# Patient Record
Sex: FEMALE | Race: BLACK OR AFRICAN AMERICAN | NOT HISPANIC OR LATINO | Employment: FULL TIME | URBAN - METROPOLITAN AREA
[De-identification: names, ages, dates, MRNs, and addresses within clinical notes are randomized per-mention and may not be internally consistent; named-entity substitution may affect disease eponyms.]

---

## 2017-11-29 PROBLEM — M35.00 SJOGREN'S SYNDROME: Status: ACTIVE | Noted: 2017-11-29

## 2017-11-29 PROBLEM — F41.1 GENERALIZED ANXIETY DISORDER: Status: ACTIVE | Noted: 2017-11-29

## 2017-11-29 PROBLEM — G40.909 SEIZURE DISORDER: Status: ACTIVE | Noted: 2017-11-29

## 2017-11-29 PROBLEM — G43.909 MIGRAINE WITHOUT STATUS MIGRAINOSUS, NOT INTRACTABLE: Status: ACTIVE | Noted: 2017-11-29

## 2017-12-11 ENCOUNTER — OFFICE VISIT (OUTPATIENT)
Dept: OTOLARYNGOLOGY | Facility: CLINIC | Age: 42
End: 2017-12-11
Payer: COMMERCIAL

## 2017-12-11 VITALS
SYSTOLIC BLOOD PRESSURE: 127 MMHG | HEART RATE: 79 BPM | TEMPERATURE: 98 F | WEIGHT: 237.44 LBS | BODY MASS INDEX: 38.32 KG/M2 | DIASTOLIC BLOOD PRESSURE: 91 MMHG

## 2017-12-11 DIAGNOSIS — M35.00 SJOGREN'S SYNDROME, WITH UNSPECIFIED ORGAN INVOLVEMENT: ICD-10-CM

## 2017-12-11 DIAGNOSIS — R09.A2 GLOBUS SENSATION: Primary | ICD-10-CM

## 2017-12-11 DIAGNOSIS — R49.0 HOARSENESS OF VOICE: ICD-10-CM

## 2017-12-11 PROCEDURE — 99204 OFFICE O/P NEW MOD 45 MIN: CPT | Mod: 25,S$GLB,, | Performed by: ORTHOPAEDIC SURGERY

## 2017-12-11 PROCEDURE — 31575 DIAGNOSTIC LARYNGOSCOPY: CPT | Mod: S$GLB,,, | Performed by: ORTHOPAEDIC SURGERY

## 2017-12-11 PROCEDURE — 99999 PR PBB SHADOW E&M-NEW PATIENT-LVL III: CPT | Mod: PBBFAC,,, | Performed by: ORTHOPAEDIC SURGERY

## 2017-12-11 NOTE — PROGRESS NOTES
Subjective:       Patient ID: Driss Lucia is a 42 y.o. female.    Chief Complaint: Lump in left side of throat    Patient is a very pleasant 42 year old female here to see me today for the first time for evaluation of a globus sensation.  She says that for the last several weeks she has had a constant sensation of there being something in her throat.  She has no difficulty swallowing either solids or liquids, and has no pain with swallowing.  She is a non-smoker.  She has not noted any reflux or heartburn.  She had a similar issue several years ago, and saw an ENT and at that time she was told it was related to her caffeine intake.  She decreased her caffeine, and it resolved at that time.  She now is drinking more caffeine.  She has had intermittent hoarseness over the last week.      Review of Systems   Constitutional: Negative for chills, fatigue, fever and unexpected weight change.   HENT: Positive for congestion, postnasal drip and voice change. Negative for dental problem, ear discharge, ear pain, facial swelling, hearing loss, nosebleeds, rhinorrhea, sinus pressure, sneezing, sore throat (globus sensation), tinnitus and trouble swallowing.    Eyes: Negative for redness, itching and visual disturbance.   Respiratory: Positive for shortness of breath (with exertion). Negative for cough, choking and wheezing.    Cardiovascular: Negative for chest pain and palpitations.   Gastrointestinal: Negative for abdominal pain.        No reflux.   Musculoskeletal: Negative for gait problem.   Skin: Negative for rash.   Neurological: Negative for dizziness, light-headedness and headaches.       Objective:      Physical Exam   Constitutional: She is oriented to person, place, and time. She appears well-developed and well-nourished. No distress.   HENT:   Head: Normocephalic and atraumatic.   Right Ear: Tympanic membrane, external ear and ear canal normal.   Left Ear: Tympanic membrane, external ear and ear canal  normal.   Nose: Nose normal. No mucosal edema, rhinorrhea, nasal deformity or septal deviation. No epistaxis. Right sinus exhibits no maxillary sinus tenderness and no frontal sinus tenderness. Left sinus exhibits no maxillary sinus tenderness and no frontal sinus tenderness.   Mouth/Throat: Uvula is midline, oropharynx is clear and moist and mucous membranes are normal. Mucous membranes are not pale and not dry. No dental caries. No oropharyngeal exudate or posterior oropharyngeal erythema.   Eyes: Conjunctivae, EOM and lids are normal. Pupils are equal, round, and reactive to light. Right eye exhibits no chemosis. Left eye exhibits no chemosis. Right conjunctiva is not injected. Left conjunctiva is not injected. No scleral icterus. Right eye exhibits normal extraocular motion and no nystagmus. Left eye exhibits normal extraocular motion and no nystagmus.   Neck: Trachea normal and phonation normal. No tracheal tenderness present. No tracheal deviation present. No thyroid mass and no thyromegaly present.   Cardiovascular: Intact distal pulses.    Pulmonary/Chest: Effort normal. No stridor. No respiratory distress.   Abdominal: She exhibits no distension.   Lymphadenopathy:        Head (right side): No submental, no submandibular, no preauricular, no posterior auricular and no occipital adenopathy present.        Head (left side): No submental, no submandibular, no preauricular, no posterior auricular and no occipital adenopathy present.     She has no cervical adenopathy.   Neurological: She is alert and oriented to person, place, and time. No cranial nerve deficit.   Skin: Skin is warm and dry. No rash noted. No erythema.   Psychiatric: She has a normal mood and affect. Her behavior is normal.       Due to indication in patient's history, presentation or risk factors,  a fiber optic exam was performed.    SEPARATE PROCEDURE NOTE:    ANESTHESIA:  Topical xylocaine with rogerio-synephrine  FINDINGS:  Normal  exam      PROCEDURE:  After verbal consent was obtained, the flexible scope was passed through the patient's nasal cavity without difficulty.  The nasopharynx (adenoid pad) and eustachian tube orifices were first visualized and were found to be normal, without masses or irregularity.  The posterior pharyngeal wall and base of tongue were then examined and no mass or irregular tissue was seen.  The scope was then advanced to the larynx, and the epiglottis, valleculae, and piriform sinuses were normal, without masses or mucosal irregularity.  The false vocal folds and true vocal folds were then examined and were found to have normal mobility (full abduction and adduction) and no masses or mucosal irregularity was seen.  The arytenoids and the interarytenoid area were normal.          Assessment:       1. Globus sensation    2. Hoarseness of voice    3. Sjogren's syndrome, with unspecified organ involvement        Plan:       1.  Globus sensation:  Discussed that her upper endoscopy today is normal, and does not show any worrisome ulcers or masses as the cause for her symptoms.  She had similar symptoms several years ago, and she noted that her previous throat issue resolved with decreasing caffeine.  She is again drinking lots of caffeine, will try and decrease.  She also has noted a postnasal drip, I would recommend she try Flonase sensimist daily for 3-4 weeks.  She has had increased life stress and anxiety, which can also worsen symptoms.  If not resolved in 2-3 weeks with above measures, would then consider esophagoscopy with GI.  2.  Hoarseness:  As above.  Normal laryngeal exam.  3.  Sjogren's:  Awaiting rheumatology appointment.  I would recommend she decrease caffeine and increase hydration.

## 2017-12-11 NOTE — LETTER
December 11, 2017      Jenny Dixon MD  [address] [facility] [address]           [facility]  [address] [place] [id number]  Phone: [phone]  Fax: [phone]          Patient: Driss Lucia   MR Number: [id number]   YOB: 1975   Date of Visit: 12/11/2017       Dear Dr. Jenny Dixon:    Thank you for referring Driss Lucia to me for evaluation. Attached you will find relevant portions of my assessment and plan of care.    If you have questions, please do not hesitate to call me. I look forward to following Driss Lucia along with you.    Sincerely,    Queenie Lopes MD    Enclosure  CC:  No Recipients    If you would like to receive this communication electronically, please contact [email or web address] or [phone] to request more information on [facility] Link access.    For providers and/or their staff who would like to refer a patient to [facility], please contact us through our one-stop-shop provider referral line, [facility], at [phone].    If you feel you have received this communication in error or would no longer like to receive these types of communications, please e-mail [email or web address]

## 2017-12-14 ENCOUNTER — TELEPHONE (OUTPATIENT)
Dept: RHEUMATOLOGY | Facility: CLINIC | Age: 42
End: 2017-12-14

## 2017-12-14 NOTE — TELEPHONE ENCOUNTER
Called patient regarding referral from Dr. Dixon, no answer, left message for pt to call clinic back.

## 2017-12-15 ENCOUNTER — TELEPHONE (OUTPATIENT)
Dept: RHEUMATOLOGY | Facility: CLINIC | Age: 42
End: 2017-12-15

## 2017-12-15 NOTE — TELEPHONE ENCOUNTER
Spoke with pt and scheduled a new patient appointment with Dr. Morris for 2.1.18 at 8.30 am. Pt verbalized understanding. Will mail apt slip.

## 2017-12-15 NOTE — TELEPHONE ENCOUNTER
----- Message from Carole Cornelius sent at 12/15/2017  9:49 AM CST -----  Contact: Self  Patient returning call. Please call back at [phone].      Thanks,  Carole Cornelius

## 2018-02-01 ENCOUNTER — LAB VISIT (OUTPATIENT)
Dept: LAB | Facility: HOSPITAL | Age: 43
End: 2018-02-01
Attending: INTERNAL MEDICINE
Payer: COMMERCIAL

## 2018-02-01 ENCOUNTER — OFFICE VISIT (OUTPATIENT)
Dept: RHEUMATOLOGY | Facility: CLINIC | Age: 43
End: 2018-02-01
Payer: COMMERCIAL

## 2018-02-01 VITALS
DIASTOLIC BLOOD PRESSURE: 82 MMHG | HEIGHT: 66 IN | WEIGHT: 240.5 LBS | BODY MASS INDEX: 38.65 KG/M2 | SYSTOLIC BLOOD PRESSURE: 122 MMHG | HEART RATE: 82 BPM

## 2018-02-01 DIAGNOSIS — M35.01 SJOGREN'S SYNDROME WITH KERATOCONJUNCTIVITIS SICCA: ICD-10-CM

## 2018-02-01 DIAGNOSIS — M35.01 SJOGREN'S SYNDROME WITH KERATOCONJUNCTIVITIS SICCA: Primary | ICD-10-CM

## 2018-02-01 LAB
ALBUMIN SERPL BCP-MCNC: 3.9 G/DL
ALP SERPL-CCNC: 52 U/L
ALT SERPL W/O P-5'-P-CCNC: 16 U/L
ANION GAP SERPL CALC-SCNC: 8 MMOL/L
AST SERPL-CCNC: 13 U/L
BASOPHILS # BLD AUTO: 0.02 K/UL
BASOPHILS NFR BLD: 0.2 %
BILIRUB SERPL-MCNC: 0.4 MG/DL
BUN SERPL-MCNC: 14 MG/DL
C3 SERPL-MCNC: 141 MG/DL
C4 SERPL-MCNC: 22 MG/DL
CALCIUM SERPL-MCNC: 9.5 MG/DL
CCP AB SER IA-ACNC: 1.2 U/ML
CHLORIDE SERPL-SCNC: 102 MMOL/L
CO2 SERPL-SCNC: 28 MMOL/L
CREAT SERPL-MCNC: 1 MG/DL
DIFFERENTIAL METHOD: ABNORMAL
EOSINOPHIL # BLD AUTO: 0.1 K/UL
EOSINOPHIL NFR BLD: 1.2 %
ERYTHROCYTE [DISTWIDTH] IN BLOOD BY AUTOMATED COUNT: 14 %
EST. GFR  (AFRICAN AMERICAN): >60 ML/MIN/1.73 M^2
EST. GFR  (NON AFRICAN AMERICAN): >60 ML/MIN/1.73 M^2
GLUCOSE SERPL-MCNC: 86 MG/DL
HCT VFR BLD AUTO: 35.8 %
HGB BLD-MCNC: 11.5 G/DL
LYMPHOCYTES # BLD AUTO: 3.1 K/UL
LYMPHOCYTES NFR BLD: 31.5 %
MCH RBC QN AUTO: 27.3 PG
MCHC RBC AUTO-ENTMCNC: 32.1 G/DL
MCV RBC AUTO: 85 FL
MONOCYTES # BLD AUTO: 0.7 K/UL
MONOCYTES NFR BLD: 7.5 %
NEUTROPHILS # BLD AUTO: 5.9 K/UL
NEUTROPHILS NFR BLD: 59.6 %
PLATELET # BLD AUTO: 337 K/UL
PMV BLD AUTO: 8.8 FL
POTASSIUM SERPL-SCNC: 3.6 MMOL/L
PROT SERPL-MCNC: 8.3 G/DL
RBC # BLD AUTO: 4.21 M/UL
SODIUM SERPL-SCNC: 138 MMOL/L
T4 FREE SERPL-MCNC: 1.02 NG/DL
TSH SERPL DL<=0.005 MIU/L-ACNC: 1.58 UIU/ML
WBC # BLD AUTO: 9.86 K/UL

## 2018-02-01 PROCEDURE — 86334 IMMUNOFIX E-PHORESIS SERUM: CPT | Mod: 26,,, | Performed by: PATHOLOGY

## 2018-02-01 PROCEDURE — 99999 PR PBB SHADOW E&M-EST. PATIENT-LVL III: CPT | Mod: PBBFAC,,, | Performed by: INTERNAL MEDICINE

## 2018-02-01 PROCEDURE — 80053 COMPREHEN METABOLIC PANEL: CPT | Mod: PO

## 2018-02-01 PROCEDURE — 84443 ASSAY THYROID STIM HORMONE: CPT

## 2018-02-01 PROCEDURE — 80074 ACUTE HEPATITIS PANEL: CPT

## 2018-02-01 PROCEDURE — 86334 IMMUNOFIX E-PHORESIS SERUM: CPT

## 2018-02-01 PROCEDURE — 84165 PROTEIN E-PHORESIS SERUM: CPT

## 2018-02-01 PROCEDURE — 36415 COLL VENOUS BLD VENIPUNCTURE: CPT | Mod: PO

## 2018-02-01 PROCEDURE — 85613 RUSSELL VIPER VENOM DILUTED: CPT

## 2018-02-01 PROCEDURE — 3008F BODY MASS INDEX DOCD: CPT | Mod: S$GLB,,, | Performed by: INTERNAL MEDICINE

## 2018-02-01 PROCEDURE — 99204 OFFICE O/P NEW MOD 45 MIN: CPT | Mod: S$GLB,,, | Performed by: INTERNAL MEDICINE

## 2018-02-01 PROCEDURE — 86146 BETA-2 GLYCOPROTEIN ANTIBODY: CPT

## 2018-02-01 PROCEDURE — 84439 ASSAY OF FREE THYROXINE: CPT

## 2018-02-01 PROCEDURE — 86235 NUCLEAR ANTIGEN ANTIBODY: CPT

## 2018-02-01 PROCEDURE — 84165 PROTEIN E-PHORESIS SERUM: CPT | Mod: 26,,, | Performed by: PATHOLOGY

## 2018-02-01 PROCEDURE — 86147 CARDIOLIPIN ANTIBODY EA IG: CPT

## 2018-02-01 PROCEDURE — 86038 ANTINUCLEAR ANTIBODIES: CPT

## 2018-02-01 PROCEDURE — 86160 COMPLEMENT ANTIGEN: CPT | Mod: 59

## 2018-02-01 PROCEDURE — 86235 NUCLEAR ANTIGEN ANTIBODY: CPT | Mod: 59

## 2018-02-01 PROCEDURE — 86200 CCP ANTIBODY: CPT

## 2018-02-01 PROCEDURE — 86160 COMPLEMENT ANTIGEN: CPT

## 2018-02-01 PROCEDURE — 86039 ANTINUCLEAR ANTIBODIES (ANA): CPT

## 2018-02-01 PROCEDURE — 85025 COMPLETE CBC W/AUTO DIFF WBC: CPT | Mod: PO

## 2018-02-01 RX ORDER — PREDNISONE 20 MG/1
TABLET ORAL
COMMUNITY
Start: 2018-01-26 | End: 2018-02-07

## 2018-02-01 RX ORDER — CETIRIZINE HYDROCHLORIDE 10 MG/1
TABLET ORAL
Refills: 0 | COMMUNITY
Start: 2018-01-26 | End: 2018-02-07

## 2018-02-01 RX ORDER — GABAPENTIN 100 MG/1
100 CAPSULE ORAL NIGHTLY
Qty: 30 CAPSULE | Refills: 5 | Status: SHIPPED | OUTPATIENT
Start: 2018-02-01 | End: 2018-03-26

## 2018-02-01 RX ORDER — HYDROXYCHLOROQUINE SULFATE 200 MG/1
200 TABLET, FILM COATED ORAL 2 TIMES DAILY
Qty: 60 TABLET | Refills: 6 | Status: SHIPPED | OUTPATIENT
Start: 2018-02-01 | End: 2018-07-16 | Stop reason: SDUPTHER

## 2018-02-01 RX ORDER — BENZONATATE 200 MG/1
CAPSULE ORAL
COMMUNITY
Start: 2018-01-26 | End: 2018-02-09

## 2018-02-01 RX ORDER — DOXYCYCLINE 100 MG/1
CAPSULE ORAL
COMMUNITY
Start: 2018-01-26 | End: 2018-02-07

## 2018-02-01 NOTE — PATIENT INSTRUCTIONS
Hydroxychloroquine tablets  What is this medicine?  HYDROXYCHLOROQUINE (shellie drox ee KLOR oh kwin) is used to treat rheumatoid arthritis and systemic lupus erythematosus. It is also used to treat malaria.  How should I use this medicine?  Take this medicine by mouth with a glass of water. Follow the directions on the prescription label. If this medicine upsets your stomach take it with food or milk. Take your doses at regular intervals. Do not take your medicine more often than directed.  Talk to your pediatrician regarding the use of this medicine in children. Special care may be needed.  What side effects may I notice from receiving this medicine?  Side effects that you should report to your doctor or health care professional as soon as possible:  · allergic reactions like skin rash, itching or hives, swelling of the face, lips, or tongue  · change in vision  · fever, infection  · hearing loss or ringing  · muscle weakness, tremor, or numbness  · redness, blistering, peeling or loosening of the skin, including inside the mouth  · seizures  · unusual bleeding or bruising  · unusually weak or tired  Side effects that usually do not require medical attention (report to your doctor or health care professional if they continue or are bothersome):  · change in coloration of the mouth or skin  · dizziness  · hair loss, lightening  · headache  · irritability, nervousness, nightmares  · loss of appetite  · stomach upset, diarrhea  What may interact with this medicine?  · antacids  · botulinum toxins  · digoxin  · kaolin  · penicillamine  What if I miss a dose?  If you miss a dose, take it as soon as you can. If it is almost time for your next dose, take only that dose. Do not take double or extra doses.  Where should I keep my medicine?  Keep out of the reach of children. In children, this medicine can cause overdose with small doses.  Store at room temperature between 15 and 30 degrees C (59 and 86 degrees F). Protect  from moisture and light. Throw away any unused medicine after the expiration date.  What should I tell my health care provider before I take this medicine?  They need to know if you have any of these conditions:  · alcoholism  · anemia or other blood disorder  · eye disease  · glucose 6-phosphate dehydrogenase (G6PD) deficiency  · liver disease  · porphyria  · psoriasis  · an unusual or allergic reaction to chloroquine, hydroxychloroquine, other medicines, foods, dyes, or preservatives  · pregnant or trying to get pregnant  · breast-feeding  What should I watch for while using this medicine?  Visit your doctor or health care professional for regular check ups. Tell your doctor if your symptoms do not improve. Arthritis symptoms may take several weeks to improve. If you are taking this medicine for a long time, you will need important blood work done. You will also need to have your eyes checked as directed.  This medicine can make you more sensitive to the sun. Keep out of the sun. If you cannot avoid being in the sun, wear protective clothing and use sunscreen. Do not use sun lamps or tanning beds/booths.  Avoid antacids and kaolin containing products for 2 hours before and after taking a dose of this medicine.  NOTE:This sheet is a summary. It may not cover all possible information. If you have questions about this medicine, talk to your doctor, pharmacist, or health care provider. Copyright© 2017 [employer] Standard        Gabapentin capsules or tablets  What is this medicine?  GABAPENTIN (GA ba pen tin) is used to control partial seizures in adults with epilepsy. It is also used to treat certain types of nerve pain.  How should I use this medicine?  Take this medicine by mouth with a glass of water. Follow the directions on the prescription label. You can take it with or without food. If it upsets your stomach, take it with food.Take your medicine at regular intervals. Do not take it more often than directed. Do not  stop taking except on your doctor's advice.  If you are directed to break the 600 or 800 mg tablets in half as part of your dose, the extra half tablet should be used for the next dose. If you have not used the extra half tablet within 28 days, it should be thrown away.  A special MedGuide will be given to you by the pharmacist with each prescription and refill. Be sure to read this information carefully each time.  Talk to your pediatrician regarding the use of this medicine in children. Special care may be needed.  What side effects may I notice from receiving this medicine?  Side effects that you should report to your doctor or health care professional as soon as possible:  · allergic reactions like skin rash, itching or hives, swelling of the face, lips, or tongue  · worsening of mood, thoughts or actions of suicide or dying  Side effects that usually do not require medical attention (report to your doctor or health care professional if they continue or are bothersome):  · constipation  · difficulty walking or controlling muscle movements  · dizziness  · nausea  · slurred speech  · tiredness  · tremors  · weight gain  What may interact with this medicine?  Do not take this medicine with any of the following medications:  · other gabapentin products  This medicine may also interact with the following medications:  · alcohol  · antacids  · antihistamines for allergy, cough and cold  · certain medicines for anxiety or sleep  · certain medicines for depression or psychotic disturbances  · homatropine; hydrocodone  · naproxen  · narcotic medicines (opiates) for pain  · phenothiazines like chlorpromazine, mesoridazine, prochlorperazine, thioridazine  What if I miss a dose?  If you miss a dose, take it as soon as you can. If it is almost time for your next dose, take only that dose. Do not take double or extra doses.  Where should I keep my medicine?  Keep out of reach of children.  This medicine may cause accidental  overdose and death if it taken by other adults, children, or pets. Mix any unused medicine with a substance like cat litter or coffee grounds. Then throw the medicine away in a sealed container like a sealed bag or a coffee can with a lid. Do not use the medicine after the expiration date.  Store at room temperature between 15 and 30 degrees C (59 and 86 degrees F).  What should I tell my health care provider before I take this medicine?  They need to know if you have any of these conditions:  · kidney disease  · suicidal thoughts, plans, or attempt; a previous suicide attempt by you or a family member  · an unusual or allergic reaction to gabapentin, other medicines, foods, dyes, or preservatives  · pregnant or trying to get pregnant  · breast-feeding  What should I watch for while using this medicine?  Visit your doctor or health care professional for regular checks on your progress. You may want to keep a record at home of how you feel your condition is responding to treatment. You may want to share this information with your doctor or health care professional at each visit. You should contact your doctor or health care professional if your seizures get worse or if you have any new types of seizures. Do not stop taking this medicine or any of your seizure medicines unless instructed by your doctor or health care professional. Stopping your medicine suddenly can increase your seizures or their severity.  Wear a medical identification bracelet or chain if you are taking this medicine for seizures, and carry a card that lists all your medications.  You may get drowsy, dizzy, or have blurred vision. Do not drive, use machinery, or do anything that needs mental alertness until you know how this medicine affects you. To reduce dizzy or fainting spells, do not sit or stand up quickly, especially if you are an older patient. Alcohol can increase drowsiness and dizziness. Avoid alcoholic drinks.  Your mouth may get dry.  Chewing sugarless gum or sucking hard candy, and drinking plenty of water will help.  The use of this medicine may increase the chance of suicidal thoughts or actions. Pay special attention to how you are responding while on this medicine. Any worsening of mood, or thoughts of suicide or dying should be reported to your health care professional right away.  Women who become pregnant while using this medicine may enroll in the [place] American Antiepileptic Drug Pregnancy Registry by calling 1-507.844.9473. This registry collects information about the safety of antiepileptic drug use during pregnancy.  NOTE:This sheet is a summary. It may not cover all possible information. If you have questions about this medicine, talk to your doctor, pharmacist, or health care provider. Copyright© 2017 [employer] Standard

## 2018-02-01 NOTE — PROGRESS NOTES
CC: routine f/u sjogrens       History of Present Illness:  Driss carvalho 42 y.o.yo female   Patient Active Problem List   Diagnosis    Seizure disorder    Sjogren's syndrome    Migraine without status migrainosus, not intractable    Generalized anxiety disorder     Referred for further evaluation of sjogrens syndrome by Jenny Dixon MD  she was diagnosed with sjogrens in  , when she presented with joint pains and swelling in hands/ feet . Associated with   Tingling/ numbnesss. She mentions of dry eyes and uses tear drops and they help. She has dry mouth and drinks lots of water.   + RP  She had reddish rash  Like burn on face in the past but none in last 1 year.   Today she mentions of arthralgias with involvement of  hands, feet , knees , shins, elbows .  Associated with early am stiffness , but then gets better as day goes by.  She takes aleve prn and it helps   She had 1 miscarriage and 1    Sometimes feels food struck in throat   She is on vit d supp  Mother has RA     Past medical history:  Asthma  Seizures , last 2 weeks ago     Past surgical history:  None     Social History   Substance Use Topics    Smoking status: Never Smoker    Smokeless tobacco: Never Used    Alcohol use Yes       Family History   Problem Relation Age of Onset    Arthritis Mother     Diabetes Father     Diabetes Maternal Grandmother     Hypertension Maternal Grandmother     Arthritis Maternal Grandmother     Diabetes Maternal Grandfather     Heart disease Maternal Grandfather     Hypertension Maternal Grandfather     Diabetes Paternal Grandmother     Heart disease Paternal Grandmother     Hypertension Paternal Grandmother     Heart disease Paternal Grandfather        Review of patient's allergies indicates:   Allergen Reactions    Sulfa (sulfonamide antibiotics) Swelling             Review of Systems:  Constitutional: Denies fever, chills. + weight gain   Fatigue: yes   Muscle weakness:  no  Headaches: no new headaches  Eyes: No redness   +dryness.  No recent visual changes.  ENT: + dry mouth. No oral or nasal ulcers.  Card: No chest pain.  Resp: No cough or sob.   Gastro: No nausea or vomiting.  No heartburn.  Constipation: no  Diarrhea: no  Genito:  No dysuria.  No genital ulcers.  Skin: No rash.  Raynauds:+  Neuro: + numbness / tingling.   Psych: No depression+  anxiety  Endo:  + thirst.  Heme: no abnormal bleeding or bruising    OBJECTIVE:     Vital Signs   Vitals:    02/01/18 0847   BP: 122/82   Pulse: 82     Physical Exam:  General Appearance:  NAD.   Gait: not favoring.  HEENT: PERRL.  Eyes not dry or injected.  No nasal ulcers.  Mouth not dry, no oral lesions.  Lymph: cervical, supraclavicular or axillary nodes: none abnormal   Cardio: no irregularity of S1 or S2.  No gallops or rubs.   Resp: Normal respiratory motion. Clear to auscultation bilaterally.   No abnormal chest conformation.  Abd: Soft, non-tender, nondistended.  No masses.   Skin: Head and neck,  and extremities examined. No rashes.   Neuro: Ox3.   Cranial nerves II-XII grossly intact.   Sensation intact  in both distal LE and upper extremities to light touch.  Musculoskeletal Exam: no synovitis     Right Side Rheumatological Exam     Examination finds the shoulder, elbow, wrist, knee, 1st PIP, 1st MCP, 2nd PIP, 2nd MCP, 3rd PIP, 3rd MCP, 4th PIP, 4th MCP, 5th PIP and 5th MCP normal.  + tinels   Left Side Rheumatological Exam     Examination finds the shoulder, elbow, wrist, knee, 1st PIP, 1st MCP, 2nd PIP, 2nd MCP, 3rd PIP, 3rd MCP, 4th PIP, 4th MCP, 5th PIP and 5th MCP normal.  + tinels    Tender points: +++  Muscle strength:Equal and full in all mm groups of the upper and lower ext.    Laboratory:   Results for orders placed or performed in visit on 01/08/18   C-reactive protein   Result Value Ref Range    CRP 8.1 (H) 0.0 - 4.9 mg/L     Imaging :none   Notes reviewed  Other procedures:    ASSESSMENT/PLAN:     Sjogren's  syndrome with keratoconjunctivitis sicca  -     Comprehensive metabolic panel; Standing  -     CBC auto differential; Standing  -     C4 complement; Future; Expected date: 02/01/2018  -     C3 complement; Future; Expected date: 02/01/2018  -     Cyclic citrul peptide antibody, IgG; Future; Expected date: 02/01/2018  -     T4, free; Future; Expected date: 02/01/2018  -     Urinalysis; Standing  -     Protein electrophoresis, serum; Future; Expected date: 02/01/2018  -     Immunofixation electrophoresis; Future; Expected date: 02/01/2018  -     TSH; Future; Expected date: 02/01/2018  -     Protein / creatinine ratio, urine; Standing  -     Hepatitis panel, acute; Future  -     DRVVT; Future; Expected date: 02/01/2018  -     Cardiolipin antibody; Future; Expected date: 02/01/2018  -     Beta-2 glycoprotein antibodies; Future; Expected date: 02/01/2018  -     hydroxychloroquine (PLAQUENIL) 200 mg tablet; Take 1 tablet (200 mg total) by mouth 2 (two) times daily.  Dispense: 60 tablet; Refill: 6  -     Ambulatory Referral to Ophthalmology  -     gabapentin (NEURONTIN) 100 MG capsule; Take 1 capsule (100 mg total) by mouth every evening.  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 Medication literature given to patient up  discharge  Gabapentin is preg C , use contraception   Plaquenil - ocular toxicity , rare myositis

## 2018-02-01 NOTE — LETTER
February 1, 2018      Jenny Dixon MD  [address] [facility] [id number] [place] [address] Rheumatology  [address] [facility] [address]  Phone: [phone]  Fax: [phone]          Patient: Driss Lucia   MR Number: [id number]   YOB: 1975   Date of Visit: 2/1/2018       Dear Dr. Jenny Dixon:    Thank you for referring Driss Lucia to me for evaluation. Attached you will find relevant portions of my assessment and plan of care.    If you have questions, please do not hesitate to call me. I look forward to following Driss Lucia along with you.    Sincerely,    Sabrina Morris MD    Enclosure  CC:  No Recipients    If you would like to receive this communication electronically, please contact [email or web address] or [phone] to request more information on [facility] access.    For providers and/or their staff who would like to refer a patient to [facility], please contact us through our one-stop-shop provider referral line, [facility], at [phone].    If you feel you have received this communication in error or would no longer like to receive these types of communications, please e-mail [email or web address]

## 2018-02-02 LAB
ANA SER QL IF: POSITIVE
ANA TITR SER IF: NORMAL {TITER}
CARDIOLIPIN IGG SER IA-ACNC: 10.92 GPL
CARDIOLIPIN IGM SER IA-ACNC: <9.4 MPL
HAV IGM SERPL QL IA: NEGATIVE
HBV CORE IGM SERPL QL IA: NEGATIVE
HBV SURFACE AG SERPL QL IA: NEGATIVE
HCV AB SERPL QL IA: NEGATIVE
INTERPRETATION SERPL IFE-IMP: NORMAL
LA PPP-IMP: NEGATIVE
PATHOLOGIST INTERPRETATION IFE: NORMAL

## 2018-02-03 LAB
B2 GLYCOPROT1 IGA SER QL: <9 SAU
B2 GLYCOPROT1 IGG SER QL: 21 SGU
B2 GLYCOPROT1 IGM SER QL: <9 SMU

## 2018-02-05 ENCOUNTER — TELEPHONE (OUTPATIENT)
Dept: RHEUMATOLOGY | Facility: CLINIC | Age: 43
End: 2018-02-05

## 2018-02-05 LAB
ALBUMIN SERPL ELPH-MCNC: 4.24 G/DL
ALPHA1 GLOB SERPL ELPH-MCNC: 0.35 G/DL
ALPHA2 GLOB SERPL ELPH-MCNC: 0.76 G/DL
B-GLOBULIN SERPL ELPH-MCNC: 0.81 G/DL
ENA SCL70 AB SER-ACNC: 43 UNITS
GAMMA GLOB SERPL ELPH-MCNC: 1.84 G/DL
PROT SERPL-MCNC: 8 G/DL

## 2018-02-05 NOTE — TELEPHONE ENCOUNTER
----- Message from Sabrina Morris MD sent at 2/5/2018  2:29 PM CST -----  Please schedule early follow up to discuss results in a week

## 2018-02-06 LAB
ANTI SM ANTIBODY: 7.19 EU
ANTI SM/RNP ANTIBODY: 30.58 EU
ANTI-SM INTERPRETATION: NEGATIVE
ANTI-SM/RNP INTERPRETATION: POSITIVE
ANTI-SSA ANTIBODY: 93.17 EU
ANTI-SSA INTERPRETATION: POSITIVE
ANTI-SSB ANTIBODY: 89.7 EU
ANTI-SSB INTERPRETATION: POSITIVE
DSDNA AB SER-ACNC: ABNORMAL [IU]/ML
PATHOLOGIST INTERPRETATION SPE: NORMAL

## 2018-02-07 ENCOUNTER — OFFICE VISIT (OUTPATIENT)
Dept: OPHTHALMOLOGY | Facility: CLINIC | Age: 43
End: 2018-02-07
Payer: COMMERCIAL

## 2018-02-07 DIAGNOSIS — Z79.899 LONG-TERM USE OF PLAQUENIL: ICD-10-CM

## 2018-02-07 DIAGNOSIS — H52.4 BILATERAL PRESBYOPIA: ICD-10-CM

## 2018-02-07 DIAGNOSIS — M35.00 SJOGREN'S SYNDROME, WITH UNSPECIFIED ORGAN INVOLVEMENT: Primary | ICD-10-CM

## 2018-02-07 DIAGNOSIS — Z83.511 FAMILY HISTORY OF GLAUCOMA IN MOTHER: ICD-10-CM

## 2018-02-07 DIAGNOSIS — H52.03 HYPEROPIA, BILATERAL: ICD-10-CM

## 2018-02-07 PROCEDURE — 92134 CPTRZ OPH DX IMG PST SGM RTA: CPT | Mod: ,,, | Performed by: OPTOMETRIST

## 2018-02-07 PROCEDURE — 92004 COMPRE OPH EXAM NEW PT 1/>: CPT | Mod: S$GLB,,, | Performed by: OPTOMETRIST

## 2018-02-07 PROCEDURE — 92015 DETERMINE REFRACTIVE STATE: CPT | Mod: S$GLB,,, | Performed by: OPTOMETRIST

## 2018-02-07 PROCEDURE — 99999 PR PBB SHADOW E&M-EST. PATIENT-LVL I: CPT | Mod: PBBFAC,,, | Performed by: OPTOMETRIST

## 2018-02-07 PROCEDURE — 92082 INTERMEDIATE VISUAL FIELD XM: CPT | Mod: ,,, | Performed by: OPTOMETRIST

## 2018-02-07 NOTE — PROGRESS NOTES
HPI     Concerns About Ocular Health    Additional comments: Plaquenil           Comments   Decrease near visual acuity x 7-8 months. New patient last eye exam   08/2017. Update glasses RX. Plaquenil check. Patient just started   plaquenil x 6 days.        Last edited by Myah Maya on 2/7/2018  8:42 AM. (History)            Assessment /Plan     For exam results, see Encounter Report.    Sjogren's syndrome, with unspecified organ involvement    Long-term use of Plaquenil    Family history of glaucoma in mother    Hyperopia, bilateral    Bilateral presbyopia      Mild keratitis.    Mom had glaucoma drain surgery. Pt has normal IOP, healthy ONH OU.    VF and mOCT show no plaquenil toxicity OU.    AT prn OU    RTC 1 year VF and mOCT

## 2018-02-07 NOTE — LETTER
February 7, 2018      Sabrina Morris MD  [id number] [place] [address] [address]ila [address] [id number]           [facility]  [address] [facility]  Sheila [place] [address]  Phone: [phone]  Fax: [phone]          Patient: Driss Lucia   MR Number: [id number]   YOB: 1975   Date of Visit: 2/7/2018       Dear Dr. Sabrina Morris:    Thank you for referring Driss Lucia to me for evaluation. Attached you will find relevant portions of my assessment and plan of care.    If you have questions, please do not hesitate to call me. I look forward to following Driss Lucia along with you.    Sincerely,    Nikita Love, OD    Enclosure  CC:  No Recipients    If you would like to receive this communication electronically, please contact [email or web address] or [phone] to request more information on [employer] access.    For providers and/or their staff who would like to refer a patient to [facility], please contact us through our one-stop-shop provider referral line, [facility], at [phone].    If you feel you have received this communication in error or would no longer like to receive these types of communications, please e-mail [email or web address]

## 2018-02-09 ENCOUNTER — OFFICE VISIT (OUTPATIENT)
Dept: RHEUMATOLOGY | Facility: CLINIC | Age: 43
End: 2018-02-09
Payer: COMMERCIAL

## 2018-02-09 ENCOUNTER — LAB VISIT (OUTPATIENT)
Dept: LAB | Facility: HOSPITAL | Age: 43
End: 2018-02-09
Attending: INTERNAL MEDICINE
Payer: COMMERCIAL

## 2018-02-09 VITALS
BODY MASS INDEX: 38.34 KG/M2 | WEIGHT: 238.56 LBS | HEART RATE: 75 BPM | DIASTOLIC BLOOD PRESSURE: 72 MMHG | SYSTOLIC BLOOD PRESSURE: 122 MMHG | HEIGHT: 66 IN

## 2018-02-09 DIAGNOSIS — M35.00 SJOGREN'S SYNDROME, WITH UNSPECIFIED ORGAN INVOLVEMENT: Primary | ICD-10-CM

## 2018-02-09 DIAGNOSIS — M35.00 SJOGREN'S SYNDROME, WITH UNSPECIFIED ORGAN INVOLVEMENT: ICD-10-CM

## 2018-02-09 DIAGNOSIS — R06.02 SHORTNESS OF BREATH: ICD-10-CM

## 2018-02-09 DIAGNOSIS — M34.9 SCLERODERMA: ICD-10-CM

## 2018-02-09 LAB
BILIRUB UR QL STRIP: NEGATIVE
CLARITY UR: ABNORMAL
COLOR UR: ABNORMAL
GLUCOSE UR QL STRIP: NEGATIVE
HGB UR QL STRIP: ABNORMAL
KETONES UR QL STRIP: NEGATIVE
LEUKOCYTE ESTERASE UR QL STRIP: NEGATIVE
MICROSCOPIC COMMENT: ABNORMAL
NITRITE UR QL STRIP: NEGATIVE
PH UR STRIP: 6 [PH] (ref 5–8)
PROT UR QL STRIP: NEGATIVE
RBC #/AREA URNS HPF: 2 /HPF (ref 0–4)
SP GR UR STRIP: 1.01 (ref 1–1.03)
SQUAMOUS #/AREA URNS HPF: 12 /HPF
URN SPEC COLLECT METH UR: ABNORMAL
WBC CLUMPS URNS QL MICRO: ABNORMAL

## 2018-02-09 PROCEDURE — 81000 URINALYSIS NONAUTO W/SCOPE: CPT | Mod: PO

## 2018-02-09 PROCEDURE — 99999 PR PBB SHADOW E&M-EST. PATIENT-LVL III: CPT | Mod: PBBFAC,,, | Performed by: INTERNAL MEDICINE

## 2018-02-09 PROCEDURE — 99214 OFFICE O/P EST MOD 30 MIN: CPT | Mod: S$GLB,,, | Performed by: INTERNAL MEDICINE

## 2018-02-09 PROCEDURE — 3008F BODY MASS INDEX DOCD: CPT | Mod: S$GLB,,, | Performed by: INTERNAL MEDICINE

## 2018-02-09 NOTE — PROGRESS NOTES
CC: routine f/u sjogrens       History of Present Illness:  Driss carvalho 42 y.o.yo female   Patient Active Problem List   Diagnosis    Seizure disorder    Sjogren's syndrome    Migraine without status migrainosus, not intractable    Generalized anxiety disorder    Scleroderma    Shortness of breath     PCP :   Jenny Dixon MD     Here for f/u and review of labs   Since last visit she has started plaquenil 200 mg po bid and tolerating well   She continues to have arthralgias inv hands , knees , elbows   + am stiffness which improves later in the day   + RP stable , no skin ulcerations , telangiectasias   She has a feeling of something struck in throat always , she had in  and EGD/ colon neg then   She also c/o SOB  Noted at rest , aggravated with exertion , uses inhalers prn , noted x month now     Denies skin thickening , no tightness around mouth   persistant dry  mouth  Has seen opthalmology , uses tear drops prn   Uses PPI for GERD     Initial visit :  she was diagnosed with sjogrens in  , when she presented with joint pains and swelling in hands/ feet . Associated with   Tingling/ numbnesss. She mentions of dry eyes and uses tear drops and they help. She has dry mouth and drinks lots of water.   + RP  She had reddish rash  Like burn on face in the past but none in last 1 year.    she mentions of arthralgias with involvement of  hands, feet , knees , shins, elbows .  Associated with early am stiffness , but then gets better as day goes by.  She takes aleve prn and it helps   She had 1 miscarriage and 1    Sometimes feels food struck in throat   She is on vit d supp  Mother has RA     Past medical history:  Asthma  Seizures , last 2 weeks ago     Past surgical history:  None     Social History   Substance Use Topics    Smoking status: Never Smoker    Smokeless tobacco: Never Used    Alcohol use Yes       Family History   Problem Relation Age of Onset    Arthritis Mother      Glaucoma Mother     Hypertension Mother     Diabetes Father     Diabetes Maternal Grandmother     Hypertension Maternal Grandmother     Arthritis Maternal Grandmother     Cataracts Maternal Grandmother     Diabetes Maternal Grandfather     Heart disease Maternal Grandfather     Hypertension Maternal Grandfather     Diabetes Paternal Grandmother     Heart disease Paternal Grandmother     Hypertension Paternal Grandmother     Heart disease Paternal Grandfather        Review of patient's allergies indicates:   Allergen Reactions    Sulfa (sulfonamide antibiotics) Swelling             Review of Systems:  Constitutional: Denies fever, chills. + weight gain   Fatigue: yes   Muscle weakness: no  Headaches: no new headaches  Eyes: No redness   +dryness.  No recent visual changes.  ENT: + dry mouth. No oral or nasal ulcers.  Card: No chest pain.  Resp: No cough or sob.   Gastro: No nausea or vomiting.  No heartburn.  Constipation: no  Diarrhea: no  Genito:  No dysuria.  No genital ulcers.  Skin: No rash.  Raynauds:+  Neuro: + numbness / tingling.   Psych: No depression+  anxiety  Endo:  + thirst.  Heme: no abnormal bleeding or bruising    OBJECTIVE:     Vital Signs   Vitals:    02/09/18 0833   BP: 122/72   Pulse: 75     Physical Exam:  General Appearance:  NAD.   Gait: not favoring.  HEENT: PERRL.  Eyes not dry or injected.  No nasal ulcers.  Mouth not dry, no oral lesions.  Lymph: cervical, supraclavicular or axillary nodes: none abnormal   Cardio: no irregularity of S1 or S2.  No gallops or rubs.   Resp: Normal respiratory motion. Clear to auscultation bilaterally.   No abnormal chest conformation.  Abd: Soft, non-tender, nondistended.  No masses.   Skin: Head and neck,  and extremities examined. No rashes.   Neuro: Ox3.   Cranial nerves II-XII grossly intact.   Sensation intact  in both distal LE and upper extremities to light touch.  Musculoskeletal Exam: no synovitis     Right Side Rheumatological Exam      Examination finds the shoulder, elbow, wrist, knee, 1st PIP, 1st MCP, 2nd PIP, 2nd MCP, 3rd PIP, 3rd MCP, 4th PIP, 4th MCP, 5th PIP and 5th MCP normal.  + tinels   Left Side Rheumatological Exam     Examination finds the shoulder, elbow, wrist, knee, 1st PIP, 1st MCP, 2nd PIP, 2nd MCP, 3rd PIP, 3rd MCP, 4th PIP, 4th MCP, 5th PIP and 5th MCP normal.  + tinels    Tender points: +++  Muscle strength:Equal and full in all mm groups of the upper and lower ext.    Laboratory:   Results for orders placed or performed in visit on 02/01/18   Comprehensive metabolic panel   Result Value Ref Range    Sodium 138 136 - 145 mmol/L    Potassium 3.6 3.5 - 5.1 mmol/L    Chloride 102 95 - 110 mmol/L    CO2 28 23 - 29 mmol/L    Glucose 86 70 - 110 mg/dL    BUN, Bld 14 6 - 20 mg/dL    Creatinine 1.0 0.5 - 1.4 mg/dL    Calcium 9.5 8.7 - 10.5 mg/dL    Total Protein 8.3 6.0 - 8.4 g/dL    Albumin 3.9 3.5 - 5.2 g/dL    Total Bilirubin 0.4 0.1 - 1.0 mg/dL    Alkaline Phosphatase 52 (L) 55 - 135 U/L    AST 13 10 - 40 U/L    ALT 16 10 - 44 U/L    Anion Gap 8 8 - 16 mmol/L    eGFR if African American >60 >60 mL/min/1.73 m^2    eGFR if non African American >60 >60 mL/min/1.73 m^2   CBC auto differential   Result Value Ref Range    WBC 9.86 3.90 - 12.70 K/uL    RBC 4.21 4.00 - 5.40 M/uL    Hemoglobin 11.5 (L) 12.0 - 16.0 g/dL    Hematocrit 35.8 (L) 37.0 - 48.5 %    MCV 85 82 - 98 fL    MCH 27.3 27.0 - 31.0 pg    MCHC 32.1 32.0 - 36.0 g/dL    RDW 14.0 11.5 - 14.5 %    Platelets 337 150 - 350 K/uL    MPV 8.8 (L) 9.2 - 12.9 fL    Gran # (ANC) 5.9 1.8 - 7.7 K/uL    Lymph # 3.1 1.0 - 4.8 K/uL    Mono # 0.7 0.3 - 1.0 K/uL    Eos # 0.1 0.0 - 0.5 K/uL    Baso # 0.02 0.00 - 0.20 K/uL    Gran% 59.6 38.0 - 73.0 %    Lymph% 31.5 18.0 - 48.0 %    Mono% 7.5 4.0 - 15.0 %    Eosinophil% 1.2 0.0 - 8.0 %    Basophil% 0.2 0.0 - 1.9 %    Differential Method Automated    C4 complement   Result Value Ref Range    Complement (C-4) 22 11 - 44 mg/dL   C3 complement    Result Value Ref Range    Complement (C-3) 141 50 - 180 mg/dL   Cyclic citrul peptide antibody, IgG   Result Value Ref Range    CCP Antibodies 1.2 <5.0 U/mL   T4, free   Result Value Ref Range    Free T4 1.02 0.71 - 1.51 ng/dL   Protein electrophoresis, serum   Result Value Ref Range    Protein, Serum 8.0 6.0 - 8.4 g/dL    Albumin grams/dl 4.24 3.35 - 5.55 g/dL    Alpha-1 grams/dl 0.35 0.17 - 0.41 g/dL    Alpha-2 grams/dl 0.76 0.43 - 0.99 g/dL    Beta grams/dl 0.81 0.50 - 1.10 g/dL    Gamma grams/dl 1.84 (H) 0.67 - 1.58 g/dL   Immunofixation electrophoresis   Result Value Ref Range    Immunofix Interp. SEE COMMENT    TSH   Result Value Ref Range    TSH 1.583 0.400 - 4.000 uIU/mL   Hepatitis panel, acute   Result Value Ref Range    Hepatitis B Surface Ag Negative     Hep B C IgM Negative     Hep A IgM Negative     Hepatitis C Ab Negative    DRVVT   Result Value Ref Range    DRVVT, Lupus Anticoagulant Negative Negative   Cardiolipin antibody   Result Value Ref Range    APA Isotype IgG 10.92 0.00 - 14.99 GPL    APA Isotype IgM <9.40 0.00 - 12.49 MPL   Beta-2 glycoprotein antibodies   Result Value Ref Range    Beta-2 Glyco 1 IgG 21 (H) <=20 SGU    Beta-2 Glyco 1 IgM <9 <=20 SMU    Beta-2 Glyco 1 IgA <9 <=20 RAYRAY   AMAURY   Result Value Ref Range    AMAURY Screen Positive (A) Negative <1:160   Anti-scleroderma antibody   Result Value Ref Range    Scleroderma SCL- 43 (H) <20 UNITS   AMAURY Titer   Result Value Ref Range    AMAURY HEP-2 Titer Positive 1:1280 Speckled  Atypical     AMAURY Profile   Result Value Ref Range    Anti Sm Antibody 7.19 0.00 - 19.99 EU    Anti-Sm Interpretation Negative Negative    Anti-SSA Antibody 93.17 (H) 0.00 - 19.99 EU    Anti-SSA Interpretation Positive (A) Negative    Anti-SSB Antibody 89.70 (H) 0.00 - 19.99 EU    Anti-SSB Interpretation Positive (A) Negative    ds DNA Ab Negative 1:10 Negative 1:10    Anti Sm/RNP Antibody 30.58 (H) 0.00 - 19.99 EU    Anti-Sm/RNP Interpretation Positive (A) Negative    Pathologist Interpretation LETICIA   Result Value Ref Range    Pathologist Interpretation LETICIA REVIEWED    Pathologist Interpretation SPE   Result Value Ref Range    Pathologist Interpretation SPE REVIEWED      Imaging :none   Notes reviewed  Other procedures:    ASSESSMENT/PLAN:     Sjogren's syndrome, with unspecified organ involvement  -     2D echo with color flow doppler; Future  -     Complete PFT with bronchodilator; Future; Expected date: 02/09/2018  -     CT Chest Without Contrast; Future; Expected date: 02/09/2018  -     Urinalysis; Standing  -     Protein / creatinine ratio, urine; Standing    Shortness of breath  -     2D echo with color flow doppler; Future  -     Complete PFT with bronchodilator; Future; Expected date: 02/09/2018  -     CT Chest Without Contrast; Future; Expected date: 02/09/2018    Scleroderma  -     2D echo with color flow doppler; Future  -     Complete PFT with bronchodilator; Future; Expected date: 02/09/2018  -     CT Chest Without Contrast; Future; Expected date: 02/09/2018  -     Urinalysis; Standing  -     Protein / creatinine ratio, urine; Standing  -     RNA polymerase III Ab, IgG; Future; Expected date: 02/09/2018  -     Beta-2 glycoprotein antibodies; Future; Expected date: 02/09/2018  -     Ambulatory Referral to Gastroenterology  -     CK; Future; Expected date: 02/09/2018      42 yr old with  1:  SSc sine scleroderma /Sjogrens overlap (+1:1289 speckled ,  AMAURY , SSa , SSb + ve )    With RP, sicca symptoms , arthralgias , GERD   With normal complements now   Tear drops, avoid sodas    Plaquenil 200 mg po bid   Advised on annual eye exams   GI referrel , continue PPI   BP stable , will monitor , ACE  As needed   Renal stable - f/u UA   RP stable - supportive care , avoid colds   Implications of sjogrens and fetal risk discussed     2: SOB :  Check  PFTs , ECHO , HRCT   Watch for ILD/ PAH     3: Fibromyalgia :  Associated with allodynia   Add gabapentin 100 mg qhs   Exercise    Weight loss     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   Brace   Gabapentin 100 mg qhs     5: Beta 2 GP low titer + ve :  No h/o clots   Repeat in 12 weeks       3 month return with labs     Risks vs Benefits and potential side effects of medication prescribed today were discussed with patient.   Gabapentin is preg C , use contraception   Plaquenil - ocular toxicity , rare myositis

## 2018-03-07 DIAGNOSIS — M35.00 SJOGREN'S SYNDROME, WITH UNSPECIFIED ORGAN INVOLVEMENT: Primary | ICD-10-CM

## 2018-03-07 NOTE — TELEPHONE ENCOUNTER
----- Message from Monica Maya sent at 3/7/2018  8:21 AM CST -----  Contact: pt  The pt request a call concerning FMLA paperwork and she is having joint pain and wants to be advised, the pt can be reached at [phone]///[email or web address]

## 2018-03-07 NOTE — TELEPHONE ENCOUNTER
Patient states that she is having severe pain. States that pain is all over . Some swelling in hands -- ankles --knees. Was not able to get out of bed yesterday. Just a little better today than yesterday. Wants to know if there is anything she can take or do to help with the pain and swelling. Patient states that she took Aleve yesterday and it was no help.

## 2018-03-08 ENCOUNTER — TELEPHONE (OUTPATIENT)
Dept: RHEUMATOLOGY | Facility: CLINIC | Age: 43
End: 2018-03-08

## 2018-03-08 RX ORDER — CYCLOBENZAPRINE HCL 10 MG
10 TABLET ORAL 2 TIMES DAILY PRN
Qty: 60 TABLET | Refills: 0 | Status: SHIPPED | OUTPATIENT
Start: 2018-03-08 | End: 2018-04-05 | Stop reason: SDUPTHER

## 2018-03-08 NOTE — TELEPHONE ENCOUNTER
Spoke with patient and informed her of Dr. Morris's advisement. Ms. Lucia states understanding and that she will bring papers for FMLA regarding missing work due to the pain.

## 2018-03-08 NOTE — TELEPHONE ENCOUNTER
Flexeril e scribed , let her know to    Let her come in if she still has issues and persistant swelling

## 2018-03-08 NOTE — TELEPHONE ENCOUNTER
----- Message from Eliot Valdes sent at 3/8/2018  3:54 PM CST -----  Contact: pt   Pt was returning the nurse call.      [phone] ([place])

## 2018-03-20 ENCOUNTER — TELEPHONE (OUTPATIENT)
Dept: RHEUMATOLOGY | Facility: CLINIC | Age: 43
End: 2018-03-20

## 2018-03-20 NOTE — TELEPHONE ENCOUNTER
Patient notified that forms were received but have not been completed. Will let her know when they are ready for pickup.

## 2018-03-20 NOTE — TELEPHONE ENCOUNTER
----- Message from Alexandra Parikh sent at 3/20/2018  8:18 AM CDT -----  Contact: pt   Call pt regarding the status of paperwork.   [phone] (rhiv)

## 2018-03-23 ENCOUNTER — TELEPHONE (OUTPATIENT)
Dept: RHEUMATOLOGY | Facility: CLINIC | Age: 43
End: 2018-03-23

## 2018-03-23 NOTE — TELEPHONE ENCOUNTER
Spoke with Dr. Morris and per verbal direction, paperwork will be discussed with patient at next visit.         Left message for pt to call clinic back.

## 2018-03-23 NOTE — TELEPHONE ENCOUNTER
----- Message from Magalis Taylor sent at 3/23/2018  9:28 AM CDT -----  Contact: Driss Naqvi at [phone] or [phone] Regarding paperwork that was to be filled out by today no later than Monday at the latest

## 2018-03-26 ENCOUNTER — LAB VISIT (OUTPATIENT)
Dept: LAB | Facility: HOSPITAL | Age: 43
End: 2018-03-26
Attending: INTERNAL MEDICINE
Payer: COMMERCIAL

## 2018-03-26 ENCOUNTER — OFFICE VISIT (OUTPATIENT)
Dept: RHEUMATOLOGY | Facility: CLINIC | Age: 43
End: 2018-03-26
Payer: COMMERCIAL

## 2018-03-26 ENCOUNTER — PROCEDURE VISIT (OUTPATIENT)
Dept: PULMONOLOGY | Facility: CLINIC | Age: 43
End: 2018-03-26
Payer: COMMERCIAL

## 2018-03-26 VITALS
BODY MASS INDEX: 38.27 KG/M2 | WEIGHT: 238.13 LBS | HEIGHT: 66 IN | HEART RATE: 91 BPM | DIASTOLIC BLOOD PRESSURE: 77 MMHG | SYSTOLIC BLOOD PRESSURE: 117 MMHG

## 2018-03-26 DIAGNOSIS — M35.00 SJOGREN'S SYNDROME, WITH UNSPECIFIED ORGAN INVOLVEMENT: Primary | ICD-10-CM

## 2018-03-26 DIAGNOSIS — M34.9 SCLERODERMA: ICD-10-CM

## 2018-03-26 DIAGNOSIS — M35.01 SJOGREN'S SYNDROME WITH KERATOCONJUNCTIVITIS SICCA: ICD-10-CM

## 2018-03-26 DIAGNOSIS — R06.02 SHORTNESS OF BREATH: ICD-10-CM

## 2018-03-26 DIAGNOSIS — M35.00 SJOGREN'S SYNDROME, WITH UNSPECIFIED ORGAN INVOLVEMENT: ICD-10-CM

## 2018-03-26 LAB
ALBUMIN SERPL BCP-MCNC: 3.9 G/DL
ALP SERPL-CCNC: 46 U/L
ALT SERPL W/O P-5'-P-CCNC: 14 U/L
ANION GAP SERPL CALC-SCNC: 8 MMOL/L
AST SERPL-CCNC: 15 U/L
BASOPHILS # BLD AUTO: 0.01 K/UL
BASOPHILS NFR BLD: 0.2 %
BILIRUB SERPL-MCNC: 0.4 MG/DL
BUN SERPL-MCNC: 11 MG/DL
CALCIUM SERPL-MCNC: 9.6 MG/DL
CHLORIDE SERPL-SCNC: 103 MMOL/L
CO2 SERPL-SCNC: 26 MMOL/L
CREAT SERPL-MCNC: 1 MG/DL
DIFFERENTIAL METHOD: ABNORMAL
EOSINOPHIL # BLD AUTO: 0.2 K/UL
EOSINOPHIL NFR BLD: 3.9 %
ERYTHROCYTE [DISTWIDTH] IN BLOOD BY AUTOMATED COUNT: 13.1 %
EST. GFR  (AFRICAN AMERICAN): >60 ML/MIN/1.73 M^2
EST. GFR  (NON AFRICAN AMERICAN): >60 ML/MIN/1.73 M^2
GLUCOSE SERPL-MCNC: 96 MG/DL
HCT VFR BLD AUTO: 35 %
HGB BLD-MCNC: 11.7 G/DL
LYMPHOCYTES # BLD AUTO: 1.9 K/UL
LYMPHOCYTES NFR BLD: 33.9 %
MCH RBC QN AUTO: 27.6 PG
MCHC RBC AUTO-ENTMCNC: 33.4 G/DL
MCV RBC AUTO: 83 FL
MONOCYTES # BLD AUTO: 0.4 K/UL
MONOCYTES NFR BLD: 6.3 %
NEUTROPHILS # BLD AUTO: 3.1 K/UL
NEUTROPHILS NFR BLD: 55.7 %
PLATELET # BLD AUTO: 279 K/UL
PMV BLD AUTO: 9.1 FL
POST FEF 25 75: 0.5 L/S (ref 2.1–3.57)
POST FET 100: 4.52 S
POST FEV1 FVC: 53 %
POST FEV1: 0.78 L (ref 2.33–2.98)
POST FIF 50: 0.9 L/S
POST FVC: 1.47 L (ref 2.88–3.64)
POST PEF: 1.05 L/S (ref 5.74–7.89)
POTASSIUM SERPL-SCNC: 3.7 MMOL/L
PRE DLCO: 10.28 ML/MMHG/MIN (ref 24.64–32.93)
PRE ERV: 0.25 L
PRE FEF 25 75: 0.51 L/S (ref 2.1–3.57)
PRE FET 100: 8.75 S
PRE FEV1 FVC: 50 %
PRE FEV1: 1 L (ref 2.33–2.98)
PRE FIF 50: 1.25 L/S
PRE FRC PL: 2.39 L (ref 1.86–2.81)
PRE FVC: 2 L (ref 2.88–3.64)
PRE KROGHS K: 4.92 1/MIN
PRE PEF: 1.57 L/S (ref 5.74–7.89)
PRE RV: 1.22 L (ref 1.51–2.21)
PRE SVC: 2 L
PRE TLC: 3.22 L (ref 5.04–5.81)
PREDICTED DLCO: 28.79 ML/MMHG/MIN (ref 24.64–32.93)
PREDICTED FEV1 FVC: 81.46 % (ref 76.56–86.36)
PREDICTED FEV1: 2.65 L (ref 2.33–2.98)
PREDICTED FRC N2: 2.34 L (ref 1.86–2.81)
PREDICTED FRC PL: 2.34 L (ref 1.86–2.81)
PREDICTED FVC: 3.26 L (ref 2.88–3.64)
PREDICTED RV: 1.86 L (ref 1.51–2.21)
PREDICTED SVC: 3.52 L
PREDICTED TLC: 5.42 L (ref 5.04–5.81)
PROT SERPL-MCNC: 8.5 G/DL
PROVOCATION PROTOCOL: ABNORMAL
RBC # BLD AUTO: 4.24 M/UL
SODIUM SERPL-SCNC: 137 MMOL/L
WBC # BLD AUTO: 5.6 K/UL

## 2018-03-26 PROCEDURE — 85025 COMPLETE CBC W/AUTO DIFF WBC: CPT | Mod: PO

## 2018-03-26 PROCEDURE — 94060 EVALUATION OF WHEEZING: CPT | Mod: S$GLB,,, | Performed by: INTERNAL MEDICINE

## 2018-03-26 PROCEDURE — 86146 BETA-2 GLYCOPROTEIN ANTIBODY: CPT

## 2018-03-26 PROCEDURE — 99999 PR PBB SHADOW E&M-EST. PATIENT-LVL III: CPT | Mod: PBBFAC,,, | Performed by: INTERNAL MEDICINE

## 2018-03-26 PROCEDURE — 99214 OFFICE O/P EST MOD 30 MIN: CPT | Mod: S$GLB,,, | Performed by: INTERNAL MEDICINE

## 2018-03-26 PROCEDURE — 83520 IMMUNOASSAY QUANT NOS NONAB: CPT

## 2018-03-26 PROCEDURE — 80053 COMPREHEN METABOLIC PANEL: CPT | Mod: PO

## 2018-03-26 PROCEDURE — 94729 DIFFUSING CAPACITY: CPT | Mod: S$GLB,,, | Performed by: INTERNAL MEDICINE

## 2018-03-26 PROCEDURE — 94726 PLETHYSMOGRAPHY LUNG VOLUMES: CPT | Mod: S$GLB,,, | Performed by: INTERNAL MEDICINE

## 2018-03-26 PROCEDURE — 36415 COLL VENOUS BLD VENIPUNCTURE: CPT | Mod: PO

## 2018-03-26 RX ORDER — PREDNISONE 5 MG/1
5 TABLET ORAL DAILY
Qty: 21 TABLET | Refills: 0 | Status: SHIPPED | OUTPATIENT
Start: 2018-03-26 | End: 2018-04-16

## 2018-03-26 NOTE — PROGRESS NOTES
CC: routine f/u sjogrens       History of Present Illness:  Driss carvalho 42 y.o.yo female   Patient Active Problem List   Diagnosis    Seizure disorder    Sjogren's syndrome    Migraine without status migrainosus, not intractable    Generalized anxiety disorder    Scleroderma    Shortness of breath     PCP :   Jenny Dixon MD     Here for f/u   Since last visit she has started plaquenil 200 mg po bid and tolerating well   She continues to have arthralgias inv hands , knees , elbows   + am stiffness which improves later in the day   + RP stable , no skin ulcerations , telangiectasias   She has a feeling of something struck in throat always , she had in  and EGD/ colon neg then   She also c/o SOB  Noted at rest , aggravated with exertion , uses inhalers prn , all worsened since last visit   + h/o asthma - on albuterol   Her s/s have all worsened since last visit   She is having more pain / stiffness in hands   She is feeling very bad some days and cant go to work   Her ECHO / CT chest / PFts are pending   + dysphagia , at times feels food struck     persistant dry  mouth  Has seen opthalmology , uses tear drops prn   Uses PPI for GERD   Stopped gabapentin due to intolerance issues     Initial visit :  she was diagnosed with sjogrens in  , when she presented with joint pains and swelling in hands/ feet . Associated with   Tingling/ numbnesss. She mentions of dry eyes and uses tear drops and they help. She has dry mouth and drinks lots of water.   + RP  She had reddish rash  Like burn on face in the past but none in last 1 year.    she mentions of arthralgias with involvement of  hands, feet , knees , shins, elbows .  Associated with early am stiffness , but then gets better as day goes by.  She takes aleve prn and it helps   She had 1 miscarriage and 1    Sometimes feels food struck in throat   She is on vit d supp  Mother has RA     Past medical history:  Asthma  Seizures      Past surgical history:  None     Social History   Substance Use Topics    Smoking status: Never Smoker    Smokeless tobacco: Never Used    Alcohol use Yes       Family History   Problem Relation Age of Onset    Arthritis Mother     Glaucoma Mother     Hypertension Mother     Diabetes Father     Diabetes Maternal Grandmother     Hypertension Maternal Grandmother     Arthritis Maternal Grandmother     Cataracts Maternal Grandmother     Diabetes Maternal Grandfather     Heart disease Maternal Grandfather     Hypertension Maternal Grandfather     Diabetes Paternal Grandmother     Heart disease Paternal Grandmother     Hypertension Paternal Grandmother     Heart disease Paternal Grandfather        Review of patient's allergies indicates:   Allergen Reactions    Sulfa (sulfonamide antibiotics) Swelling             Review of Systems:  Constitutional: Denies fever, chills. + weight gain   Fatigue: yes   Muscle weakness: no  Headaches: no new headaches  Eyes: No redness   +dryness.  No recent visual changes.  ENT: + dry mouth. No oral or nasal ulcers.  Card: No chest pain.  Resp: No cough + sob.   Gastro: No nausea or vomiting.  No heartburn.  Constipation: no  Diarrhea: no  Genito:  No dysuria.  No genital ulcers.  Skin: No rash.  Raynauds:+  Neuro: + numbness / tingling.   Psych: No depression+  anxiety  Endo:  + thirst.  Heme: no abnormal bleeding or bruising    OBJECTIVE:     Vital Signs   Vitals:    03/26/18 0939   BP: 117/77   Pulse: 91     Physical Exam:  General Appearance:  NAD.   Gait: not favoring.  HEENT: PERRL.  Eyes not dry or injected.  No nasal ulcers.  Mouth not dry, no oral lesions.  Lymph: cervical, supraclavicular or axillary nodes: none abnormal   Cardio: no irregularity of S1 or S2.  No gallops or rubs.   Resp: Normal respiratory motion. Clear to auscultation bilaterally.   No abnormal chest conformation.  Abd: Soft, non-tender, nondistended.  No masses.   Skin: Head and neck,   and extremities examined. No rashes.   Neuro: Ox3.   Cranial nerves II-XII grossly intact.   Sensation intact  in both distal LE and upper extremities to light touch.  Musculoskeletal Exam: no synovitis     B/l hands appear puffy with T inv all MCP/PIP/DIP   With some loss of wrinkling     Tender points: +++  Muscle strength:Equal and full in all mm groups of the upper and lower ext.    Laboratory:   Results for orders placed or performed in visit on 02/09/18   Urinalysis   Result Value Ref Range    Specimen UA Urine, Clean Catch     Color, UA Straw Yellow, Straw, Letty    Appearance, UA Hazy (A) Clear    pH, UA 6.0 5.0 - 8.0    Specific Gravity, UA 1.010 1.005 - 1.030    Protein, UA Negative Negative    Glucose, UA Negative Negative    Ketones, UA Negative Negative    Bilirubin (UA) Negative Negative    Occult Blood UA 3+ (A) Negative    Nitrite, UA Negative Negative    Leukocytes, UA Negative Negative   Urinalysis Microscopic   Result Value Ref Range    RBC, UA 2 0 - 4 /hpf    WBC Clumps, UA Occasional (A) None-Rare    Squam Epithel, UA 12 /hpf    Microscopic Comment SEE COMMENT      Imaging :none   Notes reviewed  Other procedures:    ASSESSMENT/PLAN:     Sjogren's syndrome, with unspecified organ involvement  -     CBC auto differential; Standing  -     Comprehensive metabolic panel; Standing  -     C-reactive protein; Standing  -     Sedimentation rate, manual; Standing  -     C4 complement; Standing; Expected date: 03/26/2018  -     C3 complement; Standing; Expected date: 03/26/2018  -     Urinalysis; Standing  -     Protein / creatinine ratio, urine; Standing    Scleroderma  -     CBC auto differential; Standing  -     Comprehensive metabolic panel; Standing  -     C-reactive protein; Standing  -     Sedimentation rate, manual; Standing  -     C4 complement; Standing; Expected date: 03/26/2018  -     C3 complement; Standing; Expected date: 03/26/2018  -     Urinalysis; Standing  -     Protein / creatinine  ratio, urine; Standing  -     predniSONE (DELTASONE) 5 MG tablet; Take 1 tablet (5 mg total) by mouth once daily.  Dispense: 21 tablet; Refill: 0      42 yr old with  1:  Scleroderma  /Sjogrens overlap (+1:1289 speckled ,  AMAURY , SSa , SSb + ve )    With stable  RP, sicca symptoms, GERD   With worsening SOB and possible skin tightening in hands and worsening arthritis   C/w Plaquenil 200 mg po bid   Add low dose prednisone for 2-3 weeks   Will f/u ct chest / PFts / ECHO and decide on MTX vs cellcept     Advised on annual eye exams   GI referrel , continue PPI   BP stable , will monitor , ACE  As needed   Renal stable - f/u UA   RP stable - supportive care , avoid colds   Implications of sjogrens and fetal risk discussed     2: SOB :  Check  PFTs , ECHO , HRCT   Watch for ILD/ PAH     3: Fibromyalgia :  Associated with allodynia   Exercise   Weight loss     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   Brace   Intolerant to gabapentin     5: Beta 2 GP low titer + ve :  No h/o clots   Repeat in 12 weeks     Labs today , return as scheduled in April   FMLA given x 3 months     Risks vs Benefits and potential side effects of medication prescribed today were discussed with patient.     Plaquenil - ocular toxicity , rare myositis , skin pigmentation     Please call if any worsening noted

## 2018-03-26 NOTE — PATIENT INSTRUCTIONS
Called [facility] to schedule a same day appointment for patient but no answer and patient was in clinic. Spoke with Dr. Morris who states that [facility] could wait until next available on 04/04/2018.

## 2018-03-28 ENCOUNTER — HOSPITAL ENCOUNTER (OUTPATIENT)
Dept: RADIOLOGY | Facility: HOSPITAL | Age: 43
Discharge: HOME | End: 2018-03-28
Attending: INTERNAL MEDICINE
Payer: COMMERCIAL

## 2018-03-28 DIAGNOSIS — M35.00 SJOGREN'S SYNDROME, WITH UNSPECIFIED ORGAN INVOLVEMENT: ICD-10-CM

## 2018-03-28 DIAGNOSIS — M34.9 SCLERODERMA: ICD-10-CM

## 2018-03-28 DIAGNOSIS — R06.02 SHORTNESS OF BREATH: ICD-10-CM

## 2018-03-28 LAB — RNA POLYMERASE III ANTIBODIES, IGG, SERUM: <10 U

## 2018-03-28 PROCEDURE — 71250 CT THORAX DX C-: CPT | Mod: TC

## 2018-03-29 LAB
B2 GLYCOPROT1 IGA SER QL: <9 SAU
B2 GLYCOPROT1 IGG SER QL: 13 SGU
B2 GLYCOPROT1 IGM SER QL: <9 SMU

## 2018-04-04 ENCOUNTER — CLINICAL SUPPORT (OUTPATIENT)
Dept: CARDIOLOGY | Facility: CLINIC | Age: 43
End: 2018-04-04
Attending: INTERNAL MEDICINE
Payer: COMMERCIAL

## 2018-04-04 DIAGNOSIS — M34.9 SCLERODERMA: ICD-10-CM

## 2018-04-04 DIAGNOSIS — R06.02 SHORTNESS OF BREATH: ICD-10-CM

## 2018-04-04 DIAGNOSIS — M35.00 SJOGREN'S SYNDROME, WITH UNSPECIFIED ORGAN INVOLVEMENT: ICD-10-CM

## 2018-04-04 LAB
DIASTOLIC DYSFUNCTION: NO
ESTIMATED PA SYSTOLIC PRESSURE: 17.9
MITRAL VALVE REGURGITATION: NORMAL
RETIRED EF AND QEF - SEE NOTES: 55 (ref 55–65)

## 2018-04-04 PROCEDURE — 93306 TTE W/DOPPLER COMPLETE: CPT | Mod: S$GLB,,, | Performed by: INTERNAL MEDICINE

## 2018-04-05 DIAGNOSIS — M35.00 SJOGREN'S SYNDROME, WITH UNSPECIFIED ORGAN INVOLVEMENT: ICD-10-CM

## 2018-04-08 RX ORDER — CYCLOBENZAPRINE HCL 10 MG
10 TABLET ORAL 2 TIMES DAILY PRN
Qty: 60 TABLET | Refills: 0 | Status: SHIPPED | OUTPATIENT
Start: 2018-04-08 | End: 2018-05-10 | Stop reason: SDUPTHER

## 2018-04-09 ENCOUNTER — TELEPHONE (OUTPATIENT)
Dept: RHEUMATOLOGY | Facility: CLINIC | Age: 43
End: 2018-04-09

## 2018-04-09 ENCOUNTER — OFFICE VISIT (OUTPATIENT)
Dept: RHEUMATOLOGY | Facility: CLINIC | Age: 43
End: 2018-04-09
Payer: COMMERCIAL

## 2018-04-09 VITALS
WEIGHT: 237.63 LBS | SYSTOLIC BLOOD PRESSURE: 114 MMHG | DIASTOLIC BLOOD PRESSURE: 80 MMHG | HEART RATE: 81 BPM | HEIGHT: 66 IN | BODY MASS INDEX: 38.19 KG/M2

## 2018-04-09 DIAGNOSIS — M35.00 SJOGREN'S SYNDROME, WITH UNSPECIFIED ORGAN INVOLVEMENT: ICD-10-CM

## 2018-04-09 DIAGNOSIS — M34.9 SCLERODERMA: Primary | ICD-10-CM

## 2018-04-09 PROCEDURE — 99999 PR PBB SHADOW E&M-EST. PATIENT-LVL III: CPT | Mod: PBBFAC,,, | Performed by: INTERNAL MEDICINE

## 2018-04-09 PROCEDURE — 99214 OFFICE O/P EST MOD 30 MIN: CPT | Mod: S$GLB,,, | Performed by: INTERNAL MEDICINE

## 2018-04-09 RX ORDER — METHOTREXATE 2.5 MG/1
15 TABLET ORAL
Qty: 30 TABLET | Refills: 3 | Status: SHIPPED | OUTPATIENT
Start: 2018-04-09 | End: 2018-07-16 | Stop reason: SDUPTHER

## 2018-04-09 RX ORDER — FOLIC ACID 1 MG/1
1 TABLET ORAL DAILY
Qty: 90 TABLET | Refills: 2 | Status: SHIPPED | OUTPATIENT
Start: 2018-04-09 | End: 2019-02-20 | Stop reason: SDUPTHER

## 2018-04-09 NOTE — PROGRESS NOTES
CC: routine f/u sjogrens       History of Present Illness:  Zachary Turner a 42 y.o.yo female   Patient Active Problem List   Diagnosis    Seizure disorder    Sjogren's syndrome    Migraine without status migrainosus, not intractable    Generalized anxiety disorder    Scleroderma    Shortness of breath     PCP :   Jenny Dixon MD     Here for f/u   She is on plaquenil 200 mg bid , seen by opthalmologist since starting plaqenil, tolerating well    Last visit she c/o  arthralgias inv hands , knees , elbows with severe pain   + am stiffness which improves later in the day   She was started on prednisone 5 mg daily with improvement in pain and stiffness     + RP stable , no skin ulcerations , telangiectasias   She has a feeling of something struck in throat always , she had in  and EGD/ colon neg then   But now it got better since being on flonase     + h/o asthma - on albuterol , last visit she has had worsening SOB , but now feeling better     Her ECHO / CT chest were normal with no evidence of ILD   Inconclusive PFTs     persistant dry  mouth  Has seen opthalmology , uses tear drops prn   Uses PPI for GERD   Stopped gabapentin due to intolerance issues     Initial visit :  she was diagnosed with sjogrens in  , when she presented with joint pains and swelling in hands/ feet . Associated with   Tingling/ numbnesss. She mentions of dry eyes and uses tear drops and they help. She has dry mouth and drinks lots of water.   + RP  She had reddish rash  Like burn on face in the past but none in last 1 year.    she mentions of arthralgias with involvement of  hands, feet , knees , shins, elbows .  Associated with early am stiffness , but then gets better as day goes by.  She takes aleve prn and it helps   She had 1 miscarriage and 1    Sometimes feels food struck in throat   She is on vit d supp  Mother has RA     Past medical history:  Asthma  Seizures     Past surgical history:  None      Social History   Substance Use Topics    Smoking status: Never Smoker    Smokeless tobacco: Never Used    Alcohol use Yes       Family History   Problem Relation Age of Onset    Arthritis Mother     Glaucoma Mother     Hypertension Mother     Diabetes Father     Diabetes Maternal Grandmother     Hypertension Maternal Grandmother     Arthritis Maternal Grandmother     Cataracts Maternal Grandmother     Diabetes Maternal Grandfather     Heart disease Maternal Grandfather     Hypertension Maternal Grandfather     Diabetes Paternal Grandmother     Heart disease Paternal Grandmother     Hypertension Paternal Grandmother     Heart disease Paternal Grandfather        Review of patient's allergies indicates:   Allergen Reactions    Sulfa (sulfonamide antibiotics) Swelling             Review of Systems:  Constitutional: Denies fever, chills. + weight gain   Fatigue: yes   Muscle weakness: no  Headaches: no new headaches  Eyes: No redness   +dryness.  No recent visual changes.  ENT: + dry mouth. No oral or nasal ulcers.  Card: No chest pain.  Resp: No cough + sob.   Gastro: No nausea or vomiting.  No heartburn.  Constipation: no  Diarrhea: no  Genito:  No dysuria.  No genital ulcers.  Skin: No rash.  Raynauds:+  Neuro: + numbness / tingling.   Psych: No depression+  anxiety  Endo:  + thirst.  Heme: no abnormal bleeding or bruising    OBJECTIVE:     Vital Signs   Vitals:    04/09/18 1011   BP: 114/80   Pulse: 81     Physical Exam:  General Appearance:  NAD.   Gait: not favoring.  HEENT: PERRL.  Eyes not dry or injected.  No nasal ulcers.  Mouth not dry, no oral lesions.  Lymph: cervical, supraclavicular or axillary nodes: none abnormal   Cardio: no irregularity of S1 or S2.  No gallops or rubs.   Resp: Normal respiratory motion. Clear to auscultation bilaterally.   No abnormal chest conformation.  Abd: Soft, non-tender, nondistended.  No masses.   Skin: Head and neck,  and extremities examined. No  rashes.   Neuro: Ox3.   Cranial nerves II-XII grossly intact.   Sensation intact  in both distal LE and upper extremities to light touch.  Musculoskeletal Exam: no synovitis   No swelling , only Tenderness along PIP noted     Tender points: +  Muscle strength:Equal and full in all mm groups of the upper and lower ext.    Laboratory:   Results for orders placed or performed in visit on 04/04/18   2D echo with color flow doppler   Result Value Ref Range    EF 55 55 - 65    Mitral Valve Regurgitation TRIVIAL     Diastolic Dysfunction No     Est. PA Systolic Pressure 17.9      Imaging :    Ct chest :  No significant abnormalities within the chest.    Notes reviewed  Other procedures:    ASSESSMENT/PLAN:     Scleroderma    Sjogren's syndrome, with unspecified organ involvement    Other orders  -     methotrexate 2.5 MG Tab; Take 6 tablets (15 mg total) by mouth every 7 days.  Dispense: 30 tablet; Refill: 3      42 yr old with  1:  Scleroderma  /Sjogrens overlap (+1:1280 speckled ,  AMAURY , SSa , SSb + ve )    With stable  RP, sicca symptoms, GERD   Worsening arthritis , now improved on prednisone   C/w Plaquenil 200 mg po bid   Add MTX 10 mg / week x 2 weeks then increase to 15 mg / week , with daily FA   Strictly advised her to use some form of contraception , MTX is highly teratogenic , she understands and agrees     Advised on annual eye exams   GI referrel , continue PPI   BP stable , will monitor , ACE  As needed   Renal stable - f/u UA   RP stable - supportive care , avoid colds   Implications of sjogrens and fetal risk discussed     2: SOB :  Related to asthma likely , improved now after prednisone    ECHO , HRCT no evidence of ILD or PAH   Inconclusive PFTs ,due to poor effort     3: Fibromyalgia :  Associated with allodynia   Exercise   Weight loss     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   Brace   Intolerant to gabapentin     5: Beta 2 GP low titer + ve :  No h/o clots   Repeat in 12 weeks  Negative     6: Hepatic steatosis  :  Incidental finding with normal LFTS   counselled on diet control  Weight loss   Avoid alcohol ( she takes ocassionally )  Recommend she f/u with PCP Dr Dixon further       3 month follow up   FMLA given x 3 months     Risks vs Benefits and potential side effects of medication prescribed today were discussed with patient.     Plaquenil - ocular toxicity , rare myositis , skin pigmentation   MTX - liver toxicity , infections , malignancy risk , teratogenic effects discussed     Please call if any worsening noted      Cc : Dr Jenny Dixon

## 2018-04-09 NOTE — TELEPHONE ENCOUNTER
----- Message from Sabrina Morris MD sent at 4/8/2018  9:09 PM CDT -----  Ct chest shows normal lung but showing fatty liver   Watch your diet , no alcohol   Will discuss further next visit

## 2018-04-09 NOTE — PATIENT INSTRUCTIONS
Start MTX 4 tablets/ week for 2 weeks then increase to 6 tab/ week if no issues. MTX (methotrexate) is one a week and Folic acid is daily. Please contact us if you have issues with your dosage change.  [phone]

## 2018-04-09 NOTE — TELEPHONE ENCOUNTER
Spoke with patient and informed her of her lab results and advisement below per Dr. Morris. MsSelma Zachary states understanding.

## 2018-04-10 NOTE — TELEPHONE ENCOUNTER
She is noted to have hepatic steatosis on CT scan   I advised her weight control, diet control ,and avoidance of alcohol   further she might need lipid panel , advised she discuss with PCP further in next visit     Cc Dr Dixon

## 2018-05-10 DIAGNOSIS — M35.00 SJOGREN'S SYNDROME, WITH UNSPECIFIED ORGAN INVOLVEMENT: ICD-10-CM

## 2018-05-10 RX ORDER — CYCLOBENZAPRINE HCL 10 MG
10 TABLET ORAL 2 TIMES DAILY PRN
Qty: 60 TABLET | Refills: 0 | Status: SHIPPED | OUTPATIENT
Start: 2018-05-10 | End: 2018-06-26 | Stop reason: SDUPTHER

## 2018-05-22 PROBLEM — R39.89 ABNORMAL URINE COLOR: Status: ACTIVE | Noted: 2018-05-22

## 2018-06-11 ENCOUNTER — HOSPITAL ENCOUNTER (EMERGENCY)
Facility: HOSPITAL | Age: 43
Discharge: HOME | End: 2018-06-12
Attending: EMERGENCY MEDICINE
Payer: COMMERCIAL

## 2018-06-11 DIAGNOSIS — R10.11 RIGHT UPPER QUADRANT ABDOMINAL PAIN: Primary | ICD-10-CM

## 2018-06-11 LAB
ALBUMIN SERPL BCP-MCNC: 3.8 G/DL
ALP SERPL-CCNC: 48 U/L
ALT SERPL W/O P-5'-P-CCNC: 15 U/L
ANION GAP SERPL CALC-SCNC: 13 MMOL/L
AST SERPL-CCNC: 29 U/L
B-HCG UR QL: NEGATIVE
BASOPHILS # BLD AUTO: 0.02 K/UL
BASOPHILS NFR BLD: 0.2 %
BILIRUB SERPL-MCNC: 0.4 MG/DL
BILIRUB UR QL STRIP: NEGATIVE
BUN SERPL-MCNC: 11 MG/DL
CALCIUM SERPL-MCNC: 9.6 MG/DL
CHLORIDE SERPL-SCNC: 106 MMOL/L
CLARITY UR: CLEAR
CO2 SERPL-SCNC: 20 MMOL/L
COLOR UR: YELLOW
CREAT SERPL-MCNC: 1 MG/DL
DIFFERENTIAL METHOD: ABNORMAL
EOSINOPHIL # BLD AUTO: 0.2 K/UL
EOSINOPHIL NFR BLD: 2 %
ERYTHROCYTE [DISTWIDTH] IN BLOOD BY AUTOMATED COUNT: 14.5 %
EST. GFR  (AFRICAN AMERICAN): >60 ML/MIN/1.73 M^2
EST. GFR  (NON AFRICAN AMERICAN): >60 ML/MIN/1.73 M^2
GLUCOSE SERPL-MCNC: 97 MG/DL
GLUCOSE UR QL STRIP: NEGATIVE
HCT VFR BLD AUTO: 33.7 %
HGB BLD-MCNC: 11.3 G/DL
HGB UR QL STRIP: ABNORMAL
KETONES UR QL STRIP: NEGATIVE
LEUKOCYTE ESTERASE UR QL STRIP: NEGATIVE
LIPASE SERPL-CCNC: 17 U/L
LYMPHOCYTES # BLD AUTO: 1.6 K/UL
LYMPHOCYTES NFR BLD: 18.4 %
MCH RBC QN AUTO: 27.8 PG
MCHC RBC AUTO-ENTMCNC: 33.5 G/DL
MCV RBC AUTO: 83 FL
MICROSCOPIC COMMENT: ABNORMAL
MONOCYTES # BLD AUTO: 0.6 K/UL
MONOCYTES NFR BLD: 7.4 %
NEUTROPHILS # BLD AUTO: 6.1 K/UL
NEUTROPHILS NFR BLD: 72 %
NITRITE UR QL STRIP: NEGATIVE
PH UR STRIP: 6 [PH] (ref 5–8)
PLATELET # BLD AUTO: 345 K/UL
PMV BLD AUTO: 8.5 FL
POTASSIUM SERPL-SCNC: 4.1 MMOL/L
PROT SERPL-MCNC: 8.5 G/DL
PROT UR QL STRIP: NEGATIVE
RBC # BLD AUTO: 4.07 M/UL
RBC #/AREA URNS HPF: 6 /HPF (ref 0–4)
SODIUM SERPL-SCNC: 139 MMOL/L
SP GR UR STRIP: 1.01 (ref 1–1.03)
URN SPEC COLLECT METH UR: ABNORMAL
UROBILINOGEN UR STRIP-ACNC: NEGATIVE EU/DL
WBC # BLD AUTO: 8.48 K/UL

## 2018-06-11 PROCEDURE — 99284 EMERGENCY DEPT VISIT MOD MDM: CPT | Mod: 25

## 2018-06-11 PROCEDURE — 81025 URINE PREGNANCY TEST: CPT

## 2018-06-11 PROCEDURE — 63600175 PHARM REV CODE 636 W HCPCS: Performed by: EMERGENCY MEDICINE

## 2018-06-11 PROCEDURE — 80053 COMPREHEN METABOLIC PANEL: CPT

## 2018-06-11 PROCEDURE — 96374 THER/PROPH/DIAG INJ IV PUSH: CPT

## 2018-06-11 PROCEDURE — 83690 ASSAY OF LIPASE: CPT

## 2018-06-11 PROCEDURE — 81000 URINALYSIS NONAUTO W/SCOPE: CPT

## 2018-06-11 PROCEDURE — 96375 TX/PRO/DX INJ NEW DRUG ADDON: CPT

## 2018-06-11 PROCEDURE — 85025 COMPLETE CBC W/AUTO DIFF WBC: CPT

## 2018-06-11 RX ORDER — HYDROMORPHONE HYDROCHLORIDE 1 MG/ML
1 INJECTION, SOLUTION INTRAMUSCULAR; INTRAVENOUS; SUBCUTANEOUS
Status: COMPLETED | OUTPATIENT
Start: 2018-06-11 | End: 2018-06-11

## 2018-06-11 RX ORDER — ONDANSETRON 2 MG/ML
4 INJECTION INTRAMUSCULAR; INTRAVENOUS
Status: COMPLETED | OUTPATIENT
Start: 2018-06-11 | End: 2018-06-11

## 2018-06-11 RX ADMIN — ONDANSETRON 4 MG: 2 INJECTION INTRAMUSCULAR; INTRAVENOUS at 10:06

## 2018-06-11 RX ADMIN — HYDROMORPHONE HYDROCHLORIDE 1 MG: 1 INJECTION, SOLUTION INTRAMUSCULAR; INTRAVENOUS; SUBCUTANEOUS at 10:06

## 2018-06-12 VITALS
DIASTOLIC BLOOD PRESSURE: 78 MMHG | TEMPERATURE: 99 F | WEIGHT: 229.25 LBS | HEIGHT: 66 IN | OXYGEN SATURATION: 99 % | RESPIRATION RATE: 18 BRPM | HEART RATE: 82 BPM | BODY MASS INDEX: 36.84 KG/M2 | SYSTOLIC BLOOD PRESSURE: 132 MMHG

## 2018-06-12 RX ORDER — ONDANSETRON 4 MG/1
4 TABLET, FILM COATED ORAL EVERY 6 HOURS
Qty: 12 TABLET | Refills: 0 | Status: SHIPPED | OUTPATIENT
Start: 2018-06-12 | End: 2021-11-29

## 2018-06-12 RX ORDER — TRAMADOL HYDROCHLORIDE 50 MG/1
50 TABLET ORAL EVERY 6 HOURS PRN
Qty: 12 TABLET | Refills: 0 | Status: SHIPPED | OUTPATIENT
Start: 2018-06-12 | End: 2018-06-22

## 2018-06-12 NOTE — ED PROVIDER NOTES
SCRIBE #1 NOTE: I, Jessa Padilla, am scribing for, and in the presence of, Oli Crane MD. I have scribed the entire note.      History      Chief Complaint   Patient presents with    Abdominal Pain     right side abd pain radiates around to right flank started last night worse today       Review of patient's allergies indicates:   Allergen Reactions    Sulfa (sulfonamide antibiotics) Swelling        HPI   HPI    6/11/2018, 8:22 PM   History obtained from the patient      History of Present Illness: Zachary Escalona is a 42 y.o. female patient who presents to the Emergency Department for RUQ abd pain which onset gradually last PM. She rates her pain a 9/10 in severity. Symptoms are constant and moderate in severity. Exacerbated by eating, mitigated by nothing. Associated sxs include nausea. Patient denies any fever, chills, emesis, diarrhea, constipation, hematochezia, dysuria, hematuria, frequency, vaginal bleeding/discharge, and all other sxs at this time. No further complaints or concerns at this time.       Arrival mode: Personal vehicle      PCP: Jenny Dixon MD       Past Medical History:  Past Medical History:   Diagnosis Date    Scleroderma     Seizures 01/12/2017       Past Surgical History:  History reviewed. No pertinent surgical history.    Family History:  Family History   Problem Relation Age of Onset    Arthritis Mother     Glaucoma Mother     Hypertension Mother     Diabetes Father     Diabetes Maternal Grandmother     Hypertension Maternal Grandmother     Arthritis Maternal Grandmother     Cataracts Maternal Grandmother     Diabetes Maternal Grandfather     Heart disease Maternal Grandfather     Hypertension Maternal Grandfather     Diabetes Paternal Grandmother     Heart disease Paternal Grandmother     Hypertension Paternal Grandmother     Heart disease Paternal Grandfather        Social History:  Social History     Social History Main Topics    Smoking status:  Never Smoker    Smokeless tobacco: Never Used    Alcohol use Yes    Drug use: No    Sexual activity: Yes       ROS   Review of Systems   Constitutional: Negative for chills and fever.   HENT: Negative for sore throat.    Respiratory: Negative for shortness of breath.    Cardiovascular: Negative for chest pain.   Gastrointestinal: Positive for abdominal pain (RUQ) and nausea. Negative for blood in stool, constipation, diarrhea and vomiting.   Genitourinary: Negative for dysuria, frequency, hematuria, vaginal bleeding and vaginal discharge.   Musculoskeletal: Negative for back pain.   Skin: Negative for rash.   Neurological: Negative for weakness.   Hematological: Does not bruise/bleed easily.   All other systems reviewed and are negative.    Physical Exam      Initial Vitals [06/11/18 1841]   BP Pulse Resp Temp SpO2   (!) 169/89 85 16 98.7 °F (37.1 °C) 97 %      MAP       --          Physical Exam  Nursing Notes and Vital Signs Reviewed.  Constitutional: Patient is in no acute distress. Well-developed and well-nourished.  Head: Atraumatic. Normocephalic.  Eyes: PERRL. EOM intact. Conjunctivae are not pale. No scleral icterus.  ENT: Mucous membranes are moist. Oropharynx is clear and symmetric.    Neck: Supple. Full ROM. No lymphadenopathy.  Cardiovascular: Regular rate. Regular rhythm. No murmurs, rubs, or gallops. Distal pulses are 2+ and symmetric.  Pulmonary/Chest: No respiratory distress. Clear to auscultation bilaterally. No wheezing or rales.  Abdominal: Soft and non-distended.  There is RUQ tenderness.  No rebound, guarding, or rigidity. Good bowel sounds.  Genitourinary: No CVA tenderness  Musculoskeletal: Moves all extremities. No obvious deformities. No edema. No calf tenderness.  Skin: Warm and dry.  Neurological:  Alert, awake, and appropriate.  Normal speech.  No acute focal neurological deficits are appreciated.  Psychiatric: Normal affect. Good eye contact. Appropriate in content.    ED Course   "  Procedures  ED Vital Signs:  Vitals:    06/11/18 1841 06/11/18 2235 06/12/18 0001 06/12/18 0025   BP: (!) 169/89 (!) 145/80 128/79 132/78   Pulse: 85 90 87 82   Resp: 16   18   Temp: 98.7 °F (37.1 °C)   98.6 °F (37 °C)   TempSrc: Oral      SpO2: 97% 96% 98% 99%   Weight: 104 kg (229 lb 4.5 oz)      Height: 5' 6" (1.676 m)          Abnormal Lab Results:  Labs Reviewed   URINALYSIS - Abnormal; Notable for the following:        Result Value    Occult Blood UA 2+ (*)     All other components within normal limits   CBC W/ AUTO DIFFERENTIAL - Abnormal; Notable for the following:     Hemoglobin 11.3 (*)     Hematocrit 33.7 (*)     MPV 8.5 (*)     All other components within normal limits   COMPREHENSIVE METABOLIC PANEL - Abnormal; Notable for the following:     CO2 20 (*)     Total Protein 8.5 (*)     Alkaline Phosphatase 48 (*)     All other components within normal limits   URINALYSIS MICROSCOPIC - Abnormal; Notable for the following:     RBC, UA 6 (*)     All other components within normal limits   PREGNANCY TEST, URINE RAPID   LIPASE        All Lab Results:  Results for orders placed or performed during the hospital encounter of 06/11/18   Urinalysis   Result Value Ref Range    Specimen UA Urine, Clean Catch     Color, UA Yellow Yellow, Straw, Letty    Appearance, UA Clear Clear    pH, UA 6.0 5.0 - 8.0    Specific Gravity, UA 1.015 1.005 - 1.030    Protein, UA Negative Negative    Glucose, UA Negative Negative    Ketones, UA Negative Negative    Bilirubin (UA) Negative Negative    Occult Blood UA 2+ (A) Negative    Nitrite, UA Negative Negative    Urobilinogen, UA Negative <2.0 EU/dL    Leukocytes, UA Negative Negative   Pregnancy, urine rapid (UPT)   Result Value Ref Range    Preg Test, Ur Negative    CBC W/ AUTO DIFFERENTIAL   Result Value Ref Range    WBC 8.48 3.90 - 12.70 K/uL    RBC 4.07 4.00 - 5.40 M/uL    Hemoglobin 11.3 (L) 12.0 - 16.0 g/dL    Hematocrit 33.7 (L) 37.0 - 48.5 %    MCV 83 82 - 98 fL    MCH 27.8 " 27.0 - 31.0 pg    MCHC 33.5 32.0 - 36.0 g/dL    RDW 14.5 11.5 - 14.5 %    Platelets 345 150 - 350 K/uL    MPV 8.5 (L) 9.2 - 12.9 fL    Gran # (ANC) 6.1 1.8 - 7.7 K/uL    Lymph # 1.6 1.0 - 4.8 K/uL    Mono # 0.6 0.3 - 1.0 K/uL    Eos # 0.2 0.0 - 0.5 K/uL    Baso # 0.02 0.00 - 0.20 K/uL    Gran% 72.0 38.0 - 73.0 %    Lymph% 18.4 18.0 - 48.0 %    Mono% 7.4 4.0 - 15.0 %    Eosinophil% 2.0 0.0 - 8.0 %    Basophil% 0.2 0.0 - 1.9 %    Differential Method Automated    Comp. Metabolic Panel   Result Value Ref Range    Sodium 139 136 - 145 mmol/L    Potassium 4.1 3.5 - 5.1 mmol/L    Chloride 106 95 - 110 mmol/L    CO2 20 (L) 23 - 29 mmol/L    Glucose 97 70 - 110 mg/dL    BUN, Bld 11 6 - 20 mg/dL    Creatinine 1.0 0.5 - 1.4 mg/dL    Calcium 9.6 8.7 - 10.5 mg/dL    Total Protein 8.5 (H) 6.0 - 8.4 g/dL    Albumin 3.8 3.5 - 5.2 g/dL    Total Bilirubin 0.4 0.1 - 1.0 mg/dL    Alkaline Phosphatase 48 (L) 55 - 135 U/L    AST 29 10 - 40 U/L    ALT 15 10 - 44 U/L    Anion Gap 13 8 - 16 mmol/L    eGFR if African American >60 >60 mL/min/1.73 m^2    eGFR if non African American >60 >60 mL/min/1.73 m^2   Lipase   Result Value Ref Range    Lipase 17 4 - 60 U/L   Urinalysis Microscopic   Result Value Ref Range    RBC, UA 6 (H) 0 - 4 /hpf    Microscopic Comment SEE COMMENT        Imaging Results:  Imaging Results          US Abdomen Limited (Final result)  Result time 06/11/18 22:40:35    Final result by Chente Cerrato MD (06/11/18 22:40:35)                 Impression:      Fatty liver.  No sonographic evidence of acute cholecystitis.      Electronically signed by: Chente Cerrato MD  Date:    06/11/2018  Time:    22:40             Narrative:    EXAMINATION:  US ABDOMEN LIMITED    CLINICAL HISTORY:  Acute right upper quadrant abdominal pain;    TECHNIQUE:  Limited ultrasound of the right upper quadrant of the abdomen (including pancreas, liver, gallbladder, common bile duct, and right kidney) was  performed.    COMPARISON:  None.    FINDINGS:  Liver: Normal in size, measuring 14.9 cm. Increased echotexture.  No focal hepatic lesions.    Gallbladder: No calculi, wall thickening, or pericholecystic fluid.  No sonographic Cornelius's sign.    Biliary system: The common duct is not dilated, measuring 4 mm.  No intrahepatic ductal dilatation.    Right kidney: Normal in size with no hydronephrosis, measuring 11 cm.    Miscellaneous: No upper abdominal ascites.                                        The Emergency Provider reviewed the vital signs and test results, which are outlined above.    ED Discussion     12:08 AM: Reassessed pt at this time.  Pt states her condition has improved at this time. She is tolerating PO. Discussed with pt all pertinent ED information and results. Discussed pt dx and plan of tx. Gave pt all f/u and return to the ED instructions. All questions and concerns were addressed at this time. Pt expresses understanding of information and instructions, and is comfortable with plan to discharge. Pt is stable for discharge.    I discussed with patient and/or family/caretaker that evaluation in the ED does not suggest any emergent or life threatening medical conditions requiring immediate intervention beyond what was provided in the ED, and I believe patient is safe for discharge.  Regardless, an unremarkable evaluation in the ED does not preclude the development or presence of a serious of life threatening condition. As such, patient was instructed to return immediately for any worsening or change in current symptoms.    Driving or other activities under influence of medications - Patient and/or family/caretaker was given a prescription for, or instructed to use a medicine that may impair ability to drive, operate machinery, or participate in other potentially dangerous activities.  Patient was instructed not to participate in these activities while under the influence of these medications.      ED  Medication(s):  Medications   HYDROmorphone injection 1 mg (1 mg Intravenous Given 6/11/18 2226)   ondansetron injection 4 mg (4 mg Intravenous Given 6/11/18 2226)       Discharge Medication List as of 6/12/2018 12:15 AM      START taking these medications    Details   ondansetron (ZOFRAN) 4 MG tablet Take 1 tablet (4 mg total) by mouth every 6 (six) hours., Starting Tue 6/12/2018, Normal      traMADol (ULTRAM) 50 mg tablet Take 1 tablet (50 mg total) by mouth every 6 (six) hours as needed for Pain., Starting Tue 6/12/2018, Until Fri 6/22/2018, Print             Follow-up Information     Jenny Dixon MD. Schedule an appointment as soon as possible for a visit in 3 days.    Specialty:  Internal Medicine  Why:  to follow-up on today's visit  Contact information:  [address] [facility] [address] [phone]             Go to  [facility]    Specialty:  Emergency Medicine  Why:  As needed, If symptoms worsen  Contact information:  [id number] [place] [id number] [phone]                   Medical Decision Making    Medical Decision Making:   Clinical Tests:   Lab Tests: Ordered and Reviewed  Radiological Study: Ordered and Reviewed           Scribe Attestation:   Scribe #1: I performed the above scribed service and the documentation accurately describes the services I performed. I attest to the accuracy of the note.    Attending:   Physician Attestation Statement for Scribe #1: I, Oli Crane MD, personally performed the services described in this documentation, as scribed by Jessa Padilla, in my presence, and it is both accurate and complete.          Clinical Impression       ICD-10-CM ICD-9-CM   1. Right upper quadrant abdominal pain R10.11 789.01       Disposition:   Disposition: Discharged  Condition: Stable         Oli Crane MD  06/16/18 0434

## 2018-06-26 DIAGNOSIS — M35.00 SJOGREN'S SYNDROME, WITH UNSPECIFIED ORGAN INVOLVEMENT: ICD-10-CM

## 2018-06-26 RX ORDER — CYCLOBENZAPRINE HCL 10 MG
10 TABLET ORAL 2 TIMES DAILY PRN
Qty: 60 TABLET | Refills: 0 | Status: SHIPPED | OUTPATIENT
Start: 2018-06-26 | End: 2022-02-17 | Stop reason: SDUPTHER

## 2018-07-09 ENCOUNTER — LAB VISIT (OUTPATIENT)
Dept: LAB | Facility: HOSPITAL | Age: 43
End: 2018-07-09
Attending: INTERNAL MEDICINE
Payer: COMMERCIAL

## 2018-07-09 DIAGNOSIS — M34.9 SCLERODERMA: ICD-10-CM

## 2018-07-09 DIAGNOSIS — M35.00 SJOGREN'S SYNDROME, WITH UNSPECIFIED ORGAN INVOLVEMENT: ICD-10-CM

## 2018-07-09 LAB
ALBUMIN SERPL BCP-MCNC: 3.7 G/DL
ALP SERPL-CCNC: 42 U/L
ALT SERPL W/O P-5'-P-CCNC: 10 U/L
ANION GAP SERPL CALC-SCNC: 6 MMOL/L
AST SERPL-CCNC: 12 U/L
BASOPHILS # BLD AUTO: 0.01 K/UL
BASOPHILS NFR BLD: 0.2 %
BILIRUB SERPL-MCNC: 0.4 MG/DL
BUN SERPL-MCNC: 13 MG/DL
C3 SERPL-MCNC: 134 MG/DL
C4 SERPL-MCNC: 25 MG/DL
CALCIUM SERPL-MCNC: 9.3 MG/DL
CHLORIDE SERPL-SCNC: 105 MMOL/L
CO2 SERPL-SCNC: 27 MMOL/L
CREAT SERPL-MCNC: 0.9 MG/DL
CRP SERPL-MCNC: 7.5 MG/L
DIFFERENTIAL METHOD: ABNORMAL
EOSINOPHIL # BLD AUTO: 0.2 K/UL
EOSINOPHIL NFR BLD: 4.1 %
ERYTHROCYTE [DISTWIDTH] IN BLOOD BY AUTOMATED COUNT: 14.1 %
ERYTHROCYTE [SEDIMENTATION RATE] IN BLOOD BY WESTERGREN METHOD: 30 MM/HR
EST. GFR  (AFRICAN AMERICAN): >60 ML/MIN/1.73 M^2
EST. GFR  (NON AFRICAN AMERICAN): >60 ML/MIN/1.73 M^2
GLUCOSE SERPL-MCNC: 96 MG/DL
HCT VFR BLD AUTO: 33.2 %
HGB BLD-MCNC: 10.9 G/DL
LYMPHOCYTES # BLD AUTO: 1.7 K/UL
LYMPHOCYTES NFR BLD: 33.1 %
MCH RBC QN AUTO: 27.5 PG
MCHC RBC AUTO-ENTMCNC: 32.8 G/DL
MCV RBC AUTO: 84 FL
MONOCYTES # BLD AUTO: 0.5 K/UL
MONOCYTES NFR BLD: 9.7 %
NEUTROPHILS # BLD AUTO: 2.7 K/UL
NEUTROPHILS NFR BLD: 52.9 %
PLATELET # BLD AUTO: 283 K/UL
PMV BLD AUTO: 8.9 FL
POTASSIUM SERPL-SCNC: 4 MMOL/L
PROT SERPL-MCNC: 7.7 G/DL
RBC # BLD AUTO: 3.96 M/UL
SODIUM SERPL-SCNC: 138 MMOL/L
WBC # BLD AUTO: 5.17 K/UL

## 2018-07-09 PROCEDURE — 86140 C-REACTIVE PROTEIN: CPT

## 2018-07-09 PROCEDURE — 86160 COMPLEMENT ANTIGEN: CPT

## 2018-07-09 PROCEDURE — 80053 COMPREHEN METABOLIC PANEL: CPT | Mod: PO

## 2018-07-09 PROCEDURE — 85025 COMPLETE CBC W/AUTO DIFF WBC: CPT | Mod: PO

## 2018-07-09 PROCEDURE — 36415 COLL VENOUS BLD VENIPUNCTURE: CPT | Mod: PO

## 2018-07-09 PROCEDURE — 86160 COMPLEMENT ANTIGEN: CPT | Mod: 59

## 2018-07-09 PROCEDURE — 85651 RBC SED RATE NONAUTOMATED: CPT | Mod: PO

## 2018-07-16 ENCOUNTER — OFFICE VISIT (OUTPATIENT)
Dept: RHEUMATOLOGY | Facility: CLINIC | Age: 43
End: 2018-07-16
Payer: COMMERCIAL

## 2018-07-16 VITALS
DIASTOLIC BLOOD PRESSURE: 90 MMHG | SYSTOLIC BLOOD PRESSURE: 148 MMHG | HEART RATE: 87 BPM | BODY MASS INDEX: 37.14 KG/M2 | HEIGHT: 66 IN | WEIGHT: 231.06 LBS

## 2018-07-16 DIAGNOSIS — M34.9 SCLERODERMA: Primary | ICD-10-CM

## 2018-07-16 DIAGNOSIS — M35.00 SJOGREN'S SYNDROME, WITH UNSPECIFIED ORGAN INVOLVEMENT: ICD-10-CM

## 2018-07-16 DIAGNOSIS — M35.01 SJOGREN'S SYNDROME WITH KERATOCONJUNCTIVITIS SICCA: ICD-10-CM

## 2018-07-16 PROCEDURE — 3008F BODY MASS INDEX DOCD: CPT | Mod: CPTII,S$GLB,, | Performed by: INTERNAL MEDICINE

## 2018-07-16 PROCEDURE — 99999 PR PBB SHADOW E&M-EST. PATIENT-LVL III: CPT | Mod: PBBFAC,,, | Performed by: INTERNAL MEDICINE

## 2018-07-16 PROCEDURE — 99214 OFFICE O/P EST MOD 30 MIN: CPT | Mod: S$GLB,,, | Performed by: INTERNAL MEDICINE

## 2018-07-16 RX ORDER — PREDNISONE 5 MG/1
5 TABLET ORAL DAILY
Qty: 10 TABLET | Refills: 1 | Status: SHIPPED | OUTPATIENT
Start: 2018-07-16 | End: 2018-10-15

## 2018-07-16 RX ORDER — METHOTREXATE 2.5 MG/1
20 TABLET ORAL
Qty: 40 TABLET | Refills: 3 | Status: SHIPPED | OUTPATIENT
Start: 2018-07-16 | End: 2018-10-15 | Stop reason: SDUPTHER

## 2018-07-16 RX ORDER — HYDROXYCHLOROQUINE SULFATE 200 MG/1
200 TABLET, FILM COATED ORAL 2 TIMES DAILY
Qty: 60 TABLET | Refills: 6 | Status: SHIPPED | OUTPATIENT
Start: 2018-07-16 | End: 2019-02-20 | Stop reason: SDUPTHER

## 2018-07-16 NOTE — PROGRESS NOTES
CC: routine f/u sjogrens       History of Present Illness:  Zachary Turner a 42 y.o.yo female   Patient Active Problem List   Diagnosis    Seizure disorder    Sjogren's syndrome    Migraine without status migrainosus, not intractable    Generalized anxiety disorder    Scleroderma    Shortness of breath    Abnormal urine color     PCP :   Jenny Dixon MD     Here for routine  f/u   She is on plaquenil 200 mg bid , seen by opthalmologist since starting plaqenil, tolerating well  She is also MTX 15 mg/ week  She did well on MTX , pain and stiffness improved though she did have some persistant am stiffness , however last Friday she  flared   Today c/o pain in hands , with puffiness , also hurts in hips ,ankles , knees , but no swelling   Denies any worsening skin changes  + RP stable , no skin ulcerations , telangiectasias   She has a feeling of something struck in throat always , she had in 2010 and EGD/ colon neg then   But now it got better ,no dysphagia   No cp, sob   But now it got better since being on flonase     + h/o asthma - on albuterol , last visit she has had worsening SOB , but now feeling better     Her ECHO / CT chest were normal with no evidence of ILD   Inconclusive PFTs ( h/o asthma)     persistant dry  Mouth, dry eyes   Has seen opthalmology , uses tear drops prn   Uses PPI for GERD   Stopped gabapentin due to intolerance issues     Initial visit :  she was diagnosed with sjogrens in 2010 , when she presented with joint pains and swelling in hands/ feet . Associated with   Tingling/ numbnesss. She mentions of dry eyes and uses tear drops and they help. She has dry mouth and drinks lots of water.   + RP  She had reddish rash  Like burn on face in the past but none in last 1 year.    she mentions of arthralgias with involvement of  hands, feet , knees , shins, elbows .  Associated with early am stiffness , but then gets better as day goes by.  She takes aleve prn and it helps    She had 1 miscarriage and 1    Sometimes feels food struck in throat   She is on vit d supp  Mother has RA     Past medical history:  Asthma  Seizures     Past surgical history:  None     Social History   Substance Use Topics    Smoking status: Never Smoker    Smokeless tobacco: Never Used    Alcohol use Yes       Family History   Problem Relation Age of Onset    Arthritis Mother     Glaucoma Mother     Hypertension Mother     Diabetes Father     Diabetes Maternal Grandmother     Hypertension Maternal Grandmother     Arthritis Maternal Grandmother     Cataracts Maternal Grandmother     Diabetes Maternal Grandfather     Heart disease Maternal Grandfather     Hypertension Maternal Grandfather     Diabetes Paternal Grandmother     Heart disease Paternal Grandmother     Hypertension Paternal Grandmother     Heart disease Paternal Grandfather        Review of patient's allergies indicates:   Allergen Reactions    Sulfa (sulfonamide antibiotics) Swelling             Review of Systems:  Constitutional: Denies fever, chills. + weight gain   Fatigue: yes   Muscle weakness: no  Headaches: no new headaches  Eyes: No redness   +dryness.  No recent visual changes.  ENT: + dry mouth. No oral or nasal ulcers.  Card: No chest pain.  Resp: No cough,  sob.   Gastro: No nausea or vomiting.  No heartburn.  Constipation: no  Diarrhea: no  Genito:  No dysuria.  No genital ulcers.  Skin: No rash.  Raynauds:+stable   Neuro: + numbness / tingling.   Psych: No depression+  anxiety  Endo:  + thirst.  Heme: no abnormal bleeding or bruising  Clots/ miscarriages - none     OBJECTIVE:     Vital Signs   Vitals:    18 1004   BP: (!) 148/90   Pulse: 87     Physical Exam:  General Appearance:  NAD.   Gait: not favoring.  HEENT: PERRL.  Eyes not dry or injected.  No nasal ulcers.  Mouth not dry, no oral lesions.  Lymph: cervical, supraclavicular or axillary nodes: none abnormal   Cardio: no irregularity of S1 or S2.   No gallops or rubs.   Resp: Normal respiratory motion. Clear to auscultation bilaterally.   No abnormal chest conformation.  Abd: Soft, non-tender, nondistended.  No masses.   Skin: Head and neck,  and extremities examined. No rashes.   Neuro: Ox3.   Cranial nerves II-XII grossly intact.   Sensation intact  in both distal LE and upper extremities to light touch.  Musculoskeletal Exam: no synovitis   Tenderness along PIP noted   MCP/ DIP : N   Puffy appearing fingers     Left knee : crepitus     Tender points: +  Muscle strength:Equal and full in all mm groups of the upper and lower ext.    Laboratory:   Results for orders placed or performed in visit on 07/09/18   CBC auto differential   Result Value Ref Range    WBC 5.17 3.90 - 12.70 K/uL    RBC 3.96 (L) 4.00 - 5.40 M/uL    Hemoglobin 10.9 (L) 12.0 - 16.0 g/dL    Hematocrit 33.2 (L) 37.0 - 48.5 %    MCV 84 82 - 98 fL    MCH 27.5 27.0 - 31.0 pg    MCHC 32.8 32.0 - 36.0 g/dL    RDW 14.1 11.5 - 14.5 %    Platelets 283 150 - 350 K/uL    MPV 8.9 (L) 9.2 - 12.9 fL    Gran # (ANC) 2.7 1.8 - 7.7 K/uL    Lymph # 1.7 1.0 - 4.8 K/uL    Mono # 0.5 0.3 - 1.0 K/uL    Eos # 0.2 0.0 - 0.5 K/uL    Baso # 0.01 0.00 - 0.20 K/uL    Gran% 52.9 38.0 - 73.0 %    Lymph% 33.1 18.0 - 48.0 %    Mono% 9.7 4.0 - 15.0 %    Eosinophil% 4.1 0.0 - 8.0 %    Basophil% 0.2 0.0 - 1.9 %    Differential Method Automated    Comprehensive metabolic panel   Result Value Ref Range    Sodium 138 136 - 145 mmol/L    Potassium 4.0 3.5 - 5.1 mmol/L    Chloride 105 95 - 110 mmol/L    CO2 27 23 - 29 mmol/L    Glucose 96 70 - 110 mg/dL    BUN, Bld 13 6 - 20 mg/dL    Creatinine 0.9 0.5 - 1.4 mg/dL    Calcium 9.3 8.7 - 10.5 mg/dL    Total Protein 7.7 6.0 - 8.4 g/dL    Albumin 3.7 3.5 - 5.2 g/dL    Total Bilirubin 0.4 0.1 - 1.0 mg/dL    Alkaline Phosphatase 42 (L) 55 - 135 U/L    AST 12 10 - 40 U/L    ALT 10 10 - 44 U/L    Anion Gap 6 (L) 8 - 16 mmol/L    eGFR if African American >60 >60 mL/min/1.73 m^2    eGFR if  non African American >60 >60 mL/min/1.73 m^2   C-reactive protein   Result Value Ref Range    CRP 7.5 0.0 - 8.2 mg/L   Sedimentation rate, manual   Result Value Ref Range    Sed Rate 30 (H) 0 - 20 mm/Hr   C4 complement   Result Value Ref Range    Complement (C-4) 25 11 - 44 mg/dL   C3 complement   Result Value Ref Range    Complement (C-3) 134 50 - 180 mg/dL     Results for ILIANA HILL (MRN [id number]) as of 7/16/2018 11:30   Ref. Range 11/29/2017 11:54 2/1/2018 09:57   AMAURY Latest Ref Range: Negative  Positive (A)    AMAURY HEP-2 Titer Unknown  Positive 1:1280 S...   Anti-SSA Antibody Latest Ref Range: 0.00 - 19.99 EU  93.17 (H)   Anti-SSA Interpretation Latest Ref Range: Negative   Positive (A)   Anti-SSB Antibody Latest Ref Range: 0.00 - 19.99 EU  89.70 (H)   Anti-SSB Interpretation Latest Ref Range: Negative   Positive (A)   ds DNA Ab Latest Ref Range: Negative 1:10  1 Negative 1:10   Anti Sm Antibody Latest Ref Range: 0.00 - 19.99 EU  7.19   Anti-Sm Interpretation Latest Ref Range: Negative   Negative   Anti Sm/RNP Antibody Latest Ref Range: 0.00 - 19.99 EU  30.58 (H)   Anti-Sm/RNP Interpretation Latest Ref Range: Negative   Positive (A)   Scleroderma SCL- Latest Ref Range: <20 UNITS  43 (H)       Imaging :    Ct chest :  No significant abnormalities within the chest.    Notes reviewed  Other procedures:    ASSESSMENT/PLAN:     Scleroderma  -     predniSONE (DELTASONE) 5 MG tablet; Take 1 tablet (5 mg total) by mouth once daily. X week , then 2.5 mg / day x week  Dispense: 10 tablet; Refill: 1    Sjogren's syndrome, with unspecified organ involvement    Sjogren's syndrome with keratoconjunctivitis sicca  -     hydroxychloroquine (PLAQUENIL) 200 mg tablet; Take 1 tablet (200 mg total) by mouth 2 (two) times daily.  Dispense: 60 tablet; Refill: 6    Other orders  -     methotrexate 2.5 MG Tab; Take 8 tablets (20 mg total) by mouth every 7 days.  Dispense: 40 tablet; Refill: 3      42 yr old with  1:   Scleroderma  /Sjogrens overlap (+1:1280 speckled ,  AMAURY , SSa , SSb + ve )  scl 70 + ve   Mild elevated ESR , normal CRP   Normal c3, c4   With stable  RP, sicca symptoms, GERD   With flare in arthritis since 3 days   Add prednisone 5 mg daily for a week then 2.5 mg daily for a week and stop  C/w Plaquenil 200 mg po bid   Increase methotrexate to 20 mg a week, split 4/ 4  c/w, with daily FA   Strictly advised her to use some form of contraception , MTX is highly teratogenic , she understands and agrees   She uses barrier contraception , does not intend to have any further kids    Advised on annual eye exams    continue PPI   BP stable , will monitor , ACE  As needed   Renal stable - UA shows microscopic hematuria, but she states she was in her cycle then, will repeat today  If persistent hematuria will refer to  Nephrology  Neg protein   RP stable - supportive care , avoid colds   Dry eyes -c/w teardrops, discuss Re stasis with Ophthalmology next visit  Implications of sjogrens and fetal risk discussed     2: SOB :  Related to asthma likely , stable now    ECHO , HRCT no evidence of ILD or PAH   Inconclusive PFTs ,due to poor effort     3: Fibromyalgia :  Associated with allodynia   Exercise   Weight loss   C/w flexeril     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   Brace   Intolerant to gabapentin     5: Beta 2 GP low titer + ve :with rpt negative   LA/ ACLA neg   No h/o clots       6: Hepatic steatosis :  Incidental finding with normal LFTS , more recent US abdomen - normal   counselled on diet control  Weight loss   Avoid alcohol  She d/w PCP and was advised weight loss     7:  Chronic stable anemia of chronic disease:  Denies any GI bleed     3 month follow up   FMLA given x 3 months in the past     Risks vs Benefits and potential side effects of medication prescribed today were discussed with patient.     Plaquenil - ocular toxicity , rare myositis , skin pigmentation   MTX - liver toxicity , infections , malignancy risk ,  teratogenic effects discussed     Please call if any worsening noted      Cc : Dr Jenny Dixon

## 2018-07-17 ENCOUNTER — LAB VISIT (OUTPATIENT)
Dept: LAB | Facility: HOSPITAL | Age: 43
End: 2018-07-17
Attending: INTERNAL MEDICINE
Payer: COMMERCIAL

## 2018-07-17 DIAGNOSIS — M34.9 SCLERODERMA: ICD-10-CM

## 2018-07-17 DIAGNOSIS — M35.00 SJOGREN'S SYNDROME, WITH UNSPECIFIED ORGAN INVOLVEMENT: ICD-10-CM

## 2018-07-17 LAB
BACTERIA #/AREA URNS HPF: ABNORMAL /HPF
BILIRUB UR QL STRIP: NEGATIVE
CLARITY UR: CLEAR
COLOR UR: YELLOW
GLUCOSE UR QL STRIP: NEGATIVE
HGB UR QL STRIP: ABNORMAL
KETONES UR QL STRIP: NEGATIVE
LEUKOCYTE ESTERASE UR QL STRIP: NEGATIVE
MICROSCOPIC COMMENT: ABNORMAL
NITRITE UR QL STRIP: POSITIVE
PH UR STRIP: 7 [PH] (ref 5–8)
PROT UR QL STRIP: NEGATIVE
RBC #/AREA URNS HPF: 1 /HPF (ref 0–4)
SP GR UR STRIP: 1.01 (ref 1–1.03)
URN SPEC COLLECT METH UR: ABNORMAL
WBC #/AREA URNS HPF: 1 /HPF (ref 0–5)

## 2018-07-17 PROCEDURE — 81000 URINALYSIS NONAUTO W/SCOPE: CPT | Mod: PO

## 2018-10-11 RX ORDER — METHOTREXATE 2.5 MG/1
15 TABLET ORAL
Qty: 30 TABLET | Refills: 3 | Status: SHIPPED | OUTPATIENT
Start: 2018-10-11 | End: 2019-02-20

## 2018-10-15 ENCOUNTER — LAB VISIT (OUTPATIENT)
Dept: LAB | Facility: HOSPITAL | Age: 43
End: 2018-10-15
Attending: INTERNAL MEDICINE
Payer: COMMERCIAL

## 2018-10-15 ENCOUNTER — OFFICE VISIT (OUTPATIENT)
Dept: RHEUMATOLOGY | Facility: CLINIC | Age: 43
End: 2018-10-15
Payer: COMMERCIAL

## 2018-10-15 VITALS
WEIGHT: 235.69 LBS | SYSTOLIC BLOOD PRESSURE: 144 MMHG | BODY MASS INDEX: 37.88 KG/M2 | HEART RATE: 102 BPM | DIASTOLIC BLOOD PRESSURE: 88 MMHG | HEIGHT: 66 IN

## 2018-10-15 DIAGNOSIS — M35.01 SJOGREN'S SYNDROME WITH KERATOCONJUNCTIVITIS SICCA: Primary | ICD-10-CM

## 2018-10-15 DIAGNOSIS — M35.00 SJOGREN'S SYNDROME, WITH UNSPECIFIED ORGAN INVOLVEMENT: ICD-10-CM

## 2018-10-15 DIAGNOSIS — M34.9 SCLERODERMA: ICD-10-CM

## 2018-10-15 DIAGNOSIS — I73.00 RAYNAUD'S DISEASE WITHOUT GANGRENE: ICD-10-CM

## 2018-10-15 DIAGNOSIS — M79.7 FIBROMYALGIA: ICD-10-CM

## 2018-10-15 LAB
ALBUMIN SERPL BCP-MCNC: 3.8 G/DL
ALP SERPL-CCNC: 49 U/L
ALT SERPL W/O P-5'-P-CCNC: 14 U/L
ANION GAP SERPL CALC-SCNC: 9 MMOL/L
AST SERPL-CCNC: 14 U/L
BASOPHILS # BLD AUTO: 0.02 K/UL
BASOPHILS NFR BLD: 0.5 %
BILIRUB SERPL-MCNC: 0.3 MG/DL
BILIRUB UR QL STRIP: NEGATIVE
BUN SERPL-MCNC: 12 MG/DL
C3 SERPL-MCNC: 154 MG/DL
C4 SERPL-MCNC: 25 MG/DL
CALCIUM SERPL-MCNC: 9.6 MG/DL
CHLORIDE SERPL-SCNC: 102 MMOL/L
CK SERPL-CCNC: 102 U/L
CLARITY UR: CLEAR
CO2 SERPL-SCNC: 26 MMOL/L
COLOR UR: YELLOW
CREAT SERPL-MCNC: 1 MG/DL
CREAT UR-MCNC: 160 MG/DL
CRP SERPL-MCNC: 14.9 MG/L
DIFFERENTIAL METHOD: ABNORMAL
EOSINOPHIL # BLD AUTO: 0.1 K/UL
EOSINOPHIL NFR BLD: 2.7 %
ERYTHROCYTE [DISTWIDTH] IN BLOOD BY AUTOMATED COUNT: 13.4 %
ERYTHROCYTE [SEDIMENTATION RATE] IN BLOOD BY WESTERGREN METHOD: 35 MM/HR
EST. GFR  (AFRICAN AMERICAN): >60 ML/MIN/1.73 M^2
EST. GFR  (NON AFRICAN AMERICAN): >60 ML/MIN/1.73 M^2
GLUCOSE SERPL-MCNC: 97 MG/DL
GLUCOSE UR QL STRIP: NEGATIVE
HCT VFR BLD AUTO: 35.1 %
HGB BLD-MCNC: 11.3 G/DL
HGB UR QL STRIP: ABNORMAL
KETONES UR QL STRIP: NEGATIVE
LEUKOCYTE ESTERASE UR QL STRIP: NEGATIVE
LYMPHOCYTES # BLD AUTO: 1.5 K/UL
LYMPHOCYTES NFR BLD: 35.8 %
MCH RBC QN AUTO: 27.1 PG
MCHC RBC AUTO-ENTMCNC: 32.2 G/DL
MCV RBC AUTO: 84 FL
MICROSCOPIC COMMENT: ABNORMAL
MONOCYTES # BLD AUTO: 0.3 K/UL
MONOCYTES NFR BLD: 8.4 %
NEUTROPHILS # BLD AUTO: 2.1 K/UL
NEUTROPHILS NFR BLD: 52.6 %
NITRITE UR QL STRIP: NEGATIVE
PH UR STRIP: 6 [PH] (ref 5–8)
PLATELET # BLD AUTO: 291 K/UL
PMV BLD AUTO: 8.6 FL
POTASSIUM SERPL-SCNC: 4 MMOL/L
PROT SERPL-MCNC: 8.1 G/DL
PROT UR QL STRIP: NEGATIVE
PROT UR-MCNC: 9 MG/DL
PROT/CREAT UR: 0.06 MG/G{CREAT}
RBC # BLD AUTO: 4.17 M/UL
RBC #/AREA URNS HPF: 6 /HPF (ref 0–4)
SODIUM SERPL-SCNC: 137 MMOL/L
SP GR UR STRIP: 1.02 (ref 1–1.03)
SQUAMOUS #/AREA URNS HPF: 1 /HPF
URN SPEC COLLECT METH UR: ABNORMAL
WBC # BLD AUTO: 4.05 K/UL
WBC #/AREA URNS HPF: 1 /HPF (ref 0–5)

## 2018-10-15 PROCEDURE — 36415 COLL VENOUS BLD VENIPUNCTURE: CPT | Mod: PO

## 2018-10-15 PROCEDURE — 80053 COMPREHEN METABOLIC PANEL: CPT | Mod: PO

## 2018-10-15 PROCEDURE — 86160 COMPLEMENT ANTIGEN: CPT | Mod: 59

## 2018-10-15 PROCEDURE — 85651 RBC SED RATE NONAUTOMATED: CPT | Mod: PO

## 2018-10-15 PROCEDURE — 99214 OFFICE O/P EST MOD 30 MIN: CPT | Mod: S$GLB,,, | Performed by: INTERNAL MEDICINE

## 2018-10-15 PROCEDURE — 86140 C-REACTIVE PROTEIN: CPT

## 2018-10-15 PROCEDURE — 82550 ASSAY OF CK (CPK): CPT | Mod: PO

## 2018-10-15 PROCEDURE — 99999 PR PBB SHADOW E&M-EST. PATIENT-LVL III: CPT | Mod: PBBFAC,,, | Performed by: INTERNAL MEDICINE

## 2018-10-15 PROCEDURE — 81000 URINALYSIS NONAUTO W/SCOPE: CPT | Mod: PO

## 2018-10-15 PROCEDURE — 85025 COMPLETE CBC W/AUTO DIFF WBC: CPT | Mod: PO

## 2018-10-15 PROCEDURE — 82570 ASSAY OF URINE CREATININE: CPT

## 2018-10-15 PROCEDURE — 3008F BODY MASS INDEX DOCD: CPT | Mod: CPTII,S$GLB,, | Performed by: INTERNAL MEDICINE

## 2018-10-15 PROCEDURE — 86160 COMPLEMENT ANTIGEN: CPT

## 2018-10-15 RX ORDER — METHOTREXATE 2.5 MG/1
20 TABLET ORAL
Qty: 120 TABLET | Refills: 1 | Status: SHIPPED | OUTPATIENT
Start: 2018-10-15 | End: 2019-02-20

## 2018-10-15 RX ORDER — AMLODIPINE BESYLATE 2.5 MG/1
2.5 TABLET ORAL DAILY
Qty: 90 TABLET | Refills: 3 | Status: SHIPPED | OUTPATIENT
Start: 2018-10-15 | End: 2020-01-08 | Stop reason: SDUPTHER

## 2018-10-15 NOTE — PROGRESS NOTES
CC: routine f/u sjogrens , scleroderma overlap      History of Present Illness:  Zachary Turner a 42 y.o.yo female   Patient Active Problem List   Diagnosis    Seizure disorder    Sjogren's syndrome    Migraine without status migrainosus, not intractable    Generalized anxiety disorder    Scleroderma    Shortness of breath    Abnormal urine color    Raynaud's disease without gangrene     PCP :   Jenny Dixon MD     Here for routine  f/u Sjogren's, scleroderma, fibromyalgia  She is on plaquenil 200 mg bid , seen by opthalmologist since starting plaqenil,  She is also MTX 20mg/ week  She did well on MTX , inflammation in the hands resolved and she feels much better  Overall she feels much better  Were she does mention of some vision disturbances    However she continues to have some bad days, which she hurts all over.  She takes some Advil all Flexeril as needed and she rests  She prefers to stay off from work during those days, usually gets back to work in a day or 2  Stop gabapentin in the past due to intolerance issues        + Raynaud's, a now she is noticing more tingling in fingertips with color changes.  No skin breakdown  Worried about the winter  It is cold at work    She had a feeling of something struck in throat always , she had in 2010 and EGD/ colon neg then   But now it got better ,no dysphagia   Uses PPI for GERD    History of asthma  No worsening sob   But now it got better since being on flonase   Her ECHO / CT chest were normal with no evidence of ILD   Inconclusive PFTs ( h/o asthma)     persistant dry  Mouth, dry eyes   Has seen opthalmology , uses tear drops prn     No new rashes, fever, chills, weight loss.  Denies any glandular swelling    Initial visit :  she was diagnosed with sjogrens in 2010 , when she presented with joint pains and swelling in hands/ feet . Associated with   Tingling/ numbnesss. She mentions of dry eyes and uses tear drops and they help. She has dry  mouth and drinks lots of water.   + RP  She had reddish rash  Like burn on face in the past but none in last 1 year.    she mentions of arthralgias with involvement of  hands, feet , knees , shins, elbows .  Associated with early am stiffness , but then gets better as day goes by.  She takes aleve prn and it helps   She had 1 miscarriage and 1    Sometimes feels food struck in throat   She is on vit d supp  Mother has RA     Past medical history:  Asthma  Seizures     Past surgical history:  None     Social History     Tobacco Use    Smoking status: Never Smoker    Smokeless tobacco: Never Used   Substance Use Topics    Alcohol use: Yes    Drug use: No       Family History   Problem Relation Age of Onset    Arthritis Mother     Glaucoma Mother     Hypertension Mother     Diabetes Father     Diabetes Maternal Grandmother     Hypertension Maternal Grandmother     Arthritis Maternal Grandmother     Cataracts Maternal Grandmother     Diabetes Maternal Grandfather     Heart disease Maternal Grandfather     Hypertension Maternal Grandfather     Diabetes Paternal Grandmother     Heart disease Paternal Grandmother     Hypertension Paternal Grandmother     Heart disease Paternal Grandfather        Review of patient's allergies indicates:   Allergen Reactions    Sulfa (sulfonamide antibiotics) Swelling             Review of Systems:  Constitutional: Denies fever, chills. + weight gain   Fatigue: yes   Muscle weakness: no  Headaches: no new headaches  Eyes: No redness   +dryness.  No recent visual changes.  ENT: + dry mouth. No oral or nasal ulcers.  Card: No chest pain.  Resp: No cough,  sob.   Gastro: No nausea or vomiting.  No heartburn.  Constipation: no  Diarrhea: no  Genito:  No dysuria.  No genital ulcers.  Skin: No rash.  Raynauds:+stable   Neuro: + numbness / tingling in finger tips , toe tips   Psych: No depression  Endo:  + thirst.  Heme: no abnormal bleeding or bruising  Clots/  miscarriages - none     OBJECTIVE:     Vital Signs   Vitals:    10/15/18 0843   BP: (!) 144/88   Pulse: 102     Physical Exam:  General Appearance:  NAD.   Gait: not favoring.  HEENT: PERRL.  Eyes not dry or injected.  No nasal ulcers.  Mouth not dry, no oral lesions.  Lymph: cervical, supraclavicular or axillary nodes: none abnormal   Cardio: no irregularity of S1 or S2.  No gallops or rubs.   Resp: Normal respiratory motion. Clear to auscultation bilaterally.   No abnormal chest conformation.  Abd: Soft, non-tender, nondistended.  No masses.   Skin: Head and neck,  and extremities examined. No rashes.   Neuro: Ox3.   Cranial nerves II-XII grossly intact.   Sensation intact  in both distal LE and upper extremities to light touch.  Musculoskeletal Exam: no synovitis     bl hands : no synovitis   Knees : crepitus +     Tender points: ++  Muscle strength:Equal and full in all mm groups of the upper and lower ext.    Laboratory:   Results for orders placed or performed in visit on 10/15/18   CK   Result Value Ref Range     20 - 180 U/L   CBC auto differential   Result Value Ref Range    WBC 4.05 3.90 - 12.70 K/uL    RBC 4.17 4.00 - 5.40 M/uL    Hemoglobin 11.3 (L) 12.0 - 16.0 g/dL    Hematocrit 35.1 (L) 37.0 - 48.5 %    MCV 84 82 - 98 fL    MCH 27.1 27.0 - 31.0 pg    MCHC 32.2 32.0 - 36.0 g/dL    RDW 13.4 11.5 - 14.5 %    Platelets 291 150 - 350 K/uL    MPV 8.6 (L) 9.2 - 12.9 fL    Gran # (ANC) 2.1 1.8 - 7.7 K/uL    Lymph # 1.5 1.0 - 4.8 K/uL    Mono # 0.3 0.3 - 1.0 K/uL    Eos # 0.1 0.0 - 0.5 K/uL    Baso # 0.02 0.00 - 0.20 K/uL    Gran% 52.6 38.0 - 73.0 %    Lymph% 35.8 18.0 - 48.0 %    Mono% 8.4 4.0 - 15.0 %    Eosinophil% 2.7 0.0 - 8.0 %    Basophil% 0.5 0.0 - 1.9 %    Differential Method Automated    Comprehensive metabolic panel   Result Value Ref Range    Sodium 137 136 - 145 mmol/L    Potassium 4.0 3.5 - 5.1 mmol/L    Chloride 102 95 - 110 mmol/L    CO2 26 23 - 29 mmol/L    Glucose 97 70 - 110 mg/dL     BUN, Bld 12 6 - 20 mg/dL    Creatinine 1.0 0.5 - 1.4 mg/dL    Calcium 9.6 8.7 - 10.5 mg/dL    Total Protein 8.1 6.0 - 8.4 g/dL    Albumin 3.8 3.5 - 5.2 g/dL    Total Bilirubin 0.3 0.1 - 1.0 mg/dL    Alkaline Phosphatase 49 (L) 55 - 135 U/L    AST 14 10 - 40 U/L    ALT 14 10 - 44 U/L    Anion Gap 9 8 - 16 mmol/L    eGFR if African American >60 >60 mL/min/1.73 m^2    eGFR if non African American >60 >60 mL/min/1.73 m^2   Urinalysis   Result Value Ref Range    Specimen UA Urine, Clean Catch     Color, UA Yellow Yellow, Straw, Letty    Appearance, UA Clear Clear    pH, UA 6.0 5.0 - 8.0    Specific Gravity, UA 1.020 1.005 - 1.030    Protein, UA Negative Negative    Glucose, UA Negative Negative    Ketones, UA Negative Negative    Bilirubin (UA) Negative Negative    Occult Blood UA 2+ (A) Negative    Nitrite, UA Negative Negative    Leukocytes, UA Negative Negative   Urinalysis Microscopic   Result Value Ref Range    RBC, UA 6 (H) 0 - 4 /hpf    WBC, UA 1 0 - 5 /hpf    Squam Epithel, UA 1 /hpf    Microscopic Comment SEE COMMENT      Results for ILIANA HILL (MRN [id number]) as of 7/16/2018 11:30   Ref. Range 11/29/2017 11:54 2/1/2018 09:57   AMAURY Latest Ref Range: Negative  Positive (A)    AMAURY HEP-2 Titer Unknown  Positive 1:1280 S...   Anti-SSA Antibody Latest Ref Range: 0.00 - 19.99 EU  93.17 (H)   Anti-SSA Interpretation Latest Ref Range: Negative   Positive (A)   Anti-SSB Antibody Latest Ref Range: 0.00 - 19.99 EU  89.70 (H)   Anti-SSB Interpretation Latest Ref Range: Negative   Positive (A)   ds DNA Ab Latest Ref Range: Negative 1:10  1 Negative 1:10   Anti Sm Antibody Latest Ref Range: 0.00 - 19.99 EU  7.19   Anti-Sm Interpretation Latest Ref Range: Negative   Negative   Anti Sm/RNP Antibody Latest Ref Range: 0.00 - 19.99 EU  30.58 (H)   Anti-Sm/RNP Interpretation Latest Ref Range: Negative   Positive (A)   Scleroderma SCL- Latest Ref Range: <20 UNITS  43 (H)       Imaging :    Ct chest :  No significant  abnormalities within the chest.    Notes reviewed  Other procedures:    ASSESSMENT/PLAN:     Sjogren's syndrome with keratoconjunctivitis sicca    Raynaud's disease without gangrene  -     amLODIPine (NORVASC) 2.5 MG tablet; Take 1 tablet (2.5 mg total) by mouth once daily.  Dispense: 90 tablet; Refill: 3    Scleroderma    Fibromyalgia    Other orders  -     methotrexate 2.5 MG Tab; Take 8 tablets (20 mg total) by mouth every 7 days.  Dispense: 120 tablet; Refill: 1      42 yr old with  1:  Scleroderma  /Sjogrens overlap (+1:1280 speckled ,  AMAURY , SSa , SSb + ve )  scl 70 + ve   Mild elevated ESR , normal CRP last lab, pending today  Normal c3, c4   With   RP, sicca symptoms, GERD       Hold  Plaquenil 200 mg po bid , due to vision problems, advised to follow up with Ophthalmology and their  instructions further on continuation of Plaquenil  C/w  methotrexate to 20 mg a week, split 4/ 4  c/w, with daily FA   With some worsening of Raynaud's noted add amlodipine 2.5 mg daily  Strictly advised her to use dual contraception , MTX is highly teratogenic , she understands and agrees   She uses barrier contraception , does not intend to have any further kids    Advised on annual eye exams   continue PPI   BP stable , will monito  Neg protein   RP  - supportive care , avoid colds , add amlodipine 2.5 daily, advised to monitor blood pressure  Dry eyes -c/w teardrops  Implications of sjogrens and fetal risk discussed     2: SOB :  Related to asthma likely , stable now    ECHO , HRCT no evidence of ILD or PAH   Inconclusive PFTs ,due to poor effort     3: Fibromyalgia :  Associated with allodynia   Exercise   Weight loss   C/w flexeril     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   Brace   Intolerant to gabapentin     5: Beta 2 GP low titer + ve :with rpt negative   LA/ ACLA neg   No h/o clots     6: Hepatic steatosis :  Incidental finding with normal LFTS , more recent US abdomen - normal   counselled on diet control  Weight loss   Avoid  alcohol  She d/w PCP and was advised weight loss     7:  Chronic stable anemia of chronic disease:  Denies any GI bleed  Stable hemoglobin   Advised to  follow-up with PCP regarding further workup and any possible iron supplementation  May take on MVT with iron    4  month follow up   FMLA given x 1 year to help during periods of flares    Risks vs Benefits and potential side effects of medication prescribed today were discussed with patient.     Plaquenil - ocular toxicity , rare myositis , skin pigmentation   MTX - liver toxicity , infections , malignancy risk , teratogenic effects discussed     Please call if any worsening noted      Cc : Dr Jenny Dixon

## 2018-10-16 ENCOUNTER — PATIENT MESSAGE (OUTPATIENT)
Dept: RHEUMATOLOGY | Facility: CLINIC | Age: 43
End: 2018-10-16

## 2018-10-17 ENCOUNTER — TELEPHONE (OUTPATIENT)
Dept: RHEUMATOLOGY | Facility: CLINIC | Age: 43
End: 2018-10-17

## 2018-10-17 NOTE — TELEPHONE ENCOUNTER
----- Message from Sabrina Morris MD sent at 10/17/2018 10:12 AM CDT -----  UA suggestive of blood, advised to repeat UA in a week  If persistent hematuria noted will need to see Nephrology

## 2018-10-24 ENCOUNTER — TELEPHONE (OUTPATIENT)
Dept: RHEUMATOLOGY | Facility: CLINIC | Age: 43
End: 2018-10-24

## 2018-10-24 ENCOUNTER — LAB VISIT (OUTPATIENT)
Dept: LAB | Facility: HOSPITAL | Age: 43
End: 2018-10-24
Attending: INTERNAL MEDICINE
Payer: COMMERCIAL

## 2018-10-24 DIAGNOSIS — M34.9 SCLERODERMA: ICD-10-CM

## 2018-10-24 LAB
BACTERIA #/AREA URNS HPF: ABNORMAL /HPF
BILIRUB UR QL STRIP: ABNORMAL
CLARITY UR: ABNORMAL
COLOR UR: ABNORMAL
GLUCOSE UR QL STRIP: ABNORMAL
HGB UR QL STRIP: ABNORMAL
KETONES UR QL STRIP: ABNORMAL
LEUKOCYTE ESTERASE UR QL STRIP: ABNORMAL
MICROSCOPIC COMMENT: ABNORMAL
NITRITE UR QL STRIP: ABNORMAL
PH UR STRIP: ABNORMAL [PH] (ref 5–8)
PROT UR QL STRIP: ABNORMAL
RBC #/AREA URNS HPF: 4 /HPF (ref 0–4)
SP GR UR STRIP: ABNORMAL (ref 1–1.03)
SQUAMOUS #/AREA URNS HPF: 15 /HPF
URN SPEC COLLECT METH UR: ABNORMAL
WBC #/AREA URNS HPF: 30 /HPF (ref 0–5)

## 2018-10-24 PROCEDURE — 81000 URINALYSIS NONAUTO W/SCOPE: CPT | Mod: PO

## 2018-10-24 NOTE — TELEPHONE ENCOUNTER
Returned pt call told pt we did not call her that it may have been from another provider calling pt verbalized understanding.

## 2018-10-24 NOTE — TELEPHONE ENCOUNTER
----- Message from Aj Carrasquillo sent at 10/24/2018  1:44 PM CDT -----  Contact: pt  She's calling in regards to a missed call, [phone] (home), if no answer pls leave results information on voice mail

## 2018-10-26 ENCOUNTER — TELEPHONE (OUTPATIENT)
Dept: RHEUMATOLOGY | Facility: CLINIC | Age: 43
End: 2018-10-26

## 2018-10-26 NOTE — TELEPHONE ENCOUNTER
----- Message from Sabrina Morris MD sent at 10/26/2018 10:19 AM CDT -----  UA- reported as orange colored, ?  Was this her cycle time  Repeat  this

## 2018-10-26 NOTE — TELEPHONE ENCOUNTER
----- Message from Alexandra Parikh sent at 10/26/2018 11:31 AM CDT -----  Contact: pt  Pt missed call back regarding test results ...   [phone] (home)

## 2018-10-26 NOTE — TELEPHONE ENCOUNTER
Informed Pt that Urine was noted to be orange colored, and test could not be completed advised Pt that we may need to repeat test. Asked Pt was test taken around or during her cycle Pt said no

## 2018-10-29 ENCOUNTER — TELEPHONE (OUTPATIENT)
Dept: RHEUMATOLOGY | Facility: CLINIC | Age: 43
End: 2018-10-29

## 2018-10-30 ENCOUNTER — LAB VISIT (OUTPATIENT)
Dept: LAB | Facility: HOSPITAL | Age: 43
End: 2018-10-30
Attending: INTERNAL MEDICINE
Payer: COMMERCIAL

## 2018-10-30 ENCOUNTER — TELEPHONE (OUTPATIENT)
Dept: RHEUMATOLOGY | Facility: CLINIC | Age: 43
End: 2018-10-30

## 2018-10-30 DIAGNOSIS — R31.29 MICROSCOPIC HEMATURIA: Primary | ICD-10-CM

## 2018-10-30 DIAGNOSIS — M34.9 SCLERODERMA: ICD-10-CM

## 2018-10-30 LAB
BILIRUB UR QL STRIP: NEGATIVE
CLARITY UR: CLEAR
COLOR UR: YELLOW
GLUCOSE UR QL STRIP: NEGATIVE
HGB UR QL STRIP: ABNORMAL
KETONES UR QL STRIP: NEGATIVE
LEUKOCYTE ESTERASE UR QL STRIP: NEGATIVE
MICROSCOPIC COMMENT: NORMAL
NITRITE UR QL STRIP: NEGATIVE
PH UR STRIP: 7 [PH] (ref 5–8)
PROT UR QL STRIP: NEGATIVE
RBC #/AREA URNS HPF: 0 /HPF (ref 0–4)
SP GR UR STRIP: 1.01 (ref 1–1.03)
URN SPEC COLLECT METH UR: ABNORMAL
WBC #/AREA URNS HPF: 1 /HPF (ref 0–5)

## 2018-10-30 PROCEDURE — 81000 URINALYSIS NONAUTO W/SCOPE: CPT | Mod: PO

## 2018-10-30 NOTE — TELEPHONE ENCOUNTER
In view of intermittent microscopic hematuria will refer her to Urology and nephrology     Patient called and message left explaining the same

## 2018-10-30 NOTE — TELEPHONE ENCOUNTER
----- Message from Asad La sent at 10/30/2018  3:25 PM CDT -----  Contact: Pt  Please give pt a call at ..[phone] (home) she is returning the nurse call.

## 2018-10-30 NOTE — TELEPHONE ENCOUNTER
Called pt and scheduled appt nephrology for 11/02/18@3:00 . Tried to schedule urology but nothing until next year pt states will see what nephrology says and then will schedule urology. Pt verbalized understanding.

## 2018-11-01 DIAGNOSIS — M34.9 SCLERODERMA: ICD-10-CM

## 2018-11-02 ENCOUNTER — OFFICE VISIT (OUTPATIENT)
Dept: NEPHROLOGY | Facility: CLINIC | Age: 43
End: 2018-11-02
Payer: COMMERCIAL

## 2018-11-02 ENCOUNTER — LAB VISIT (OUTPATIENT)
Dept: LAB | Facility: HOSPITAL | Age: 43
End: 2018-11-02
Attending: INTERNAL MEDICINE
Payer: COMMERCIAL

## 2018-11-02 VITALS
HEART RATE: 82 BPM | WEIGHT: 237.19 LBS | DIASTOLIC BLOOD PRESSURE: 80 MMHG | BODY MASS INDEX: 38.12 KG/M2 | SYSTOLIC BLOOD PRESSURE: 122 MMHG | HEIGHT: 66 IN

## 2018-11-02 DIAGNOSIS — R31.9 HEMATURIA SYNDROME: ICD-10-CM

## 2018-11-02 DIAGNOSIS — M34.9 SCLERODERMA: ICD-10-CM

## 2018-11-02 DIAGNOSIS — M34.9 SCLERODERMA: Primary | ICD-10-CM

## 2018-11-02 PROCEDURE — 87086 URINE CULTURE/COLONY COUNT: CPT

## 2018-11-02 PROCEDURE — 87088 URINE BACTERIA CULTURE: CPT

## 2018-11-02 PROCEDURE — 99204 OFFICE O/P NEW MOD 45 MIN: CPT | Mod: S$GLB,,, | Performed by: INTERNAL MEDICINE

## 2018-11-02 PROCEDURE — 99999 PR PBB SHADOW E&M-EST. PATIENT-LVL III: CPT | Mod: PBBFAC,,, | Performed by: INTERNAL MEDICINE

## 2018-11-02 PROCEDURE — 3008F BODY MASS INDEX DOCD: CPT | Mod: CPTII,S$GLB,, | Performed by: INTERNAL MEDICINE

## 2018-11-02 RX ORDER — PREDNISONE 5 MG/1
5 TABLET ORAL DAILY
Qty: 21 TABLET | Refills: 0 | OUTPATIENT
Start: 2018-11-02 | End: 2018-11-23

## 2018-11-02 NOTE — LETTER
November 2, 2018      Sabrina Morris MD  [address] [place] [address] [address] [place] [id number]           [facility]  [address] [facility] [place] [address]  Phone: [phone]  Fax: [phone]          Patient: Zachary Lucia   MR Number: [id number]   YOB: 1975   Date of Visit: 11/2/2018       Dear Dr. Sabrina Morris:    Thank you for referring Zachary Lucia to me for evaluation. Attached you will find relevant portions of my assessment and plan of care.    If you have questions, please do not hesitate to call me. I look forward to following Zachary Lucia along with you.    Sincerely,    Alma Rosa Garza MD    Enclosure  CC:  No Recipients    If you would like to receive this communication electronically, please contact [email or web address] or [phone] to request more information on [facility] access.    For providers and/or their staff who would like to refer a patient to [facility], please contact us through our one-stop-shop provider referral line, [facility], at [phone].    If you feel you have received this communication in error or would no longer like to receive these types of communications, please e-mail [email or web address]

## 2018-11-02 NOTE — PROGRESS NOTES
Subjective:       Patient ID: Zachary Lucia is a 43 y.o. Black or  female who presents for new evaluation of Hematuria    HPI     Patient is a 43-year-old female with history of scleroderma and seizure disorder.  Patient has been seen by Rheumatology and has been managed on methotrexate and Plaquenil.  Patient was noted to have hematuria and a nephrology consultation is requested in November of 2018.  Patient's creatinine is 1.0.  Patient has been anemic.  Patient also has hypertension for which she is on Norvasc.  The patient's complement levels have been normal in October 2018.  Patient also has been AMAURY positive and immunological profile is consistent with scleroderma.  Patient also has history of Sjogren syndrome back in 2010.  Patient also has history of miscarriage.  Patient also has history of rash.    Review of Systems   Constitutional: Negative for activity change, appetite change, chills, diaphoresis, fatigue, fever and unexpected weight change.   HENT: Negative for congestion, dental problem, drooling, postnasal drip, rhinorrhea and voice change.    Eyes: Negative for discharge.   Respiratory: Negative for apnea, cough, choking, chest tightness, shortness of breath, wheezing and stridor.    Cardiovascular: Negative for chest pain, palpitations and leg swelling.   Gastrointestinal: Negative for abdominal distention, blood in stool, constipation, diarrhea, nausea, rectal pain and vomiting.   Endocrine: Negative for cold intolerance, heat intolerance, polydipsia and polyuria.   Genitourinary: Negative for decreased urine volume, difficulty urinating, dysuria, enuresis, flank pain, frequency, hematuria and urgency.   Musculoskeletal: Negative for arthralgias, back pain, gait problem and joint swelling.   Skin: Negative for rash.   Allergic/Immunologic: Negative for food allergies and immunocompromised state.   Neurological: Negative for dizziness, tremors, syncope, numbness  "and headaches.   Hematological: Does not bruise/bleed easily.   Psychiatric/Behavioral: Negative for agitation, behavioral problems and self-injury. The patient is not nervous/anxious and is not hyperactive.    All other systems reviewed and are negative.      Objective:   /80   Pulse 82   Ht 5' 6" (1.676 m)   Wt 107.6 kg (237 lb 3.4 oz)   BMI 38.29 kg/m²      Physical Exam   Constitutional: She is oriented to person, place, and time. No distress.   HENT:   Head: Normocephalic and atraumatic.   Nose: Nose normal.   Eyes: Conjunctivae and EOM are normal. Pupils are equal, round, and reactive to light.   Neck: Normal range of motion. No JVD present. No tracheal deviation present. No thyromegaly present.   Cardiovascular: Normal rate, regular rhythm, normal heart sounds and intact distal pulses. Exam reveals no gallop and no friction rub.   No murmur heard.  Pulmonary/Chest: Effort normal and breath sounds normal. No respiratory distress. She has no wheezes. She has no rales. She exhibits no tenderness.   Abdominal: Soft. Bowel sounds are normal. She exhibits no distension and no mass. There is no tenderness. No hernia.   Musculoskeletal: Normal range of motion. She exhibits no edema, tenderness or deformity.   Multiple joint tenderness and inflammation   Neurological: She is alert and oriented to person, place, and time. She has normal reflexes. She displays normal reflexes. No cranial nerve deficit. She exhibits normal muscle tone. Coordination normal.   Skin: Skin is warm. Capillary refill takes less than 2 seconds. Rash noted. She is not diaphoretic. No erythema. There is pallor.   Psychiatric: She has a normal mood and affect. Her behavior is normal. Judgment and thought content normal.   Nursing note and vitals reviewed.        Lab Results   Component Value Date    CREATININE 1.0 10/15/2018    BUN 12 10/15/2018     10/15/2018    K 4.0 10/15/2018     10/15/2018    CO2 26 10/15/2018     Lab " Results   Component Value Date    WBC 4.05 10/15/2018    HGB 11.3 (L) 10/15/2018    HCT 35.1 (L) 10/15/2018    MCV 84 10/15/2018     10/15/2018     Lab Results   Component Value Date    CALCIUM 9.6 10/15/2018       Assessment:    )    1. Scleroderma    2. Hematuria syndrome        Plan:         patient has autoimmune disorder with history of Sjogren syndrome and scleroderma.  Now the patient has hematuria with rash in legs along with positive AMAURY.  Ideally at this point we will need to check for UTI since the patient is on immune suppressive agents.  Will also need to image the kidney and the bladder.  Pending urology consult noted.  we will need to proceed with a kidney biopsy for diagnostic purposes to check for lupus nephritis/ vasculitis or other autoimmune renal pathology    Will order autoimmune workup as well as CT urogram and follow-up.

## 2018-11-04 LAB — BACTERIA UR CULT: NORMAL

## 2018-11-15 ENCOUNTER — TELEPHONE (OUTPATIENT)
Dept: RADIOLOGY | Facility: HOSPITAL | Age: 43
End: 2018-11-15

## 2018-11-15 DIAGNOSIS — I73.00 RAYNAUD'S DISEASE WITHOUT GANGRENE: Primary | ICD-10-CM

## 2018-11-16 ENCOUNTER — HOSPITAL ENCOUNTER (OUTPATIENT)
Facility: HOSPITAL | Age: 43
Discharge: HOME | End: 2018-11-16
Attending: INTERNAL MEDICINE | Admitting: INTERNAL MEDICINE
Payer: COMMERCIAL

## 2018-11-16 VITALS
HEIGHT: 66 IN | DIASTOLIC BLOOD PRESSURE: 84 MMHG | TEMPERATURE: 98 F | HEART RATE: 74 BPM | WEIGHT: 234 LBS | BODY MASS INDEX: 37.61 KG/M2 | RESPIRATION RATE: 18 BRPM | OXYGEN SATURATION: 98 % | SYSTOLIC BLOOD PRESSURE: 132 MMHG

## 2018-11-16 DIAGNOSIS — M34.9 SCLERODERMA: ICD-10-CM

## 2018-11-16 DIAGNOSIS — R31.9 HEMATURIA SYNDROME: ICD-10-CM

## 2018-11-16 LAB
ANION GAP SERPL CALC-SCNC: 7 MMOL/L
BUN SERPL-MCNC: 9 MG/DL
CALCIUM SERPL-MCNC: 8.8 MG/DL
CHLORIDE SERPL-SCNC: 104 MMOL/L
CO2 SERPL-SCNC: 25 MMOL/L
CREAT SERPL-MCNC: 0.9 MG/DL
EST. GFR  (AFRICAN AMERICAN): >60 ML/MIN/1.73 M^2
EST. GFR  (NON AFRICAN AMERICAN): >60 ML/MIN/1.73 M^2
GLUCOSE SERPL-MCNC: 101 MG/DL
POTASSIUM SERPL-SCNC: 4.1 MMOL/L
SODIUM SERPL-SCNC: 136 MMOL/L

## 2018-11-16 PROCEDURE — 25000003 PHARM REV CODE 250

## 2018-11-16 PROCEDURE — 63600175 PHARM REV CODE 636 W HCPCS: Performed by: RADIOLOGY

## 2018-11-16 PROCEDURE — 63600175 PHARM REV CODE 636 W HCPCS

## 2018-11-16 PROCEDURE — 80048 BASIC METABOLIC PNL TOTAL CA: CPT

## 2018-11-16 RX ORDER — MIDAZOLAM HYDROCHLORIDE 1 MG/ML
INJECTION INTRAMUSCULAR; INTRAVENOUS CODE/TRAUMA/SEDATION MEDICATION
Status: COMPLETED | OUTPATIENT
Start: 2018-11-16 | End: 2018-11-16

## 2018-11-16 RX ORDER — FENTANYL CITRATE 50 UG/ML
INJECTION, SOLUTION INTRAMUSCULAR; INTRAVENOUS CODE/TRAUMA/SEDATION MEDICATION
Status: COMPLETED | OUTPATIENT
Start: 2018-11-16 | End: 2018-11-16

## 2018-11-16 RX ADMIN — MIDAZOLAM HYDROCHLORIDE 0.5 MG: 1 INJECTION, SOLUTION INTRAMUSCULAR; INTRAVENOUS at 09:11

## 2018-11-16 RX ADMIN — FENTANYL CITRATE 25 MCG: 50 INJECTION, SOLUTION INTRAMUSCULAR; INTRAVENOUS at 09:11

## 2018-11-16 NOTE — SEDATION DOCUMENTATION
Pt in ct prone with bilateral arms above head. Continuous vs monitor in place, vss.  Pt verbalized understanding of procedure.

## 2018-11-16 NOTE — DISCHARGE INSTRUCTIONS
Recovery After Procedural Sedation (Adult)  You have been given medicine by vein to make you sleep during your surgery. This may have included both a pain medicine and sleeping medicine. Most of the effects have worn off. But you may still have some drowsiness for the next 6 to 8 hours.  Home care  Follow these guidelines when you get home:  · For the next 8 hours, you should be watched by a responsible adult. This person should make sure your condition is not getting worse.  · Don't drink any alcohol for the next 24 hours.  · Don't drive, operate dangerous machinery, or make important business or personal decisions during the next 24 hours.  Note: Your healthcare provider may tell you not to take any medicine by mouth for pain or sleep in the next 4 hours. These medicines may react with the medicines you were given in the hospital. This could cause a much stronger response than usual.  Follow-up care  Follow up with your healthcare provider if you are not alert and back to your usual level of activity within 12 hours.  When to seek medical advice  Call your healthcare provider right away if any of these occur:  · Drowsiness gets worse  · Weakness or dizziness gets worse  · Repeated vomiting  · You can't be awakened   Date Last Reviewed: 10/18/2016  © 9286-2132 [employer]. [address] [place] [address], [place] [address]. All rights reserved. This information is not intended as a substitute for professional medical care. Always follow your healthcare professional's instructions.        Discharge Instructions for Kidney Biopsy  You had a procedure called a kidney biopsy. Your healthcare provider used a special needle to remove a small piece of tissue from your kidney to examine it for signs of damage and disease. A kidney biopsy is ordered after other tests have shown that there may be a problem with your kidney. Kidney biopsies are also performed when kidney disease is suspected and to rule out  cancer.  Home care  · Rest for 24 hours to 48 hours. Get up only to use the bathroom.  · Dont drive for 24 hours to 48 hours after the procedure.  · Dont shower for 24 hours after the biopsy. If you wish, you may wash yourself with a sponge or washcloth. When you are able to shower, dont scrub the site. Gently wash the area and pat it dry.  · Remove the bandage covering the biopsy site 24 hours to 48 hours after the procedure.  · Dont lift anything heavier than 10 pounds for 3 days to 4 days after the procedure.  · Ask your healthcare provider when you can return to work. Be sure to tell your healthcare provider if your job involves heavy lifting.  · If you normally take blood thinner medicines (anticoagulants or antiplatelet medicines) and you stopped taking them a few days before your procedure, ask your healthcare provider when to start taking them again.  When to seek medical care  Call your healthcare provider right away if you have any of the following:  · Blood in your urine  · Exhaustion or extreme weakness  · Dizziness or lightheadedness  · Sudden or increased shortness of breath  · Sudden chest pain  · Fever of 100.4°F (38°C) or higher, or chills  · Increasing redness, tenderness, or swelling at the biopsy site  · Opening of or drainage or bleeding from the biopsy site  · Increasing pain, with or without activity   Date Last Reviewed: 2/1/2017  © 7261-4884 [employer]. [address] [place] [address], [place] [address]. All rights reserved. This information is not intended as a substitute for professional medical care. Always follow your healthcare professional's instructions.

## 2018-11-16 NOTE — PLAN OF CARE
Ok to discharge pt to home per Dr. Lutz.  Pt d/c home in stable condition via wheelchair with mother and aunt.  Verbalized understanding of d/c instructions.  Pt voiced no complaints at this time. Pt stood at side of bed, walked steps with no new motor or sensory deficits.  Neurologically intact. Pt voided clear yellow urine prior to discharge

## 2018-11-16 NOTE — SEDATION DOCUMENTATION
Procedure complete, pt tolerated well.  Vss.  Pt awake and alert, able to follow commands.  Band aid placed to R back puncture site, site WDL.  Pt complained to moderated pain to R back puncture site, see flow sheet for intervention.  Pressure held to site per Demar Mattson

## 2018-11-20 ENCOUNTER — LAB VISIT (OUTPATIENT)
Dept: LAB | Facility: HOSPITAL | Age: 43
End: 2018-11-20
Attending: INTERNAL MEDICINE
Payer: COMMERCIAL

## 2018-11-20 DIAGNOSIS — M34.9 SCLERODERMA: ICD-10-CM

## 2018-11-20 DIAGNOSIS — R31.9 HEMATURIA SYNDROME: ICD-10-CM

## 2018-11-20 LAB
ALBUMIN SERPL BCP-MCNC: 3.5 G/DL
ANION GAP SERPL CALC-SCNC: 6 MMOL/L
BASOPHILS # BLD AUTO: 0.02 K/UL
BASOPHILS NFR BLD: 0.4 %
BUN SERPL-MCNC: 11 MG/DL
CALCIUM SERPL-MCNC: 9.5 MG/DL
CHLORIDE SERPL-SCNC: 101 MMOL/L
CO2 SERPL-SCNC: 28 MMOL/L
CREAT SERPL-MCNC: 0.8 MG/DL
DIFFERENTIAL METHOD: ABNORMAL
EOSINOPHIL # BLD AUTO: 0.2 K/UL
EOSINOPHIL NFR BLD: 3.2 %
ERYTHROCYTE [DISTWIDTH] IN BLOOD BY AUTOMATED COUNT: 13.5 %
EST. GFR  (AFRICAN AMERICAN): >60 ML/MIN/1.73 M^2
EST. GFR  (NON AFRICAN AMERICAN): >60 ML/MIN/1.73 M^2
GLUCOSE SERPL-MCNC: 87 MG/DL
HCT VFR BLD AUTO: 33.7 %
HGB BLD-MCNC: 10.3 G/DL
IMM GRANULOCYTES # BLD AUTO: 0.02 K/UL
IMM GRANULOCYTES NFR BLD AUTO: 0.4 %
LYMPHOCYTES # BLD AUTO: 1.5 K/UL
LYMPHOCYTES NFR BLD: 27.1 %
MCH RBC QN AUTO: 26.3 PG
MCHC RBC AUTO-ENTMCNC: 30.6 G/DL
MCV RBC AUTO: 86 FL
MONOCYTES # BLD AUTO: 0.4 K/UL
MONOCYTES NFR BLD: 7.4 %
NEUTROPHILS # BLD AUTO: 3.4 K/UL
NEUTROPHILS NFR BLD: 61.5 %
NRBC BLD-RTO: 0 /100 WBC
PHOSPHATE SERPL-MCNC: 2.9 MG/DL
PLATELET # BLD AUTO: 355 K/UL
PMV BLD AUTO: 9.6 FL
POTASSIUM SERPL-SCNC: 3.8 MMOL/L
RBC # BLD AUTO: 3.92 M/UL
SODIUM SERPL-SCNC: 135 MMOL/L
WBC # BLD AUTO: 5.57 K/UL

## 2018-11-20 PROCEDURE — 82610 CYSTATIN C: CPT

## 2018-11-20 PROCEDURE — 80069 RENAL FUNCTION PANEL: CPT

## 2018-11-20 PROCEDURE — 85025 COMPLETE CBC W/AUTO DIFF WBC: CPT

## 2018-11-21 LAB
CYSTATIN C SERPL-MCNC: 0.59 MG/L
GFR/BSA.PRED SERPLBLD CYS-BASED-ARV: 120 ML/MIN/BSA

## 2018-11-26 NOTE — PLAN OF CARE
Kidney biopsy result:    Patient's kidney biopsy shows early membranous nephropathy.  No evidence of vasculitis or interstitial nephritis.  Minimal chronicity.  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  We do not need to make any adjustments in medications at this time.  Will have the patient come back for follow-up for further discussions and management questions.    Alma Rosa Garza MD

## 2018-11-27 ENCOUNTER — OFFICE VISIT (OUTPATIENT)
Dept: NEPHROLOGY | Facility: CLINIC | Age: 43
End: 2018-11-27
Payer: COMMERCIAL

## 2018-11-27 VITALS
SYSTOLIC BLOOD PRESSURE: 110 MMHG | HEART RATE: 62 BPM | WEIGHT: 234.13 LBS | BODY MASS INDEX: 37.63 KG/M2 | DIASTOLIC BLOOD PRESSURE: 70 MMHG | HEIGHT: 66 IN

## 2018-11-27 DIAGNOSIS — I10 BENIGN ESSENTIAL HTN: ICD-10-CM

## 2018-11-27 DIAGNOSIS — M32.14 LUPUS NEPHRITIS, ISN/RPS CLASS V: ICD-10-CM

## 2018-11-27 DIAGNOSIS — I73.00 RAYNAUD'S DISEASE WITHOUT GANGRENE: ICD-10-CM

## 2018-11-27 DIAGNOSIS — R31.9 HEMATURIA SYNDROME: Primary | ICD-10-CM

## 2018-11-27 PROCEDURE — 99214 OFFICE O/P EST MOD 30 MIN: CPT | Mod: S$GLB,,, | Performed by: INTERNAL MEDICINE

## 2018-11-27 PROCEDURE — 99999 PR PBB SHADOW E&M-EST. PATIENT-LVL III: CPT | Mod: PBBFAC,,, | Performed by: INTERNAL MEDICINE

## 2018-11-27 PROCEDURE — 3008F BODY MASS INDEX DOCD: CPT | Mod: CPTII,S$GLB,, | Performed by: INTERNAL MEDICINE

## 2018-11-27 RX ORDER — LISINOPRIL 5 MG/1
5 TABLET ORAL DAILY
Qty: 90 TABLET | Refills: 3 | Status: SHIPPED | OUTPATIENT
Start: 2018-11-27 | End: 2020-01-08 | Stop reason: SDUPTHER

## 2018-11-27 NOTE — PATIENT INSTRUCTIONS
Lupus membranous nephropathy (class V) -- Patients with class V lupus nephritis typically present with signs of the nephrotic syndrome, similar to that in idiopathic membranous nephropathy [6,69,83]. Microscopic hematuria and hypertension may also be seen at presentation, and the creatinine concentration is usually normal or only slightly elevated.  Class V disease is characterized by diffuse thickening of the glomerular capillary wall on light microscopy (picture 5) and by subepithelial immune deposits (either global or segmental involvement) on immunofluorescence or electron microscopy (picture 6) [63,83,84]. Mesangial involvement may also be seen.  Although sparse subendothelial deposits can be seen by immunofluorescence or electron microscopy with class V disease alone, the presence of such deposits as detected by light microscopy warrants a combined diagnosis of classes III and V disease, or of classes IV and V disease. In this setting, the additional designation of class III or IV is based upon the distribution of the deposits      All patients with lupus membranous nephropathy should be treated with antihypertensive, antiproteinuric, and lipid-lowering

## 2018-11-27 NOTE — PROGRESS NOTES
Subjective:       Patient ID: Zachary Lucia is a 43 y.o. Black or  female who presents for new evaluation of Scleroderma; Follow-up; and Glomerulonephritis    HPI     Patient is a 43-year-old female with history of scleroderma and seizure disorder.  Patient has been seen by Rheumatology and has been managed on methotrexate and Plaquenil.  Patient was noted to have hematuria and a nephrology consultation is requested in November of 2018.  Patient's creatinine is 1.0.  Patient has been anemic.  Patient also has hypertension for which she is on Norvasc.  The patient's complement levels have been normal in October 2018.  Patient also has been AMAURY positive and immunological profile is consistent with scleroderma.  Patient also has history of Sjogren syndrome back in 2010.  Patient also has history of miscarriage.  Patient also has history of rash.    November 2018 patient seen after kidney biopsy.  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  No heavy proteinuria no chronicity.  No proliferative lesions. Started on ACE-inhibitor.  Check lipid profile on follow-up.    Review of Systems   Constitutional: Negative for activity change, appetite change, chills, diaphoresis, fatigue, fever and unexpected weight change.   HENT: Negative for congestion, dental problem, drooling, postnasal drip, rhinorrhea and voice change.    Eyes: Negative for discharge.   Respiratory: Negative for apnea, cough, choking, chest tightness, shortness of breath, wheezing and stridor.    Cardiovascular: Negative for chest pain, palpitations and leg swelling.   Gastrointestinal: Negative for abdominal distention, blood in stool, constipation, diarrhea, nausea, rectal pain and vomiting.   Endocrine: Negative for cold intolerance, heat intolerance, polydipsia and polyuria.   Genitourinary: Negative for decreased urine volume, difficulty urinating, dysuria, enuresis, flank pain, frequency, hematuria and  "urgency.   Musculoskeletal: Negative for arthralgias, back pain, gait problem and joint swelling.   Skin: Negative for rash.   Allergic/Immunologic: Negative for food allergies and immunocompromised state.   Neurological: Negative for dizziness, tremors, syncope, numbness and headaches.   Hematological: Does not bruise/bleed easily.   Psychiatric/Behavioral: Negative for agitation, behavioral problems and self-injury. The patient is not nervous/anxious and is not hyperactive.    All other systems reviewed and are negative.      Objective:   /70   Pulse 62   Ht 5' 6" (1.676 m)   Wt 106.2 kg (234 lb 2.1 oz)   BMI 37.79 kg/m²      Physical Exam   Constitutional: She is oriented to person, place, and time. No distress.   HENT:   Head: Normocephalic and atraumatic.   Nose: Nose normal.   Eyes: Conjunctivae and EOM are normal. Pupils are equal, round, and reactive to light.   Neck: Normal range of motion. No JVD present. No tracheal deviation present. No thyromegaly present.   Cardiovascular: Normal rate, regular rhythm, normal heart sounds and intact distal pulses. Exam reveals no gallop and no friction rub.   No murmur heard.  Pulmonary/Chest: Effort normal and breath sounds normal. No respiratory distress. She has no wheezes. She has no rales. She exhibits no tenderness.   Abdominal: Soft. Bowel sounds are normal. She exhibits no distension and no mass. There is no tenderness. No hernia.   Musculoskeletal: Normal range of motion. She exhibits no edema, tenderness or deformity.   Multiple joint tenderness and inflammation   Neurological: She is alert and oriented to person, place, and time. She has normal reflexes. She displays normal reflexes. No cranial nerve deficit. She exhibits normal muscle tone. Coordination normal.   Skin: Skin is warm. Capillary refill takes less than 2 seconds. Rash noted. She is not diaphoretic. No erythema. There is pallor.   Psychiatric: She has a normal mood and affect. Her " behavior is normal. Judgment and thought content normal.   Nursing note and vitals reviewed.        Lab Results   Component Value Date    CREATININE 0.8 11/20/2018    BUN 11 11/20/2018     (L) 11/20/2018    K 3.8 11/20/2018     11/20/2018    CO2 28 11/20/2018     Lab Results   Component Value Date    WBC 5.57 11/20/2018    HGB 10.3 (L) 11/20/2018    HCT 33.7 (L) 11/20/2018    MCV 86 11/20/2018     (H) 11/20/2018     Lab Results   Component Value Date    CALCIUM 9.5 11/20/2018    PHOS 2.9 11/20/2018       Assessment:    )    1. Hematuria syndrome    2. Lupus nephritis, ISN/RPS class V    3. Raynaud's disease without gangrene    4. Benign essential HTN        Plan:         1.  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  Stage 5 with no chronicity.  Early membranous changes.  No nephrotic syndrome.  No proliferative changes in the kidney biopsy.  No indication for immunosuppressive therapy.  Will start on small dose of ACE-inhibitor.  Check lipid profile for follow-up.  If the blood pressure drops we may stop the Norvasc.    2.  Raynaud's without gangrene:  Continue small dose of Norvasc    3.  Essential hypertension:  Adjust medications to keep the systolic blood pressure between 110-130 systolic      Biopsy report and handout and education done      Follow-up 3 months

## 2019-02-13 ENCOUNTER — OFFICE VISIT (OUTPATIENT)
Dept: OPHTHALMOLOGY | Facility: CLINIC | Age: 44
End: 2019-02-13
Payer: COMMERCIAL

## 2019-02-13 DIAGNOSIS — H52.4 BILATERAL PRESBYOPIA: ICD-10-CM

## 2019-02-13 DIAGNOSIS — Z79.899 LONG-TERM USE OF PLAQUENIL: ICD-10-CM

## 2019-02-13 DIAGNOSIS — H52.03 HYPEROPIA, BILATERAL: ICD-10-CM

## 2019-02-13 DIAGNOSIS — M35.00 SJOGREN'S SYNDROME, WITH UNSPECIFIED ORGAN INVOLVEMENT: Primary | ICD-10-CM

## 2019-02-13 DIAGNOSIS — Z83.511 FAMILY HISTORY OF GLAUCOMA IN MOTHER: ICD-10-CM

## 2019-02-13 PROCEDURE — 92015 PR REFRACTION: ICD-10-PCS | Mod: S$GLB,,, | Performed by: OPTOMETRIST

## 2019-02-13 PROCEDURE — 92082 INTERMEDIATE VISUAL FIELD XM: CPT | Mod: S$GLB,,, | Performed by: OPTOMETRIST

## 2019-02-13 PROCEDURE — 92134 POSTERIOR SEGMENT OCT RETINA (OCULAR COHERENCE TOMOGRAPHY)-BOTH EYES: ICD-10-PCS | Mod: S$GLB,,, | Performed by: OPTOMETRIST

## 2019-02-13 PROCEDURE — 92015 DETERMINE REFRACTIVE STATE: CPT | Mod: S$GLB,,, | Performed by: OPTOMETRIST

## 2019-02-13 PROCEDURE — 99999 PR PBB SHADOW E&M-EST. PATIENT-LVL I: CPT | Mod: PBBFAC,,, | Performed by: OPTOMETRIST

## 2019-02-13 PROCEDURE — 92014 COMPRE OPH EXAM EST PT 1/>: CPT | Mod: S$GLB,,, | Performed by: OPTOMETRIST

## 2019-02-13 PROCEDURE — 92134 CPTRZ OPH DX IMG PST SGM RTA: CPT | Mod: S$GLB,,, | Performed by: OPTOMETRIST

## 2019-02-13 PROCEDURE — 92014 PR EYE EXAM, EST PATIENT,COMPREHESV: ICD-10-PCS | Mod: S$GLB,,, | Performed by: OPTOMETRIST

## 2019-02-13 PROCEDURE — 99999 PR PBB SHADOW E&M-EST. PATIENT-LVL I: ICD-10-PCS | Mod: PBBFAC,,, | Performed by: OPTOMETRIST

## 2019-02-13 PROCEDURE — 92082 HUMPHREY VISUAL FIELD-OU-INTERMEDIATE-BOTH EYES: ICD-10-PCS | Mod: S$GLB,,, | Performed by: OPTOMETRIST

## 2019-02-13 NOTE — PROGRESS NOTES
HPI     Hard to focus at near and distance x 6 months.  Plaquenil check, taking Plaquenil x 1 year.  Last eye exam 02/07/2018 TRF.  Update glasses RX.    Last edited by Myah Maya on 2/13/2019  8:38 AM. (History)            Assessment /Plan     For exam results, see Encounter Report.    Sjogren's syndrome, with unspecified organ involvement    Long-term use of Plaquenil    Family history of glaucoma in mother    Hyperopia, bilateral    Bilateral presbyopia      No active anterior or posterior uveitis OU.  No ocular changes from Plaquenil use  Will schedule VF and mOCT for tomorrow.    Stable IOP and ONH    Dispense Final Rx for glasses.  RTC 1 year  Discussed above and answered questions.

## 2019-02-14 ENCOUNTER — OFFICE VISIT (OUTPATIENT)
Dept: OPHTHALMOLOGY | Facility: CLINIC | Age: 44
End: 2019-02-14
Payer: COMMERCIAL

## 2019-02-14 DIAGNOSIS — Z79.899 LONG-TERM USE OF PLAQUENIL: ICD-10-CM

## 2019-02-14 DIAGNOSIS — M35.00 SJOGREN'S SYNDROME, WITH UNSPECIFIED ORGAN INVOLVEMENT: Primary | ICD-10-CM

## 2019-02-14 PROCEDURE — 99999 PR PBB SHADOW E&M-EST. PATIENT-LVL I: ICD-10-PCS | Mod: PBBFAC,,, | Performed by: OPTOMETRIST

## 2019-02-14 PROCEDURE — 99999 PR PBB SHADOW E&M-EST. PATIENT-LVL I: CPT | Mod: PBBFAC,,, | Performed by: OPTOMETRIST

## 2019-02-14 PROCEDURE — 99499 NO LOS: ICD-10-PCS | Mod: S$GLB,,, | Performed by: OPTOMETRIST

## 2019-02-14 PROCEDURE — 99499 UNLISTED E&M SERVICE: CPT | Mod: S$GLB,,, | Performed by: OPTOMETRIST

## 2019-02-20 ENCOUNTER — OFFICE VISIT (OUTPATIENT)
Dept: RHEUMATOLOGY | Facility: CLINIC | Age: 44
End: 2019-02-20
Payer: COMMERCIAL

## 2019-02-20 ENCOUNTER — LAB VISIT (OUTPATIENT)
Dept: LAB | Facility: HOSPITAL | Age: 44
End: 2019-02-20
Attending: INTERNAL MEDICINE
Payer: COMMERCIAL

## 2019-02-20 VITALS
BODY MASS INDEX: 39.32 KG/M2 | SYSTOLIC BLOOD PRESSURE: 134 MMHG | HEIGHT: 66 IN | HEART RATE: 74 BPM | WEIGHT: 244.69 LBS | DIASTOLIC BLOOD PRESSURE: 89 MMHG

## 2019-02-20 DIAGNOSIS — M34.9 SCLERODERMA: ICD-10-CM

## 2019-02-20 DIAGNOSIS — M35.01 SJOGREN'S SYNDROME WITH KERATOCONJUNCTIVITIS SICCA: ICD-10-CM

## 2019-02-20 DIAGNOSIS — M34.9 SCLERODERMA: Primary | ICD-10-CM

## 2019-02-20 LAB
ALBUMIN SERPL BCP-MCNC: 3.7 G/DL
ALP SERPL-CCNC: 51 U/L
ALT SERPL W/O P-5'-P-CCNC: 13 U/L
ANION GAP SERPL CALC-SCNC: 9 MMOL/L
AST SERPL-CCNC: 12 U/L
BASOPHILS # BLD AUTO: 0.01 K/UL
BASOPHILS NFR BLD: 0.2 %
BILIRUB SERPL-MCNC: 0.3 MG/DL
BUN SERPL-MCNC: 10 MG/DL
C3 SERPL-MCNC: 167 MG/DL
C4 SERPL-MCNC: 27 MG/DL
CALCIUM SERPL-MCNC: 9.6 MG/DL
CHLORIDE SERPL-SCNC: 104 MMOL/L
CO2 SERPL-SCNC: 25 MMOL/L
CREAT SERPL-MCNC: 0.9 MG/DL
CRP SERPL-MCNC: 11.3 MG/L
DIFFERENTIAL METHOD: ABNORMAL
EOSINOPHIL # BLD AUTO: 0.3 K/UL
EOSINOPHIL NFR BLD: 4.7 %
ERYTHROCYTE [DISTWIDTH] IN BLOOD BY AUTOMATED COUNT: 14 %
ERYTHROCYTE [SEDIMENTATION RATE] IN BLOOD BY WESTERGREN METHOD: 31 MM/HR
EST. GFR  (AFRICAN AMERICAN): >60 ML/MIN/1.73 M^2
EST. GFR  (NON AFRICAN AMERICAN): >60 ML/MIN/1.73 M^2
GLUCOSE SERPL-MCNC: 89 MG/DL
HCT VFR BLD AUTO: 35.3 %
HGB BLD-MCNC: 10.9 G/DL
IMM GRANULOCYTES # BLD AUTO: 0.03 K/UL
IMM GRANULOCYTES NFR BLD AUTO: 0.5 %
LYMPHOCYTES # BLD AUTO: 1.9 K/UL
LYMPHOCYTES NFR BLD: 30.9 %
MCH RBC QN AUTO: 25.6 PG
MCHC RBC AUTO-ENTMCNC: 30.9 G/DL
MCV RBC AUTO: 83 FL
MONOCYTES # BLD AUTO: 0.6 K/UL
MONOCYTES NFR BLD: 9.2 %
NEUTROPHILS # BLD AUTO: 3.4 K/UL
NEUTROPHILS NFR BLD: 55 %
NRBC BLD-RTO: 0 /100 WBC
PLATELET # BLD AUTO: 341 K/UL
PMV BLD AUTO: 8.9 FL
POTASSIUM SERPL-SCNC: 3.5 MMOL/L
PROT SERPL-MCNC: 8.5 G/DL
RBC # BLD AUTO: 4.26 M/UL
SODIUM SERPL-SCNC: 138 MMOL/L
WBC # BLD AUTO: 6.18 K/UL

## 2019-02-20 PROCEDURE — 85025 COMPLETE CBC W/AUTO DIFF WBC: CPT

## 2019-02-20 PROCEDURE — 99999 PR PBB SHADOW E&M-EST. PATIENT-LVL III: CPT | Mod: PBBFAC,,, | Performed by: INTERNAL MEDICINE

## 2019-02-20 PROCEDURE — 86140 C-REACTIVE PROTEIN: CPT

## 2019-02-20 PROCEDURE — 3008F PR BODY MASS INDEX (BMI) DOCUMENTED: ICD-10-PCS | Mod: CPTII,S$GLB,, | Performed by: INTERNAL MEDICINE

## 2019-02-20 PROCEDURE — 86235 NUCLEAR ANTIGEN ANTIBODY: CPT | Mod: 59

## 2019-02-20 PROCEDURE — 99214 PR OFFICE/OUTPT VISIT, EST, LEVL IV, 30-39 MIN: ICD-10-PCS | Mod: S$GLB,,, | Performed by: INTERNAL MEDICINE

## 2019-02-20 PROCEDURE — 3079F PR MOST RECENT DIASTOLIC BLOOD PRESSURE 80-89 MM HG: ICD-10-PCS | Mod: CPTII,S$GLB,, | Performed by: INTERNAL MEDICINE

## 2019-02-20 PROCEDURE — 82657 ENZYME CELL ACTIVITY: CPT

## 2019-02-20 PROCEDURE — 85652 RBC SED RATE AUTOMATED: CPT

## 2019-02-20 PROCEDURE — 3008F BODY MASS INDEX DOCD: CPT | Mod: CPTII,S$GLB,, | Performed by: INTERNAL MEDICINE

## 2019-02-20 PROCEDURE — 3075F SYST BP GE 130 - 139MM HG: CPT | Mod: CPTII,S$GLB,, | Performed by: INTERNAL MEDICINE

## 2019-02-20 PROCEDURE — 86039 ANTINUCLEAR ANTIBODIES (ANA): CPT

## 2019-02-20 PROCEDURE — 99999 PR PBB SHADOW E&M-EST. PATIENT-LVL III: ICD-10-PCS | Mod: PBBFAC,,, | Performed by: INTERNAL MEDICINE

## 2019-02-20 PROCEDURE — 86160 COMPLEMENT ANTIGEN: CPT

## 2019-02-20 PROCEDURE — 99214 OFFICE O/P EST MOD 30 MIN: CPT | Mod: S$GLB,,, | Performed by: INTERNAL MEDICINE

## 2019-02-20 PROCEDURE — 80053 COMPREHEN METABOLIC PANEL: CPT

## 2019-02-20 PROCEDURE — 86038 ANTINUCLEAR ANTIBODIES: CPT

## 2019-02-20 PROCEDURE — 36415 COLL VENOUS BLD VENIPUNCTURE: CPT

## 2019-02-20 PROCEDURE — 3079F DIAST BP 80-89 MM HG: CPT | Mod: CPTII,S$GLB,, | Performed by: INTERNAL MEDICINE

## 2019-02-20 PROCEDURE — 86160 COMPLEMENT ANTIGEN: CPT | Mod: 59

## 2019-02-20 PROCEDURE — 3075F PR MOST RECENT SYSTOLIC BLOOD PRESS GE 130-139MM HG: ICD-10-PCS | Mod: CPTII,S$GLB,, | Performed by: INTERNAL MEDICINE

## 2019-02-20 RX ORDER — HYDROXYCHLOROQUINE SULFATE 200 MG/1
200 TABLET, FILM COATED ORAL 2 TIMES DAILY
Qty: 180 TABLET | Refills: 3 | Status: SHIPPED | OUTPATIENT
Start: 2019-02-20 | End: 2020-02-12 | Stop reason: SDUPTHER

## 2019-02-20 RX ORDER — METHOTREXATE 2.5 MG/1
20 TABLET ORAL
Qty: 120 TABLET | Refills: 1 | Status: SHIPPED | OUTPATIENT
Start: 2019-02-20 | End: 2019-02-20 | Stop reason: ALTCHOICE

## 2019-02-20 RX ORDER — MYCOPHENOLATE MOFETIL 500 MG/1
1000 TABLET ORAL 2 TIMES DAILY
Qty: 120 TABLET | Refills: 6 | Status: SHIPPED | OUTPATIENT
Start: 2019-02-20 | End: 2019-02-27 | Stop reason: SDUPTHER

## 2019-02-20 RX ORDER — FOLIC ACID 1 MG/1
1 TABLET ORAL DAILY
Qty: 90 TABLET | Refills: 3 | Status: SHIPPED | OUTPATIENT
Start: 2019-02-20 | End: 2019-02-20 | Stop reason: ALTCHOICE

## 2019-02-20 NOTE — PROGRESS NOTES
CC: routine f/u sjogrens , scleroderma overlap      History of Present Illness:  Zachary Buckner a 43 y.o.yo female   Patient Active Problem List   Diagnosis    Seizure disorder    Sjogren's syndrome    Migraine without status migrainosus, not intractable    Generalized anxiety disorder    Scleroderma    Shortness of breath    Abnormal urine color    Raynaud's disease without gangrene     PCP :   Jenny Dixon MD     Here for routine  f/u Sjogren's, scleroderma, fibromyalgia  She is on plaquenil 200 mg bid , seen by opthalmologist since starting plaqenil,  She is also MTX  15 mg/ week daily folic acid 1 mg  She states she never increase methotrexate to 8 tablets a week she stayed on 6 tablets a week  She did well on MTX , inflammation in the hands resolved and she feels much better  Overall she feels much better  Today no pain stiffness or swelling in the hands noted      However she continues to have some bad days, which she hurts all over.  Like in November she had to take of 3 days in December 2 days and once in January  She just takes 1 day off  She takes some Advil all Flexeril as needed and she rests  Stopped  gabapentin in the past due to intolerance issues    She was also noted to have Raynaud's and was added Norvasc 2.5 mg last visit  Raynaud's is stable now.  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  Denies any glandular swelling    Initial visit :  she was diagnosed with sjogrens in  , when she presented with joint pains and swelling in hands/ feet . Associated with   Tingling/ numbnesss. She mentions of dry eyes and uses tear drops and they help. She has dry mouth and drinks lots of water.   + RP  She had reddish rash  Like burn on face in the past but none in last 1 year.    she mentions of arthralgias with involvement of  hands, feet , knees , shins, elbows .  Associated with early am stiffness , but then gets better as day goes by.  She takes aleve prn and it helps   She had 1 miscarriage and 1    Sometimes feels food struck in throat   She is on vit d supp  Mother has RA     Past medical history:  Asthma  Seizures     Past surgical history:  None     Social History     Tobacco Use    Smoking status: Never Smoker    Smokeless tobacco: Never Used   Substance Use Topics    Alcohol use: Yes    Drug use: No       Family History   Problem Relation Age of Onset    Arthritis Mother     Glaucoma Mother     Hypertension Mother     Diabetes Father     Diabetes Maternal Grandmother     Hypertension Maternal Grandmother     Arthritis Maternal Grandmother     Cataracts Maternal Grandmother     Diabetes Maternal Grandfather     Heart disease Maternal Grandfather     Hypertension Maternal Grandfather     Diabetes Paternal Grandmother     Heart disease Paternal Grandmother     Hypertension Paternal Grandmother     Heart disease Paternal Grandfather        Review of patient's allergies indicates:   Allergen Reactions    Sulfa (sulfonamide antibiotics) Swelling             Review of Systems:  Constitutional: Denies fever, chills. + weight gain   Fatigue: yes   Muscle weakness: no  Headaches: no new headaches  Eyes: No redness   +dryness.  No recent visual changes.  ENT: + dry mouth. No oral or nasal ulcers.  Card: No chest pain.  Resp:  No cough,  sob.   Gastro: No nausea or vomiting.  No heartburn.  Constipation: no  Diarrhea: no  Genito:  No dysuria.  No genital ulcers.  Skin: No rash.  Raynauds:+stable   Neuro: + numbness / tingling in finger tips , toe tips   Psych: No depression  Endo:  + thirst.  Heme: no abnormal bleeding or bruising  Clots/ miscarriages - none     OBJECTIVE:     Vital Signs   Vitals:    02/20/19 1142   BP: 134/89   Pulse: 74     Physical Exam:  General Appearance:  NAD.   Gait: not favoring.  HEENT: PERRL.  Eyes not dry or injected.  No nasal ulcers.  Mouth not dry, no oral lesions.  Lymph: cervical, supraclavicular or axillary nodes: none abnormal   Cardio: no irregularity of S1 or S2.  No gallops or rubs.   Resp: Normal respiratory motion. Clear to auscultation bilaterally.   No abnormal chest conformation.  Abd: Soft, non-tender, nondistended.  No masses.   Skin: Head and neck,  and extremities examined. No rashes.   Neuro: Ox3.   Cranial nerves II-XII grossly intact.   Sensation intact  in both distal LE and upper extremities to light touch.  Musculoskeletal Exam: no synovitis     bl hands : no synovitis ,  Knees : crepitus +   No skin thickening noted in the hands  Muscle strength:Equal and full in all mm groups of the upper and lower ext.    Laboratory:   Results for orders placed or performed in visit on 11/20/18   Urinalysis   Result Value Ref Range    Specimen UA Urine, Clean Catch     Color, UA Yellow Yellow, Straw, Letty    Appearance, UA Clear Clear    pH, UA 7.0 5.0 - 8.0    Specific Gravity, UA 1.015 1.005 - 1.030    Protein, UA Negative Negative    Glucose, UA Negative Negative    Ketones, UA Negative Negative    Bilirubin (UA) Negative Negative    Occult Blood UA 2+ (A) Negative    Nitrite, UA Negative Negative    Leukocytes, UA 1+ (A) Negative   Protein / creatinine ratio, urine   Result Value Ref Range    Protein, Urine Random 15 0 - 15 mg/dL    Creatinine, Random Ur 102.0 15.0 - 325.0 mg/dL    Prot/Creat  Ratio, Ur 0.15 0.00 - 0.20   Urinalysis Microscopic   Result Value Ref Range    RBC, UA 10 (H) 0 - 4 /hpf    WBC, UA 20 (H) 0 - 5 /hpf    Bacteria, UA Few (A) None-Occ /hpf    Squam Epithel, UA 10 /hpf    Microscopic Comment SEE COMMENT      Results for ILIANA HILL (MRN [id number]) as of 7/16/2018 11:30   Ref. Range 11/29/2017 11:54 2/1/2018 09:57   AMAURY Latest Ref Range: Negative  Positive (A)    AMAURY HEP-2 Titer Unknown  Positive 1:1280 S...   Anti-SSA Antibody Latest Ref Range: 0.00 - 19.99 EU  93.17 (H)   Anti-SSA Interpretation Latest Ref Range: Negative   Positive (A)   Anti-SSB Antibody Latest Ref Range: 0.00 - 19.99 EU  89.70 (H)   Anti-SSB Interpretation Latest Ref Range: Negative   Positive (A)   ds DNA Ab Latest Ref Range: Negative 1:10  1 Negative 1:10   Anti Sm Antibody Latest Ref Range: 0.00 - 19.99 EU  7.19   Anti-Sm Interpretation Latest Ref Range: Negative   Negative   Anti Sm/RNP Antibody Latest Ref Range: 0.00 - 19.99 EU  30.58 (H)   Anti-Sm/RNP Interpretation Latest Ref Range: Negative   Positive (A)   Scleroderma SCL- Latest Ref Range: <20 UNITS  43 (H)       Imaging :    Ct chest :  No significant abnormalities within the chest.    Notes reviewed  Other procedures:    ASSESSMENT/PLAN:     Scleroderma  -     Comprehensive metabolic panel; Standing  -     C-reactive protein; Standing  -     Sedimentation rate; Standing  -     CBC auto differential; Standing  -     Urinalysis; Standing  -     Protein / creatinine ratio, urine; Standing  -     C3 complement; Standing; Expected date: 02/20/2019  -     C4 complement; Standing; Expected date: 02/20/2019  -     Discontinue: methotrexate 2.5 MG Tab; Take 8 tablets (20 mg total) by mouth every 7 days.  Dispense: 120 tablet; Refill: 1  -     Discontinue: folic acid (FOLVITE) 1 MG tablet; Take 1 tablet (1 mg total) by mouth once daily.  Dispense: 90 tablet; Refill: 3  -     mycophenolate (CELLCEPT) 500 mg Tab; Take 2 tablets (1,000 mg total) by  mouth 2 (two) times daily. Start 1 tablet twice daily , then increase to 2 tabs twice daily  Dispense: 120 tablet; Refill: 6  -     Thiopurine Methyltrans, RBC; Future; Expected date: 02/20/2019    Sjogren's syndrome with keratoconjunctivitis sicca  -     hydroxychloroquine (PLAQUENIL) 200 mg tablet; Take 1 tablet (200 mg total) by mouth 2 (two) times daily.  Dispense: 180 tablet; Refill: 3      42 yr old with  1:  Scleroderma  /Sjogrens overlap (+1:1280 speckled ,  AMAURY , SSa , SSb + ve )  scl 70 + ve   Normal c3, c4   With   RP, sicca symptoms, GERD   Microscopic hematuria with kidney biopsy suggestive of membranous nephropathy/lupus-like condition/lupus nephritis    Continue with Plaquenil 200 mg twice a day use sunscreens and continue with annual eye exams  With kidney involvement, and history of fatty liver, would prefer  CellCept over methotrexate  Stop methotrexate  Start CellCept 500 mg p.o. b.i.d. for a week  Then increase to 1000 mg p.o. B.i.d.  If further optimization needed will call back and may increase to 3 tablets twice a day  She uses barrier contraception , does not intend to have any further kids    RP  - supportive care , avoid colds , c/w  amlodipine 2.5 daily, advised to monitor blood pressure  Dry eyes -c/w teardrops  Implications of sjogrens and fetal risk discussed     2: SOB :  Related to asthma likely , stable now    ECHO , HRCT no evidence of ILD or PAH   Inconclusive PFTs ,due to poor effort     3: Fibromyalgia :  Associated with allodynia   Exercise   Weight loss   C/w flexeril     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   Brace   Intolerant to gabapentin     5: Beta 2 GP low titer + ve :with rpt negative   LA/ ACLA neg   No h/o clots     6: Hepatic steatosis :  Incidental finding with normal LFTS , more recent US abdomen - fatty liver  counselled on diet control  Weight loss   Avoid alcohol  She d/w PCP and was advised weight loss     7:  Chronic stable anemia of chronic disease:  Denies any GI  bleed  Stable hemoglobin   Advised to  follow-up with PCP regarding further workup and any possible iron supplementation  May take on MVT with iron    3   month follow up   FMLA given x 1 year to help during periods of flares    Risks vs Benefits and potential side effects of medication prescribed today were discussed with patient.     Plaquenil - ocular toxicity , rare myositis , skin pigmentation   CellCept- infection, malignancy risk discussed  Diarrhea-if she develops any nausea or diarrhea she will call me back  Always hold during infection or prior to surgery  Will check TPMT as well in any case if Imuran is a consideration for future    Please call if any worsening noted

## 2019-02-21 LAB — ANA SER QL IF: POSITIVE

## 2019-02-22 ENCOUNTER — TELEPHONE (OUTPATIENT)
Dept: PHARMACY | Facility: CLINIC | Age: 44
End: 2019-02-22

## 2019-02-22 LAB
ANA TITR SER IF: NORMAL {TITER}
ANTI SM ANTIBODY: 11.33 EU
ANTI SM/RNP ANTIBODY: 11.51 EU
ANTI-SM INTERPRETATION: NEGATIVE
ANTI-SM/RNP INTERPRETATION: NEGATIVE
ANTI-SSA ANTIBODY: 112.22 EU
ANTI-SSA INTERPRETATION: POSITIVE
ANTI-SSB ANTIBODY: 106.97 EU
ANTI-SSB INTERPRETATION: POSITIVE
DSDNA AB SER-ACNC: ABNORMAL [IU]/ML

## 2019-02-27 DIAGNOSIS — M34.9 SCLERODERMA: ICD-10-CM

## 2019-02-27 LAB
6-METHYLMERCAPTOPURINE RIBOSIDE: 5.02 NMOL/ML/H (ref 5.04–9.57)
6-METHYLMERCAPTOPURINE: 2.95 NMOL/ML/H (ref 3–6.66)
6-METHYLTHIOGUANINE RIBOSIDE: 3.07 NMOL/ML/H (ref 2.7–5.84)
TPMT INTERPRETATION: ABNORMAL
TPMT REVIEWED BY: ABNORMAL

## 2019-02-27 NOTE — TELEPHONE ENCOUNTER
DOCUMENTATION ONLY:  Prior authorization for Mycophenolate not required    Co-pay: Unknown, patient locked into [facility]    Patient Assistance is not available.

## 2019-02-27 NOTE — TELEPHONE ENCOUNTER
----- Message from Bridget Segura sent at 2/27/2019  3:29 PM CST -----  Regarding: Mycophenolate  Contact: [phone]  FYI:  Mycophenolate does not required prior authorization. Patient's insurance requires the patient to fill through [facility]. Please send prescription to [facility], which has been added to the patients EPIC profile. Patient has been notified and provided with the necessary info to call and schedule a delivery.    To complete this in EPIC, the original order MUST be discontinued and re-typed as a new prescription with the updated pharmacy listed. Clicking reorder will continue to route the rx to [facility] even if the pharmacy is changed. Please opt the patient out of [facility] when the BPA is fired.

## 2019-02-28 DIAGNOSIS — M34.9 SCLERODERMA: Primary | ICD-10-CM

## 2019-02-28 DIAGNOSIS — M34.9 SCLERODERMA: ICD-10-CM

## 2019-02-28 RX ORDER — MYCOPHENOLATE MOFETIL 500 MG/1
1000 TABLET ORAL 2 TIMES DAILY
Qty: 120 TABLET | Refills: 6 | Status: SHIPPED | OUTPATIENT
Start: 2019-02-28 | End: 2019-02-28 | Stop reason: CLARIF

## 2019-02-28 RX ORDER — MYCOPHENOLATE MOFETIL 500 MG/1
1000 TABLET ORAL 2 TIMES DAILY
Qty: 120 TABLET | Refills: 5 | Status: SHIPPED | OUTPATIENT
Start: 2019-02-28 | End: 2019-03-18 | Stop reason: SDUPTHER

## 2019-02-28 RX ORDER — MYCOPHENOLATE MOFETIL 500 MG/1
1000 TABLET ORAL 2 TIMES DAILY
Qty: 120 TABLET | Refills: 6 | Status: SHIPPED | OUTPATIENT
Start: 2019-02-28 | End: 2019-02-28 | Stop reason: SDUPTHER

## 2019-03-01 NOTE — PATIENT INSTRUCTIONS
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   Kami received cycle #6 of CHOP/R and Dose #11 of prophylactic intrathecal methotrexate 2019.  Kami was brought in by family ( and 2 daughters) with a temperature elevation to 100.8°. Recheck temperature here is 101°.  CBC reveals a WBC of 0.19 with an ANC of 0.06  Hemoglobin of 8.7 and platelet count of 209,000.  On review of systems, she has only lost 1 pound in the last week, she feels about baseline other than being weak and tired and experienced nausea and vomiting on the one-hour drive from home to the clinic.  She does not have a headache or cough, or burning on urination or sputum production.  She is being evaluated in anticipation of admission to the hospital for neutropenia and fever.      TARGET LYMPHOMA SITES:  1. Right maxillary sinus with local invasion to the floor of the right orbit   2.  on 10/10/18   3. borderline spleen size, 13 x 4.8 x 11.8 cm  4. Bone marrow negative on 10/11/18   5. CSF specimen via Omaya port negative on 10/24/18  TUMOR HISTORY: Right maxillary sinus high-grade diffuse large B-cell lymphoma 2018  Kami was treated for hyperthyroidism with radioactive iodine at about age 33. About 45 years ago she underwent a thyroidectomy followed subsequently by a vocal cord medialization procedure by Dr. Crane in [facility].  Several years ago she had right maxillary sinus surgery by Dr. Garcia.  Kami developed upper respiratory and sinus symptoms in the spring of 2018.  She was seen and treated at [facility], [place], operated by [facility] urgent care on 3/13/2018 for bronchitis.   She was then seen in    Methotrexate tablets  What is this medicine?  METHOTREXATE (METH oh TREX ate) is a chemotherapy drug used to treat cancer including breast cancer, leukemia, and lymphoma. This medicine can also be used to treat psoriasis and certain kinds of arthritis.  How should I use this medicine?  Take this medicine by mouth with a glass of water. Follow the directions on the prescription label. Take your medicine at regular intervals. Do not take it more often than directed. Do not stop taking except on your doctor's advice.  Make sure you know why you are taking this medicine and how often you should take it. If this medicine is used for a condition that is not cancer, like arthritis or psoriasis, it should be taken weekly, NOT daily. Taking this medicine more often than directed can cause serious side effects, even death.  Talk to your healthcare provider about safe handling and disposal of this medicine. You may need to take special precautions.  Talk to your pediatrician regarding the use of this medicine in children. While this drug may be prescribed for selected conditions, precautions do apply.  What side effects may I notice from receiving this medicine?  Side effects that you should report to your doctor or health care professional as soon as possible:  · allergic reactions like skin rash, itching or hives, swelling of the face, lips, or tongue  · breathing problems or shortness of breath  · diarrhea  · dry, nonproductive cough  · low blood counts - this medicine may decrease the number of white blood cells, red blood cells and platelets. You may be at increased risk for infections and bleeding.  · mouth sores  · redness, blistering, peeling or loosening of the skin, including inside the mouth  · signs of infection - fever or chills, cough, sore throat, pain or trouble passing urine  · signs and symptoms of bleeding such as bloody or black, tarry stools; red or dark-brown urine; spitting up blood or brown material  followup by her PCP, Dr. Kvng Strong on 4/27/2018. He felt an abnormality in the neck and ordered a CT scan of the soft tissues of the neck and referred her to ENT for further evaluation of this.  A CT scan of the soft tissues of the neck with contrast on 5/7/2018 documented extensive mucosal thickening of the right maxillary sinus consistent with chronic sinusitis. There was also opacification of several of the ethmoid air cells and mucosal thickening in the nasal cavity.  No neck mass or adenopathy was appreciated.   Kami was evaluated by Dr. Bong Felix on 5/30/2018 for evaluation of right neck pain and swelling.  Full ENT exam including nasopharyngeal area was within normal limits.  Review of the CT scan from 5/7/2018 was consistent with chronic sinusitis. Kami had had a medialization procedure by ENT and [place] earlier in the year for a vocal cord paralysis issue. It was felt that some of the changes on the CT scan were consistent with that procedure.  On 8/3/2018 Kami had a right upper posterior tooth pulled by an oral surgeon, Dr. Ortega in [facility] for what was felt to possibly be an infected tooth.  Kami was again seen at [facility] urgent care on 8/16/2018 with swelling, redness and tenderness in the right maxillary area with associated headaches not responding to antibiotics.   Plain film imaging studies and a CT scan of the area was performed.   Plain film x-rays of the facial bones on 8/16/2018 did not reveal acute abnormalities   CT scan of facial bones within without contrast on 8/16/2018 documented interval development of an enhancing soft tissue along the right maxilla extending along the anterior right maxillary sinus and along the floor of the right orbit measuring 2.4 cm in width. New erosive bony changes were noted involving the bony structure of the right orbital floor. The findings were concerning for a neoplastic process with erosive changes of the right orbital  that looks like coffee grounds; red spots on the skin; unusual bruising or bleeding from the eye, gums, or nose  · signs and symptoms of kidney injury like trouble passing urine or change in the amount of urine  · signs and symptoms of liver injury like dark yellow or brown urine; general ill feeling or flu-like symptoms; light-colored stools; loss of appetite; nausea; right upper belly pain; unusually weak or tired; yellowing of the eyes or skin  Side effects that usually do not require medical attention (report to your doctor or health care professional if they continue or are bothersome):  · dizziness  · hair loss  · tiredness  · upset stomach  · vomiting  What may interact with this medicine?  This medicine may interact with the following medication:  · acitretin  · aspirin and aspirin-like medicines including salicylates  · azathioprine  · certain antibiotics like penicillins, tetracycline, and chloramphenicol  · cyclosporine  · gold  · hydroxychloroquine  · live virus vaccines  · NSAIDs, medicines for pain and inflammation, like ibuprofen or naproxen  · other cytotoxic agents  · penicillamine  · phenylbutazone  · phenytoin  · probenecid  · retinoids such as isotretinoin and tretinoin  · steroid medicines like prednisone or cortisone  · sulfonamides like sulfasalazine and trimethoprim/sulfamethoxazole  · theophylline  What if I miss a dose?  If you miss a dose, talk with your doctor or health care professional. Do not take double or extra doses.  Where should I keep my medicine?  Keep out of the reach of children.  Store at room temperature between 20 and 25 degrees C (68 and 77 degrees F). Protect from light. Throw away any unused medicine after the expiration date.  What should I tell my health care provider before I take this medicine?  They need to know if you have any of these conditions:  · fluid in the stomach area or lungs  · if you often drink alcohol  · infection or immune system problems  · kidney  disease or on hemodialysis  · liver disease  · low blood counts, like low white cell, platelet, or red cell counts  · lung disease  · radiation therapy  · stomach ulcers  · ulcerative colitis  · an unusual or allergic reaction to methotrexate, other medicines, foods, dyes, or preservatives  · pregnant or trying to get pregnant  · breast-feeding  What should I watch for while using this medicine?  Avoid alcoholic drinks.  This medicine can make you more sensitive to the sun. Keep out of the sun. If you cannot avoid being in the sun, wear protective clothing and use sunscreen. Do not use sun lamps or tanning beds/booths.  You may need blood work done while you are taking this medicine.  Call your doctor or health care professional for advice if you get a fever, chills or sore throat, or other symptoms of a cold or flu. Do not treat yourself. This drug decreases your body's ability to fight infections. Try to avoid being around people who are sick.  This medicine may increase your risk to bruise or bleed. Call your doctor or health care professional if you notice any unusual bleeding.  Check with your doctor or health care professional if you get an attack of severe diarrhea, nausea and vomiting, or if you sweat a lot. The loss of too much body fluid can make it dangerous for you to take this medicine.  Talk to your doctor about your risk of cancer. You may be more at risk for certain types of cancers if you take this medicine.  Both men and women must use effective birth control with this medicine. Do not become pregnant while taking this medicine or until at least 1 normal menstrual cycle has occurred after stopping it. Women should inform their doctor if they wish to become pregnant or think they might be pregnant. Men should not father a child while taking this medicine and for 3 months after stopping it. There is a potential for serious side effects to an unborn child. Talk to your health care professional or  floor with ENT consultation recommended.   Kami was seen by Kenny Mar PA-C with ENT on 8/20/2018 where the above CT scanning clinical findings were evaluated. Arrangements were made for a biopsy with Dr. Bong Felix.  On 9/17/2018, Dr. Bong Felix performed a nasal/sinus endoscopy with biopsy.  Pathology revealed a high grade (Ki-67 of 80%) monoclonal kappa B cell diffuse large B-cell lymphoma. The cells are polymorphic with maintaining medium sized to large cells with irregular nuclear contour.  On flow cytometry, the monoclonal kappa B cell population does not express CD5 or CD10, but are low viability.  The immunostains, show a diffuse B-cell infiltrate positive for CD20, PAX-5 and BCL 6, but negative for CD5, CD10, BCL-2, cyclin D1 and CD43.  <30% of the cells were positive for Mum-1. Ki-67 showed a high proliferative rate of about 80%.  All of the above is consistent with a diffuse large B-cell lymphoma of the GBC type.  A CT scan of the soft tissues of the neck with contrast on 10/3/2018 was compared to the CT scan of the face on 8/16/2018 and 5/7/2018.  There had been significant progression of disease with interval increase in the size of the right buccal space hyperdense mass measuring approximately at least 7 x 2 cm on axial cuts.   There is involvement of the medial and lateral pterigoid muscle. The right temporalis muscle was also inseparable from the mass with involvement as well of the pterygomaxillary fissure, pterygopalatine fossa and sphenopalatine foramen.  Local cortex erosive the structure and was also noted along the maxillary gingival surface.  Involvement of the roof of the right maxillary sinus with progression of the erosive disease along the floor of the right orbit was also again identified. An asymmetric soft tissue density was also noted at the location of the right infraorbital area.  There was no evidence of involvement of the extraocular muscles nor do the lacrimal glands  demonstrate a discrete mass.  The lacrimal glands do not demonstrate a discrete mass.  Disease extends along the right lateral nasal cavity and ethmoid air cells. There is now disease along the right sphenoid sinus inseparable from the right foramen rotundum with probable involvement of the maxillary branch of V5.  Evidence of prior instrumentation with prior middle turbinectomies, uncinate to measles, antrostomies, and partial ethmoidectomies is also suspected based on the radiographic imaging.  The cavernous sinuses are symmetric.  Meckel's caves are symmetric.   There is now a right level one be lymphadenopathy by size criteria, and abnormal clustering with lymph node measuring up to 1.5 cm.  Otherwise the rest of the oral cavity and head and neck area did not demonstrate evidence of discrete mass.  Apparent right vocal cord paralysis would medialization of the true vocal cord with anteromedial rotation of the right was also described.   CT scan of the chest with and without contrast on 10/3/2018 did not reveal evidence of thoracic lymphadenopathy.  Tiny 2 mm nodules were noted in the subpleural left lung, likely to be benign with a 1 year followup recommended   CT scan of the abdomen and pelvis with contrast on 10/3/2018 did not reveal evidence of metastatic disease nor abdominal lymphadenopathy. The spleen was of borderline size measuring 13 cm x 4.8 cm x 11.8 cm.  Physical examination on 10/10/2018 revealed a very enlarged and swollen right maxillary facial area with induration of soft tissues around the upper teeth on the right and face.  No palpable peripheral lymphadenopathy was able to be appreciated in the head and neck area, epitrochlear and axillary areas, inguinal areas. No evidence of palpable splenomegaly or other abdominal findings were encountered on exam.  Bone marrow aspirate and biopsy on 10/11/18 showed no evidence of involvement by diffuse large B-cell lymphoma. FLOW cytometry negative.  MRI  pharmacist for more information. Do not breast-feed an infant while taking this medicine.  NOTE:This sheet is a summary. It may not cover all possible information. If you have questions about this medicine, talk to your doctor, pharmacist, or health care provider. Copyright© 2017 [employer]        Mycophenolate tablets  What is this medicine?  MYCOPHENOLATE MOFETIL (april HARRIS oh late PINO fe til) is used to decrease the immune system's response to a transplanted organ.  How should I use this medicine?  Take this medicine by mouth with a full glass of water. Follow the directions on the prescription label. Take this medicine on an empty stomach, at least 1 hour before or 2 hours after food. Do not take with food unless your doctor approves. Swallow the medicine whole. Do not cut, crush, or chew the medicine. If the medicine is broken or is not intact, do not get the powder on your skin or eyes. If contact occurs, rinse thoroughly with water. Take your medicine at regular intervals. Do not take your medicine more often than directed. Do not stop taking except on your doctor's advice.  A special MedGuide will be given to you by the pharmacist with each prescription and refill. Be sure to read this information carefully each time.  Talk to your pediatrician regarding the use of this medicine in children. Special care may be needed.  What side effects may I notice from receiving this medicine?  Side effects that you should report to your doctor or health care professional as soon as possible:  · allergic reactions like skin rash, itching or hives, swelling of the face, lips, or tongue  · changes in vision  · dizziness  · fever, chills or any other sign of infection  · unusual bleeding or bruising  · unusually weak or tired  Side effects that usually do not require medical attention (report to your doctor or health care professional if they continue or are bothersome):  · constipation  · diarrhea  · difficulty  of the brain w/wo contrast 10/11/18 at [facility] was consistent with abnormal soft tissue mass involving the right  and buccal space extending along the anterior margin of the right maxillary sinus contiguous with the floor of the right orbit. There was no definite invasion into the orbit, nor displacement of the inferior rectus musculature.  PET scan 10/12/18 at [facility] showed hypermetabolic activity associated with the right buccal space and  space mass, SUV 18.5. There is no distant focus of hypermetabolic activity seen to suggest other sites of involvement.  Echocardiogram 10/12/18 at [facility] revealed an estimated LVEF of 60%.   Hepatitis B and C were negative.  Hepatitis A IgM was positive and discussed with Dr. Osborn.  Left chest mediport was placed 10/18/18 by Dr. ANSON Mcgrath.  Right ommaya port was placed 10/22/18 by Dr. ANURADHA Greer.  CSF specimen was obtained via ommaya port by Dr. Osborn, sent for cytology and FLOW prior to initiation of Cycle #1 CHOP-R initiated on 10/24/18 was negative.     TREATMENT SUMMARY:  1. Cycle #1 CHOP-R initiated 10/24/18,   2. Dose #1 prophylactic intrathecal methotrexate (FLOW cytometry on CSF negative) given 10/29/18. Dose #2 on 11/14/18.   3. Anticipate consolidative XRT.     TARGET BREAST CANCER SITES:  1. Right mastectomy 09/23/04 for DCIS     TUMOR HISTORY: Right DCIS 09/23/04  On 09/23/04, Kami had a right simple mastectomy by Dr. 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   The ER was 95%, IL was 13%.   No further specific therapy was indicated for this cancer other than close follow up of the area and the opposite breast.     TREATMENT SUMMARY:  1. Right mastectomy and 9/23/2004 for DCIS    REVIEW OF SYSTEMS:    Constitutional: positive for fever on admission , curve decreasing  HEENT: no blurring of vision, no double vision                       Lungs: no hemoptysis, no cough  CVS: no palpitation, no chest pain  GI: no abdominal pain, no  sleeping  · loss of appetite  · nausea  · vomiting  What may interact with this medicine?  · acyclovir or valacyclovir  · antacids  · azathioprine  · birth control pills  · certain antibiotics like ciprofloxacin and amoxicillin; clavulanic acid  · ganciclovir or valganciclovir  · lanthanum carbonate  · medicines for cholesterol like cholestyramine and colestipol  · metronidazole  · norfloxacin  · other mycophenolate medicines  · probenecid  · rifampin  · sevelamer  · vaccines  What if I miss a dose?  If you miss a dose, take it as soon as you can. If it is almost time for your next dose, take only that dose. Do not take double or extra doses.  Where should I keep my medicine?  Keep out of the reach of children.  Store at room temperature between 15 and 30 degrees C (59 and 86 degrees F). Protect from light. Throw away any unused medicine after the expiration date.  What should I tell my health care provider before I take this medicine?  They need to know if you have any of these conditions:  · anemia or other blood disorder  · diarrhea  · immune system problems  · infection  · kidney disease  · phenylketonuria  · stomach problems  · an unusual or allergic reaction to mycophenolate mofetil, other medicines, foods, dyes, or preservatives  · pregnant or trying to get pregnant  · breast-feeding  What should I watch for while using this medicine?  Visit your doctor or health care professional for regular checks on your progress. You will need frequent blood checks during the first few months you are receiving the medicine.  Keep out of the sun. If you cannot avoid being in the sun, wear protective clothing and use sunscreen. Do not use sun lamps or tanning beds/booths.  This medicine can cause birth defects. Do not get pregnant while taking this drug. Females will need to have a negative pregnancy test before starting this medicine. If sexually active, use 2 reliable forms of birth control together for 4 weeks before  constipation; no nausea  AMOS: no dysuria, frequency and urgency, no hematuria  Musculoskeletal: no joint pain, swelling , stiffness  Endocrine: no polyuria, polydypsia, no cold or heat intolerence; positive for chronic thyroid replacement  Hematology: positive for pancytopenia from chemotherapy  Dermatology: no skin rash, no eczema, no pruritis  Psychiatry: no suicide ideation  Neurology: no syncope, no seizures; positive for Ommaya with IT chemotherapy     OBJECTIVE:    Vitals:    03/01/19 0338   BP: 142/74   Pulse: 80   Resp: 16   Temp: 98.7 °F (37.1 °C)   SpO2: 92%       Intake/Output Summary (Last 24 hours) at 3/1/2019 0627  Last data filed at 2/28/2019 1555  Gross per 24 hour   Intake 960 ml   Output --   Net 960 ml     PHYSICAL EXAM:    CONSTITUTIONAL: NAD  EYES: Nonicteric  ENT: Mucus membranes moist, no oropharyngeal lesions   NECK: Supple, no masses   CHEST/LUNGS: mostly clear, normal respiratory effort   CARDIOVASCULAR: RRR, no murmurs  ABDOMEN: soft non-tender, active bowel sounds, no HSM  EXTREMITIES: warm, moves all fours  SKIN: warm, dry with no rashes or lesions.  port left chest wall without erythema, Ommaya right scalp without erythema  LYMPH: No cervical, clavicular, axillary, or inguinal lymphadenopathy  NEUROLOGIC: follows commands, non focal   PSYCH: mood and affect appropriate    CBC  Results from last 7 days   Lab Units 03/01/19  0434 02/28/19  0500 02/27/19  1325   WBC 10*3/mm3 0.42* 0.22* 0.17*   HEMOGLOBIN g/dL 8.1* 7.6* 9.2*   HEMATOCRIT % 24.4* 22.7* 27.8*   PLATELETS 10*3/mm3 71* 81* 115*         Lab Results   Component Value Date     (L) 03/01/2019    K 2.5 (C) 03/01/2019    CL 96 (L) 03/01/2019    CO2 32.0 (H) 03/01/2019    BUN 14 03/01/2019    CREATININE 0.53 03/01/2019    GLUCOSE 76 03/01/2019    CALCIUM 7.9 (L) 03/01/2019    BILITOT 0.4 02/27/2019    ALKPHOS 53 02/27/2019    AST 21 02/27/2019    ALT 26 02/27/2019    AGRATIO 1.5 02/27/2019    GLOB 2.1 02/27/2019       Lab  starting this medicine, while you are taking this medicine, and for 6 weeks after you stop taking this medicine. Birth control pills alone may not work properly while you are taking this medicine. If you think that you might be pregnant talk to your doctor right away.  If you get a cold or other infection while receiving this medicine, call your doctor or health care professional. Do not treat yourself. The medicine may decrease your body's ability to fight infections.  NOTE:This sheet is a summary. It may not cover all possible information. If you have questions about this medicine, talk to your doctor, pharmacist, or health care provider. Copyright© 2017 [employer]         Results   Component Value Date    INR 0.97 09/10/2018    PROTIME 13.2 09/10/2018       Cultures:    Lab Results   Component Value Date    BLOODCX No growth at 24 hours 02/27/2019     No components found for: URINCX    ASSESSMENT/PLAN:  1.  High-grade diffuse large B-cell lymphoma of the right maxillary sinus with local invasion to the floor of the right orbit.   S/p cycle #6 of CHOP/R  2/20/19  (C#6 D#10 today)  S/p dose #11 of prophylactic intrathecal methotrexate 2/20/19      2. Neutropenic fever -neutropenia from chemotherapy     WBC - 0.42 with ANC pending   - neupogen 480 daily continues  Temp max recorded last 24 hrs 101.1 at 8:21 PM - afebrile since 1244 - 2/28/19    B/p  142/74 this am    Urine cx 2/27/19 - > 1000 CFU/mL gram-negative bacilli  Blood culture x 2 2/27/19 -no growth at 24 hours  Repeat blood culture port/peripheral 2/27/19 p.m. - no growth at 24 hrs  CSF 2/27/19 - gram stain negative-culture pending - no growth at 24 hrs    CXR 2/27/19 -  diffuse interstitial opacities    Zosyn 4.5 g IV every 8 hours  Levaquin 500 mg IV daily  Appreciate Dr. Gruber [place] assistance      3.  Anemia from chemotherapy  HGB - 8.1 - s/p 2 units p RBC 2/28/19      4.  Thrombocytopenia from chemotherapy  PLT - 71,000 - monitor    5.  DVT prophylaxis    - Lovenox 40 subq daily - monitor plt  - crt 0.53    5.  Hypokalemia    - K 2.5 - KCL runs ordered      Discussed with daughter      ELLA Jiménez    03/01/19  6:27 AM     Kami has Pancytopenia due to chemotherapy which led to the neutropenia, anemia and thrombocytopenia.    I personally saw and examined this patient, performing a face-to-face diagnostic evaluation with plan of care reviewed and developed with  Prashanth Chambers PA-C and nursing staff.   I have addended and/or modified the above history of present illness, physical examination, and assessment and plan to reflect my findings and impressions.   Essential elements of the care plan were discussed with   Prashanth Chambers PA-C .   Agree with findings and assessment/plan as documented above.   Questions were encouraged, asked and answered to their understanding and satisfaction.    Alexander Osborn MD  3/1/2019 7:01 AM

## 2019-03-11 ENCOUNTER — LAB VISIT (OUTPATIENT)
Dept: LAB | Facility: HOSPITAL | Age: 44
End: 2019-03-11
Attending: INTERNAL MEDICINE
Payer: COMMERCIAL

## 2019-03-11 DIAGNOSIS — R31.9 HEMATURIA SYNDROME: ICD-10-CM

## 2019-03-11 DIAGNOSIS — M32.14 LUPUS NEPHRITIS, ISN/RPS CLASS V: ICD-10-CM

## 2019-03-11 PROCEDURE — 36415 COLL VENOUS BLD VENIPUNCTURE: CPT

## 2019-03-11 PROCEDURE — 80061 LIPID PANEL: CPT

## 2019-03-11 PROCEDURE — 85025 COMPLETE CBC W/AUTO DIFF WBC: CPT

## 2019-03-11 PROCEDURE — 80069 RENAL FUNCTION PANEL: CPT

## 2019-03-12 LAB
ALBUMIN SERPL BCP-MCNC: 3.9 G/DL
ANION GAP SERPL CALC-SCNC: 6 MMOL/L
BASOPHILS # BLD AUTO: 0.03 K/UL
BASOPHILS NFR BLD: 0.5 %
BUN SERPL-MCNC: 12 MG/DL
CALCIUM SERPL-MCNC: 9.8 MG/DL
CHLORIDE SERPL-SCNC: 101 MMOL/L
CHOLEST SERPL-MCNC: 179 MG/DL
CHOLEST/HDLC SERPL: 3.4 {RATIO}
CO2 SERPL-SCNC: 28 MMOL/L
CREAT SERPL-MCNC: 0.9 MG/DL
DIFFERENTIAL METHOD: ABNORMAL
EOSINOPHIL # BLD AUTO: 0.5 K/UL
EOSINOPHIL NFR BLD: 8.5 %
ERYTHROCYTE [DISTWIDTH] IN BLOOD BY AUTOMATED COUNT: 13.9 %
EST. GFR  (AFRICAN AMERICAN): >60 ML/MIN/1.73 M^2
EST. GFR  (NON AFRICAN AMERICAN): >60 ML/MIN/1.73 M^2
GLUCOSE SERPL-MCNC: 87 MG/DL
HCT VFR BLD AUTO: 33.8 %
HDLC SERPL-MCNC: 53 MG/DL
HDLC SERPL: 29.6 %
HGB BLD-MCNC: 10.5 G/DL
IMM GRANULOCYTES # BLD AUTO: 0.01 K/UL
IMM GRANULOCYTES NFR BLD AUTO: 0.2 %
LDLC SERPL CALC-MCNC: 110.2 MG/DL
LYMPHOCYTES # BLD AUTO: 2.1 K/UL
LYMPHOCYTES NFR BLD: 37.2 %
MCH RBC QN AUTO: 25.6 PG
MCHC RBC AUTO-ENTMCNC: 31.1 G/DL
MCV RBC AUTO: 82 FL
MONOCYTES # BLD AUTO: 0.5 K/UL
MONOCYTES NFR BLD: 8.5 %
NEUTROPHILS # BLD AUTO: 2.6 K/UL
NEUTROPHILS NFR BLD: 45.1 %
NONHDLC SERPL-MCNC: 126 MG/DL
NRBC BLD-RTO: 0 /100 WBC
PHOSPHATE SERPL-MCNC: 3.4 MG/DL
PLATELET # BLD AUTO: 354 K/UL
PMV BLD AUTO: 9.6 FL
POTASSIUM SERPL-SCNC: 3.8 MMOL/L
RBC # BLD AUTO: 4.1 M/UL
SODIUM SERPL-SCNC: 135 MMOL/L
TRIGL SERPL-MCNC: 79 MG/DL
WBC # BLD AUTO: 5.67 K/UL

## 2019-03-18 ENCOUNTER — OFFICE VISIT (OUTPATIENT)
Dept: NEPHROLOGY | Facility: CLINIC | Age: 44
End: 2019-03-18
Payer: COMMERCIAL

## 2019-03-18 VITALS
WEIGHT: 241.88 LBS | DIASTOLIC BLOOD PRESSURE: 74 MMHG | BODY MASS INDEX: 38.87 KG/M2 | HEART RATE: 80 BPM | SYSTOLIC BLOOD PRESSURE: 128 MMHG | HEIGHT: 66 IN

## 2019-03-18 DIAGNOSIS — R31.9 HEMATURIA SYNDROME: Primary | ICD-10-CM

## 2019-03-18 DIAGNOSIS — M32.14 LUPUS NEPHRITIS, ISN/RPS CLASS V: ICD-10-CM

## 2019-03-18 DIAGNOSIS — M34.9 SCLERODERMA: ICD-10-CM

## 2019-03-18 DIAGNOSIS — I10 BENIGN ESSENTIAL HTN: ICD-10-CM

## 2019-03-18 DIAGNOSIS — I73.00 RAYNAUD'S DISEASE WITHOUT GANGRENE: ICD-10-CM

## 2019-03-18 PROCEDURE — 99999 PR PBB SHADOW E&M-EST. PATIENT-LVL III: ICD-10-PCS | Mod: PBBFAC,,, | Performed by: INTERNAL MEDICINE

## 2019-03-18 PROCEDURE — 99999 PR PBB SHADOW E&M-EST. PATIENT-LVL III: CPT | Mod: PBBFAC,,, | Performed by: INTERNAL MEDICINE

## 2019-03-18 PROCEDURE — 3074F SYST BP LT 130 MM HG: CPT | Mod: CPTII,S$GLB,, | Performed by: INTERNAL MEDICINE

## 2019-03-18 PROCEDURE — 3078F DIAST BP <80 MM HG: CPT | Mod: CPTII,S$GLB,, | Performed by: INTERNAL MEDICINE

## 2019-03-18 PROCEDURE — 3078F PR MOST RECENT DIASTOLIC BLOOD PRESSURE < 80 MM HG: ICD-10-PCS | Mod: CPTII,S$GLB,, | Performed by: INTERNAL MEDICINE

## 2019-03-18 PROCEDURE — 3074F PR MOST RECENT SYSTOLIC BLOOD PRESSURE < 130 MM HG: ICD-10-PCS | Mod: CPTII,S$GLB,, | Performed by: INTERNAL MEDICINE

## 2019-03-18 PROCEDURE — 99214 OFFICE O/P EST MOD 30 MIN: CPT | Mod: S$GLB,,, | Performed by: INTERNAL MEDICINE

## 2019-03-18 PROCEDURE — 99214 PR OFFICE/OUTPT VISIT, EST, LEVL IV, 30-39 MIN: ICD-10-PCS | Mod: S$GLB,,, | Performed by: INTERNAL MEDICINE

## 2019-03-18 PROCEDURE — 3008F PR BODY MASS INDEX (BMI) DOCUMENTED: ICD-10-PCS | Mod: CPTII,S$GLB,, | Performed by: INTERNAL MEDICINE

## 2019-03-18 PROCEDURE — 3008F BODY MASS INDEX DOCD: CPT | Mod: CPTII,S$GLB,, | Performed by: INTERNAL MEDICINE

## 2019-03-18 RX ORDER — MYCOPHENOLATE MOFETIL 500 MG/1
1000 TABLET ORAL 2 TIMES DAILY
Qty: 120 TABLET | Refills: 5 | Status: SHIPPED | OUTPATIENT
Start: 2019-03-18 | End: 2019-05-22 | Stop reason: SINTOL

## 2019-03-18 NOTE — TELEPHONE ENCOUNTER
Called patient about meds and she stated she never received meds.  Patient should be on 500mg 1 tab BID. Patient stated pharmacy never got meds.

## 2019-03-18 NOTE — TELEPHONE ENCOUNTER
Please call her and check with her regarding the dose of cellcept she is on     2 tabs bid ot 3 tabs bid ?  Let me know

## 2019-03-18 NOTE — PROGRESS NOTES
Subjective:       Patient ID: Zachary Lucia is a 43 y.o. Black or  female who presents for new evaluation of Glomerulonephritis and Hematuria    Hematuria   Irritative symptoms do not include frequency or urgency. Pertinent negatives include no chills, dysuria, fever, flank pain, nausea or vomiting.        Patient is a 43-year-old female with history of scleroderma and seizure disorder.  Patient has been seen by Rheumatology and has been managed on methotrexate and Plaquenil.  Patient was noted to have hematuria and a nephrology consultation is requested in November of 2018.  Patient's creatinine is 1.0.  Patient has been anemic.  Patient also has hypertension for which she is on Norvasc.  The patient's complement levels have been normal in October 2018.  Patient also has been AMAURY positive and immunological profile is consistent with scleroderma.  Patient also has history of Sjogren syndrome back in 2010.  Patient also has history of miscarriage.  Patient also has history of rash.    November 2018 patient seen after kidney biopsy.  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  No heavy proteinuria no chronicity.  No proliferative lesions. Started on ACE-inhibitor.  Check lipid profile on follow-up.    March 2019 no heavy proteinuria ,creatinine stable at 0.9.  Rheumatology records reviewed.  Methotrexate has been stopped and the patient has been started on CellCept    Review of Systems   Constitutional: Negative for activity change, appetite change, chills, diaphoresis, fatigue, fever and unexpected weight change.   HENT: Negative for congestion, dental problem, drooling, postnasal drip, rhinorrhea and voice change.    Eyes: Negative for discharge.   Respiratory: Negative for apnea, cough, choking, chest tightness, shortness of breath, wheezing and stridor.    Cardiovascular: Negative for chest pain, palpitations and leg swelling.   Gastrointestinal: Negative for  "abdominal distention, blood in stool, constipation, diarrhea, nausea, rectal pain and vomiting.   Endocrine: Negative for cold intolerance, heat intolerance, polydipsia and polyuria.   Genitourinary: Positive for hematuria. Negative for decreased urine volume, difficulty urinating, dysuria, enuresis, flank pain, frequency and urgency.   Musculoskeletal: Negative for arthralgias, back pain, gait problem and joint swelling.   Skin: Negative for rash.   Allergic/Immunologic: Negative for food allergies and immunocompromised state.   Neurological: Negative for dizziness, tremors, syncope, numbness and headaches.   Hematological: Does not bruise/bleed easily.   Psychiatric/Behavioral: Negative for agitation, behavioral problems and self-injury. The patient is not nervous/anxious and is not hyperactive.    All other systems reviewed and are negative.      Objective:   /74   Pulse 80   Ht 5' 6" (1.676 m)   Wt 109.7 kg (241 lb 13.5 oz)   BMI 39.03 kg/m²      Physical Exam   Constitutional: She is oriented to person, place, and time. No distress.   HENT:   Head: Normocephalic and atraumatic.   Nose: Nose normal.   Eyes: Conjunctivae and EOM are normal. Pupils are equal, round, and reactive to light.   Neck: Normal range of motion. No JVD present. No tracheal deviation present. No thyromegaly present.   Cardiovascular: Normal rate, regular rhythm, normal heart sounds and intact distal pulses. Exam reveals no gallop and no friction rub.   No murmur heard.  Pulmonary/Chest: Effort normal and breath sounds normal. No respiratory distress. She has no wheezes. She has no rales. She exhibits no tenderness.   Abdominal: Soft. Bowel sounds are normal. She exhibits no distension and no mass. There is no tenderness. No hernia.   Musculoskeletal: Normal range of motion. She exhibits no edema, tenderness or deformity.   Multiple joint tenderness and inflammation   Neurological: She is alert and oriented to person, place, and " time. She has normal reflexes. She displays normal reflexes. No cranial nerve deficit. She exhibits normal muscle tone. Coordination normal.   Skin: Skin is warm. Capillary refill takes less than 2 seconds. Rash noted. She is not diaphoretic. No erythema. There is pallor.   Psychiatric: She has a normal mood and affect. Her behavior is normal. Judgment and thought content normal.   Nursing note and vitals reviewed.        Lab Results   Component Value Date    CREATININE 0.9 03/11/2019    BUN 12 03/11/2019     (L) 03/11/2019    K 3.8 03/11/2019     03/11/2019    CO2 28 03/11/2019     Lab Results   Component Value Date    WBC 5.67 03/11/2019    HGB 10.5 (L) 03/11/2019    HCT 33.8 (L) 03/11/2019    MCV 82 03/11/2019     (H) 03/11/2019     Lab Results   Component Value Date    CALCIUM 9.8 03/11/2019    PHOS 3.4 03/11/2019       Assessment:    )    1. Hematuria syndrome    2. Lupus nephritis, ISN/RPS class V    3. Raynaud's disease without gangrene    4. Benign essential HTN        Plan:         1.  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  Stage 5 with no chronicity.  Early membranous changes.  No nephrotic syndrome.  No proliferative changes in the kidney biopsy.  Patient is on Plaquenil and CellCept per Rheumatology.  Continue current regimen.  Reordered Cell cept ( as she never got it)     2.  Raynaud's without gangrene:  Continue small dose of Norvasc    3.  Essential hypertension:  Adjust medications to keep the systolic blood pressure between 110-130 systolic          Follow-up 6 months

## 2019-03-18 NOTE — PATIENT INSTRUCTIONS
1.  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  Stage 5 with no chronicity.  Early membranous changes.  No nephrotic syndrome.  No proliferative changes in the kidney biopsy.  Patient is on Plaquenil and CellCept per Rheumatology.  Continue current regimen.  Reordered Cell cept ( as she never got it)     2.  Raynaud's without gangrene:  Continue small dose of Norvasc    3.  Essential hypertension:  Adjust medications to keep the systolic blood pressure between 110-130 systolic

## 2019-03-19 RX ORDER — MYCOPHENOLATE MOFETIL 500 MG/1
1000 TABLET ORAL 2 TIMES DAILY
Qty: 120 TABLET | Refills: 5 | OUTPATIENT
Start: 2019-03-19

## 2019-03-25 ENCOUNTER — TELEPHONE (OUTPATIENT)
Dept: RHEUMATOLOGY | Facility: CLINIC | Age: 44
End: 2019-03-25

## 2019-03-25 NOTE — TELEPHONE ENCOUNTER
----- Message from Martha Hadleyite sent at 3/25/2019 11:56 AM CDT -----  Contact: Pt   Pt called and stated she has not rec'd the medication from the specialty pharmacy and it has been about a month. She can be reached at [phone].    Thanks,  TF

## 2019-03-25 NOTE — TELEPHONE ENCOUNTER
Called [facility] and spoke with Queenie who stated med was approved they just needed to speak with patient.  Called patient to inform her to call [facility] for meds. Patient verbalized understanding.

## 2019-05-15 ENCOUNTER — LAB VISIT (OUTPATIENT)
Dept: LAB | Facility: HOSPITAL | Age: 44
End: 2019-05-15
Payer: COMMERCIAL

## 2019-05-15 DIAGNOSIS — M34.9 SCLERODERMA: ICD-10-CM

## 2019-05-15 LAB
ALBUMIN SERPL BCP-MCNC: 3.5 G/DL (ref 3.5–5.2)
ALP SERPL-CCNC: 50 U/L (ref 55–135)
ALT SERPL W/O P-5'-P-CCNC: 14 U/L (ref 10–44)
ANION GAP SERPL CALC-SCNC: 6 MMOL/L (ref 8–16)
AST SERPL-CCNC: 14 U/L (ref 10–40)
BASOPHILS # BLD AUTO: 0.02 K/UL (ref 0–0.2)
BASOPHILS NFR BLD: 0.4 % (ref 0–1.9)
BILIRUB SERPL-MCNC: 0.4 MG/DL (ref 0.1–1)
BUN SERPL-MCNC: 8 MG/DL (ref 6–20)
C3 SERPL-MCNC: 148 MG/DL (ref 50–180)
C4 SERPL-MCNC: 26 MG/DL (ref 11–44)
CALCIUM SERPL-MCNC: 8.8 MG/DL (ref 8.7–10.5)
CHLORIDE SERPL-SCNC: 105 MMOL/L (ref 95–110)
CO2 SERPL-SCNC: 27 MMOL/L (ref 23–29)
CREAT SERPL-MCNC: 0.9 MG/DL (ref 0.5–1.4)
CRP SERPL-MCNC: 8.3 MG/L (ref 0–8.2)
DIFFERENTIAL METHOD: ABNORMAL
EOSINOPHIL # BLD AUTO: 0.3 K/UL (ref 0–0.5)
EOSINOPHIL NFR BLD: 5.8 % (ref 0–8)
ERYTHROCYTE [DISTWIDTH] IN BLOOD BY AUTOMATED COUNT: 14.4 % (ref 11.5–14.5)
ERYTHROCYTE [SEDIMENTATION RATE] IN BLOOD BY WESTERGREN METHOD: 11 MM/HR (ref 0–36)
EST. GFR  (AFRICAN AMERICAN): >60 ML/MIN/1.73 M^2
EST. GFR  (NON AFRICAN AMERICAN): >60 ML/MIN/1.73 M^2
GLUCOSE SERPL-MCNC: 97 MG/DL (ref 70–110)
HCT VFR BLD AUTO: 34.4 % (ref 37–48.5)
HGB BLD-MCNC: 10.6 G/DL (ref 12–16)
IMM GRANULOCYTES # BLD AUTO: 0.01 K/UL (ref 0–0.04)
IMM GRANULOCYTES NFR BLD AUTO: 0.2 % (ref 0–0.5)
LYMPHOCYTES # BLD AUTO: 1.6 K/UL (ref 1–4.8)
LYMPHOCYTES NFR BLD: 31.4 % (ref 18–48)
MCH RBC QN AUTO: 25.9 PG (ref 27–31)
MCHC RBC AUTO-ENTMCNC: 30.8 G/DL (ref 32–36)
MCV RBC AUTO: 84 FL (ref 82–98)
MONOCYTES # BLD AUTO: 0.3 K/UL (ref 0.3–1)
MONOCYTES NFR BLD: 5.6 % (ref 4–15)
NEUTROPHILS # BLD AUTO: 3 K/UL (ref 1.8–7.7)
NEUTROPHILS NFR BLD: 56.8 % (ref 38–73)
NRBC BLD-RTO: 0 /100 WBC
PLATELET # BLD AUTO: 336 K/UL (ref 150–350)
PMV BLD AUTO: 8.8 FL (ref 9.2–12.9)
POTASSIUM SERPL-SCNC: 3.6 MMOL/L (ref 3.5–5.1)
PROT SERPL-MCNC: 7.7 G/DL (ref 6–8.4)
RBC # BLD AUTO: 4.1 M/UL (ref 4–5.4)
SODIUM SERPL-SCNC: 138 MMOL/L (ref 136–145)
WBC # BLD AUTO: 5.19 K/UL (ref 3.9–12.7)

## 2019-05-15 PROCEDURE — 85652 RBC SED RATE AUTOMATED: CPT

## 2019-05-15 PROCEDURE — 86038 ANTINUCLEAR ANTIBODIES: CPT

## 2019-05-15 PROCEDURE — 86140 C-REACTIVE PROTEIN: CPT

## 2019-05-15 PROCEDURE — 85025 COMPLETE CBC W/AUTO DIFF WBC: CPT

## 2019-05-15 PROCEDURE — 86160 COMPLEMENT ANTIGEN: CPT

## 2019-05-15 PROCEDURE — 86160 COMPLEMENT ANTIGEN: CPT | Mod: 59

## 2019-05-15 PROCEDURE — 86235 NUCLEAR ANTIGEN ANTIBODY: CPT | Mod: 59

## 2019-05-15 PROCEDURE — 86039 ANTINUCLEAR ANTIBODIES (ANA): CPT

## 2019-05-15 PROCEDURE — 36415 COLL VENOUS BLD VENIPUNCTURE: CPT

## 2019-05-15 PROCEDURE — 80053 COMPREHEN METABOLIC PANEL: CPT

## 2019-05-16 LAB
ANA SER QL IF: POSITIVE
ANA TITR SER IF: NORMAL {TITER}

## 2019-05-17 LAB
ANTI SM ANTIBODY: 5.92 EU (ref 0–19.99)
ANTI SM/RNP ANTIBODY: 10.07 EU (ref 0–19.99)
ANTI-SM INTERPRETATION: NEGATIVE
ANTI-SM/RNP INTERPRETATION: NEGATIVE
ANTI-SSA ANTIBODY: 88.76 EU (ref 0–19.99)
ANTI-SSA INTERPRETATION: POSITIVE
ANTI-SSB ANTIBODY: 107.56 EU (ref 0–19.99)
ANTI-SSB INTERPRETATION: POSITIVE
DSDNA AB SER-ACNC: ABNORMAL [IU]/ML

## 2019-05-22 ENCOUNTER — OFFICE VISIT (OUTPATIENT)
Dept: RHEUMATOLOGY | Facility: CLINIC | Age: 44
End: 2019-05-22
Payer: COMMERCIAL

## 2019-05-22 VITALS
WEIGHT: 240.5 LBS | SYSTOLIC BLOOD PRESSURE: 128 MMHG | HEART RATE: 76 BPM | BODY MASS INDEX: 38.65 KG/M2 | HEIGHT: 66 IN | DIASTOLIC BLOOD PRESSURE: 88 MMHG

## 2019-05-22 DIAGNOSIS — M34.9 SCLERODERMA: ICD-10-CM

## 2019-05-22 DIAGNOSIS — D64.9 ANEMIA, UNSPECIFIED TYPE: Primary | ICD-10-CM

## 2019-05-22 PROCEDURE — 3074F SYST BP LT 130 MM HG: CPT | Mod: CPTII,S$GLB,, | Performed by: INTERNAL MEDICINE

## 2019-05-22 PROCEDURE — 99999 PR PBB SHADOW E&M-EST. PATIENT-LVL III: ICD-10-PCS | Mod: PBBFAC,,, | Performed by: INTERNAL MEDICINE

## 2019-05-22 PROCEDURE — 3079F PR MOST RECENT DIASTOLIC BLOOD PRESSURE 80-89 MM HG: ICD-10-PCS | Mod: CPTII,S$GLB,, | Performed by: INTERNAL MEDICINE

## 2019-05-22 PROCEDURE — 99214 PR OFFICE/OUTPT VISIT, EST, LEVL IV, 30-39 MIN: ICD-10-PCS | Mod: S$GLB,,, | Performed by: INTERNAL MEDICINE

## 2019-05-22 PROCEDURE — 3008F BODY MASS INDEX DOCD: CPT | Mod: CPTII,S$GLB,, | Performed by: INTERNAL MEDICINE

## 2019-05-22 PROCEDURE — 3008F PR BODY MASS INDEX (BMI) DOCUMENTED: ICD-10-PCS | Mod: CPTII,S$GLB,, | Performed by: INTERNAL MEDICINE

## 2019-05-22 PROCEDURE — 3074F PR MOST RECENT SYSTOLIC BLOOD PRESSURE < 130 MM HG: ICD-10-PCS | Mod: CPTII,S$GLB,, | Performed by: INTERNAL MEDICINE

## 2019-05-22 PROCEDURE — 99999 PR PBB SHADOW E&M-EST. PATIENT-LVL III: CPT | Mod: PBBFAC,,, | Performed by: INTERNAL MEDICINE

## 2019-05-22 PROCEDURE — 3079F DIAST BP 80-89 MM HG: CPT | Mod: CPTII,S$GLB,, | Performed by: INTERNAL MEDICINE

## 2019-05-22 PROCEDURE — 99214 OFFICE O/P EST MOD 30 MIN: CPT | Mod: S$GLB,,, | Performed by: INTERNAL MEDICINE

## 2019-05-22 RX ORDER — MYCOPHENOLATE MOFETIL 250 MG/1
1000 CAPSULE ORAL 2 TIMES DAILY
Qty: 240 CAPSULE | Refills: 5 | Status: SHIPPED | OUTPATIENT
Start: 2019-05-22 | End: 2019-10-21 | Stop reason: ALTCHOICE

## 2019-05-22 RX ORDER — IBUPROFEN 800 MG/1
800 TABLET ORAL EVERY 8 HOURS PRN
Refills: 0 | COMMUNITY
Start: 2019-04-08

## 2019-05-22 NOTE — PROGRESS NOTES
CC: routine f/u sjogrens , scleroderma overlap      History of Present Illness:  Zachary Buckner a 43 y.o.yo female   Patient Active Problem List   Diagnosis    Seizure disorder    Sjogren's syndrome    Migraine without status migrainosus, not intractable    Generalized anxiety disorder    Scleroderma    Shortness of breath    Abnormal urine color    Raynaud's disease without gangrene     PCP :   Jenny Dixon MD     Here for routine  f/u Sjogren's, scleroderma, fibromyalgia  She is on plaquenil 200 mg bid , seen by opthalmologist since starting plaqenil,  She is also on CellCept 1500 mg p.o. B.i.d.  Labs reviewed- stable  Improvement in CRP, normal complement  Hand pain and stiffness and skin thickening improved  However CellCept tablets cause nausea, so she is not able to tolerate    Further workup for scleroderma normal CT chest no ILD  Fatty liver noted advised to follow up with PCP, methotrexate avoided  No transaminitis noted though  2D echo PA pressures 18    However her main issue has been fatigue  She did have a flare of arthritis since last visit which resolved now  She takes some Advil all Flexeril as needed and she rests  Stopped  gabapentin in the past due to intolerance issues    She was also noted to have Raynaud's and is on Norvasc 2.5 mg daily  Raynaud's is stable now.  No skin breakdown    she was seen by Nephrology for microscopic hematuria  She had a kidney biopsy done which was suggestive of membranous nephropathy/membranous lupus nephritis/ class 5  She is also on ACE inhibitor now    She had a feeling of something struck in throat always , she had in 2010 and EGD/ colon neg then   But now it got better ,no dysphagia   Uses PPI for GERD    She is also noted to have chronic anemia  She is now taking multivitamin with iron  Has not followed up back with PCP for further evaluation of chronic anemia as advised last visit    History of asthma  No worsening sob   But  now it got better since being on flonase   Her ECHO / CT chest were normal with no evidence of ILD   Inconclusive PFTs ( h/o asthma)     persistant dry  Mouth, dry eyes   Has seen opthalmology , uses tear drops prn     No new rashes, fever, chills, weight loss.  Denies any glandular swelling    Initial visit :  she was diagnosed with sjogrens in  , when she presented with joint pains and swelling in hands/ feet . Associated with   Tingling/ numbnesss. She mentions of dry eyes and uses tear drops and they help. She has dry mouth and drinks lots of water.   + RP  She had reddish rash  Like burn on face in the past but none in last 1 year.    she mentions of arthralgias with involvement of  hands, feet , knees , shins, elbows .  Associated with early am stiffness , but then gets better as day goes by.  She takes aleve prn and it helps   She had 1 miscarriage and 1    Sometimes feels food struck in throat   She is on vit d supp  Mother has RA     Past medical history:  Asthma  Seizures     Past surgical history:  None     Social History     Tobacco Use    Smoking status: Never Smoker    Smokeless tobacco: Never Used   Substance Use Topics    Alcohol use: Yes    Drug use: No       Family History   Problem Relation Age of Onset    Arthritis Mother     Glaucoma Mother     Hypertension Mother     Diabetes Father     Diabetes Maternal Grandmother     Hypertension Maternal Grandmother     Arthritis Maternal Grandmother     Cataracts Maternal Grandmother     Diabetes Maternal Grandfather     Heart disease Maternal Grandfather     Hypertension Maternal Grandfather     Diabetes Paternal Grandmother     Heart disease Paternal Grandmother     Hypertension Paternal Grandmother     Heart disease Paternal Grandfather        Review of patient's allergies indicates:   Allergen Reactions    Sulfa (sulfonamide antibiotics) Swelling             Review of Systems:  Constitutional: Denies fever, chills. +  weight gain   Fatigue: yes   Muscle weakness: no  Headaches: no new headaches  Eyes: No redness   +dryness.  No recent visual changes.  ENT: + dry mouth. No oral or nasal ulcers.  Card: No chest pain.  Resp: No cough,  sob.   Gastro: No nausea or vomiting.  No heartburn.  Constipation: no  Diarrhea: no  Genito:  No dysuria.  No genital ulcers.  Skin: No rash.  Raynauds:+stable   Neuro: + numbness / tingling in finger tips , toe tips   Psych: No depression  Endo:  + thirst.  Heme: no abnormal bleeding or bruising  Clots/ miscarriages - none     OBJECTIVE:     Vital Signs   Vitals:    05/22/19 0945   BP: 128/88   Pulse: 76     Physical Exam:  General Appearance:  NAD.   Gait: not favoring.  HEENT: PERRL.  Eyes not dry or injected.  No nasal ulcers.  Mouth not dry, no oral lesions.  Lymph: cervical, supraclavicular or axillary nodes: none abnormal   Cardio: no irregularity of S1 or S2.  No gallops or rubs.   Resp: Normal respiratory motion. Clear to auscultation bilaterally.   No abnormal chest conformation.  Abd: Soft, non-tender, nondistended.  No masses.   Skin: Head and neck,  and extremities examined. No rashes.   Neuro: Ox3.   Cranial nerves II-XII grossly intact.   Sensation intact  in both distal LE and upper extremities to light touch.  Musculoskeletal Exam: no synovitis     bl hands : no synovitis ,  Knees : crepitus +   No skin thickening noted in the hands  Muscle strength:Equal and full in all mm groups of the upper and lower ext.    Laboratory:   Results for orders placed or performed in visit on 05/15/19   Urinalysis   Result Value Ref Range    Specimen UA Urine, Clean Catch     Color, UA Straw Yellow, Straw, Letty    Appearance, UA Clear Clear    pH, UA 6.0 5.0 - 8.0    Specific Gravity, UA <=1.005 1.005 - 1.030    Protein, UA Negative Negative    Glucose, UA Negative Negative    Ketones, UA Negative Negative    Bilirubin (UA) Negative Negative    Occult Blood UA 2+ (A) Negative    Nitrite, UA Negative  Negative    Leukocytes, UA Negative Negative   Protein / creatinine ratio, urine   Result Value Ref Range    Protein, Urine Random <7 0 - 15 mg/dL    Creatinine, Random Ur 66.0 15.0 - 325.0 mg/dL    Prot/Creat Ratio, Ur Unable to calculate 0.00 - 0.20   Urinalysis Microscopic   Result Value Ref Range    RBC, UA 6 (H) 0 - 4 /hpf    WBC, UA 1 0 - 5 /hpf    Squam Epithel, UA 3 /hpf    Microscopic Comment SEE COMMENT      Results for ILIANA HILL (MRN [id number]) as of 7/16/2018 11:30   Ref. Range 11/29/2017 11:54 2/1/2018 09:57   AMAURY Latest Ref Range: Negative  Positive (A)    AMAURY HEP-2 Titer Unknown  Positive 1:1280 S...   Anti-SSA Antibody Latest Ref Range: 0.00 - 19.99 EU  93.17 (H)   Anti-SSA Interpretation Latest Ref Range: Negative   Positive (A)   Anti-SSB Antibody Latest Ref Range: 0.00 - 19.99 EU  89.70 (H)   Anti-SSB Interpretation Latest Ref Range: Negative   Positive (A)   ds DNA Ab Latest Ref Range: Negative 1:10  1 Negative 1:10   Anti Sm Antibody Latest Ref Range: 0.00 - 19.99 EU  7.19   Anti-Sm Interpretation Latest Ref Range: Negative   Negative   Anti Sm/RNP Antibody Latest Ref Range: 0.00 - 19.99 EU  30.58 (H)   Anti-Sm/RNP Interpretation Latest Ref Range: Negative   Positive (A)   Scleroderma SCL- Latest Ref Range: <20 UNITS  43 (H)       Imaging :    Ct chest :  No significant abnormalities within the chest.    Notes reviewed  Other procedures:    ASSESSMENT/PLAN:     Anemia, unspecified type  -     Ferritin; Future; Expected date: 05/22/2019  -     Iron and TIBC; Future; Expected date: 05/22/2019  -     Vitamin B12; Future; Expected date: 05/22/2019  -     Folate; Future; Expected date: 05/22/2019    Scleroderma  -     mycophenolate (CELLCEPT) 250 mg Cap; Take 4 capsules (1,000 mg total) by mouth 2 (two) times daily.  Dispense: 240 capsule; Refill: 5  -     Anti-DNA antibody, double-stranded; Standing; Expected date: 05/22/2019      42 yr old with  1:  Scleroderma  /Sjogrens overlap  (+1:1280 speckled ,  AMAURY , SSa , SSb + ve )  scl 70 + ve   Normal c3, c4   With   RP, sicca symptoms, GERD   Microscopic hematuria with kidney biopsy suggestive of membranous nephropathy/lupus-like condition/lupus nephritis    Continue with Plaquenil 200 mg twice a day use sunscreens and continue with annual eye exams  With kidney involvement, and history of fatty liver, would prefer  CellCept over methotrexate  But however she has intolerance to CellCept tablets  Will switch to capsules, she will try capsules for a month  If no relief in side effects will switch her to Imuran 50 mg p.o. B.i.d.- TPMT normal      She uses barrier contraception , does not intend to have any further kids    RP  - supportive care , avoid colds , c/w  amlodipine 2.5 daily, advised to monitor blood pressure  Dry eyes -c/w teardrops  Implications of sjogrens and fetal risk discussed     2: SOB :  Related to asthma likely , stable now    ECHO , HRCT no evidence of ILD or PAH   Inconclusive PFTs ,due to poor effort     3: Fibromyalgia :  Associated with allodynia   Exercise   Weight loss   C/w flexeril / p.r.n.    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   Stable  Brace   Intolerant to gabapentin     5: Beta 2 GP low titer + ve :with rpt negative   LA/ ACLA neg   No h/o clots     6: Hepatic steatosis :  Incidental finding with normal LFTS , more recent US abdomen - fatty liver  counselled on diet control  Weight loss   Avoid alcohol  She d/w PCP and was advised weight loss     7:  Chronic stable anemia of chronic disease:  Denies any GI bleed  Stable hemoglobin   Advised to  follow-up with PCP regarding further workup and any possible iron supplementation  May take on MVT with iron  Will check further iron studies, B12, folate- advised she might to the labs sometime this week    4    month follow up   FMLA given x 1 year to help during periods of flares    Risks vs Benefits and potential side effects of medication prescribed today were discussed with patient.      Plaquenil - ocular toxicity , rare myositis , skin pigmentation   CellCept/ Imuran - infection, malignancy risk discussed  Diarrhea-if she develops any nausea or diarrhea she will call me back  Always hold during infection or prior to surgery      Please call if any worsening noted      Disclaimer: This note was prepared using voice recognition system and is likely to have sound alike errors and is not proof read.  Please call me with any questions.

## 2019-05-29 ENCOUNTER — LAB VISIT (OUTPATIENT)
Dept: LAB | Facility: HOSPITAL | Age: 44
End: 2019-05-29
Attending: INTERNAL MEDICINE
Payer: COMMERCIAL

## 2019-05-29 DIAGNOSIS — M34.9 SCLERODERMA: ICD-10-CM

## 2019-05-29 DIAGNOSIS — D64.9 ANEMIA, UNSPECIFIED TYPE: ICD-10-CM

## 2019-05-29 LAB
FERRITIN SERPL-MCNC: 7 NG/ML (ref 20–300)
FOLATE SERPL-MCNC: 5.8 NG/ML (ref 4–24)
IRON SERPL-MCNC: 46 UG/DL (ref 30–160)
SATURATED IRON: 11 % (ref 20–50)
TOTAL IRON BINDING CAPACITY: 437 UG/DL (ref 250–450)
TRANSFERRIN SERPL-MCNC: 295 MG/DL (ref 200–375)
VIT B12 SERPL-MCNC: 278 PG/ML (ref 210–950)

## 2019-05-29 PROCEDURE — 82728 ASSAY OF FERRITIN: CPT

## 2019-05-29 PROCEDURE — 83540 ASSAY OF IRON: CPT

## 2019-05-29 PROCEDURE — 36415 COLL VENOUS BLD VENIPUNCTURE: CPT

## 2019-05-29 PROCEDURE — 82746 ASSAY OF FOLIC ACID SERUM: CPT

## 2019-05-29 PROCEDURE — 82607 VITAMIN B-12: CPT

## 2019-05-29 PROCEDURE — 86225 DNA ANTIBODY NATIVE: CPT

## 2019-05-30 ENCOUNTER — TELEPHONE (OUTPATIENT)
Dept: RHEUMATOLOGY | Facility: CLINIC | Age: 44
End: 2019-05-30

## 2019-05-30 PROBLEM — Z79.899 ON ANTIEPILEPTIC THERAPY: Status: ACTIVE | Noted: 2019-05-30

## 2019-05-30 PROBLEM — N05.2: Status: ACTIVE | Noted: 2019-05-30

## 2019-05-30 PROBLEM — N05.2: Status: RESOLVED | Noted: 2019-05-30 | Resolved: 2019-05-30

## 2019-05-30 PROBLEM — E55.9 VITAMIN D DEFICIENCY DISEASE: Status: ACTIVE | Noted: 2019-05-30

## 2019-05-30 PROBLEM — M32.14 LUPUS NEPHRITIS, ISN/RPS CLASS V: Status: ACTIVE | Noted: 2019-05-30

## 2019-05-30 LAB — DSDNA AB SER-ACNC: NORMAL [IU]/ML

## 2019-05-30 NOTE — TELEPHONE ENCOUNTER
----- Message from Sabrina Morris MD sent at 5/30/2019  2:48 PM CDT -----  Let her know she has iron deficiency anemia  She can continue iron supplements and follow-up with PCP

## 2019-08-15 ENCOUNTER — OFFICE VISIT (OUTPATIENT)
Dept: OPHTHALMOLOGY | Facility: CLINIC | Age: 44
End: 2019-08-15
Payer: COMMERCIAL

## 2019-08-15 DIAGNOSIS — H00.14 CHALAZION LEFT UPPER EYELID: Primary | ICD-10-CM

## 2019-08-15 DIAGNOSIS — H00.034 CELLULITIS OF LEFT UPPER EYELID: ICD-10-CM

## 2019-08-15 PROCEDURE — 92012 PR EYE EXAM, EST PATIENT,INTERMED: ICD-10-PCS | Mod: S$GLB,,, | Performed by: OPTOMETRIST

## 2019-08-15 PROCEDURE — 99999 PR PBB SHADOW E&M-EST. PATIENT-LVL II: CPT | Mod: PBBFAC,,, | Performed by: OPTOMETRIST

## 2019-08-15 PROCEDURE — 92012 INTRM OPH EXAM EST PATIENT: CPT | Mod: S$GLB,,, | Performed by: OPTOMETRIST

## 2019-08-15 PROCEDURE — 99999 PR PBB SHADOW E&M-EST. PATIENT-LVL II: ICD-10-PCS | Mod: PBBFAC,,, | Performed by: OPTOMETRIST

## 2019-08-15 RX ORDER — DOXYCYCLINE HYCLATE 100 MG
100 TABLET ORAL EVERY 12 HOURS
Qty: 20 TABLET | Refills: 2 | Status: SHIPPED | OUTPATIENT
Start: 2019-08-15 | End: 2019-08-25

## 2019-08-15 NOTE — PROGRESS NOTES
HPI     Left eye red swollen, irritated, hard to open.  Pain left eye. Pain scale 9.  Patient used Napcon A x 3 days which made it worse.  Bump on upper left lid x 7 days.  Last eye visit 02/14/2019 [facility]    Last edited by Nikita Love, OD on 8/15/2019 11:02 AM. (History)            Assessment /Plan     For exam results, see Encounter Report.    Chalazion left upper eyelid  -     doxycycline (VIBRA-TABS) 100 MG tablet; Take 1 tablet (100 mg total) by mouth every 12 (twelve) hours. for 10 days  Dispense: 20 tablet; Refill: 2    Cellulitis of left upper eyelid  -     doxycycline (VIBRA-TABS) 100 MG tablet; Take 1 tablet (100 mg total) by mouth every 12 (twelve) hours. for 10 days  Dispense: 20 tablet; Refill: 2      Worksheet given, warm soaks with lid scrubs    RTC prn worsens

## 2019-09-25 ENCOUNTER — LAB VISIT (OUTPATIENT)
Dept: LAB | Facility: HOSPITAL | Age: 44
End: 2019-09-25
Attending: INTERNAL MEDICINE
Payer: COMMERCIAL

## 2019-09-25 DIAGNOSIS — M34.9 SCLERODERMA: ICD-10-CM

## 2019-09-25 LAB
ALBUMIN SERPL BCP-MCNC: 3.6 G/DL (ref 3.5–5.2)
ALP SERPL-CCNC: 47 U/L (ref 55–135)
ALT SERPL W/O P-5'-P-CCNC: 13 U/L (ref 10–44)
ANION GAP SERPL CALC-SCNC: 11 MMOL/L (ref 8–16)
AST SERPL-CCNC: 16 U/L (ref 10–40)
BASOPHILS # BLD AUTO: 0.02 K/UL (ref 0–0.2)
BASOPHILS NFR BLD: 0.4 % (ref 0–1.9)
BILIRUB SERPL-MCNC: 0.3 MG/DL (ref 0.1–1)
BUN SERPL-MCNC: 9 MG/DL (ref 6–20)
CALCIUM SERPL-MCNC: 9.7 MG/DL (ref 8.7–10.5)
CHLORIDE SERPL-SCNC: 104 MMOL/L (ref 95–110)
CO2 SERPL-SCNC: 24 MMOL/L (ref 23–29)
CREAT SERPL-MCNC: 1 MG/DL (ref 0.5–1.4)
CRP SERPL-MCNC: 17.4 MG/L (ref 0–8.2)
DIFFERENTIAL METHOD: ABNORMAL
EOSINOPHIL # BLD AUTO: 0.3 K/UL (ref 0–0.5)
EOSINOPHIL NFR BLD: 5.3 % (ref 0–8)
ERYTHROCYTE [DISTWIDTH] IN BLOOD BY AUTOMATED COUNT: 13.8 % (ref 11.5–14.5)
EST. GFR  (AFRICAN AMERICAN): >60 ML/MIN/1.73 M^2
EST. GFR  (NON AFRICAN AMERICAN): >60 ML/MIN/1.73 M^2
GLUCOSE SERPL-MCNC: 99 MG/DL (ref 70–110)
HCT VFR BLD AUTO: 33.9 % (ref 37–48.5)
HGB BLD-MCNC: 10.7 G/DL (ref 12–16)
IMM GRANULOCYTES # BLD AUTO: 0.02 K/UL (ref 0–0.04)
IMM GRANULOCYTES NFR BLD AUTO: 0.4 % (ref 0–0.5)
LYMPHOCYTES # BLD AUTO: 1.5 K/UL (ref 1–4.8)
LYMPHOCYTES NFR BLD: 28.9 % (ref 18–48)
MCH RBC QN AUTO: 25.9 PG (ref 27–31)
MCHC RBC AUTO-ENTMCNC: 31.6 G/DL (ref 32–36)
MCV RBC AUTO: 82 FL (ref 82–98)
MONOCYTES # BLD AUTO: 0.4 K/UL (ref 0.3–1)
MONOCYTES NFR BLD: 7.8 % (ref 4–15)
NEUTROPHILS # BLD AUTO: 3 K/UL (ref 1.8–7.7)
NEUTROPHILS NFR BLD: 57.6 % (ref 38–73)
NRBC BLD-RTO: 0 /100 WBC
PLATELET # BLD AUTO: 337 K/UL (ref 150–350)
PMV BLD AUTO: 8.8 FL (ref 9.2–12.9)
POTASSIUM SERPL-SCNC: 4.2 MMOL/L (ref 3.5–5.1)
PROT SERPL-MCNC: 7.9 G/DL (ref 6–8.4)
RBC # BLD AUTO: 4.13 M/UL (ref 4–5.4)
SODIUM SERPL-SCNC: 139 MMOL/L (ref 136–145)
WBC # BLD AUTO: 5.12 K/UL (ref 3.9–12.7)

## 2019-09-25 PROCEDURE — 85025 COMPLETE CBC W/AUTO DIFF WBC: CPT

## 2019-09-25 PROCEDURE — 85652 RBC SED RATE AUTOMATED: CPT

## 2019-09-25 PROCEDURE — 86160 COMPLEMENT ANTIGEN: CPT

## 2019-09-25 PROCEDURE — 80053 COMPREHEN METABOLIC PANEL: CPT

## 2019-09-25 PROCEDURE — 86160 COMPLEMENT ANTIGEN: CPT | Mod: 59

## 2019-09-25 PROCEDURE — 86140 C-REACTIVE PROTEIN: CPT

## 2019-09-25 PROCEDURE — 36415 COLL VENOUS BLD VENIPUNCTURE: CPT

## 2019-09-26 LAB
C3 SERPL-MCNC: 166 MG/DL (ref 50–180)
C4 SERPL-MCNC: 29 MG/DL (ref 11–44)
ERYTHROCYTE [SEDIMENTATION RATE] IN BLOOD BY WESTERGREN METHOD: 40 MM/HR (ref 0–36)

## 2019-10-21 ENCOUNTER — OFFICE VISIT (OUTPATIENT)
Dept: RHEUMATOLOGY | Facility: CLINIC | Age: 44
End: 2019-10-21
Payer: COMMERCIAL

## 2019-10-21 VITALS
BODY MASS INDEX: 37.95 KG/M2 | DIASTOLIC BLOOD PRESSURE: 79 MMHG | WEIGHT: 242.31 LBS | SYSTOLIC BLOOD PRESSURE: 121 MMHG | HEART RATE: 84 BPM

## 2019-10-21 DIAGNOSIS — M34.9 SCLERODERMA: Primary | ICD-10-CM

## 2019-10-21 PROCEDURE — 3074F PR MOST RECENT SYSTOLIC BLOOD PRESSURE < 130 MM HG: ICD-10-PCS | Mod: CPTII,S$GLB,, | Performed by: INTERNAL MEDICINE

## 2019-10-21 PROCEDURE — 3008F PR BODY MASS INDEX (BMI) DOCUMENTED: ICD-10-PCS | Mod: CPTII,S$GLB,, | Performed by: INTERNAL MEDICINE

## 2019-10-21 PROCEDURE — 99215 PR OFFICE/OUTPT VISIT, EST, LEVL V, 40-54 MIN: ICD-10-PCS | Mod: S$GLB,,, | Performed by: INTERNAL MEDICINE

## 2019-10-21 PROCEDURE — 3008F BODY MASS INDEX DOCD: CPT | Mod: CPTII,S$GLB,, | Performed by: INTERNAL MEDICINE

## 2019-10-21 PROCEDURE — 3074F SYST BP LT 130 MM HG: CPT | Mod: CPTII,S$GLB,, | Performed by: INTERNAL MEDICINE

## 2019-10-21 PROCEDURE — 3078F DIAST BP <80 MM HG: CPT | Mod: CPTII,S$GLB,, | Performed by: INTERNAL MEDICINE

## 2019-10-21 PROCEDURE — 99215 OFFICE O/P EST HI 40 MIN: CPT | Mod: S$GLB,,, | Performed by: INTERNAL MEDICINE

## 2019-10-21 PROCEDURE — 3078F PR MOST RECENT DIASTOLIC BLOOD PRESSURE < 80 MM HG: ICD-10-PCS | Mod: CPTII,S$GLB,, | Performed by: INTERNAL MEDICINE

## 2019-10-21 PROCEDURE — 99999 PR PBB SHADOW E&M-EST. PATIENT-LVL III: CPT | Mod: PBBFAC,,, | Performed by: INTERNAL MEDICINE

## 2019-10-21 PROCEDURE — 99999 PR PBB SHADOW E&M-EST. PATIENT-LVL III: ICD-10-PCS | Mod: PBBFAC,,, | Performed by: INTERNAL MEDICINE

## 2019-10-21 RX ORDER — AZATHIOPRINE 50 MG/1
100 TABLET ORAL DAILY
Qty: 60 TABLET | Refills: 3 | Status: SHIPPED | OUTPATIENT
Start: 2019-10-21 | End: 2020-01-29 | Stop reason: SDUPTHER

## 2019-10-21 NOTE — PROGRESS NOTES
CC: routine f/u sjogrens , scleroderma overlap      History of Present Illness:  Zachary Buckner a 44 y.o.yo female   Patient Active Problem List   Diagnosis    Seizure disorder    Sjogren's syndrome    Migraine without status migrainosus, not intractable    Generalized anxiety disorder    Scleroderma    Shortness of breath    Abnormal urine color    Raynaud's disease without gangrene    Lupus nephritis, ISN/RPS class V    On antiepileptic therapy    Vitamin D deficiency disease     PCP :   Jenny Dixon MD     Here for routine  follow-up of scleroderma, Sjogren's  She is on plaquenil 200 mg bid , seen by opthalmologist since starting plaqenil,  She is also on CellCept 1500 mg p.o. B.i.d.  On CellCept Hand pain and stiffness and skin thickening improved  However she had issues tolerating so we switched her to capsules, still has side effects like weakness and intolerance issues  She is also noted to have iron deficiency anemia but is currently not taking any iron supplements  She is only taking vitamin-D supplements given by PCP now    Today we have reviewed lab work done recently UA positive for hematuria but she was in her cycle    Further workup for scleroderma normal CT chest no ILD  Fatty liver noted advised to follow up with PCP, methotrexate avoided  No transaminitis noted though  2D echo PA pressures 18      Stopped  gabapentin in the past due to intolerance issues    She was also noted to have Raynaud's and is on Norvasc 2.5 mg daily  Raynaud's is stable now.  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  Denies any glandular swelling    Initial visit :  she was diagnosed with sjogrens in  , when she presented with joint pains and swelling in hands/ feet . Associated with   Tingling/ numbnesss. She mentions of dry eyes and uses tear drops and they help. She has dry mouth and drinks lots of water.   + RP  She had reddish rash  Like burn on face in the past but none in last 1 year.    she mentions of arthralgias with involvement of  hands, feet , knees , shins, elbows .  Associated with early am stiffness , but then gets better as day goes by.  She takes aleve prn and it helps   She had 1 miscarriage and 1    Sometimes feels food struck in throat   She is on vit d supp  Mother has RA     Past medical history:  Asthma  Seizures     Past surgical history:  None     Social History     Tobacco Use    Smoking status: Never Smoker    Smokeless tobacco: Never Used   Substance Use Topics    Alcohol use: Yes    Drug use: No       Family History   Problem Relation Age of Onset    Arthritis Mother     Glaucoma Mother     Hypertension Mother     Diabetes Father     Diabetes Maternal Grandmother     Hypertension Maternal Grandmother     Arthritis Maternal Grandmother     Cataracts Maternal Grandmother     Diabetes Maternal Grandfather     Heart disease Maternal Grandfather     Hypertension Maternal Grandfather     Diabetes Paternal Grandmother     Heart disease Paternal Grandmother     Hypertension Paternal Grandmother     Heart disease Paternal Grandfather        Review of patient's allergies indicates:   Allergen Reactions    Sulfa (sulfonamide antibiotics) Swelling       Review of Systems:  Constitutional: Denies fever, chills. +  weight gain   Fatigue: yes   Muscle weakness: no  Headaches: no new headaches  Eyes: No redness   +dryness.  No recent visual changes.  ENT: + dry mouth. No oral or nasal ulcers.  Card: No chest pain.  Resp: No cough,  sob.   Gastro: No nausea or vomiting.  No heartburn.  Constipation: no  Diarrhea: no  Genito:  No dysuria.  No genital ulcers.  Skin: No rash.  Raynauds:+stable   Neuro: + numbness / tingling in finger tips , toe tips   Psych: No depression  Endo:  + thirst.  Heme: no abnormal bleeding or bruising  Clots/ miscarriages - none     OBJECTIVE:     Vital Signs   Vitals:    10/21/19 1026   BP: 121/79   Pulse: 84     Physical Exam:  General Appearance:  NAD.   Gait: not favoring.  HEENT: PERRL.  Eyes not dry or injected.  No nasal ulcers.  Mouth not dry, no oral lesions.  Lymph: cervical, supraclavicular or axillary nodes: none abnormal   Cardio: no irregularity of S1 or S2.  No gallops or rubs.   Resp: Normal respiratory motion. Clear to auscultation bilaterally.   No abnormal chest conformation.  Abd: Soft, non-tender, nondistended.  No masses.   Skin: Head and neck,  and extremities examined. No rashes.   Neuro: Ox3.   Cranial nerves II-XII grossly intact.   Sensation intact  in both distal LE and upper extremities to light touch.  Musculoskeletal Exam: no synovitis     bl hands : no synovitis ,  Knees : crepitus +   No skin thickening noted in the hands  Muscle strength:Equal and full in all mm groups of the upper and lower ext.    Laboratory:   Results for orders placed or performed in visit on 09/25/19   Urinalysis   Result Value Ref Range    Specimen UA Urine, Clean Catch     Color, UA Yellow Yellow, Straw, Letty    Appearance, UA Clear Clear    pH, UA 7.0 5.0 - 8.0    Specific Gravity, UA 1.010 1.005 - 1.030    Protein, UA Negative Negative    Glucose, UA Negative Negative    Ketones, UA Negative Negative    Bilirubin (UA) Negative Negative    Occult Blood UA 3+ (A) Negative    Nitrite, UA Negative  Negative    Leukocytes, UA Negative Negative   Protein / creatinine ratio, urine   Result Value Ref Range    Protein, Urine Random 13 0 - 15 mg/dL    Creatinine, Random Ur 101.0 15.0 - 325.0 mg/dL    Prot/Creat Ratio, Ur 0.13 0.00 - 0.20   Urinalysis Microscopic   Result Value Ref Range    RBC, UA 40 (H) 0 - 4 /hpf    WBC, UA 5 0 - 5 /hpf    Bacteria Occasional None-Occ /hpf    Squam Epithel, UA 2 /hpf    Microscopic Comment SEE COMMENT      Results for ILIANA HILL (MRN [id number]) as of 7/16/2018 11:30   Ref. Range 11/29/2017 11:54 2/1/2018 09:57   AMAURY Latest Ref Range: Negative  Positive (A)    AMAURY HEP-2 Titer Unknown  Positive 1:1280 S...   Anti-SSA Antibody Latest Ref Range: 0.00 - 19.99 EU  93.17 (H)   Anti-SSA Interpretation Latest Ref Range: Negative   Positive (A)   Anti-SSB Antibody Latest Ref Range: 0.00 - 19.99 EU  89.70 (H)   Anti-SSB Interpretation Latest Ref Range: Negative   Positive (A)   ds DNA Ab Latest Ref Range: Negative 1:10  1 Negative 1:10   Anti Sm Antibody Latest Ref Range: 0.00 - 19.99 EU  7.19   Anti-Sm Interpretation Latest Ref Range: Negative   Negative   Anti Sm/RNP Antibody Latest Ref Range: 0.00 - 19.99 EU  30.58 (H)   Anti-Sm/RNP Interpretation Latest Ref Range: Negative   Positive (A)   Scleroderma SCL- Latest Ref Range: <20 UNITS  43 (H)       Imaging :    Ct chest :  No significant abnormalities within the chest.    Notes reviewed  Other procedures:    ASSESSMENT/PLAN:     Scleroderma  -     C-reactive protein; Standing  -     Sedimentation rate; Standing  -     Anti-DNA antibody, double-stranded; Standing; Expected date: 10/21/2019  -     Urinalysis; Standing  -     Protein / creatinine ratio, urine; Standing  -     azaTHIOprine (IMURAN) 100 mg tablet; Take 1 tablet (100 mg total) by mouth once daily. Start 50 mg daily x 2 weeks , then increase to 100 mg daily  Dispense: 30 tablet; Refill: 3  -     CBC auto differential; Standing      42 yr old with  1:  Scleroderma   /Sjogrens overlap (+1:1280 speckled ,  AMAURY , SSa , SSb + ve )  scl 70 + ve   Normal c3, c4   With   RP, sicca symptoms, GERD   Microscopic hematuria with kidney biopsy suggestive of membranous nephropathy/lupus-like condition/lupus nephritis- stage 5, sees Dr. Paul    Continue with Plaquenil 200 mg twice a day use sunscreens and continue with annual eye exams  With kidney involvement, and history of fatty liver, CellCept preferred over methotrexate  But however she has intolerance to CellCept tablets/ capsules  Recommend decrease CellCept to 1000 mg p.o. B.i.d., if she can tolerate this well she will stay on CellCept otherwise  She will start  Imuran - TPMT normal, 50 mg daily for 2 weeks then increase to 100 mg daily      She uses barrier contraception , does not intend to have any further kids  RP  - supportive care , avoid colds , c/w  amlodipine 2.5 daily, advised to monitor blood pressure  Dry eyes -c/w teardrops  Implications of sjogrens and fetal risk discussed     2: SOB :  Related to asthma likely , stable now    ECHO , HRCT no evidence of ILD or PAH   Inconclusive PFTs ,due to poor effort     3: Fibromyalgia :  Associated with allodynia   Exercise   Weight loss   C/w flexeril / p.r.n.    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   Stable  Brace   Intolerant to gabapentin   Discuss other options like release surgery from ortho    5: Beta 2 GP low titer + ve :with rpt negative   LA/ ACLA neg   No h/o clots     6: Hepatic steatosis :  Incidental finding with normal LFTS , more recent US abdomen - fatty liver  counselled on diet control  Weight loss   Avoid alcohol  She d/w PCP and was advised weight loss     7:  Chronic stable anemia of chronic disease:  Denies any GI bleed  Stable hemoglobin   At this point I would recommend she continues to follow-up with PCP as well for advised and start taking iron with vitamin-C  If no improvement with oral iron we can refer her to Hematology for IV iron      Risks vs Benefits and  potential side effects of medication prescribed today were discussed with patient.     Plaquenil - ocular toxicity , rare myositis , skin pigmentation   CellCept/ Imuran - infection, malignancy risk discussed  Diarrhea-if she develops any nausea or diarrhea she will call me back  Always hold during infection or prior to surgery      Please call if any worsening noted      Disclaimer: This note was prepared using voice recognition system and is likely to have sound alike errors and is not proof read.  Please call me with any questions.

## 2019-10-22 ENCOUNTER — TELEPHONE (OUTPATIENT)
Dept: PHARMACY | Facility: CLINIC | Age: 44
End: 2019-10-22

## 2019-10-22 NOTE — TELEPHONE ENCOUNTER
LVM for callback to inform patient that [facility] received prescription for Azasan and benefits investigation is required.  [facility] will be back in touch once insurance determination is received.

## 2019-11-05 ENCOUNTER — TELEPHONE (OUTPATIENT)
Dept: PHARMACY | Facility: CLINIC | Age: 44
End: 2019-11-05

## 2019-11-05 ENCOUNTER — PATIENT MESSAGE (OUTPATIENT)
Dept: PHARMACY | Facility: CLINIC | Age: 44
End: 2019-11-05

## 2019-11-13 ENCOUNTER — PATIENT MESSAGE (OUTPATIENT)
Dept: PHARMACY | Facility: CLINIC | Age: 44
End: 2019-11-13

## 2019-11-21 ENCOUNTER — LAB VISIT (OUTPATIENT)
Dept: LAB | Facility: HOSPITAL | Age: 44
End: 2019-11-21
Attending: INTERNAL MEDICINE
Payer: COMMERCIAL

## 2019-11-21 DIAGNOSIS — M34.9 SCLERODERMA: ICD-10-CM

## 2019-11-21 LAB
BASOPHILS # BLD AUTO: 0.02 K/UL (ref 0–0.2)
BASOPHILS NFR BLD: 0.3 % (ref 0–1.9)
CRP SERPL-MCNC: 14.2 MG/L (ref 0–8.2)
DIFFERENTIAL METHOD: ABNORMAL
EOSINOPHIL # BLD AUTO: 0.2 K/UL (ref 0–0.5)
EOSINOPHIL NFR BLD: 3.8 % (ref 0–8)
ERYTHROCYTE [DISTWIDTH] IN BLOOD BY AUTOMATED COUNT: 13.7 % (ref 11.5–14.5)
ERYTHROCYTE [SEDIMENTATION RATE] IN BLOOD BY WESTERGREN METHOD: 43 MM/HR (ref 0–36)
HCT VFR BLD AUTO: 35.7 % (ref 37–48.5)
HGB BLD-MCNC: 11.1 G/DL (ref 12–16)
IMM GRANULOCYTES # BLD AUTO: 0.01 K/UL (ref 0–0.04)
IMM GRANULOCYTES NFR BLD AUTO: 0.2 % (ref 0–0.5)
LYMPHOCYTES # BLD AUTO: 2.2 K/UL (ref 1–4.8)
LYMPHOCYTES NFR BLD: 35.8 % (ref 18–48)
MCH RBC QN AUTO: 25.8 PG (ref 27–31)
MCHC RBC AUTO-ENTMCNC: 31.1 G/DL (ref 32–36)
MCV RBC AUTO: 83 FL (ref 82–98)
MONOCYTES # BLD AUTO: 0.5 K/UL (ref 0.3–1)
MONOCYTES NFR BLD: 8.8 % (ref 4–15)
NEUTROPHILS # BLD AUTO: 3.1 K/UL (ref 1.8–7.7)
NEUTROPHILS NFR BLD: 51.3 % (ref 38–73)
NRBC BLD-RTO: 0 /100 WBC
PLATELET # BLD AUTO: 382 K/UL (ref 150–350)
PMV BLD AUTO: 9.3 FL (ref 9.2–12.9)
RBC # BLD AUTO: 4.31 M/UL (ref 4–5.4)
WBC # BLD AUTO: 6 K/UL (ref 3.9–12.7)

## 2019-11-21 PROCEDURE — 86225 DNA ANTIBODY NATIVE: CPT

## 2019-11-21 PROCEDURE — 36415 COLL VENOUS BLD VENIPUNCTURE: CPT

## 2019-11-21 PROCEDURE — 85025 COMPLETE CBC W/AUTO DIFF WBC: CPT

## 2019-11-21 PROCEDURE — 86140 C-REACTIVE PROTEIN: CPT

## 2019-11-21 PROCEDURE — 85652 RBC SED RATE AUTOMATED: CPT

## 2019-11-22 LAB — DSDNA AB SER-ACNC: NORMAL [IU]/ML

## 2019-12-05 ENCOUNTER — TELEPHONE (OUTPATIENT)
Dept: PHARMACY | Facility: CLINIC | Age: 44
End: 2019-12-05

## 2019-12-05 NOTE — TELEPHONE ENCOUNTER
Initial azathioprine (Imuran) consult completed on . Azathioprine 50 mg will be shipped on . 10.00 copay. Patient intends to start azathioprine on 004. Confirmed 2 patient identifiers - name and . Therapy Appropriate.      Patient was counseled on the administration directions:  -Take 1 tablet (50 mg) by mouth once daily for two weeks, then 2 tablets (100 mg) by mouth daily.     Patient was counseled on the following possible side effects, which include (but are not limited to):   · Central nervous system: Malaise  · Gastrointestinal: Nausea and vomiting (rheumatoid arthritis: 12%), diarrhea  · Hematologic & oncologic: Leukopenia (renal transplant: >50%; rheumatoid arthritis: 28%), neoplasia (renal transplant 3% [other than lymphoma], 0.5% [lymphoma]), thrombocytopenia  · Hepatic: Hepatotoxicity, increased serum alkaline phosphatase, increased serum bilirubin, increased serum transaminases  · Infection: Increased susceptibility to infection (renal transplant 20%; rheumatoid arthritis <1%; includes bacterial, fungal, protozoal, viral, opportunistic, and reactivation of latent infections)  · Neuromuscular & skeletal: Myalgia  · Miscellaneous: Fever     DDIs:  Medication list reviewed and no DDI's noted.     Lab Monitoring: expect weekly lab monitoring for the first month of therapy. TPMP normal.     Patient confirmed understanding. Consultation included: indication; goals of treatment; administration; storage and handling; side effects; how to handle side effects; the importance of compliance; how to handle missed doses; the importance of laboratory monitoring; the importance of keeping all follow up appointments.Patient understands to report any medication changes to [facility] and provider.  All questions answered and addressed to patients satisfaction. I will f/u with patient in 1 week from start, [facility] to contact patient in 3 weeks for refills.     19 labs scheduled.

## 2020-01-02 ENCOUNTER — TELEPHONE (OUTPATIENT)
Dept: PHARMACY | Facility: CLINIC | Age: 45
End: 2020-01-02

## 2020-01-08 PROBLEM — I10 ESSENTIAL HYPERTENSION, MALIGNANT: Status: ACTIVE | Noted: 2020-01-08

## 2020-01-08 PROBLEM — R80.8 OTHER PROTEINURIA: Status: ACTIVE | Noted: 2020-01-08

## 2020-01-27 ENCOUNTER — PATIENT MESSAGE (OUTPATIENT)
Dept: PHARMACY | Facility: CLINIC | Age: 45
End: 2020-01-27

## 2020-01-28 ENCOUNTER — LAB VISIT (OUTPATIENT)
Dept: LAB | Facility: HOSPITAL | Age: 45
End: 2020-01-28
Attending: INTERNAL MEDICINE
Payer: COMMERCIAL

## 2020-01-28 DIAGNOSIS — M34.9 SCLERODERMA: ICD-10-CM

## 2020-01-28 LAB
ALBUMIN SERPL BCP-MCNC: 3.9 G/DL (ref 3.5–5.2)
ALP SERPL-CCNC: 48 U/L (ref 55–135)
ALT SERPL W/O P-5'-P-CCNC: 17 U/L (ref 10–44)
ANION GAP SERPL CALC-SCNC: 10 MMOL/L (ref 8–16)
AST SERPL-CCNC: 15 U/L (ref 10–40)
BASOPHILS # BLD AUTO: 0.03 K/UL (ref 0–0.2)
BASOPHILS NFR BLD: 0.6 % (ref 0–1.9)
BILIRUB SERPL-MCNC: 0.5 MG/DL (ref 0.1–1)
BUN SERPL-MCNC: 10 MG/DL (ref 6–20)
C3 SERPL-MCNC: 148 MG/DL (ref 50–180)
C4 SERPL-MCNC: 25 MG/DL (ref 11–44)
CALCIUM SERPL-MCNC: 9.3 MG/DL (ref 8.7–10.5)
CHLORIDE SERPL-SCNC: 103 MMOL/L (ref 95–110)
CO2 SERPL-SCNC: 24 MMOL/L (ref 23–29)
CREAT SERPL-MCNC: 1 MG/DL (ref 0.5–1.4)
CRP SERPL-MCNC: 7.2 MG/L (ref 0–8.2)
DIFFERENTIAL METHOD: ABNORMAL
EOSINOPHIL # BLD AUTO: 0.2 K/UL (ref 0–0.5)
EOSINOPHIL NFR BLD: 3.3 % (ref 0–8)
ERYTHROCYTE [DISTWIDTH] IN BLOOD BY AUTOMATED COUNT: 13.6 % (ref 11.5–14.5)
ERYTHROCYTE [SEDIMENTATION RATE] IN BLOOD BY WESTERGREN METHOD: 33 MM/HR (ref 0–36)
EST. GFR  (AFRICAN AMERICAN): >60 ML/MIN/1.73 M^2
EST. GFR  (NON AFRICAN AMERICAN): >60 ML/MIN/1.73 M^2
GLUCOSE SERPL-MCNC: 95 MG/DL (ref 70–110)
HCT VFR BLD AUTO: 34.2 % (ref 37–48.5)
HGB BLD-MCNC: 10.8 G/DL (ref 12–16)
IMM GRANULOCYTES # BLD AUTO: 0.01 K/UL (ref 0–0.04)
IMM GRANULOCYTES NFR BLD AUTO: 0.2 % (ref 0–0.5)
LYMPHOCYTES # BLD AUTO: 1.7 K/UL (ref 1–4.8)
LYMPHOCYTES NFR BLD: 33.3 % (ref 18–48)
MCH RBC QN AUTO: 26.2 PG (ref 27–31)
MCHC RBC AUTO-ENTMCNC: 31.6 G/DL (ref 32–36)
MCV RBC AUTO: 83 FL (ref 82–98)
MONOCYTES # BLD AUTO: 0.4 K/UL (ref 0.3–1)
MONOCYTES NFR BLD: 8 % (ref 4–15)
NEUTROPHILS # BLD AUTO: 2.9 K/UL (ref 1.8–7.7)
NEUTROPHILS NFR BLD: 54.8 % (ref 38–73)
NRBC BLD-RTO: 0 /100 WBC
PLATELET # BLD AUTO: 332 K/UL (ref 150–350)
PMV BLD AUTO: 9.2 FL (ref 9.2–12.9)
POTASSIUM SERPL-SCNC: 3.9 MMOL/L (ref 3.5–5.1)
PROT SERPL-MCNC: 8.1 G/DL (ref 6–8.4)
RBC # BLD AUTO: 4.13 M/UL (ref 4–5.4)
SODIUM SERPL-SCNC: 137 MMOL/L (ref 136–145)
WBC # BLD AUTO: 5.22 K/UL (ref 3.9–12.7)

## 2020-01-28 PROCEDURE — 86140 C-REACTIVE PROTEIN: CPT

## 2020-01-28 PROCEDURE — 86160 COMPLEMENT ANTIGEN: CPT

## 2020-01-28 PROCEDURE — 85652 RBC SED RATE AUTOMATED: CPT

## 2020-01-28 PROCEDURE — 80053 COMPREHEN METABOLIC PANEL: CPT

## 2020-01-28 PROCEDURE — 85025 COMPLETE CBC W/AUTO DIFF WBC: CPT

## 2020-01-28 PROCEDURE — 36415 COLL VENOUS BLD VENIPUNCTURE: CPT

## 2020-01-28 PROCEDURE — 86160 COMPLEMENT ANTIGEN: CPT | Mod: 59

## 2020-01-29 ENCOUNTER — OFFICE VISIT (OUTPATIENT)
Dept: RHEUMATOLOGY | Facility: CLINIC | Age: 45
End: 2020-01-29
Payer: COMMERCIAL

## 2020-01-29 ENCOUNTER — TELEPHONE (OUTPATIENT)
Dept: PHARMACY | Facility: CLINIC | Age: 45
End: 2020-01-29

## 2020-01-29 VITALS
DIASTOLIC BLOOD PRESSURE: 79 MMHG | BODY MASS INDEX: 39.18 KG/M2 | WEIGHT: 242.75 LBS | HEART RATE: 88 BPM | SYSTOLIC BLOOD PRESSURE: 115 MMHG

## 2020-01-29 DIAGNOSIS — M34.9 SCLERODERMA: ICD-10-CM

## 2020-01-29 PROCEDURE — 99999 PR PBB SHADOW E&M-EST. PATIENT-LVL II: CPT | Mod: PBBFAC,,, | Performed by: INTERNAL MEDICINE

## 2020-01-29 PROCEDURE — 3074F PR MOST RECENT SYSTOLIC BLOOD PRESSURE < 130 MM HG: ICD-10-PCS | Mod: CPTII,S$GLB,, | Performed by: INTERNAL MEDICINE

## 2020-01-29 PROCEDURE — 3074F SYST BP LT 130 MM HG: CPT | Mod: CPTII,S$GLB,, | Performed by: INTERNAL MEDICINE

## 2020-01-29 PROCEDURE — 99214 PR OFFICE/OUTPT VISIT, EST, LEVL IV, 30-39 MIN: ICD-10-PCS | Mod: S$GLB,,, | Performed by: INTERNAL MEDICINE

## 2020-01-29 PROCEDURE — 3008F PR BODY MASS INDEX (BMI) DOCUMENTED: ICD-10-PCS | Mod: CPTII,S$GLB,, | Performed by: INTERNAL MEDICINE

## 2020-01-29 PROCEDURE — 3008F BODY MASS INDEX DOCD: CPT | Mod: CPTII,S$GLB,, | Performed by: INTERNAL MEDICINE

## 2020-01-29 PROCEDURE — 99999 PR PBB SHADOW E&M-EST. PATIENT-LVL II: ICD-10-PCS | Mod: PBBFAC,,, | Performed by: INTERNAL MEDICINE

## 2020-01-29 PROCEDURE — 3078F DIAST BP <80 MM HG: CPT | Mod: CPTII,S$GLB,, | Performed by: INTERNAL MEDICINE

## 2020-01-29 PROCEDURE — 3078F PR MOST RECENT DIASTOLIC BLOOD PRESSURE < 80 MM HG: ICD-10-PCS | Mod: CPTII,S$GLB,, | Performed by: INTERNAL MEDICINE

## 2020-01-29 PROCEDURE — 99214 OFFICE O/P EST MOD 30 MIN: CPT | Mod: S$GLB,,, | Performed by: INTERNAL MEDICINE

## 2020-01-29 RX ORDER — AZATHIOPRINE 50 MG/1
100 TABLET ORAL DAILY
Qty: 180 TABLET | Refills: 3 | Status: SHIPPED | OUTPATIENT
Start: 2020-01-29 | End: 2020-02-27 | Stop reason: SDUPTHER

## 2020-01-29 RX ORDER — PREDNISONE 10 MG/1
TABLET ORAL
Qty: 30 TABLET | Refills: 1 | Status: ON HOLD | OUTPATIENT
Start: 2020-01-29 | End: 2020-08-01 | Stop reason: HOSPADM

## 2020-01-29 NOTE — TELEPHONE ENCOUNTER
Patient confirmed shipping of Imuran and Prednisone (7 tabs dispensed for 8 days) on  to arrive . Address and  verified. $10.88 copay in 004, [facility] 0247. Pt said she started taking the increased dose of Imuran 50mg, 2 tabs daily, about 2 weeks ago. She stated she has missed 2 doses of Imuran, that she can recall, in the last month due to forgetting. Patient stated she takes her dose typically at work when she has lunch. She said she will sometimes forget if she took the dose for the day already or not, and said she will hold off taking in case she already did. Advised patient to keep a log with her lunch or at work to check off the day she took medication in order to help keep track. Also, suggested use of pillbox as long as pt uses gloves to handle doses. Patient voiced understanding. She stated she has been taking doses directly from the cap. Patient said she is aware prednisone is used for flares and directions were reviewed - Use 1 tablet (10 mg) daily for 3-5 days followed by a half-tablet (5 mg) daily for 3 days for any flares. Patient reported 0 prednisone tablets on hand. She stated she has about 2 weeks of Imuran on hand. Patient reported no new medications or dose changes. She stated she has not developed any new allergies or health conditions. Patient had no further questions or concerns.

## 2020-01-29 NOTE — PROGRESS NOTES
CC: routine f/u sjogrens , scleroderma overlap      History of Present Illness:  Zachary Buckner a 44 y.o.yo female  here for routine follow-up    PCP :   Jenny Dixon MD     Here for routine  follow-up of scleroderma, Sjogren's  She is on plaquenil 200 mg bid , seen by opthalmologist since starting plaqenil,  She is currently on Imuran 100 mg daily  She has normal inflammatory markers and complements and is doing well today  Denies any pain or stiffness  She is only taking vitamin-D supplements given by PCP now    UA negative for hematuria    Further workup for scleroderma normal CT chest no ILD  Fatty liver noted advised to follow up with PCP, methotrexate avoided  No transaminitis noted though  2D echo PA pressures 18      Stopped  gabapentin in the past due to intolerance issues    She was also noted to have Raynaud's and is on Norvasc 2.5 mg daily  Raynaud's is stable now.  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  Denies any glandular swelling    Initial visit :  she was diagnosed with sjogrens in 2010 , when she presented with joint pains and swelling in hands/ feet . Associated with   Tingling/ numbnesss. She mentions of dry eyes and uses tear drops and they help. She has dry mouth and drinks lots of water.   + RP  She had reddish rash  Like burn on  face in the past but none in last 1 year.    she mentions of arthralgias with involvement of  hands, feet , knees , shins, elbows .  Associated with early am stiffness , but then gets better as day goes by.  She takes aleve prn and it helps   She had 1 miscarriage and 1    Sometimes feels food struck in throat   She is on vit d supp  Mother has RA     Past medical history:  Asthma  Seizures     Past surgical history:  None     Social History     Tobacco Use    Smoking status: Never Smoker    Smokeless tobacco: Never Used   Substance Use Topics    Alcohol use: Yes    Drug use: No       Family History   Problem Relation Age of Onset    Arthritis Mother     Glaucoma Mother     Hypertension Mother     Diabetes Father     Diabetes Maternal Grandmother     Hypertension Maternal Grandmother     Arthritis Maternal Grandmother     Cataracts Maternal Grandmother     Diabetes Maternal Grandfather     Heart disease Maternal Grandfather     Hypertension Maternal Grandfather     Diabetes Paternal Grandmother     Heart disease Paternal Grandmother     Hypertension Paternal Grandmother     Heart disease Paternal Grandfather        Review of patient's allergies indicates:   Allergen Reactions    Sulfa (sulfonamide antibiotics) Swelling       Review of Systems:  Constitutional: Denies fever, chills. + weight gain   Fatigue: yes   Muscle weakness: no  Headaches: no new headaches  Eyes: No redness   +dryness.  No recent visual changes.  ENT: + dry mouth. No oral or nasal ulcers.  Card: No chest pain.  Resp: No cough,  sob.   Gastro: No nausea or vomiting.  No heartburn.  Constipation: no  Diarrhea: no  Genito:  No dysuria.  No genital ulcers.  Skin: No rash.  Raynauds:+stable   Neuro: + numbness / tingling in finger tips , toe tips   Psych: No depression  Endo:  + thirst.  Heme: no abnormal bleeding or bruising  Clots/ miscarriages - none     OBJECTIVE:     Vital Signs   Vitals:    20 1236   BP: 115/79    Pulse: 88     Physical Exam:  General Appearance:  NAD.   Gait: not favoring.  HEENT: PERRL.  Eyes not dry or injected.  No nasal ulcers.  Mouth not dry, no oral lesions.  Lymph: cervical, supraclavicular or axillary nodes: none abnormal   Cardio: no irregularity of S1 or S2.  No gallops or rubs.   Resp: Normal respiratory motion. Clear to auscultation bilaterally.   No abnormal chest conformation.  Abd: Soft, non-tender, nondistended.  No masses.   Skin: Head and neck,  and extremities examined. No rashes.   Neuro: Ox3.   Cranial nerves II-XII grossly intact.   Sensation intact  in both distal LE and upper extremities to light touch.  Musculoskeletal Exam: no synovitis     bl hands : no synovitis ,  Knees : crepitus +   No skin thickening noted in the hands  Muscle strength:Equal and full in all mm groups of the upper and lower ext.    Laboratory:   Results for orders placed or performed in visit on 01/28/20   Urinalysis   Result Value Ref Range    Specimen UA Urine, Clean Catch     Color, UA Yellow Yellow, Straw, Letty    Appearance, UA Clear Clear    pH, UA 7.0 5.0 - 8.0    Specific Gravity, UA 1.010 1.005 - 1.030    Protein, UA Negative Negative    Glucose, UA Negative Negative    Ketones, UA Negative Negative    Bilirubin (UA) Negative Negative    Occult Blood UA 1+ (A) Negative    Nitrite, UA Negative Negative    Leukocytes, UA Negative Negative   Urinalysis Microscopic   Result Value Ref Range    RBC, UA 2 0 - 4 /hpf    WBC, UA 5 0 - 5 /hpf    Bacteria Rare None-Occ /hpf    Squam Epithel, UA 5 /hpf    Microscopic Comment SEE COMMENT      Results for ILIANA HILL (MRN [id number]) as of 7/16/2018 11:30   Ref. Range 11/29/2017 11:54 2/1/2018 09:57   AMAURY Latest Ref Range: Negative  Positive (A)    AMAURY HEP-2 Titer Unknown  Positive 1:1280 S...   Anti-SSA Antibody Latest Ref Range: 0.00 - 19.99 EU  93.17 (H)   Anti-SSA Interpretation Latest Ref Range: Negative   Positive (A)   Anti-SSB Antibody Latest  Ref Range: 0.00 - 19.99 EU  89.70 (H)   Anti-SSB Interpretation Latest Ref Range: Negative   Positive (A)   ds DNA Ab Latest Ref Range: Negative 1:10  1 Negative 1:10   Anti Sm Antibody Latest Ref Range: 0.00 - 19.99 EU  7.19   Anti-Sm Interpretation Latest Ref Range: Negative   Negative   Anti Sm/RNP Antibody Latest Ref Range: 0.00 - 19.99 EU  30.58 (H)   Anti-Sm/RNP Interpretation Latest Ref Range: Negative   Positive (A)   Scleroderma SCL- Latest Ref Range: <20 UNITS  43 (H)       Imaging :    Ct chest :  No significant abnormalities within the chest.    Notes reviewed  Other procedures:    ASSESSMENT/PLAN:     Scleroderma  -     azaTHIOprine (IMURAN) 50 mg Tab; Take 2 tablets (100 mg total) by mouth once daily.  Dispense: 180 tablet; Refill: 3  -     predniSONE (DELTASONE) 10 MG tablet; Use 10 mg daily for 3-5 days followed by 5 mg daily for 3 days for any flares  Dispense: 30 tablet; Refill: 1      42 yr old with  1:  Scleroderma  /Sjogrens overlap (+1:1280 speckled ,  AMAURY , SSa , SSb + ve )  scl 70 + ve   Normal c3, c4   With   RP, sicca symptoms, GERD   Microscopic hematuria with kidney biopsy suggestive of membranous nephropathy/lupus-like condition/lupus nephritis- stage 5, sees Dr. Paul  Intolerance to CellCept in the past  Continue with Plaquenil 200 mg twice a day use sunscreens and continue with annual eye exams  Continue with Imuran 100 mg a day   She uses barrier contraception , does not intend to have any further kids  RP  - supportive care , avoid colds , c/w  amlodipine 2.5 daily, advised to monitor blood pressure  Dry eyes -c/w teardrops  Implications of sjogrens and fetal risk discussed     2: SOB :  Related to asthma likely , stable now    ECHO , HRCT no evidence of ILD or PAH   Inconclusive PFTs ,due to poor effort     3: Fibromyalgia :  Associated with allodynia   Exercise   Weight loss   C/w flexeril / p.r.n.    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   Stable  Brace   Intolerant to gabapentin   Discuss other  options like release surgery from ortho    5: Beta 2 GP low titer + ve :with rpt negative   LA/ ACLA neg   No h/o clots     6: Hepatic steatosis :  Incidental finding with normal LFTS , more recent US abdomen - fatty liver  counselled on diet control  Weight loss   Avoid alcohol  She d/w PCP and was advised weight loss     7:  Chronic stable anemia of chronic disease:  Denies any GI bleed  Stable hemoglobin   At this point I would recommend she continues to follow-up with PCP as well for advised take iron supplements  If any worsening noted Will refer to hematology      Risks vs Benefits and potential side effects of medication prescribed today were discussed with patient.     Plaquenil - ocular toxicity , rare myositis , skin pigmentation    Imuran - infection, malignancy risk discussed  Diarrhea-if she develops any nausea or diarrhea she will call me back  Always hold during infection or prior to surgery      Please call if any worsening noted      Disclaimer: This note was prepared using voice recognition system and is likely to have sound alike errors and is not proof read.  Please call me with any questions.

## 2020-02-12 DIAGNOSIS — M35.01 SJOGREN'S SYNDROME WITH KERATOCONJUNCTIVITIS SICCA: ICD-10-CM

## 2020-02-13 RX ORDER — HYDROXYCHLOROQUINE SULFATE 200 MG/1
200 TABLET, FILM COATED ORAL 2 TIMES DAILY
Qty: 180 TABLET | Refills: 3 | Status: SHIPPED | OUTPATIENT
Start: 2020-02-13 | End: 2021-02-01 | Stop reason: SDUPTHER

## 2020-02-26 ENCOUNTER — TELEPHONE (OUTPATIENT)
Dept: PHARMACY | Facility: CLINIC | Age: 45
End: 2020-02-26

## 2020-02-26 NOTE — TELEPHONE ENCOUNTER
[facility]/pharmacy #[id number] - [place] - [address] [facility] [phone] (Phone)  [phone] (Fax)

## 2020-02-27 DIAGNOSIS — M34.9 SCLERODERMA: Primary | ICD-10-CM

## 2020-02-27 RX ORDER — AZATHIOPRINE 100 MG/1
100 TABLET ORAL DAILY
Qty: 90 TABLET | Refills: 1 | Status: SHIPPED | OUTPATIENT
Start: 2020-02-27 | End: 2021-02-01 | Stop reason: SDUPTHER

## 2020-03-17 ENCOUNTER — PATIENT MESSAGE (OUTPATIENT)
Dept: RHEUMATOLOGY | Facility: CLINIC | Age: 45
End: 2020-03-17

## 2020-05-22 ENCOUNTER — LAB VISIT (OUTPATIENT)
Dept: LAB | Facility: HOSPITAL | Age: 45
End: 2020-05-22
Attending: INTERNAL MEDICINE
Payer: COMMERCIAL

## 2020-05-22 DIAGNOSIS — M34.9 SCLERODERMA: ICD-10-CM

## 2020-05-22 LAB
ALBUMIN SERPL BCP-MCNC: 3.6 G/DL (ref 3.5–5.2)
ALP SERPL-CCNC: 45 U/L (ref 55–135)
ALT SERPL W/O P-5'-P-CCNC: 11 U/L (ref 10–44)
ANION GAP SERPL CALC-SCNC: 8 MMOL/L (ref 8–16)
AST SERPL-CCNC: 12 U/L (ref 10–40)
BASOPHILS # BLD AUTO: 0.02 K/UL (ref 0–0.2)
BASOPHILS NFR BLD: 0.5 % (ref 0–1.9)
BILIRUB SERPL-MCNC: 0.3 MG/DL (ref 0.1–1)
BUN SERPL-MCNC: 9 MG/DL (ref 6–20)
CALCIUM SERPL-MCNC: 9 MG/DL (ref 8.7–10.5)
CHLORIDE SERPL-SCNC: 104 MMOL/L (ref 95–110)
CO2 SERPL-SCNC: 26 MMOL/L (ref 23–29)
CREAT SERPL-MCNC: 0.9 MG/DL (ref 0.5–1.4)
CRP SERPL-MCNC: 7.7 MG/L (ref 0–8.2)
DIFFERENTIAL METHOD: ABNORMAL
EOSINOPHIL # BLD AUTO: 0.2 K/UL (ref 0–0.5)
EOSINOPHIL NFR BLD: 4.7 % (ref 0–8)
ERYTHROCYTE [DISTWIDTH] IN BLOOD BY AUTOMATED COUNT: 13.9 % (ref 11.5–14.5)
ERYTHROCYTE [SEDIMENTATION RATE] IN BLOOD BY WESTERGREN METHOD: 17 MM/HR (ref 0–36)
EST. GFR  (AFRICAN AMERICAN): >60 ML/MIN/1.73 M^2
EST. GFR  (NON AFRICAN AMERICAN): >60 ML/MIN/1.73 M^2
GLUCOSE SERPL-MCNC: 92 MG/DL (ref 70–110)
HCT VFR BLD AUTO: 32.8 % (ref 37–48.5)
HGB BLD-MCNC: 10.5 G/DL (ref 12–16)
IMM GRANULOCYTES # BLD AUTO: 0 K/UL (ref 0–0.04)
IMM GRANULOCYTES NFR BLD AUTO: 0 % (ref 0–0.5)
LYMPHOCYTES # BLD AUTO: 1.5 K/UL (ref 1–4.8)
LYMPHOCYTES NFR BLD: 35.3 % (ref 18–48)
MCH RBC QN AUTO: 26.4 PG (ref 27–31)
MCHC RBC AUTO-ENTMCNC: 32 G/DL (ref 32–36)
MCV RBC AUTO: 83 FL (ref 82–98)
MONOCYTES # BLD AUTO: 0.3 K/UL (ref 0.3–1)
MONOCYTES NFR BLD: 7.8 % (ref 4–15)
NEUTROPHILS # BLD AUTO: 2.2 K/UL (ref 1.8–7.7)
NEUTROPHILS NFR BLD: 51.7 % (ref 38–73)
NRBC BLD-RTO: 0 /100 WBC
PLATELET # BLD AUTO: 312 K/UL (ref 150–350)
PMV BLD AUTO: 8.7 FL (ref 9.2–12.9)
POTASSIUM SERPL-SCNC: 4 MMOL/L (ref 3.5–5.1)
PROT SERPL-MCNC: 7.5 G/DL (ref 6–8.4)
RBC # BLD AUTO: 3.97 M/UL (ref 4–5.4)
SODIUM SERPL-SCNC: 138 MMOL/L (ref 136–145)
WBC # BLD AUTO: 4.22 K/UL (ref 3.9–12.7)

## 2020-05-22 PROCEDURE — 36415 COLL VENOUS BLD VENIPUNCTURE: CPT

## 2020-05-22 PROCEDURE — 85025 COMPLETE CBC W/AUTO DIFF WBC: CPT

## 2020-05-22 PROCEDURE — 85652 RBC SED RATE AUTOMATED: CPT

## 2020-05-22 PROCEDURE — 86140 C-REACTIVE PROTEIN: CPT

## 2020-05-22 PROCEDURE — 80053 COMPREHEN METABOLIC PANEL: CPT

## 2020-05-27 ENCOUNTER — OFFICE VISIT (OUTPATIENT)
Dept: RHEUMATOLOGY | Facility: CLINIC | Age: 45
End: 2020-05-27
Payer: COMMERCIAL

## 2020-05-27 VITALS
WEIGHT: 239.19 LBS | BODY MASS INDEX: 38.61 KG/M2 | HEART RATE: 78 BPM | DIASTOLIC BLOOD PRESSURE: 91 MMHG | SYSTOLIC BLOOD PRESSURE: 144 MMHG

## 2020-05-27 DIAGNOSIS — M34.9 SCLERODERMA: Primary | ICD-10-CM

## 2020-05-27 DIAGNOSIS — Z79.899 ENCOUNTER FOR LONG-TERM (CURRENT) USE OF HIGH-RISK MEDICATION: ICD-10-CM

## 2020-05-27 DIAGNOSIS — M71.21 BAKER'S CYST OF KNEE, RIGHT: ICD-10-CM

## 2020-05-27 DIAGNOSIS — M32.14 LUPUS NEPHRITIS, ISN/RPS CLASS V: ICD-10-CM

## 2020-05-27 DIAGNOSIS — M79.7 FIBROMYALGIA: ICD-10-CM

## 2020-05-27 DIAGNOSIS — M35.01 SJOGREN'S SYNDROME WITH KERATOCONJUNCTIVITIS SICCA: ICD-10-CM

## 2020-05-27 PROCEDURE — 3080F PR MOST RECENT DIASTOLIC BLOOD PRESSURE >= 90 MM HG: ICD-10-PCS | Mod: CPTII,S$GLB,, | Performed by: INTERNAL MEDICINE

## 2020-05-27 PROCEDURE — 3077F SYST BP >= 140 MM HG: CPT | Mod: CPTII,S$GLB,, | Performed by: INTERNAL MEDICINE

## 2020-05-27 PROCEDURE — 3077F PR MOST RECENT SYSTOLIC BLOOD PRESSURE >= 140 MM HG: ICD-10-PCS | Mod: CPTII,S$GLB,, | Performed by: INTERNAL MEDICINE

## 2020-05-27 PROCEDURE — 20610 DRAIN/INJ JOINT/BURSA W/O US: CPT | Mod: RT,S$GLB,, | Performed by: INTERNAL MEDICINE

## 2020-05-27 PROCEDURE — 20610 PR DRAIN/INJECT LARGE JOINT/BURSA: ICD-10-PCS | Mod: RT,S$GLB,, | Performed by: INTERNAL MEDICINE

## 2020-05-27 PROCEDURE — 99214 OFFICE O/P EST MOD 30 MIN: CPT | Mod: 25,S$GLB,, | Performed by: INTERNAL MEDICINE

## 2020-05-27 PROCEDURE — 99999 PR PBB SHADOW E&M-EST. PATIENT-LVL III: ICD-10-PCS | Mod: PBBFAC,,, | Performed by: INTERNAL MEDICINE

## 2020-05-27 PROCEDURE — 3008F PR BODY MASS INDEX (BMI) DOCUMENTED: ICD-10-PCS | Mod: CPTII,S$GLB,, | Performed by: INTERNAL MEDICINE

## 2020-05-27 PROCEDURE — 3080F DIAST BP >= 90 MM HG: CPT | Mod: CPTII,S$GLB,, | Performed by: INTERNAL MEDICINE

## 2020-05-27 PROCEDURE — 3008F BODY MASS INDEX DOCD: CPT | Mod: CPTII,S$GLB,, | Performed by: INTERNAL MEDICINE

## 2020-05-27 PROCEDURE — 99999 PR PBB SHADOW E&M-EST. PATIENT-LVL III: CPT | Mod: PBBFAC,,, | Performed by: INTERNAL MEDICINE

## 2020-05-27 PROCEDURE — 99214 PR OFFICE/OUTPT VISIT, EST, LEVL IV, 30-39 MIN: ICD-10-PCS | Mod: 25,S$GLB,, | Performed by: INTERNAL MEDICINE

## 2020-05-27 RX ORDER — AMITRIPTYLINE HYDROCHLORIDE 10 MG/1
10 TABLET, FILM COATED ORAL NIGHTLY PRN
Qty: 30 TABLET | Refills: 0 | Status: SHIPPED | OUTPATIENT
Start: 2020-05-27 | End: 2020-06-30

## 2020-05-27 RX ORDER — TRIAMCINOLONE ACETONIDE 40 MG/ML
40 INJECTION, SUSPENSION INTRA-ARTICULAR; INTRAMUSCULAR ONCE
Status: COMPLETED | OUTPATIENT
Start: 2020-05-27 | End: 2020-05-27

## 2020-05-27 RX ORDER — LIDOCAINE HYDROCHLORIDE 20 MG/ML
1 INJECTION, SOLUTION INFILTRATION; PERINEURAL ONCE
Status: COMPLETED | OUTPATIENT
Start: 2020-05-27 | End: 2020-05-27

## 2020-05-27 RX ADMIN — TRIAMCINOLONE ACETONIDE 40 MG: 40 INJECTION, SUSPENSION INTRA-ARTICULAR; INTRAMUSCULAR at 12:05

## 2020-05-27 RX ADMIN — LIDOCAINE HYDROCHLORIDE 1 ML: 20 INJECTION, SOLUTION INFILTRATION; PERINEURAL at 12:05

## 2020-05-27 NOTE — PROGRESS NOTES
CC: routine f/u sjogrens , scleroderma overlap      History of Present Illness:  Zachary Buckner a 44 y.o.yo female  here for routine follow-up    PCP :   Jenny Dixon MD     Here for routine  follow-up of scleroderma, Sjogren's  She is on plaquenil 200 mg bid , seen by opthalmologist since starting plaqenil,  She is currently on Imuran 100 mg daily  She has normal inflammatory markers and complements and is doing well today  She is doing better with decreased flares, but still every few months she has a bad day, uses prednisone for 1 or 2 days  She works at [employer] and is able to manage her work well otherwise  She is only taking vitamin-D supplements given by PCP now    Her main concern today seems to be right knee pain started 1 month ago  No response to conservative measures  She feels tightness in the right knee, with limited range of motion    UA negative for hematuria    Further workup for scleroderma normal CT chest no ILD  Fatty liver noted advised to follow up with PCP, methotrexate avoided  No transaminitis noted though  2D echo PA pressures 18      Stopped  gabapentin in the past due to intolerance issues    She was also noted to have Raynaud's and is on Norvasc 2.5 mg daily  Raynaud's is stable now.  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  Denies  any glandular swelling    Initial visit :  she was diagnosed with sjogrens in  , when she presented with joint pains and swelling in hands/ feet . Associated with   Tingling/ numbnesss. She mentions of dry eyes and uses tear drops and they help. She has dry mouth and drinks lots of water.   + RP  She had reddish rash  Like burn on face in the past but none in last 1 year.    she mentions of arthralgias with involvement of  hands, feet , knees , shins, elbows .  Associated with early am stiffness , but then gets better as day goes by.  She takes aleve prn and it helps   She had 1 miscarriage and 1    Sometimes feels food struck in throat   She is on vit d supp  Mother has RA     Past medical history:  Asthma  Seizures     Past surgical history:  None     Social History     Tobacco Use    Smoking status: Never Smoker    Smokeless tobacco: Never Used   Substance Use Topics    Alcohol use: Yes    Drug use: No       Family History   Problem Relation Age of Onset    Arthritis Mother     Glaucoma Mother     Hypertension Mother     Diabetes Father     Diabetes Maternal Grandmother     Hypertension Maternal Grandmother     Arthritis Maternal Grandmother     Cataracts Maternal Grandmother     Diabetes Maternal Grandfather     Heart disease Maternal Grandfather     Hypertension Maternal Grandfather     Diabetes Paternal Grandmother     Heart disease Paternal Grandmother     Hypertension Paternal Grandmother     Heart disease Paternal Grandfather        Review of patient's allergies indicates:   Allergen Reactions    Sulfa (sulfonamide antibiotics) Swelling       Review of Systems:  Constitutional: Denies fever, chills.  No recent weight loss  Fatigue: yes   Muscle weakness: no  Headaches: no new headaches  Eyes: No redness   +dryness.  No recent visual changes.  ENT: + dry mouth. No oral or nasal ulcers.  Card: No chest pain.  Resp: No cough,  sob.   Gastro: No nausea or vomiting.  No  heartburn.  Constipation: no  Diarrhea: no  Genito:  No dysuria.  No genital ulcers.  Skin: No rash.  Raynauds:+stable   Neuro:  Nonfocal  Psych: No depression  Endo:  + thirst.  Heme: no abnormal bleeding or bruising  Clots/ miscarriages - none     OBJECTIVE:     Vital Signs   Vitals:    05/27/20 1056   BP: (!) 144/91   Pulse: 78     Physical Exam:  General Appearance:  NAD.   Gait: not favoring.  HEENT: PERRL.  Eyes not dry or injected.  No nasal ulcers.  Mouth not dry, no oral lesions.  Lymph: cervical, supraclavicular or axillary nodes: none abnormal   Cardio: no irregularity of S1 or S2.  No gallops or rubs.   Resp: Normal respiratory motion. Clear to auscultation bilaterally.   No abnormal chest conformation.  Abd: Soft, non-tender, nondistended.  No masses.   Skin: Head and neck,  and extremities examined. No rashes.  Some dry areas noted  Neuro: Ox3.   Cranial nerves II-XII grossly intact.   Sensation intact  in both distal LE and upper extremities to light touch.  Musculoskeletal Exam:     bl hands : no synovitis ,  Right knee mild swelling with restricted range of motion  Left knee normal range of motion  No skin thickening noted in the hands  Muscle strength:Equal and full in all mm groups of the upper and lower ext.    Laboratory:   Results for orders placed or performed in visit on 05/22/20   Comprehensive metabolic panel   Result Value Ref Range    Sodium 138 136 - 145 mmol/L    Potassium 4.0 3.5 - 5.1 mmol/L    Chloride 104 95 - 110 mmol/L    CO2 26 23 - 29 mmol/L    Glucose 92 70 - 110 mg/dL    BUN, Bld 9 6 - 20 mg/dL    Creatinine 0.9 0.5 - 1.4 mg/dL    Calcium 9.0 8.7 - 10.5 mg/dL    Total Protein 7.5 6.0 - 8.4 g/dL    Albumin 3.6 3.5 - 5.2 g/dL    Total Bilirubin 0.3 0.1 - 1.0 mg/dL    Alkaline Phosphatase 45 (L) 55 - 135 U/L    AST 12 10 - 40 U/L    ALT 11 10 - 44 U/L    Anion Gap 8 8 - 16 mmol/L    eGFR if African American >60 >60 mL/min/1.73 m^2    eGFR if non African American >60 >60  mL/min/1.73 m^2   C-reactive protein   Result Value Ref Range    CRP 7.7 0.0 - 8.2 mg/L   Sedimentation rate   Result Value Ref Range    Sed Rate 17 0 - 36 mm/Hr   CBC auto differential   Result Value Ref Range    WBC 4.22 3.90 - 12.70 K/uL    RBC 3.97 (L) 4.00 - 5.40 M/uL    Hemoglobin 10.5 (L) 12.0 - 16.0 g/dL    Hematocrit 32.8 (L) 37.0 - 48.5 %    Mean Corpuscular Volume 83 82 - 98 fL    Mean Corpuscular Hemoglobin 26.4 (L) 27.0 - 31.0 pg    Mean Corpuscular Hemoglobin Conc 32.0 32.0 - 36.0 g/dL    RDW 13.9 11.5 - 14.5 %    Platelets 312 150 - 350 K/uL    MPV 8.7 (L) 9.2 - 12.9 fL    Immature Granulocytes 0.0 0.0 - 0.5 %    Gran # (ANC) 2.2 1.8 - 7.7 K/uL    Immature Grans (Abs) 0.00 0.00 - 0.04 K/uL    Lymph # 1.5 1.0 - 4.8 K/uL    Mono # 0.3 0.3 - 1.0 K/uL    Eos # 0.2 0.0 - 0.5 K/uL    Baso # 0.02 0.00 - 0.20 K/uL    nRBC 0 0 /100 WBC    Gran% 51.7 38.0 - 73.0 %    Lymph% 35.3 18.0 - 48.0 %    Mono% 7.8 4.0 - 15.0 %    Eosinophil% 4.7 0.0 - 8.0 %    Basophil% 0.5 0.0 - 1.9 %    Differential Method Automated      Results for ILIANA HILL (MRN [id number]) as of 7/16/2018 11:30   Ref. Range 11/29/2017 11:54 2/1/2018 09:57   AMAURY Latest Ref Range: Negative  Positive (A)    AMAURY HEP-2 Titer Unknown  Positive 1:1280 S...   Anti-SSA Antibody Latest Ref Range: 0.00 - 19.99 EU  93.17 (H)   Anti-SSA Interpretation Latest Ref Range: Negative   Positive (A)   Anti-SSB Antibody Latest Ref Range: 0.00 - 19.99 EU  89.70 (H)   Anti-SSB Interpretation Latest Ref Range: Negative   Positive (A)   ds DNA Ab Latest Ref Range: Negative 1:10  1 Negative 1:10   Anti Sm Antibody Latest Ref Range: 0.00 - 19.99 EU  7.19   Anti-Sm Interpretation Latest Ref Range: Negative   Negative   Anti Sm/RNP Antibody Latest Ref Range: 0.00 - 19.99 EU  30.58 (H)   Anti-Sm/RNP Interpretation Latest Ref Range: Negative   Positive (A)   Scleroderma SCL- Latest Ref Range: <20 UNITS  43 (H)       Imaging :    Ct chest :  No significant  abnormalities within the chest.    Notes reviewed  Other procedures:    ASSESSMENT/PLAN:     Scleroderma  -     SCHEDULED EKG 12-LEAD (to Muse); Future  -     CBC auto differential; Standing  -     Comprehensive metabolic panel; Standing  -     C-Reactive Protein; Standing  -     Sedimentation rate; Standing  -     Anti-DNA antibody, double-stranded; Standing; Expected date: 05/27/2020  -     C4 complement; Standing; Expected date: 05/27/2020  -     C3 complement; Standing; Expected date: 05/27/2020  -     Urinalysis; Standing    Baker's cyst of knee, right  -     triamcinolone acetonide injection 40 mg  -     lidocaine HCL 20 mg/ml (2%) injection 1 mL    Sjogren's syndrome with keratoconjunctivitis sicca    Lupus nephritis, ISN/RPS class V    Encounter for long-term (current) use of high-risk medication    Fibromyalgia  -     amitriptyline (ELAVIL) 10 MG tablet; Take 1 tablet (10 mg total) by mouth nightly as needed for Insomnia.  Dispense: 30 tablet; Refill: 0      42 yr old with  1:  Scleroderma  /Sjogrens overlap (+1:1280 speckled ,  AMAURY , SSa , SSb + ve )  scl 70 + ve   Normal c3, c4   With   RP, sicca symptoms, GERD   Microscopic hematuria with kidney biopsy suggestive of membranous nephropathy/lupus-like condition/lupus nephritis- stage 5, sees Dr. Garza   Intolerance to CellCept in the past  Continue with Plaquenil 200 mg twice a day use sunscreens and continue with annual eye exams  Continue with Imuran 100 mg a day   She uses barrier contraception , does not intend to have any further kids  RP  - supportive care , avoid colds , c/w  amlodipine 2.5 daily, advised to monitor blood pressure  Dry eyes -c/w teardrops  Implications of sjogrens and fetal risk discussed     2: SOB :  Related to asthma likely , stable now    ECHO , HRCT no evidence of ILD or PAH   Inconclusive PFTs ,due to poor effort     3: Fibromyalgia :  Associated with allodynia   Exercise   Weight loss   C/w flexeril / p.r.n.  She can try  Elavil at bedtime    4:  Right knee arthritis:  Possible Baker cyst  Rest, ice  Inject with Kenalog today    5: Beta 2 GP low titer + ve :with rpt negative   LA/ ACLA neg   No h/o clots     6: Hepatic steatosis :  Incidental finding with normal LFTS , more recent US abdomen - fatty liver  counselled on diet control  Weight loss   Avoid alcohol  She d/w PCP and was advised weight loss     7:  Chronic stable anemia of chronic disease:  Denies any GI bleed  Stable hemoglobin   At this point I would recommend she continues to follow-up with PCP as well for advised take iron supplements, she is not on any now   If any worsening noted Will refer to hematology    4 month return  [facility] paperwork filled out    Risks vs Benefits and potential side effects of medication prescribed today were discussed with patient.     Plaquenil - ocular toxicity , rare myositis , skin pigmentation    Imuran - infection, malignancy risk discussed  Diarrhea-if she develops any nausea or diarrhea she will call me back  Always hold during infection or prior to surgery    Procedure Note   I  have explained the risks, benefits, and alternatives of joint or bursa injection, including infection and bleeding. The patient voices understanding and  questions have been answered.  The patient agrees to proceed as planned.  The  right knee was prepped with antiseptic and alcohol.  The area was numbed with topical refrigerant. Using anterior approach , 25 g needle was introduced into the joint space and and 1ml  40 mg of triamcinolone and 1 ml lidocaine 2% was injected into the space after a negative aspiration.  The patient tolerated the procedure well, and was instructed to rest for 24 hours after the procedure.    Please call if any worsening noted      Disclaimer: This note was prepared using voice recognition system and is likely to have sound alike errors and is not proof read.  Please call me with any questions.

## 2020-05-27 NOTE — PATIENT INSTRUCTIONS
Azathioprine tablets  What is this medicine?  AZATHIOPRINE (ay za THYE oh preen) suppresses the immune system. It is used to prevent organ rejection after a transplant. It is also used to treat rheumatoid arthritis.  How should I use this medicine?  Take this medicine by mouth with a full glass of water. Follow the directions on the prescription label. Take your medicine at regular intervals. Do not take your medicine more often than directed. Continue to take your medicine even if you feel better. Do not stop taking except on your doctor's advice.  Talk to your pediatrician regarding the use of this medicine in children. Special care may be needed.  What side effects may I notice from receiving this medicine?  Side effects that you should report to your doctor or health care professional as soon as possible:  · allergic reactions like skin rash, itching or hives, swelling of the face, lips, or tongue  · changes in vision  · confusion  · fever, chills, or any other sign of infection  · loss of balance or coordination  · severe stomach pain  · unusual bleeding, bruising  · unusually weak or tired  · vomiting  · yellowing of the eyes or skin  Side effects that usually do not require medical attention (report to your doctor or health care professional if they continue or are bothersome):  · hair loss  · nausea  What may interact with this medicine?  Do not take this medicine with any of the following medications:  · febuxostat  · mercaptopurine  This medicine may also interact with the following medications:  · allopurinol  · aminosalicylates like sulfasalazine, mesalamine, balsalazide, and olsalazine  · leflunomide  · medicines called ACE inhibitors like benazepril, captopril, enalapril, fosinopril, quinapril, lisinopril, ramipril, and trandolapril  · mycophenolate  · sulfamethoxazole; trimethoprim  · vaccines  · warfarin  What if I miss a dose?  If you miss a dose, take it as soon as you can. If it is almost time  for your next dose, take only that dose. Do not take double or extra doses.  Where should I keep my medicine?  Keep out of the reach of children.  Store at room temperature between 15 and 25 degrees C (59 and 77 degrees F). Protect from light. Throw away any unused medicine after the expiration date.  What should I tell my health care provider before I take this medicine?  They need to know if you have any of these conditions:  · infection  · kidney disease  · liver disease  · an unusual or allergic reaction to azathioprine, other medicines, lactose, foods, dyes, or preservatives  · pregnant or trying to get pregnant  · breast feeding  What should I watch for while using this medicine?  Visit your doctor or health care professional for regular checks on your progress. You will need frequent blood checks during the first few months you are receiving the medicine.  If you get a cold or other infection while receiving this medicine, call your doctor or health care professional. Do not treat yourself. The medicine may increase your risk of getting an infection.  Women should inform their doctor if they wish to become pregnant or think they might be pregnant. There is a potential for serious side effects to an unborn child. Talk to your health care professional or pharmacist for more information.  Men may have a reduced sperm count while they are taking this medicine. Talk to your health care professional for more information.  This medicine may increase your risk of getting certain kinds of cancer. Talk to your doctor about healthy lifestyle choices, important screenings, and your risk.  NOTE:This sheet is a summary. It may not cover all possible information. If you have questions about this medicine, talk to your doctor, pharmacist, or health care provider. Copyright© 2017 [employer]        Hydroxychloroquine tablets  What is this medicine?  HYDROXYCHLOROQUINE (shellie drox ee KLOR oh kwin) is used to treat rheumatoid  arthritis and systemic lupus erythematosus. It is also used to treat malaria.  How should I use this medicine?  Take this medicine by mouth with a glass of water. Follow the directions on the prescription label. If this medicine upsets your stomach take it with food or milk. Take your doses at regular intervals. Do not take your medicine more often than directed.  Talk to your pediatrician regarding the use of this medicine in children. Special care may be needed.  What side effects may I notice from receiving this medicine?  Side effects that you should report to your doctor or health care professional as soon as possible:  · allergic reactions like skin rash, itching or hives, swelling of the face, lips, or tongue  · change in vision  · fever, infection  · hearing loss or ringing  · muscle weakness, tremor, or numbness  · redness, blistering, peeling or loosening of the skin, including inside the mouth  · seizures  · unusual bleeding or bruising  · unusually weak or tired  Side effects that usually do not require medical attention (report to your doctor or health care professional if they continue or are bothersome):  · change in coloration of the mouth or skin  · dizziness  · hair loss, lightening  · headache  · irritability, nervousness, nightmares  · loss of appetite  · stomach upset, diarrhea  What may interact with this medicine?  · antacids  · botulinum toxins  · digoxin  · kaolin  · penicillamine  What if I miss a dose?  If you miss a dose, take it as soon as you can. If it is almost time for your next dose, take only that dose. Do not take double or extra doses.  Where should I keep my medicine?  Keep out of the reach of children. In children, this medicine can cause overdose with small doses.  Store at room temperature between 15 and 30 degrees C (59 and 86 degrees F). Protect from moisture and light. Throw away any unused medicine after the expiration date.  What should I tell my health care provider  before I take this medicine?  They need to know if you have any of these conditions:  · alcoholism  · anemia or other blood disorder  · eye disease  · glucose 6-phosphate dehydrogenase (G6PD) deficiency  · liver disease  · porphyria  · psoriasis  · an unusual or allergic reaction to chloroquine, hydroxychloroquine, other medicines, foods, dyes, or preservatives  · pregnant or trying to get pregnant  · breast-feeding  What should I watch for while using this medicine?  Visit your doctor or health care professional for regular check ups. Tell your doctor if your symptoms do not improve. Arthritis symptoms may take several weeks to improve. If you are taking this medicine for a long time, you will need important blood work done. You will also need to have your eyes checked as directed.  This medicine can make you more sensitive to the sun. Keep out of the sun. If you cannot avoid being in the sun, wear protective clothing and use sunscreen. Do not use sun lamps or tanning beds/booths.  Avoid antacids and kaolin containing products for 2 hours before and after taking a dose of this medicine.  NOTE:This sheet is a summary. It may not cover all possible information. If you have questions about this medicine, talk to your doctor, pharmacist, or health care provider. Copyright© 2017 [employer]

## 2020-06-01 PROBLEM — I10 ESSENTIAL HYPERTENSION, MALIGNANT: Status: RESOLVED | Noted: 2020-01-08 | Resolved: 2020-06-01

## 2020-06-29 DIAGNOSIS — M32.14 LUPUS NEPHRITIS, ISN/RPS CLASS V: Primary | ICD-10-CM

## 2020-06-30 ENCOUNTER — OFFICE VISIT (OUTPATIENT)
Dept: NEPHROLOGY | Facility: CLINIC | Age: 45
End: 2020-06-30
Payer: COMMERCIAL

## 2020-06-30 ENCOUNTER — LAB VISIT (OUTPATIENT)
Dept: LAB | Facility: HOSPITAL | Age: 45
End: 2020-06-30
Attending: INTERNAL MEDICINE
Payer: COMMERCIAL

## 2020-06-30 VITALS
BODY MASS INDEX: 38.37 KG/M2 | SYSTOLIC BLOOD PRESSURE: 120 MMHG | RESPIRATION RATE: 20 BRPM | HEIGHT: 66 IN | HEART RATE: 74 BPM | WEIGHT: 238.75 LBS | DIASTOLIC BLOOD PRESSURE: 74 MMHG

## 2020-06-30 DIAGNOSIS — I10 BENIGN ESSENTIAL HTN: ICD-10-CM

## 2020-06-30 DIAGNOSIS — M32.14 LUPUS NEPHRITIS, ISN/RPS CLASS V: Primary | ICD-10-CM

## 2020-06-30 DIAGNOSIS — M32.14 LUPUS NEPHRITIS, ISN/RPS CLASS V: ICD-10-CM

## 2020-06-30 DIAGNOSIS — M34.9 SCLERODERMA: ICD-10-CM

## 2020-06-30 DIAGNOSIS — R31.9 HEMATURIA SYNDROME: ICD-10-CM

## 2020-06-30 DIAGNOSIS — I73.00 RAYNAUD'S DISEASE WITHOUT GANGRENE: ICD-10-CM

## 2020-06-30 LAB
ALBUMIN SERPL BCP-MCNC: 3.5 G/DL (ref 3.5–5.2)
ANION GAP SERPL CALC-SCNC: 9 MMOL/L (ref 8–16)
BASOPHILS # BLD AUTO: 0.03 K/UL (ref 0–0.2)
BASOPHILS NFR BLD: 0.5 % (ref 0–1.9)
BILIRUB UR QL STRIP: NEGATIVE
BUN SERPL-MCNC: 13 MG/DL (ref 6–20)
CALCIUM SERPL-MCNC: 9.1 MG/DL (ref 8.7–10.5)
CHLORIDE SERPL-SCNC: 106 MMOL/L (ref 95–110)
CLARITY UR: ABNORMAL
CO2 SERPL-SCNC: 24 MMOL/L (ref 23–29)
COLOR UR: YELLOW
CREAT SERPL-MCNC: 1 MG/DL (ref 0.5–1.4)
CREAT UR-MCNC: 87 MG/DL (ref 15–325)
DIFFERENTIAL METHOD: ABNORMAL
EOSINOPHIL # BLD AUTO: 0.1 K/UL (ref 0–0.5)
EOSINOPHIL NFR BLD: 1.5 % (ref 0–8)
ERYTHROCYTE [DISTWIDTH] IN BLOOD BY AUTOMATED COUNT: 13.5 % (ref 11.5–14.5)
EST. GFR  (AFRICAN AMERICAN): >60 ML/MIN/1.73 M^2
EST. GFR  (NON AFRICAN AMERICAN): >60 ML/MIN/1.73 M^2
GLUCOSE SERPL-MCNC: 96 MG/DL (ref 70–110)
GLUCOSE UR QL STRIP: NEGATIVE
HCT VFR BLD AUTO: 34.1 % (ref 37–48.5)
HGB BLD-MCNC: 10.3 G/DL (ref 12–16)
HGB UR QL STRIP: ABNORMAL
IMM GRANULOCYTES # BLD AUTO: 0.02 K/UL (ref 0–0.04)
IMM GRANULOCYTES NFR BLD AUTO: 0.3 % (ref 0–0.5)
KETONES UR QL STRIP: NEGATIVE
LEUKOCYTE ESTERASE UR QL STRIP: NEGATIVE
LYMPHOCYTES # BLD AUTO: 1.6 K/UL (ref 1–4.8)
LYMPHOCYTES NFR BLD: 25.2 % (ref 18–48)
MCH RBC QN AUTO: 25.8 PG (ref 27–31)
MCHC RBC AUTO-ENTMCNC: 30.2 G/DL (ref 32–36)
MCV RBC AUTO: 86 FL (ref 82–98)
MONOCYTES # BLD AUTO: 0.5 K/UL (ref 0.3–1)
MONOCYTES NFR BLD: 7.8 % (ref 4–15)
NEUTROPHILS # BLD AUTO: 4.2 K/UL (ref 1.8–7.7)
NEUTROPHILS NFR BLD: 65 % (ref 38–73)
NITRITE UR QL STRIP: NEGATIVE
NRBC BLD-RTO: 0 /100 WBC
PH UR STRIP: 6 [PH] (ref 5–8)
PHOSPHATE SERPL-MCNC: 3.2 MG/DL (ref 2.7–4.5)
PLATELET # BLD AUTO: 305 K/UL (ref 150–350)
PMV BLD AUTO: 8.8 FL (ref 9.2–12.9)
POTASSIUM SERPL-SCNC: 4 MMOL/L (ref 3.5–5.1)
PROT UR QL STRIP: NEGATIVE
PROT UR-MCNC: <7 MG/DL (ref 0–15)
PROT/CREAT UR: NORMAL MG/G{CREAT} (ref 0–0.2)
PTH-INTACT SERPL-MCNC: 62 PG/ML (ref 9–77)
RBC # BLD AUTO: 3.99 M/UL (ref 4–5.4)
SODIUM SERPL-SCNC: 139 MMOL/L (ref 136–145)
SP GR UR STRIP: 1.01 (ref 1–1.03)
URN SPEC COLLECT METH UR: ABNORMAL
WBC # BLD AUTO: 6.51 K/UL (ref 3.9–12.7)

## 2020-06-30 PROCEDURE — 3074F PR MOST RECENT SYSTOLIC BLOOD PRESSURE < 130 MM HG: ICD-10-PCS | Mod: CPTII,S$GLB,, | Performed by: INTERNAL MEDICINE

## 2020-06-30 PROCEDURE — 76775 PR  US, RETROPERITNL ABD,  LTD: ICD-10-PCS | Mod: S$GLB,,, | Performed by: INTERNAL MEDICINE

## 2020-06-30 PROCEDURE — 3078F PR MOST RECENT DIASTOLIC BLOOD PRESSURE < 80 MM HG: ICD-10-PCS | Mod: CPTII,S$GLB,, | Performed by: INTERNAL MEDICINE

## 2020-06-30 PROCEDURE — 76775 US EXAM ABDO BACK WALL LIM: CPT | Mod: S$GLB,,, | Performed by: INTERNAL MEDICINE

## 2020-06-30 PROCEDURE — 80069 RENAL FUNCTION PANEL: CPT

## 2020-06-30 PROCEDURE — 81003 URINALYSIS AUTO W/O SCOPE: CPT

## 2020-06-30 PROCEDURE — 3008F PR BODY MASS INDEX (BMI) DOCUMENTED: ICD-10-PCS | Mod: CPTII,S$GLB,, | Performed by: INTERNAL MEDICINE

## 2020-06-30 PROCEDURE — 85025 COMPLETE CBC W/AUTO DIFF WBC: CPT

## 2020-06-30 PROCEDURE — 84156 ASSAY OF PROTEIN URINE: CPT

## 2020-06-30 PROCEDURE — 83970 ASSAY OF PARATHORMONE: CPT

## 2020-06-30 PROCEDURE — 99214 PR OFFICE/OUTPT VISIT, EST, LEVL IV, 30-39 MIN: ICD-10-PCS | Mod: 25,S$GLB,, | Performed by: INTERNAL MEDICINE

## 2020-06-30 PROCEDURE — 99999 PR PBB SHADOW E&M-EST. PATIENT-LVL III: ICD-10-PCS | Mod: PBBFAC,,, | Performed by: INTERNAL MEDICINE

## 2020-06-30 PROCEDURE — 36415 COLL VENOUS BLD VENIPUNCTURE: CPT

## 2020-06-30 PROCEDURE — 99214 OFFICE O/P EST MOD 30 MIN: CPT | Mod: 25,S$GLB,, | Performed by: INTERNAL MEDICINE

## 2020-06-30 PROCEDURE — 3008F BODY MASS INDEX DOCD: CPT | Mod: CPTII,S$GLB,, | Performed by: INTERNAL MEDICINE

## 2020-06-30 PROCEDURE — 3074F SYST BP LT 130 MM HG: CPT | Mod: CPTII,S$GLB,, | Performed by: INTERNAL MEDICINE

## 2020-06-30 PROCEDURE — 99999 PR PBB SHADOW E&M-EST. PATIENT-LVL III: CPT | Mod: PBBFAC,,, | Performed by: INTERNAL MEDICINE

## 2020-06-30 PROCEDURE — 3078F DIAST BP <80 MM HG: CPT | Mod: CPTII,S$GLB,, | Performed by: INTERNAL MEDICINE

## 2020-06-30 NOTE — PROGRESS NOTES
Subjective:       Patient ID: Zachary Lucia is a 44 y.o. Black or  female who presents for follow-up evaluation of Glomerulonephritis and Hematuria    Hematuria  Irritative symptoms do not include frequency or urgency. Pertinent negatives include no chills, dysuria, fever, flank pain, nausea or vomiting.        Patient is a   female with history of scleroderma and seizure disorder.  Patient has been seen by Rheumatology and has been managed on methotrexate and Plaquenil.  Patient was noted to have hematuria and a nephrology consultation is requested in November of 2018.  Patient's creatinine is 1.0.  Patient has been anemic.  Patient also has hypertension for which she is on Norvasc.  The patient's complement levels have been normal in October 2018.  Patient also has been AMAURY positive and immunological profile is consistent with scleroderma.  Patient also has history of Sjogren syndrome back in 2010.  Patient also has history of miscarriage.  Patient also has history of rash.    November 2018 patient seen after kidney biopsy.  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  No heavy proteinuria no chronicity.  No proliferative lesions. Started on ACE-inhibitor.  Check lipid profile on follow-up.    March 2019 no heavy proteinuria ,creatinine stable at 0.9.  Rheumatology records reviewed.  Methotrexate has been stopped and the patient has been started on CellCept    June 2020 creatinine stable.  No heavy proteinuria.  Complement levels stable    Review of Systems   Constitutional: Negative for activity change, appetite change, chills, diaphoresis, fatigue, fever and unexpected weight change.   HENT: Negative for congestion, dental problem, drooling, postnasal drip, rhinorrhea and voice change.    Eyes: Negative for discharge.   Respiratory: Negative for apnea, cough, choking, chest tightness, shortness of breath, wheezing and stridor.    Cardiovascular: Negative for chest  "pain, palpitations and leg swelling.   Gastrointestinal: Negative for abdominal distention, blood in stool, constipation, diarrhea, nausea, rectal pain and vomiting.   Endocrine: Negative for cold intolerance, heat intolerance, polydipsia and polyuria.   Genitourinary: Positive for hematuria. Negative for decreased urine volume, difficulty urinating, dysuria, enuresis, flank pain, frequency and urgency.   Musculoskeletal: Negative for arthralgias, back pain, gait problem and joint swelling.   Skin: Negative for rash.   Allergic/Immunologic: Negative for food allergies and immunocompromised state.   Neurological: Negative for dizziness, tremors, syncope, numbness and headaches.   Hematological: Does not bruise/bleed easily.   Psychiatric/Behavioral: Negative for agitation, behavioral problems and self-injury. The patient is not nervous/anxious and is not hyperactive.    All other systems reviewed and are negative.      Objective:   /74   Pulse 74   Resp 20   Ht 5' 6" (1.676 m)   Wt 108.3 kg (238 lb 12.1 oz)   LMP 06/16/2020   BMI 38.54 kg/m²      Physical Exam  Vitals signs and nursing note reviewed.   Constitutional:       General: She is not in acute distress.     Appearance: She is not diaphoretic.   HENT:      Head: Normocephalic and atraumatic.      Nose: Nose normal.   Eyes:      Conjunctiva/sclera: Conjunctivae normal.      Pupils: Pupils are equal, round, and reactive to light.   Neck:      Musculoskeletal: Normal range of motion.      Thyroid: No thyromegaly.      Vascular: No JVD.      Trachea: No tracheal deviation.   Cardiovascular:      Rate and Rhythm: Normal rate and regular rhythm.      Heart sounds: Normal heart sounds. No murmur. No friction rub. No gallop.    Pulmonary:      Effort: Pulmonary effort is normal. No respiratory distress.      Breath sounds: Normal breath sounds. No wheezing or rales.   Chest:      Chest wall: No tenderness.   Abdominal:      General: Bowel sounds are " normal. There is no distension.      Palpations: Abdomen is soft. There is no mass.      Tenderness: There is no abdominal tenderness.      Hernia: No hernia is present.   Musculoskeletal: Normal range of motion.         General: No tenderness or deformity.      Comments: Multiple joint tenderness and inflammation   Skin:     General: Skin is warm.      Capillary Refill: Capillary refill takes less than 2 seconds.      Coloration: Skin is pale.      Findings: Rash present. No erythema.   Neurological:      Mental Status: She is alert and oriented to person, place, and time.      Cranial Nerves: No cranial nerve deficit.      Motor: No abnormal muscle tone.      Coordination: Coordination normal.      Deep Tendon Reflexes: Reflexes are normal and symmetric. Reflexes normal.   Psychiatric:         Behavior: Behavior normal.         Thought Content: Thought content normal.         Judgment: Judgment normal.                   Lab Results   Component Value Date    CREATININE 1.0 06/30/2020    BUN 13 06/30/2020     06/30/2020    K 4.0 06/30/2020     06/30/2020    CO2 24 06/30/2020     Lab Results   Component Value Date    WBC 6.51 06/30/2020    HGB 10.3 (L) 06/30/2020    HCT 34.1 (L) 06/30/2020    MCV 86 06/30/2020     06/30/2020     Lab Results   Component Value Date    CALCIUM 9.1 06/30/2020    PHOS 3.2 06/30/2020       The bilateral kidneys are normal in size, echotexture, and cortical thickness. No evidence of hydronephrosis, solid mass, or calculus. No perinephric fluid collections.    The right kidney measures 10 cm in length.    The left kidney measures 11 cm in length.    The urinary bladder is within normal limits.    Impression: Normal renal ultrasound without evidence of hydronephrosis      Assessment:    )    1. Lupus nephritis, ISN/RPS class V    2. Hematuria syndrome    3. Raynaud's disease without gangrene    4. Benign essential HTN    5. Scleroderma        Plan:         1.  Lupus  nephritis with chronic kidney disease stage I:  Hematuria can be explained on lupus nephritis.  Stage 5 with no chronicity.  Early membranous changes.  No nephrotic syndrome.  No proliferative changes in the kidney biopsy.  Patient is on Plaquenil and imuran per Rheumatology.  Continue current regimen.  Reordered Cell cept      2.  Raynaud's without gangrene:  Continue small dose of  ACEI     3.  Essential hypertension:  Adjust medications to keep the systolic blood pressure between 110-130 systolic. Agree with ACEI ok to go higher           Follow-up 6 months

## 2020-06-30 NOTE — PATIENT INSTRUCTIONS
Ok to use protein diet     Ok for Keto diet and plant based diet     Drink plenty of fluids to make urine look like water

## 2020-07-27 ENCOUNTER — HOSPITAL ENCOUNTER (INPATIENT)
Facility: HOSPITAL | Age: 45
LOS: 4 days | Discharge: HOME | DRG: 871 | End: 2020-08-01
Attending: FAMILY MEDICINE | Admitting: INTERNAL MEDICINE
Payer: COMMERCIAL

## 2020-07-27 DIAGNOSIS — U07.1 COVID-19 VIRUS INFECTION: Primary | ICD-10-CM

## 2020-07-27 DIAGNOSIS — U07.1 PNEUMONIA DUE TO COVID-19 VIRUS: ICD-10-CM

## 2020-07-27 DIAGNOSIS — J12.82 PNEUMONIA DUE TO COVID-19 VIRUS: ICD-10-CM

## 2020-07-27 DIAGNOSIS — U07.1 COVID-19: ICD-10-CM

## 2020-07-27 DIAGNOSIS — R06.03 RESPIRATORY DISTRESS: ICD-10-CM

## 2020-07-27 DIAGNOSIS — A41.9 SEPSIS, DUE TO UNSPECIFIED ORGANISM, UNSPECIFIED WHETHER ACUTE ORGAN DYSFUNCTION PRESENT: ICD-10-CM

## 2020-07-27 DIAGNOSIS — R06.02 SOB (SHORTNESS OF BREATH): ICD-10-CM

## 2020-07-27 LAB
ALBUMIN SERPL BCP-MCNC: 3.3 G/DL (ref 3.5–5.2)
ALP SERPL-CCNC: 71 U/L (ref 55–135)
ALT SERPL W/O P-5'-P-CCNC: 37 U/L (ref 10–44)
ANION GAP SERPL CALC-SCNC: 12 MMOL/L (ref 8–16)
AST SERPL-CCNC: 35 U/L (ref 10–40)
BASOPHILS # BLD AUTO: 0.01 K/UL (ref 0–0.2)
BASOPHILS NFR BLD: 0.1 % (ref 0–1.9)
BILIRUB SERPL-MCNC: 0.4 MG/DL (ref 0.1–1)
BNP SERPL-MCNC: <10 PG/ML (ref 0–99)
BUN SERPL-MCNC: 11 MG/DL (ref 6–20)
CALCIUM SERPL-MCNC: 9.5 MG/DL (ref 8.7–10.5)
CHLORIDE SERPL-SCNC: 101 MMOL/L (ref 95–110)
CO2 SERPL-SCNC: 23 MMOL/L (ref 23–29)
CREAT SERPL-MCNC: 1 MG/DL (ref 0.5–1.4)
DIFFERENTIAL METHOD: ABNORMAL
EOSINOPHIL # BLD AUTO: 0 K/UL (ref 0–0.5)
EOSINOPHIL NFR BLD: 0.1 % (ref 0–8)
ERYTHROCYTE [DISTWIDTH] IN BLOOD BY AUTOMATED COUNT: 13.7 % (ref 11.5–14.5)
EST. GFR  (AFRICAN AMERICAN): >60 ML/MIN/1.73 M^2
EST. GFR  (NON AFRICAN AMERICAN): >60 ML/MIN/1.73 M^2
GLUCOSE SERPL-MCNC: 109 MG/DL (ref 70–110)
HCT VFR BLD AUTO: 33.4 % (ref 37–48.5)
HGB BLD-MCNC: 10.1 G/DL (ref 12–16)
IMM GRANULOCYTES # BLD AUTO: 0.04 K/UL (ref 0–0.04)
IMM GRANULOCYTES NFR BLD AUTO: 0.4 % (ref 0–0.5)
INR PPP: 1 (ref 0.8–1.2)
LACTATE SERPL-SCNC: 2.3 MMOL/L (ref 0.5–2.2)
LYMPHOCYTES # BLD AUTO: 0.8 K/UL (ref 1–4.8)
LYMPHOCYTES NFR BLD: 8.4 % (ref 18–48)
MAGNESIUM SERPL-MCNC: 2 MG/DL (ref 1.6–2.6)
MCH RBC QN AUTO: 25.5 PG (ref 27–31)
MCHC RBC AUTO-ENTMCNC: 30.2 G/DL (ref 32–36)
MCV RBC AUTO: 84 FL (ref 82–98)
MONOCYTES # BLD AUTO: 0.4 K/UL (ref 0.3–1)
MONOCYTES NFR BLD: 4.5 % (ref 4–15)
NEUTROPHILS # BLD AUTO: 7.7 K/UL (ref 1.8–7.7)
NEUTROPHILS NFR BLD: 86.5 % (ref 38–73)
NRBC BLD-RTO: 0 /100 WBC
PLATELET # BLD AUTO: 362 K/UL (ref 150–350)
PMV BLD AUTO: 8.7 FL (ref 9.2–12.9)
POTASSIUM SERPL-SCNC: 3.7 MMOL/L (ref 3.5–5.1)
PROCALCITONIN SERPL IA-MCNC: 0.06 NG/ML
PROT SERPL-MCNC: 8.5 G/DL (ref 6–8.4)
PROTHROMBIN TIME: 10.7 SEC (ref 9–12.5)
RBC # BLD AUTO: 3.96 M/UL (ref 4–5.4)
SARS-COV-2 RDRP RESP QL NAA+PROBE: POSITIVE
SODIUM SERPL-SCNC: 136 MMOL/L (ref 136–145)
TROPONIN I SERPL DL<=0.01 NG/ML-MCNC: <0.006 NG/ML (ref 0–0.03)
WBC # BLD AUTO: 8.92 K/UL (ref 3.9–12.7)

## 2020-07-27 PROCEDURE — U0002 COVID-19 LAB TEST NON-CDC: HCPCS

## 2020-07-27 PROCEDURE — 87040 BLOOD CULTURE FOR BACTERIA: CPT

## 2020-07-27 PROCEDURE — 94640 AIRWAY INHALATION TREATMENT: CPT

## 2020-07-27 PROCEDURE — 85610 PROTHROMBIN TIME: CPT

## 2020-07-27 PROCEDURE — 83735 ASSAY OF MAGNESIUM: CPT

## 2020-07-27 PROCEDURE — 84484 ASSAY OF TROPONIN QUANT: CPT

## 2020-07-27 PROCEDURE — 83605 ASSAY OF LACTIC ACID: CPT

## 2020-07-27 PROCEDURE — 25000003 PHARM REV CODE 250: Performed by: FAMILY MEDICINE

## 2020-07-27 PROCEDURE — 80053 COMPREHEN METABOLIC PANEL: CPT

## 2020-07-27 PROCEDURE — 85025 COMPLETE CBC W/AUTO DIFF WBC: CPT

## 2020-07-27 PROCEDURE — 93010 ELECTROCARDIOGRAM REPORT: CPT | Mod: ,,, | Performed by: INTERNAL MEDICINE

## 2020-07-27 PROCEDURE — 83880 ASSAY OF NATRIURETIC PEPTIDE: CPT

## 2020-07-27 PROCEDURE — 84145 PROCALCITONIN (PCT): CPT

## 2020-07-27 PROCEDURE — 25000242 PHARM REV CODE 250 ALT 637 W/ HCPCS: Performed by: FAMILY MEDICINE

## 2020-07-27 PROCEDURE — 93005 ELECTROCARDIOGRAM TRACING: CPT

## 2020-07-27 PROCEDURE — 99291 CRITICAL CARE FIRST HOUR: CPT | Mod: 25

## 2020-07-27 PROCEDURE — 36415 COLL VENOUS BLD VENIPUNCTURE: CPT

## 2020-07-27 PROCEDURE — 93010 EKG 12-LEAD: ICD-10-PCS | Mod: ,,, | Performed by: INTERNAL MEDICINE

## 2020-07-27 RX ORDER — ACETAMINOPHEN 325 MG/1
650 TABLET ORAL
Status: COMPLETED | OUTPATIENT
Start: 2020-07-27 | End: 2020-07-27

## 2020-07-27 RX ORDER — IPRATROPIUM BROMIDE AND ALBUTEROL SULFATE 2.5; .5 MG/3ML; MG/3ML
3 SOLUTION RESPIRATORY (INHALATION)
Status: COMPLETED | OUTPATIENT
Start: 2020-07-27 | End: 2020-07-27

## 2020-07-27 RX ADMIN — IPRATROPIUM BROMIDE AND ALBUTEROL SULFATE 3 ML: .5; 3 SOLUTION RESPIRATORY (INHALATION) at 10:07

## 2020-07-27 RX ADMIN — ACETAMINOPHEN 650 MG: 325 TABLET ORAL at 10:07

## 2020-07-27 RX ADMIN — SODIUM CHLORIDE 3240 ML: 0.9 INJECTION, SOLUTION INTRAVENOUS at 10:07

## 2020-07-28 PROBLEM — U07.1 COVID-19: Status: ACTIVE | Noted: 2020-07-28

## 2020-07-28 PROBLEM — J12.82 PNEUMONIA DUE TO COVID-19 VIRUS: Status: ACTIVE | Noted: 2020-07-28

## 2020-07-28 PROBLEM — A41.9 SEPSIS: Status: ACTIVE | Noted: 2020-07-28

## 2020-07-28 LAB
ALBUMIN SERPL BCP-MCNC: 2.7 G/DL (ref 3.5–5.2)
ALP SERPL-CCNC: 62 U/L (ref 55–135)
ALT SERPL W/O P-5'-P-CCNC: 30 U/L (ref 10–44)
ANION GAP SERPL CALC-SCNC: 9 MMOL/L (ref 8–16)
AST SERPL-CCNC: 27 U/L (ref 10–40)
BACTERIA #/AREA URNS HPF: ABNORMAL /HPF
BASOPHILS # BLD AUTO: 0 K/UL (ref 0–0.2)
BASOPHILS NFR BLD: 0 % (ref 0–1.9)
BILIRUB SERPL-MCNC: 0.3 MG/DL (ref 0.1–1)
BILIRUB UR QL STRIP: NEGATIVE
BUN SERPL-MCNC: 8 MG/DL (ref 6–20)
CALCIUM SERPL-MCNC: 8.3 MG/DL (ref 8.7–10.5)
CHLORIDE SERPL-SCNC: 107 MMOL/L (ref 95–110)
CLARITY UR: CLEAR
CO2 SERPL-SCNC: 23 MMOL/L (ref 23–29)
COLOR UR: YELLOW
CREAT SERPL-MCNC: 0.8 MG/DL (ref 0.5–1.4)
CRP SERPL-MCNC: 184.31 MG/L (ref 0–3.19)
D DIMER PPP IA.FEU-MCNC: 1.16 MG/L FEU
DIFFERENTIAL METHOD: ABNORMAL
EOSINOPHIL # BLD AUTO: 0 K/UL (ref 0–0.5)
EOSINOPHIL NFR BLD: 0 % (ref 0–8)
ERYTHROCYTE [DISTWIDTH] IN BLOOD BY AUTOMATED COUNT: 13.8 % (ref 11.5–14.5)
EST. GFR  (AFRICAN AMERICAN): >60 ML/MIN/1.73 M^2
EST. GFR  (NON AFRICAN AMERICAN): >60 ML/MIN/1.73 M^2
FERRITIN SERPL-MCNC: 119 NG/ML (ref 20–300)
GLUCOSE SERPL-MCNC: 112 MG/DL (ref 70–110)
GLUCOSE UR QL STRIP: NEGATIVE
HCT VFR BLD AUTO: 30.4 % (ref 37–48.5)
HGB BLD-MCNC: 9.3 G/DL (ref 12–16)
HGB UR QL STRIP: ABNORMAL
HYALINE CASTS #/AREA URNS LPF: 0 /LPF
IMM GRANULOCYTES # BLD AUTO: 0.07 K/UL (ref 0–0.04)
IMM GRANULOCYTES NFR BLD AUTO: 1 % (ref 0–0.5)
KETONES UR QL STRIP: NEGATIVE
LACTATE SERPL-SCNC: 1.2 MMOL/L (ref 0.5–2.2)
LDH SERPL L TO P-CCNC: 219 U/L (ref 110–260)
LEUKOCYTE ESTERASE UR QL STRIP: NEGATIVE
LYMPHOCYTES # BLD AUTO: 0.8 K/UL (ref 1–4.8)
LYMPHOCYTES NFR BLD: 11 % (ref 18–48)
MCH RBC QN AUTO: 26.1 PG (ref 27–31)
MCHC RBC AUTO-ENTMCNC: 30.6 G/DL (ref 32–36)
MCV RBC AUTO: 85 FL (ref 82–98)
MICROSCOPIC COMMENT: ABNORMAL
MONOCYTES # BLD AUTO: 0.3 K/UL (ref 0.3–1)
MONOCYTES NFR BLD: 4.7 % (ref 4–15)
NEUTROPHILS # BLD AUTO: 6.1 K/UL (ref 1.8–7.7)
NEUTROPHILS NFR BLD: 83.3 % (ref 38–73)
NITRITE UR QL STRIP: NEGATIVE
NRBC BLD-RTO: 0 /100 WBC
PH UR STRIP: 6 [PH] (ref 5–8)
PLATELET # BLD AUTO: 285 K/UL (ref 150–350)
PMV BLD AUTO: 8.4 FL (ref 9.2–12.9)
POTASSIUM SERPL-SCNC: 3.5 MMOL/L (ref 3.5–5.1)
PROT SERPL-MCNC: 7.2 G/DL (ref 6–8.4)
PROT UR QL STRIP: ABNORMAL
RBC # BLD AUTO: 3.56 M/UL (ref 4–5.4)
RBC #/AREA URNS HPF: 2 /HPF (ref 0–4)
SODIUM SERPL-SCNC: 139 MMOL/L (ref 136–145)
SP GR UR STRIP: 1.02 (ref 1–1.03)
SQUAMOUS #/AREA URNS HPF: 7 /HPF
URN SPEC COLLECT METH UR: ABNORMAL
UROBILINOGEN UR STRIP-ACNC: 1 EU/DL
WBC # BLD AUTO: 7.3 K/UL (ref 3.9–12.7)
WBC #/AREA URNS HPF: 2 /HPF (ref 0–5)

## 2020-07-28 PROCEDURE — 94640 AIRWAY INHALATION TREATMENT: CPT

## 2020-07-28 PROCEDURE — 81000 URINALYSIS NONAUTO W/SCOPE: CPT

## 2020-07-28 PROCEDURE — 83605 ASSAY OF LACTIC ACID: CPT

## 2020-07-28 PROCEDURE — 25000003 PHARM REV CODE 250: Performed by: INTERNAL MEDICINE

## 2020-07-28 PROCEDURE — 63700000 PHARM REV CODE 250 ALT 637 W/O HCPCS: Performed by: HOSPITALIST

## 2020-07-28 PROCEDURE — 96376 TX/PRO/DX INJ SAME DRUG ADON: CPT

## 2020-07-28 PROCEDURE — 36415 COLL VENOUS BLD VENIPUNCTURE: CPT

## 2020-07-28 PROCEDURE — 63600175 PHARM REV CODE 636 W HCPCS: Performed by: HOSPITALIST

## 2020-07-28 PROCEDURE — 85025 COMPLETE CBC W/AUTO DIFF WBC: CPT

## 2020-07-28 PROCEDURE — 96374 THER/PROPH/DIAG INJ IV PUSH: CPT

## 2020-07-28 PROCEDURE — 94761 N-INVAS EAR/PLS OXIMETRY MLT: CPT

## 2020-07-28 PROCEDURE — 82728 ASSAY OF FERRITIN: CPT

## 2020-07-28 PROCEDURE — 83615 LACTATE (LD) (LDH) ENZYME: CPT

## 2020-07-28 PROCEDURE — 63600175 PHARM REV CODE 636 W HCPCS: Performed by: INTERNAL MEDICINE

## 2020-07-28 PROCEDURE — 86141 C-REACTIVE PROTEIN HS: CPT

## 2020-07-28 PROCEDURE — 85379 FIBRIN DEGRADATION QUANT: CPT

## 2020-07-28 PROCEDURE — 80053 COMPREHEN METABOLIC PANEL: CPT

## 2020-07-28 PROCEDURE — 27000221 HC OXYGEN, UP TO 24 HOURS

## 2020-07-28 PROCEDURE — 21400001 HC TELEMETRY ROOM

## 2020-07-28 PROCEDURE — 25000242 PHARM REV CODE 250 ALT 637 W/ HCPCS: Performed by: INTERNAL MEDICINE

## 2020-07-28 PROCEDURE — 99900035 HC TECH TIME PER 15 MIN (STAT)

## 2020-07-28 PROCEDURE — 25000003 PHARM REV CODE 250: Performed by: HOSPITALIST

## 2020-07-28 RX ORDER — ASCORBIC ACID 500 MG
500 TABLET ORAL DAILY
Status: DISCONTINUED | OUTPATIENT
Start: 2020-07-28 | End: 2020-08-01 | Stop reason: HOSPADM

## 2020-07-28 RX ORDER — ALBUTEROL SULFATE 90 UG/1
2 AEROSOL, METERED RESPIRATORY (INHALATION) EVERY 6 HOURS
Status: DISCONTINUED | OUTPATIENT
Start: 2020-07-28 | End: 2020-08-01 | Stop reason: HOSPADM

## 2020-07-28 RX ORDER — LISINOPRIL 5 MG/1
5 TABLET ORAL DAILY
Status: DISCONTINUED | OUTPATIENT
Start: 2020-07-28 | End: 2020-07-30

## 2020-07-28 RX ORDER — SODIUM CHLORIDE 0.9 % (FLUSH) 0.9 %
10 SYRINGE (ML) INJECTION
Status: DISCONTINUED | OUTPATIENT
Start: 2020-07-28 | End: 2020-08-01 | Stop reason: HOSPADM

## 2020-07-28 RX ORDER — TALC
6 POWDER (GRAM) TOPICAL NIGHTLY PRN
Status: DISCONTINUED | OUTPATIENT
Start: 2020-07-28 | End: 2020-08-01 | Stop reason: HOSPADM

## 2020-07-28 RX ORDER — LEVETIRACETAM 500 MG/1
500 TABLET ORAL DAILY
Status: DISCONTINUED | OUTPATIENT
Start: 2020-07-28 | End: 2020-08-01 | Stop reason: HOSPADM

## 2020-07-28 RX ORDER — ENOXAPARIN SODIUM 100 MG/ML
40 INJECTION SUBCUTANEOUS EVERY 24 HOURS
Status: DISCONTINUED | OUTPATIENT
Start: 2020-07-28 | End: 2020-08-01 | Stop reason: HOSPADM

## 2020-07-28 RX ORDER — HYDROXYCHLOROQUINE SULFATE 200 MG/1
200 TABLET, FILM COATED ORAL 2 TIMES DAILY
Status: DISCONTINUED | OUTPATIENT
Start: 2020-07-28 | End: 2020-07-28

## 2020-07-28 RX ORDER — THIAMINE HCL 100 MG
100 TABLET ORAL DAILY
Status: DISCONTINUED | OUTPATIENT
Start: 2020-07-28 | End: 2020-08-01 | Stop reason: HOSPADM

## 2020-07-28 RX ORDER — AZITHROMYCIN 250 MG/1
250 TABLET, FILM COATED ORAL DAILY
Status: DISCONTINUED | OUTPATIENT
Start: 2020-07-28 | End: 2020-08-01 | Stop reason: HOSPADM

## 2020-07-28 RX ORDER — METHYLPREDNISOLONE SOD SUCC 125 MG
40 VIAL (EA) INJECTION EVERY 6 HOURS
Status: DISCONTINUED | OUTPATIENT
Start: 2020-07-28 | End: 2020-07-28

## 2020-07-28 RX ORDER — FLUTICASONE FUROATE AND VILANTEROL 100; 25 UG/1; UG/1
1 POWDER RESPIRATORY (INHALATION) DAILY
Status: DISCONTINUED | OUTPATIENT
Start: 2020-07-28 | End: 2020-08-01 | Stop reason: HOSPADM

## 2020-07-28 RX ORDER — ALBUTEROL SULFATE 90 UG/1
2 AEROSOL, METERED RESPIRATORY (INHALATION) EVERY 4 HOURS
Status: DISCONTINUED | OUTPATIENT
Start: 2020-07-28 | End: 2020-07-28

## 2020-07-28 RX ADMIN — HYDROXYCHLOROQUINE SULFATE 200 MG: 200 TABLET ORAL at 09:07

## 2020-07-28 RX ADMIN — ALBUTEROL SULFATE 2 PUFF: 90 AEROSOL, METERED RESPIRATORY (INHALATION) at 05:07

## 2020-07-28 RX ADMIN — DEXAMETHASONE 6 MG: 4 TABLET ORAL at 04:07

## 2020-07-28 RX ADMIN — ENOXAPARIN SODIUM 40 MG: 40 INJECTION SUBCUTANEOUS at 04:07

## 2020-07-28 RX ADMIN — CEFTRIAXONE 1 G: 1 INJECTION, SOLUTION INTRAVENOUS at 04:07

## 2020-07-28 RX ADMIN — REMDESIVIR 200 MG: 100 INJECTION, POWDER, LYOPHILIZED, FOR SOLUTION INTRAVENOUS at 11:07

## 2020-07-28 RX ADMIN — OXYCODONE HYDROCHLORIDE AND ACETAMINOPHEN 500 MG: 500 TABLET ORAL at 09:07

## 2020-07-28 RX ADMIN — ALBUTEROL SULFATE 2 PUFF: 90 AEROSOL, METERED RESPIRATORY (INHALATION) at 01:07

## 2020-07-28 RX ADMIN — METHYLPREDNISOLONE SODIUM SUCCINATE 40 MG: 125 INJECTION, POWDER, FOR SOLUTION INTRAMUSCULAR; INTRAVENOUS at 12:07

## 2020-07-28 RX ADMIN — LISINOPRIL 5 MG: 5 TABLET ORAL at 09:07

## 2020-07-28 RX ADMIN — ALBUTEROL SULFATE 2 PUFF: 90 AEROSOL, METERED RESPIRATORY (INHALATION) at 09:07

## 2020-07-28 RX ADMIN — LEVETIRACETAM 500 MG: 500 TABLET ORAL at 09:07

## 2020-07-28 RX ADMIN — ALBUTEROL SULFATE 2 PUFF: 90 AEROSOL, METERED RESPIRATORY (INHALATION) at 02:07

## 2020-07-28 RX ADMIN — Medication 100 MG: at 09:07

## 2020-07-28 RX ADMIN — AZITHROMYCIN MONOHYDRATE 250 MG: 250 TABLET ORAL at 04:07

## 2020-07-28 RX ADMIN — FLUTICASONE FUROATE AND VILANTEROL TRIFENATATE 1 PUFF: 100; 25 POWDER RESPIRATORY (INHALATION) at 10:07

## 2020-07-28 RX ADMIN — METHYLPREDNISOLONE SODIUM SUCCINATE 40 MG: 125 INJECTION, POWDER, FOR SOLUTION INTRAMUSCULAR; INTRAVENOUS at 05:07

## 2020-07-28 RX ADMIN — ALBUTEROL SULFATE 2 PUFF: 90 AEROSOL, METERED RESPIRATORY (INHALATION) at 07:07

## 2020-07-28 NOTE — SUBJECTIVE & OBJECTIVE
Interval History: C/O sob on exertion. Saturating well on 2 lit NC    Review of Systems  Objective:     Vital Signs (Most Recent):  Temp: 97.8 °F (36.6 °C) (07/28/20 1131)  Pulse: 84 (07/28/20 1319)  Resp: 15 (07/28/20 1319)  BP: (!) 144/94 (07/28/20 1131)  SpO2: 96 % (07/28/20 1319) Vital Signs (24h Range):  Temp:  [97.8 °F (36.6 °C)-100.4 °F (38 °C)] 97.8 °F (36.6 °C)  Pulse:  [] 84  Resp:  [15-24] 15  SpO2:  [87 %-100 %] 96 %  BP: (133-192)/() 144/94     Weight: 106 kg (233 lb 11 oz)  Body mass index is 40.11 kg/m².    Intake/Output Summary (Last 24 hours) at 7/28/2020 1453  Last data filed at 7/28/2020 1200  Gross per 24 hour   Intake 3480 ml   Output 2880 ml   Net 600 ml      Physical Exam  Constitutional:       Appearance: She is well-developed.   HENT:      Head: Normocephalic and atraumatic.      Right Ear: External ear normal.      Left Ear: External ear normal.      Nose: Nose normal.   Eyes:      Conjunctiva/sclera: Conjunctivae normal.      Pupils: Pupils are equal, round, and reactive to light.   Neck:      Musculoskeletal: Normal range of motion and neck supple.      Thyroid: No thyromegaly.      Vascular: No JVD.   Cardiovascular:      Rate and Rhythm: Regular rhythm. Tachycardia present.      Heart sounds: Normal heart sounds. No murmur. No friction rub. No gallop.    Pulmonary:      Effort: Pulmonary effort is normal. No respiratory distress.      Breath sounds: No stridor. Wheezing present. No rales.   Chest:      Chest wall: No tenderness.   Abdominal:      General: Bowel sounds are normal. There is no distension.      Palpations: Abdomen is soft. There is no mass.      Tenderness: There is no abdominal tenderness. There is no guarding or rebound.   Genitourinary:     Vagina: Normal. No vaginal discharge.   Musculoskeletal: Normal range of motion.         General: No deformity.   Lymphadenopathy:      Cervical: No cervical adenopathy.   Skin:     General: Skin is warm.      Capillary  Refill: Capillary refill takes less than 2 seconds.      Findings: No erythema or rash.   Neurological:      Mental Status: She is alert and oriented to person, place, and time.      Cranial Nerves: No cranial nerve deficit.      Sensory: No sensory deficit.      Deep Tendon Reflexes: Reflexes normal.   Psychiatric:         Behavior: Behavior normal.   Significant Labs: All pertinent labs within the past 24 hours have been reviewed.    Significant Imaging: I have reviewed and interpreted all pertinent imaging results/findings within the past 24 hours.

## 2020-07-28 NOTE — PROGRESS NOTES
[facility] Hospital Medicine  Progress Note    Patient Name: Zachary Lucia  MRN: [id number]  Patient Class: IP- Inpatient   Admission Date: 7/27/2020  Length of Stay: 0 days  Attending Physician: Ji Correa MD  Primary Care Provider: Jenny Dixon MD        Subjective:     Principal Problem:Pneumonia due to COVID-19 virus        HPI:  Mrs. Zachary Lucia is a 44 y.o. female patient with medical history noted below who presents  to the Emergency Department for evaluation of SOB. Patient reports that about 5 days ago she lost her sense of smell and taste and was having HA and fevers, these symptoms improved then SOB developed. At first it was exertional but it progressed to at rest and her Albuterol pump was not helping her. She endorses myalgias and dry cough as well. Notes her  is COVID+. In ED patient noted to be febrile and COVID+. Patient will be admitted for further management.        Overview/Hospital Course:  No notes on file    Interval History: C/O sob on exertion. Saturating well on 2 lit NC    Review of Systems  Objective:     Vital Signs (Most Recent):  Temp: 97.8 °F (36.6 °C) (07/28/20 1131)  Pulse: 84 (07/28/20 1319)  Resp: 15 (07/28/20 1319)  BP: (!) 144/94 (07/28/20 1131)  SpO2: 96 % (07/28/20 1319) Vital Signs (24h Range):  Temp:  [97.8 °F (36.6 °C)-100.4 °F (38 °C)] 97.8 °F (36.6 °C)  Pulse:  [] 84  Resp:  [15-24] 15  SpO2:  [87 %-100 %] 96 %  BP: (133-192)/() 144/94     Weight: 106 kg (233 lb 11 oz)  Body mass index is 40.11 kg/m².    Intake/Output Summary (Last 24 hours) at 7/28/2020 9028  Last data filed at 7/28/2020 1200  Gross per 24 hour   Intake 3480 ml   Output 2880 ml   Net 600 ml      Physical Exam  Constitutional:       Appearance: She is well-developed.   HENT:      Head: Normocephalic and atraumatic.      Right Ear: External ear normal.      Left Ear: External ear normal.      Nose: Nose normal.   Eyes:       Conjunctiva/sclera: Conjunctivae normal.      Pupils: Pupils are equal, round, and reactive to light.   Neck:      Musculoskeletal: Normal range of motion and neck supple.      Thyroid: No thyromegaly.      Vascular: No JVD.   Cardiovascular:      Rate and Rhythm: Regular rhythm.        Heart sounds: Normal heart sounds. No murmur. No friction rub. No gallop.    Pulmonary:      Effort: Pulmonary effort is normal. No respiratory distress.      Breath sounds: No stridor. Wheezing present. No rales.   Chest:      Chest wall: No tenderness.   Abdominal:      General: Bowel sounds are normal. There is no distension.      Palpations: Abdomen is soft. There is no mass.      Tenderness: There is no abdominal tenderness. There is no guarding or rebound.   Genitourinary:     Vagina: Normal. No vaginal discharge.   Musculoskeletal: Normal range of motion.         General: No deformity.   Lymphadenopathy:      Cervical: No cervical adenopathy.   Skin:     General: Skin is warm.      Capillary Refill: Capillary refill takes less than 2 seconds.      Findings: No erythema or rash.   Neurological:      Mental Status: She is alert and oriented to person, place, and time.      Cranial Nerves: No cranial nerve deficit.      Sensory: No sensory deficit.      Deep Tendon Reflexes: Reflexes normal.   Psychiatric:         Behavior: Behavior normal.   Significant Labs: All pertinent labs within the past 24 hours have been reviewed.    Significant Imaging: I have reviewed and interpreted all pertinent imaging results/findings within the past 24 hours.      Assessment/Plan:      * Pneumonia due to COVID-19 virus  - COVID-19 testing   - Infection Control notified     - Isolation:   - Airborne, Contact and Droplet Precautions  - Cohort patients into COVID units  - N95 mask, wear eye protection  - 20 second hand hygiene              - Limit visitors per hospital policy              - Consolidating lab draws, nursing care, provider visits,  and interventions    - Diagnostics: (leukopenia, hyponatremia, hyperferritinemia, elevated troponin, elevated d-dimer, age, and comorbidities are significant predictors of poor clinical outcome)  CBC, CMP, Procalcitonin, Ferritin, CRP, Troponin and Portable CXR    - Management:  Supplemental O2 to maintain SpO2 >92%  Telemetry  Continuous/intermittent Pulse Ox  Albuterol treatment PRN  Will add ceftriaxone,azithromycin, Decadron.  Pharmacy consult for remdesivir   Ascorbic Acid, Thiamine, Melatonin               Shortness of breath  2/2 Covid   As above       Scleroderma  Continue HCQ, AZA held       Sjogren's syndrome  Continue HCQ, AZA held       Seizure disorder  Seizure risk   Continue home med        VTE Risk Mitigation (From admission, onward)         Ordered     IP VTE HIGH RISK PATIENT  Once      07/28/20 1407     Place sequential compression device  Until discontinued      07/28/20 1407                      Ji Correa MD  Department of Hospital Medicine   [facility]

## 2020-07-28 NOTE — SUBJECTIVE & OBJECTIVE
Past Medical History:   Diagnosis Date    Asthma     Essential hypertension, malignant 1/8/2020    Essential hypertension, malignant 1/8/2020    Lupus (systemic lupus erythematosus)     Membranous glomerulonephritis, 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    Scleroderma     Seizures 01/12/2017       Past Surgical History:   Procedure Laterality Date    RENAL BIOPSY  11/16/2018       Review of patient's allergies indicates:   Allergen Reactions    Sulfa (sulfonamide antibiotics) Swelling       No current facility-administered medications on file prior to encounter.      Current Outpatient Medications on File Prior to Encounter   Medication Sig    azaTHIOprine (IMURAN) 100 mg tablet Take 1 tablet (100 mg total) by mouth once daily.    cholecalciferol, vitamin D3, 1,250 mcg (50,000 unit) capsule Take 1 capsule (50,000 Units total) by mouth once a week.    hydroxychloroquine (PLAQUENIL) 200 mg tablet Take 1 tablet (200 mg total) by mouth 2 (two) times daily.    lisinopriL 10 MG tablet Take 1 tablet (10 mg total) by mouth once daily. (Patient taking differently: Take 5 mg by mouth once daily. )    predniSONE (DELTASONE) 10 MG tablet Use 1 tablet (10 mg) daily for 3-5 days followed by a half-tablet (5 mg) daily for 3 days for any flares    PROAIR HFA 90 mcg/actuation inhaler INHALE 2 PUFFS INTO THE LUNGS EVERY 6 (SIX) HOURS AS NEEDED FOR WHEEZING. RESCUE    cyclobenzaprine (FLEXERIL) 10 MG tablet TAKE 1 TABLET (10 MG TOTAL) BY MOUTH 2 (TWO) TIMES DAILY AS NEEDED FOR MUSCLE SPASMS.    fluticasone-salmeterol diskus inhaler 100-50 mcg Inhale 1 puff into the lungs 2 (two) times daily. Controller     mg tablet Take 800 mg by mouth every 8 (eight) hours as needed.    levetiracetam XR (KEPPRA XR) 500 mg Tb24 24 hr tablet Take 500 mg by mouth once daily.    ondansetron (ZOFRAN) 4 MG tablet Take 1 tablet (4 mg total) by mouth every 6 (six) hours. (Patient not taking: Reported on 5/27/2020)     Family History     Problem  Relation (Age of Onset)    Arthritis Mother, Maternal Grandmother    Cataracts Maternal Grandmother    Diabetes Father, Maternal Grandmother, Maternal Grandfather, Paternal Grandmother    Glaucoma Mother    Heart disease Maternal Grandfather, Paternal Grandmother, Paternal Grandfather    Hypertension Mother, Maternal Grandmother, Maternal Grandfather, Paternal Grandmother        Tobacco Use    Smoking status: Never Smoker    Smokeless tobacco: Never Used   Substance and Sexual Activity    Alcohol use: Yes    Drug use: No    Sexual activity: Yes     Review of Systems   Constitutional: Positive for chills, fatigue and fever. Negative for appetite change.   HENT: Negative for congestion, ear discharge, ear pain, mouth sores, nosebleeds, sinus pain, sneezing, sore throat, tinnitus and trouble swallowing.    Eyes: Negative for pain, discharge, redness and itching.   Respiratory: Positive for cough, shortness of breath and wheezing. Negative for chest tightness.    Cardiovascular: Negative for chest pain, palpitations and leg swelling.   Gastrointestinal: Negative for abdominal distention, abdominal pain, blood in stool, constipation, diarrhea, nausea and vomiting.   Endocrine: Negative for cold intolerance, heat intolerance, polydipsia, polyphagia and polyuria.   Genitourinary: Negative for difficulty urinating, dyspareunia, dysuria, enuresis, flank pain, frequency, genital sores, hematuria, menstrual problem, pelvic pain, urgency, vaginal bleeding, vaginal discharge and vaginal pain.   Musculoskeletal: Negative for arthralgias, back pain, joint swelling, myalgias and neck pain.   Skin: Negative for pallor and rash.   Neurological: Negative for dizziness, weakness, light-headedness, numbness and headaches.   Hematological: Negative for adenopathy. Does not bruise/bleed easily.   Psychiatric/Behavioral: Negative for confusion and sleep disturbance. The patient is not nervous/anxious.    All other systems reviewed and  are negative.    Objective:     Vital Signs (Most Recent):  Temp: 98.8 °F (37.1 °C) (07/28/20 0105)  Pulse: 107 (07/28/20 0000)  Resp: 20 (07/28/20 0000)  BP: 133/77 (07/28/20 0000)  SpO2: (!) 94 % (07/28/20 0000) Vital Signs (24h Range):  Temp:  [98.8 °F (37.1 °C)-100.4 °F (38 °C)] 98.8 °F (37.1 °C)  Pulse:  [100-111] 107  Resp:  [20-24] 20  SpO2:  [94 %-100 %] 94 %  BP: (133-150)/(77-99) 133/77     Weight: 108 kg (238 lb)  Body mass index is 38.41 kg/m².    Physical Exam  Vitals signs and nursing note reviewed.   Constitutional:       Appearance: She is well-developed.   HENT:      Head: Normocephalic and atraumatic.      Right Ear: External ear normal.      Left Ear: External ear normal.      Nose: Nose normal.   Eyes:      Conjunctiva/sclera: Conjunctivae normal.      Pupils: Pupils are equal, round, and reactive to light.   Neck:      Musculoskeletal: Normal range of motion and neck supple.      Thyroid: No thyromegaly.      Vascular: No JVD.   Cardiovascular:      Rate and Rhythm: Regular rhythm. Tachycardia present.      Heart sounds: Normal heart sounds. No murmur. No friction rub. No gallop.    Pulmonary:      Effort: Pulmonary effort is normal. No respiratory distress.      Breath sounds: No stridor. Wheezing present. No rales.   Chest:      Chest wall: No tenderness.   Abdominal:      General: Bowel sounds are normal. There is no distension.      Palpations: Abdomen is soft. There is no mass.      Tenderness: There is no abdominal tenderness. There is no guarding or rebound.   Genitourinary:     Vagina: Normal. No vaginal discharge.   Musculoskeletal: Normal range of motion.         General: No deformity.   Lymphadenopathy:      Cervical: No cervical adenopathy.   Skin:     General: Skin is warm.      Capillary Refill: Capillary refill takes less than 2 seconds.      Findings: No erythema or rash.   Neurological:      Mental Status: She is alert and oriented to person, place, and time.      Cranial  Nerves: No cranial nerve deficit.      Sensory: No sensory deficit.      Deep Tendon Reflexes: Reflexes normal.   Psychiatric:         Behavior: Behavior normal.           CRANIAL NERVES     CN III, IV, VI   Pupils are equal, round, and reactive to light.       Significant Labs: All pertinent labs within the past 24 hours have been reviewed.    Significant Imaging: I have reviewed and interpreted all pertinent imaging results/findings within the past 24 hours.

## 2020-07-28 NOTE — HPI
Mrs. Zachary Lucia is a 44 y.o. female patient with medical history noted below who presents  to the Emergency Department for evaluation of SOB. Patient reports that about 5 days ago she lost her sense of smell and taste and was having HA and fevers, these symptoms improved then SOB developed. At first it was exertional but it progressed to at rest and her Albuterol pump was not helping her. She endorses myalgias and dry cough as well. Notes her  is COVID+. In ED patient noted to be febrile and COVID+. Patient will be admitted for further management.

## 2020-07-28 NOTE — ED PROVIDER NOTES
SCRIBE #1 NOTE: I, Maria R Babcock, am scribing for, and in the presence of, Barbara Weeks MD. I have scribed the entire note.       History     Chief Complaint   Patient presents with    Shortness of Breath     c/o SOB, fever, cough since Friday. Denies N/V/D. hx of asthma and lupus. SOB in triage     Review of patient's allergies indicates:   Allergen Reactions    Sulfa (sulfonamide antibiotics) Swelling         History of Present Illness     HPI    7/27/2020, 10:06 PM  History obtained from the patient      History of Present Illness: Zachary Lucia is a 44 y.o. female patient with a PMHx of HTN, asthma, and seizures who presents to the Emergency Department for evaluation of SOB which onset gradually 4 days ago. Pt reports her  recently tested positive for COVID-19 and she was also tested but her results came back negative. Symptoms are constant and moderate in severity. No mitigating or exacerbating factors reported. Associated sxs include fever (Tmax 102) and non-productive cough. Patient denies any recent travel, long car trips, leg pain/swelling, CP, n/v, and all other sxs at this time. Prior Tx includes albuterol and advair regularly with no relief. No further complaints or concerns at this time.       Arrival mode: Personal vehicle      PCP: Jenny Dixon MD        Past Medical History:  Past Medical History:   Diagnosis Date    Asthma     Essential hypertension, malignant 1/8/2020    Essential hypertension, malignant 1/8/2020    Lupus (systemic lupus erythematosus)     Membranous glomerulonephritis, 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    Scleroderma     Seizures 01/12/2017       Past Surgical History:  Past Surgical History:   Procedure Laterality Date    RENAL BIOPSY  11/16/2018         Family History:  Family History   Problem Relation Age of Onset    Arthritis Mother     Glaucoma Mother     Hypertension Mother     Diabetes Father     Diabetes Maternal Grandmother      Hypertension Maternal Grandmother     Arthritis Maternal Grandmother     Cataracts Maternal Grandmother     Diabetes Maternal Grandfather     Heart disease Maternal Grandfather     Hypertension Maternal Grandfather     Diabetes Paternal Grandmother     Heart disease Paternal Grandmother     Hypertension Paternal Grandmother     Heart disease Paternal Grandfather        Social History:  Social History     Tobacco Use    Smoking status: Never Smoker    Smokeless tobacco: Never Used   Substance and Sexual Activity    Alcohol use: Yes    Drug use: No    Sexual activity: Yes        Review of Systems     Review of Systems   Constitutional: Positive for fever (Tmax 102).        -recent travel, long car trips   HENT: Negative for sore throat.    Respiratory: Positive for cough (non-productive) and shortness of breath.    Cardiovascular: Negative for chest pain and leg swelling.   Gastrointestinal: Negative for nausea and vomiting.   Genitourinary: Negative for dysuria.   Musculoskeletal: Negative for back pain and myalgias (leg pain).   Skin: Negative for rash.   Neurological: Negative for weakness.   Hematological: Does not bruise/bleed easily.   All other systems reviewed and are negative.       Physical Exam     Initial Vitals [07/27/20 2139]   BP Pulse Resp Temp SpO2   (!) 150/99 (!) 111 20 (!) 100.4 °F (38 °C) (!) 94 %      MAP       --          Physical Exam  Nursing Notes and Vital Signs Reviewed.  Constitutional: Patient is in acute distress. Well-developed and well-nourished.  Head: Atraumatic. Normocephalic.  Eyes: PERRL. EOM intact. Conjunctivae are not pale. No scleral icterus.  ENT: Mucous membranes are moist. Oropharynx is clear and symmetric.    Neck: Supple. Full ROM. No lymphadenopathy.  Cardiovascular: Tachycardic. Regular rhythm. No murmurs, rubs, or gallops. Distal pulses are 2+ and symmetric.  Pulmonary/Chest: Acute respiratory distress. Conversational dyspnea. Supracostal and intercostal  retractions. Diminished breath sounds bilaterally. Tachypneic. No wheezing or rales.  Abdominal: Soft and non-distended.  There is no tenderness.  No rebound, guarding, or rigidity. Good bowel sounds.  Genitourinary: No CVA tenderness  Musculoskeletal: Moves all extremities. No obvious deformities. No edema. No calf tenderness.  Skin: Warm and dry.  Neurological:  Alert, awake, and appropriate.  Normal speech.  No acute focal neurological deficits are appreciated.  Psychiatric: Normal affect. Good eye contact. Appropriate in content.     ED Course   Critical Care    Date/Time: 7/28/2020 1:01 AM  Performed by: Barbara Weeks MD  Authorized by: Barbara Weeks MD   Direct patient critical care time: 15 minutes  Additional history critical care time: 10 minutes  Ordering / reviewing critical care time: 10 minutes  Documentation critical care time: 5 minutes  Consulting other physicians critical care time: 5 minutes  Total critical care time (exclusive of procedural time) : 45 minutes  Critical care time was exclusive of separately billable procedures and treating other patients and teaching time.  Critical care was necessary to treat or prevent imminent or life-threatening deterioration of the following conditions: sepsis (Covid-19).  Critical care was time spent personally by me on the following activities: blood draw for specimens, development of treatment plan with patient or surrogate, discussions with consultants, interpretation of cardiac output measurements, evaluation of patient's response to treatment, examination of patient, obtaining history from patient or surrogate, ordering and performing treatments and interventions, ordering and review of laboratory studies, ordering and review of radiographic studies, pulse oximetry, re-evaluation of patient's condition and review of old charts.        ED Vital Signs:  Vitals:    07/27/20 2139 07/27/20 2229 07/27/20 2230 07/27/20 2254   BP: (!) 150/99      Pulse: (!)  "111 108  101   Resp: 20   (!) 22   Temp: (!) 100.4 °F (38 °C)  (!) 100.4 °F (38 °C)    TempSrc: Oral      SpO2: (!) 94%   100%   Weight: 108 kg (238 lb)      Height: 5' 6" (1.676 m)       07/27/20 2255 07/27/20 2257 07/27/20 2258 07/28/20 0000   BP:    133/77   Pulse: 100 103 103 107   Resp: (!) 22 (!) 24 (!) 24 20   Temp:       TempSrc:       SpO2: 100% 100% 100% (!) 94%   Weight:       Height:           Abnormal Lab Results:  Labs Reviewed   CBC W/ AUTO DIFFERENTIAL - Abnormal; Notable for the following components:       Result Value    RBC 3.96 (*)     Hemoglobin 10.1 (*)     Hematocrit 33.4 (*)     Mean Corpuscular Hemoglobin 25.5 (*)     Mean Corpuscular Hemoglobin Conc 30.2 (*)     Platelets 362 (*)     MPV 8.7 (*)     Lymph # 0.8 (*)     Gran% 86.5 (*)     Lymph% 8.4 (*)     All other components within normal limits   COMPREHENSIVE METABOLIC PANEL - Abnormal; Notable for the following components:    Total Protein 8.5 (*)     Albumin 3.3 (*)     All other components within normal limits   LACTIC ACID, PLASMA - Abnormal; Notable for the following components:    Lactate (Lactic Acid) 2.3 (*)     All other components within normal limits   SARS-COV-2 RNA AMPLIFICATION, QUAL - Abnormal; Notable for the following components:    SARS-CoV-2 RNA, Amplification, Qual Positive (*)     All other components within normal limits   CULTURE, BLOOD   CULTURE, BLOOD   MAGNESIUM   B-TYPE NATRIURETIC PEPTIDE   TROPONIN I   PROCALCITONIN   PROTIME-INR   URINALYSIS, REFLEX TO URINE CULTURE        All Lab Results:  Results for orders placed or performed during the hospital encounter of 07/27/20   CBC auto differential   Result Value Ref Range    WBC 8.92 3.90 - 12.70 K/uL    RBC 3.96 (L) 4.00 - 5.40 M/uL    Hemoglobin 10.1 (L) 12.0 - 16.0 g/dL    Hematocrit 33.4 (L) 37.0 - 48.5 %    Mean Corpuscular Volume 84 82 - 98 fL    Mean Corpuscular Hemoglobin 25.5 (L) 27.0 - 31.0 pg    Mean Corpuscular Hemoglobin Conc 30.2 (L) 32.0 - 36.0 " g/dL    RDW 13.7 11.5 - 14.5 %    Platelets 362 (H) 150 - 350 K/uL    MPV 8.7 (L) 9.2 - 12.9 fL    Immature Granulocytes 0.4 0.0 - 0.5 %    Gran # (ANC) 7.7 1.8 - 7.7 K/uL    Immature Grans (Abs) 0.04 0.00 - 0.04 K/uL    Lymph # 0.8 (L) 1.0 - 4.8 K/uL    Mono # 0.4 0.3 - 1.0 K/uL    Eos # 0.0 0.0 - 0.5 K/uL    Baso # 0.01 0.00 - 0.20 K/uL    nRBC 0 0 /100 WBC    Gran% 86.5 (H) 38.0 - 73.0 %    Lymph% 8.4 (L) 18.0 - 48.0 %    Mono% 4.5 4.0 - 15.0 %    Eosinophil% 0.1 0.0 - 8.0 %    Basophil% 0.1 0.0 - 1.9 %    Differential Method Automated    Comprehensive metabolic panel   Result Value Ref Range    Sodium 136 136 - 145 mmol/L    Potassium 3.7 3.5 - 5.1 mmol/L    Chloride 101 95 - 110 mmol/L    CO2 23 23 - 29 mmol/L    Glucose 109 70 - 110 mg/dL    BUN, Bld 11 6 - 20 mg/dL    Creatinine 1.0 0.5 - 1.4 mg/dL    Calcium 9.5 8.7 - 10.5 mg/dL    Total Protein 8.5 (H) 6.0 - 8.4 g/dL    Albumin 3.3 (L) 3.5 - 5.2 g/dL    Total Bilirubin 0.4 0.1 - 1.0 mg/dL    Alkaline Phosphatase 71 55 - 135 U/L    AST 35 10 - 40 U/L    ALT 37 10 - 44 U/L    Anion Gap 12 8 - 16 mmol/L    eGFR if African American >60 >60 mL/min/1.73 m^2    eGFR if non African American >60 >60 mL/min/1.73 m^2   Lactic acid, plasma #1   Result Value Ref Range    Lactate (Lactic Acid) 2.3 (H) 0.5 - 2.2 mmol/L   Magnesium   Result Value Ref Range    Magnesium 2.0 1.6 - 2.6 mg/dL   Brain natriuretic peptide   Result Value Ref Range    BNP <10 0 - 99 pg/mL   Troponin I   Result Value Ref Range    Troponin I <0.006 0.000 - 0.026 ng/mL   Procalcitonin   Result Value Ref Range    Procalcitonin 0.06 <0.25 ng/mL   Protime-INR   Result Value Ref Range    Prothrombin Time 10.7 9.0 - 12.5 sec    INR 1.0 0.8 - 1.2   COVID-19 Rapid Screening   Result Value Ref Range    SARS-CoV-2 RNA, Amplification, Qual Positive (A) Negative         Imaging Results:  Imaging Results          X-Ray Chest AP Portable (Final result)  Result time 07/27/20 22:39:39    Final result by Anibal EGALE  MD Jax (07/27/20 22:39:39)                 Impression:      Bilateral patchy ground-glass infiltrates      Electronically signed by: Anibal Camara MD  Date:    07/27/2020  Time:    22:39             Narrative:    EXAMINATION:  XR CHEST AP PORTABLE    CLINICAL HISTORY:  Sepsis;    TECHNIQUE:  Single frontal view of the chest was performed.    COMPARISON:  None    FINDINGS:  The cardiomediastinal silhouette is normal.    Shallow inspiration.  Patchy ground-glass infiltrates throughout the central and lower lungs.  No effusions.    Bones are unremarkable.                                 The EKG was ordered, reviewed, and independently interpreted by the ED provider.  Interpretation time: 22:17  Rate: 104 BPM  Rhythm: sinus tachycardia  Interpretation: Nonspecific ST and T wave abnormality. No STEMI.             The Emergency Provider reviewed the vital signs and test results, which are outlined above.     ED Discussion     12:50 AM: Discussed case with Agus Germain MD ([facility]). Dr. Germain agrees with current care and management of pt and accepts admission.   Admitting Service: Hospital Medicine  Admitting Physician: Dr. Germain  Admit to: Obs/tele    12:56 AM: Re-evaluated pt. I have discussed test results, shared treatment plan, and the need for admission with patient at bedside. Pt expresses understanding at this time and agree with all information. All questions answered. Pt has no further questions or concerns at this time. Pt is ready for admit.    Your test was POSITIVE for COVID-19 (coronavirus).             Sincerely,     Barbara Weeks MD           Medical Decision Making:   Clinical Tests:   Lab Tests: Ordered and Reviewed  Radiological Study: Ordered and Reviewed  Medical Tests: Ordered and Reviewed           ED Medication(s):  Medications   sodium chloride 0.9% bolus 3,240 mL (3,240 mLs Intravenous New Bag 7/27/20 2230)   acetaminophen tablet 650 mg (650 mg Oral Given 7/27/20 2230)  "  albuterol-ipratropium 2.5 mg-0.5 mg/3 mL nebulizer solution 3 mL (3 mLs Nebulization Given 7/27/20 5563)       New Prescriptions    No medications on file               Scribe Attestation:   Scribe #1: I performed the above scribed service and the documentation accurately describes the services I performed. I attest to the accuracy of the note.     Attending:   Physician Attestation Statement for Scribe #1: I, Barbara Weeks MD, personally performed the services described in this documentation, as scribed by Maria R Babcock, in my presence, and it is both accurate and complete.           Clinical Impression       ICD-10-CM ICD-9-CM   1. Sepsis, due to unspecified organism, unspecified whether acute organ dysfunction present  A41.9 038.9     995.91   2. SOB (shortness of breath)  R06.02 786.05   3. COVID-19 virus infection  U07.1    4. Respiratory distress  R06.03 786.09   5. COVID-19  U07.1        Disposition:   Disposition: Placed in Observation  Condition: Fair    Portions of this note may have been created with voice recognition software. Occasional "wrong-word" or "sound-a-like" substitutions may have occurred due to the inherent limitations of voice recognition software. Please, read the note carefully and recognize, using context, where substitutions have occurred.     \       Barbara Weeks MD  07/28/20 0712    "

## 2020-07-28 NOTE — H&P
[facility] Hospital Medicine  History & Physical    Patient Name: Zachary Lucia  MRN: [id number]  Admission Date: 7/27/2020  Attending Physician: Agus Germain MD  Primary Care Provider: Jenny Dixon MD         Patient information was obtained from patient and ER records.     Subjective:     Principal Problem:Pneumonia due to COVID-19 virus    Chief Complaint:   Chief Complaint   Patient presents with    Shortness of Breath     c/o SOB, fever, cough since Friday. Denies N/V/D. hx of asthma and lupus. SOB in triage        HPI: Mrs. Zachary Lucai is a 44 y.o. female patient with medical history noted below who presents  to the Emergency Department for evaluation of SOB. Patient reports that about 5 days ago she lost her sense of smell and taste and was having HA and fevers, these symptoms improved then SOB developed. At first it was exertional but it progressed to at rest and her Albuterol pump was not helping her. She endorses myalgias and dry cough as well. Notes her  is COVID+. In ED patient noted to be febrile and COVID+. Patient will be admitted for further management.        Past Medical History:   Diagnosis Date    Asthma     Essential hypertension, malignant 1/8/2020    Essential hypertension, malignant 1/8/2020    Lupus (systemic lupus erythematosus)     Membranous glomerulonephritis, 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    Scleroderma     Seizures 01/12/2017       Past Surgical History:   Procedure Laterality Date    RENAL BIOPSY  11/16/2018       Review of patient's allergies indicates:   Allergen Reactions    Sulfa (sulfonamide antibiotics) Swelling       No current facility-administered medications on file prior to encounter.      Current Outpatient Medications on File Prior to Encounter   Medication Sig    azaTHIOprine (IMURAN) 100 mg tablet Take 1 tablet (100 mg total) by mouth once daily.    cholecalciferol, vitamin D3, 1,250 mcg (50,000  unit) capsule Take 1 capsule (50,000 Units total) by mouth once a week.    hydroxychloroquine (PLAQUENIL) 200 mg tablet Take 1 tablet (200 mg total) by mouth 2 (two) times daily.    lisinopriL 10 MG tablet Take 1 tablet (10 mg total) by mouth once daily. (Patient taking differently: Take 5 mg by mouth once daily. )    predniSONE (DELTASONE) 10 MG tablet Use 1 tablet (10 mg) daily for 3-5 days followed by a half-tablet (5 mg) daily for 3 days for any flares    PROAIR HFA 90 mcg/actuation inhaler INHALE 2 PUFFS INTO THE LUNGS EVERY 6 (SIX) HOURS AS NEEDED FOR WHEEZING. RESCUE    cyclobenzaprine (FLEXERIL) 10 MG tablet TAKE 1 TABLET (10 MG TOTAL) BY MOUTH 2 (TWO) TIMES DAILY AS NEEDED FOR MUSCLE SPASMS.    fluticasone-salmeterol diskus inhaler 100-50 mcg Inhale 1 puff into the lungs 2 (two) times daily. Controller     mg tablet Take 800 mg by mouth every 8 (eight) hours as needed.    levetiracetam XR (KEPPRA XR) 500 mg Tb24 24 hr tablet Take 500 mg by mouth once daily.    ondansetron (ZOFRAN) 4 MG tablet Take 1 tablet (4 mg total) by mouth every 6 (six) hours. (Patient not taking: Reported on 5/27/2020)     Family History     Problem Relation (Age of Onset)    Arthritis Mother, Maternal Grandmother    Cataracts Maternal Grandmother    Diabetes Father, Maternal Grandmother, Maternal Grandfather, Paternal Grandmother    Glaucoma Mother    Heart disease Maternal Grandfather, Paternal Grandmother, Paternal Grandfather    Hypertension Mother, Maternal Grandmother, Maternal Grandfather, Paternal Grandmother        Tobacco Use    Smoking status: Never Smoker    Smokeless tobacco: Never Used   Substance and Sexual Activity    Alcohol use: Yes    Drug use: No    Sexual activity: Yes     Review of Systems   Constitutional: Positive for chills, fatigue and fever. Negative for appetite change.   HENT: Negative for congestion, ear discharge, ear pain, mouth sores, nosebleeds, sinus pain, sneezing, sore throat,  tinnitus and trouble swallowing.    Eyes: Negative for pain, discharge, redness and itching.   Respiratory: Positive for cough, shortness of breath and wheezing. Negative for chest tightness.    Cardiovascular: Negative for chest pain, palpitations and leg swelling.   Gastrointestinal: Negative for abdominal distention, abdominal pain, blood in stool, constipation, diarrhea, nausea and vomiting.   Endocrine: Negative for cold intolerance, heat intolerance, polydipsia, polyphagia and polyuria.   Genitourinary: Negative for difficulty urinating, dyspareunia, dysuria, enuresis, flank pain, frequency, genital sores, hematuria, menstrual problem, pelvic pain, urgency, vaginal bleeding, vaginal discharge and vaginal pain.   Musculoskeletal: Negative for arthralgias, back pain, joint swelling, myalgias and neck pain.   Skin: Negative for pallor and rash.   Neurological: Negative for dizziness, weakness, light-headedness, numbness and headaches.   Hematological: Negative for adenopathy. Does not bruise/bleed easily.   Psychiatric/Behavioral: Negative for confusion and sleep disturbance. The patient is not nervous/anxious.    All other systems reviewed and are negative.    Objective:     Vital Signs (Most Recent):  Temp: 98.8 °F (37.1 °C) (07/28/20 0105)  Pulse: 107 (07/28/20 0000)  Resp: 20 (07/28/20 0000)  BP: 133/77 (07/28/20 0000)  SpO2: (!) 94 % (07/28/20 0000) Vital Signs (24h Range):  Temp:  [98.8 °F (37.1 °C)-100.4 °F (38 °C)] 98.8 °F (37.1 °C)  Pulse:  [100-111] 107  Resp:  [20-24] 20  SpO2:  [94 %-100 %] 94 %  BP: (133-150)/(77-99) 133/77     Weight: 108 kg (238 lb)  Body mass index is 38.41 kg/m².    Physical Exam  Vitals signs and nursing note reviewed.   Constitutional:       Appearance: She is well-developed.   HENT:      Head: Normocephalic and atraumatic.      Right Ear: External ear normal.      Left Ear: External ear normal.      Nose: Nose normal.   Eyes:      Conjunctiva/sclera: Conjunctivae normal.       Pupils: Pupils are equal, round, and reactive to light.   Neck:      Musculoskeletal: Normal range of motion and neck supple.      Thyroid: No thyromegaly.      Vascular: No JVD.   Cardiovascular:      Rate and Rhythm: Regular rhythm. Tachycardia present.      Heart sounds: Normal heart sounds. No murmur. No friction rub. No gallop.    Pulmonary:      Effort: Pulmonary effort is normal. No respiratory distress.      Breath sounds: No stridor. Wheezing present. No rales.   Chest:      Chest wall: No tenderness.   Abdominal:      General: Bowel sounds are normal. There is no distension.      Palpations: Abdomen is soft. There is no mass.      Tenderness: There is no abdominal tenderness. There is no guarding or rebound.   Genitourinary:     Vagina: Normal. No vaginal discharge.   Musculoskeletal: Normal range of motion.         General: No deformity.   Lymphadenopathy:      Cervical: No cervical adenopathy.   Skin:     General: Skin is warm.      Capillary Refill: Capillary refill takes less than 2 seconds.      Findings: No erythema or rash.   Neurological:      Mental Status: She is alert and oriented to person, place, and time.      Cranial Nerves: No cranial nerve deficit.      Sensory: No sensory deficit.      Deep Tendon Reflexes: Reflexes normal.   Psychiatric:         Behavior: Behavior normal.           CRANIAL NERVES     CN III, IV, VI   Pupils are equal, round, and reactive to light.       Significant Labs: All pertinent labs within the past 24 hours have been reviewed.    Significant Imaging: I have reviewed and interpreted all pertinent imaging results/findings within the past 24 hours.    Assessment/Plan:     * Pneumonia due to COVID-19 virus  - COVID-19 testing   - Infection Control notified     - Isolation:   - Airborne, Contact and Droplet Precautions  - Cohort patients into COVID units  - N95 mask, wear eye protection  - 20 second hand hygiene              - Limit visitors per hospital policy               - Consolidating lab draws, nursing care, provider visits, and interventions    - Diagnostics: (leukopenia, hyponatremia, hyperferritinemia, elevated troponin, elevated d-dimer, age, and comorbidities are significant predictors of poor clinical outcome)  CBC, CMP, Procalcitonin, Ferritin, CRP, Troponin and Portable CXR    - Management:  Supplemental O2 to maintain SpO2 >92%  Telemetry  Continuous/intermittent Pulse Ox  Albuterol treatment PRN  Methylprednisolone, Ascorbic Acid, Thiamine, Melatonin               Shortness of breath  2/2 Covid   As above       Scleroderma  Continue HCQ, AZA held       Sjogren's syndrome  Continue HCQ, AZA held       Seizure disorder  Seizure risk   Continue home med        VTE Risk Mitigation (From admission, onward)    None             Agus Germain MD  Department of Hospital Medicine   [facility]

## 2020-07-28 NOTE — PLAN OF CARE
Problem: Adult Inpatient Plan of Care  Goal: Plan of Care Review  Outcome: Ongoing, Progressing       Problem: Infection (Pneumonia)  Goal: Resolution of Infection Signs/Symptoms  Outcome: Ongoing, Progressing     Problem: Respiratory Compromise (Pneumonia)  Goal: Effective Oxygenation and Ventilation  Outcome: Ongoing, Progressing     Pt arrived and oriented to tele unit.  Pt aao x 4, VSS.  Afebrile on unit.  Speech clear, able to verbalize needs and concerns.  Denies pain.  2L O2 via NC.  Ambulatory, steady gait noted.  Able to turn and reposition self.  Frequent weight shifting encouraged.  NSR on monitor.  Plan of care reviewed, pt verbalized understanding.  Educated on infection control.  Continue safety interventions.   Will continue to monitor.

## 2020-07-28 NOTE — ASSESSMENT & PLAN NOTE
- COVID-19 testing   - Infection Control notified     - Isolation:   - Airborne, Contact and Droplet Precautions  - Cohort patients into COVID units  - N95 mask, wear eye protection  - 20 second hand hygiene              - Limit visitors per hospital policy              - Consolidating lab draws, nursing care, provider visits, and interventions    - Diagnostics: (leukopenia, hyponatremia, hyperferritinemia, elevated troponin, elevated d-dimer, age, and comorbidities are significant predictors of poor clinical outcome)  CBC, CMP, Procalcitonin, Ferritin, CRP, Troponin and Portable CXR    - Management:  Supplemental O2 to maintain SpO2 >92%  Telemetry  Continuous/intermittent Pulse Ox  Albuterol treatment PRN  Methylprednisolone, Ascorbic Acid, Thiamine, Melatonin

## 2020-07-28 NOTE — ED NOTES
Bed: TL 03  Expected date:   Expected time:   Means of arrival: Personal Transportation  Comments:

## 2020-07-28 NOTE — NURSING
Patient is AAO x4. Monitor placed and verified by monitor tech. IVF fluids started. Will continue to monitor.

## 2020-07-28 NOTE — PLAN OF CARE
07/28/20 1156   Discharge Assessment   Assessment Type Discharge Planning Assessment   Confirmed/corrected address and phone number on facesheet? Yes   Assessment information obtained from? Patient   Prior to hospitilization cognitive status: Alert/Oriented   Prior to hospitalization functional status: Independent   Current cognitive status: Alert/Oriented   Current Functional Status: Independent   Lives With spouse   Able to Return to Prior Arrangements yes   Is patient able to care for self after discharge? Yes   Who are your caregiver(s) and their phone number(s)? Darrell Lucia () [phone]   Patient's perception of discharge disposition home or selfcare   Readmission Within the Last 30 Days no previous admission in last 30 days   Patient currently being followed by outpatient case management? No   Patient currently receives any other outside agency services? No   Equipment Currently Used at Home none   Do you have any problems affording any of your prescribed medications? No   Is the patient taking medications as prescribed? yes   Does the patient have transportation home? Yes   Transportation Anticipated family or friend will provide   Does the patient receive services at the [facility]? No   Discharge Plan A Home with family   Discharge Plan B Home with family   Patient/Family in Agreement with Plan yes     Spoke to pt via phone due to Covid status. Pt lives at home with her  and does not use any DME. No CM DC needs identified at this time. [facility] [address]       [id number] [place] - [address]  [address]  [place] [address]  Phone: [phone] Fax: [phone]    [facility] [place], [place] - [address] AT Portal to Registered [facility] Sites  [address]  [facility] [address]  Phone: [phone] Fax: [phone]    PCP: MD Bryant Trevino LMSW 7/28/2020 12:00 PM

## 2020-07-29 LAB
ALBUMIN SERPL BCP-MCNC: 2.8 G/DL (ref 3.5–5.2)
ALP SERPL-CCNC: 73 U/L (ref 55–135)
ALT SERPL W/O P-5'-P-CCNC: 41 U/L (ref 10–44)
ANION GAP SERPL CALC-SCNC: 15 MMOL/L (ref 8–16)
AST SERPL-CCNC: 31 U/L (ref 10–40)
BILIRUB SERPL-MCNC: 0.2 MG/DL (ref 0.1–1)
BUN SERPL-MCNC: 10 MG/DL (ref 6–20)
CALCIUM SERPL-MCNC: 9.5 MG/DL (ref 8.7–10.5)
CHLORIDE SERPL-SCNC: 102 MMOL/L (ref 95–110)
CO2 SERPL-SCNC: 22 MMOL/L (ref 23–29)
CREAT SERPL-MCNC: 0.8 MG/DL (ref 0.5–1.4)
EST. GFR  (AFRICAN AMERICAN): >60 ML/MIN/1.73 M^2
EST. GFR  (NON AFRICAN AMERICAN): >60 ML/MIN/1.73 M^2
GLUCOSE SERPL-MCNC: 114 MG/DL (ref 70–110)
POTASSIUM SERPL-SCNC: 4.2 MMOL/L (ref 3.5–5.1)
PROT SERPL-MCNC: 7.8 G/DL (ref 6–8.4)
SODIUM SERPL-SCNC: 139 MMOL/L (ref 136–145)

## 2020-07-29 PROCEDURE — 96372 THER/PROPH/DIAG INJ SC/IM: CPT

## 2020-07-29 PROCEDURE — 25000003 PHARM REV CODE 250: Performed by: HOSPITALIST

## 2020-07-29 PROCEDURE — 36415 COLL VENOUS BLD VENIPUNCTURE: CPT

## 2020-07-29 PROCEDURE — 99900035 HC TECH TIME PER 15 MIN (STAT)

## 2020-07-29 PROCEDURE — 94761 N-INVAS EAR/PLS OXIMETRY MLT: CPT

## 2020-07-29 PROCEDURE — 25000003 PHARM REV CODE 250: Performed by: INTERNAL MEDICINE

## 2020-07-29 PROCEDURE — 94640 AIRWAY INHALATION TREATMENT: CPT

## 2020-07-29 PROCEDURE — 80053 COMPREHEN METABOLIC PANEL: CPT

## 2020-07-29 PROCEDURE — 63600175 PHARM REV CODE 636 W HCPCS: Performed by: HOSPITALIST

## 2020-07-29 PROCEDURE — 63700000 PHARM REV CODE 250 ALT 637 W/O HCPCS: Performed by: HOSPITALIST

## 2020-07-29 PROCEDURE — 21400001 HC TELEMETRY ROOM

## 2020-07-29 PROCEDURE — 27000221 HC OXYGEN, UP TO 24 HOURS

## 2020-07-29 RX ORDER — GUAIFENESIN 100 MG/5ML
200 SOLUTION ORAL EVERY 4 HOURS PRN
Status: DISCONTINUED | OUTPATIENT
Start: 2020-07-29 | End: 2020-08-01 | Stop reason: HOSPADM

## 2020-07-29 RX ADMIN — AZITHROMYCIN MONOHYDRATE 250 MG: 250 TABLET ORAL at 08:07

## 2020-07-29 RX ADMIN — DEXAMETHASONE 6 MG: 4 TABLET ORAL at 08:07

## 2020-07-29 RX ADMIN — CEFTRIAXONE 1 G: 1 INJECTION, SOLUTION INTRAVENOUS at 04:07

## 2020-07-29 RX ADMIN — LISINOPRIL 5 MG: 5 TABLET ORAL at 08:07

## 2020-07-29 RX ADMIN — ALBUTEROL SULFATE 2 PUFF: 90 AEROSOL, METERED RESPIRATORY (INHALATION) at 12:07

## 2020-07-29 RX ADMIN — GUAIFENESIN 200 MG: 200 SOLUTION ORAL at 03:07

## 2020-07-29 RX ADMIN — FLUTICASONE FUROATE AND VILANTEROL TRIFENATATE 1 PUFF: 100; 25 POWDER RESPIRATORY (INHALATION) at 07:07

## 2020-07-29 RX ADMIN — ALBUTEROL SULFATE 2 PUFF: 90 AEROSOL, METERED RESPIRATORY (INHALATION) at 07:07

## 2020-07-29 RX ADMIN — Medication 100 MG: at 08:07

## 2020-07-29 RX ADMIN — OXYCODONE HYDROCHLORIDE AND ACETAMINOPHEN 500 MG: 500 TABLET ORAL at 08:07

## 2020-07-29 RX ADMIN — LEVETIRACETAM 500 MG: 500 TABLET ORAL at 08:07

## 2020-07-29 RX ADMIN — REMDESIVIR 100 MG: 100 INJECTION, POWDER, LYOPHILIZED, FOR SOLUTION INTRAVENOUS at 07:07

## 2020-07-29 RX ADMIN — Medication 6 MG: at 09:07

## 2020-07-29 NOTE — PROGRESS NOTES
[facility] Hospital Medicine  Progress Note    Patient Name: Zachary Lucia  MRN: [id number]  Patient Class: IP- Inpatient   Admission Date: 7/27/2020  Length of Stay: 1 days  Attending Physician: Ji Correa MD  Primary Care Provider: Jenny Dixon MD        Subjective:     Principal Problem:Pneumonia due to COVID-19 virus        HPI:  Mrs. Zachary Lucia is a 44 y.o. female patient with medical history noted below who presents  to the Emergency Department for evaluation of SOB. Patient reports that about 5 days ago she lost her sense of smell and taste and was having HA and fevers, these symptoms improved then SOB developed. At first it was exertional but it progressed to at rest and her Albuterol pump was not helping her. She endorses myalgias and dry cough as well. Notes her  is COVID+. In ED patient noted to be febrile and COVID+. Patient will be admitted for further management.        Overview/Hospital Course:  No notes on file    Interval History: C/O chest tightness after 1 st dose remdesinivir which improved this morning     Review of Systems  Objective:     Vital Signs (Most Recent):  Temp: 97.5 °F (36.4 °C) (07/29/20 1134)  Pulse: 69 (07/29/20 1301)  Resp: 18 (07/29/20 1235)  BP: (!) 152/85 (07/29/20 1134)  SpO2: 95 % (07/29/20 1235) Vital Signs (24h Range):  Temp:  [97 °F (36.1 °C)-99.3 °F (37.4 °C)] 97.5 °F (36.4 °C)  Pulse:  [] 69  Resp:  [17-20] 18  SpO2:  [91 %-97 %] 95 %  BP: (133-162)/(77-89) 152/85     Weight: 106.5 kg (234 lb 12.6 oz)  Body mass index is 40.3 kg/m².    Intake/Output Summary (Last 24 hours) at 7/29/2020 1458  Last data filed at 7/29/2020 0500  Gross per 24 hour   Intake 927.58 ml   Output 350 ml   Net 577.58 ml      Physical Exam  Constitutional:       Appearance: She is well-developed.   HENT:      Head: Normocephalic and atraumatic.      Right Ear: External ear normal.      Left Ear: External ear normal.       Nose: Nose normal.   Eyes:      Conjunctiva/sclera: Conjunctivae normal.      Pupils: Pupils are equal, round, and reactive to light.   Neck:      Musculoskeletal: Normal range of motion and neck supple.      Thyroid: No thyromegaly.      Vascular: No JVD.   Cardiovascular:      Rate and Rhythm: Regular rhythm.        Heart sounds: Normal heart sounds. No murmur. No friction rub. No gallop.    Pulmonary:      Effort: Pulmonary effort is normal. No respiratory distress.      Breath sounds: No stridor. Wheezing present. No rales.   Chest:      Chest wall: No tenderness.   Abdominal:      General: Bowel sounds are normal. There is no distension.      Palpations: Abdomen is soft. There is no mass.      Tenderness: There is no abdominal tenderness. There is no guarding or rebound.   Genitourinary:     Vagina: Normal. No vaginal discharge.   Musculoskeletal: Normal range of motion.         General: No deformity.   Lymphadenopathy:      Cervical: No cervical adenopathy.   Skin:     General: Skin is warm.      Capillary Refill: Capillary refill takes less than 2 seconds.      Findings: No erythema or rash.   Neurological:      Mental Status: She is alert and oriented to person, place, and time.      Cranial Nerves: No cranial nerve deficit.      Sensory: No sensory deficit.      Deep Tendon Reflexes: Reflexes normal.   Psychiatric:         Behavior: Behavior normal.   Significant Labs: All pertinent labs within the past 24 hours have been reviewed.    Significant Imaging: I have reviewed and interpreted all pertinent imaging results/findings within the past 24 hours.      Assessment/Plan:     Pneumonia due to COVID-19 virus  - COVID-19 testing   - Infection Control notified      - Isolation:   - Airborne, Contact and Droplet Precautions  - Cohort patients into COVID units  - N95 mask, wear eye protection  - 20 second hand hygiene              - Limit visitors per hospital policy              - Consolidating lab draws,  nursing care, provider visits, and interventions     - Diagnostics: (leukopenia, hyponatremia, hyperferritinemia, elevated troponin, elevated d-dimer, age, and comorbidities are significant predictors of poor clinical outcome)  CBC, CMP, Procalcitonin, Ferritin, CRP, Troponin and Portable CXR     - Management:  Supplemental O2 to maintain SpO2 >92%  Telemetry  Continuous/intermittent Pulse Ox  Albuterol treatment PRN   On ceftriaxone,azithromycin, Decadron, Remdesinivir   Ascorbic Acid, Thiamine, Melatonin                     Shortness of breath  2/2 Covid   As above         Scleroderma  Continue HCQ, AZA held         Sjogren's syndrome  Continue HCQ, AZA held         Seizure disorder  Seizure risk   Continue home med           VTE Risk Mitigation (From admission, onward)         Ordered     enoxaparin injection 40 mg  Every 24 hours      07/28/20 1456     IP VTE HIGH RISK PATIENT  Once      07/28/20 1407     Place sequential compression device  Until discontinued      07/28/20 1407                      Ji Correa MD  Department of Hospital Medicine   [facility]

## 2020-07-29 NOTE — PROGRESS NOTES
"While in patients room to stop IV pump from beeping she stated that her chest had just started to feel "tight" and that she felt like she was going to"have an asthma attack". Breath sounds clear but diminished as per earlier assessment and her O2 sat on 2L/NC was 98%. Instructed her to call if it got worse. She has an inhaler by RT due at 0100.  "

## 2020-07-29 NOTE — PLAN OF CARE
Discussed Plan of Care with patient and verbalized understanding - Patient remains AAOx4 - remains free of falls, accidents and trauma during the night shift. Bed is in the low position and the call light is within reach.She has not voided yet, if no void will scan her bladder. First Remdesivir dose given during night shift. Will continue to monitor

## 2020-07-29 NOTE — SUBJECTIVE & OBJECTIVE
Interval History: C/O chest tightness after 1 st dose remdesinivir which improved this morning     Review of Systems  Objective:     Vital Signs (Most Recent):  Temp: 97.5 °F (36.4 °C) (07/29/20 1134)  Pulse: 69 (07/29/20 1301)  Resp: 18 (07/29/20 1235)  BP: (!) 152/85 (07/29/20 1134)  SpO2: 95 % (07/29/20 1235) Vital Signs (24h Range):  Temp:  [97 °F (36.1 °C)-99.3 °F (37.4 °C)] 97.5 °F (36.4 °C)  Pulse:  [] 69  Resp:  [17-20] 18  SpO2:  [91 %-97 %] 95 %  BP: (133-162)/(77-89) 152/85     Weight: 106.5 kg (234 lb 12.6 oz)  Body mass index is 40.3 kg/m².    Intake/Output Summary (Last 24 hours) at 7/29/2020 1458  Last data filed at 7/29/2020 0500  Gross per 24 hour   Intake 927.58 ml   Output 350 ml   Net 577.58 ml      Physical Exam  Constitutional:       Appearance: She is well-developed.   HENT:      Head: Normocephalic and atraumatic.      Right Ear: External ear normal.      Left Ear: External ear normal.      Nose: Nose normal.   Eyes:      Conjunctiva/sclera: Conjunctivae normal.      Pupils: Pupils are equal, round, and reactive to light.   Neck:      Musculoskeletal: Normal range of motion and neck supple.      Thyroid: No thyromegaly.      Vascular: No JVD.   Cardiovascular:      Rate and Rhythm: Regular rhythm.        Heart sounds: Normal heart sounds. No murmur. No friction rub. No gallop.    Pulmonary:      Effort: Pulmonary effort is normal. No respiratory distress.      Breath sounds: No stridor. Wheezing present. No rales.   Chest:      Chest wall: No tenderness.   Abdominal:      General: Bowel sounds are normal. There is no distension.      Palpations: Abdomen is soft. There is no mass.      Tenderness: There is no abdominal tenderness. There is no guarding or rebound.   Genitourinary:     Vagina: Normal. No vaginal discharge.   Musculoskeletal: Normal range of motion.         General: No deformity.   Lymphadenopathy:      Cervical: No cervical adenopathy.   Skin:     General: Skin is warm.       Capillary Refill: Capillary refill takes less than 2 seconds.      Findings: No erythema or rash.   Neurological:      Mental Status: She is alert and oriented to person, place, and time.      Cranial Nerves: No cranial nerve deficit.      Sensory: No sensory deficit.      Deep Tendon Reflexes: Reflexes normal.   Psychiatric:         Behavior: Behavior normal.   Significant Labs: All pertinent labs within the past 24 hours have been reviewed.    Significant Imaging: I have reviewed and interpreted all pertinent imaging results/findings within the past 24 hours.

## 2020-07-29 NOTE — PROGRESS NOTES
Per patient she has not voided yet this shift, she has had fluids to drink. Instructed her that if she did not void I would have to scan her bladder and possibly perform an In/Out cath to empty bladder, stated she would try to void soon.

## 2020-07-29 NOTE — PLAN OF CARE
Plan of care reviewed with patient, she verbalized understanding.  Pt remains free from falls this shift, fall precautions in place.  Pt remains free from skin breakdown, she turns and repositions independently while in bed; up to the bathroom with standby assist.  AAOx4, VSS, NAD noted at this time.  Pt remained afebrile.  2L O2 via Nc.  NSR on the tele monitor.  Pt admitted for COVID-19; today will be the second dose of remdesivir.  Pt currently resting comfortably in bed.  Hourly rounding complete.  Will continue to monitor.

## 2020-07-29 NOTE — PROGRESS NOTES
Spoke with RT about patients chest tightness - patient did not mention it to RT while she was in the room giving her the Inhaler dose.   poor plus

## 2020-07-30 LAB
ALBUMIN SERPL BCP-MCNC: 2.8 G/DL (ref 3.5–5.2)
ALP SERPL-CCNC: 65 U/L (ref 55–135)
ALT SERPL W/O P-5'-P-CCNC: 32 U/L (ref 10–44)
ANION GAP SERPL CALC-SCNC: 13 MMOL/L (ref 8–16)
AST SERPL-CCNC: 21 U/L (ref 10–40)
BILIRUB SERPL-MCNC: 0.2 MG/DL (ref 0.1–1)
BUN SERPL-MCNC: 18 MG/DL (ref 6–20)
CALCIUM SERPL-MCNC: 9.5 MG/DL (ref 8.7–10.5)
CHLORIDE SERPL-SCNC: 104 MMOL/L (ref 95–110)
CO2 SERPL-SCNC: 22 MMOL/L (ref 23–29)
CREAT SERPL-MCNC: 0.9 MG/DL (ref 0.5–1.4)
EST. GFR  (AFRICAN AMERICAN): >60 ML/MIN/1.73 M^2
EST. GFR  (NON AFRICAN AMERICAN): >60 ML/MIN/1.73 M^2
GLUCOSE SERPL-MCNC: 83 MG/DL (ref 70–110)
POTASSIUM SERPL-SCNC: 3.8 MMOL/L (ref 3.5–5.1)
PROT SERPL-MCNC: 7.5 G/DL (ref 6–8.4)
SODIUM SERPL-SCNC: 139 MMOL/L (ref 136–145)

## 2020-07-30 PROCEDURE — 94640 AIRWAY INHALATION TREATMENT: CPT

## 2020-07-30 PROCEDURE — 63600175 PHARM REV CODE 636 W HCPCS: Performed by: HOSPITALIST

## 2020-07-30 PROCEDURE — 63700000 PHARM REV CODE 250 ALT 637 W/O HCPCS: Performed by: HOSPITALIST

## 2020-07-30 PROCEDURE — 94761 N-INVAS EAR/PLS OXIMETRY MLT: CPT

## 2020-07-30 PROCEDURE — 80053 COMPREHEN METABOLIC PANEL: CPT

## 2020-07-30 PROCEDURE — 25000003 PHARM REV CODE 250: Performed by: INTERNAL MEDICINE

## 2020-07-30 PROCEDURE — 21400001 HC TELEMETRY ROOM

## 2020-07-30 PROCEDURE — 99900035 HC TECH TIME PER 15 MIN (STAT)

## 2020-07-30 PROCEDURE — 27000221 HC OXYGEN, UP TO 24 HOURS

## 2020-07-30 PROCEDURE — 36415 COLL VENOUS BLD VENIPUNCTURE: CPT

## 2020-07-30 PROCEDURE — 25000003 PHARM REV CODE 250: Performed by: HOSPITALIST

## 2020-07-30 PROCEDURE — 63600175 PHARM REV CODE 636 W HCPCS: Performed by: NURSE PRACTITIONER

## 2020-07-30 RX ORDER — HYDRALAZINE HYDROCHLORIDE 20 MG/ML
10 INJECTION INTRAMUSCULAR; INTRAVENOUS EVERY 8 HOURS PRN
Status: DISCONTINUED | OUTPATIENT
Start: 2020-07-30 | End: 2020-08-01 | Stop reason: HOSPADM

## 2020-07-30 RX ORDER — LISINOPRIL 10 MG/1
10 TABLET ORAL DAILY
Status: DISCONTINUED | OUTPATIENT
Start: 2020-07-31 | End: 2020-08-01 | Stop reason: HOSPADM

## 2020-07-30 RX ADMIN — OXYCODONE HYDROCHLORIDE AND ACETAMINOPHEN 500 MG: 500 TABLET ORAL at 08:07

## 2020-07-30 RX ADMIN — FLUTICASONE FUROATE AND VILANTEROL TRIFENATATE 1 PUFF: 100; 25 POWDER RESPIRATORY (INHALATION) at 08:07

## 2020-07-30 RX ADMIN — AZITHROMYCIN MONOHYDRATE 250 MG: 250 TABLET ORAL at 08:07

## 2020-07-30 RX ADMIN — ALBUTEROL SULFATE 2 PUFF: 90 AEROSOL, METERED RESPIRATORY (INHALATION) at 07:07

## 2020-07-30 RX ADMIN — Medication 100 MG: at 08:07

## 2020-07-30 RX ADMIN — HYDRALAZINE HYDROCHLORIDE 10 MG: 20 INJECTION INTRAMUSCULAR; INTRAVENOUS at 04:07

## 2020-07-30 RX ADMIN — ALBUTEROL SULFATE 2 PUFF: 90 AEROSOL, METERED RESPIRATORY (INHALATION) at 01:07

## 2020-07-30 RX ADMIN — LEVETIRACETAM 500 MG: 500 TABLET ORAL at 08:07

## 2020-07-30 RX ADMIN — DEXAMETHASONE 6 MG: 4 TABLET ORAL at 08:07

## 2020-07-30 RX ADMIN — Medication 6 MG: at 09:07

## 2020-07-30 RX ADMIN — CEFTRIAXONE 1 G: 1 INJECTION, SOLUTION INTRAVENOUS at 03:07

## 2020-07-30 RX ADMIN — ALBUTEROL SULFATE 2 PUFF: 90 AEROSOL, METERED RESPIRATORY (INHALATION) at 08:07

## 2020-07-30 RX ADMIN — LISINOPRIL 5 MG: 5 TABLET ORAL at 08:07

## 2020-07-30 RX ADMIN — REMDESIVIR 100 MG: 100 INJECTION, POWDER, LYOPHILIZED, FOR SOLUTION INTRAVENOUS at 04:07

## 2020-07-30 RX ADMIN — ALBUTEROL SULFATE 2 PUFF: 90 AEROSOL, METERED RESPIRATORY (INHALATION) at 12:07

## 2020-07-30 NOTE — PLAN OF CARE
POC reviewed, verbalized understanding. AAOx4. 5L O2. Pt remained free from falls, fall precautions in place. Pt is NSR on monitor. VSS. Given PRN, melatonin to promote sleep.  No other c/o at this time. PIV intact. Call bell and personal belongings within reach. Hourly rounding complete. Reminded to call for assistance. Will continue to monitor.

## 2020-07-30 NOTE — SUBJECTIVE & OBJECTIVE
Interval History:No events overnight     Review of Systems  Objective:     Vital Signs (Most Recent):  Temp: 98.1 °F (36.7 °C) (07/30/20 0733)  Pulse: 71 (07/30/20 0900)  Resp: 18 (07/30/20 0807)  BP: 134/83 (07/30/20 0733)  SpO2: 95 % (07/30/20 0807) Vital Signs (24h Range):  Temp:  [97.5 °F (36.4 °C)-98.6 °F (37 °C)] 98.1 °F (36.7 °C)  Pulse:  [59-81] 71  Resp:  [16-20] 18  SpO2:  [90 %-98 %] 95 %  BP: (125-185)/() 134/83     Weight: 106.7 kg (235 lb 3.7 oz)  Body mass index is 40.38 kg/m².    Intake/Output Summary (Last 24 hours) at 7/30/2020 1103  Last data filed at 7/30/2020 0500  Gross per 24 hour   Intake 610 ml   Output 1400 ml   Net -790 ml      Physical Exam  Constitutional:       Appearance: She is well-developed.   HENT:      Head: Normocephalic and atraumatic.      Right Ear: External ear normal.      Left Ear: External ear normal.      Nose: Nose normal.   Eyes:      Conjunctiva/sclera: Conjunctivae normal.      Pupils: Pupils are equal, round, and reactive to light.   Neck:      Musculoskeletal: Normal range of motion and neck supple.      Thyroid: No thyromegaly.      Vascular: No JVD.   Cardiovascular:      Rate and Rhythm: Regular rhythm.        Heart sounds: Normal heart sounds. No murmur. No friction rub. No gallop.    Pulmonary:      Effort: Pulmonary effort is normal. No respiratory distress.      Breath sounds: No stridor. Wheezing present. No rales.   Chest:      Chest wall: No tenderness.   Abdominal:      General: Bowel sounds are normal. There is no distension.      Palpations: Abdomen is soft. There is no mass.      Tenderness: There is no abdominal tenderness. There is no guarding or rebound.   Genitourinary:     Vagina: Normal. No vaginal discharge.   Musculoskeletal: Normal range of motion.         General: No deformity.   Lymphadenopathy:      Cervical: No cervical adenopathy.   Skin:     General: Skin is warm.      Capillary Refill: Capillary refill takes less than 2 seconds.       Findings: No erythema or rash.   Neurological:      Mental Status: She is alert and oriented to person, place, and time.      Cranial Nerves: No cranial nerve deficit.      Sensory: No sensory deficit.      Deep Tendon Reflexes: Reflexes normal.   Psychiatric:         Behavior: Behavior normal.  Significant Labs: All pertinent labs within the past 24 hours have been reviewed.    Significant Imaging: I have reviewed and interpreted all pertinent imaging results/findings within the past 24 hours.

## 2020-07-30 NOTE — PLAN OF CARE
Plan of care reviewed with patient, she verbalized understanding.  Pt remains free from falls this shift, fall precautions in place.  Pt remains free from skin breakdown, she turns and repositions independently while in bed and is ambulatory to the bathroom with standby assist.  AAOx4, VSS, NAD noted at this time.  Pt remained afebrile.  2L O2 via Nc.  NSR on the tele monitor.  Pt admitted for COVID; rocephin IV, supplemental O2 and remdesivir (third dose given this shift by RN).  Pt currently resting comfortably in bed.  Hourly rounding complete.  Will continue to monitor.

## 2020-07-30 NOTE — PROGRESS NOTES
[facility] Hospital Medicine  Progress Note    Patient Name: Zachary Lucia  MRN: [id number]  Patient Class: IP- Inpatient   Admission Date: 7/27/2020  Length of Stay: 2 days  Attending Physician: Ji Correa MD  Primary Care Provider: Jenny Dixon MD        Subjective:     Principal Problem:Pneumonia due to COVID-19 virus        HPI:  Mrs. Zachary Lucia is a 44 y.o. female patient with medical history noted below who presents  to the Emergency Department for evaluation of SOB. Patient reports that about 5 days ago she lost her sense of smell and taste and was having HA and fevers, these symptoms improved then SOB developed. At first it was exertional but it progressed to at rest and her Albuterol pump was not helping her. She endorses myalgias and dry cough as well. Notes her  is COVID+. In ED patient noted to be febrile and COVID+. Patient will be admitted for further management.        Overview/Hospital Course:  No notes on file    Interval History:No events overnight     Review of Systems  Objective:     Vital Signs (Most Recent):  Temp: 98.1 °F (36.7 °C) (07/30/20 0733)  Pulse: 71 (07/30/20 0900)  Resp: 18 (07/30/20 0807)  BP: 134/83 (07/30/20 0733)  SpO2: 95 % (07/30/20 0807) Vital Signs (24h Range):  Temp:  [97.5 °F (36.4 °C)-98.6 °F (37 °C)] 98.1 °F (36.7 °C)  Pulse:  [59-81] 71  Resp:  [16-20] 18  SpO2:  [90 %-98 %] 95 %  BP: (125-185)/() 134/83     Weight: 106.7 kg (235 lb 3.7 oz)  Body mass index is 40.38 kg/m².    Intake/Output Summary (Last 24 hours) at 7/30/2020 1103  Last data filed at 7/30/2020 0500  Gross per 24 hour   Intake 610 ml   Output 1400 ml   Net -790 ml      Physical Exam  Constitutional:       Appearance: She is well-developed.   HENT:      Head: Normocephalic and atraumatic.      Right Ear: External ear normal.      Left Ear: External ear normal.      Nose: Nose normal.   Eyes:      Conjunctiva/sclera:  Conjunctivae normal.      Pupils: Pupils are equal, round, and reactive to light.   Neck:      Musculoskeletal: Normal range of motion and neck supple.      Thyroid: No thyromegaly.      Vascular: No JVD.   Cardiovascular:      Rate and Rhythm: Regular rhythm.        Heart sounds: Normal heart sounds. No murmur. No friction rub. No gallop.    Pulmonary:      Effort: Pulmonary effort is normal. No respiratory distress.      Breath sounds: No stridor. Wheezing present. No rales.   Chest:      Chest wall: No tenderness.   Abdominal:      General: Bowel sounds are normal. There is no distension.      Palpations: Abdomen is soft. There is no mass.      Tenderness: There is no abdominal tenderness. There is no guarding or rebound.   Genitourinary:     Vagina: Normal. No vaginal discharge.   Musculoskeletal: Normal range of motion.         General: No deformity.   Lymphadenopathy:      Cervical: No cervical adenopathy.   Skin:     General: Skin is warm.      Capillary Refill: Capillary refill takes less than 2 seconds.      Findings: No erythema or rash.   Neurological:      Mental Status: She is alert and oriented to person, place, and time.      Cranial Nerves: No cranial nerve deficit.      Sensory: No sensory deficit.      Deep Tendon Reflexes: Reflexes normal.   Psychiatric:         Behavior: Behavior normal.  Significant Labs: All pertinent labs within the past 24 hours have been reviewed.    Significant Imaging: I have reviewed and interpreted all pertinent imaging results/findings within the past 24 hours.      Assessment/Plan:      * Pneumonia due to COVID-19 virus  - COVID-19 testing   - Infection Control notified     - Isolation:   - Airborne, Contact and Droplet Precautions  - Cohort patients into COVID units  - N95 mask, wear eye protection  - 20 second hand hygiene              - Limit visitors per hospital policy              - Consolidating lab draws, nursing care, provider visits, and interventions    -  Diagnostics: (leukopenia, hyponatremia, hyperferritinemia, elevated troponin, elevated d-dimer, age, and comorbidities are significant predictors of poor clinical outcome)  CBC, CMP, Procalcitonin, Ferritin, CRP, Troponin and Portable CXR     Management:  Supplemental O2 to maintain SpO2 >92%  Telemetry  Continuous/intermittent Pulse Ox  Albuterol treatment PRN   On ceftriaxone,azithromycin, Decadron, Remdesinivir   Ascorbic Acid, Thiamine, Melatonin            Elevated Blood pressure  Will increase her lisinopril to 10 mg daily          Shortness of breath  2/2 Covid   As above         Scleroderma  Continue HCQ, AZA held         Sjogren's syndrome  Continue HCQ, AZA held         Seizure disorder  Seizure risk   Continue home med         VTE Risk Mitigation (From admission, onward)         Ordered     enoxaparin injection 40 mg  Every 24 hours      07/28/20 1456     IP VTE HIGH RISK PATIENT  Once      07/28/20 1407     Place sequential compression device  Until discontinued      07/28/20 1407                      Ji Correa MD  Department of Hospital Medicine   [facility]

## 2020-07-31 LAB
ALBUMIN SERPL BCP-MCNC: 2.7 G/DL (ref 3.5–5.2)
ALP SERPL-CCNC: 62 U/L (ref 55–135)
ALT SERPL W/O P-5'-P-CCNC: 28 U/L (ref 10–44)
ANION GAP SERPL CALC-SCNC: 12 MMOL/L (ref 8–16)
AST SERPL-CCNC: 16 U/L (ref 10–40)
BACTERIA BLD CULT: ABNORMAL
BASOPHILS NFR BLD: 0 % (ref 0–1.9)
BILIRUB SERPL-MCNC: 0.1 MG/DL (ref 0.1–1)
BUN SERPL-MCNC: 18 MG/DL (ref 6–20)
CALCIUM SERPL-MCNC: 9.6 MG/DL (ref 8.7–10.5)
CHLORIDE SERPL-SCNC: 101 MMOL/L (ref 95–110)
CO2 SERPL-SCNC: 25 MMOL/L (ref 23–29)
CREAT SERPL-MCNC: 1 MG/DL (ref 0.5–1.4)
DIFFERENTIAL METHOD: ABNORMAL
EOSINOPHIL NFR BLD: 0 % (ref 0–8)
ERYTHROCYTE [DISTWIDTH] IN BLOOD BY AUTOMATED COUNT: 14.2 % (ref 11.5–14.5)
EST. GFR  (AFRICAN AMERICAN): >60 ML/MIN/1.73 M^2
EST. GFR  (NON AFRICAN AMERICAN): >60 ML/MIN/1.73 M^2
GLUCOSE SERPL-MCNC: 85 MG/DL (ref 70–110)
HCT VFR BLD AUTO: 34.3 % (ref 37–48.5)
HGB BLD-MCNC: 10.2 G/DL (ref 12–16)
IMM GRANULOCYTES # BLD AUTO: ABNORMAL K/UL (ref 0–0.04)
IMM GRANULOCYTES NFR BLD AUTO: ABNORMAL % (ref 0–0.5)
LYMPHOCYTES NFR BLD: 11 % (ref 18–48)
MCH RBC QN AUTO: 25.2 PG (ref 27–31)
MCHC RBC AUTO-ENTMCNC: 29.7 G/DL (ref 32–36)
MCV RBC AUTO: 85 FL (ref 82–98)
MONOCYTES NFR BLD: 6 % (ref 4–15)
NEUTROPHILS NFR BLD: 79 % (ref 38–73)
NEUTS BAND NFR BLD MANUAL: 4 %
NRBC BLD-RTO: 0 /100 WBC
PLATELET # BLD AUTO: 490 K/UL (ref 150–350)
PLATELET BLD QL SMEAR: ABNORMAL
PMV BLD AUTO: 8.6 FL (ref 9.2–12.9)
POTASSIUM SERPL-SCNC: 4.3 MMOL/L (ref 3.5–5.1)
PROT SERPL-MCNC: 7.2 G/DL (ref 6–8.4)
RBC # BLD AUTO: 4.04 M/UL (ref 4–5.4)
SODIUM SERPL-SCNC: 138 MMOL/L (ref 136–145)
WBC # BLD AUTO: 8.81 K/UL (ref 3.9–12.7)

## 2020-07-31 PROCEDURE — 21400001 HC TELEMETRY ROOM

## 2020-07-31 PROCEDURE — 25000003 PHARM REV CODE 250: Performed by: INTERNAL MEDICINE

## 2020-07-31 PROCEDURE — 25000003 PHARM REV CODE 250: Performed by: HOSPITALIST

## 2020-07-31 PROCEDURE — 80053 COMPREHEN METABOLIC PANEL: CPT

## 2020-07-31 PROCEDURE — 63600175 PHARM REV CODE 636 W HCPCS: Performed by: HOSPITALIST

## 2020-07-31 PROCEDURE — 85007 BL SMEAR W/DIFF WBC COUNT: CPT

## 2020-07-31 PROCEDURE — 94640 AIRWAY INHALATION TREATMENT: CPT

## 2020-07-31 PROCEDURE — 63700000 PHARM REV CODE 250 ALT 637 W/O HCPCS: Performed by: HOSPITALIST

## 2020-07-31 PROCEDURE — 27000221 HC OXYGEN, UP TO 24 HOURS

## 2020-07-31 PROCEDURE — 94761 N-INVAS EAR/PLS OXIMETRY MLT: CPT

## 2020-07-31 PROCEDURE — 36415 COLL VENOUS BLD VENIPUNCTURE: CPT

## 2020-07-31 PROCEDURE — 99900035 HC TECH TIME PER 15 MIN (STAT)

## 2020-07-31 PROCEDURE — 85027 COMPLETE CBC AUTOMATED: CPT

## 2020-07-31 RX ADMIN — Medication 100 MG: at 08:07

## 2020-07-31 RX ADMIN — FLUTICASONE FUROATE AND VILANTEROL TRIFENATATE 1 PUFF: 100; 25 POWDER RESPIRATORY (INHALATION) at 08:07

## 2020-07-31 RX ADMIN — REMDESIVIR 100 MG: 100 INJECTION, POWDER, LYOPHILIZED, FOR SOLUTION INTRAVENOUS at 04:07

## 2020-07-31 RX ADMIN — ALBUTEROL SULFATE 2 PUFF: 90 AEROSOL, METERED RESPIRATORY (INHALATION) at 01:07

## 2020-07-31 RX ADMIN — OXYCODONE HYDROCHLORIDE AND ACETAMINOPHEN 500 MG: 500 TABLET ORAL at 08:07

## 2020-07-31 RX ADMIN — LEVETIRACETAM 500 MG: 500 TABLET ORAL at 08:07

## 2020-07-31 RX ADMIN — LISINOPRIL 10 MG: 10 TABLET ORAL at 08:07

## 2020-07-31 RX ADMIN — ALBUTEROL SULFATE 2 PUFF: 90 AEROSOL, METERED RESPIRATORY (INHALATION) at 07:07

## 2020-07-31 RX ADMIN — AZITHROMYCIN MONOHYDRATE 250 MG: 250 TABLET ORAL at 08:07

## 2020-07-31 RX ADMIN — CEFTRIAXONE 1 G: 1 INJECTION, SOLUTION INTRAVENOUS at 03:07

## 2020-07-31 RX ADMIN — ALBUTEROL SULFATE 2 PUFF: 90 AEROSOL, METERED RESPIRATORY (INHALATION) at 12:07

## 2020-07-31 RX ADMIN — DEXAMETHASONE 6 MG: 4 TABLET ORAL at 08:07

## 2020-07-31 NOTE — PLAN OF CARE
Plan of care reviewed with patient, she verbalized understanding.  Pt remains free from falls this shift, fall precautions in place.  Pt remains free from skin breakdown, she turns and repositions independently while in bed.  AAOx4, VSS, NAD noted at this time.  Pt remained afebrile.  2-4L O2 via Nc.  NSR on the tele monitor.  Pt admitted for COVID; today is day 4 of remdesivir; IV rocephen.  Pt currently resting comfortably in bed.  Hourly rounding complete.  Will continue to monitor.

## 2020-07-31 NOTE — PROGRESS NOTES
[facility] Hospital Medicine  Progress Note    Patient Name: Zachary Lucia  MRN: [id number]  Patient Class: IP- Inpatient   Admission Date: 7/27/2020  Length of Stay: 3 days  Attending Physician: Ji Correa MD  Primary Care Provider: Jenny Dixon MD        Subjective:     Principal Problem:Pneumonia due to COVID-19 virus        HPI:  Mrs. Zachary Lucia is a 44 y.o. female patient with medical history noted below who presents  to the Emergency Department for evaluation of SOB. Patient reports that about 5 days ago she lost her sense of smell and taste and was having HA and fevers, these symptoms improved then SOB developed. At first it was exertional but it progressed to at rest and her Albuterol pump was not helping her. She endorses myalgias and dry cough as well. Notes her  is COVID+. In ED patient noted to be febrile and COVID+. Patient will be admitted for further management.        Overview/Hospital Course:  No notes on file    Interval History: No events overnight     Review of Systems  Objective:     Vital Signs (Most Recent):  Temp: 97.7 °F (36.5 °C) (07/31/20 1135)  Pulse: 64 (07/31/20 1322)  Resp: 16 (07/31/20 1322)  BP: (!) 149/89 (07/31/20 1135)  SpO2: 99 % (07/31/20 1322) Vital Signs (24h Range):  Temp:  [97.5 °F (36.4 °C)-98 °F (36.7 °C)] 97.7 °F (36.5 °C)  Pulse:  [62-81] 64  Resp:  [16-20] 16  SpO2:  [90 %-99 %] 99 %  BP: (127-153)/(80-89) 149/89     Weight: 106.1 kg (233 lb 14.5 oz)  Body mass index is 40.15 kg/m².    Intake/Output Summary (Last 24 hours) at 7/31/2020 4444  Last data filed at 7/31/2020 0500  Gross per 24 hour   Intake 1100 ml   Output 650 ml   Net 450 ml      Physical Exam  Constitutional:       Appearance: She is well-developed.   HENT:      Head: Normocephalic and atraumatic.      Right Ear: External ear normal.      Left Ear: External ear normal.      Nose: Nose normal.   Eyes:      Conjunctiva/sclera:  Conjunctivae normal.      Pupils: Pupils are equal, round, and reactive to light.   Neck:      Musculoskeletal: Normal range of motion and neck supple.      Thyroid: No thyromegaly.      Vascular: No JVD.   Cardiovascular:      Rate and Rhythm: Regular rhythm.        Heart sounds: Normal heart sounds. No murmur. No friction rub. No gallop.    Pulmonary:      Effort: Pulmonary effort is normal. No respiratory distress.      Breath sounds: No stridor. Wheezing present. No rales.   Chest:      Chest wall: No tenderness.   Abdominal:      General: Bowel sounds are normal. There is no distension.      Palpations: Abdomen is soft. There is no mass.      Tenderness: There is no abdominal tenderness. There is no guarding or rebound.   Genitourinary:     Vagina: Normal. No vaginal discharge.   Musculoskeletal: Normal range of motion.         General: No deformity.   Lymphadenopathy:      Cervical: No cervical adenopathy.   Skin:     General: Skin is warm.      Capillary Refill: Capillary refill takes less than 2 seconds.      Findings: No erythema or rash.   Neurological:      Mental Status: She is alert and oriented to person, place, and time.      Cranial Nerves: No cranial nerve deficit.      Sensory: No sensory deficit.      Deep Tendon Reflexes: Reflexes normal.   Psychiatric:         Behavior: Behavior normal.  Significant Labs: All pertinent labs within the past 24 hours have been reviewed.    Significant Imaging: I have reviewed and interpreted all pertinent imaging results/findings within the past 24 hours.      Assessment/Plan:            * Pneumonia due to COVID-19 virus  - COVID-19 testing   - Infection Control notified      - Isolation:   - Airborne, Contact and Droplet Precautions  - Cohort patients into COVID units  - N95 mask, wear eye protection  - 20 second hand hygiene              - Limit visitors per hospital policy              - Consolidating lab draws, nursing care, provider visits, and  interventions     - Diagnostics: (leukopenia, hyponatremia, hyperferritinemia, elevated troponin, elevated d-dimer, age, and comorbidities are significant predictors of poor clinical outcome)  CBC, CMP, Procalcitonin, Ferritin, CRP, Troponin and Portable CXR      Management:  Supplemental O2 to maintain SpO2 >92%  Telemetry  Continuous/intermittent Pulse Ox  Albuterol treatment PRN   On ceftriaxone,azithromycin, Decadron, Remdesinivir   Ascorbic Acid, Thiamine, Melatonin            Elevated Blood pressure    lisinopril is increased  10 mg daily            Shortness of breath  2/2 Covid   As above         Scleroderma  Continue HCQ, AZA held         Sjogren's syndrome  Continue HCQ, AZA held         Seizure disorder  Seizure risk   Continue home med         VTE Risk Mitigation (From admission, onward)         Ordered     enoxaparin injection 40 mg  Every 24 hours      07/28/20 1456     IP VTE HIGH RISK PATIENT  Once      07/28/20 1407     Place sequential compression device  Until discontinued      07/28/20 1407                      Ji Correa MD  Department of Hospital Medicine   [facility]

## 2020-07-31 NOTE — SUBJECTIVE & OBJECTIVE
Interval History: No events overnight     Review of Systems  Objective:     Vital Signs (Most Recent):  Temp: 97.7 °F (36.5 °C) (07/31/20 1135)  Pulse: 64 (07/31/20 1322)  Resp: 16 (07/31/20 1322)  BP: (!) 149/89 (07/31/20 1135)  SpO2: 99 % (07/31/20 1322) Vital Signs (24h Range):  Temp:  [97.5 °F (36.4 °C)-98 °F (36.7 °C)] 97.7 °F (36.5 °C)  Pulse:  [62-81] 64  Resp:  [16-20] 16  SpO2:  [90 %-99 %] 99 %  BP: (127-153)/(80-89) 149/89     Weight: 106.1 kg (233 lb 14.5 oz)  Body mass index is 40.15 kg/m².    Intake/Output Summary (Last 24 hours) at 7/31/2020 1454  Last data filed at 7/31/2020 0500  Gross per 24 hour   Intake 1100 ml   Output 650 ml   Net 450 ml      Physical Exam  Constitutional:       Appearance: She is well-developed.   HENT:      Head: Normocephalic and atraumatic.      Right Ear: External ear normal.      Left Ear: External ear normal.      Nose: Nose normal.   Eyes:      Conjunctiva/sclera: Conjunctivae normal.      Pupils: Pupils are equal, round, and reactive to light.   Neck:      Musculoskeletal: Normal range of motion and neck supple.      Thyroid: No thyromegaly.      Vascular: No JVD.   Cardiovascular:      Rate and Rhythm: Regular rhythm.        Heart sounds: Normal heart sounds. No murmur. No friction rub. No gallop.    Pulmonary:      Effort: Pulmonary effort is normal. No respiratory distress.      Breath sounds: No stridor. Wheezing present. No rales.   Chest:      Chest wall: No tenderness.   Abdominal:      General: Bowel sounds are normal. There is no distension.      Palpations: Abdomen is soft. There is no mass.      Tenderness: There is no abdominal tenderness. There is no guarding or rebound.   Genitourinary:     Vagina: Normal. No vaginal discharge.   Musculoskeletal: Normal range of motion.         General: No deformity.   Lymphadenopathy:      Cervical: No cervical adenopathy.   Skin:     General: Skin is warm.      Capillary Refill: Capillary refill takes less than 2  seconds.      Findings: No erythema or rash.   Neurological:      Mental Status: She is alert and oriented to person, place, and time.      Cranial Nerves: No cranial nerve deficit.      Sensory: No sensory deficit.      Deep Tendon Reflexes: Reflexes normal.   Psychiatric:         Behavior: Behavior normal.  Significant Labs: All pertinent labs within the past 24 hours have been reviewed.    Significant Imaging: I have reviewed and interpreted all pertinent imaging results/findings within the past 24 hours.

## 2020-07-31 NOTE — PLAN OF CARE
POC reviewed, verbalized understanding. AAOx4. 2L O2.Pt remained free from falls, fall precautions in place. Pt is SR on monitor. Given PRN melatonin to promote sleep. VSS. No other c/o at this time. PIV intact, IV abx. Call bell and personal belongings within reach. Hourly rounding complete. Reminded to call for assistance. Will continue to monitor.

## 2020-08-01 ENCOUNTER — NURSE TRIAGE (OUTPATIENT)
Dept: ADMINISTRATIVE | Facility: CLINIC | Age: 45
End: 2020-08-01

## 2020-08-01 VITALS
DIASTOLIC BLOOD PRESSURE: 86 MMHG | TEMPERATURE: 99 F | WEIGHT: 233 LBS | HEIGHT: 64 IN | HEART RATE: 74 BPM | OXYGEN SATURATION: 95 % | SYSTOLIC BLOOD PRESSURE: 138 MMHG | RESPIRATION RATE: 18 BRPM | BODY MASS INDEX: 39.78 KG/M2

## 2020-08-01 LAB
ALBUMIN SERPL BCP-MCNC: 2.9 G/DL (ref 3.5–5.2)
ALP SERPL-CCNC: 63 U/L (ref 55–135)
ALT SERPL W/O P-5'-P-CCNC: 26 U/L (ref 10–44)
ANION GAP SERPL CALC-SCNC: 15 MMOL/L (ref 8–16)
AST SERPL-CCNC: 19 U/L (ref 10–40)
BASOPHILS NFR BLD: 0 % (ref 0–1.9)
BILIRUB SERPL-MCNC: 0.3 MG/DL (ref 0.1–1)
BUN SERPL-MCNC: 16 MG/DL (ref 6–20)
CALCIUM SERPL-MCNC: 9.3 MG/DL (ref 8.7–10.5)
CHLORIDE SERPL-SCNC: 100 MMOL/L (ref 95–110)
CO2 SERPL-SCNC: 22 MMOL/L (ref 23–29)
CREAT SERPL-MCNC: 0.8 MG/DL (ref 0.5–1.4)
DIFFERENTIAL METHOD: ABNORMAL
EOSINOPHIL NFR BLD: 0 % (ref 0–8)
ERYTHROCYTE [DISTWIDTH] IN BLOOD BY AUTOMATED COUNT: 14.1 % (ref 11.5–14.5)
EST. GFR  (AFRICAN AMERICAN): >60 ML/MIN/1.73 M^2
EST. GFR  (NON AFRICAN AMERICAN): >60 ML/MIN/1.73 M^2
GLUCOSE SERPL-MCNC: 78 MG/DL (ref 70–110)
HCT VFR BLD AUTO: 33.3 % (ref 37–48.5)
HGB BLD-MCNC: 10.1 G/DL (ref 12–16)
IMM GRANULOCYTES # BLD AUTO: ABNORMAL K/UL (ref 0–0.04)
IMM GRANULOCYTES NFR BLD AUTO: ABNORMAL % (ref 0–0.5)
LYMPHOCYTES NFR BLD: 24 % (ref 18–48)
MCH RBC QN AUTO: 25.2 PG (ref 27–31)
MCHC RBC AUTO-ENTMCNC: 30.3 G/DL (ref 32–36)
MCV RBC AUTO: 83 FL (ref 82–98)
MONOCYTES NFR BLD: 8 % (ref 4–15)
NEUTROPHILS NFR BLD: 67 % (ref 38–73)
NEUTS BAND NFR BLD MANUAL: 1 %
NRBC BLD-RTO: 0 /100 WBC
PLATELET # BLD AUTO: 512 K/UL (ref 150–350)
PLATELET BLD QL SMEAR: ABNORMAL
PMV BLD AUTO: 8.5 FL (ref 9.2–12.9)
POLYCHROMASIA BLD QL SMEAR: ABNORMAL
POTASSIUM SERPL-SCNC: 4.1 MMOL/L (ref 3.5–5.1)
PROT SERPL-MCNC: 7.4 G/DL (ref 6–8.4)
RBC # BLD AUTO: 4.01 M/UL (ref 4–5.4)
SODIUM SERPL-SCNC: 137 MMOL/L (ref 136–145)
WBC # BLD AUTO: 8.71 K/UL (ref 3.9–12.7)

## 2020-08-01 PROCEDURE — 25000003 PHARM REV CODE 250: Performed by: NURSE PRACTITIONER

## 2020-08-01 PROCEDURE — 25000003 PHARM REV CODE 250: Performed by: INTERNAL MEDICINE

## 2020-08-01 PROCEDURE — 94640 AIRWAY INHALATION TREATMENT: CPT

## 2020-08-01 PROCEDURE — 25000003 PHARM REV CODE 250: Performed by: HOSPITALIST

## 2020-08-01 PROCEDURE — 27000221 HC OXYGEN, UP TO 24 HOURS

## 2020-08-01 PROCEDURE — 36415 COLL VENOUS BLD VENIPUNCTURE: CPT

## 2020-08-01 PROCEDURE — 80053 COMPREHEN METABOLIC PANEL: CPT

## 2020-08-01 PROCEDURE — 63700000 PHARM REV CODE 250 ALT 637 W/O HCPCS: Performed by: HOSPITALIST

## 2020-08-01 PROCEDURE — 63600175 PHARM REV CODE 636 W HCPCS: Performed by: HOSPITALIST

## 2020-08-01 PROCEDURE — 85007 BL SMEAR W/DIFF WBC COUNT: CPT

## 2020-08-01 PROCEDURE — 85027 COMPLETE CBC AUTOMATED: CPT

## 2020-08-01 RX ORDER — AZITHROMYCIN 250 MG/1
250 TABLET, FILM COATED ORAL DAILY
Qty: 5 TABLET | Refills: 0 | Status: SHIPPED | OUTPATIENT
Start: 2020-08-02 | End: 2020-08-07

## 2020-08-01 RX ORDER — LANOLIN ALCOHOL/MO/W.PET/CERES
100 CREAM (GRAM) TOPICAL DAILY
Qty: 30 TABLET | Refills: 0 | Status: SHIPPED | OUTPATIENT
Start: 2020-08-02 | End: 2020-09-01

## 2020-08-01 RX ORDER — GUAIFENESIN 100 MG/5ML
200 SOLUTION ORAL EVERY 6 HOURS PRN
Qty: 400 ML | Refills: 0 | Status: SHIPPED | OUTPATIENT
Start: 2020-08-01 | End: 2020-08-11

## 2020-08-01 RX ORDER — ASCORBIC ACID 500 MG
500 TABLET ORAL DAILY
COMMUNITY
Start: 2020-08-02 | End: 2020-08-12

## 2020-08-01 RX ORDER — ASPIRIN 81 MG/1
81 TABLET ORAL DAILY
Qty: 30 TABLET | Refills: 0 | Status: SHIPPED | OUTPATIENT
Start: 2020-08-01 | End: 2021-11-24 | Stop reason: ALTCHOICE

## 2020-08-01 RX ORDER — ASPIRIN 81 MG/1
81 TABLET ORAL DAILY
Status: DISCONTINUED | OUTPATIENT
Start: 2020-08-01 | End: 2020-08-01 | Stop reason: HOSPADM

## 2020-08-01 RX ADMIN — OXYCODONE HYDROCHLORIDE AND ACETAMINOPHEN 500 MG: 500 TABLET ORAL at 08:08

## 2020-08-01 RX ADMIN — REMDESIVIR 100 MG: 100 INJECTION, POWDER, LYOPHILIZED, FOR SOLUTION INTRAVENOUS at 04:08

## 2020-08-01 RX ADMIN — ALBUTEROL SULFATE 2 PUFF: 90 AEROSOL, METERED RESPIRATORY (INHALATION) at 08:08

## 2020-08-01 RX ADMIN — LEVETIRACETAM 500 MG: 500 TABLET ORAL at 08:08

## 2020-08-01 RX ADMIN — LISINOPRIL 10 MG: 10 TABLET ORAL at 08:08

## 2020-08-01 RX ADMIN — DEXAMETHASONE 6 MG: 4 TABLET ORAL at 08:08

## 2020-08-01 RX ADMIN — ALBUTEROL SULFATE 2 PUFF: 90 AEROSOL, METERED RESPIRATORY (INHALATION) at 01:08

## 2020-08-01 RX ADMIN — Medication 100 MG: at 08:08

## 2020-08-01 RX ADMIN — ASPIRIN 81 MG: 81 TABLET, COATED ORAL at 05:08

## 2020-08-01 RX ADMIN — AZITHROMYCIN MONOHYDRATE 250 MG: 250 TABLET ORAL at 08:08

## 2020-08-01 RX ADMIN — FLUTICASONE FUROATE AND VILANTEROL TRIFENATATE 1 PUFF: 100; 25 POWDER RESPIRATORY (INHALATION) at 08:08

## 2020-08-01 NOTE — DISCHARGE SUMMARY
[facility] Hospital Medicine  Discharge Summary      Patient Name: Zachary Lucia  MRN: [id number]  Admission Date: 7/27/2020  Hospital Length of Stay: 4 days  Discharge Date and Time: 8/1/2020  7:11 PM  Attending Physician: Dr. Ashia Norwood    Discharging Provider: Mariajose Chavez NP  Primary Care Provider: Jenny Dixon MD      HPI:   Mrs. Zachary Lucia is a 44 y.o. female patient with medical history noted below who presents  to the Emergency Department for evaluation of SOB. Patient reports that about 5 days ago she lost her sense of smell and taste and was having HA and fevers, these symptoms improved then SOB developed. At first it was exertional but it progressed to at rest and her Albuterol pump was not helping her. She endorses myalgias and dry cough as well. Notes her  is COVID+. In ED patient noted to be febrile and COVID+. Patient will be admitted for further management.        * No surgery found *      Hospital Course:   Pt admitted to Telemetry Unit for Pneumonia due to COVID 19 virus.  IV antibiotics, supplemental oxygen, and supportive care continued.  Airborne, Contact and Droplet Precautions continued.  Hydroxychloroquine continued and Imuran held during hospitalization in the setting of Sjogren's Syndrome and Scleroderma.  Mild symptom improvement noted.  Home oxygen not needed per results of evaluation.  Blood cultures with no growth to date and contaminant x 1 bottle.  Pt seen and examined on the date of discharge and deemed stable for discharge to home.  COVID 19 discharge instructions reviewed with understanding verbalized.  Referral for home surveillance placed.  Current medications resumed with Vitamin C, ASA, Azithromycin, Robitussin, and Thiamine prescribed.  Orders for pulse oximeter device placed.  Pt instructed to follow up with PCP and Rheumatology upon discharge for further evaluation.       Consults:       Final Active  "Diagnoses:    Diagnosis Date Noted POA    PRINCIPAL PROBLEM:  Pneumonia due to COVID-19 virus [U07.1, J12.89] 07/28/2020 Unknown    Sepsis [A41.9] 07/28/2020 Yes    Shortness of breath [R06.02] 02/09/2018 Yes    Scleroderma [M34.9] 02/09/2018 Yes    Seizure disorder [G40.909] 11/29/2017 Yes    Sjogren's syndrome [M35.00] 11/29/2017 Yes      Problems Resolved During this Admission:       Discharged Condition: stable    Disposition: Home or Self Care    Follow Up:  Follow-up Information     Jenny Dixon MD In 3 days.    Specialty: Internal Medicine  Why: -hospital follow up   Contact information:  [address] [place] [address]  [place] [address] [phone]             Sabrina Morris MD In 2 weeks.    Specialties: Rheumatology, Internal Medicine  Why: -follow up    Contact information:  [address] [place] [address] [address] [place] [address] [phone]                 Patient Instructions:      HME - OTHER     Order Specific Question Answer Comments   Type of Equipment: pulse oximeter    Height: 5' 4" (1.626 m)    Weight: 105.7 kg (233 lb 0.4 oz)    Does patient have medical equipment at home? none      Diet Cardiac     COVID-19 Surveillance Program     Order Specific Question Answer Comments   Does patient have a smartphone? Yes    Does patient have the [facility] frantz on their smartphone? Yes    While in surveillance program, will patient be using home oxygen? Yes      Notify your health care provider if you experience any of the following:  temperature >100.4     Notify your health care provider if you experience any of the following:  increased confusion or weakness     Notify your health care provider if you experience any of the following:  persistent dizziness, light-headedness, or visual disturbances     Notify your health care provider if you experience any of the following:  severe persistent headache     Notify your health care provider if you experience any of the following:  difficulty breathing or " increased cough     Notify your health care provider if you experience any of the following:  persistent nausea and vomiting or diarrhea     Activity as tolerated       Significant Diagnostic Studies: Labs: All labs within the past 24 hours have been reviewed    Pending Diagnostic Studies:     None         Medications:  Reconciled Home Medications:      Medication List      START taking these medications    ascorbic acid (vitamin C) 500 MG tablet  Commonly known as: VITAMIN C  Take 1 tablet (500 mg total) by mouth once daily. for 10 days     aspirin 81 MG EC tablet  Commonly known as: ECOTRIN  Take 1 tablet (81 mg total) by mouth once daily.     azithromycin 250 MG tablet  Commonly known as: Z-JUSTINA  Take 1 tablet (250 mg total) by mouth once daily. for 5 days     guaifenesin 100 mg/5 ml 100 mg/5 mL syrup  Commonly known as: ROBITUSSIN  Take 10 mLs (200 mg total) by mouth every 6 (six) hours as needed for Cough or Congestion.     pulse oximeter device  Commonly known as: pulse oximeter  Use by Apply Externally route 2 (two) times a day. Use twice daily at 8 AM and 3 PM and [facility].     thiamine 100 MG tablet  Take 1 tablet (100 mg total) by mouth once daily.        CHANGE how you take these medications    lisinopriL 10 MG tablet  Take 1 tablet (10 mg total) by mouth once daily.  What changed: how much to take        CONTINUE taking these medications    azaTHIOprine 100 mg tablet  Commonly known as: IMURAN  Take 1 tablet (100 mg total) by mouth once daily.     cholecalciferol (vitamin D3) 1,250 mcg (50,000 unit) capsule  Take 1 capsule (50,000 Units total) by mouth once a week.     cyclobenzaprine 10 MG tablet  Commonly known as: FLEXERIL  TAKE 1 TABLET (10 MG TOTAL) BY MOUTH 2 (TWO) TIMES DAILY AS NEEDED FOR MUSCLE SPASMS.     fluticasone-salmeterol 100-50 mcg/dose 100-50 mcg/dose diskus inhaler  Commonly known as: ADVAIR  Inhale 1 puff into the lungs 2 (two) times daily. Controller      hydrOXYchloroQUINE 200 mg tablet  Commonly known as: PLAQUENIL  Take 1 tablet (200 mg total) by mouth 2 (two) times daily.      MG tablet  Generic drug: ibuprofen  Take 800 mg by mouth every 8 (eight) hours as needed.     KEPPRA  mg Tb24 24 hr tablet  Generic drug: levetiracetam XR  Take 500 mg by mouth once daily.     ondansetron 4 MG tablet  Commonly known as: ZOFRAN  Take 1 tablet (4 mg total) by mouth every 6 (six) hours.     PROAIR HFA 90 mcg/actuation inhaler  Generic drug: albuterol  INHALE 2 PUFFS INTO THE LUNGS EVERY 6 (SIX) HOURS AS NEEDED FOR WHEEZING. RESCUE        STOP taking these medications    predniSONE 10 MG tablet  Commonly known as: DELTASONE            Indwelling Lines/Drains at time of discharge:   Lines/Drains/Airways     None                 Time spent on the discharge of patient: > 42 minutes  Patient was seen and examined on the date of discharge and determined to be suitable for discharge.         Mariajose Chavez NP  Department of Hospital Medicine  [facility]

## 2020-08-01 NOTE — PLAN OF CARE
POC reviewed with pt, verbalized understanding. Pt remained free from falls, fall precautions in place. Pt is NSR-SB on monitor, 50-70s. VS monitored. No other c/o at this time. PIV intact, saline locked. Pt on 3L O2, nasal cannula with sats at 97%. Call bell and personal belongings within reach. Hourly rounding complete. Reminded to call for assistance. Will continue to monitor.

## 2020-08-01 NOTE — PROGRESS NOTES
Home Oxygen Evaluation    Date Performed: 2020    1) Patient's Home O2 Sat on room air, while at rest: 97%      If O2 sats on room air at rest are 88% or below, patient qualifies. No additional testing needed. Document N/A in steps 2 and 3. If 89% or above, complete steps 2.      2) Patient's O2 Sat on room air while exercisin%        If O2 sats on room air while exercising remain 89% or above patient does not qualify, no further testing needed Document N/A in step 3. If O2 sats on room air while exercising are 88% or below, continue to step 3.      3) Patient's O2 Sat while exercising on O2: NA at NA LPM         (Must show improvement from #2 for patients to qualify)    If O2 sats improve on oxygen, patient qualifies for portable oxygen. If not, the patient does not qualify.

## 2020-08-01 NOTE — NURSING
Patient to receive last dose of Remdesivir then will discharge home. Heart monitor cleaned and returned. Patient still needs AVS and peripheral IV removed at this time.

## 2020-08-02 ENCOUNTER — NURSE TRIAGE (OUTPATIENT)
Dept: ADMINISTRATIVE | Facility: CLINIC | Age: 45
End: 2020-08-02

## 2020-08-02 LAB — BACTERIA BLD CULT: NORMAL

## 2020-08-02 NOTE — TELEPHONE ENCOUNTER
Reason for Disposition   [1] COVID-19 infection diagnosed or suspected AND [2] mild symptoms (fever, cough) AND [3] no trouble breathing or other complications   COVID-19 Home Isolation, questions about   COVID-19 Prevention and Healthy Living, questions about    Additional Information   Negative: Severe difficulty breathing (e.g., struggling for each breath, speaks in single words)   Negative: Difficult to awaken or acting confused (e.g., disoriented, slurred speech)   Negative: Bluish (or gray) lips or face now   Negative: Shock suspected (e.g., cold/pale/clammy skin, too weak to stand, low BP, rapid pulse)   Negative: Sounds like a life-threatening emergency to the triager   Negative: [1] COVID-19 suspected (e.g., cough, fever, shortness of breath) AND [2] mild symptoms AND [3] public health department recommends testing   Negative: [1] COVID-19 exposure AND [2] no symptoms   Negative: COVID-19 and Breastfeeding, questions about   Negative: SEVERE or constant chest pain (Exception: mild central chest pain, present only when coughing)   Negative: MODERATE difficulty breathing (e.g., speaks in phrases, SOB even at rest, pulse 100-120)   Negative: Patient sounds very sick or weak to the triager   Negative: MILD difficulty breathing (e.g., minimal/no SOB at rest, SOB with walking, pulse <100)   Negative: Chest pain   Negative: Fever > 103 F (39.4 C)   Negative: [1] Fever > 101 F (38.3 C) AND [2] age > 60   Negative: [1] Fever > 100.0 F (37.8 C) AND [2] bedridden (e.g., nursing home patient, CVA, chronic illness, recovering from surgery)   Negative: HIGH RISK patient (e.g., age > 64 years, diabetes, heart or lung disease, weak immune system)   Negative: Fever present > 3 days (72 hours)   Negative: [1] Fever returns after gone for over 24 hours AND [2] symptoms worse or not improved   Negative: [1] Continuous (nonstop) coughing interferes with work or school AND [2] no improvement using cough  treatment per protocol   Negative: Cough present > 3 weeks    Protocols used: CORONAVIRUS (COVID-19) - DIAGNOSED OR [id number]

## 2020-08-02 NOTE — TELEPHONE ENCOUNTER
"Patient escalated to Covid Surveillance que as "0" for pulse ox and HR and SOB a 2 with rest and 3 with exertion.  Patient stated when she was discharged last night the hospital was out of pulse oximeters.  Patient stated a prescription was sent to the [facility] which is near her home but they are closed today.  Patient's consent signed and enrollment completed except for pulse ox.  Patient denies fever and last temp was 98.2.  Patient stated her SOB is a lot better than it was when she went into hospital.  Patient does not know how to count her heart rate.  Patient denies pain.  Patient has OOC number if need arises and 911 for emergencies  "

## 2020-08-02 NOTE — TELEPHONE ENCOUNTER
Covid surveillance escalation due to no response after 3 pm push notification. Patient reported not having pulse oximeter available until tomorrow. Attempted folloow up call to triage by phone until equipment is obtained. No contact made. Unable to leave message. Sent [employer] message.     Reason for Disposition   No answer.  First attempt to contact caller.  Follow-up call scheduled within 15 minutes.    Additional Information   Negative: SEVERE difficulty breathing (e.g., struggling for each breath, speaks in single words)   Negative: Difficult to awaken or acting confused (e.g., disoriented, slurred speech)   Negative: Bluish (or gray) lips or face now   Negative: Shock suspected (e.g., cold/pale/clammy skin, too weak to stand, low BP, rapid pulse)   Negative: Sounds like a life-threatening emergency to the triager   Negative: Caller is angry or rude (e.g., hangs up, verbally abusive, yelling)   Negative: Caller hangs up   Negative: Caller has already spoken with the PCP and has no further questions.   Negative: Caller has already spoken with another triager and has no further questions.   Negative: Caller has already spoken with another triager or PCP AND has further questions AND triager able to answer questions.   Negative: Busy signal.  First attempt to contact caller.  Follow-up call scheduled within 15 minutes.    Protocols used: NO CONTACT OR DUPLICATE CONTACT CALL-A-AH, CORONAVIRUS (COVID-19) DIAGNOSED OR [id number]

## 2020-08-02 NOTE — TELEPHONE ENCOUNTER
Reason for Disposition   Message left on identified voice mail    Additional Information   Negative: Caller is angry or rude (e.g., hangs up, verbally abusive, yelling)   Negative: Caller hangs up   Negative: Caller has already spoken with the PCP and has no further questions.   Negative: Caller has already spoken with another triager and has no further questions.   Negative: Caller has already spoken with another triager or PCP AND has further questions AND triager able to answer questions.   Negative: No answer.  First attempt to contact caller.  Follow-up call scheduled within 15 minutes.   Negative: Busy signal.  First attempt to contact caller.  Follow-up call scheduled within 15 minutes.    Protocols used: NO CONTACT OR DUPLICATE CONTACT CALL-A-AH

## 2020-08-02 NOTE — TELEPHONE ENCOUNTER
Attempted to reach patient enrolled in the Covid 19 surveillance program to complete welcome call and orientation process.   Left message.  First attempt.  My chart message sent

## 2020-08-02 NOTE — TELEPHONE ENCOUNTER
Called patient to review COVID-19 Surveillance Program enrollment process.    Smartphone:  Yes    [facility]:  Yes  Program consent:  Yes    Pulse oximeter status:  Pending    Verified emergency contacts:  Verified    Program Overview: Reviewed , no response process, and importance of correct emergency contacts in event that well-being check is warranted. Encouraged patient to call [phone] 24/7 to speak with an OnCall nurse, if needed.    Patient had no further questions.

## 2020-08-02 NOTE — TELEPHONE ENCOUNTER
O2 and HR are 0 as patient does not have pulse ox at this time. VS entries as follows: T 98.8, Fever/Chills N, Cough N, rates SOB at rest 2, with activity 3,     Follow up call attempted for abnormal findings, no contact made. Left VM message, provided phone number.     Reason for Disposition   Message left on identified voice mail    Additional Information   Negative: Caller is angry or rude (e.g., hangs up, verbally abusive, yelling)   Negative: Caller hangs up   Negative: Caller has already spoken with the PCP and has no further questions.   Negative: Caller has already spoken with another triager and has no further questions.   Negative: Caller has already spoken with another triager or PCP AND has further questions AND triager able to answer questions.   Negative: Busy signal.  First attempt to contact caller.  Follow-up call scheduled within 15 minutes.   Negative: No answer.  First attempt to contact caller.  Follow-up call scheduled within 15 minutes.    Protocols used: NO CONTACT OR DUPLICATE CONTACT CALL-A-

## 2020-08-03 ENCOUNTER — NURSE TRIAGE (OUTPATIENT)
Dept: ADMINISTRATIVE | Facility: CLINIC | Age: 45
End: 2020-08-03

## 2020-08-03 ENCOUNTER — TELEPHONE (OUTPATIENT)
Dept: ADMINISTRATIVE | Facility: CLINIC | Age: 45
End: 2020-08-03

## 2020-08-03 NOTE — TELEPHONE ENCOUNTER
Spoke with patient on behalf of COVID Surveilance Escalation. Patient tested positive for COVID 19 ON 7/27/20. Was discharged from hospital on 8/1/20. Didn't enter her information because she doesn't have her pulse oximeter. Her  is on his was to get it. States her SOB is improving. States this am she woke up with R side back pain. Denies difficulty voiding or burning with urination. States pain is 5 out of 10. Disposition per protocol is home care.     Reason for Disposition   MILD difficulty breathing (e.g., minimal/no SOB at rest, SOB with walking, pulse <100)     Has improved since discharge   [1] COVID-19 diagnosed by positive lab test AND [2] mild symptoms (e.g., cough, fever, others) AND [3] no complications or SOB   Back pain    Additional Information   Negative: SEVERE difficulty breathing (e.g., struggling for each breath, speaks in single words)   Negative: Difficult to awaken or acting confused (e.g., disoriented, slurred speech)   Negative: Bluish (or gray) lips or face now   Negative: Shock suspected (e.g., cold/pale/clammy skin, too weak to stand, low BP, rapid pulse)   Negative: Sounds like a life-threatening emergency to the triager   Negative: [1] COVID-19 exposure AND [2] NO symptoms   Negative: COVID-19 and Breastfeeding, questions about   Negative: [1] Adult with possible COVID-19 symptoms AND [2] triager concerned about severity of symptoms or other causes   Negative: SEVERE or constant chest pain or pressure (Exception: mild central chest pain, present only when coughing)   Negative: MODERATE difficulty breathing (e.g., speaks in phrases, SOB even at rest, pulse 100-120)   Negative: Chest pain   Negative: Patient sounds very sick or weak to the triager   Negative: Fever > 103 F (39.4 C)   Negative: [1] Fever > 101 F (38.3 C) AND [2] age > 60   Negative: [1] Fever > 100.0 F (37.8 C) AND [2] bedridden (e.g., nursing home patient, CVA, chronic illness, recovering from  surgery)   Negative: HIGH RISK patient (e.g., age > 64 years, diabetes, heart or lung disease, weak immune system)   Negative: [1] COVID-19 infection suspected by caller or triager AND [2] mild symptoms (cough, fever, or others) AND [3] no complications or SOB   Negative: Fever present > 3 days (72 hours)   Negative: [1] Fever returns after gone for over 24 hours AND [2] symptoms worse or not improved   Negative: [1] Continuous (nonstop) coughing interferes with work or school AND [2] no improvement using cough treatment per protocol   Negative: Cough present > 3 weeks   Negative: Passed out (i.e., fainted, collapsed and was not responding)   Negative: Shock suspected (e.g., cold/pale/clammy skin, too weak to stand, low BP, rapid pulse)   Negative: Sounds like a life-threatening emergency to the triager   Negative: Major injury to the back (e.g., MVA, fall > 10 feet or 3 meters, penetrating injury, etc.)   Negative: Pain in the upper back over the ribs (rib cage) that radiates (travels) into the chest   Negative: Pain in the upper back over the ribs (rib cage) and worsened by coughing (or clearly increases with breathing)   Negative: SEVERE back pain of sudden onset and age > 60   Negative: SEVERE abdominal pain (e.g., excruciating)   Negative: Abdominal pain and age > 60   Negative: Unable to urinate (or only a few drops) and bladder feels very full   Negative: Loss of bladder or bowel control (urine or bowel incontinence; wetting self, leaking stool) of new onset   Negative: Numbness (loss of sensation) in groin or rectal area   Negative: Pain radiates into groin, scrotum   Negative: Blood in urine (red, pink, or tea-colored)   Negative: Vomiting and pain over lower ribs of back (i.e., flank - kidney area)   Negative: Weakness of a leg or foot (e.g., unable to bear weight, dragging foot)   Negative: Patient sounds very sick or weak to the triager   Negative: Fever > 100.5 F (38.1 C) and  flank pain   Negative: Pain or burning with passing urine (urination)   Negative: SEVERE back pain (e.g., excruciating, unable to do any normal activities) and not improved after pain medicine and CARE ADVICE   Negative: Numbness in an arm or hand (i.e., loss of sensation) and upper back pain   Negative: Numbness in a leg or foot (i.e., loss of sensation)   Negative: High-risk adult (e.g., history of cancer, history of HIV, or history of IV drug abuse)   Negative: Painful rash with multiple small blisters grouped together (i.e., dermatomal distribution or 'band' or 'stripe')   Negative: Pain radiates into the thigh or further down the leg, and in both legs   Negative: Age > 50 and no history of prior similar back pain   Negative: MODERATE back pain (e.g., interferes with normal activities) and present > 3 days   Negative: Pain radiates into the thigh or further down the leg   Negative: Patient wants to be seen   Negative: Back pain lasts > 2 weeks   Negative: Back pain is a chronic symptom (recurrent or ongoing AND lasting > 4 weeks)    Protocols used: CORONAVIRUS (COVID-19) DIAGNOSED OR [id number], BACK PAIN-A-OH

## 2020-08-03 NOTE — TELEPHONE ENCOUNTER
COVID Surveillance Program: Provider Telephone Note    Called patient due to RN escalation in COVID Surveillance program. Pt escalated due to new onset right-sided flank pain that began 24 hr ago and is increasing in intensity.  It started out as a 2/10 in now is a 7/10.  The pain is not radiating.  She has no abnormalities in urination and denies dysuria.  She has a history of lupus nephritis as well as scleroderma and is on both Plaquenil and Imuran.  She is also on a Z-Xander from discharge from the hospital on the 1st of August which is 2 days ago.  She was there 5 days and four nights.  Her symptoms of COVID started a little over 2 weeks ago but escalated about a week ago which is when she presented to the emergency room in [place] and was admitted.  Her CRP on admission was 175 and her D-dimer was 1.15.  She also follows with Rheumatology and has a baseline of normal inflammatory markers.  As concerning her vital signs and oxygenation they are stable.  She has a cough and it fluctuates in quality from wet to dry and is on and antitussives.    Patient location: home in [place], [place]     Vitals: Sp02 :96 %.(RA)  P:77 . Temp: AF    44 y.o.AA female with pertinent PMHx of lupus nephritis, scleroderma and chronic immunosuppression on approximate day 17 of Covid symptoms. Positive Covid screen 7/27 CXR on 7/2 . Home oxygen: no COVID-19     Hospitalization History:  Per above.  She is also on a Z-Xander from discharge from the hospital on the 1st of August which is 2 days ago.  She was there 5 days and four nights.  Her symptoms of COVID started a little over 2 weeks ago but escalated about a week ago which is when she presented to the emergency room in [place] and was admitted.  Her CRP on admission was 175 and her D-dimer was 1.15.  She also follows with Rheumatology and has a baseline of normal inflammatory markers on DMARDs.   Her vital signs and oxygenation are stable as above.  She has a cough and it fluctuates in  quality from wet to dry and is on and antitussives. She does have intermittent chest pressure but no chest pain.     ROS: Denies worsening cough, light headedness, fever, chills, diaphoresis, chest pain, abdominal pain, emesis, diarrhea or further symptoms.     HPI: Pt escalated due to new onset right-sided flank pain that began 24 hr ago and is increasing in intensity.  It started out as a 2/10 in now is a 7/10.  The pain is not radiating.  She has no abnormalities in urination and denies dysuria.  She has a history of lupus nephritis as well as scleroderma and is on both Plaquenil and Imuran.  She is also on a Z-Xander from discharge from the hospital on the 1st of August which is 2 days ago.  She was there 5 days and four nights.  Her symptoms of COVID started a little over 2 weeks ago but escalated about a week ago which is when she presented to the emergency room in [place] and was admitted.  Her CRP on admission was 175 and her D-dimer was 1.15.  She also follows with Rheumatology and has a baseline of normal inflammatory markers.  As concerning her vital signs and oxygenation they are stable.  She has a cough and it fluctuates in quality from wet to dry and is on and antitussives.      Assessment: Vitals appear WNL. During phone call, patient appears alert and oriented. Able to speak in full sentences without difficulty. No audible wheezing heard during call but obvious cough. No respiratory stress outside of coughing reported.      Plan: NEEDS VIRTUAL VISIT WITH PCP, DR ESTEVEZ TOMORROW. I recommend lab work up to include: ddimer, CRP, chemistries and urinalysis to assess for thrombosis of renal vein or artery in view of her baseline hypercoagulable state and increased risk with covid and being immunocompromised. Patient is on asa 81 mg daily.       Reviewed with patient the reasons for seeking emergency care. Pt aware that if Sp02 <90%, they need to go to the ED. If any new or worsening symptoms arise, go  to ED or Urgent. Otherwise, patient will continue to submit data as scheduled. Reviewed importance of wearing mask if self or family members leave the house.

## 2020-08-03 NOTE — TELEPHONE ENCOUNTER
"O2 96% RA, HR 77, T 98.4, Fever/Chills N, Cough N, rates SOB at rest 2, with exertion 2    Covid surveillance escalation due to SOB at rest. Follow up call, patient also c/o chest pressure. Denies chest pain.Stated "it has been like that." New today severe lower back pain. Care advice given per protocol to speak with PCP. Sent secure message to MD on call for telephone consultation.       Reason for Disposition   MILD difficulty breathing (e.g., minimal/no SOB at rest, SOB with walking, pulse <100)    Additional Information   Negative: SEVERE difficulty breathing (e.g., struggling for each breath, speaks in single words)   Negative: Difficult to awaken or acting confused (e.g., disoriented, slurred speech)   Negative: Bluish (or gray) lips or face now   Negative: Shock suspected (e.g., cold/pale/clammy skin, too weak to stand, low BP, rapid pulse)   Negative: Sounds like a life-threatening emergency to the triager   Negative: [1] COVID-19 exposure AND [2] NO symptoms   Negative: COVID-19 and Breastfeeding, questions about   Negative: [1] Adult with possible COVID-19 symptoms AND [2] triager concerned about severity of symptoms or other causes   Negative: SEVERE or constant chest pain or pressure (Exception: mild central chest pain, present only when coughing)    Protocols used: CORONAVIRUS (COVID-19) DIAGNOSED OR [id number]      "

## 2020-08-04 PROBLEM — R10.9 RIGHT FLANK PAIN: Status: ACTIVE | Noted: 2020-08-04

## 2020-08-04 PROBLEM — Z09 HOSPITAL DISCHARGE FOLLOW-UP: Status: ACTIVE | Noted: 2020-08-04

## 2020-08-04 NOTE — HOSPITAL COURSE
Pt admitted to Telemetry Unit for Pneumonia due to COVID 19 virus.  IV antibiotics, supplemental oxygen, and supportive care continued.  Airborne, Contact and Droplet Precautions continued.  Hydroxychloroquine continued and Imuran held during hospitalization in the setting of Sjogren's Syndrome and Scleroderma.  Mild symptom improvement noted.  Home oxygen not needed per results of evaluation.  Blood cultures with no growth to date and contaminant x 1 bottle.  Pt seen and examined on the date of discharge and deemed stable for discharge to home.  COVID 19 discharge instructions reviewed with understanding verbalized.  Referral for home surveillance placed.  Current medications resumed with Vitamin C, ASA, Azithromycin, Robitussin, and Thiamine prescribed.  Orders for pulse oximeter device placed.  Pt instructed to follow up with PCP and Rheumatology upon discharge for further evaluation.

## 2020-08-05 ENCOUNTER — HOSPITAL ENCOUNTER (OUTPATIENT)
Dept: RADIOLOGY | Facility: HOSPITAL | Age: 45
Discharge: HOME | End: 2020-08-05
Attending: INTERNAL MEDICINE
Payer: COMMERCIAL

## 2020-08-05 DIAGNOSIS — R10.9 RIGHT FLANK PAIN: ICD-10-CM

## 2020-08-05 PROCEDURE — 76770 US EXAM ABDO BACK WALL COMP: CPT | Mod: TC

## 2020-08-05 NOTE — PHYSICIAN QUERY
PT Name: Zachary Lucia  MR #: [id number]    Emergency Medicine Diagnosis Clarification     CDS/: Henna Duncan RN              Contact information: [email or web address]  This form is a permanent document in the medical record.     Query Date: August 5, 2020    By submitting this query, we are merely seeking further clarification of documentation.  Please utilize your independent clinical judgment when addressing the question(s) below.      The Medical Record reflects the following:  Clinical Information Location in Medical Record   Clinical Impression :    Sepsis, due to unspecified organism, unspecified whether acute organ dysfunction present         Pneumonia due to COVID-19 virus   - COVID-19 testing   - Infection Control notified       Vital Signs (24h Range):   Temp: [98.8 °F (37.1 °C)-100.4 °F (38 °C)] 98.8 °F (37.1 °C)   Pulse: [100-111] 107   Resp: [20-24] 20   SpO2: [94 %-100 %] 94 %   BP: (133-150)/(77-99) 133/77         WBC 8.92 7.30 8.81 8.71       Lactate 2.3-> 1.2      Final Active Diagnoses:  Diagnosis  Sepsis    ED Provider Note 7/27              H&P 7/28  PN 7/28,7/29,7/30,7/31       Hospital Medicine      H&P 7/28       Hospital Medicine                Lab  7/27, 7/28, 7/31, 8/1      Labs 7/27, 7/28      Discharge Summary 8/1       Hospital Medicine             Please clarify/confirm the Emergency Medicine diagnosis of _____Sepsis_________.     [   @] Diagnosis ruled in     [   ] Diagnosis ruled out     [   ] Other diagnosis (please specify): _____________     [  ] Clinically undetermined

## 2020-08-08 ENCOUNTER — NURSE TRIAGE (OUTPATIENT)
Dept: ADMINISTRATIVE | Facility: CLINIC | Age: 45
End: 2020-08-08

## 2020-08-08 NOTE — TELEPHONE ENCOUNTER
No response escalation- unable to make contact with pt or other listed contacts - LM at each number and My chart message sent.

## 2020-08-08 NOTE — TELEPHONE ENCOUNTER
Reason for Disposition   Caller has cancelled the call before the first contact    Protocols used: NO CONTACT OR DUPLICATE CONTACT CALL-A-AH

## 2020-08-12 ENCOUNTER — HOSPITAL ENCOUNTER (OUTPATIENT)
Dept: RADIOLOGY | Facility: HOSPITAL | Age: 45
Discharge: HOME | End: 2020-08-12
Attending: INTERNAL MEDICINE
Payer: COMMERCIAL

## 2020-08-12 DIAGNOSIS — R10.9 RIGHT FLANK PAIN: ICD-10-CM

## 2020-08-12 PROCEDURE — 93975 VASCULAR STUDY: CPT | Mod: TC

## 2020-08-13 ENCOUNTER — NURSE TRIAGE (OUTPATIENT)
Dept: ADMINISTRATIVE | Facility: CLINIC | Age: 45
End: 2020-08-13

## 2020-09-21 ENCOUNTER — LAB VISIT (OUTPATIENT)
Dept: LAB | Facility: HOSPITAL | Age: 45
End: 2020-09-21
Attending: INTERNAL MEDICINE
Payer: COMMERCIAL

## 2020-09-21 DIAGNOSIS — M34.9 SCLERODERMA: ICD-10-CM

## 2020-09-21 DIAGNOSIS — M35.00 SJOGREN'S SYNDROME, WITH UNSPECIFIED ORGAN INVOLVEMENT: ICD-10-CM

## 2020-09-21 LAB
ALBUMIN SERPL BCP-MCNC: 3.6 G/DL (ref 3.5–5.2)
ALP SERPL-CCNC: 44 U/L (ref 55–135)
ALT SERPL W/O P-5'-P-CCNC: 15 U/L (ref 10–44)
ANION GAP SERPL CALC-SCNC: 7 MMOL/L (ref 8–16)
AST SERPL-CCNC: 21 U/L (ref 10–40)
BASOPHILS # BLD AUTO: 0.03 K/UL (ref 0–0.2)
BASOPHILS NFR BLD: 0.6 % (ref 0–1.9)
BILIRUB SERPL-MCNC: 0.3 MG/DL (ref 0.1–1)
BUN SERPL-MCNC: 8 MG/DL (ref 6–20)
CALCIUM SERPL-MCNC: 9.1 MG/DL (ref 8.7–10.5)
CHLORIDE SERPL-SCNC: 103 MMOL/L (ref 95–110)
CO2 SERPL-SCNC: 27 MMOL/L (ref 23–29)
CREAT SERPL-MCNC: 1 MG/DL (ref 0.5–1.4)
CRP SERPL-MCNC: 8 MG/L (ref 0–8.2)
DIFFERENTIAL METHOD: ABNORMAL
EOSINOPHIL # BLD AUTO: 0.3 K/UL (ref 0–0.5)
EOSINOPHIL NFR BLD: 4.7 % (ref 0–8)
ERYTHROCYTE [DISTWIDTH] IN BLOOD BY AUTOMATED COUNT: 14.4 % (ref 11.5–14.5)
ERYTHROCYTE [SEDIMENTATION RATE] IN BLOOD BY WESTERGREN METHOD: 20 MM/HR (ref 0–36)
EST. GFR  (AFRICAN AMERICAN): >60 ML/MIN/1.73 M^2
EST. GFR  (NON AFRICAN AMERICAN): >60 ML/MIN/1.73 M^2
GLUCOSE SERPL-MCNC: 91 MG/DL (ref 70–110)
HCT VFR BLD AUTO: 31.7 % (ref 37–48.5)
HGB BLD-MCNC: 10 G/DL (ref 12–16)
IMM GRANULOCYTES # BLD AUTO: 0.01 K/UL (ref 0–0.04)
IMM GRANULOCYTES NFR BLD AUTO: 0.2 % (ref 0–0.5)
LYMPHOCYTES # BLD AUTO: 1.5 K/UL (ref 1–4.8)
LYMPHOCYTES NFR BLD: 28.7 % (ref 18–48)
MCH RBC QN AUTO: 26.2 PG (ref 27–31)
MCHC RBC AUTO-ENTMCNC: 31.5 G/DL (ref 32–36)
MCV RBC AUTO: 83 FL (ref 82–98)
MONOCYTES # BLD AUTO: 0.5 K/UL (ref 0.3–1)
MONOCYTES NFR BLD: 8.6 % (ref 4–15)
NEUTROPHILS # BLD AUTO: 3.1 K/UL (ref 1.8–7.7)
NEUTROPHILS NFR BLD: 57.4 % (ref 38–73)
NRBC BLD-RTO: 0 /100 WBC
PLATELET # BLD AUTO: 279 K/UL (ref 150–350)
PMV BLD AUTO: 8.7 FL (ref 9.2–12.9)
POTASSIUM SERPL-SCNC: 3.5 MMOL/L (ref 3.5–5.1)
PROT SERPL-MCNC: 7.3 G/DL (ref 6–8.4)
RBC # BLD AUTO: 3.81 M/UL (ref 4–5.4)
SODIUM SERPL-SCNC: 137 MMOL/L (ref 136–145)
WBC # BLD AUTO: 5.37 K/UL (ref 3.9–12.7)

## 2020-09-21 PROCEDURE — 85025 COMPLETE CBC W/AUTO DIFF WBC: CPT

## 2020-09-21 PROCEDURE — 86160 COMPLEMENT ANTIGEN: CPT | Mod: 59

## 2020-09-21 PROCEDURE — 85652 RBC SED RATE AUTOMATED: CPT

## 2020-09-21 PROCEDURE — 86225 DNA ANTIBODY NATIVE: CPT

## 2020-09-21 PROCEDURE — 86140 C-REACTIVE PROTEIN: CPT

## 2020-09-21 PROCEDURE — 86160 COMPLEMENT ANTIGEN: CPT

## 2020-09-21 PROCEDURE — 80053 COMPREHEN METABOLIC PANEL: CPT

## 2020-09-21 PROCEDURE — 36415 COLL VENOUS BLD VENIPUNCTURE: CPT

## 2020-09-22 LAB
C3 SERPL-MCNC: 136 MG/DL (ref 50–180)
C4 SERPL-MCNC: 25 MG/DL (ref 11–44)
DSDNA AB SER-ACNC: NORMAL [IU]/ML

## 2020-09-28 ENCOUNTER — OFFICE VISIT (OUTPATIENT)
Dept: RHEUMATOLOGY | Facility: CLINIC | Age: 45
End: 2020-09-28
Payer: COMMERCIAL

## 2020-09-28 VITALS
BODY MASS INDEX: 40.87 KG/M2 | HEART RATE: 81 BPM | WEIGHT: 238.13 LBS | DIASTOLIC BLOOD PRESSURE: 93 MMHG | SYSTOLIC BLOOD PRESSURE: 135 MMHG

## 2020-09-28 DIAGNOSIS — M34.9 SCLERODERMA: Primary | ICD-10-CM

## 2020-09-28 DIAGNOSIS — M35.01 SJOGREN'S SYNDROME WITH KERATOCONJUNCTIVITIS SICCA: ICD-10-CM

## 2020-09-28 PROCEDURE — 99214 PR OFFICE/OUTPT VISIT, EST, LEVL IV, 30-39 MIN: ICD-10-PCS | Mod: S$GLB,,, | Performed by: INTERNAL MEDICINE

## 2020-09-28 PROCEDURE — 99999 PR PBB SHADOW E&M-EST. PATIENT-LVL III: CPT | Mod: PBBFAC,,, | Performed by: INTERNAL MEDICINE

## 2020-09-28 PROCEDURE — 3080F PR MOST RECENT DIASTOLIC BLOOD PRESSURE >= 90 MM HG: ICD-10-PCS | Mod: CPTII,S$GLB,, | Performed by: INTERNAL MEDICINE

## 2020-09-28 PROCEDURE — 3008F PR BODY MASS INDEX (BMI) DOCUMENTED: ICD-10-PCS | Mod: CPTII,S$GLB,, | Performed by: INTERNAL MEDICINE

## 2020-09-28 PROCEDURE — 3075F SYST BP GE 130 - 139MM HG: CPT | Mod: CPTII,S$GLB,, | Performed by: INTERNAL MEDICINE

## 2020-09-28 PROCEDURE — 3075F PR MOST RECENT SYSTOLIC BLOOD PRESS GE 130-139MM HG: ICD-10-PCS | Mod: CPTII,S$GLB,, | Performed by: INTERNAL MEDICINE

## 2020-09-28 PROCEDURE — 3080F DIAST BP >= 90 MM HG: CPT | Mod: CPTII,S$GLB,, | Performed by: INTERNAL MEDICINE

## 2020-09-28 PROCEDURE — 99999 PR PBB SHADOW E&M-EST. PATIENT-LVL III: ICD-10-PCS | Mod: PBBFAC,,, | Performed by: INTERNAL MEDICINE

## 2020-09-28 PROCEDURE — 99214 OFFICE O/P EST MOD 30 MIN: CPT | Mod: S$GLB,,, | Performed by: INTERNAL MEDICINE

## 2020-09-28 PROCEDURE — 3008F BODY MASS INDEX DOCD: CPT | Mod: CPTII,S$GLB,, | Performed by: INTERNAL MEDICINE

## 2020-09-28 NOTE — PROGRESS NOTES
CC: routine f/u sjogrens , scleroderma overlap      History of Present Illness:  Zachary Buckner a 44 y.o.yo female  here for routine follow-up    PCP :   Jenny Dixon MD     Here for routine  follow-up of scleroderma, Sjogren's  Positive 1 in 640, speckled, SSA, SSB positive, Scl 70 positive  She is on plaquenil 200 mg bid , seen by opthalmologist since starting plaqenil, sees Dr Love , to see again in October   She is currently on Imuran 100 mg daily  She has normal inflammatory markers and complements and is doing well today  She is doing better with decreased flares, less intense flares  She works at [employer] and is able to manage her work well otherwise  She is only taking vitamin-D supplements given by PCP now    Rt knee doing well after kenalog injection     UA negative for hematuria  Further workup for scleroderma normal CT chest no ILD  Fatty liver noted advised to follow up with PCP, methotrexate avoided  No transaminitis noted though  2D echo PA pressures 18, no CP/ SOB     Stopped  gabapentin in the past due to intolerance issues    She was also noted to have Raynaud's and is on Norvasc 2.5 mg daily  Raynaud's is stable now.  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  Denies any glandular swelling    Has recovered  from COVID and doing  well    Initial visit :  she was diagnosed with sjogrens in  , when she presented with joint pains and swelling in hands/ feet . Associated with   Tingling/ numbnesss. She mentions of dry eyes and uses tear drops and they help. She has dry mouth and drinks lots of water.   + RP  She had reddish rash  Like burn on face in the past but none in last 1 year.    she mentions of arthralgias with involvement of  hands, feet , knees , shins, elbows .  Associated with early am stiffness , but then gets better as day goes by.  She takes aleve prn and it helps   She had 1 miscarriage and 1    Sometimes feels food struck in throat   She is on vit d supp  Mother has RA     Past medical history:  Asthma  Seizures     Past surgical history:  None     Social History     Tobacco Use    Smoking status: Never Smoker    Smokeless tobacco: Never Used   Substance Use Topics    Alcohol use: Yes    Drug use: No       Family History   Problem Relation Age of Onset    Arthritis Mother     Glaucoma Mother     Hypertension Mother     Diabetes Father     Diabetes Maternal Grandmother     Hypertension Maternal Grandmother     Arthritis Maternal Grandmother     Cataracts Maternal Grandmother     Diabetes Maternal Grandfather     Heart disease Maternal Grandfather     Hypertension Maternal Grandfather     Diabetes Paternal Grandmother     Heart disease Paternal Grandmother     Hypertension Paternal Grandmother     Heart disease Paternal Grandfather        Review of patient's allergies indicates:   Allergen Reactions    Sulfa (sulfonamide antibiotics) Swelling       Review of Systems:  Constitutional: Denies fever, chills.  No recent weight loss  Fatigue: yes   Muscle weakness: no  Headaches: no new headaches  Eyes: No redness   +dryness.  No recent visual changes.  ENT: +stable  dry mouth. No oral or nasal ulcers.  Card: No chest pain.  Resp: No cough,  sob.   Gastro: No nausea or vomiting.  No  heartburn.  Constipation: no  Diarrhea: no  Genito:  No dysuria.  No genital ulcers.  Skin: No rash.  Raynauds:+stable   Neuro:  Nonfocal  Psych: No depression  Endo:  none   Heme: no abnormal bleeding or bruising  Clots/ miscarriages - none     OBJECTIVE:     Vital Signs   Vitals:    09/28/20 0911   BP: (!) 135/93   Pulse: 81     Physical Exam:  General Appearance:  NAD.   Gait: not favoring.  HEENT: PERRL.  Eyes not dry or injected.  No nasal ulcers.  Mouth not dry, no oral lesions.  Lymph: cervical, supraclavicular or axillary nodes: none abnormal   Cardio: no irregularity of S1 or S2.  No gallops or rubs.   Resp: Normal respiratory motion. Clear to auscultation bilaterally.   No abnormal chest conformation.  Abd: Soft, non-tender, nondistended.  No masses.   Skin: Head and neck,  and extremities examined. No rashes.  Some dry areas noted  Neuro: Ox3.   Cranial nerves II-XII grossly intact.   Sensation intact  in both distal LE and upper extremities to light touch.  Musculoskeletal Exam:     bl hands : no synovitis ,  Right knee mild swelling with restricted range of motion  Left knee normal range of motion  No skin thickening noted in the hands  Muscle strength:Equal and full in all mm groups of the upper and lower ext.    Laboratory:   Results for orders placed or performed in visit on 09/21/20   Urinalysis   Result Value Ref Range    Specimen UA Urine, Clean Catch     Color, UA Yellow Yellow, Straw, Letty    Appearance, UA Clear Clear    pH, UA 6.0 5.0 - 8.0    Specific Gravity, UA 1.020 1.005 - 1.030    Protein, UA Negative Negative    Glucose, UA Negative Negative    Ketones, UA Negative Negative    Bilirubin (UA) Negative Negative    Occult Blood UA 2+ (A) Negative    Nitrite, UA Negative Negative    Leukocytes, UA Negative Negative   Urinalysis Microscopic   Result Value Ref Range    RBC, UA 0 0 - 4 /hpf    Squam Epithel, UA 2 /hpf    Microscopic Comment SEE COMMENT      Results for ILIANA HILL  (MRN [id number]) as of 7/16/2018 11:30   Ref. Range 11/29/2017 11:54 2/1/2018 09:57   AMAURY Latest Ref Range: Negative  Positive (A)    AMAURY HEP-2 Titer Unknown  Positive 1:1280 S...   Anti-SSA Antibody Latest Ref Range: 0.00 - 19.99 EU  93.17 (H)   Anti-SSA Interpretation Latest Ref Range: Negative   Positive (A)   Anti-SSB Antibody Latest Ref Range: 0.00 - 19.99 EU  89.70 (H)   Anti-SSB Interpretation Latest Ref Range: Negative   Positive (A)   ds DNA Ab Latest Ref Range: Negative 1:10  1 Negative 1:10   Anti Sm Antibody Latest Ref Range: 0.00 - 19.99 EU  7.19   Anti-Sm Interpretation Latest Ref Range: Negative   Negative   Anti Sm/RNP Antibody Latest Ref Range: 0.00 - 19.99 EU  30.58 (H)   Anti-Sm/RNP Interpretation Latest Ref Range: Negative   Positive (A)   Scleroderma SCL- Latest Ref Range: <20 UNITS  43 (H)       Imaging :    Ct chest :  No significant abnormalities within the chest.    Notes reviewed  Other procedures:    ASSESSMENT/PLAN:     There are no diagnoses linked to this encounter.  42 yr old with  1:  Scleroderma  /Sjogrens overlap (+1:1280 speckled ,  AMAURY , SSa , SSb + ve )  scl 70 + ve   APLA - ve , no h/o clots   Normal c3, c4   With   RP, sicca symptoms, GERD   Microscopic hematuria with kidney biopsy suggestive of membranous nephropathy/lupus-like condition/lupus nephritis- stage 5, sees Dr. Garza   Intolerance to CellCept in the past  Continue with Plaquenil 200 mg twice a day use sunscreens and continue with annual eye exams  Continue with Imuran 100 mg a day   She uses barrier contraception , does not intend to have any further kids  RP  - supportive care , avoid colds , c/w  amlodipine 2.5 daily, advised to monitor blood pressure  Dry eyes -c/w teardrops  Implications of sjogrens and fetal risk discussed     2: SOB :  Related to asthma likely , None now    ECHO , HRCT no evidence of ILD or PAH   Inconclusive PFTs ,due to poor effort     3: Fibromyalgia :  Associated with allodynia   Exercise    Weight loss   C/w flexeril / p.r.n.      4: Hepatic steatosis :  Incidental finding with normal LFTS , more recent US abdomen - fatty liver  counselled on diet control  Weight loss   Avoid alcohol  She d/w PCP and was advised weight loss     5:  Chronic stable anemia of chronic disease:  Denies any GI bleed  Stable hemoglobin   At this point I would recommend she continues to follow-up with PCP as well for advised take iron supplements, she is not on any now   If any worsening noted Will refer to hematology  Prev colonoscopy and endoscopy normal     Compromised immune system secondary to autoimmune disease and use of immunosuppressive drugs. Monitor carefully for infections. Advised to get immediate medical care if any infection. Also advised strict adherence to age appropriate vaccinations and cancer screenings with PCP  Refused flu shot today  She refuses any further vaccinations      4 month return      Risks vs Benefits and potential side effects of medication prescribed today were discussed with patient.     Plaquenil - ocular toxicity , rare myositis , skin pigmentation    Imuran - infection, malignancy risk discussed  Always hold during infection or prior to surgery        Disclaimer: This note was prepared using voice recognition system and is likely to have sound alike errors and is not proof read.  Please call me with any questions.

## 2020-10-08 ENCOUNTER — OFFICE VISIT (OUTPATIENT)
Dept: OPHTHALMOLOGY | Facility: CLINIC | Age: 45
End: 2020-10-08
Payer: COMMERCIAL

## 2020-10-08 DIAGNOSIS — Z79.899 LONG-TERM USE OF PLAQUENIL: ICD-10-CM

## 2020-10-08 DIAGNOSIS — H52.03 HYPEROPIA, BILATERAL: ICD-10-CM

## 2020-10-08 DIAGNOSIS — M35.00 SJOGREN'S SYNDROME, WITH UNSPECIFIED ORGAN INVOLVEMENT: Primary | ICD-10-CM

## 2020-10-08 DIAGNOSIS — Z83.511 FAMILY HISTORY OF GLAUCOMA IN MOTHER: ICD-10-CM

## 2020-10-08 DIAGNOSIS — H52.4 BILATERAL PRESBYOPIA: ICD-10-CM

## 2020-10-08 PROCEDURE — 92083 HUMPHREY VISUAL FIELD - OU - BOTH EYES: ICD-10-PCS | Mod: S$GLB,,, | Performed by: OPTOMETRIST

## 2020-10-08 PROCEDURE — 92083 EXTENDED VISUAL FIELD XM: CPT | Mod: S$GLB,,, | Performed by: OPTOMETRIST

## 2020-10-08 PROCEDURE — 92015 PR REFRACTION: ICD-10-PCS | Mod: S$GLB,,, | Performed by: OPTOMETRIST

## 2020-10-08 PROCEDURE — 99999 PR PBB SHADOW E&M-EST. PATIENT-LVL III: CPT | Mod: PBBFAC,,, | Performed by: OPTOMETRIST

## 2020-10-08 PROCEDURE — 92134 POSTERIOR SEGMENT OCT RETINA (OCULAR COHERENCE TOMOGRAPHY)-BOTH EYES: ICD-10-PCS | Mod: S$GLB,,, | Performed by: OPTOMETRIST

## 2020-10-08 PROCEDURE — 92014 COMPRE OPH EXAM EST PT 1/>: CPT | Mod: S$GLB,,, | Performed by: OPTOMETRIST

## 2020-10-08 PROCEDURE — 92015 DETERMINE REFRACTIVE STATE: CPT | Mod: S$GLB,,, | Performed by: OPTOMETRIST

## 2020-10-08 PROCEDURE — 92014 PR EYE EXAM, EST PATIENT,COMPREHESV: ICD-10-PCS | Mod: S$GLB,,, | Performed by: OPTOMETRIST

## 2020-10-08 PROCEDURE — 92134 CPTRZ OPH DX IMG PST SGM RTA: CPT | Mod: S$GLB,,, | Performed by: OPTOMETRIST

## 2020-10-08 PROCEDURE — 99999 PR PBB SHADOW E&M-EST. PATIENT-LVL III: ICD-10-PCS | Mod: PBBFAC,,, | Performed by: OPTOMETRIST

## 2020-10-08 NOTE — PROGRESS NOTES
HPI     Eye Exam      Additional comments: Plaq Ck              Comments     Last Exam w/ TRF 8/15/2019  Patient states that she notices some slight changes in vision far away  Eyes hurt slightly today and states they've been like this for a while.   She noticed that when she goes out into the light she has trouble getting   her eyes to adjust and focus.             Last edited by Nguyen Light on 10/8/2020  4:09 PM. (History)            Assessment /Plan     For exam results, see Encounter Report.    Sjogren's syndrome, with unspecified organ involvement  -     Marcelino Visual Field - OU - Extended - Both Eyes  -     Posterior Segment OCT Retina-Both eyes    Long-term use of Plaquenil  -     Marcelino Visual Field - OU - Extended - Both Eyes  -     Posterior Segment OCT Retina-Both eyes    Family history of glaucoma in mother    Hyperopia, bilateral    Bilateral presbyopia      Unchanged mOCT and VF    No evidence of glaucoma    Dispense Final Rx for glasses.  RTC 1 year  Discussed above and answered questions.

## 2020-11-06 ENCOUNTER — OFFICE VISIT (OUTPATIENT)
Dept: OPHTHALMOLOGY | Facility: CLINIC | Age: 45
End: 2020-11-06
Payer: COMMERCIAL

## 2020-11-06 DIAGNOSIS — H57.11 EYE PAIN, RIGHT: Primary | ICD-10-CM

## 2020-11-06 PROCEDURE — 92014 COMPRE OPH EXAM EST PT 1/>: CPT | Mod: S$GLB,,, | Performed by: OPTOMETRIST

## 2020-11-06 PROCEDURE — 99999 PR PBB SHADOW E&M-EST. PATIENT-LVL II: ICD-10-PCS | Mod: PBBFAC,,, | Performed by: OPTOMETRIST

## 2020-11-06 PROCEDURE — 99999 PR PBB SHADOW E&M-EST. PATIENT-LVL II: CPT | Mod: PBBFAC,,, | Performed by: OPTOMETRIST

## 2020-11-06 PROCEDURE — 92014 PR EYE EXAM, EST PATIENT,COMPREHESV: ICD-10-PCS | Mod: S$GLB,,, | Performed by: OPTOMETRIST
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
## 2020-11-09 PROBLEM — Z09 HOSPITAL DISCHARGE FOLLOW-UP: Status: RESOLVED | Noted: 2020-08-04 | Resolved: 2020-11-09

## 2021-01-28 ENCOUNTER — LAB VISIT (OUTPATIENT)
Dept: LAB | Facility: HOSPITAL | Age: 46
End: 2021-01-28
Attending: INTERNAL MEDICINE
Payer: COMMERCIAL

## 2021-01-28 DIAGNOSIS — M34.9 SCLERODERMA: ICD-10-CM

## 2021-01-28 LAB
ALBUMIN SERPL BCP-MCNC: 3.8 G/DL (ref 3.5–5.2)
ALP SERPL-CCNC: 49 U/L (ref 55–135)
ALT SERPL W/O P-5'-P-CCNC: 15 U/L (ref 10–44)
ANION GAP SERPL CALC-SCNC: 7 MMOL/L (ref 8–16)
AST SERPL-CCNC: 14 U/L (ref 10–40)
BASOPHILS # BLD AUTO: 0.02 K/UL (ref 0–0.2)
BASOPHILS NFR BLD: 0.4 % (ref 0–1.9)
BILIRUB SERPL-MCNC: 0.5 MG/DL (ref 0.1–1)
BUN SERPL-MCNC: 12 MG/DL (ref 6–20)
CALCIUM SERPL-MCNC: 9 MG/DL (ref 8.7–10.5)
CHLORIDE SERPL-SCNC: 106 MMOL/L (ref 95–110)
CO2 SERPL-SCNC: 26 MMOL/L (ref 23–29)
CREAT SERPL-MCNC: 0.9 MG/DL (ref 0.5–1.4)
CRP SERPL-MCNC: 9.1 MG/L (ref 0–8.2)
DIFFERENTIAL METHOD: ABNORMAL
EOSINOPHIL # BLD AUTO: 0.2 K/UL (ref 0–0.5)
EOSINOPHIL NFR BLD: 3.8 % (ref 0–8)
ERYTHROCYTE [DISTWIDTH] IN BLOOD BY AUTOMATED COUNT: 14.6 % (ref 11.5–14.5)
ERYTHROCYTE [SEDIMENTATION RATE] IN BLOOD BY WESTERGREN METHOD: 27 MM/HR (ref 0–20)
EST. GFR  (AFRICAN AMERICAN): >60 ML/MIN/1.73 M^2
EST. GFR  (NON AFRICAN AMERICAN): >60 ML/MIN/1.73 M^2
GLUCOSE SERPL-MCNC: 95 MG/DL (ref 70–110)
HCT VFR BLD AUTO: 33.6 % (ref 37–48.5)
HGB BLD-MCNC: 10.5 G/DL (ref 12–16)
IMM GRANULOCYTES # BLD AUTO: 0.02 K/UL (ref 0–0.04)
IMM GRANULOCYTES NFR BLD AUTO: 0.4 % (ref 0–0.5)
LYMPHOCYTES # BLD AUTO: 1.3 K/UL (ref 1–4.8)
LYMPHOCYTES NFR BLD: 28 % (ref 18–48)
MCH RBC QN AUTO: 26.1 PG (ref 27–31)
MCHC RBC AUTO-ENTMCNC: 31.3 G/DL (ref 32–36)
MCV RBC AUTO: 83 FL (ref 82–98)
MONOCYTES # BLD AUTO: 0.5 K/UL (ref 0.3–1)
MONOCYTES NFR BLD: 10.8 % (ref 4–15)
NEUTROPHILS # BLD AUTO: 2.5 K/UL (ref 1.8–7.7)
NEUTROPHILS NFR BLD: 56.6 % (ref 38–73)
NRBC BLD-RTO: 0 /100 WBC
PLATELET # BLD AUTO: 296 K/UL (ref 150–350)
PMV BLD AUTO: 8.9 FL (ref 9.2–12.9)
POTASSIUM SERPL-SCNC: 4.3 MMOL/L (ref 3.5–5.1)
PROT SERPL-MCNC: 7.5 G/DL (ref 6–8.4)
RBC # BLD AUTO: 4.03 M/UL (ref 4–5.4)
SODIUM SERPL-SCNC: 139 MMOL/L (ref 136–145)
WBC # BLD AUTO: 4.46 K/UL (ref 3.9–12.7)

## 2021-01-28 PROCEDURE — 86140 C-REACTIVE PROTEIN: CPT

## 2021-01-28 PROCEDURE — 80053 COMPREHEN METABOLIC PANEL: CPT

## 2021-01-28 PROCEDURE — 85025 COMPLETE CBC W/AUTO DIFF WBC: CPT

## 2021-01-28 PROCEDURE — 36415 COLL VENOUS BLD VENIPUNCTURE: CPT

## 2021-01-28 PROCEDURE — 85651 RBC SED RATE NONAUTOMATED: CPT

## 2021-02-01 ENCOUNTER — OFFICE VISIT (OUTPATIENT)
Dept: RHEUMATOLOGY | Facility: CLINIC | Age: 46
End: 2021-02-01
Payer: COMMERCIAL

## 2021-02-01 VITALS
SYSTOLIC BLOOD PRESSURE: 135 MMHG | WEIGHT: 236.75 LBS | DIASTOLIC BLOOD PRESSURE: 91 MMHG | HEART RATE: 70 BPM | BODY MASS INDEX: 40.64 KG/M2

## 2021-02-01 DIAGNOSIS — D84.9 IMMUNOSUPPRESSED STATUS: ICD-10-CM

## 2021-02-01 DIAGNOSIS — Z79.899 ENCOUNTER FOR LONG-TERM (CURRENT) USE OF HIGH-RISK MEDICATION: ICD-10-CM

## 2021-02-01 DIAGNOSIS — M34.9 SCLERODERMA: Primary | ICD-10-CM

## 2021-02-01 DIAGNOSIS — M35.01 SJOGREN'S SYNDROME WITH KERATOCONJUNCTIVITIS SICCA: ICD-10-CM

## 2021-02-01 PROCEDURE — 1126F PR PAIN SEVERITY QUANTIFIED, NO PAIN PRESENT: ICD-10-PCS | Mod: S$GLB,,, | Performed by: INTERNAL MEDICINE

## 2021-02-01 PROCEDURE — 3008F BODY MASS INDEX DOCD: CPT | Mod: CPTII,S$GLB,, | Performed by: INTERNAL MEDICINE

## 2021-02-01 PROCEDURE — 3075F PR MOST RECENT SYSTOLIC BLOOD PRESS GE 130-139MM HG: ICD-10-PCS | Mod: CPTII,S$GLB,, | Performed by: INTERNAL MEDICINE

## 2021-02-01 PROCEDURE — 3080F PR MOST RECENT DIASTOLIC BLOOD PRESSURE >= 90 MM HG: ICD-10-PCS | Mod: CPTII,S$GLB,, | Performed by: INTERNAL MEDICINE

## 2021-02-01 PROCEDURE — 3080F DIAST BP >= 90 MM HG: CPT | Mod: CPTII,S$GLB,, | Performed by: INTERNAL MEDICINE

## 2021-02-01 PROCEDURE — 99999 PR PBB SHADOW E&M-EST. PATIENT-LVL III: ICD-10-PCS | Mod: PBBFAC,,, | Performed by: INTERNAL MEDICINE

## 2021-02-01 PROCEDURE — 99999 PR PBB SHADOW E&M-EST. PATIENT-LVL III: CPT | Mod: PBBFAC,,, | Performed by: INTERNAL MEDICINE

## 2021-02-01 PROCEDURE — 99214 PR OFFICE/OUTPT VISIT, EST, LEVL IV, 30-39 MIN: ICD-10-PCS | Mod: S$GLB,,, | Performed by: INTERNAL MEDICINE

## 2021-02-01 PROCEDURE — 3075F SYST BP GE 130 - 139MM HG: CPT | Mod: CPTII,S$GLB,, | Performed by: INTERNAL MEDICINE

## 2021-02-01 PROCEDURE — 99214 OFFICE O/P EST MOD 30 MIN: CPT | Mod: S$GLB,,, | Performed by: INTERNAL MEDICINE

## 2021-02-01 PROCEDURE — 3008F PR BODY MASS INDEX (BMI) DOCUMENTED: ICD-10-PCS | Mod: CPTII,S$GLB,, | Performed by: INTERNAL MEDICINE

## 2021-02-01 PROCEDURE — 1126F AMNT PAIN NOTED NONE PRSNT: CPT | Mod: S$GLB,,, | Performed by: INTERNAL MEDICINE

## 2021-02-01 RX ORDER — AZATHIOPRINE 50 MG/1
50 TABLET ORAL DAILY
Qty: 90 TABLET | Refills: 1 | Status: SHIPPED | OUTPATIENT
Start: 2021-02-01 | End: 2021-09-01 | Stop reason: SDUPTHER

## 2021-02-01 RX ORDER — AZATHIOPRINE 100 MG/1
100 TABLET ORAL DAILY
Qty: 90 TABLET | Refills: 1 | Status: SHIPPED | OUTPATIENT
Start: 2021-02-01 | End: 2021-09-01 | Stop reason: SDUPTHER

## 2021-02-01 RX ORDER — PREDNISONE 5 MG/1
5 TABLET ORAL DAILY
Qty: 30 TABLET | Refills: 0 | Status: SHIPPED | OUTPATIENT
Start: 2021-02-01 | End: 2022-02-17

## 2021-02-01 RX ORDER — HYDROXYCHLOROQUINE SULFATE 200 MG/1
200 TABLET, FILM COATED ORAL 2 TIMES DAILY
Qty: 180 TABLET | Refills: 3 | Status: SHIPPED | OUTPATIENT
Start: 2021-02-01 | End: 2021-09-01 | Stop reason: SDUPTHER

## 2021-03-10 DIAGNOSIS — M35.01 SJOGREN'S SYNDROME WITH KERATOCONJUNCTIVITIS SICCA: ICD-10-CM

## 2021-03-10 RX ORDER — HYDROXYCHLOROQUINE SULFATE 200 MG/1
200 TABLET, FILM COATED ORAL 2 TIMES DAILY
Qty: 180 TABLET | Refills: 3 | Status: CANCELLED | OUTPATIENT
Start: 2021-03-10

## 2021-04-16 DIAGNOSIS — M35.01 SJOGREN'S SYNDROME WITH KERATOCONJUNCTIVITIS SICCA: ICD-10-CM

## 2021-04-16 RX ORDER — HYDROXYCHLOROQUINE SULFATE 200 MG/1
200 TABLET, FILM COATED ORAL 2 TIMES DAILY
Qty: 180 TABLET | Refills: 3 | Status: CANCELLED | OUTPATIENT
Start: 2021-04-16

## 2021-04-26 DIAGNOSIS — M35.01 SJOGREN'S SYNDROME WITH KERATOCONJUNCTIVITIS SICCA: ICD-10-CM

## 2021-04-26 RX ORDER — HYDROXYCHLOROQUINE SULFATE 200 MG/1
200 TABLET, FILM COATED ORAL 2 TIMES DAILY
Qty: 180 TABLET | Refills: 3 | Status: CANCELLED | OUTPATIENT
Start: 2021-04-26

## 2021-05-24 ENCOUNTER — OFFICE VISIT (OUTPATIENT)
Dept: URGENT CARE | Facility: CLINIC | Age: 46
End: 2021-05-24
Payer: COMMERCIAL

## 2021-05-24 VITALS
HEART RATE: 77 BPM | OXYGEN SATURATION: 98 % | DIASTOLIC BLOOD PRESSURE: 77 MMHG | WEIGHT: 248.38 LBS | SYSTOLIC BLOOD PRESSURE: 129 MMHG | BODY MASS INDEX: 42.63 KG/M2 | TEMPERATURE: 99 F

## 2021-05-24 DIAGNOSIS — R21 RASH: Primary | ICD-10-CM

## 2021-05-24 DIAGNOSIS — M34.9 SCLERODERMA: ICD-10-CM

## 2021-05-24 DIAGNOSIS — I10 ESSENTIAL HYPERTENSION: ICD-10-CM

## 2021-05-24 DIAGNOSIS — L29.9 PRURITUS: ICD-10-CM

## 2021-05-24 DIAGNOSIS — L30.9 DERMATITIS: ICD-10-CM

## 2021-05-24 PROCEDURE — 3008F PR BODY MASS INDEX (BMI) DOCUMENTED: ICD-10-PCS | Mod: CPTII,S$GLB,, | Performed by: NURSE PRACTITIONER

## 2021-05-24 PROCEDURE — 99999 PR PBB SHADOW E&M-EST. PATIENT-LVL V: CPT | Mod: PBBFAC,,, | Performed by: NURSE PRACTITIONER

## 2021-05-24 PROCEDURE — 99204 OFFICE O/P NEW MOD 45 MIN: CPT | Mod: S$GLB,,, | Performed by: NURSE PRACTITIONER

## 2021-05-24 PROCEDURE — 99204 PR OFFICE/OUTPT VISIT, NEW, LEVL IV, 45-59 MIN: ICD-10-PCS | Mod: S$GLB,,, | Performed by: NURSE PRACTITIONER

## 2021-05-24 PROCEDURE — 99999 PR PBB SHADOW E&M-EST. PATIENT-LVL V: ICD-10-PCS | Mod: PBBFAC,,, | Performed by: NURSE PRACTITIONER

## 2021-05-24 PROCEDURE — 1126F AMNT PAIN NOTED NONE PRSNT: CPT | Mod: S$GLB,,, | Performed by: NURSE PRACTITIONER

## 2021-05-24 PROCEDURE — 3008F BODY MASS INDEX DOCD: CPT | Mod: CPTII,S$GLB,, | Performed by: NURSE PRACTITIONER

## 2021-05-24 PROCEDURE — 1126F PR PAIN SEVERITY QUANTIFIED, NO PAIN PRESENT: ICD-10-PCS | Mod: S$GLB,,, | Performed by: NURSE PRACTITIONER

## 2021-05-25 ENCOUNTER — OFFICE VISIT (OUTPATIENT)
Dept: DERMATOLOGY | Facility: CLINIC | Age: 46
End: 2021-05-25
Payer: COMMERCIAL

## 2021-05-25 DIAGNOSIS — L20.89 OTHER ATOPIC DERMATITIS: ICD-10-CM

## 2021-05-25 DIAGNOSIS — L30.1 DYSHIDROTIC ECZEMA: Primary | ICD-10-CM

## 2021-05-25 PROCEDURE — 1126F PR PAIN SEVERITY QUANTIFIED, NO PAIN PRESENT: ICD-10-PCS | Mod: S$GLB,,, | Performed by: DERMATOLOGY

## 2021-05-25 PROCEDURE — 99203 PR OFFICE/OUTPT VISIT, NEW, LEVL III, 30-44 MIN: ICD-10-PCS | Mod: S$GLB,,, | Performed by: DERMATOLOGY

## 2021-05-25 PROCEDURE — 99203 OFFICE O/P NEW LOW 30 MIN: CPT | Mod: S$GLB,,, | Performed by: DERMATOLOGY

## 2021-05-25 PROCEDURE — 99999 PR PBB SHADOW E&M-EST. PATIENT-LVL III: CPT | Mod: PBBFAC,,, | Performed by: DERMATOLOGY

## 2021-05-25 PROCEDURE — 1126F AMNT PAIN NOTED NONE PRSNT: CPT | Mod: S$GLB,,, | Performed by: DERMATOLOGY

## 2021-05-25 PROCEDURE — 99999 PR PBB SHADOW E&M-EST. PATIENT-LVL III: ICD-10-PCS | Mod: PBBFAC,,, | Performed by: DERMATOLOGY

## 2021-05-25 RX ORDER — CRISABOROLE 20 MG/G
OINTMENT TOPICAL
Qty: 60 G | Refills: 3 | Status: SHIPPED | OUTPATIENT
Start: 2021-05-25 | End: 2024-02-26

## 2021-05-25 RX ORDER — BETAMETHASONE DIPROPIONATE 0.5 MG/G
OINTMENT TOPICAL 2 TIMES DAILY PRN
Qty: 45 G | Refills: 3 | Status: SHIPPED | OUTPATIENT
Start: 2021-05-25 | End: 2024-02-26

## 2021-05-27 ENCOUNTER — LAB VISIT (OUTPATIENT)
Dept: LAB | Facility: HOSPITAL | Age: 46
End: 2021-05-27
Attending: INTERNAL MEDICINE
Payer: COMMERCIAL

## 2021-05-27 DIAGNOSIS — M34.9 SCLERODERMA: ICD-10-CM

## 2021-05-27 LAB
ALBUMIN SERPL BCP-MCNC: 3.7 G/DL (ref 3.5–5.2)
ALP SERPL-CCNC: 46 U/L (ref 55–135)
ALT SERPL W/O P-5'-P-CCNC: 15 U/L (ref 10–44)
ANION GAP SERPL CALC-SCNC: 8 MMOL/L (ref 8–16)
AST SERPL-CCNC: 13 U/L (ref 10–40)
BASOPHILS # BLD AUTO: 0.02 K/UL (ref 0–0.2)
BASOPHILS NFR BLD: 0.4 % (ref 0–1.9)
BILIRUB SERPL-MCNC: 0.3 MG/DL (ref 0.1–1)
BUN SERPL-MCNC: 16 MG/DL (ref 6–20)
CALCIUM SERPL-MCNC: 9.2 MG/DL (ref 8.7–10.5)
CHLORIDE SERPL-SCNC: 104 MMOL/L (ref 95–110)
CO2 SERPL-SCNC: 26 MMOL/L (ref 23–29)
CREAT SERPL-MCNC: 1 MG/DL (ref 0.5–1.4)
CRP SERPL-MCNC: 10.8 MG/L (ref 0–8.2)
DIFFERENTIAL METHOD: ABNORMAL
EOSINOPHIL # BLD AUTO: 0.3 K/UL (ref 0–0.5)
EOSINOPHIL NFR BLD: 4.8 % (ref 0–8)
ERYTHROCYTE [DISTWIDTH] IN BLOOD BY AUTOMATED COUNT: 15.5 % (ref 11.5–14.5)
ERYTHROCYTE [SEDIMENTATION RATE] IN BLOOD BY WESTERGREN METHOD: 63 MM/HR (ref 0–20)
EST. GFR  (AFRICAN AMERICAN): >60 ML/MIN/1.73 M^2
EST. GFR  (NON AFRICAN AMERICAN): >60 ML/MIN/1.73 M^2
GLUCOSE SERPL-MCNC: 96 MG/DL (ref 70–110)
HCT VFR BLD AUTO: 33.5 % (ref 37–48.5)
HGB BLD-MCNC: 10.8 G/DL (ref 12–16)
IMM GRANULOCYTES # BLD AUTO: 0.03 K/UL (ref 0–0.04)
IMM GRANULOCYTES NFR BLD AUTO: 0.5 % (ref 0–0.5)
LYMPHOCYTES # BLD AUTO: 1.6 K/UL (ref 1–4.8)
LYMPHOCYTES NFR BLD: 27.6 % (ref 18–48)
MCH RBC QN AUTO: 27 PG (ref 27–31)
MCHC RBC AUTO-ENTMCNC: 32.2 G/DL (ref 32–36)
MCV RBC AUTO: 84 FL (ref 82–98)
MONOCYTES # BLD AUTO: 0.5 K/UL (ref 0.3–1)
MONOCYTES NFR BLD: 9.5 % (ref 4–15)
NEUTROPHILS # BLD AUTO: 3.2 K/UL (ref 1.8–7.7)
NEUTROPHILS NFR BLD: 57.2 % (ref 38–73)
NRBC BLD-RTO: 0 /100 WBC
PLATELET # BLD AUTO: 314 K/UL (ref 150–450)
PMV BLD AUTO: 8.8 FL (ref 9.2–12.9)
POTASSIUM SERPL-SCNC: 4.4 MMOL/L (ref 3.5–5.1)
PROT SERPL-MCNC: 7.6 G/DL (ref 6–8.4)
RBC # BLD AUTO: 4 M/UL (ref 4–5.4)
SODIUM SERPL-SCNC: 138 MMOL/L (ref 136–145)
WBC # BLD AUTO: 5.66 K/UL (ref 3.9–12.7)

## 2021-05-27 PROCEDURE — 36415 COLL VENOUS BLD VENIPUNCTURE: CPT | Performed by: INTERNAL MEDICINE

## 2021-05-27 PROCEDURE — 80053 COMPREHEN METABOLIC PANEL: CPT | Performed by: INTERNAL MEDICINE

## 2021-05-27 PROCEDURE — 85025 COMPLETE CBC W/AUTO DIFF WBC: CPT | Performed by: INTERNAL MEDICINE

## 2021-05-27 PROCEDURE — 86140 C-REACTIVE PROTEIN: CPT | Performed by: INTERNAL MEDICINE

## 2021-05-27 PROCEDURE — 85651 RBC SED RATE NONAUTOMATED: CPT | Performed by: INTERNAL MEDICINE

## 2021-05-31 ENCOUNTER — TELEPHONE (OUTPATIENT)
Dept: RHEUMATOLOGY | Facility: CLINIC | Age: 46
End: 2021-05-31

## 2021-06-01 ENCOUNTER — OFFICE VISIT (OUTPATIENT)
Dept: RHEUMATOLOGY | Facility: CLINIC | Age: 46
End: 2021-06-01
Payer: COMMERCIAL

## 2021-06-01 ENCOUNTER — TELEPHONE (OUTPATIENT)
Dept: RHEUMATOLOGY | Facility: CLINIC | Age: 46
End: 2021-06-01

## 2021-06-01 ENCOUNTER — PATIENT MESSAGE (OUTPATIENT)
Dept: RHEUMATOLOGY | Facility: CLINIC | Age: 46
End: 2021-06-01

## 2021-06-01 ENCOUNTER — HOSPITAL ENCOUNTER (OUTPATIENT)
Dept: RADIOLOGY | Facility: HOSPITAL | Age: 46
Discharge: HOME | End: 2021-06-01
Attending: PHYSICIAN ASSISTANT
Payer: COMMERCIAL

## 2021-06-01 VITALS
WEIGHT: 248.44 LBS | BODY MASS INDEX: 42.41 KG/M2 | HEART RATE: 80 BPM | DIASTOLIC BLOOD PRESSURE: 84 MMHG | SYSTOLIC BLOOD PRESSURE: 139 MMHG | HEIGHT: 64 IN

## 2021-06-01 DIAGNOSIS — M32.14 LUPUS NEPHRITIS, ISN/RPS CLASS V: ICD-10-CM

## 2021-06-01 DIAGNOSIS — I73.00 RAYNAUD'S DISEASE WITHOUT GANGRENE: ICD-10-CM

## 2021-06-01 DIAGNOSIS — Z79.899 HIGH RISK MEDICATION USE: ICD-10-CM

## 2021-06-01 DIAGNOSIS — M35.01 SJOGREN'S SYNDROME WITH KERATOCONJUNCTIVITIS SICCA: ICD-10-CM

## 2021-06-01 DIAGNOSIS — M34.9 SCLERODERMA: ICD-10-CM

## 2021-06-01 DIAGNOSIS — Z01.818 PRE-OP TESTING: ICD-10-CM

## 2021-06-01 DIAGNOSIS — M34.9 SCLERODERMA: Primary | ICD-10-CM

## 2021-06-01 DIAGNOSIS — D84.9 IMMUNOSUPPRESSED STATUS: ICD-10-CM

## 2021-06-01 PROCEDURE — 99215 PR OFFICE/OUTPT VISIT, EST, LEVL V, 40-54 MIN: ICD-10-PCS | Mod: S$GLB,,, | Performed by: PHYSICIAN ASSISTANT

## 2021-06-01 PROCEDURE — 1125F PR PAIN SEVERITY QUANTIFIED, PAIN PRESENT: ICD-10-PCS | Mod: S$GLB,,, | Performed by: PHYSICIAN ASSISTANT

## 2021-06-01 PROCEDURE — 71046 XR CHEST PA AND LATERAL: ICD-10-PCS | Mod: 26,,, | Performed by: RADIOLOGY

## 2021-06-01 PROCEDURE — 99999 PR PBB SHADOW E&M-EST. PATIENT-LVL V: CPT | Mod: PBBFAC,,, | Performed by: PHYSICIAN ASSISTANT

## 2021-06-01 PROCEDURE — 1125F AMNT PAIN NOTED PAIN PRSNT: CPT | Mod: S$GLB,,, | Performed by: PHYSICIAN ASSISTANT

## 2021-06-01 PROCEDURE — 99215 OFFICE O/P EST HI 40 MIN: CPT | Mod: S$GLB,,, | Performed by: PHYSICIAN ASSISTANT

## 2021-06-01 PROCEDURE — 71046 X-RAY EXAM CHEST 2 VIEWS: CPT | Mod: 26,,, | Performed by: RADIOLOGY

## 2021-06-01 PROCEDURE — 3008F PR BODY MASS INDEX (BMI) DOCUMENTED: ICD-10-PCS | Mod: CPTII,S$GLB,, | Performed by: PHYSICIAN ASSISTANT

## 2021-06-01 PROCEDURE — 3008F BODY MASS INDEX DOCD: CPT | Mod: CPTII,S$GLB,, | Performed by: PHYSICIAN ASSISTANT

## 2021-06-01 PROCEDURE — 99999 PR PBB SHADOW E&M-EST. PATIENT-LVL V: ICD-10-PCS | Mod: PBBFAC,,, | Performed by: PHYSICIAN ASSISTANT

## 2021-06-01 PROCEDURE — 71046 X-RAY EXAM CHEST 2 VIEWS: CPT | Mod: TC

## 2021-06-01 RX ORDER — PREDNISONE 10 MG/1
10 TABLET ORAL DAILY
Qty: 60 TABLET | Refills: 1 | Status: SHIPPED | OUTPATIENT
Start: 2021-06-01 | End: 2021-09-27

## 2021-06-02 ENCOUNTER — PATIENT MESSAGE (OUTPATIENT)
Dept: RHEUMATOLOGY | Facility: CLINIC | Age: 46
End: 2021-06-02

## 2021-06-08 ENCOUNTER — LAB VISIT (OUTPATIENT)
Dept: OTOLARYNGOLOGY | Facility: CLINIC | Age: 46
End: 2021-06-08
Payer: COMMERCIAL

## 2021-06-08 DIAGNOSIS — Z01.818 PRE-OP TESTING: ICD-10-CM

## 2021-06-08 PROCEDURE — U0005 INFEC AGEN DETEC AMPLI PROBE: HCPCS | Performed by: PHYSICIAN ASSISTANT

## 2021-06-08 PROCEDURE — U0003 INFECTIOUS AGENT DETECTION BY NUCLEIC ACID (DNA OR RNA); SEVERE ACUTE RESPIRATORY SYNDROME CORONAVIRUS 2 (SARS-COV-2) (CORONAVIRUS DISEASE [COVID-19]), AMPLIFIED PROBE TECHNIQUE, MAKING USE OF HIGH THROUGHPUT TECHNOLOGIES AS DESCRIBED BY CMS-2020-01-R: HCPCS | Performed by: PHYSICIAN ASSISTANT

## 2021-06-09 LAB — SARS-COV-2 RNA RESP QL NAA+PROBE: NOT DETECTED

## 2021-06-11 ENCOUNTER — CLINICAL SUPPORT (OUTPATIENT)
Dept: PULMONOLOGY | Facility: CLINIC | Age: 46
End: 2021-06-11
Payer: COMMERCIAL

## 2021-06-11 VITALS — WEIGHT: 248.44 LBS | HEIGHT: 64 IN | BODY MASS INDEX: 42.41 KG/M2

## 2021-06-11 DIAGNOSIS — M32.14 LUPUS NEPHRITIS, ISN/RPS CLASS V: ICD-10-CM

## 2021-06-11 DIAGNOSIS — M34.9 SCLERODERMA: ICD-10-CM

## 2021-06-11 DIAGNOSIS — M35.01 SJOGREN'S SYNDROME WITH KERATOCONJUNCTIVITIS SICCA: ICD-10-CM

## 2021-06-11 LAB
BRPFT: NORMAL
BRPFT: NORMAL
DLCO ADJ PRE: 15.2 ML/(MIN*MMHG)
DLCO SINGLE BREATH LLN: 19.02
DLCO SINGLE BREATH PRE REF: 61.4 %
DLCO SINGLE BREATH REF: 24.76
DLCOC SBVA LLN: 3.46
DLCOC SBVA PRE REF: 99.8 %
DLCOC SBVA REF: 4.98
DLCOC SINGLE BREATH LLN: 19.02
DLCOC SINGLE BREATH PRE REF: 61.4 %
DLCOC SINGLE BREATH REF: 24.76
DLCOVA LLN: 3.46
DLCOVA PRE REF: 99.8 %
DLCOVA PRE: 4.97 ML/(MIN*MMHG*L)
DLCOVA REF: 4.98
DLVAADJ PRE: 4.97 ML/(MIN*MMHG*L)
ERV LLN: -16449
ERV PRE REF: 35.1 %
ERV REF: 1
FEF 25 75 CHG: 9.8 %
FEF 25 75 LLN: 1.26
FEF 25 75 POST REF: 74.4 %
FEF 25 75 PRE REF: 67.7 %
FEF 25 75 REF: 2.52
FET100 CHG: -7.1 %
FEV1 CHG: 8 %
FEV1 FVC CHG: 5.4 %
FEV1 FVC LLN: 71
FEV1 FVC POST REF: 98.8 %
FEV1 FVC PRE REF: 93.7 %
FEV1 FVC REF: 81
FEV1 LLN: 1.87
FEV1 POST REF: 80.9 %
FEV1 PRE REF: 74.9 %
FEV1 REF: 2.45
FRCPLETH LLN: 1.88
FRCPLETH PREREF: 50.2 %
FRCPLETH REF: 2.7
FVC CHG: 2.4 %
FVC LLN: 2.33
FVC POST REF: 81.5 %
FVC PRE REF: 79.6 %
FVC REF: 3.03
IVC PRE: 2.2 L
IVC SINGLE BREATH LLN: 2.33
IVC SINGLE BREATH PRE REF: 72.7 %
IVC SINGLE BREATH REF: 3.03
MVV LLN: 91
MVV PRE REF: 44.1 %
MVV REF: 107
PEF CHG: 61.8 %
PEF LLN: 4.34
PEF POST REF: 62.1 %
PEF PRE REF: 38.3 %
PEF REF: 6.36
POST FEF 25 75: 1.87 L/S
POST FET 100: 6.46 SEC
POST FEV1 FVC: 80.34 %
POST FEV1: 1.98 L
POST FVC: 2.47 L
POST PEF: 3.95 L/S
PRE DLCO: 15.2 ML/(MIN*MMHG)
PRE ERV: 0.35 L
PRE FEF 25 75: 1.71 L/S
PRE FET 100: 6.95 SEC
PRE FEV1 FVC: 76.21 %
PRE FEV1: 1.84 L
PRE FRC PL: 1.35 L
PRE FVC: 2.41 L
PRE MVV: 47 L/MIN
PRE PEF: 2.44 L/S
PRE RV: 0.91 L
PRE TLC: 3.42 L
RAW LLN: 3.06
RAW PRE REF: 131.9 %
RAW PRE: 4.03 CMH2O*S/L
RAW REF: 3.06
RV LLN: 1.13
RV PRE REF: 53.3 %
RV REF: 1.7
RVTLC LLN: 25
RVTLC PRE REF: 75.9 %
RVTLC PRE: 26.53 %
RVTLC REF: 35
TLC LLN: 3.98
TLC PRE REF: 68.9 %
TLC REF: 4.97
VA PRE: 3.06 L
VA SINGLE BREATH LLN: 4.82
VA SINGLE BREATH PRE REF: 63.5 %
VA SINGLE BREATH REF: 4.82
VC LLN: 2.33
VC PRE REF: 83.1 %
VC PRE: 2.52 L
VC REF: 3.03
VTGRAWPRE: 1.77 L

## 2021-06-11 PROCEDURE — 94729 DIFFUSING CAPACITY: CPT | Mod: S$GLB,,, | Performed by: INTERNAL MEDICINE

## 2021-06-11 PROCEDURE — 99999 PR PBB SHADOW E&M-EST. PATIENT-LVL I: CPT | Mod: PBBFAC,,,

## 2021-06-11 PROCEDURE — 99999 PR PBB SHADOW E&M-EST. PATIENT-LVL I: ICD-10-PCS | Mod: PBBFAC,,,

## 2021-06-11 PROCEDURE — 94060 EVALUATION OF WHEEZING: CPT | Mod: S$GLB,,, | Performed by: INTERNAL MEDICINE

## 2021-06-11 PROCEDURE — 94060 PR EVAL OF BRONCHOSPASM: ICD-10-PCS | Mod: S$GLB,,, | Performed by: INTERNAL MEDICINE

## 2021-06-11 PROCEDURE — 94618 PULMONARY STRESS TESTING: CPT | Mod: S$GLB,,, | Performed by: INTERNAL MEDICINE

## 2021-06-11 PROCEDURE — 94726 PULM FUNCT TST PLETHYSMOGRAP: ICD-10-PCS | Mod: S$GLB,,, | Performed by: INTERNAL MEDICINE

## 2021-06-11 PROCEDURE — 94726 PLETHYSMOGRAPHY LUNG VOLUMES: CPT | Mod: S$GLB,,, | Performed by: INTERNAL MEDICINE

## 2021-06-11 PROCEDURE — 94729 PR C02/MEMBANE DIFFUSE CAPACITY: ICD-10-PCS | Mod: S$GLB,,, | Performed by: INTERNAL MEDICINE

## 2021-06-11 PROCEDURE — 94618 PULMONARY STRESS TESTING: ICD-10-PCS | Mod: S$GLB,,, | Performed by: INTERNAL MEDICINE

## 2021-06-23 ENCOUNTER — HOSPITAL ENCOUNTER (OUTPATIENT)
Dept: CARDIOLOGY | Facility: HOSPITAL | Age: 46
Discharge: HOME | End: 2021-06-23
Attending: PHYSICIAN ASSISTANT
Payer: COMMERCIAL

## 2021-06-23 VITALS — WEIGHT: 248 LBS | BODY MASS INDEX: 42.34 KG/M2 | HEIGHT: 64 IN

## 2021-06-23 DIAGNOSIS — M34.9 SCLERODERMA: ICD-10-CM

## 2021-06-23 DIAGNOSIS — M32.14 LUPUS NEPHRITIS, ISN/RPS CLASS V: ICD-10-CM

## 2021-06-23 DIAGNOSIS — M35.01 SJOGREN'S SYNDROME WITH KERATOCONJUNCTIVITIS SICCA: ICD-10-CM

## 2021-06-23 LAB
AORTIC ROOT ANNULUS: 2.95 CM
AV INDEX (PROSTH): 0.75
AV MEAN GRADIENT: 3 MMHG
AV PEAK GRADIENT: 5 MMHG
AV VALVE AREA: 2.37 CM2
AV VELOCITY RATIO: 0.8
BSA FOR ECHO PROCEDURE: 2.25 M2
CV ECHO LV RWT: 0.54 CM
DOP CALC AO PEAK VEL: 1.12 M/S
DOP CALC AO VTI: 26.02 CM
DOP CALC LVOT AREA: 3.1 CM2
DOP CALC LVOT DIAMETER: 2 CM
DOP CALC LVOT PEAK VEL: 0.9 M/S
DOP CALC LVOT STROKE VOLUME: 61.67 CM3
DOP CALCLVOT PEAK VEL VTI: 19.64 CM
E WAVE DECELERATION TIME: 191.45 MSEC
E/A RATIO: 1.19
E/E' RATIO: 8.95 M/S
ECHO LV POSTERIOR WALL: 1.12 CM (ref 0.6–1.1)
EJECTION FRACTION: 60 %
FRACTIONAL SHORTENING: 31 % (ref 28–44)
INTERVENTRICULAR SEPTUM: 1.2 CM (ref 0.6–1.1)
IVRT: 88.49 MSEC
LA MAJOR: 4.46 CM
LA MINOR: 4.67 CM
LA WIDTH: 3.41 CM
LEFT ATRIUM SIZE: 3.17 CM
LEFT ATRIUM VOLUME INDEX: 19.6 ML/M2
LEFT ATRIUM VOLUME: 41.92 CM3
LEFT INTERNAL DIMENSION IN SYSTOLE: 2.86 CM (ref 2.1–4)
LEFT VENTRICLE DIASTOLIC VOLUME INDEX: 35.66 ML/M2
LEFT VENTRICLE DIASTOLIC VOLUME: 76.32 ML
LEFT VENTRICLE MASS INDEX: 78 G/M2
LEFT VENTRICLE SYSTOLIC VOLUME INDEX: 14.6 ML/M2
LEFT VENTRICLE SYSTOLIC VOLUME: 31.25 ML
LEFT VENTRICULAR INTERNAL DIMENSION IN DIASTOLE: 4.15 CM (ref 3.5–6)
LEFT VENTRICULAR MASS: 166.47 G
LV LATERAL E/E' RATIO: 7.83 M/S
LV SEPTAL E/E' RATIO: 10.44 M/S
MV A" WAVE DURATION": 8.85 MSEC
MV PEAK A VEL: 0.79 M/S
MV PEAK E VEL: 0.94 M/S
PISA TR MAX VEL: 2.7 M/S
PULM VEIN S/D RATIO: 1.34
PV PEAK D VEL: 0.53 M/S
PV PEAK S VEL: 0.71 M/S
PV PEAK VELOCITY: 1.15 CM/S
RA MAJOR: 3.64 CM
RA PRESSURE: 3 MMHG
RA WIDTH: 2.67 CM
SINUS: 2.22 CM
STJ: 2.22 CM
TDI LATERAL: 0.12 M/S
TDI SEPTAL: 0.09 M/S
TDI: 0.11 M/S
TR MAX PG: 29 MMHG
TRICUSPID ANNULAR PLANE SYSTOLIC EXCURSION: 1.85 CM
TV REST PULMONARY ARTERY PRESSURE: 32 MMHG

## 2021-06-23 PROCEDURE — 93306 TTE W/DOPPLER COMPLETE: CPT

## 2021-06-23 PROCEDURE — 93306 ECHO (CUPID ONLY): ICD-10-PCS | Mod: 26,,, | Performed by: INTERNAL MEDICINE

## 2021-06-23 PROCEDURE — 93306 TTE W/DOPPLER COMPLETE: CPT | Mod: 26,,, | Performed by: INTERNAL MEDICINE

## 2021-08-03 ENCOUNTER — OFFICE VISIT (OUTPATIENT)
Dept: FAMILY MEDICINE | Facility: CLINIC | Age: 46
End: 2021-08-03
Payer: COMMERCIAL

## 2021-08-03 VITALS
OXYGEN SATURATION: 96 % | WEIGHT: 247.81 LBS | SYSTOLIC BLOOD PRESSURE: 130 MMHG | DIASTOLIC BLOOD PRESSURE: 80 MMHG | TEMPERATURE: 98 F | BODY MASS INDEX: 42.3 KG/M2 | HEIGHT: 64 IN | HEART RATE: 84 BPM

## 2021-08-03 DIAGNOSIS — J06.9 UPPER RESPIRATORY TRACT INFECTION, UNSPECIFIED TYPE: Primary | ICD-10-CM

## 2021-08-03 PROCEDURE — 1126F PR PAIN SEVERITY QUANTIFIED, NO PAIN PRESENT: ICD-10-PCS | Mod: CPTII,S$GLB,, | Performed by: NURSE PRACTITIONER

## 2021-08-03 PROCEDURE — 1160F RVW MEDS BY RX/DR IN RCRD: CPT | Mod: CPTII,S$GLB,, | Performed by: NURSE PRACTITIONER

## 2021-08-03 PROCEDURE — 99213 OFFICE O/P EST LOW 20 MIN: CPT | Mod: S$GLB,,, | Performed by: NURSE PRACTITIONER

## 2021-08-03 PROCEDURE — 1126F AMNT PAIN NOTED NONE PRSNT: CPT | Mod: CPTII,S$GLB,, | Performed by: NURSE PRACTITIONER

## 2021-08-03 PROCEDURE — 99213 PR OFFICE/OUTPT VISIT, EST, LEVL III, 20-29 MIN: ICD-10-PCS | Mod: S$GLB,,, | Performed by: NURSE PRACTITIONER

## 2021-08-03 PROCEDURE — 3079F PR MOST RECENT DIASTOLIC BLOOD PRESSURE 80-89 MM HG: ICD-10-PCS | Mod: CPTII,S$GLB,, | Performed by: NURSE PRACTITIONER

## 2021-08-03 PROCEDURE — 1159F PR MEDICATION LIST DOCUMENTED IN MEDICAL RECORD: ICD-10-PCS | Mod: CPTII,S$GLB,, | Performed by: NURSE PRACTITIONER

## 2021-08-03 PROCEDURE — 3075F SYST BP GE 130 - 139MM HG: CPT | Mod: CPTII,S$GLB,, | Performed by: NURSE PRACTITIONER

## 2021-08-03 PROCEDURE — 99999 PR PBB SHADOW E&M-EST. PATIENT-LVL V: ICD-10-PCS | Mod: PBBFAC,,, | Performed by: NURSE PRACTITIONER

## 2021-08-03 PROCEDURE — 3079F DIAST BP 80-89 MM HG: CPT | Mod: CPTII,S$GLB,, | Performed by: NURSE PRACTITIONER

## 2021-08-03 PROCEDURE — 3075F PR MOST RECENT SYSTOLIC BLOOD PRESS GE 130-139MM HG: ICD-10-PCS | Mod: CPTII,S$GLB,, | Performed by: NURSE PRACTITIONER

## 2021-08-03 PROCEDURE — 99999 PR PBB SHADOW E&M-EST. PATIENT-LVL V: CPT | Mod: PBBFAC,,, | Performed by: NURSE PRACTITIONER

## 2021-08-03 PROCEDURE — 1159F MED LIST DOCD IN RCRD: CPT | Mod: CPTII,S$GLB,, | Performed by: NURSE PRACTITIONER

## 2021-08-03 PROCEDURE — U0005 INFEC AGEN DETEC AMPLI PROBE: HCPCS | Performed by: NURSE PRACTITIONER

## 2021-08-03 PROCEDURE — U0003 INFECTIOUS AGENT DETECTION BY NUCLEIC ACID (DNA OR RNA); SEVERE ACUTE RESPIRATORY SYNDROME CORONAVIRUS 2 (SARS-COV-2) (CORONAVIRUS DISEASE [COVID-19]), AMPLIFIED PROBE TECHNIQUE, MAKING USE OF HIGH THROUGHPUT TECHNOLOGIES AS DESCRIBED BY CMS-2020-01-R: HCPCS | Performed by: NURSE PRACTITIONER

## 2021-08-03 PROCEDURE — 1160F PR REVIEW ALL MEDS BY PRESCRIBER/CLIN PHARMACIST DOCUMENTED: ICD-10-PCS | Mod: CPTII,S$GLB,, | Performed by: NURSE PRACTITIONER

## 2021-08-03 PROCEDURE — 3008F PR BODY MASS INDEX (BMI) DOCUMENTED: ICD-10-PCS | Mod: CPTII,S$GLB,, | Performed by: NURSE PRACTITIONER

## 2021-08-03 PROCEDURE — 3008F BODY MASS INDEX DOCD: CPT | Mod: CPTII,S$GLB,, | Performed by: NURSE PRACTITIONER

## 2021-08-03 RX ORDER — PROMETHAZINE HYDROCHLORIDE AND DEXTROMETHORPHAN HYDROBROMIDE 6.25; 15 MG/5ML; MG/5ML
5 SYRUP ORAL 3 TIMES DAILY PRN
Qty: 118 ML | Refills: 0 | Status: SHIPPED | OUTPATIENT
Start: 2021-08-03 | End: 2021-08-13

## 2021-08-03 RX ORDER — LEVOCETIRIZINE DIHYDROCHLORIDE 5 MG/1
5 TABLET, FILM COATED ORAL NIGHTLY
Qty: 90 TABLET | Refills: 3 | Status: SHIPPED | OUTPATIENT
Start: 2021-08-03 | End: 2022-02-17

## 2021-08-04 ENCOUNTER — PATIENT MESSAGE (OUTPATIENT)
Dept: FAMILY MEDICINE | Facility: CLINIC | Age: 46
End: 2021-08-04

## 2021-08-04 LAB
SARS-COV-2 RNA RESP QL NAA+PROBE: NOT DETECTED
SARS-COV-2- CYCLE NUMBER: -1

## 2021-08-04 RX ORDER — AMOXICILLIN AND CLAVULANATE POTASSIUM 875; 125 MG/1; MG/1
1 TABLET, FILM COATED ORAL EVERY 12 HOURS
Qty: 20 TABLET | Refills: 0 | Status: SHIPPED | OUTPATIENT
Start: 2021-08-04 | End: 2021-09-01

## 2021-08-31 ENCOUNTER — TELEPHONE (OUTPATIENT)
Dept: RHEUMATOLOGY | Facility: CLINIC | Age: 46
End: 2021-08-31

## 2021-09-01 ENCOUNTER — OFFICE VISIT (OUTPATIENT)
Dept: RHEUMATOLOGY | Facility: CLINIC | Age: 46
End: 2021-09-01
Payer: COMMERCIAL

## 2021-09-01 ENCOUNTER — HOSPITAL ENCOUNTER (OUTPATIENT)
Dept: RADIOLOGY | Facility: HOSPITAL | Age: 46
Discharge: HOME | End: 2021-09-01
Attending: PHYSICIAN ASSISTANT
Payer: COMMERCIAL

## 2021-09-01 VITALS
DIASTOLIC BLOOD PRESSURE: 85 MMHG | WEIGHT: 249.56 LBS | BODY MASS INDEX: 42.61 KG/M2 | HEIGHT: 64 IN | SYSTOLIC BLOOD PRESSURE: 127 MMHG | HEART RATE: 78 BPM

## 2021-09-01 DIAGNOSIS — I73.00 RAYNAUD'S DISEASE WITHOUT GANGRENE: ICD-10-CM

## 2021-09-01 DIAGNOSIS — M32.14 LUPUS NEPHRITIS, ISN/RPS CLASS V: ICD-10-CM

## 2021-09-01 DIAGNOSIS — M35.01 SJOGREN'S SYNDROME WITH KERATOCONJUNCTIVITIS SICCA: ICD-10-CM

## 2021-09-01 DIAGNOSIS — R05.9 COUGH: ICD-10-CM

## 2021-09-01 DIAGNOSIS — Z79.899 HIGH RISK MEDICATION USE: ICD-10-CM

## 2021-09-01 DIAGNOSIS — D84.9 IMMUNOSUPPRESSED STATUS: ICD-10-CM

## 2021-09-01 DIAGNOSIS — M34.9 SCLERODERMA: Primary | ICD-10-CM

## 2021-09-01 PROCEDURE — 3074F SYST BP LT 130 MM HG: CPT | Mod: CPTII,S$GLB,, | Performed by: PHYSICIAN ASSISTANT

## 2021-09-01 PROCEDURE — 99999 PR PBB SHADOW E&M-EST. PATIENT-LVL IV: ICD-10-PCS | Mod: PBBFAC,,, | Performed by: PHYSICIAN ASSISTANT

## 2021-09-01 PROCEDURE — 1159F MED LIST DOCD IN RCRD: CPT | Mod: CPTII,S$GLB,, | Performed by: PHYSICIAN ASSISTANT

## 2021-09-01 PROCEDURE — 3079F DIAST BP 80-89 MM HG: CPT | Mod: CPTII,S$GLB,, | Performed by: PHYSICIAN ASSISTANT

## 2021-09-01 PROCEDURE — 99215 OFFICE O/P EST HI 40 MIN: CPT | Mod: S$GLB,,, | Performed by: PHYSICIAN ASSISTANT

## 2021-09-01 PROCEDURE — 71046 X-RAY EXAM CHEST 2 VIEWS: CPT | Mod: TC

## 2021-09-01 PROCEDURE — 1159F PR MEDICATION LIST DOCUMENTED IN MEDICAL RECORD: ICD-10-PCS | Mod: CPTII,S$GLB,, | Performed by: PHYSICIAN ASSISTANT

## 2021-09-01 PROCEDURE — 3079F PR MOST RECENT DIASTOLIC BLOOD PRESSURE 80-89 MM HG: ICD-10-PCS | Mod: CPTII,S$GLB,, | Performed by: PHYSICIAN ASSISTANT

## 2021-09-01 PROCEDURE — 71046 XR CHEST PA AND LATERAL: ICD-10-PCS | Mod: 26,,, | Performed by: RADIOLOGY

## 2021-09-01 PROCEDURE — 71046 X-RAY EXAM CHEST 2 VIEWS: CPT | Mod: 26,,, | Performed by: RADIOLOGY

## 2021-09-01 PROCEDURE — 99999 PR PBB SHADOW E&M-EST. PATIENT-LVL IV: CPT | Mod: PBBFAC,,, | Performed by: PHYSICIAN ASSISTANT

## 2021-09-01 PROCEDURE — 99215 PR OFFICE/OUTPT VISIT, EST, LEVL V, 40-54 MIN: ICD-10-PCS | Mod: S$GLB,,, | Performed by: PHYSICIAN ASSISTANT

## 2021-09-01 PROCEDURE — 3008F PR BODY MASS INDEX (BMI) DOCUMENTED: ICD-10-PCS | Mod: CPTII,S$GLB,, | Performed by: PHYSICIAN ASSISTANT

## 2021-09-01 PROCEDURE — 3008F BODY MASS INDEX DOCD: CPT | Mod: CPTII,S$GLB,, | Performed by: PHYSICIAN ASSISTANT

## 2021-09-01 PROCEDURE — 3074F PR MOST RECENT SYSTOLIC BLOOD PRESSURE < 130 MM HG: ICD-10-PCS | Mod: CPTII,S$GLB,, | Performed by: PHYSICIAN ASSISTANT

## 2021-09-01 RX ORDER — AZATHIOPRINE 100 MG/1
100 TABLET ORAL DAILY
Qty: 90 TABLET | Refills: 1 | Status: SHIPPED | OUTPATIENT
Start: 2021-09-01 | End: 2022-02-17 | Stop reason: SDUPTHER

## 2021-09-01 RX ORDER — HYDROXYCHLOROQUINE SULFATE 200 MG/1
200 TABLET, FILM COATED ORAL 2 TIMES DAILY
Qty: 180 TABLET | Refills: 3 | Status: SHIPPED | OUTPATIENT
Start: 2021-09-01 | End: 2023-08-10 | Stop reason: SDUPTHER

## 2021-09-01 RX ORDER — PANTOPRAZOLE SODIUM 40 MG/1
40 TABLET, DELAYED RELEASE ORAL DAILY
Qty: 30 TABLET | Refills: 11 | Status: SHIPPED | OUTPATIENT
Start: 2021-09-01 | End: 2022-02-17

## 2021-09-01 RX ORDER — AZATHIOPRINE 50 MG/1
50 TABLET ORAL DAILY
Qty: 90 TABLET | Refills: 1 | Status: SHIPPED | OUTPATIENT
Start: 2021-09-01 | End: 2022-02-17 | Stop reason: SDUPTHER

## 2021-09-01 RX ORDER — PANTOPRAZOLE SODIUM 40 MG/1
40 TABLET, DELAYED RELEASE ORAL DAILY
Qty: 30 TABLET | Refills: 11 | Status: SHIPPED | OUTPATIENT
Start: 2021-09-01 | End: 2021-09-01 | Stop reason: SDUPTHER

## 2021-10-19 ENCOUNTER — OFFICE VISIT (OUTPATIENT)
Dept: OPHTHALMOLOGY | Facility: CLINIC | Age: 46
End: 2021-10-19
Payer: COMMERCIAL

## 2021-10-19 DIAGNOSIS — H52.4 BILATERAL PRESBYOPIA: ICD-10-CM

## 2021-10-19 DIAGNOSIS — H52.03 HYPEROPIA, BILATERAL: ICD-10-CM

## 2021-10-19 DIAGNOSIS — M35.00 SJOGREN'S SYNDROME, WITH UNSPECIFIED ORGAN INVOLVEMENT: Primary | ICD-10-CM

## 2021-10-19 DIAGNOSIS — Z79.899 LONG-TERM USE OF PLAQUENIL: ICD-10-CM

## 2021-10-19 PROCEDURE — 99999 PR PBB SHADOW E&M-EST. PATIENT-LVL III: ICD-10-PCS | Mod: PBBFAC,,, | Performed by: OPTOMETRIST

## 2021-10-19 PROCEDURE — 92015 PR REFRACTION: ICD-10-PCS | Mod: S$GLB,,, | Performed by: OPTOMETRIST

## 2021-10-19 PROCEDURE — 99999 PR PBB SHADOW E&M-EST. PATIENT-LVL III: CPT | Mod: PBBFAC,,, | Performed by: OPTOMETRIST

## 2021-10-19 PROCEDURE — 92083 HUMPHREY VISUAL FIELD - OU - BOTH EYES: ICD-10-PCS | Mod: S$GLB,,, | Performed by: OPTOMETRIST

## 2021-10-19 PROCEDURE — 92134 CPTRZ OPH DX IMG PST SGM RTA: CPT | Mod: S$GLB,,, | Performed by: OPTOMETRIST

## 2021-10-19 PROCEDURE — 92014 COMPRE OPH EXAM EST PT 1/>: CPT | Mod: S$GLB,,, | Performed by: OPTOMETRIST

## 2021-10-19 PROCEDURE — 92014 PR EYE EXAM, EST PATIENT,COMPREHESV: ICD-10-PCS | Mod: S$GLB,,, | Performed by: OPTOMETRIST

## 2021-10-19 PROCEDURE — 4010F PR ACE/ARB THEARPY RXD/TAKEN: ICD-10-PCS | Mod: CPTII,S$GLB,, | Performed by: OPTOMETRIST

## 2021-10-19 PROCEDURE — 92134 POSTERIOR SEGMENT OCT RETINA (OCULAR COHERENCE TOMOGRAPHY)-BOTH EYES: ICD-10-PCS | Mod: S$GLB,,, | Performed by: OPTOMETRIST

## 2021-10-19 PROCEDURE — 4010F ACE/ARB THERAPY RXD/TAKEN: CPT | Mod: CPTII,S$GLB,, | Performed by: OPTOMETRIST

## 2021-10-19 PROCEDURE — 92083 EXTENDED VISUAL FIELD XM: CPT | Mod: S$GLB,,, | Performed by: OPTOMETRIST

## 2021-10-19 PROCEDURE — 92015 DETERMINE REFRACTIVE STATE: CPT | Mod: S$GLB,,, | Performed by: OPTOMETRIST

## 2021-10-19 PROCEDURE — 1159F PR MEDICATION LIST DOCUMENTED IN MEDICAL RECORD: ICD-10-PCS | Mod: CPTII,S$GLB,, | Performed by: OPTOMETRIST

## 2021-10-19 PROCEDURE — 1159F MED LIST DOCD IN RCRD: CPT | Mod: CPTII,S$GLB,, | Performed by: OPTOMETRIST

## 2021-10-19 RX ORDER — FERROUS SULFATE 325(65) MG
1 TABLET ORAL DAILY
COMMUNITY
Start: 2021-08-03 | End: 2023-07-05

## 2021-11-18 ENCOUNTER — HOSPITAL ENCOUNTER (EMERGENCY)
Facility: HOSPITAL | Age: 46
Discharge: HOME | End: 2021-11-19
Attending: EMERGENCY MEDICINE
Payer: COMMERCIAL

## 2021-11-18 DIAGNOSIS — R07.9 CHEST PAIN: Primary | ICD-10-CM

## 2021-11-18 DIAGNOSIS — R06.02 SOB (SHORTNESS OF BREATH): ICD-10-CM

## 2021-11-18 DIAGNOSIS — M71.21 BAKER'S CYST OF KNEE, RIGHT: ICD-10-CM

## 2021-11-18 LAB
ALBUMIN SERPL BCP-MCNC: 4.1 G/DL (ref 3.5–5.2)
ALP SERPL-CCNC: 45 U/L (ref 55–135)
ALT SERPL W/O P-5'-P-CCNC: 23 U/L (ref 10–44)
ANION GAP SERPL CALC-SCNC: 13 MMOL/L (ref 8–16)
AST SERPL-CCNC: 27 U/L (ref 10–40)
BASOPHILS # BLD AUTO: 0.03 K/UL (ref 0–0.2)
BASOPHILS NFR BLD: 0.5 % (ref 0–1.9)
BILIRUB SERPL-MCNC: 0.5 MG/DL (ref 0.1–1)
BNP SERPL-MCNC: <10 PG/ML (ref 0–99)
BUN SERPL-MCNC: 12 MG/DL (ref 6–20)
CALCIUM SERPL-MCNC: 9.4 MG/DL (ref 8.7–10.5)
CHLORIDE SERPL-SCNC: 103 MMOL/L (ref 95–110)
CO2 SERPL-SCNC: 21 MMOL/L (ref 23–29)
CREAT SERPL-MCNC: 1.1 MG/DL (ref 0.5–1.4)
DIFFERENTIAL METHOD: ABNORMAL
EOSINOPHIL # BLD AUTO: 0.2 K/UL (ref 0–0.5)
EOSINOPHIL NFR BLD: 2.9 % (ref 0–8)
ERYTHROCYTE [DISTWIDTH] IN BLOOD BY AUTOMATED COUNT: 14.1 % (ref 11.5–14.5)
EST. GFR  (AFRICAN AMERICAN): >60 ML/MIN/1.73 M^2
EST. GFR  (NON AFRICAN AMERICAN): 60 ML/MIN/1.73 M^2
GLUCOSE SERPL-MCNC: 101 MG/DL (ref 70–110)
HCT VFR BLD AUTO: 33.7 % (ref 37–48.5)
HGB BLD-MCNC: 10.7 G/DL (ref 12–16)
IMM GRANULOCYTES # BLD AUTO: 0.02 K/UL (ref 0–0.04)
IMM GRANULOCYTES NFR BLD AUTO: 0.3 % (ref 0–0.5)
LYMPHOCYTES # BLD AUTO: 2.2 K/UL (ref 1–4.8)
LYMPHOCYTES NFR BLD: 38.1 % (ref 18–48)
MCH RBC QN AUTO: 26.5 PG (ref 27–31)
MCHC RBC AUTO-ENTMCNC: 31.8 G/DL (ref 32–36)
MCV RBC AUTO: 83 FL (ref 82–98)
MONOCYTES # BLD AUTO: 0.5 K/UL (ref 0.3–1)
MONOCYTES NFR BLD: 8.2 % (ref 4–15)
NEUTROPHILS # BLD AUTO: 2.9 K/UL (ref 1.8–7.7)
NEUTROPHILS NFR BLD: 50 % (ref 38–73)
NRBC BLD-RTO: 0 /100 WBC
PLATELET # BLD AUTO: 298 K/UL (ref 150–450)
PMV BLD AUTO: 9.6 FL (ref 9.2–12.9)
POTASSIUM SERPL-SCNC: 3.6 MMOL/L (ref 3.5–5.1)
PROT SERPL-MCNC: 8.2 G/DL (ref 6–8.4)
RBC # BLD AUTO: 4.04 M/UL (ref 4–5.4)
SODIUM SERPL-SCNC: 137 MMOL/L (ref 136–145)
TROPONIN I SERPL DL<=0.01 NG/ML-MCNC: <0.006 NG/ML (ref 0–0.03)
WBC # BLD AUTO: 5.82 K/UL (ref 3.9–12.7)

## 2021-11-18 PROCEDURE — 96375 TX/PRO/DX INJ NEW DRUG ADDON: CPT

## 2021-11-18 PROCEDURE — 93010 ELECTROCARDIOGRAM REPORT: CPT | Mod: ,,, | Performed by: INTERNAL MEDICINE

## 2021-11-18 PROCEDURE — 99285 EMERGENCY DEPT VISIT HI MDM: CPT | Mod: 25

## 2021-11-18 PROCEDURE — 80053 COMPREHEN METABOLIC PANEL: CPT | Performed by: EMERGENCY MEDICINE

## 2021-11-18 PROCEDURE — 96372 THER/PROPH/DIAG INJ SC/IM: CPT

## 2021-11-18 PROCEDURE — 85025 COMPLETE CBC W/AUTO DIFF WBC: CPT | Performed by: EMERGENCY MEDICINE

## 2021-11-18 PROCEDURE — 93005 ELECTROCARDIOGRAM TRACING: CPT

## 2021-11-18 PROCEDURE — 96374 THER/PROPH/DIAG INJ IV PUSH: CPT

## 2021-11-18 PROCEDURE — 93010 EKG 12-LEAD: ICD-10-PCS | Mod: ,,, | Performed by: INTERNAL MEDICINE

## 2021-11-18 PROCEDURE — 83880 ASSAY OF NATRIURETIC PEPTIDE: CPT | Performed by: EMERGENCY MEDICINE

## 2021-11-18 PROCEDURE — 25000003 PHARM REV CODE 250: Performed by: EMERGENCY MEDICINE

## 2021-11-18 PROCEDURE — 25000242 PHARM REV CODE 250 ALT 637 W/ HCPCS: Performed by: EMERGENCY MEDICINE

## 2021-11-18 PROCEDURE — 84484 ASSAY OF TROPONIN QUANT: CPT | Performed by: EMERGENCY MEDICINE

## 2021-11-18 PROCEDURE — 63600175 PHARM REV CODE 636 W HCPCS: Performed by: EMERGENCY MEDICINE

## 2021-11-18 RX ORDER — ONDANSETRON 2 MG/ML
4 INJECTION INTRAMUSCULAR; INTRAVENOUS
Status: COMPLETED | OUTPATIENT
Start: 2021-11-18 | End: 2021-11-19

## 2021-11-18 RX ORDER — ASPIRIN 325 MG
325 TABLET ORAL
Status: COMPLETED | OUTPATIENT
Start: 2021-11-18 | End: 2021-11-18

## 2021-11-18 RX ORDER — MELOXICAM 15 MG/1
15 TABLET ORAL DAILY
Qty: 14 TABLET | Refills: 0 | Status: SHIPPED | OUTPATIENT
Start: 2021-11-18 | End: 2021-12-02

## 2021-11-18 RX ORDER — MORPHINE SULFATE 4 MG/ML
4 INJECTION, SOLUTION INTRAMUSCULAR; INTRAVENOUS
Status: COMPLETED | OUTPATIENT
Start: 2021-11-18 | End: 2021-11-19

## 2021-11-18 RX ORDER — CEFTRIAXONE 500 MG/1
500 INJECTION, POWDER, FOR SOLUTION INTRAMUSCULAR; INTRAVENOUS
Status: COMPLETED | OUTPATIENT
Start: 2021-11-18 | End: 2021-11-18

## 2021-11-18 RX ORDER — NITROGLYCERIN 0.4 MG/1
0.4 TABLET SUBLINGUAL
Status: COMPLETED | OUTPATIENT
Start: 2021-11-18 | End: 2021-11-18

## 2021-11-18 RX ADMIN — CEFTRIAXONE SODIUM 500 MG: 500 INJECTION, POWDER, FOR SOLUTION INTRAMUSCULAR; INTRAVENOUS at 10:11

## 2021-11-18 RX ADMIN — NITROGLYCERIN 0.4 MG: 0.4 TABLET SUBLINGUAL at 11:11

## 2021-11-18 RX ADMIN — ASPIRIN 325 MG ORAL TABLET 325 MG: 325 PILL ORAL at 11:11

## 2021-11-19 VITALS
BODY MASS INDEX: 42.23 KG/M2 | OXYGEN SATURATION: 100 % | RESPIRATION RATE: 18 BRPM | WEIGHT: 246 LBS | HEART RATE: 74 BPM | SYSTOLIC BLOOD PRESSURE: 128 MMHG | DIASTOLIC BLOOD PRESSURE: 79 MMHG | TEMPERATURE: 98 F

## 2021-11-19 PROCEDURE — 96375 TX/PRO/DX INJ NEW DRUG ADDON: CPT

## 2021-11-19 PROCEDURE — 63600175 PHARM REV CODE 636 W HCPCS: Performed by: EMERGENCY MEDICINE

## 2021-11-19 PROCEDURE — 96374 THER/PROPH/DIAG INJ IV PUSH: CPT

## 2021-11-19 RX ADMIN — ONDANSETRON HYDROCHLORIDE 4 MG: 2 SOLUTION INTRAMUSCULAR; INTRAVENOUS at 12:11

## 2021-11-19 RX ADMIN — MORPHINE SULFATE 4 MG: 4 INJECTION INTRAVENOUS at 12:11

## 2021-11-24 ENCOUNTER — OFFICE VISIT (OUTPATIENT)
Dept: CARDIOLOGY | Facility: CLINIC | Age: 46
End: 2021-11-24
Payer: COMMERCIAL

## 2021-11-24 VITALS
WEIGHT: 243.38 LBS | HEIGHT: 64 IN | RESPIRATION RATE: 16 BRPM | SYSTOLIC BLOOD PRESSURE: 120 MMHG | HEART RATE: 83 BPM | DIASTOLIC BLOOD PRESSURE: 80 MMHG | OXYGEN SATURATION: 99 % | BODY MASS INDEX: 41.55 KG/M2

## 2021-11-24 DIAGNOSIS — R06.03 RESPIRATORY DISTRESS: ICD-10-CM

## 2021-11-24 DIAGNOSIS — U07.1 COVID-19: ICD-10-CM

## 2021-11-24 DIAGNOSIS — I10 ESSENTIAL HYPERTENSION: Primary | ICD-10-CM

## 2021-11-24 DIAGNOSIS — R07.9 CHEST PAIN, UNSPECIFIED TYPE: ICD-10-CM

## 2021-11-24 DIAGNOSIS — R06.02 SOB (SHORTNESS OF BREATH): ICD-10-CM

## 2021-11-24 PROCEDURE — 99999 PR PBB SHADOW E&M-EST. PATIENT-LVL V: CPT | Mod: PBBFAC,,, | Performed by: INTERNAL MEDICINE

## 2021-11-24 PROCEDURE — 4010F ACE/ARB THERAPY RXD/TAKEN: CPT | Mod: CPTII,S$GLB,, | Performed by: INTERNAL MEDICINE

## 2021-11-24 PROCEDURE — 99999 PR PBB SHADOW E&M-EST. PATIENT-LVL V: ICD-10-PCS | Mod: PBBFAC,,, | Performed by: INTERNAL MEDICINE

## 2021-11-24 PROCEDURE — 99203 PR OFFICE/OUTPT VISIT, NEW, LEVL III, 30-44 MIN: ICD-10-PCS | Mod: S$GLB,,, | Performed by: INTERNAL MEDICINE

## 2021-11-24 PROCEDURE — 4010F PR ACE/ARB THEARPY RXD/TAKEN: ICD-10-PCS | Mod: CPTII,S$GLB,, | Performed by: INTERNAL MEDICINE

## 2021-11-24 PROCEDURE — 99203 OFFICE O/P NEW LOW 30 MIN: CPT | Mod: S$GLB,,, | Performed by: INTERNAL MEDICINE

## 2021-11-29 ENCOUNTER — PATIENT MESSAGE (OUTPATIENT)
Dept: INTERNAL MEDICINE | Facility: CLINIC | Age: 46
End: 2021-11-29

## 2021-11-29 ENCOUNTER — PATIENT MESSAGE (OUTPATIENT)
Dept: RHEUMATOLOGY | Facility: CLINIC | Age: 46
End: 2021-11-29
Payer: COMMERCIAL

## 2021-11-29 ENCOUNTER — TELEPHONE (OUTPATIENT)
Dept: RHEUMATOLOGY | Facility: CLINIC | Age: 46
End: 2021-11-29
Payer: COMMERCIAL

## 2021-11-29 ENCOUNTER — HOSPITAL ENCOUNTER (OUTPATIENT)
Dept: RADIOLOGY | Facility: HOSPITAL | Age: 46
Discharge: HOME | End: 2021-11-29
Attending: INTERNAL MEDICINE
Payer: COMMERCIAL

## 2021-11-29 ENCOUNTER — OFFICE VISIT (OUTPATIENT)
Dept: INTERNAL MEDICINE | Facility: CLINIC | Age: 46
End: 2021-11-29
Payer: COMMERCIAL

## 2021-11-29 VITALS
DIASTOLIC BLOOD PRESSURE: 76 MMHG | WEIGHT: 244.06 LBS | HEART RATE: 103 BPM | BODY MASS INDEX: 41.67 KG/M2 | SYSTOLIC BLOOD PRESSURE: 124 MMHG | OXYGEN SATURATION: 99 % | HEIGHT: 64 IN | TEMPERATURE: 99 F

## 2021-11-29 DIAGNOSIS — J45.20 MILD INTERMITTENT ASTHMA WITHOUT COMPLICATION: ICD-10-CM

## 2021-11-29 DIAGNOSIS — E66.01 MORBID OBESITY WITH BMI OF 40.0-44.9, ADULT: ICD-10-CM

## 2021-11-29 DIAGNOSIS — M32.14 LUPUS NEPHRITIS: ICD-10-CM

## 2021-11-29 DIAGNOSIS — Z00.00 ANNUAL PHYSICAL EXAM: Primary | ICD-10-CM

## 2021-11-29 DIAGNOSIS — Z12.11 COLON CANCER SCREENING: Primary | ICD-10-CM

## 2021-11-29 DIAGNOSIS — G40.909 SEIZURE DISORDER: ICD-10-CM

## 2021-11-29 DIAGNOSIS — M25.561 RIGHT KNEE PAIN, UNSPECIFIED CHRONICITY: ICD-10-CM

## 2021-11-29 DIAGNOSIS — M25.561 ACUTE PAIN OF RIGHT KNEE: ICD-10-CM

## 2021-11-29 PROBLEM — R06.02 SHORTNESS OF BREATH: Status: RESOLVED | Noted: 2018-02-09 | Resolved: 2021-11-29

## 2021-11-29 PROBLEM — J12.82 PNEUMONIA DUE TO COVID-19 VIRUS: Status: RESOLVED | Noted: 2020-07-28 | Resolved: 2021-11-29

## 2021-11-29 PROBLEM — A41.9 SEPSIS: Status: RESOLVED | Noted: 2020-07-28 | Resolved: 2021-11-29

## 2021-11-29 PROBLEM — U07.1 PNEUMONIA DUE TO COVID-19 VIRUS: Status: RESOLVED | Noted: 2020-07-28 | Resolved: 2021-11-29

## 2021-11-29 PROCEDURE — 99396 PR PREVENTIVE VISIT,EST,40-64: ICD-10-PCS | Mod: S$GLB,,, | Performed by: INTERNAL MEDICINE

## 2021-11-29 PROCEDURE — 73560 X-RAY EXAM OF KNEE 1 OR 2: CPT | Mod: 59,TC,LT

## 2021-11-29 PROCEDURE — 99396 PREV VISIT EST AGE 40-64: CPT | Mod: S$GLB,,, | Performed by: INTERNAL MEDICINE

## 2021-11-29 PROCEDURE — 99999 PR PBB SHADOW E&M-EST. PATIENT-LVL V: ICD-10-PCS | Mod: PBBFAC,,, | Performed by: INTERNAL MEDICINE

## 2021-11-29 PROCEDURE — 73562 XR KNEE ORTHO RIGHT: ICD-10-PCS | Mod: 26,RT,, | Performed by: RADIOLOGY

## 2021-11-29 PROCEDURE — 73560 XR KNEE ORTHO RIGHT: ICD-10-PCS | Mod: 26,LT,, | Performed by: RADIOLOGY

## 2021-11-29 PROCEDURE — 73560 X-RAY EXAM OF KNEE 1 OR 2: CPT | Mod: 26,LT,, | Performed by: RADIOLOGY

## 2021-11-29 PROCEDURE — 4010F ACE/ARB THERAPY RXD/TAKEN: CPT | Mod: CPTII,S$GLB,, | Performed by: INTERNAL MEDICINE

## 2021-11-29 PROCEDURE — 73562 X-RAY EXAM OF KNEE 3: CPT | Mod: 26,RT,, | Performed by: RADIOLOGY

## 2021-11-29 PROCEDURE — 99999 PR PBB SHADOW E&M-EST. PATIENT-LVL V: CPT | Mod: PBBFAC,,, | Performed by: INTERNAL MEDICINE

## 2021-11-29 PROCEDURE — 4010F PR ACE/ARB THEARPY RXD/TAKEN: ICD-10-PCS | Mod: CPTII,S$GLB,, | Performed by: INTERNAL MEDICINE

## 2021-12-01 DIAGNOSIS — Z12.31 OTHER SCREENING MAMMOGRAM: ICD-10-CM

## 2021-12-13 ENCOUNTER — HOSPITAL ENCOUNTER (OUTPATIENT)
Dept: CARDIOLOGY | Facility: HOSPITAL | Age: 46
Discharge: HOME | End: 2021-12-13
Attending: INTERNAL MEDICINE
Payer: COMMERCIAL

## 2021-12-13 DIAGNOSIS — R06.03 RESPIRATORY DISTRESS: ICD-10-CM

## 2021-12-13 DIAGNOSIS — U07.1 COVID-19: ICD-10-CM

## 2021-12-13 DIAGNOSIS — I10 ESSENTIAL HYPERTENSION: ICD-10-CM

## 2021-12-13 DIAGNOSIS — R06.02 SOB (SHORTNESS OF BREATH): ICD-10-CM

## 2021-12-13 DIAGNOSIS — R07.9 CHEST PAIN, UNSPECIFIED TYPE: ICD-10-CM

## 2021-12-13 LAB
CV STRESS BASE HR: 90 BPM
DIASTOLIC BLOOD PRESSURE: 88 MMHG
OHS CV CPX 1 MINUTE RECOVERY HEART RATE: 131 BPM
OHS CV CPX 85 PERCENT MAX PREDICTED HEART RATE MALE: 139
OHS CV CPX ESTIMATED METS: 7
OHS CV CPX MAX PREDICTED HEART RATE: 164
OHS CV CPX PATIENT IS FEMALE: 1
OHS CV CPX PATIENT IS MALE: 0
OHS CV CPX PEAK DIASTOLIC BLOOD PRESSURE: 82 MMHG
OHS CV CPX PEAK HEAR RATE: 153 BPM
OHS CV CPX PEAK RATE PRESSURE PRODUCT: NORMAL
OHS CV CPX PEAK SYSTOLIC BLOOD PRESSURE: 188 MMHG
OHS CV CPX PERCENT MAX PREDICTED HEART RATE ACHIEVED: 93
OHS CV CPX RATE PRESSURE PRODUCT PRESENTING: NORMAL
STRESS ECHO POST EXERCISE DUR MIN: 6 MINUTES
STRESS ECHO POST EXERCISE DUR SEC: 0 SECONDS
STRESS ST DEPRESSION: 0.5 MM
SYSTOLIC BLOOD PRESSURE: 113 MMHG

## 2021-12-13 PROCEDURE — 93017 CV STRESS TEST TRACING ONLY: CPT

## 2021-12-13 PROCEDURE — 93018 CV STRESS TEST I&R ONLY: CPT | Mod: ,,, | Performed by: INTERNAL MEDICINE

## 2021-12-13 PROCEDURE — 93016 CV STRESS TEST SUPVJ ONLY: CPT | Mod: ,,, | Performed by: INTERNAL MEDICINE

## 2021-12-13 PROCEDURE — 93018 EXERCISE STRESS - EKG (CUPID ONLY): ICD-10-PCS | Mod: ,,, | Performed by: INTERNAL MEDICINE

## 2021-12-13 PROCEDURE — 93016 EXERCISE STRESS - EKG (CUPID ONLY): ICD-10-PCS | Mod: ,,, | Performed by: INTERNAL MEDICINE

## 2021-12-14 ENCOUNTER — PATIENT OUTREACH (OUTPATIENT)
Dept: ADMINISTRATIVE | Facility: OTHER | Age: 46
End: 2021-12-14
Payer: COMMERCIAL

## 2021-12-14 ENCOUNTER — TELEPHONE (OUTPATIENT)
Dept: CARDIOLOGY | Facility: CLINIC | Age: 46
End: 2021-12-14
Payer: COMMERCIAL

## 2021-12-15 ENCOUNTER — OFFICE VISIT (OUTPATIENT)
Dept: CARDIOLOGY | Facility: CLINIC | Age: 46
End: 2021-12-15
Payer: COMMERCIAL

## 2021-12-15 VITALS
RESPIRATION RATE: 16 BRPM | HEART RATE: 66 BPM | SYSTOLIC BLOOD PRESSURE: 110 MMHG | OXYGEN SATURATION: 98 % | WEIGHT: 240.94 LBS | HEIGHT: 64 IN | BODY MASS INDEX: 41.13 KG/M2 | DIASTOLIC BLOOD PRESSURE: 80 MMHG

## 2021-12-15 DIAGNOSIS — R06.02 SOB (SHORTNESS OF BREATH): ICD-10-CM

## 2021-12-15 DIAGNOSIS — U07.1 PNEUMONIA DUE TO COVID-19 VIRUS: ICD-10-CM

## 2021-12-15 DIAGNOSIS — M35.01 SJOGREN'S SYNDROME WITH KERATOCONJUNCTIVITIS SICCA: ICD-10-CM

## 2021-12-15 DIAGNOSIS — A41.9 SEPSIS, DUE TO UNSPECIFIED ORGANISM, UNSPECIFIED WHETHER ACUTE ORGAN DYSFUNCTION PRESENT: ICD-10-CM

## 2021-12-15 DIAGNOSIS — U07.1 COVID-19: ICD-10-CM

## 2021-12-15 DIAGNOSIS — J12.82 PNEUMONIA DUE TO COVID-19 VIRUS: ICD-10-CM

## 2021-12-15 DIAGNOSIS — R06.03 RESPIRATORY DISTRESS: ICD-10-CM

## 2021-12-15 DIAGNOSIS — R07.9 CHEST PAIN, UNSPECIFIED TYPE: Primary | ICD-10-CM

## 2021-12-15 PROCEDURE — 99214 PR OFFICE/OUTPT VISIT, EST, LEVL IV, 30-39 MIN: ICD-10-PCS | Mod: S$GLB,,, | Performed by: INTERNAL MEDICINE

## 2021-12-15 PROCEDURE — 99999 PR PBB SHADOW E&M-EST. PATIENT-LVL IV: ICD-10-PCS | Mod: PBBFAC,,, | Performed by: INTERNAL MEDICINE

## 2021-12-15 PROCEDURE — 99999 PR PBB SHADOW E&M-EST. PATIENT-LVL IV: CPT | Mod: PBBFAC,,, | Performed by: INTERNAL MEDICINE

## 2021-12-15 PROCEDURE — 4010F ACE/ARB THERAPY RXD/TAKEN: CPT | Mod: CPTII,S$GLB,, | Performed by: INTERNAL MEDICINE

## 2021-12-15 PROCEDURE — 4010F PR ACE/ARB THEARPY RXD/TAKEN: ICD-10-PCS | Mod: CPTII,S$GLB,, | Performed by: INTERNAL MEDICINE

## 2021-12-15 PROCEDURE — 99214 OFFICE O/P EST MOD 30 MIN: CPT | Mod: S$GLB,,, | Performed by: INTERNAL MEDICINE

## 2021-12-21 DIAGNOSIS — M25.561 RIGHT KNEE PAIN, UNSPECIFIED CHRONICITY: Primary | ICD-10-CM

## 2021-12-22 ENCOUNTER — HOSPITAL ENCOUNTER (OUTPATIENT)
Dept: RADIOLOGY | Facility: HOSPITAL | Age: 46
Discharge: HOME | End: 2021-12-22
Attending: ORTHOPAEDIC SURGERY
Payer: COMMERCIAL

## 2021-12-22 ENCOUNTER — OFFICE VISIT (OUTPATIENT)
Dept: ORTHOPEDICS | Facility: CLINIC | Age: 46
End: 2021-12-22
Payer: COMMERCIAL

## 2021-12-22 VITALS
DIASTOLIC BLOOD PRESSURE: 89 MMHG | HEIGHT: 64 IN | BODY MASS INDEX: 40.97 KG/M2 | WEIGHT: 240 LBS | SYSTOLIC BLOOD PRESSURE: 145 MMHG

## 2021-12-22 DIAGNOSIS — M25.561 RIGHT KNEE PAIN, UNSPECIFIED CHRONICITY: ICD-10-CM

## 2021-12-22 DIAGNOSIS — M17.11 PRIMARY OSTEOARTHRITIS OF RIGHT KNEE: Primary | ICD-10-CM

## 2021-12-22 PROCEDURE — 73560 XR KNEE 1 OR 2 VIEW RIGHT: ICD-10-PCS | Mod: 26,RT,, | Performed by: RADIOLOGY

## 2021-12-22 PROCEDURE — 73560 X-RAY EXAM OF KNEE 1 OR 2: CPT | Mod: 26,RT,, | Performed by: RADIOLOGY

## 2021-12-22 PROCEDURE — 99999 PR PBB SHADOW E&M-EST. PATIENT-LVL V: ICD-10-PCS | Mod: PBBFAC,,, | Performed by: PHYSICIAN ASSISTANT

## 2021-12-22 PROCEDURE — 1159F MED LIST DOCD IN RCRD: CPT | Mod: CPTII,S$GLB,, | Performed by: PHYSICIAN ASSISTANT

## 2021-12-22 PROCEDURE — 3079F PR MOST RECENT DIASTOLIC BLOOD PRESSURE 80-89 MM HG: ICD-10-PCS | Mod: CPTII,S$GLB,, | Performed by: PHYSICIAN ASSISTANT

## 2021-12-22 PROCEDURE — 3077F SYST BP >= 140 MM HG: CPT | Mod: CPTII,S$GLB,, | Performed by: PHYSICIAN ASSISTANT

## 2021-12-22 PROCEDURE — 1160F PR REVIEW ALL MEDS BY PRESCRIBER/CLIN PHARMACIST DOCUMENTED: ICD-10-PCS | Mod: CPTII,S$GLB,, | Performed by: PHYSICIAN ASSISTANT

## 2021-12-22 PROCEDURE — 99999 PR PBB SHADOW E&M-EST. PATIENT-LVL V: CPT | Mod: PBBFAC,,, | Performed by: PHYSICIAN ASSISTANT

## 2021-12-22 PROCEDURE — 1159F PR MEDICATION LIST DOCUMENTED IN MEDICAL RECORD: ICD-10-PCS | Mod: CPTII,S$GLB,, | Performed by: PHYSICIAN ASSISTANT

## 2021-12-22 PROCEDURE — 3077F PR MOST RECENT SYSTOLIC BLOOD PRESSURE >= 140 MM HG: ICD-10-PCS | Mod: CPTII,S$GLB,, | Performed by: PHYSICIAN ASSISTANT

## 2021-12-22 PROCEDURE — 3008F BODY MASS INDEX DOCD: CPT | Mod: CPTII,S$GLB,, | Performed by: PHYSICIAN ASSISTANT

## 2021-12-22 PROCEDURE — 99204 PR OFFICE/OUTPT VISIT, NEW, LEVL IV, 45-59 MIN: ICD-10-PCS | Mod: S$GLB,,, | Performed by: PHYSICIAN ASSISTANT

## 2021-12-22 PROCEDURE — 1160F RVW MEDS BY RX/DR IN RCRD: CPT | Mod: CPTII,S$GLB,, | Performed by: PHYSICIAN ASSISTANT

## 2021-12-22 PROCEDURE — 3008F PR BODY MASS INDEX (BMI) DOCUMENTED: ICD-10-PCS | Mod: CPTII,S$GLB,, | Performed by: PHYSICIAN ASSISTANT

## 2021-12-22 PROCEDURE — 99204 OFFICE O/P NEW MOD 45 MIN: CPT | Mod: S$GLB,,, | Performed by: PHYSICIAN ASSISTANT

## 2021-12-22 PROCEDURE — 3079F DIAST BP 80-89 MM HG: CPT | Mod: CPTII,S$GLB,, | Performed by: PHYSICIAN ASSISTANT

## 2021-12-22 PROCEDURE — 73560 X-RAY EXAM OF KNEE 1 OR 2: CPT | Mod: TC,RT

## 2021-12-22 RX ORDER — AZITHROMYCIN 250 MG/1
TABLET, FILM COATED ORAL
COMMUNITY
Start: 2021-12-16 | End: 2022-01-12 | Stop reason: ALTCHOICE

## 2021-12-22 RX ORDER — HYDROCODONE BITARTRATE AND ACETAMINOPHEN 7.5; 325 MG/1; MG/1
1 TABLET ORAL 4 TIMES DAILY PRN
COMMUNITY
Start: 2021-12-10

## 2021-12-22 NOTE — PATIENT INSTRUCTIONS
Assessment:  Zachary Lucia is a  46 y.o. female   Technical support @ [facility] with a chief complaint of Pain of the Right Knee  Referred by Dr. Camara for right knee pain since septemeber   Right knee pain   Right knee OA    Encounter Diagnoses   Name Primary?    Right knee pain, unspecified chronicity     Primary osteoarthritis of right knee Yes      Plan:   Voltaren    Tylenol OA   Right knee CSI 2/2/40   Physical therapy at [facility]: Right knee OA   Visco in the future- Right knee 4 weeks   Compressive knee brace (already has) Horacio Copper Fit    Follow-up: 4 weeks or sooner if there are any problems between now and then.    Thank you for choosing [facility] and Dr. Robert Chambers for your orthopedic & sports medicine care. It is our goal to provide you with exceptional care that will help keep you healthy, active, and get you back in the game.    If you felt that you received exemplary care today, please consider leaving us feedback on Healthgrades at [email or web address].     Please do not hesitate to reach out to us via email, phone, or MyChart with any questions, concerns, or feedback.    If you are experiencing pain/discomfort ,or have questions after 5pm and would like to be connected to the [facility] on-call team, please call this number and specify which Sports Medicine provider is treating you: [phone]

## 2021-12-22 NOTE — PROGRESS NOTES
Patient ID: Zachary Lucia  YOB: 1975  MRN: [id number]    Chief Complaint: No chief complaint on file.    Referred By: Danna Camara DO    History of Present Illness: Zachary Lucia is a 46 y.o. female   Technical support @ [facility] with a chief complaint of No chief complaint on file.  Symptom Onset:  Her pain began 2021.  There was not a specific injury.  ***  Prior Treatment:   She has previously been treated by Danna Camara DO.  Treatment included none.    Current Symptoms: Her pain is currently located right knee.  Average level of pain is 10/10.  Pain is {IMPROVING UNCHANGED WORSENIN}.  She has experienced the following symptoms: pain, swelling, giving way and popping/clicking.  Aggravating activities include going up and down stairs, inactivity, rising after sitting, squatting, standing and walking.    Past Medical History:   Past Medical History:   Diagnosis Date    Asthma     Essential hypertension, malignant 2020    Lupus (systemic lupus erythematosus)     Membranous glomerulonephritis, stage 4 2019    Scleroderma     Seizures 2017     Past Surgical History:   Procedure Laterality Date    RENAL BIOPSY  2018     Family History   Problem Relation Age of Onset    Arthritis Mother     Glaucoma Mother     Hypertension Mother     Diabetes Father     Diabetes Maternal Grandmother     Hypertension Maternal Grandmother     Arthritis Maternal Grandmother     Cataracts Maternal Grandmother     Diabetes Maternal Grandfather     Heart disease Maternal Grandfather     Hypertension Maternal Grandfather     Diabetes Paternal Grandmother     Heart disease Paternal Grandmother     Hypertension Paternal Grandmother     Heart disease Paternal Grandfather      Social History     Socioeconomic History    Marital status:      Spouse name: Darrell    Number of children: 0   Tobacco Use    Smoking status:  Never Smoker    Smokeless tobacco: Never Used   Substance and Sexual Activity    Alcohol use: Yes    Drug use: No    Sexual activity: Yes     Medication List with Changes/Refills   Current Medications    AZATHIOPRINE (IMURAN) 100 MG TABLET    Take 1 tablet (100 mg total) by mouth once daily.    AZATHIOPRINE (IMURAN) 50 MG TAB    Take 1 tablet (50 mg total) by mouth once daily in evening    BETAMETHASONE DIPROPIONATE (DIPROLENE) 0.05 % OINTMENT    Apply topically 2 (two) times daily as needed. Steroid. Use for flares    CHOLECALCIFEROL, VITAMIN D3, 1,250 MCG (50,000 UNIT) CAPSULE    Take 1 capsule (50,000 Units total) by mouth once a week.    CRISABOROLE (EUCRISA) 2 % OINT    AAA bid prn.  Non-steroid.  Safe to use long-term.    CYCLOBENZAPRINE (FLEXERIL) 10 MG TABLET    TAKE 1 TABLET (10 MG TOTAL) BY MOUTH 2 (TWO) TIMES DAILY AS NEEDED FOR MUSCLE SPASMS.    FERROUS SULFATE (FEOSOL) 325 MG (65 MG IRON) TAB TABLET    Take 1 tablet by mouth once daily.    FLUTICASONE-SALMETEROL DISKUS INHALER 100-50 MCG    Inhale 1 puff into the lungs 2 (two) times daily. Controller    HYDROXYCHLOROQUINE (PLAQUENIL) 200 MG TABLET    Take 1 tablet (200 mg total) by mouth 2 (two) times daily.     MG TABLET    Take 800 mg by mouth every 8 (eight) hours as needed.    LEVETIRACETAM XR (KEPPRA XR) 500 MG TB24 24 HR TABLET    Take 500 mg by mouth once daily.    LEVOCETIRIZINE (XYZAL) 5 MG TABLET    Take 1 tablet (5 mg total) by mouth every evening.    PANTOPRAZOLE (PROTONIX) 40 MG TABLET    Take 1 tablet (40 mg total) by mouth once daily.    PREDNISONE (DELTASONE) 10 MG TABLET    TAKE 1 TABLET BY MOUTH EVERY DAY    PREDNISONE (DELTASONE) 5 MG TABLET    Take 1 tablet (5 mg total) by mouth once daily. For 3-5 days as needed during flares    PROAIR HFA 90 MCG/ACTUATION INHALER    INHALE 2 PUFFS INTO THE LUNGS EVERY 6 (SIX) HOURS AS NEEDED FOR WHEEZING. RESCUE    PULSE OXIMETER (PULSE OXIMETER) DEVICE    Use by Apply Externally route 2  (two) times a day. Use twice daily at 8 AM and 3 PM and record the value in MyChart as directed.     Review of patient's allergies indicates:   Allergen Reactions    Sulfa (sulfonamide antibiotics) Swelling       Physical Exam:   There is no height or weight on file to calculate BMI.  GENERAL: Well appearing, appropriate for stated age, no acute distress.  CARDIOVASCULAR: Pulses regular by peripheral palpation.  PULMONARY: Respirations are even and non-labored.  NEURO: Awake, alert, and oriented x 3.  PSYCH: Mood & affect are appropriate.  HEENT: Head is normocephalic and atraumatic.    Ortho/SPM Exam  {RIGHT/LEFT:25105} Knee:  Inspection:   ***  Range of motion:  *** deg extension - *** deg flexion  Strength:   ***/5 Extension     ***/5 Flexion  Palpation tenderness: ***  Special Tests:  ***  Neurovascular: Intact EHL, FHL, gastrocsoleus, and tibialis anterior. Sensation intact to light touch in superficial peroneal, deep peroneal, tibial, sural, and saphenous nerve distributions. Foot warm and well perfused with capillary refill of less than 2 seconds and palpable pedal pulses.     Imaging:   XR Results:  No results found for this or any previous visit.    MRI Results:  No results found for this or any previous visit.    CT Results:  No results found for this or any previous visit.    Relevant imaging results reviewed and interpreted by me, discussed with the patient and / or family today. ***    Other Tests:   No other tests performed today.***    There are no Patient Instructions on file for this visit.  Provider Note/Medical Decision Making: ***     I discussed worrisome and red flag signs and symptoms with the patient. The patient expressed understanding and agreed to alert me immediately or to go to the emergency room if they experience any of these.    Treatment plan was developed with input from the patient/family, and they expressed understanding and agreement with the plan. All questions were  answered today.    Coding According to 2021 CPT Guidelines; 2/3 MDM Elements or Time as Indicated  EST Total Minutes Spent by Physician or Non Physician QHP # Problems Addressed MDM Patient Management MDM TIME   41584 10-19 1 Minor Min/none Min Risk      11821 20-29 2 Minor, 1 chronic stable or 1 acute uncomplicated Rev/order 2 ext notes/tests Low Risk      77109 30-39 1 chronic with progression, or 1 new diagnosis or acute complicated inj Rev/order 3 ext notes/tests; or int of test performed by another QHP; or disc of management or test with external QHP Discussion of minor surg w risk factors, major surgery      53466 40-54 1+ chronic with severe progression, or 1 inj that poses threat to function 2/3: Rev/order 3 ext notes/tests; or int of test performed by another QHP; or disc of management or test with external QHP Discussed elec major surg w patient risk factors, emerg nicholas surgery, hospitalization      NEW           28948 15-29 1 Minor Min/none Min Risk      34864 30-44 2 Minor, 1 chronic stable or 1 acute uncomplicated Rev 2 ext notes, order or int 2 tests Low Risk      95493 45-59 1 chronic with progression, or 1 new diagnosis or acute complicated inj Rev/order 3 ext notes/tests;or int of test performed by another QHP; or disc of management or test with external QHP Discussion of minor surg w risk factors, major surgery       79860 60-74 1+ chronic with severe progression, or 1 inj that poses threat to function 2/3: Rev/order 3 ext notes/tests; or int of test performed by another QHP; or disc of management or test with external QHP Discussed elec major surg w patient risk factors, emerg nicholas surgery, hospitalization      PHONE            5-10         01671 11-20         26096 21-30             Disclaimer: This note was prepared using a voice recognition system and is likely to have sound alike errors within the text.

## 2021-12-22 NOTE — PROGRESS NOTES
Patient ID: Zachary Lucia  YOB: 1975  MRN: [id number]    Chief Complaint: Pain of the Right Knee    Referred By: Danna Camara DO    History of Present Illness: Zachary Lucia is a 46 y.o. female   Technical support @ [facility] with a chief complaint of Pain of the Right Knee  Symptom Onset:  Her pain began September 2021.  There was not a specific injury.  Gradual onset of anterior and medial sided right knee pain and joint effusion.    Current Symptoms: Her pain is currently located right knee.  Average level of pain is 10/10.  Pain is improving.  She has experienced the following symptoms: pain, swelling, giving way and popping/clicking.  Aggravating activities include going up and down stairs, inactivity, rising after sitting, squatting, standing and walking.    Past Medical History:   Past Medical History:   Diagnosis Date    Asthma     Essential hypertension, malignant 1/8/2020    Lupus (systemic lupus erythematosus)     Membranous glomerulonephritis, 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    Scleroderma     Seizures 01/12/2017     Past Surgical History:   Procedure Laterality Date    RENAL BIOPSY  11/16/2018     Family History   Problem Relation Age of Onset    Arthritis Mother     Glaucoma Mother     Hypertension Mother     Diabetes Father     Diabetes Maternal Grandmother     Hypertension Maternal Grandmother     Arthritis Maternal Grandmother     Cataracts Maternal Grandmother     Diabetes Maternal Grandfather     Heart disease Maternal Grandfather     Hypertension Maternal Grandfather     Diabetes Paternal Grandmother     Heart disease Paternal Grandmother     Hypertension Paternal Grandmother     Heart disease Paternal Grandfather      Social History     Socioeconomic History    Marital status:      Spouse name: Darrell    Number of children: 0   Tobacco Use    Smoking status: Never Smoker    Smokeless tobacco: Never Used   Substance and  Sexual Activity    Alcohol use: Yes    Drug use: No    Sexual activity: Yes     Medication List with Changes/Refills   Current Medications    AZATHIOPRINE (IMURAN) 100 MG TABLET    Take 1 tablet (100 mg total) by mouth once daily.    AZATHIOPRINE (IMURAN) 50 MG TAB    Take 1 tablet (50 mg total) by mouth once daily in evening    AZITHROMYCIN (Z-JUSTINA) 250 MG TABLET        BETAMETHASONE DIPROPIONATE (DIPROLENE) 0.05 % OINTMENT    Apply topically 2 (two) times daily as needed. Steroid. Use for flares    CHOLECALCIFEROL, VITAMIN D3, 1,250 MCG (50,000 UNIT) CAPSULE    Take 1 capsule (50,000 Units total) by mouth once a week.    CRISABOROLE (EUCRISA) 2 % OINT    AAA bid prn.  Non-steroid.  Safe to use long-term.    CYCLOBENZAPRINE (FLEXERIL) 10 MG TABLET    TAKE 1 TABLET (10 MG TOTAL) BY MOUTH 2 (TWO) TIMES DAILY AS NEEDED FOR MUSCLE SPASMS.    FERROUS SULFATE (FEOSOL) 325 MG (65 MG IRON) TAB TABLET    Take 1 tablet by mouth once daily.    FLUTICASONE-SALMETEROL DISKUS INHALER 100-50 MCG    Inhale 1 puff into the lungs 2 (two) times daily. Controller    HYDROCODONE-ACETAMINOPHEN (NORCO) 7.5-325 MG PER TABLET    Take 1 tablet by mouth 4 (four) times daily as needed.    HYDROXYCHLOROQUINE (PLAQUENIL) 200 MG TABLET    Take 1 tablet (200 mg total) by mouth 2 (two) times daily.     MG TABLET    Take 800 mg by mouth every 8 (eight) hours as needed.    LEVETIRACETAM XR (KEPPRA XR) 500 MG TB24 24 HR TABLET    Take 500 mg by mouth once daily.    LEVOCETIRIZINE (XYZAL) 5 MG TABLET    Take 1 tablet (5 mg total) by mouth every evening.    PANTOPRAZOLE (PROTONIX) 40 MG TABLET    Take 1 tablet (40 mg total) by mouth once daily.    PREDNISONE (DELTASONE) 10 MG TABLET    TAKE 1 TABLET BY MOUTH EVERY DAY    PREDNISONE (DELTASONE) 5 MG TABLET    Take 1 tablet (5 mg total) by mouth once daily. For 3-5 days as needed during flares    PROAIR HFA 90 MCG/ACTUATION INHALER    INHALE 2 PUFFS INTO THE LUNGS EVERY 6 (SIX) HOURS AS NEEDED  FOR WHEEZING. RESCUE    PULSE OXIMETER (PULSE OXIMETER) DEVICE    Use by Apply Externally route 2 (two) times a day. Use twice daily at 8 AM and 3 PM and record the value in MyChart as directed.     Review of patient's allergies indicates:   Allergen Reactions    Sulfa (sulfonamide antibiotics) Swelling       Physical Exam:   Body mass index is 41.2 kg/m².  GENERAL: Well appearing, appropriate for stated age, no acute distress.  CARDIOVASCULAR: Pulses regular by peripheral palpation.  PULMONARY: Respirations are even and non-labored.  NEURO: Awake, alert, and oriented x 3.  PSYCH: Mood & affect are appropriate.  HEENT: Head is normocephalic and atraumatic.    Ortho/SPM Exam  Right Knee:  Inspection:   Mild effusion  Range of motion:  0 deg extension - 120 deg flexion  Strength:   5/5 Extension     5/5 Flexion  Palpation tenderness: Medial joint line  Special Tests:  Stable ligamentous exam  Neurovascular: Intact EHL, FHL, gastrocsoleus, and tibialis anterior. Sensation intact to light touch in superficial peroneal, deep peroneal, tibial, sural, and saphenous nerve distributions. Foot warm and well perfused with capillary refill of less than 2 seconds and palpable pedal pulses.     Imaging:  X-Ray Knee 1 or 2 View Right  Narrative: EXAMINATION:  XR KNEE 1 OR 2 VIEW RIGHT    CLINICAL HISTORY:  Pain in right knee    TECHNIQUE:  AP and lateral views of the right knee were performed.    COMPARISON:  November 2021    FINDINGS:  Joint spaces are maintained.  No fracture or dislocation is seen on this single view.  Impression: Please see above    Electronically signed by: Ministerio Hansen MD  Date:    12/22/2021  Time:    09:17     Relevant imaging results reviewed and interpreted by me, discussed with the patient and / or family today. I agree with findings stated above.       Other Tests:   No other tests performed today.    Patient Instructions     Assessment:  Zachary Lucia is a  46 y.o. female    Technical support @ [facility] with a chief complaint of Pain of the Right Knee  Referred by Dr. Camara for right knee pain since septemeber   Right knee pain   Right knee OA    Encounter Diagnosis   Name Primary?    Right knee pain, unspecified chronicity       Plan:   Voltaren    Tylenol OA   Right knee CSI 2/2/40   Physical therapy at [facility]: Right knee OA   Visco in the future- Right knee 4 weeks   Compressive knee brace (already has) Horacio Copper Fit    Follow-up: 4 weeks or sooner if there are any problems between now and then.    Thank you for choosing [facility] and Dr. Robert Chambers for your orthopedic & sports medicine care. It is our goal to provide you with exceptional care that will help keep you healthy, active, and get you back in the game.    If you felt that you received exemplary care today, please consider leaving us feedback on Healthgrades at [email or web address].     Please do not hesitate to reach out to us via email, phone, or MyChart with any questions, concerns, or feedback.    If you are experiencing pain/discomfort ,or have questions after 5pm and would like to be connected to the [facility] on-call team, please call this number and specify which Sports Medicine provider is treating you: [phone]         Provider Note/Medical Decision Making:      I discussed worrisome and red flag signs and symptoms with the patient. The patient expressed understanding and agreed to alert me immediately or to go to the emergency room if they experience any of these.    Treatment plan was developed with input from the patient/family, and they expressed understanding and agreement with the plan. All questions were answered today.    Disclaimer: This note was prepared using a voice recognition system and is likely to have sound alike errors within the text.     I, Eron Cheek, acted as a  scribe for Kaya Fatima PA-C for the duration of this office visit.

## 2021-12-27 ENCOUNTER — TELEPHONE (OUTPATIENT)
Dept: CARDIOLOGY | Facility: HOSPITAL | Age: 46
End: 2021-12-27
Payer: COMMERCIAL

## 2022-01-05 RX ORDER — METHYLPREDNISOLONE ACETATE 80 MG/ML
80 INJECTION, SUSPENSION INTRA-ARTICULAR; INTRALESIONAL; INTRAMUSCULAR; SOFT TISSUE
Status: DISCONTINUED | OUTPATIENT
Start: 2021-12-22 | End: 2022-01-05

## 2022-01-05 NOTE — PROCEDURES
Large Joint Aspiration/Injection: R knee    Date/Time: 12/22/2021 8:50 AM  Performed by: Kaya Fatima PA-C  Authorized by: Kaya Fatima PA-C     Consent Done?:  Yes (Verbal)  Indications:  Joint swelling and pain  Site marked: the procedure site was marked    Timeout: prior to procedure the correct patient, procedure, and site was verified    Prep: patient was prepped and draped in usual sterile fashion      Local anesthesia used?: Yes    Anesthesia:  Local infiltration  Local anesthetic:  Bupivacaine 0.5% without epinephrine, lidocaine 1% without epinephrine, topical anesthetic and lidocaine spray    Details:  Needle Size:  22 G  Approach:  Superior  Location:  Knee  Site:  R knee  Medications:  80 mg methylPREDNISolone acetate 80 mg/mL  Patient tolerance:  Patient tolerated the procedure well with no immediate complications     2cc 1% lidocaine plain, 2cc 0.5% marcaine plain, 0.5cc 80mg methylprednisolone    Procedure Note:  We discussed the risk and benefits of injections, including pain, infection, bleeding, damage to adjacent structures, risk of reaction to injection. We discussed the steroid/cortisone injections will not heal the problem but mat help decrease inflammation and help with symptoms. We discussed the risk of repeated injections. The patient expressed understanding and wanted to proceed with the injection. We performed a timeout to verify the proper patient, proper procedure, and the proper site. The injection site was prepared in a sterile fashion. The patient tolerated it well and there were no complication. We did discuss with the patient that steroid injections can cause some increase in blood sugar and blood pressure for up to a week after the injection.

## 2022-01-06 ENCOUNTER — HOSPITAL ENCOUNTER (OUTPATIENT)
Dept: CARDIOLOGY | Facility: HOSPITAL | Age: 47
Discharge: HOME | End: 2022-01-06
Attending: INTERNAL MEDICINE
Payer: COMMERCIAL

## 2022-01-06 ENCOUNTER — HOSPITAL ENCOUNTER (OUTPATIENT)
Dept: RADIOLOGY | Facility: HOSPITAL | Age: 47
Discharge: HOME | End: 2022-01-06
Attending: INTERNAL MEDICINE
Payer: COMMERCIAL

## 2022-01-06 DIAGNOSIS — R06.02 SOB (SHORTNESS OF BREATH): ICD-10-CM

## 2022-01-06 DIAGNOSIS — J12.82 PNEUMONIA DUE TO COVID-19 VIRUS: ICD-10-CM

## 2022-01-06 DIAGNOSIS — U07.1 PNEUMONIA DUE TO COVID-19 VIRUS: ICD-10-CM

## 2022-01-06 DIAGNOSIS — M35.01 SJOGREN'S SYNDROME WITH KERATOCONJUNCTIVITIS SICCA: ICD-10-CM

## 2022-01-06 DIAGNOSIS — U07.1 COVID-19: ICD-10-CM

## 2022-01-06 DIAGNOSIS — R06.03 RESPIRATORY DISTRESS: ICD-10-CM

## 2022-01-06 DIAGNOSIS — R07.9 CHEST PAIN, UNSPECIFIED TYPE: ICD-10-CM

## 2022-01-06 DIAGNOSIS — A41.9 SEPSIS, DUE TO UNSPECIFIED ORGANISM, UNSPECIFIED WHETHER ACUTE ORGAN DYSFUNCTION PRESENT: ICD-10-CM

## 2022-01-06 PROCEDURE — 78452 HT MUSCLE IMAGE SPECT MULT: CPT | Mod: 26,,, | Performed by: INTERNAL MEDICINE

## 2022-01-06 PROCEDURE — 78452 STRESS TEST WITH MYOCARDIAL PERFUSION (CUPID ONLY): ICD-10-PCS | Mod: 26,,, | Performed by: INTERNAL MEDICINE

## 2022-01-06 PROCEDURE — 93016 STRESS TEST WITH MYOCARDIAL PERFUSION (CUPID ONLY): ICD-10-PCS | Mod: ,,, | Performed by: INTERNAL MEDICINE

## 2022-01-06 PROCEDURE — 93018 CV STRESS TEST I&R ONLY: CPT | Mod: ,,, | Performed by: INTERNAL MEDICINE

## 2022-01-06 PROCEDURE — 93016 CV STRESS TEST SUPVJ ONLY: CPT | Mod: ,,, | Performed by: INTERNAL MEDICINE

## 2022-01-06 PROCEDURE — 93018 STRESS TEST WITH MYOCARDIAL PERFUSION (CUPID ONLY): ICD-10-PCS | Mod: ,,, | Performed by: INTERNAL MEDICINE

## 2022-01-06 PROCEDURE — 93017 CV STRESS TEST TRACING ONLY: CPT

## 2022-01-06 PROCEDURE — A9502 TC99M TETROFOSMIN: HCPCS

## 2022-01-07 ENCOUNTER — TELEPHONE (OUTPATIENT)
Dept: CARDIOLOGY | Facility: CLINIC | Age: 47
End: 2022-01-07
Payer: COMMERCIAL

## 2022-01-07 LAB
CV STRESS BASE HR: 77 BPM
DIASTOLIC BLOOD PRESSURE: 84 MMHG
NUC REST EJECTION FRACTION: 76
NUC STRESS EJECTION FRACTION: 73 %
OHS CV CPX 1 MINUTE RECOVERY HEART RATE: 126 BPM
OHS CV CPX 85 PERCENT MAX PREDICTED HEART RATE MALE: 139
OHS CV CPX ESTIMATED METS: 7
OHS CV CPX MAX PREDICTED HEART RATE: 164
OHS CV CPX PATIENT IS FEMALE: 1
OHS CV CPX PATIENT IS MALE: 0
OHS CV CPX PEAK DIASTOLIC BLOOD PRESSURE: 96 MMHG
OHS CV CPX PEAK HEAR RATE: 142 BPM
OHS CV CPX PEAK RATE PRESSURE PRODUCT: NORMAL
OHS CV CPX PEAK SYSTOLIC BLOOD PRESSURE: 166 MMHG
OHS CV CPX PERCENT MAX PREDICTED HEART RATE ACHIEVED: 87
OHS CV CPX RATE PRESSURE PRODUCT PRESENTING: 9086
STRESS ECHO POST EXERCISE DUR MIN: 6 MINUTES
STRESS ECHO POST EXERCISE DUR SEC: 11 SECONDS
SYSTOLIC BLOOD PRESSURE: 118 MMHG

## 2022-01-07 NOTE — TELEPHONE ENCOUNTER
Pt was contacted about results:     This is an abnormal scan and I would like to see the patient in the office in the next week to re-evaluate possible plan for heart catheterization.  If the patient has significant symptoms of chest discomfort she should go to the emergency room       Pt confirmed appt on Wed with Dr. Sumner.     Pt verbalized understanding with no questions or concerns.          ----- Message from Elie Sumner MD sent at 1/7/2022  2:32 PM CST -----  This is an abnormal scan and I would like to see the patient in the office in the next week to re-evaluate possible plan for heart catheterization.  If the patient has significant symptoms of chest discomfort she should go to the emergency room

## 2022-01-12 ENCOUNTER — OFFICE VISIT (OUTPATIENT)
Dept: CARDIOLOGY | Facility: CLINIC | Age: 47
End: 2022-01-12
Payer: COMMERCIAL

## 2022-01-12 ENCOUNTER — LAB VISIT (OUTPATIENT)
Dept: LAB | Facility: HOSPITAL | Age: 47
End: 2022-01-12
Attending: INTERNAL MEDICINE
Payer: COMMERCIAL

## 2022-01-12 VITALS
DIASTOLIC BLOOD PRESSURE: 72 MMHG | HEIGHT: 64 IN | HEART RATE: 80 BPM | OXYGEN SATURATION: 99 % | SYSTOLIC BLOOD PRESSURE: 118 MMHG | WEIGHT: 240.31 LBS | BODY MASS INDEX: 41.03 KG/M2

## 2022-01-12 DIAGNOSIS — R06.03 RESPIRATORY DISTRESS: ICD-10-CM

## 2022-01-12 DIAGNOSIS — R06.02 SOB (SHORTNESS OF BREATH): ICD-10-CM

## 2022-01-12 DIAGNOSIS — M34.9 SCLERODERMA: ICD-10-CM

## 2022-01-12 DIAGNOSIS — R07.9 CHEST PAIN, UNSPECIFIED TYPE: ICD-10-CM

## 2022-01-12 DIAGNOSIS — M79.609 PAIN IN EXTREMITY, UNSPECIFIED EXTREMITY: Primary | ICD-10-CM

## 2022-01-12 DIAGNOSIS — J12.82 PNEUMONIA DUE TO COVID-19 VIRUS: ICD-10-CM

## 2022-01-12 DIAGNOSIS — I10 ESSENTIAL HYPERTENSION: ICD-10-CM

## 2022-01-12 DIAGNOSIS — U07.1 COVID-19 VIRUS INFECTION: ICD-10-CM

## 2022-01-12 DIAGNOSIS — M79.609 PAIN IN EXTREMITY, UNSPECIFIED EXTREMITY: ICD-10-CM

## 2022-01-12 DIAGNOSIS — U07.1 PNEUMONIA DUE TO COVID-19 VIRUS: ICD-10-CM

## 2022-01-12 DIAGNOSIS — R07.9 CHEST PAIN, UNSPECIFIED TYPE: Primary | ICD-10-CM

## 2022-01-12 DIAGNOSIS — M35.01 SJOGREN'S SYNDROME WITH KERATOCONJUNCTIVITIS SICCA: ICD-10-CM

## 2022-01-12 DIAGNOSIS — A41.9 SEPSIS, DUE TO UNSPECIFIED ORGANISM, UNSPECIFIED WHETHER ACUTE ORGAN DYSFUNCTION PRESENT: ICD-10-CM

## 2022-01-12 LAB
ANION GAP SERPL CALC-SCNC: 6 MMOL/L (ref 8–16)
APTT BLDCRRT: 29.6 SEC (ref 21–32)
BASOPHILS # BLD AUTO: 0.02 K/UL (ref 0–0.2)
BASOPHILS NFR BLD: 0.4 % (ref 0–1.9)
BUN SERPL-MCNC: 10 MG/DL (ref 6–20)
CALCIUM SERPL-MCNC: 9.7 MG/DL (ref 8.7–10.5)
CHLORIDE SERPL-SCNC: 105 MMOL/L (ref 95–110)
CO2 SERPL-SCNC: 28 MMOL/L (ref 23–29)
CREAT SERPL-MCNC: 0.9 MG/DL (ref 0.5–1.4)
DIFFERENTIAL METHOD: ABNORMAL
EOSINOPHIL # BLD AUTO: 0.1 K/UL (ref 0–0.5)
EOSINOPHIL NFR BLD: 2.6 % (ref 0–8)
ERYTHROCYTE [DISTWIDTH] IN BLOOD BY AUTOMATED COUNT: 14.5 % (ref 11.5–14.5)
EST. GFR  (AFRICAN AMERICAN): >60 ML/MIN/1.73 M^2
EST. GFR  (NON AFRICAN AMERICAN): >60 ML/MIN/1.73 M^2
GLUCOSE SERPL-MCNC: 92 MG/DL (ref 70–110)
HCT VFR BLD AUTO: 36.2 % (ref 37–48.5)
HGB BLD-MCNC: 11.1 G/DL (ref 12–16)
IMM GRANULOCYTES # BLD AUTO: 0.01 K/UL (ref 0–0.04)
IMM GRANULOCYTES NFR BLD AUTO: 0.2 % (ref 0–0.5)
INR PPP: 1 (ref 0.8–1.2)
LYMPHOCYTES # BLD AUTO: 1.8 K/UL (ref 1–4.8)
LYMPHOCYTES NFR BLD: 35.3 % (ref 18–48)
MCH RBC QN AUTO: 26.3 PG (ref 27–31)
MCHC RBC AUTO-ENTMCNC: 30.7 G/DL (ref 32–36)
MCV RBC AUTO: 86 FL (ref 82–98)
MONOCYTES # BLD AUTO: 0.4 K/UL (ref 0.3–1)
MONOCYTES NFR BLD: 7.7 % (ref 4–15)
NEUTROPHILS # BLD AUTO: 2.7 K/UL (ref 1.8–7.7)
NEUTROPHILS NFR BLD: 53.8 % (ref 38–73)
NRBC BLD-RTO: 0 /100 WBC
PLATELET # BLD AUTO: 377 K/UL (ref 150–450)
PMV BLD AUTO: 9.6 FL (ref 9.2–12.9)
POTASSIUM SERPL-SCNC: 4.4 MMOL/L (ref 3.5–5.1)
PROTHROMBIN TIME: 10.5 SEC (ref 9–12.5)
RBC # BLD AUTO: 4.22 M/UL (ref 4–5.4)
SODIUM SERPL-SCNC: 139 MMOL/L (ref 136–145)
WBC # BLD AUTO: 4.96 K/UL (ref 3.9–12.7)

## 2022-01-12 PROCEDURE — 80048 BASIC METABOLIC PNL TOTAL CA: CPT | Performed by: INTERNAL MEDICINE

## 2022-01-12 PROCEDURE — 3078F DIAST BP <80 MM HG: CPT | Mod: CPTII,S$GLB,, | Performed by: INTERNAL MEDICINE

## 2022-01-12 PROCEDURE — 3008F BODY MASS INDEX DOCD: CPT | Mod: CPTII,S$GLB,, | Performed by: INTERNAL MEDICINE

## 2022-01-12 PROCEDURE — 1159F PR MEDICATION LIST DOCUMENTED IN MEDICAL RECORD: ICD-10-PCS | Mod: CPTII,S$GLB,, | Performed by: INTERNAL MEDICINE

## 2022-01-12 PROCEDURE — 85025 COMPLETE CBC W/AUTO DIFF WBC: CPT | Performed by: INTERNAL MEDICINE

## 2022-01-12 PROCEDURE — 85610 PROTHROMBIN TIME: CPT | Performed by: INTERNAL MEDICINE

## 2022-01-12 PROCEDURE — 99999 PR PBB SHADOW E&M-EST. PATIENT-LVL IV: CPT | Mod: PBBFAC,,, | Performed by: INTERNAL MEDICINE

## 2022-01-12 PROCEDURE — 99214 OFFICE O/P EST MOD 30 MIN: CPT | Mod: S$GLB,,, | Performed by: INTERNAL MEDICINE

## 2022-01-12 PROCEDURE — 3074F SYST BP LT 130 MM HG: CPT | Mod: CPTII,S$GLB,, | Performed by: INTERNAL MEDICINE

## 2022-01-12 PROCEDURE — 3074F PR MOST RECENT SYSTOLIC BLOOD PRESSURE < 130 MM HG: ICD-10-PCS | Mod: CPTII,S$GLB,, | Performed by: INTERNAL MEDICINE

## 2022-01-12 PROCEDURE — 85730 THROMBOPLASTIN TIME PARTIAL: CPT | Performed by: INTERNAL MEDICINE

## 2022-01-12 PROCEDURE — 1159F MED LIST DOCD IN RCRD: CPT | Mod: CPTII,S$GLB,, | Performed by: INTERNAL MEDICINE

## 2022-01-12 PROCEDURE — 1160F PR REVIEW ALL MEDS BY PRESCRIBER/CLIN PHARMACIST DOCUMENTED: ICD-10-PCS | Mod: CPTII,S$GLB,, | Performed by: INTERNAL MEDICINE

## 2022-01-12 PROCEDURE — 3078F PR MOST RECENT DIASTOLIC BLOOD PRESSURE < 80 MM HG: ICD-10-PCS | Mod: CPTII,S$GLB,, | Performed by: INTERNAL MEDICINE

## 2022-01-12 PROCEDURE — 3008F PR BODY MASS INDEX (BMI) DOCUMENTED: ICD-10-PCS | Mod: CPTII,S$GLB,, | Performed by: INTERNAL MEDICINE

## 2022-01-12 PROCEDURE — 99999 PR PBB SHADOW E&M-EST. PATIENT-LVL IV: ICD-10-PCS | Mod: PBBFAC,,, | Performed by: INTERNAL MEDICINE

## 2022-01-12 PROCEDURE — 36415 COLL VENOUS BLD VENIPUNCTURE: CPT | Performed by: INTERNAL MEDICINE

## 2022-01-12 PROCEDURE — 1160F RVW MEDS BY RX/DR IN RCRD: CPT | Mod: CPTII,S$GLB,, | Performed by: INTERNAL MEDICINE

## 2022-01-12 PROCEDURE — 99214 PR OFFICE/OUTPT VISIT, EST, LEVL IV, 30-39 MIN: ICD-10-PCS | Mod: S$GLB,,, | Performed by: INTERNAL MEDICINE

## 2022-01-12 RX ORDER — ASPIRIN 81 MG/1
81 TABLET ORAL DAILY
Qty: 30 TABLET | Refills: 0
Start: 2022-01-12 | End: 2022-02-17

## 2022-01-12 NOTE — PROGRESS NOTES
Subjective:   Patient ID:  Zachary Lucia is a 46 y.o. female who presents for follow-up of No chief complaint on file.     Intermittent mild chest discomfort some of the typical and atypical.  Positive family history of coronary disease on her mother side.  Will go ahead with a stress test echo done recently was completely normal.  EKG showed nonspecific changes.        Overall patient is stable.  We would like to go ahead with a nuclear stress test as the regular stress test did show ST segment changes.  Patient has had no chest discomfort on this test.  Follow-up evaluation after nuclear stress test is performed.  Patient has a very high risk factor for family history of coronary disease.    The patient presents the office having intermittent chest discomfort.  Nuclear stress test showed inferior basal ischemia.  ST changes in inferior leads.  Patient continues intermittent symptoms.  Will go ahead with heart catheterization to rule out significant coronary disease.  Patient agreeable to this plan.      Review of Systems   Constitutional: Negative for chills, diaphoresis, night sweats, weight gain and weight loss.   HENT: Negative for congestion, hoarse voice, sore throat and stridor.    Eyes: Negative for double vision and pain.   Cardiovascular: Negative for chest pain, claudication, cyanosis, dyspnea on exertion, irregular heartbeat, leg swelling, near-syncope, orthopnea, palpitations, paroxysmal nocturnal dyspnea and syncope.   Respiratory: Negative for cough, hemoptysis, shortness of breath, sleep disturbances due to breathing, snoring, sputum production and wheezing.    Endocrine: Negative for cold intolerance, heat intolerance and polydipsia.   Hematologic/Lymphatic: Negative for bleeding problem. Does not bruise/bleed easily.   Skin: Negative for color change, dry skin and rash.   Musculoskeletal: Negative for joint swelling and muscle cramps.   Gastrointestinal: Negative for bloating,  abdominal pain, constipation, diarrhea, dysphagia, melena, nausea and vomiting.   Genitourinary: Negative for flank pain and urgency.   Neurological: Negative for dizziness, focal weakness, headaches, light-headedness, loss of balance, seizures and weakness.   Psychiatric/Behavioral: Negative for altered mental status and memory loss. The patient is not nervous/anxious.      Family History   Problem Relation Age of Onset    Arthritis Mother     Glaucoma Mother     Hypertension Mother     Diabetes Father     Diabetes Maternal Grandmother     Hypertension Maternal Grandmother     Arthritis Maternal Grandmother     Cataracts Maternal Grandmother     Diabetes Maternal Grandfather     Heart disease Maternal Grandfather     Hypertension Maternal Grandfather     Diabetes Paternal Grandmother     Heart disease Paternal Grandmother     Hypertension Paternal Grandmother     Heart disease Paternal Grandfather      Past Medical History:   Diagnosis Date    Asthma     Essential hypertension, malignant 1/8/2020    Lupus (systemic lupus erythematosus)     Membranous glomerulonephritis, 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    Scleroderma     Seizures 01/12/2017     Social History     Socioeconomic History    Marital status:      Spouse name: Darrell    Number of children: 0   Tobacco Use    Smoking status: Never Smoker    Smokeless tobacco: Never Used   Substance and Sexual Activity    Alcohol use: Yes    Drug use: No    Sexual activity: Yes     Current Outpatient Medications on File Prior to Visit   Medication Sig Dispense Refill    azaTHIOprine (IMURAN) 100 mg tablet Take 1 tablet (100 mg total) by mouth once daily. 90 tablet 1    azaTHIOprine (IMURAN) 50 mg Tab Take 1 tablet (50 mg total) by mouth once daily in evening 90 tablet 1    azithromycin (Z-JUSTINA) 250 MG tablet       betamethasone dipropionate (DIPROLENE) 0.05 % ointment Apply topically 2 (two) times daily as needed. Steroid. Use for flares 45 g 3     cholecalciferol, vitamin D3, 1,250 mcg (50,000 unit) capsule Take 1 capsule (50,000 Units total) by mouth once a week. 15 capsule 1    crisaborole (EUCRISA) 2 % Oint AAA bid prn.  Non-steroid.  Safe to use long-term. 60 g 3    cyclobenzaprine (FLEXERIL) 10 MG tablet TAKE 1 TABLET (10 MG TOTAL) BY MOUTH 2 (TWO) TIMES DAILY AS NEEDED FOR MUSCLE SPASMS. 60 tablet 0    ferrous sulfate (FEOSOL) 325 mg (65 mg iron) Tab tablet Take 1 tablet by mouth once daily.      fluticasone-salmeterol diskus inhaler 100-50 mcg Inhale 1 puff into the lungs 2 (two) times daily. Controller 1 each 11    HYDROcodone-acetaminophen (NORCO) 7.5-325 mg per tablet Take 1 tablet by mouth 4 (four) times daily as needed.      hydrOXYchloroQUINE (PLAQUENIL) 200 mg tablet Take 1 tablet (200 mg total) by mouth 2 (two) times daily. 180 tablet 3     mg tablet Take 800 mg by mouth every 8 (eight) hours as needed.  0    levetiracetam XR (KEPPRA XR) 500 mg Tb24 24 hr tablet Take 500 mg by mouth once daily.      levocetirizine (XYZAL) 5 MG tablet Take 1 tablet (5 mg total) by mouth every evening. 90 tablet 3    pantoprazole (PROTONIX) 40 MG tablet Take 1 tablet (40 mg total) by mouth once daily. 30 tablet 11    predniSONE (DELTASONE) 10 MG tablet TAKE 1 TABLET BY MOUTH EVERY DAY 60 tablet 1    predniSONE (DELTASONE) 5 MG tablet Take 1 tablet (5 mg total) by mouth once daily. For 3-5 days as needed during flares (Patient not taking: Reported on 12/22/2021) 30 tablet 0    PROAIR HFA 90 mcg/actuation inhaler INHALE 2 PUFFS INTO THE LUNGS EVERY 6 (SIX) HOURS AS NEEDED FOR WHEEZING. RESCUE 8.5 Inhaler 0    pulse oximeter (PULSE OXIMETER) device Use by Apply Externally route 2 (two) times a day. Use twice daily at 8 AM and 3 PM and record the value in [employer] as directed. 1 each 0     No current facility-administered medications on file prior to visit.     Review of patient's allergies indicates:   Allergen Reactions    Sulfa (sulfonamide  antibiotics) Swelling       Objective:     Physical Exam  Eyes:      Pupils: Pupils are equal, round, and reactive to light.   Neck:      Trachea: No tracheal deviation.   Cardiovascular:      Rate and Rhythm: Normal rate and regular rhythm.      Pulses: Intact distal pulses.           Carotid pulses are 2+ on the right side and 2+ on the left side.       Radial pulses are 2+ on the right side and 2+ on the left side.        Femoral pulses are 2+ on the right side and 2+ on the left side.       Popliteal pulses are 2+ on the right side and 2+ on the left side.        Dorsalis pedis pulses are 2+ on the right side and 2+ on the left side.        Posterior tibial pulses are 2+ on the right side and 2+ on the left side.      Heart sounds: Normal heart sounds. No murmur heard.  No friction rub. No gallop.    Pulmonary:      Effort: Pulmonary effort is normal. No respiratory distress.      Breath sounds: Normal breath sounds. No stridor. No wheezing or rales.   Chest:      Chest wall: No tenderness.   Abdominal:      General: There is no distension.      Tenderness: There is no abdominal tenderness. There is no rebound.   Musculoskeletal:         General: No tenderness or edema.      Cervical back: Normal range of motion.   Skin:     General: Skin is warm and dry.   Neurological:      Mental Status: She is alert and oriented to person, place, and time.         Assessment:     1. Respiratory distress    2. Chest pain, unspecified type    3. COVID-19 virus infection    4. Pneumonia due to COVID-19 virus    5. Sepsis, due to unspecified organism, unspecified whether acute organ dysfunction present    6. SOB (shortness of breath)    7. Essential hypertension    8. Sjogren's syndrome with keratoconjunctivitis sicca    9. Scleroderma        Plan:     Respiratory distress    Chest pain, unspecified type    COVID-19 virus infection    Pneumonia due to COVID-19 virus    Sepsis, due to unspecified organism, unspecified whether  acute organ dysfunction present    SOB (shortness of breath)    Essential hypertension    Sjogren's syndrome with keratoconjunctivitis sicca    Scleroderma    Impression 1. Positive stress test:  Patient has active positive stress test with evidence of inferior cardiac ischemia.  Will go ahead with a heart catheterization rule out significant disease.  Patient is agreeable to this plan.  Lab tests done today patient will be prepped for heart catheterization.  Patient will have a left heart catheterization

## 2022-01-12 NOTE — H&P (VIEW-ONLY)
Subjective:   Patient ID:  Zachary Lucia is a 46 y.o. female who presents for follow-up of No chief complaint on file.     Intermittent mild chest discomfort some of the typical and atypical.  Positive family history of coronary disease on her mother side.  Will go ahead with a stress test echo done recently was completely normal.  EKG showed nonspecific changes.        Overall patient is stable.  We would like to go ahead with a nuclear stress test as the regular stress test did show ST segment changes.  Patient has had no chest discomfort on this test.  Follow-up evaluation after nuclear stress test is performed.  Patient has a very high risk factor for family history of coronary disease.    The patient presents the office having intermittent chest discomfort.  Nuclear stress test showed inferior basal ischemia.  ST changes in inferior leads.  Patient continues intermittent symptoms.  Will go ahead with heart catheterization to rule out significant coronary disease.  Patient agreeable to this plan.      Review of Systems   Constitutional: Negative for chills, diaphoresis, night sweats, weight gain and weight loss.   HENT: Negative for congestion, hoarse voice, sore throat and stridor.    Eyes: Negative for double vision and pain.   Cardiovascular: Negative for chest pain, claudication, cyanosis, dyspnea on exertion, irregular heartbeat, leg swelling, near-syncope, orthopnea, palpitations, paroxysmal nocturnal dyspnea and syncope.   Respiratory: Negative for cough, hemoptysis, shortness of breath, sleep disturbances due to breathing, snoring, sputum production and wheezing.    Endocrine: Negative for cold intolerance, heat intolerance and polydipsia.   Hematologic/Lymphatic: Negative for bleeding problem. Does not bruise/bleed easily.   Skin: Negative for color change, dry skin and rash.   Musculoskeletal: Negative for joint swelling and muscle cramps.   Gastrointestinal: Negative for bloating,  abdominal pain, constipation, diarrhea, dysphagia, melena, nausea and vomiting.   Genitourinary: Negative for flank pain and urgency.   Neurological: Negative for dizziness, focal weakness, headaches, light-headedness, loss of balance, seizures and weakness.   Psychiatric/Behavioral: Negative for altered mental status and memory loss. The patient is not nervous/anxious.      Family History   Problem Relation Age of Onset    Arthritis Mother     Glaucoma Mother     Hypertension Mother     Diabetes Father     Diabetes Maternal Grandmother     Hypertension Maternal Grandmother     Arthritis Maternal Grandmother     Cataracts Maternal Grandmother     Diabetes Maternal Grandfather     Heart disease Maternal Grandfather     Hypertension Maternal Grandfather     Diabetes Paternal Grandmother     Heart disease Paternal Grandmother     Hypertension Paternal Grandmother     Heart disease Paternal Grandfather      Past Medical History:   Diagnosis Date    Asthma     Essential hypertension, malignant 1/8/2020    Lupus (systemic lupus erythematosus)     Membranous glomerulonephritis, 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    Scleroderma     Seizures 01/12/2017     Social History     Socioeconomic History    Marital status:      Spouse name: Darrell    Number of children: 0   Tobacco Use    Smoking status: Never Smoker    Smokeless tobacco: Never Used   Substance and Sexual Activity    Alcohol use: Yes    Drug use: No    Sexual activity: Yes     Current Outpatient Medications on File Prior to Visit   Medication Sig Dispense Refill    azaTHIOprine (IMURAN) 100 mg tablet Take 1 tablet (100 mg total) by mouth once daily. 90 tablet 1    azaTHIOprine (IMURAN) 50 mg Tab Take 1 tablet (50 mg total) by mouth once daily in evening 90 tablet 1    azithromycin (Z-JUSTINA) 250 MG tablet       betamethasone dipropionate (DIPROLENE) 0.05 % ointment Apply topically 2 (two) times daily as needed. Steroid. Use for flares 45 g 3     cholecalciferol, vitamin D3, 1,250 mcg (50,000 unit) capsule Take 1 capsule (50,000 Units total) by mouth once a week. 15 capsule 1    crisaborole (EUCRISA) 2 % Oint AAA bid prn.  Non-steroid.  Safe to use long-term. 60 g 3    cyclobenzaprine (FLEXERIL) 10 MG tablet TAKE 1 TABLET (10 MG TOTAL) BY MOUTH 2 (TWO) TIMES DAILY AS NEEDED FOR MUSCLE SPASMS. 60 tablet 0    ferrous sulfate (FEOSOL) 325 mg (65 mg iron) Tab tablet Take 1 tablet by mouth once daily.      fluticasone-salmeterol diskus inhaler 100-50 mcg Inhale 1 puff into the lungs 2 (two) times daily. Controller 1 each 11    HYDROcodone-acetaminophen (NORCO) 7.5-325 mg per tablet Take 1 tablet by mouth 4 (four) times daily as needed.      hydrOXYchloroQUINE (PLAQUENIL) 200 mg tablet Take 1 tablet (200 mg total) by mouth 2 (two) times daily. 180 tablet 3     mg tablet Take 800 mg by mouth every 8 (eight) hours as needed.  0    levetiracetam XR (KEPPRA XR) 500 mg Tb24 24 hr tablet Take 500 mg by mouth once daily.      levocetirizine (XYZAL) 5 MG tablet Take 1 tablet (5 mg total) by mouth every evening. 90 tablet 3    pantoprazole (PROTONIX) 40 MG tablet Take 1 tablet (40 mg total) by mouth once daily. 30 tablet 11    predniSONE (DELTASONE) 10 MG tablet TAKE 1 TABLET BY MOUTH EVERY DAY 60 tablet 1    predniSONE (DELTASONE) 5 MG tablet Take 1 tablet (5 mg total) by mouth once daily. For 3-5 days as needed during flares (Patient not taking: Reported on 12/22/2021) 30 tablet 0    PROAIR HFA 90 mcg/actuation inhaler INHALE 2 PUFFS INTO THE LUNGS EVERY 6 (SIX) HOURS AS NEEDED FOR WHEEZING. RESCUE 8.5 Inhaler 0    pulse oximeter (PULSE OXIMETER) device Use by Apply Externally route 2 (two) times a day. Use twice daily at 8 AM and 3 PM and record the value in [employer] as directed. 1 each 0     No current facility-administered medications on file prior to visit.     Review of patient's allergies indicates:   Allergen Reactions    Sulfa (sulfonamide  antibiotics) Swelling       Objective:     Physical Exam  Eyes:      Pupils: Pupils are equal, round, and reactive to light.   Neck:      Trachea: No tracheal deviation.   Cardiovascular:      Rate and Rhythm: Normal rate and regular rhythm.      Pulses: Intact distal pulses.           Carotid pulses are 2+ on the right side and 2+ on the left side.       Radial pulses are 2+ on the right side and 2+ on the left side.        Femoral pulses are 2+ on the right side and 2+ on the left side.       Popliteal pulses are 2+ on the right side and 2+ on the left side.        Dorsalis pedis pulses are 2+ on the right side and 2+ on the left side.        Posterior tibial pulses are 2+ on the right side and 2+ on the left side.      Heart sounds: Normal heart sounds. No murmur heard.  No friction rub. No gallop.    Pulmonary:      Effort: Pulmonary effort is normal. No respiratory distress.      Breath sounds: Normal breath sounds. No stridor. No wheezing or rales.   Chest:      Chest wall: No tenderness.   Abdominal:      General: There is no distension.      Tenderness: There is no abdominal tenderness. There is no rebound.   Musculoskeletal:         General: No tenderness or edema.      Cervical back: Normal range of motion.   Skin:     General: Skin is warm and dry.   Neurological:      Mental Status: She is alert and oriented to person, place, and time.         Assessment:     1. Respiratory distress    2. Chest pain, unspecified type    3. COVID-19 virus infection    4. Pneumonia due to COVID-19 virus    5. Sepsis, due to unspecified organism, unspecified whether acute organ dysfunction present    6. SOB (shortness of breath)    7. Essential hypertension    8. Sjogren's syndrome with keratoconjunctivitis sicca    9. Scleroderma        Plan:     Respiratory distress    Chest pain, unspecified type    COVID-19 virus infection    Pneumonia due to COVID-19 virus    Sepsis, due to unspecified organism, unspecified whether  acute organ dysfunction present    SOB (shortness of breath)    Essential hypertension    Sjogren's syndrome with keratoconjunctivitis sicca    Scleroderma    Impression 1. Positive stress test:  Patient has active positive stress test with evidence of inferior cardiac ischemia.  Will go ahead with a heart catheterization rule out significant disease.  Patient is agreeable to this plan.  Lab tests done today patient will be prepped for heart catheterization.  Patient will have a left heart catheterization

## 2022-01-13 ENCOUNTER — TELEPHONE (OUTPATIENT)
Dept: CARDIOLOGY | Facility: CLINIC | Age: 47
End: 2022-01-13
Payer: COMMERCIAL

## 2022-01-13 NOTE — TELEPHONE ENCOUNTER
Spoke with pt to let her know that her lab results was stable and to continue her current medications. Pt verbalized understanding.  ----- Message from Elie Sumner MD sent at 1/13/2022  8:45 AM CST -----  Lab tests stable continue all current medications

## 2022-01-14 ENCOUNTER — PATIENT OUTREACH (OUTPATIENT)
Dept: ADMINISTRATIVE | Facility: OTHER | Age: 47
End: 2022-01-14
Payer: COMMERCIAL

## 2022-01-14 NOTE — PROGRESS NOTES
Health Maintenance Due   Topic Date Due    Pneumococcal Vaccines (Age 0-64) (1 of 4 - PCV13) Never done    HIV Screening  Never done    Mammogram  09/05/2019    Cervical Cancer Screening  10/05/2019    Influenza Vaccine (1) 09/01/2021    Colorectal Cancer Screening  10/01/2021     Updates were requested from care everywhere.  Chart was reviewed for overdue Proactive [facility] Encounters ([facility]) topics (CRS, Breast Cancer Screening, Eye exam)  Health Maintenance has been updated.  [facility] immunization registry triggered.  Immunizations were reconciled.     Drysol Pregnancy And Lactation Text: This medication is considered safe during pregnancy and breast feeding.

## 2022-01-18 ENCOUNTER — OFFICE VISIT (OUTPATIENT)
Dept: NEPHROLOGY | Facility: CLINIC | Age: 47
End: 2022-01-18
Payer: COMMERCIAL

## 2022-01-18 VITALS
BODY MASS INDEX: 39.15 KG/M2 | SYSTOLIC BLOOD PRESSURE: 130 MMHG | WEIGHT: 243.63 LBS | HEART RATE: 70 BPM | DIASTOLIC BLOOD PRESSURE: 80 MMHG | HEIGHT: 66 IN

## 2022-01-18 DIAGNOSIS — N18.1 CKD (CHRONIC KIDNEY DISEASE) STAGE 1, GFR 90 ML/MIN OR GREATER: ICD-10-CM

## 2022-01-18 DIAGNOSIS — M32.14 LUPUS NEPHRITIS, ISN/RPS CLASS V: Primary | ICD-10-CM

## 2022-01-18 DIAGNOSIS — M32.14 LUPUS NEPHRITIS: ICD-10-CM

## 2022-01-18 PROCEDURE — 1160F PR REVIEW ALL MEDS BY PRESCRIBER/CLIN PHARMACIST DOCUMENTED: ICD-10-PCS | Mod: CPTII,S$GLB,, | Performed by: INTERNAL MEDICINE

## 2022-01-18 PROCEDURE — 3075F PR MOST RECENT SYSTOLIC BLOOD PRESS GE 130-139MM HG: ICD-10-PCS | Mod: CPTII,S$GLB,, | Performed by: INTERNAL MEDICINE

## 2022-01-18 PROCEDURE — 3075F SYST BP GE 130 - 139MM HG: CPT | Mod: CPTII,S$GLB,, | Performed by: INTERNAL MEDICINE

## 2022-01-18 PROCEDURE — 3066F NEPHROPATHY DOC TX: CPT | Mod: CPTII,S$GLB,, | Performed by: INTERNAL MEDICINE

## 2022-01-18 PROCEDURE — 99999 PR PBB SHADOW E&M-EST. PATIENT-LVL IV: ICD-10-PCS | Mod: PBBFAC,,, | Performed by: INTERNAL MEDICINE

## 2022-01-18 PROCEDURE — 1159F MED LIST DOCD IN RCRD: CPT | Mod: CPTII,S$GLB,, | Performed by: INTERNAL MEDICINE

## 2022-01-18 PROCEDURE — 1160F RVW MEDS BY RX/DR IN RCRD: CPT | Mod: CPTII,S$GLB,, | Performed by: INTERNAL MEDICINE

## 2022-01-18 PROCEDURE — 3008F PR BODY MASS INDEX (BMI) DOCUMENTED: ICD-10-PCS | Mod: CPTII,S$GLB,, | Performed by: INTERNAL MEDICINE

## 2022-01-18 PROCEDURE — 3079F DIAST BP 80-89 MM HG: CPT | Mod: CPTII,S$GLB,, | Performed by: INTERNAL MEDICINE

## 2022-01-18 PROCEDURE — 99999 PR PBB SHADOW E&M-EST. PATIENT-LVL IV: CPT | Mod: PBBFAC,,, | Performed by: INTERNAL MEDICINE

## 2022-01-18 PROCEDURE — 99214 PR OFFICE/OUTPT VISIT, EST, LEVL IV, 30-39 MIN: ICD-10-PCS | Mod: S$GLB,,, | Performed by: INTERNAL MEDICINE

## 2022-01-18 PROCEDURE — 99214 OFFICE O/P EST MOD 30 MIN: CPT | Mod: S$GLB,,, | Performed by: INTERNAL MEDICINE

## 2022-01-18 PROCEDURE — 3066F PR DOCUMENTATION OF TREATMENT FOR NEPHROPATHY: ICD-10-PCS | Mod: CPTII,S$GLB,, | Performed by: INTERNAL MEDICINE

## 2022-01-18 PROCEDURE — 3079F PR MOST RECENT DIASTOLIC BLOOD PRESSURE 80-89 MM HG: ICD-10-PCS | Mod: CPTII,S$GLB,, | Performed by: INTERNAL MEDICINE

## 2022-01-18 PROCEDURE — 3008F BODY MASS INDEX DOCD: CPT | Mod: CPTII,S$GLB,, | Performed by: INTERNAL MEDICINE

## 2022-01-18 PROCEDURE — 1159F PR MEDICATION LIST DOCUMENTED IN MEDICAL RECORD: ICD-10-PCS | Mod: CPTII,S$GLB,, | Performed by: INTERNAL MEDICINE

## 2022-01-18 NOTE — PROGRESS NOTES
"Subjective:       Patient ID: Zachary Lucia is a 46 y.o. female.    Chief Complaint: Lupus nephritis    HPI    She presents to clinic today for routine follow-up.  Since her last office visit she has been doing well and has no specific or new complaints.  Her laboratory studies medications were reviewed.  She relates that she has taken herself off of lisinopril due to developing a cough.  Her medication allergies and adverse reactions list was updated.  All nephrology related questions were answered to her satisfaction.    Review of Systems   Constitutional: Positive for fatigue.   HENT: Negative.    Eyes: Negative.    Respiratory: Negative.    Cardiovascular: Negative.    Gastrointestinal: Negative.    Genitourinary: Negative.    Musculoskeletal: Negative.    Skin: Negative.    Neurological: Negative.          /80   Pulse 70   Ht 5' 6" (1.676 m)   Wt 110.5 kg (243 lb 9.7 oz)   BMI 39.32 kg/m²     Lab Results   Component Value Date    WBC 4.96 01/12/2022    HGB 11.1 (L) 01/12/2022    HCT 36.2 (L) 01/12/2022    MCV 86 01/12/2022     01/12/2022      BMP  Lab Results   Component Value Date     01/12/2022    K 4.4 01/12/2022     01/12/2022    CO2 28 01/12/2022    BUN 10 01/12/2022    CREATININE 0.9 01/12/2022    CALCIUM 9.7 01/12/2022    ANIONGAP 6 (L) 01/12/2022    ESTGFRAFRICA >60.0 01/12/2022    EGFRNONAA >60.0 01/12/2022     CMP  Sodium   Date Value Ref Range Status   01/12/2022 139 136 - 145 mmol/L Final     Potassium   Date Value Ref Range Status   01/12/2022 4.4 3.5 - 5.1 mmol/L Final     Chloride   Date Value Ref Range Status   01/12/2022 105 95 - 110 mmol/L Final     CO2   Date Value Ref Range Status   01/12/2022 28 23 - 29 mmol/L Final     Glucose   Date Value Ref Range Status   01/12/2022 92 70 - 110 mg/dL Final     BUN   Date Value Ref Range Status   01/12/2022 10 6 - 20 mg/dL Final     Creatinine   Date Value Ref Range Status   01/12/2022 0.9 0.5 - 1.4 mg/dL " Final     Calcium   Date Value Ref Range Status   01/12/2022 9.7 8.7 - 10.5 mg/dL Final     Total Protein   Date Value Ref Range Status   11/18/2021 8.2 6.0 - 8.4 g/dL Final     Albumin   Date Value Ref Range Status   11/18/2021 4.1 3.5 - 5.2 g/dL Final     Total Bilirubin   Date Value Ref Range Status   11/18/2021 0.5 0.1 - 1.0 mg/dL Final     Comment:     For infants and newborns, interpretation of results should be based  on gestational age, weight and in agreement with clinical  observations.    Premature Infant recommended reference ranges:  Up to 24 hours.............<8.0 mg/dL  Up to 48 hours............<12.0 mg/dL  3-5 days..................<15.0 mg/dL  6-29 days.................<15.0 mg/dL       Alkaline Phosphatase   Date Value Ref Range Status   11/18/2021 45 (L) 55 - 135 U/L Final     AST   Date Value Ref Range Status   11/18/2021 27 10 - 40 U/L Final     ALT   Date Value Ref Range Status   11/18/2021 23 10 - 44 U/L Final     Anion Gap   Date Value Ref Range Status   01/12/2022 6 (L) 8 - 16 mmol/L Final     eGFR if    Date Value Ref Range Status   01/12/2022 >60.0 >60 mL/min/1.73 m^2 Final     eGFR if non    Date Value Ref Range Status   01/12/2022 >60.0 >60 mL/min/1.73 m^2 Final     Comment:     Calculation used to obtain the estimated glomerular filtration  rate (eGFR) is the CKD-EPI equation.        Current Outpatient Medications on File Prior to Visit   Medication Sig Dispense Refill    aspirin (ECOTRIN) 81 MG EC tablet Take 1 tablet (81 mg total) by mouth once daily. 30 tablet 0    azaTHIOprine (IMURAN) 100 mg tablet Take 1 tablet (100 mg total) by mouth once daily. 90 tablet 1    azaTHIOprine (IMURAN) 50 mg Tab Take 1 tablet (50 mg total) by mouth once daily in evening 90 tablet 1    betamethasone dipropionate (DIPROLENE) 0.05 % ointment Apply topically 2 (two) times daily as needed. Steroid. Use for flares 45 g 3    cholecalciferol, vitamin D3, 1,250 mcg  (50,000 unit) capsule Take 1 capsule (50,000 Units total) by mouth once a week. 15 capsule 1    crisaborole (EUCRISA) 2 % Oint AAA bid prn.  Non-steroid.  Safe to use long-term. 60 g 3    cyclobenzaprine (FLEXERIL) 10 MG tablet TAKE 1 TABLET (10 MG TOTAL) BY MOUTH 2 (TWO) TIMES DAILY AS NEEDED FOR MUSCLE SPASMS. 60 tablet 0    ferrous sulfate (FEOSOL) 325 mg (65 mg iron) Tab tablet Take 1 tablet by mouth once daily.      HYDROcodone-acetaminophen (NORCO) 7.5-325 mg per tablet Take 1 tablet by mouth 4 (four) times daily as needed.      hydrOXYchloroQUINE (PLAQUENIL) 200 mg tablet Take 1 tablet (200 mg total) by mouth 2 (two) times daily. 180 tablet 3     mg tablet Take 800 mg by mouth every 8 (eight) hours as needed.  0    levetiracetam XR (KEPPRA XR) 500 mg Tb24 24 hr tablet Take 500 mg by mouth once daily.      levocetirizine (XYZAL) 5 MG tablet Take 1 tablet (5 mg total) by mouth every evening. 90 tablet 3    pantoprazole (PROTONIX) 40 MG tablet Take 1 tablet (40 mg total) by mouth once daily. 30 tablet 11    predniSONE (DELTASONE) 10 MG tablet TAKE 1 TABLET BY MOUTH EVERY DAY 60 tablet 1    predniSONE (DELTASONE) 5 MG tablet Take 1 tablet (5 mg total) by mouth once daily. For 3-5 days as needed during flares 30 tablet 0    PROAIR HFA 90 mcg/actuation inhaler INHALE 2 PUFFS INTO THE LUNGS EVERY 6 (SIX) HOURS AS NEEDED FOR WHEEZING. RESCUE 8.5 Inhaler 0    pulse oximeter (PULSE OXIMETER) device Use by Apply Externally route 2 (two) times a day. Use twice daily at 8 AM and 3 PM and [facility]. 1 each 0    fluticasone-salmeterol diskus inhaler 100-50 mcg Inhale 1 puff into the lungs 2 (two) times daily. Controller 1 each 11     No current facility-administered medications on file prior to visit.            Objective:            Physical Exam  Constitutional:       Appearance: Normal appearance.   HENT:      Head: Normocephalic and atraumatic.      Mouth/Throat:       Mouth: Mucous membranes are moist.   Eyes:      General: No scleral icterus.     Extraocular Movements: Extraocular movements intact.      Pupils: Pupils are equal, round, and reactive to light.   Pulmonary:      Effort: Pulmonary effort is normal.      Breath sounds: Normal breath sounds.   Musculoskeletal:      Cervical back: Normal range of motion. No rigidity.      Right lower leg: No edema.      Left lower leg: No edema.   Skin:     General: Skin is warm and dry.   Neurological:      General: No focal deficit present.      Mental Status: She is alert and oriented to person, place, and time.   Psychiatric:         Mood and Affect: Mood normal.         Behavior: Behavior normal.         Assessment:       1. Lupus nephritis, ISN/RPS class V    2. CKD (chronic kidney disease) stage 1, GFR 90 ml/min or greater    3. Lupus nephritis        Plan:         1. Kidney biopsy was performed in November 2018 that showed class 5, membranous, lupus nephritis.  Most recent urinalysis does not show evidence of proteinuria.  She has no stigmata of nephrosis at this time.  It appears that she is in remission.      2. Creatinine is stable at 0.9.      Rishabh Perez MD

## 2022-01-24 ENCOUNTER — LAB VISIT (OUTPATIENT)
Dept: LAB | Facility: HOSPITAL | Age: 47
End: 2022-01-24
Attending: PHYSICIAN ASSISTANT
Payer: COMMERCIAL

## 2022-01-24 ENCOUNTER — OFFICE VISIT (OUTPATIENT)
Dept: ORTHOPEDICS | Facility: CLINIC | Age: 47
End: 2022-01-24
Payer: COMMERCIAL

## 2022-01-24 VITALS — BODY MASS INDEX: 39.05 KG/M2 | WEIGHT: 243 LBS | HEIGHT: 66 IN

## 2022-01-24 DIAGNOSIS — M35.01 SJOGREN'S SYNDROME WITH KERATOCONJUNCTIVITIS SICCA: ICD-10-CM

## 2022-01-24 DIAGNOSIS — M17.11 PRIMARY OSTEOARTHRITIS OF RIGHT KNEE: Primary | ICD-10-CM

## 2022-01-24 DIAGNOSIS — M32.14 LUPUS NEPHRITIS, ISN/RPS CLASS V: ICD-10-CM

## 2022-01-24 DIAGNOSIS — M34.9 SCLERODERMA: ICD-10-CM

## 2022-01-24 LAB
ALBUMIN SERPL BCP-MCNC: 4 G/DL (ref 3.5–5.2)
ALP SERPL-CCNC: 54 U/L (ref 55–135)
ALT SERPL W/O P-5'-P-CCNC: 16 U/L (ref 10–44)
ANION GAP SERPL CALC-SCNC: 10 MMOL/L (ref 8–16)
AST SERPL-CCNC: 15 U/L (ref 10–40)
BACTERIA #/AREA URNS HPF: ABNORMAL /HPF
BASOPHILS # BLD AUTO: 0.01 K/UL (ref 0–0.2)
BASOPHILS NFR BLD: 0.2 % (ref 0–1.9)
BILIRUB SERPL-MCNC: 0.4 MG/DL (ref 0.1–1)
BILIRUB UR QL STRIP: NEGATIVE
BUN SERPL-MCNC: 8 MG/DL (ref 6–20)
C3 SERPL-MCNC: 156 MG/DL (ref 50–180)
C4 SERPL-MCNC: 31 MG/DL (ref 11–44)
CALCIUM SERPL-MCNC: 9.6 MG/DL (ref 8.7–10.5)
CHLORIDE SERPL-SCNC: 105 MMOL/L (ref 95–110)
CLARITY UR: CLEAR
CO2 SERPL-SCNC: 26 MMOL/L (ref 23–29)
COLOR UR: YELLOW
CREAT SERPL-MCNC: 0.9 MG/DL (ref 0.5–1.4)
CREAT UR-MCNC: 184 MG/DL (ref 15–325)
CRP SERPL-MCNC: 11.6 MG/L (ref 0–8.2)
DIFFERENTIAL METHOD: ABNORMAL
EOSINOPHIL # BLD AUTO: 0.1 K/UL (ref 0–0.5)
EOSINOPHIL NFR BLD: 2.7 % (ref 0–8)
ERYTHROCYTE [DISTWIDTH] IN BLOOD BY AUTOMATED COUNT: 14.6 % (ref 11.5–14.5)
ERYTHROCYTE [SEDIMENTATION RATE] IN BLOOD BY WESTERGREN METHOD: 18 MM/HR (ref 0–36)
EST. GFR  (AFRICAN AMERICAN): >60 ML/MIN/1.73 M^2
EST. GFR  (NON AFRICAN AMERICAN): >60 ML/MIN/1.73 M^2
GLUCOSE SERPL-MCNC: 95 MG/DL (ref 70–110)
GLUCOSE UR QL STRIP: NEGATIVE
HCT VFR BLD AUTO: 34.3 % (ref 37–48.5)
HGB BLD-MCNC: 10.8 G/DL (ref 12–16)
HGB UR QL STRIP: NEGATIVE
IMM GRANULOCYTES # BLD AUTO: 0.01 K/UL (ref 0–0.04)
IMM GRANULOCYTES NFR BLD AUTO: 0.2 % (ref 0–0.5)
KETONES UR QL STRIP: NEGATIVE
LEUKOCYTE ESTERASE UR QL STRIP: ABNORMAL
LYMPHOCYTES # BLD AUTO: 1.4 K/UL (ref 1–4.8)
LYMPHOCYTES NFR BLD: 34.6 % (ref 18–48)
MCH RBC QN AUTO: 26.6 PG (ref 27–31)
MCHC RBC AUTO-ENTMCNC: 31.5 G/DL (ref 32–36)
MCV RBC AUTO: 85 FL (ref 82–98)
MICROSCOPIC COMMENT: ABNORMAL
MONOCYTES # BLD AUTO: 0.3 K/UL (ref 0.3–1)
MONOCYTES NFR BLD: 7.8 % (ref 4–15)
NEUTROPHILS # BLD AUTO: 2.2 K/UL (ref 1.8–7.7)
NEUTROPHILS NFR BLD: 54.5 % (ref 38–73)
NITRITE UR QL STRIP: NEGATIVE
NRBC BLD-RTO: 0 /100 WBC
PH UR STRIP: 6 [PH] (ref 5–8)
PLATELET # BLD AUTO: 281 K/UL (ref 150–450)
PMV BLD AUTO: 8.9 FL (ref 9.2–12.9)
POTASSIUM SERPL-SCNC: 3.8 MMOL/L (ref 3.5–5.1)
PROT SERPL-MCNC: 8 G/DL (ref 6–8.4)
PROT UR QL STRIP: NEGATIVE
PROT UR-MCNC: <7 MG/DL (ref 0–15)
PROT/CREAT UR: NORMAL MG/G{CREAT} (ref 0–0.2)
RBC # BLD AUTO: 4.06 M/UL (ref 4–5.4)
RBC #/AREA URNS HPF: 1 /HPF (ref 0–4)
SODIUM SERPL-SCNC: 141 MMOL/L (ref 136–145)
SP GR UR STRIP: 1.02 (ref 1–1.03)
SQUAMOUS #/AREA URNS HPF: 2 /HPF
URN SPEC COLLECT METH UR: ABNORMAL
WBC # BLD AUTO: 4.08 K/UL (ref 3.9–12.7)
WBC #/AREA URNS HPF: 2 /HPF (ref 0–5)
YEAST URNS QL MICRO: ABNORMAL

## 2022-01-24 PROCEDURE — 36415 COLL VENOUS BLD VENIPUNCTURE: CPT | Performed by: PHYSICIAN ASSISTANT

## 2022-01-24 PROCEDURE — 3066F NEPHROPATHY DOC TX: CPT | Mod: CPTII,S$GLB,, | Performed by: PHYSICIAN ASSISTANT

## 2022-01-24 PROCEDURE — 3066F PR DOCUMENTATION OF TREATMENT FOR NEPHROPATHY: ICD-10-PCS | Mod: CPTII,S$GLB,, | Performed by: PHYSICIAN ASSISTANT

## 2022-01-24 PROCEDURE — 86140 C-REACTIVE PROTEIN: CPT | Performed by: PHYSICIAN ASSISTANT

## 2022-01-24 PROCEDURE — 85025 COMPLETE CBC W/AUTO DIFF WBC: CPT | Performed by: PHYSICIAN ASSISTANT

## 2022-01-24 PROCEDURE — 86225 DNA ANTIBODY NATIVE: CPT | Performed by: PHYSICIAN ASSISTANT

## 2022-01-24 PROCEDURE — 1159F PR MEDICATION LIST DOCUMENTED IN MEDICAL RECORD: ICD-10-PCS | Mod: CPTII,S$GLB,, | Performed by: PHYSICIAN ASSISTANT

## 2022-01-24 PROCEDURE — 85652 RBC SED RATE AUTOMATED: CPT | Performed by: PHYSICIAN ASSISTANT

## 2022-01-24 PROCEDURE — 86160 COMPLEMENT ANTIGEN: CPT | Mod: 59 | Performed by: PHYSICIAN ASSISTANT

## 2022-01-24 PROCEDURE — 80053 COMPREHEN METABOLIC PANEL: CPT | Performed by: PHYSICIAN ASSISTANT

## 2022-01-24 PROCEDURE — 99999 PR PBB SHADOW E&M-EST. PATIENT-LVL IV: ICD-10-PCS | Mod: PBBFAC,,, | Performed by: PHYSICIAN ASSISTANT

## 2022-01-24 PROCEDURE — 1160F RVW MEDS BY RX/DR IN RCRD: CPT | Mod: CPTII,S$GLB,, | Performed by: PHYSICIAN ASSISTANT

## 2022-01-24 PROCEDURE — 99213 PR OFFICE/OUTPT VISIT, EST, LEVL III, 20-29 MIN: ICD-10-PCS | Mod: S$GLB,,, | Performed by: PHYSICIAN ASSISTANT

## 2022-01-24 PROCEDURE — 86160 COMPLEMENT ANTIGEN: CPT | Performed by: PHYSICIAN ASSISTANT

## 2022-01-24 PROCEDURE — 82570 ASSAY OF URINE CREATININE: CPT | Performed by: PHYSICIAN ASSISTANT

## 2022-01-24 PROCEDURE — 1160F PR REVIEW ALL MEDS BY PRESCRIBER/CLIN PHARMACIST DOCUMENTED: ICD-10-PCS | Mod: CPTII,S$GLB,, | Performed by: PHYSICIAN ASSISTANT

## 2022-01-24 PROCEDURE — 99999 PR PBB SHADOW E&M-EST. PATIENT-LVL IV: CPT | Mod: PBBFAC,,, | Performed by: PHYSICIAN ASSISTANT

## 2022-01-24 PROCEDURE — 99213 OFFICE O/P EST LOW 20 MIN: CPT | Mod: S$GLB,,, | Performed by: PHYSICIAN ASSISTANT

## 2022-01-24 PROCEDURE — 3008F PR BODY MASS INDEX (BMI) DOCUMENTED: ICD-10-PCS | Mod: CPTII,S$GLB,, | Performed by: PHYSICIAN ASSISTANT

## 2022-01-24 PROCEDURE — 3008F BODY MASS INDEX DOCD: CPT | Mod: CPTII,S$GLB,, | Performed by: PHYSICIAN ASSISTANT

## 2022-01-24 PROCEDURE — 1159F MED LIST DOCD IN RCRD: CPT | Mod: CPTII,S$GLB,, | Performed by: PHYSICIAN ASSISTANT

## 2022-01-24 PROCEDURE — 81000 URINALYSIS NONAUTO W/SCOPE: CPT | Performed by: PHYSICIAN ASSISTANT

## 2022-01-24 NOTE — PATIENT INSTRUCTIONS
Assessment:  Zachary Lucia is a  46 y.o. female   Technical support @ [facility] with a chief complaint of Pain of the Right Knee  Presents today for a recheck on right knee pain and right knee swelling.    Right knee OA    Encounter Diagnosis   Name Primary?    Primary osteoarthritis of right knee Yes      Plan:   Right knee visco    Start physical therapy at [facility]   Continue conservative treatment.     Follow-up: 4 weeks or sooner if there are any problems between now and then.    Thank you for choosing [facility] and Dr. Robert Chambers for your orthopedic & sports medicine care. It is our goal to provide you with exceptional care that will help keep you healthy, active, and get you back in the game.    If you felt that you received exemplary care today, please consider leaving us feedback on Health[employer] at [email or web address].     Please do not hesitate to reach out to us via email, phone, or MyChart with any questions, concerns, or feedback.    If you are experiencing pain/discomfort ,or have questions after 5pm and would like to be connected to the [facility] on-call team, please call this number and specify which Sports Medicine provider is treating you: [phone]

## 2022-01-24 NOTE — PROGRESS NOTES
Patient ID: Zachary Lucia  YOB: 1975  MRN: [id number]    Chief Complaint: Pain of the Right Knee    Referred By: Self     History of Present Illness: Zachary Lucia is a 46 y.o. female   Technical support @ [facility] with a chief complaint of Pain of the Right Knee  Patient presents to the clinic today for a follow-up on her Right Knee 4 weeks s/p CSI (12/22/21). She c/o of 4/10 dull pain. The CSI gave her some relief. She states avoiding putting pressure on her knee, massaging and icing PRN, and uses her brace when driving. She is unable to use Voltaren (feels like burning). She has not been to PT because they have not called to schedule with her. She states that she cannot sleep in any one position for an extended period. Her knee pops and feels like it's going to give out when going from sitting to standing.     Previous History of Present Illness (12/22/21):   Symptom Onset:           Her pain began September 2021.  There was not a specific injury.  Gradual onset of anterior and medial sided right knee pain and joint effusion.    Current Symptoms:      Her pain is currently located right knee.  Average level of pain is 10/10.  Pain is improving.  She has experienced the following symptoms: pain, swelling, giving way and popping/clicking.  Aggravating activities include going up and down stairs, inactivity, rising after sitting, squatting, standing and walking.    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    Scleroderma     Seizures 01/12/2017     Past Surgical History:    Procedure Laterality Date    RENAL BIOPSY  11/16/2018     Family History   Problem Relation Age of Onset    Arthritis Mother     Glaucoma Mother     Hypertension Mother     Diabetes Father     Diabetes Maternal Grandmother     Hypertension Maternal Grandmother     Arthritis Maternal Grandmother     Cataracts Maternal Grandmother     Diabetes Maternal Grandfather     Heart disease Maternal Grandfather     Hypertension Maternal Grandfather     Diabetes Paternal Grandmother     Heart disease Paternal Grandmother     Hypertension Paternal Grandmother     Heart disease Paternal Grandfather      Social History     Socioeconomic History    Marital status:      Spouse name: Darrell    Number of children: 0   Tobacco Use    Smoking status: Never Smoker    Smokeless tobacco: Never Used   Substance and Sexual Activity    Alcohol use: Yes    Drug use: No    Sexual activity: Yes     Medication List with Changes/Refills   Current Medications    ASPIRIN (ECOTRIN) 81 MG EC TABLET    Take 1 tablet (81 mg total) by mouth once daily.    AZATHIOPRINE (IMURAN) 100 MG TABLET    Take 1 tablet (100 mg total) by mouth once daily.    AZATHIOPRINE (IMURAN) 50 MG TAB    Take 1 tablet (50 mg total) by mouth once daily in evening    BETAMETHASONE DIPROPIONATE (DIPROLENE) 0.05 % OINTMENT    Apply topically 2 (two) times daily as needed. Steroid. Use for flares    CHOLECALCIFEROL, VITAMIN D3, 1,250 MCG (50,000 UNIT) CAPSULE    Take 1 capsule (50,000 Units total) by mouth once a week.    CRISABOROLE (EUCRISA) 2 % OINT    AAA bid prn.  Non-steroid.  Safe to use long-term.    CYCLOBENZAPRINE (FLEXERIL) 10 MG TABLET    TAKE 1 TABLET (10 MG TOTAL) BY MOUTH 2 (TWO) TIMES DAILY AS NEEDED FOR MUSCLE SPASMS.    FERROUS SULFATE (FEOSOL) 325 MG (65 MG IRON) TAB TABLET    Take 1 tablet by mouth once daily.    FLUTICASONE-SALMETEROL DISKUS INHALER 100-50 MCG    Inhale 1 puff into the lungs 2 (two) times daily. Controller     HYDROCODONE-ACETAMINOPHEN (NORCO) 7.5-325 MG PER TABLET    Take 1 tablet by mouth 4 (four) times daily as needed.    HYDROXYCHLOROQUINE (PLAQUENIL) 200 MG TABLET    Take 1 tablet (200 mg total) by mouth 2 (two) times daily.     MG TABLET    Take 800 mg by mouth every 8 (eight) hours as needed.    LEVETIRACETAM XR (KEPPRA XR) 500 MG TB24 24 HR TABLET    Take 500 mg by mouth once daily.    LEVOCETIRIZINE (XYZAL) 5 MG TABLET    Take 1 tablet (5 mg total) by mouth every evening.    PANTOPRAZOLE (PROTONIX) 40 MG TABLET    Take 1 tablet (40 mg total) by mouth once daily.    PREDNISONE (DELTASONE) 10 MG TABLET    TAKE 1 TABLET BY MOUTH EVERY DAY    PREDNISONE (DELTASONE) 5 MG TABLET    Take 1 tablet (5 mg total) by mouth once daily. For 3-5 days as needed during flares    PROAIR HFA 90 MCG/ACTUATION INHALER    INHALE 2 PUFFS INTO THE LUNGS EVERY 6 (SIX) HOURS AS NEEDED FOR WHEEZING. RESCUE    PULSE OXIMETER (PULSE OXIMETER) DEVICE    Use by Apply Externally route 2 (two) times a day. Use twice daily at 8 AM and 3 PM and record the value in [facility] as directed.     Review of patient's allergies indicates:   Allergen Reactions    Lisinopril      cough    Sulfa (sulfonamide antibiotics) Swelling       Physical Exam:   Body mass index is 39.22 kg/m².  GENERAL: Well appearing, appropriate for stated age, no acute distress.  CARDIOVASCULAR: Pulses regular by peripheral palpation.  PULMONARY: Respirations are even and non-labored.  NEURO: Awake, alert, and oriented x 3.  PSYCH: Mood & affect are appropriate.  HEENT: Head is normocephalic and atraumatic.    General    Nursing note and vitals reviewed.          Right Knee Exam   Right knee exam is normal.    Inspection   Effusion: present    Tenderness   The patient is tender to palpation of the no tenderness and medial joint line.    Crepitus   The patient has crepitus of the patella.    Range of Motion   Extension: 0   Flexion: 120     Tests   Ligament Examination  Lachman: normal (-1 to 2mm) PCL-Posterior Drawer: normal (0 to 2mm)     MCL - Valgus: normal (0 to 2mm)  LCL - Varus: normal    Other   Sensation: normal    Left Knee Exam   Left knee exam is normal.    Inspection   Effusion: absent    Tenderness   The patient is experiencing no tenderness.     Crepitus   The patient has crepitus of the patella.    Range of Motion   Extension: 0   Flexion: 120     Tests   Stability Lachman: normal (-1 to 2mm) PCL-Posterior Drawer: normal (0 to 2mm)  MCL - Valgus: normal (0 to 2mm)  LCL - Varus: normal (0 to 2mm)    Other   Sensation: normal    Muscle Strength   Right Lower Extremity   Hip Abduction: 5/5   Quadriceps:  4/5   Hamstrin/5   Left Lower Extremity   Hip Abduction: 5/5   Quadriceps:  5/5   Hamstrin/5     Vascular Exam     Right Pulses  Dorsalis Pedis:      2+  Posterior Tibial:      2+        Left Pulses  Dorsalis Pedis:      2+  Posterior Tibial:      2+            Neurovascular: Intact EHL, FHL, gastrocsoleus, and tibialis anterior. Sensation intact to light touch in superficial peroneal, deep peroneal, tibial, sural, and saphenous nerve distributions. Foot warm and well perfused with capillary refill of less than 2 seconds and palpable pedal pulses.     Imaging:   XR Results:  Results for orders placed during the hospital encounter of 21    X-Ray Knee 1 or 2 View Right    Narrative  EXAMINATION:  XR KNEE 1 OR 2 VIEW RIGHT    CLINICAL HISTORY:  Pain in right knee    TECHNIQUE:  AP and lateral views of the right knee were performed.    COMPARISON:  2021    FINDINGS:  Joint spaces are maintained.  No fracture or dislocation is seen on this single view.    Impression  Please see above      Electronically signed by: Ministerio Hansen MD  Date:    2021  Time:    09:17    MRI Results:  No results found for this or any previous visit.    CT Results:  No results found for this or any previous visit.    Relevant imaging results reviewed and interpreted  by me, discussed with the patient and / or family today.     Other Tests:   No other tests performed today    Patient Instructions     Assessment:  Zachary Lucia is a  46 y.o. female   Technical support @ [facility] with a chief complaint of Pain of the Right Knee  Presents today for a recheck on right knee pain and right knee swelling.    Right knee OA    Encounter Diagnosis   Name Primary?    Primary osteoarthritis of right knee Yes      Plan:   Right knee visco    Start physical therapy at [facility]   Continue conservative treatment.     Follow-up: 4 weeks or sooner if there are any problems between now and then.    Thank you for choosing [facility] and Dr. Robert Chambers for your orthopedic & sports medicine care. It is our goal to provide you with exceptional care that will help keep you healthy, active, and get you back in the game.    If you felt that you received exemplary care today, please consider leaving us feedback on Healthgrades at [email or web address].     Please do not hesitate to reach out to us via email, phone, or MyChart with any questions, concerns, or feedback.    If you are experiencing pain/discomfort ,or have questions after 5pm and would like to be connected to the [facility] on-call team, please call this number and specify which Sports Medicine provider is treating you: [phone]         Provider Note/Medical Decision Making:   MEDICAL NECESSITY FOR VISCOSUPPLEMENTATION: After thorough evaluation of the patient, I have determined that visco-supplementation is medically necessary. The patient has painful DJD of the knee with failure of conservative therapy including lifestyle modifications and rehabilitation exercises. Oral analgesis/NSAIDs have not adequately controlled symptoms and there is radiographic evidence of joint space narrowing, subchondral sclerosis, and  some early osteophytic changes, or in lack of radiographic evidence, there is arthroscopic or other evidence of chondrosis.  Kellgren- Jeff grade 2 or greater.      I discussed worrisome and red flag signs and symptoms with the patient. The patient expressed understanding and agreed to alert me immediately or to go to the emergency room if they experience any of these.    Treatment plan was developed with input from the patient/family, and they expressed understanding and agreement with the plan. All questions were answered today.    Disclaimer: This note was prepared using a voice recognition system and is likely to have sound alike errors within the text.

## 2022-01-25 ENCOUNTER — HOSPITAL ENCOUNTER (OUTPATIENT)
Facility: HOSPITAL | Age: 47
Discharge: HOME | End: 2022-01-25
Attending: INTERNAL MEDICINE | Admitting: INTERNAL MEDICINE
Payer: COMMERCIAL

## 2022-01-25 DIAGNOSIS — R94.39 ABNORMAL STRESS TEST: ICD-10-CM

## 2022-01-25 LAB
B-HCG UR QL: NEGATIVE
CATH EF QUANTITATIVE: 60 %
CTP QC/QA: YES
DSDNA AB SER-ACNC: NORMAL [IU]/ML
SARS-COV-2 AG RESP QL IA.RAPID: NEGATIVE

## 2022-01-25 PROCEDURE — 25500020 PHARM REV CODE 255: Performed by: INTERNAL MEDICINE

## 2022-01-25 PROCEDURE — 99152 PR MOD CONSCIOUS SEDATION, SAME PHYS, 5+ YRS, FIRST 15 MIN: ICD-10-PCS | Mod: ,,, | Performed by: INTERNAL MEDICINE

## 2022-01-25 PROCEDURE — 63600175 PHARM REV CODE 636 W HCPCS: Performed by: INTERNAL MEDICINE

## 2022-01-25 PROCEDURE — C1894 INTRO/SHEATH, NON-LASER: HCPCS | Performed by: INTERNAL MEDICINE

## 2022-01-25 PROCEDURE — 99153 MOD SED SAME PHYS/QHP EA: CPT | Performed by: INTERNAL MEDICINE

## 2022-01-25 PROCEDURE — 93458 L HRT ARTERY/VENTRICLE ANGIO: CPT | Mod: 26,,, | Performed by: INTERNAL MEDICINE

## 2022-01-25 PROCEDURE — 93458 PR CATH PLACE/CORON ANGIO, IMG SUPER/INTERP,W LEFT HEART VENTRICULOGRAPHY: ICD-10-PCS | Mod: 26,,, | Performed by: INTERNAL MEDICINE

## 2022-01-25 PROCEDURE — 99152 MOD SED SAME PHYS/QHP 5/>YRS: CPT | Mod: ,,, | Performed by: INTERNAL MEDICINE

## 2022-01-25 PROCEDURE — 27201423 OPTIME MED/SURG SUP & DEVICES STERILE SUPPLY: Performed by: INTERNAL MEDICINE

## 2022-01-25 PROCEDURE — 81025 URINE PREGNANCY TEST: CPT | Performed by: INTERNAL MEDICINE

## 2022-01-25 PROCEDURE — 25000003 PHARM REV CODE 250: Performed by: INTERNAL MEDICINE

## 2022-01-25 PROCEDURE — C1769 GUIDE WIRE: HCPCS | Performed by: INTERNAL MEDICINE

## 2022-01-25 PROCEDURE — 99152 MOD SED SAME PHYS/QHP 5/>YRS: CPT | Performed by: INTERNAL MEDICINE

## 2022-01-25 PROCEDURE — 93458 L HRT ARTERY/VENTRICLE ANGIO: CPT | Performed by: INTERNAL MEDICINE

## 2022-01-25 RX ORDER — NAPROXEN 250 MG/1
250 TABLET ORAL ONCE
Status: COMPLETED | OUTPATIENT
Start: 2022-01-25 | End: 2022-01-25

## 2022-01-25 RX ORDER — HYDROMORPHONE HCL 2 MG/ML
VIAL (ML) INJECTION
Status: DISCONTINUED | OUTPATIENT
Start: 2022-01-25 | End: 2022-01-25 | Stop reason: HOSPADM

## 2022-01-25 RX ORDER — DIAZEPAM 5 MG/1
5 TABLET ORAL
Status: DISCONTINUED | OUTPATIENT
Start: 2022-01-25 | End: 2022-01-25 | Stop reason: HOSPADM

## 2022-01-25 RX ORDER — HEPARIN SODIUM 1000 [USP'U]/ML
INJECTION, SOLUTION INTRAVENOUS; SUBCUTANEOUS
Status: DISCONTINUED | OUTPATIENT
Start: 2022-01-25 | End: 2022-01-25 | Stop reason: HOSPADM

## 2022-01-25 RX ORDER — FENTANYL CITRATE 50 UG/ML
INJECTION, SOLUTION INTRAMUSCULAR; INTRAVENOUS
Status: DISCONTINUED | OUTPATIENT
Start: 2022-01-25 | End: 2022-01-25 | Stop reason: HOSPADM

## 2022-01-25 RX ORDER — ACETAMINOPHEN 325 MG/1
650 TABLET ORAL EVERY 4 HOURS PRN
Status: DISCONTINUED | OUTPATIENT
Start: 2022-01-25 | End: 2022-01-25 | Stop reason: HOSPADM

## 2022-01-25 RX ORDER — SODIUM CHLORIDE 9 MG/ML
INJECTION, SOLUTION INTRAVENOUS CONTINUOUS
Status: ACTIVE | OUTPATIENT
Start: 2022-01-25 | End: 2022-01-25

## 2022-01-25 RX ORDER — NAPROXEN SODIUM 220 MG/1
81 TABLET, FILM COATED ORAL ONCE
Status: DISCONTINUED | OUTPATIENT
Start: 2022-01-25 | End: 2022-01-25 | Stop reason: HOSPADM

## 2022-01-25 RX ORDER — NITROGLYCERIN 5 MG/ML
INJECTION, SOLUTION INTRAVENOUS
Status: DISCONTINUED | OUTPATIENT
Start: 2022-01-25 | End: 2022-01-25 | Stop reason: HOSPADM

## 2022-01-25 RX ORDER — MIDAZOLAM HYDROCHLORIDE 1 MG/ML
INJECTION INTRAMUSCULAR; INTRAVENOUS
Status: DISCONTINUED | OUTPATIENT
Start: 2022-01-25 | End: 2022-01-25 | Stop reason: HOSPADM

## 2022-01-25 RX ORDER — ONDANSETRON 8 MG/1
8 TABLET, ORALLY DISINTEGRATING ORAL EVERY 8 HOURS PRN
Status: DISCONTINUED | OUTPATIENT
Start: 2022-01-25 | End: 2022-01-25 | Stop reason: HOSPADM

## 2022-01-25 RX ORDER — DIPHENHYDRAMINE HCL 50 MG
50 CAPSULE ORAL ONCE
Status: DISCONTINUED | OUTPATIENT
Start: 2022-01-25 | End: 2022-01-25 | Stop reason: HOSPADM

## 2022-01-25 RX ORDER — ONDANSETRON 8 MG/1
8 TABLET, ORALLY DISINTEGRATING ORAL ONCE
Status: COMPLETED | OUTPATIENT
Start: 2022-01-25 | End: 2022-01-25

## 2022-01-25 RX ORDER — VERAPAMIL HYDROCHLORIDE 2.5 MG/ML
INJECTION, SOLUTION INTRAVENOUS
Status: DISCONTINUED | OUTPATIENT
Start: 2022-01-25 | End: 2022-01-25 | Stop reason: HOSPADM

## 2022-01-25 RX ADMIN — ONDANSETRON 8 MG: 8 TABLET, ORALLY DISINTEGRATING ORAL at 10:01

## 2022-01-25 RX ADMIN — NAPROXEN 250 MG: 250 TABLET ORAL at 10:01

## 2022-01-25 NOTE — Clinical Note
The left ventricle was injected. The injected rate was 12 mL/sec. The total injected volume was 24 mL.

## 2022-01-25 NOTE — Clinical Note
ID band present and verified. Family is not present. Contact # : Darrell Lucia (spouse) [phone].

## 2022-01-25 NOTE — OP NOTE
INPATIENT Operative Note         SUMMARY     Surgery Date: 1/25/2022     Surgeon(s) and Role:     * Rocío Davidson MD - Primary    ASSISTANT:none    Pre-op Diagnosis:  Abnormal stress test [R94.39]Chest pain, unspecified type [R07.9]      Post-op Diagnosis:  Abnormal stress test [R94.39]Chest pain, unspecified type [R07.9]    Procedure(s) (LRB):  CATHETERIZATION, HEART, LEFT (Left)    COMPLICATION:none    Anesthesia: RN IV Sedation    Findings/Key Components:  Normal coronaries    Estimated Blood Loss: < 50 ML.         SPECIMEN: NONE    Devices/Prostetics: None    PLAN:  Reassure.

## 2022-01-25 NOTE — INTERVAL H&P NOTE
The patient has been examined and the H&P has been reviewed:    I concur with the findings and no changes have occurred since H&P was written.    Procedure risks, benefits and alternative options discussed and understood by patient/family.          Active Hospital Problems    Diagnosis  POA    Abnormal stress test [R94.39]  Yes    Lupus nephritis, ISN/RPS class V [M32.14]  Yes     On acei        Raynaud's disease without gangrene [I73.00]  Yes     On amlodipine         Scleroderma [M34.9]  Yes    Sjogren's syndrome [M35.00]  Yes      Resolved Hospital Problems   No resolved problems to display.

## 2022-01-25 NOTE — NURSING
1100 Discharge inst. Reviewed with patient and  and placed in discharge folder.  1115 Ambulated to bathroom and back.  Upon return to stretcher c/o nausea.  1120 coughed up no emesis, only sputum.  1125 Assisted to lay down on stretcher.  IV fluids infusing until nausea subsides.

## 2022-01-25 NOTE — Clinical Note
The left radial was prepped. The site was prepped with ChloraPrep. The patient was draped. The patient was positioned supine.

## 2022-01-25 NOTE — Clinical Note
95 ml of contrast were injected throughout the case. 55 mL of contrast was the total wasted during the case. 150 mL was the total amount used during the case.

## 2022-01-25 NOTE — PLAN OF CARE
1205 Requesting to go home.  Denies nausea. 1210 Ambulated to desk and back.  Denies nausea.  1215 IV removed.  1225 Discharged to home.  To exit via W/C

## 2022-01-26 VITALS
TEMPERATURE: 98 F | RESPIRATION RATE: 15 BRPM | DIASTOLIC BLOOD PRESSURE: 79 MMHG | BODY MASS INDEX: 39.15 KG/M2 | HEIGHT: 66 IN | OXYGEN SATURATION: 98 % | HEART RATE: 74 BPM | WEIGHT: 243.63 LBS | SYSTOLIC BLOOD PRESSURE: 150 MMHG

## 2022-01-26 NOTE — DISCHARGE SUMMARY
[facility] - Cath Lab (Hospital)  Discharge Note  Short Stay    Procedure(s) (LRB):  CATHETERIZATION, HEART, LEFT (Left)    OUTCOME: Patient tolerated treatment/procedure well without complication and is now ready for discharge.    DISPOSITION: Home or Self Care    FINAL DIAGNOSIS:  Abnormal stress test    FOLLOWUP: In clinic    DISCHARGE INSTRUCTIONS:    Discharge Procedure Orders   Diet general     Call MD for:  temperature >100.4     Call MD for:  persistent nausea and vomiting     Call MD for:  severe uncontrolled pain     Call MD for:  difficulty breathing, headache or visual disturbances     Call MD for:  redness, tenderness, or signs of infection (pain, swelling, redness, odor or green/yellow discharge around incision site)     Call MD for:  hives     Call MD for:  persistent dizziness or light-headedness     Call MD for:  extreme fatigue        TIME SPENT ON DISCHARGE: 15  minutes

## 2022-02-01 NOTE — PROGRESS NOTES
Subjective:   Patient ID:  Zachary Lucia is a 46 y.o. female who presents for follow-up of No chief complaint on file.  Intermittent mild chest discomfort some of the typical and atypical.  Positive family history of coronary disease on her mother side.  Will go ahead with a stress test echo done recently was completely normal.  EKG showed nonspecific changes.        Overall patient is stable.  We would like to go ahead with a nuclear stress test as the regular stress test did show ST segment changes.  Patient has had no chest discomfort on this test.  Follow-up evaluation after nuclear stress test is performed.  Patient has a very high risk factor for family history of coronary disease.     The patient presents the office having intermittent chest discomfort.  Nuclear stress test showed inferior basal ischemia.  ST changes in inferior leads.  Patient continues intermittent symptoms.  Will go ahead with heart catheterization to rule out significant coronary disease.  Patient agreeable to this plan.       This visit finds patient having intermittent palpitations and rapid heart rate.  No chest pain.  Review of the cardiac echo shows normal heart function the recent heart catheterization showed normal pressures and normal coronary anatomy normal heart function.  Will go ahead with a Holter monitor review possible tachyarrhythmias occurring the cause the patient be short of breath.  History of lupus no acute acute changes in medications asthmatic condition in no acute changes there is well.      Review of Systems   Constitutional: Negative for chills, diaphoresis, night sweats, weight gain and weight loss.   HENT: Negative for congestion, hoarse voice, sore throat and stridor.    Eyes: Negative for double vision and pain.   Cardiovascular: Negative for chest pain, claudication, cyanosis, dyspnea on exertion, irregular heartbeat, leg swelling, near-syncope, orthopnea, palpitations, paroxysmal  nocturnal dyspnea and syncope.   Respiratory: Negative for cough, hemoptysis, shortness of breath, sleep disturbances due to breathing, snoring, sputum production and wheezing.    Endocrine: Negative for cold intolerance, heat intolerance and polydipsia.   Hematologic/Lymphatic: Negative for bleeding problem. Does not bruise/bleed easily.   Skin: Negative for color change, dry skin and rash.   Musculoskeletal: Negative for joint swelling and muscle cramps.   Gastrointestinal: Negative for bloating, abdominal pain, constipation, diarrhea, dysphagia, melena, nausea and vomiting.   Genitourinary: Negative for flank pain and urgency.   Neurological: Negative for dizziness, focal weakness, headaches, light-headedness, loss of balance, seizures and weakness.   Psychiatric/Behavioral: Negative for altered mental status and memory loss. The patient is not nervous/anxious.      Family History   Problem Relation Age of Onset    Arthritis Mother     Glaucoma Mother     Hypertension Mother     Diabetes Father     Diabetes Maternal Grandmother     Hypertension Maternal Grandmother     Arthritis Maternal Grandmother     Cataracts Maternal Grandmother     Diabetes Maternal Grandfather     Heart disease Maternal Grandfather     Hypertension Maternal Grandfather     Diabetes Paternal Grandmother     Heart disease Paternal Grandmother     Hypertension Paternal Grandmother     Heart disease Paternal Grandfather      Past Medical History:   Diagnosis Date    Abnormal stress test 1/25/2022    Asthma     Essential hypertension, malignant 1/8/2020    Lupus (systemic lupus erythematosus)     Membranous glomerulonephritis, 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    Scleroderma     Seizures 01/12/2017     Social History     Socioeconomic History    Marital status:      Spouse name: Darrell    Number of children: 0   Tobacco Use    Smoking status: Never Smoker    Smokeless tobacco: Never Used   Substance and Sexual Activity     Alcohol use: Yes    Drug use: No    Sexual activity: Yes     Current Outpatient Medications on File Prior to Visit   Medication Sig Dispense Refill    aspirin (ECOTRIN) 81 MG EC tablet Take 1 tablet (81 mg total) by mouth once daily. 30 tablet 0    azaTHIOprine (IMURAN) 100 mg tablet Take 1 tablet (100 mg total) by mouth once daily. 90 tablet 1    azaTHIOprine (IMURAN) 50 mg Tab Take 1 tablet (50 mg total) by mouth once daily in evening 90 tablet 1    betamethasone dipropionate (DIPROLENE) 0.05 % ointment Apply topically 2 (two) times daily as needed. Steroid. Use for flares 45 g 3    cholecalciferol, vitamin D3, 1,250 mcg (50,000 unit) capsule Take 1 capsule (50,000 Units total) by mouth once a week. 15 capsule 1    crisaborole (EUCRISA) 2 % Oint AAA bid prn.  Non-steroid.  Safe to use long-term. 60 g 3    cyclobenzaprine (FLEXERIL) 10 MG tablet TAKE 1 TABLET (10 MG TOTAL) BY MOUTH 2 (TWO) TIMES DAILY AS NEEDED FOR MUSCLE SPASMS. 60 tablet 0    ferrous sulfate (FEOSOL) 325 mg (65 mg iron) Tab tablet Take 1 tablet by mouth once daily.      fluticasone-salmeterol diskus inhaler 100-50 mcg Inhale 1 puff into the lungs 2 (two) times daily. Controller 1 each 11    HYDROcodone-acetaminophen (NORCO) 7.5-325 mg per tablet Take 1 tablet by mouth 4 (four) times daily as needed.      hydrOXYchloroQUINE (PLAQUENIL) 200 mg tablet Take 1 tablet (200 mg total) by mouth 2 (two) times daily. 180 tablet 3     mg tablet Take 800 mg by mouth every 8 (eight) hours as needed.  0    levetiracetam XR (KEPPRA XR) 500 mg Tb24 24 hr tablet Take 500 mg by mouth once daily.      levocetirizine (XYZAL) 5 MG tablet Take 1 tablet (5 mg total) by mouth every evening. 90 tablet 3    pantoprazole (PROTONIX) 40 MG tablet Take 1 tablet (40 mg total) by mouth once daily. 30 tablet 11    predniSONE (DELTASONE) 10 MG tablet TAKE 1 TABLET BY MOUTH EVERY DAY 60 tablet 1    predniSONE (DELTASONE) 5 MG tablet Take 1 tablet (5 mg  total) by mouth once daily. For 3-5 days as needed during flares 30 tablet 0    PROAIR HFA 90 mcg/actuation inhaler INHALE 2 PUFFS INTO THE LUNGS EVERY 6 (SIX) HOURS AS NEEDED FOR WHEEZING. RESCUE 8.5 Inhaler 0    pulse oximeter (PULSE OXIMETER) device Use by Apply Externally route 2 (two) times a day. Use twice daily at 8 AM and 3 PM and record the value in Perkt as directed. 1 each 0     No current facility-administered medications on file prior to visit.     Review of patient's allergies indicates:   Allergen Reactions    Lisinopril      cough    Sulfa (sulfonamide antibiotics) Swelling       Objective:     Physical Exam  Eyes:      Pupils: Pupils are equal, round, and reactive to light.   Neck:      Trachea: No tracheal deviation.   Cardiovascular:      Rate and Rhythm: Normal rate and regular rhythm.      Pulses: Intact distal pulses.           Carotid pulses are 2+ on the right side and 2+ on the left side.       Radial pulses are 2+ on the right side and 2+ on the left side.        Femoral pulses are 2+ on the right side and 2+ on the left side.       Popliteal pulses are 2+ on the right side and 2+ on the left side.        Dorsalis pedis pulses are 2+ on the right side and 2+ on the left side.        Posterior tibial pulses are 2+ on the right side and 2+ on the left side.      Heart sounds: Normal heart sounds. No murmur heard.  No friction rub. No gallop.    Pulmonary:      Effort: Pulmonary effort is normal. No respiratory distress.      Breath sounds: Normal breath sounds. No stridor. No wheezing or rales.   Chest:      Chest wall: No tenderness.   Abdominal:      General: There is no distension.      Tenderness: There is no abdominal tenderness. There is no rebound.   Musculoskeletal:         General: No tenderness or edema.      Cervical back: Normal range of motion.   Skin:     General: Skin is warm and dry.   Neurological:      Mental Status: She is alert and oriented to person, place, and  time.        Cardiac catheterization 01/25/2022  · The pre-procedure left ventricular end diastolic pressure was 20.  · The post-procedure left ventricular end diastolic pressure was 20.  · The ejection fraction was calculated to be 60%.  · There was no aortic valve stenosis.  · There was no mitral valve stenosis.  · There was no mitral valve prolapse evident. The annulus was normal. There was normal mitral valve motion.  · The estimated blood loss was none.  · The coronary arteries were normal..  · Tortuous coronary arteries.     1 Patient Communication     1  Topic    Component Ref Range & Units 2 mo ago   (11/29/21) 1 yr ago   (6/9/20) 2 yr ago   (5/30/19) 2 yr ago   (3/11/19)   Cholesterol 120 - 199 mg/dL 180  176 R  192 R  179 CM    Comment: The National Cholesterol Education Program (NCEP) has set the   following guidelines (reference ranges) for Cholesterol:   Optimal.....................<200 mg/dL   Borderline High.............200-239 mg/dL   High........................> or = 240 mg/dL    Triglycerides 30 - 150 mg/dL 101  71 R  85 R  79 CM    Comment: The National Cholesterol Education Program (NCEP) has set the   following guidelines (reference values) for triglycerides:   Normal......................<150 mg/dL   Borderline High.............150-199 mg/dL   High........................200-499 mg/dL    HDL 40 - 75 mg/dL 53  59 R  54 R  53 CM    Comment: The National Cholesterol Education Program (NCEP) has set the   following guidelines (reference values) for HDL Cholesterol:   Low...............<40 mg/dL   Optimal...........>60 mg/dL    LDL Cholesterol 63.0 - 159.0 mg/dL 106.8    110.2 CM    Comment: The National Cholesterol Education Program (NCEP) has set the   following guidelines (reference values) for LDL Cholesterol:   Optimal.......................<130 mg/dL   Borderline High...............130-159 mg/dL   High..........................160-189 mg/dL           Assessment:     1. Abnormal stress test     2. COVID-19 virus infection    3. Scleroderma    4. Pain in extremity, unspecified extremity    5. Pneumonia due to COVID-19 virus    6. Essential hypertension    7. Sepsis, due to unspecified organism, unspecified whether acute organ dysfunction present    8. Chest pain, unspecified type    9. SOB (shortness of breath)    10. Respiratory distress    11. Sjogren's syndrome with keratoconjunctivitis sicca        Plan:     Abnormal stress test    COVID-19 virus infection    Scleroderma    Pain in extremity, unspecified extremity    Pneumonia due to COVID-19 virus    Essential hypertension    Sepsis, due to unspecified organism, unspecified whether acute organ dysfunction present    Chest pain, unspecified type    SOB (shortness of breath)    Respiratory distress    Sjogren's syndrome with keratoconjunctivitis sicca    Impression 1 hypertension under good control today  2. Chest discomfort resolved in no acute change  3.  shortness of breath may be related to increased heart rate will go ahead with a Holter monitor follow-up evaluation after monitor is done at this point stable all questions answered we reviewed the cardiac echo and the cardiac catheterization which overall normal in this situation.

## 2022-02-02 ENCOUNTER — OFFICE VISIT (OUTPATIENT)
Dept: CARDIOLOGY | Facility: CLINIC | Age: 47
End: 2022-02-02
Payer: COMMERCIAL

## 2022-02-02 VITALS
SYSTOLIC BLOOD PRESSURE: 142 MMHG | DIASTOLIC BLOOD PRESSURE: 90 MMHG | WEIGHT: 243.19 LBS | OXYGEN SATURATION: 99 % | BODY MASS INDEX: 39.08 KG/M2 | HEART RATE: 75 BPM | HEIGHT: 66 IN

## 2022-02-02 DIAGNOSIS — M79.609 PAIN IN EXTREMITY, UNSPECIFIED EXTREMITY: ICD-10-CM

## 2022-02-02 DIAGNOSIS — M34.9 SCLERODERMA: ICD-10-CM

## 2022-02-02 DIAGNOSIS — R07.9 CHEST PAIN, UNSPECIFIED TYPE: ICD-10-CM

## 2022-02-02 DIAGNOSIS — U07.1 COVID-19 VIRUS INFECTION: ICD-10-CM

## 2022-02-02 DIAGNOSIS — A41.9 SEPSIS, DUE TO UNSPECIFIED ORGANISM, UNSPECIFIED WHETHER ACUTE ORGAN DYSFUNCTION PRESENT: ICD-10-CM

## 2022-02-02 DIAGNOSIS — J12.82 PNEUMONIA DUE TO COVID-19 VIRUS: ICD-10-CM

## 2022-02-02 DIAGNOSIS — R06.02 SOB (SHORTNESS OF BREATH): Primary | ICD-10-CM

## 2022-02-02 DIAGNOSIS — R94.39 ABNORMAL STRESS TEST: ICD-10-CM

## 2022-02-02 DIAGNOSIS — I10 ESSENTIAL HYPERTENSION: ICD-10-CM

## 2022-02-02 DIAGNOSIS — R06.03 RESPIRATORY DISTRESS: ICD-10-CM

## 2022-02-02 DIAGNOSIS — M35.01 SJOGREN'S SYNDROME WITH KERATOCONJUNCTIVITIS SICCA: ICD-10-CM

## 2022-02-02 DIAGNOSIS — U07.1 PNEUMONIA DUE TO COVID-19 VIRUS: ICD-10-CM

## 2022-02-02 PROCEDURE — 3077F PR MOST RECENT SYSTOLIC BLOOD PRESSURE >= 140 MM HG: ICD-10-PCS | Mod: CPTII,S$GLB,, | Performed by: INTERNAL MEDICINE

## 2022-02-02 PROCEDURE — 99214 PR OFFICE/OUTPT VISIT, EST, LEVL IV, 30-39 MIN: ICD-10-PCS | Mod: S$GLB,,, | Performed by: INTERNAL MEDICINE

## 2022-02-02 PROCEDURE — 3008F BODY MASS INDEX DOCD: CPT | Mod: CPTII,S$GLB,, | Performed by: INTERNAL MEDICINE

## 2022-02-02 PROCEDURE — 1159F MED LIST DOCD IN RCRD: CPT | Mod: CPTII,S$GLB,, | Performed by: INTERNAL MEDICINE

## 2022-02-02 PROCEDURE — 3008F PR BODY MASS INDEX (BMI) DOCUMENTED: ICD-10-PCS | Mod: CPTII,S$GLB,, | Performed by: INTERNAL MEDICINE

## 2022-02-02 PROCEDURE — 99999 PR PBB SHADOW E&M-EST. PATIENT-LVL V: CPT | Mod: PBBFAC,,, | Performed by: INTERNAL MEDICINE

## 2022-02-02 PROCEDURE — 3080F PR MOST RECENT DIASTOLIC BLOOD PRESSURE >= 90 MM HG: ICD-10-PCS | Mod: CPTII,S$GLB,, | Performed by: INTERNAL MEDICINE

## 2022-02-02 PROCEDURE — 1159F PR MEDICATION LIST DOCUMENTED IN MEDICAL RECORD: ICD-10-PCS | Mod: CPTII,S$GLB,, | Performed by: INTERNAL MEDICINE

## 2022-02-02 PROCEDURE — 3066F PR DOCUMENTATION OF TREATMENT FOR NEPHROPATHY: ICD-10-PCS | Mod: CPTII,S$GLB,, | Performed by: INTERNAL MEDICINE

## 2022-02-02 PROCEDURE — 3080F DIAST BP >= 90 MM HG: CPT | Mod: CPTII,S$GLB,, | Performed by: INTERNAL MEDICINE

## 2022-02-02 PROCEDURE — 99214 OFFICE O/P EST MOD 30 MIN: CPT | Mod: S$GLB,,, | Performed by: INTERNAL MEDICINE

## 2022-02-02 PROCEDURE — 99999 PR PBB SHADOW E&M-EST. PATIENT-LVL V: ICD-10-PCS | Mod: PBBFAC,,, | Performed by: INTERNAL MEDICINE

## 2022-02-02 PROCEDURE — 3077F SYST BP >= 140 MM HG: CPT | Mod: CPTII,S$GLB,, | Performed by: INTERNAL MEDICINE

## 2022-02-02 PROCEDURE — 3066F NEPHROPATHY DOC TX: CPT | Mod: CPTII,S$GLB,, | Performed by: INTERNAL MEDICINE

## 2022-02-11 ENCOUNTER — CLINICAL SUPPORT (OUTPATIENT)
Dept: REHABILITATION | Facility: HOSPITAL | Age: 47
End: 2022-02-11
Payer: COMMERCIAL

## 2022-02-11 DIAGNOSIS — R26.89 FUNCTIONAL GAIT ABNORMALITY: ICD-10-CM

## 2022-02-11 DIAGNOSIS — R53.1 DECREASED STRENGTH, ENDURANCE, AND MOBILITY: ICD-10-CM

## 2022-02-11 DIAGNOSIS — Z74.09 DECREASED STRENGTH, ENDURANCE, AND MOBILITY: ICD-10-CM

## 2022-02-11 DIAGNOSIS — M17.11 PRIMARY OSTEOARTHRITIS OF RIGHT KNEE: ICD-10-CM

## 2022-02-11 DIAGNOSIS — M25.461 SWELLING OF JOINT OF RIGHT KNEE: ICD-10-CM

## 2022-02-11 DIAGNOSIS — R68.89 DECREASED STRENGTH, ENDURANCE, AND MOBILITY: ICD-10-CM

## 2022-02-11 PROCEDURE — 97162 PT EVAL MOD COMPLEX 30 MIN: CPT

## 2022-02-11 PROCEDURE — 97110 THERAPEUTIC EXERCISES: CPT

## 2022-02-11 NOTE — PLAN OF CARE
[facility]   Physical Therapy Initial Evaluation   Date: 2/11/2022   Name: Zachary Escalona Saco  Clinic Number: [id number]    Therapy Diagnosis:   Encounter Diagnoses   Name Primary?    Primary osteoarthritis of right knee     Functional gait abnormality     Decreased strength, endurance, and mobility     Swelling of joint of right knee      Physician: Kaya Fatima PA-C    Physician Orders: PT Eval and Treat  Medical Diagnosis from Referral: Primary osteoarthritis of right knee  Evaluation Date: 2/11/2022  Authorization Period Expiration: 4/1/2022  Plan of Care Expiration: 5/11/2022  Progress Note Due: 3/11/2022  Visit # / Visits authorized: 1/ 1   FOTO: 1/3    Precautions: Standard and seizure disorder, migraines, Raynaud's disease, lupus nephritis Sjogren's syndrome, scleroderma    Time In: 8:36AM  Time Out: 9:16AM  Total Appointment Time (timed & untimed codes): 40 minutes    SUBJECTIVE     Date of onset: September 2021  History of current condition - Zachary reports: that she started feeling knee pain back in September whenever she was driving randomly. Patient reports that she has multiple auto immune disorders so she thought there might be just fluid build up or something in her knee which typically comes and goes. Patient reports that with this instance she had pain which does not normally occur and has progressively gotten worse. The pain has prevented her from performing all functional activities including driving short or long distances as this in now out of the question. Patient reports that she has trouble sleeping at night due to the severity of pain as well. Patient received cortisone injection from orthopedic team which provided some relief.   Patient reports that since then she drove to her hometown that is about two hours away and her knee began killing her/throbbing after 15 minutes. Patient reports that she wore a Coppertone sleeve as recommended  during the drive although did not help much. Patient reports that knee extension, laying down hurts and everything in between hurts.    Patient reports that the pain is more significant after prolonged sustained posturing and after standing from sustained seated posturing. Patient reports that after she is moving around for a while the pain subsides.   Patient reports that she initially thought the pain could have also been a baker's cyst because she had problems with this about 2 years ago in her left knee. Patient reports that she additionally has central low back pain that has been present since 1990s. Patient reports that since she put on some weight she finds that it has gotten worse. Patient reports that whenever she is doing house chores her low back will start hurting almost immediately before her knee will bother her.      Falls: No falls reported.     Imaging, X-Ray Knee Right 12/21/2021, X-Ray Knee Ortho Right 11/29/2021: Reviewed in chart.     Prior Therapy: Patient has never has Physical Therapy for knee pain.   Social History: Patient lives with their spouse in a one level home.   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   Prior Level of Function: Patient had intermittent flare ups related to auto immune diseases but never had pain associated with it.   Current Level of Function: Patient has increased pain when negotiating stairs, more ascending than descending. Patient has a storage unit with 3 steps to enter that they have been working in which is difficulty and painful for her to consistently access. Patient has increased pain after sustained postures (mainly sitting for long durations) and then transitioning to standing following prolonged sitting. Patient has increased pain when sleeping at night which makes it difficult for her to sleep.    Pain:  Current 4/10, worst 9/10, best 3/10   Location: right medial  knee  Description: Burning, Throbbing and Sharp  Aggravating Factors: Stationary flexed positioning and then getting up after sustain immobility  Easing Factors: cortisone injection    Dominant Extremity: Right    Patients goals: is to get her knee to do what it needs to do with less pain. Wants to gets back to walking 25 minutes at the gym, 2 times per week.     Medical History:   Past Medical History:   Diagnosis Date    Abnormal stress test 1/25/2022    Asthma     Essential hypertension, malignant 1/8/2020    Lupus (systemic lupus erythematosus)     Membranous glomerulonephritis, 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    Scleroderma     Seizures 01/12/2017       Surgical History:   Zachary Lucia  has a past surgical history that includes Renal biopsy (11/16/2018) and Left heart catheterization (Left, 1/25/2022).    Medications:   Zachary has a current medication list which includes the following prescription(s): aspirin, azathioprine, azathioprine, betamethasone dipropionate, cholecalciferol (vitamin d3), eucrisa, cyclobenzaprine, ferrous sulfate, fluticasone-salmeterol 100-50 mcg/dose, hydrocodone-acetaminophen, hydroxychloroquine, ibu, levetiracetam xr, levocetirizine, pantoprazole, prednisone, prednisone, proair hfa, and pulse oximeter.    Allergies:   Review of patient's allergies indicates:   Allergen Reactions    Lisinopril      cough    Sulfa (sulfonamide antibiotics) Swelling          OBJECTIVE     Sensation:  Sensation is intact to light touch with hypersensitivity along right medial knee.  Posture:  Pt presents with postural abnormalities which include: right knee flexed slightly in stance, slight right lateral shift despite knee pain  Palpation: Increased tone and tenderness noted with palpation of right quadriceps, vastus medialis/VMO, medial hamstrings, hip adductors  and ITB and left adductors although not as tender. Increased tenderness noted with palpation of right medial tibiofemoral  joint line , lateral tibiofemoral joint line  and pes anserine.   Girth Measurements: Measured at mid patella reading 50.5 cm on right and 48 cm on left.  Movement Analysis: Patient noted with upper extremity support when performing sit to stand transfers after sitting for short duration with apprehension and pain present requiring extended time to complete.    Gait Analysis: The patient ambulated with the following assistive device: none; the pt presents with the following gait abnormalities: decreased step length on right, decreased stance time on right, decreased heel strike on right, decreased hip extension on right, decreased knee flexion on right, decreased knee extension on right and left lateral trunk lean and weight shift to assist with forward progression of right lower extremity    (x = not tested due to pain and/or inability to obtain test position)    Range of Motion/Strength:     Hip Right  2/11/2022 Left  2/11/2022 Pain/Dysfunction with Movement Goal   AROM       Flexion (120)  Full Full Fullness in right knee No fullness present   Extension (30)  15 flexion  10 flexion Pain in right knee and lumbar region 10 B No pain   Abduction (45) 30 35 Tightness in adductors B 40 B No pain   IR (45) 15 25 Pain in right knee 40 B No pain   ER (45) 30  40 Pain in right knee 40 B No pain     Knee Right  2/11/2022 Left  2/11/2022 Pain/Dysfunction with Movement Goal   AROM       Flexion (135)  104  120 Significant pain and fullness in right knee 125 B No pain   Extension (0)  7 flexion  0 Pain in right knee 0 No pain     L/E MMT Right Left Pain/Dysfunction with Movement Goal   Hip Flexion  4-/5 4-/5 Pain in central low back  4+/5 B   Hip Extension  3-/5 3-/5 Fullness and pain in right knee 4+/5 B   Hip Abduction  3/5 3+/5 Pain in right knee 4+/5 B   Hip Adduction 3-/5 3-/5 Pain in right knee 4+/5 B   Knee Extension 4-/5 4-/5 Pain in right knee 5/5 B   Knee Flexion 3+/5 3+/5 Pain in right knee 5/5 B   Hip IR x x   Assess   Hip ER x x  Assess   Ankle DF 4-/5 4-/5 No pain 5/5 B   Ankle PF 4-/5 4-/5 No pain 5/5 B   Abdominals: Assessed via ability to perform abdominal bracing with fair activation noted, low back pain present    MUSCLE LENGTH:     Muscle Tested  Right  2/11/2022 Left   2/11/2022 Goal   Quadratus Lumborum decreased decreased Normal B   Hip Flexors  decreased decreased Normal B   Quadriceps decreased normal Normal B   Adductors decreased decreased    Hamstrings  decreased decreased Normal B     SPECIAL TESTS:  Not tested as hypersensitivity and guarding prevents accurate findings from testing.     Joint Mobility  Joint Motion Tested Right  2/11/2022 Left   2/11/2022 Goal   Patellar Mobility Hypermobile with pain Hypermobile Normal B   Tibial Glides  Hypomobile Normal Normal B     Function:   CMS Impairment/Limitation/Restriction for FOTO Knee Survey    Therapist reviewed FOTO scores for Zachary Lucia on 2/11/2022.   FOTO documents entered into [employer] - see Media section.    Limitation Score: 53%       TREATMENT     Treatment Time In: 9:08AM  Treatment Time Out: 9:16AM  Total Treatment time (time-based codes) separate from Evaluation: 8 minutes    Zachary received therapeutic exercises to develop strength, endurance, ROM, flexibility, posture and core stabilization for (8) minutes including:     Intervention Performed Today    Educated patient on proper form and sequencing of all exercises provided in HEP x Demonstration by PT and recall demonstration of exercises by patient    See additional education provided below included in billable time                                      PATIENT EDUCATION AND HOME EXERCISES     Home Exercises and Patient Education Provided    Education provided:    Patient educated on the impairments noted above and the effects of physical therapy intervention to improve overall condition and QOL, including normalizing knee mobility and improving overall lower extremity  strength as well as addressing low back pain as this will limit her ability to walk longer durations and perform all functional activities.    Patient was educated on all the above exercise prior/during/after for proper posture, positioning, and execution for safe performance with home exercise program.    Patient educated on the importance of improved core and lower extremity strength in order to improve alignment of the spine and lower extremities with static positions and dynamic movement.    Patient educated on the importance of strong core and lower extremity musculature in order to improve both static and dynamic balance, improve gait mechanics, reduce fall risk and improve household and community mobility.     Written Home Exercises Provided: Patient instructed to cont prior HEP. Exercises were reviewed and Zachary was able to demonstrate them prior to the end of the session.  Zachary demonstrated good  understanding of the education provided. See EMR under Patient Instructions for exercises provided during therapy sessions.    ASSESSMENT     Zachary is a 46 y.o. female referred to outpatient Physical Therapy with a medical diagnosis of Primary osteoarthritis of right knee. Patient presents with decreased ROM, decreased strength, decreased joint mobility, joint hypermobility , decreased muscle length, impaired coordination, impaired balance, postural abnormalities, gait abnormalities and decreased overall function which impairs her ability to sit for long durations required for her job and when travelling for any duration in the car and has decreased ability to negotiate stairs and perform all functional activities.     Pt prognosis is Good.   Pt will benefit from skilled outpatient Physical Therapy to address the deficits stated above and in the chart below, provide pt/family education, and to maximize pt's level of independence.     Plan of care discussed with patient: Yes  Pt's spiritual, cultural  and educational needs considered and patient is agreeable to the plan of care and goals as stated below:     Anticipated Barriers for therapy: seizure disorder, migraines, Raynaud's disease, lupus nephritis Sjogren's syndrome, scleroderma     Medical Necessity is demonstrated by the following  History  Co-morbidities and personal factors that may impact the plan of care Co-morbidities:   seizure disorder, migraines, Raynaud's disease, lupus nephritis Sjogren's syndrome, scleroderma, chronic low back pain    Personal Factors:   social background     high   Examination  Body Structures and Functions, activity limitations and participation restrictions that may impact the plan of care Body Regions:   back  lower extremities  trunk    Body Systems:    gross symmetry  ROM  strength  gross coordinated movement  balance  gait  transfers  transitions  motor control  motor learning  edema    Participation Restrictions:   Impaired ability to perform the above described tasks    Activity limitations:   Learning and applying knowledge  no deficits    General Tasks and Commands  no deficits    Communication  no deficits    Mobility  lifting and carrying objects  walking  long durations of sustained postures, stair negotiation    Self care  dressing    Domestic Life  shopping  cooking  doing house work (cleaning house, washing dishes, laundry)  assisting others    Interactions/Relationships  no deficits    Life Areas  no deficits    Community and Social Life  community life  recreation and leisure         moderate   Clinical Presentation evolving clinical presentation with changing clinical characteristics moderate   Decision Making/ Complexity Score: moderate     Goals:  Short Term Goals:  6 weeks 2/11/2022   1. Pain: Pt will demonstrate improved pain by reports of less than or equal to 5/10 worst pain on the verbal rating scale in order to progress toward maximal functional ability and improve QOL.    2. Function: Patient will  demonstrate improved function as indicated by a score of less than or equal to 45% impairment on FOTO.     3. Mobility: Patient will improve AROM to 50% of stated goals, listed in objective measures above, in order to progress towards independence with functional activities.     4. Strength: Patient will improve strength to 3+/5 in all muscle groups, listed in objective measures above, in order to progress towards independence with functional activities.     5. Gait: Patient will demonstrate improved gait mechanics including more functional knee flexion and extension and hip extension in order to improve functional mobility, improve quality of life, and decrease risk of further injury.     6. HEP: Patient will demonstrate independence with HEP in order to progress toward functional independence.    7. Patient will be able to sit in car for drives up to 30 minutes with 5/10 pain or less to note improving mobility, muscle tone and endurance to allow for improved quality of life.       Long Term Goals:  12 weeks 2/11/2022   1. Pain: Pt will demonstrate improved pain by reports of less than or equal to 2/10 worst pain on the verbal rating scale in order to progress toward maximal functional ability and improve QOL.      2. Function: Patient will demonstrate improved function as indicated by a score of less than or equal to 36% impairment on FOTO.     3. Mobility: Patient will improve AROM to stated goals, listed in objective measures above, in order to return to maximal functional potential and improve quality of life.    4. Strength: Patient will improve strength to stated goals, listed in objective measures above, in order to improve functional independence and quality of life.    5. Gait: Patient will demonstrate normalized gait mechanics with minimal compensation in order to return to PLOF.    6. Patient will return to normal ADL's, IADL's, recreational activities, and work-related activities with less than or equal  to 1/10 pain and maximal function.     7. Patient will be able to drive to her hometown 2 hours away with 1/10 pain or less to noted adequate mobility, muscle tone, length, strength and endurance.     8. 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       PLAN   Plan of care Certification: 2/11/2022 to 5/11/2022.    Outpatient Physical Therapy 2 times weekly for 12 weeks to include any combination of the following interventions: Virtual Therapy, Dry Needling, modalities, electrical stimulation (IFC, Pre-Mod, Attended with Functional Dry Needling), Aquatic Therapy, Cervical/Lumbar Traction, Electrical Stimulation unattended/attended, Gait Training, Manual Therapy, Moist Heat/ Ice, Neuromuscular Re-ed, Patient Education, Self Care, Therapeutic Activities and Therapeutic Exercise    Thank you for this referral.    These services are reasonable and necessary for the conditions set forth above while under my care.    I CERTIFY THE NEED FOR THESE SERVICES FURNISHED UNDER THIS PLAN OF TREATMENT AND WHILE UNDER MY CARE   Physician's comments:     Physician's Signature: ___________________________________________________         Jaylene Tamayo, PT, DPT

## 2022-02-16 ENCOUNTER — CLINICAL SUPPORT (OUTPATIENT)
Dept: REHABILITATION | Facility: HOSPITAL | Age: 47
End: 2022-02-16
Payer: COMMERCIAL

## 2022-02-16 ENCOUNTER — TELEPHONE (OUTPATIENT)
Dept: RHEUMATOLOGY | Facility: CLINIC | Age: 47
End: 2022-02-16
Payer: COMMERCIAL

## 2022-02-16 ENCOUNTER — PATIENT OUTREACH (OUTPATIENT)
Dept: ADMINISTRATIVE | Facility: OTHER | Age: 47
End: 2022-02-16
Payer: COMMERCIAL

## 2022-02-16 DIAGNOSIS — Z74.09 DECREASED STRENGTH, ENDURANCE, AND MOBILITY: ICD-10-CM

## 2022-02-16 DIAGNOSIS — M25.461 SWELLING OF JOINT OF RIGHT KNEE: ICD-10-CM

## 2022-02-16 DIAGNOSIS — R53.1 DECREASED STRENGTH, ENDURANCE, AND MOBILITY: ICD-10-CM

## 2022-02-16 DIAGNOSIS — R26.89 FUNCTIONAL GAIT ABNORMALITY: ICD-10-CM

## 2022-02-16 DIAGNOSIS — R68.89 DECREASED STRENGTH, ENDURANCE, AND MOBILITY: ICD-10-CM

## 2022-02-16 PROCEDURE — 97112 NEUROMUSCULAR REEDUCATION: CPT

## 2022-02-16 PROCEDURE — 97110 THERAPEUTIC EXERCISES: CPT

## 2022-02-16 PROCEDURE — 97140 MANUAL THERAPY 1/> REGIONS: CPT

## 2022-02-16 NOTE — PROGRESS NOTES
"[facility]   Physical Therapy Treatment Note     Name: Zachary Escalona Covert  Clinic Number: [id number]    Therapy Diagnosis:   Encounter Diagnoses   Name Primary?    Functional gait abnormality     Decreased strength, endurance, and mobility     Swelling of joint of right knee      Physician: Kaya Fatima PA-C    Visit Date: 2/16/2022    Physician Orders: PT Eval and Treat  Medical Diagnosis from Referral: Primary osteoarthritis of right knee  Evaluation Date: 2/11/2022  Authorization Period Expiration: 4/1/2022  Plan of Care Expiration: 5/11/2022  Visit # / Visits authorized: 1/ 20 (+1 for evaluation)  FOTO: 1/3    PTA Visit #: 0/5     Progress Note Due on 3/11/2022    Time In: 7:10AM  Time Out: 7:50AM  Total Billable Time: 40 minutes    Precautions: Standard and seizure disorder, migraines, Raynaud's disease, lupus nephritis Sjogren's syndrome, scleroderma     SUBJECTIVE     Pt reports: that she is doing alright today. Patient reports that she has been working on the "chair exercises" including long arc quads and tailgaters which seem to help alleviate some discomfort. Patient reports that she worked a lot outside in the yard this weekend so her knee was hurting and more swollen the following day.     She was compliant with home exercise program.  Response to previous treatment: No increase in pain  Functional change: With utilization of seated exercises less pain and stiffness is present when sitting for extended durations    Pain: 4/10  Location: right medial knee     OBJECTIVE     Objective Measures updated at progress report unless specified.     Treatment     Zachary received therapeutic exercises to develop strength, endurance, ROM, flexibility, posture and core stabilization for (8) minutes including:     Intervention Performed Today    Heel Slides x 3 minutes 5 second holds    Recumbent Bike x 5 minutes level 2 working on equal weightbearing B                "                    Plan for Next Visit: Continue to progress lower extremity strength and mobility     Zachary received the following manual therapy techniques: Myofacial release and Soft tissue Mobilization were applied to the: right knee for (24) minutes, including:    Manual Intervention Performed Today    Soft Tissue Mobilization x right vastus medialis/VMO, medial hamstrings, hip adductors  and pes anserine, edema massage, sacral scrubbing   Joint Mobilizations     Mobilization with movement x Gluteal release with hip rotation B   Muscle Energy Technique  x For pelvic rotation noted   Functional Dry Needling        Plan for Next Visit: Continue as needed     Zachary participated in neuromuscular re-education activities to improve: Proprioception and Posture for (8) minutes. The following activities were included:    Intervention Performed Today    Belt Block x 10 second holds each for 4 minutes    Side Lying Clams (added) x x20 reps B                                   Plan for Next Visit: Continue to progress core and hip strength       Patient Education and Home Exercises     Home Exercises Provided and Patient Education Provided     Education provided:   - Education provided throughout the session on the proper form and sequencing of all exercises provided in HEP    Written Home Exercises Provided: Patient instructed to cont prior HEP. Exercises were reviewed and Zachary was able to demonstrate them prior to the end of the session.  Zachary demonstrated good  understanding of the education provided. See EMR under Patient Instructions for exercises provided during therapy sessions      ASSESSMENT     Patient tolerated treatment well today with reduction of pain noted in right knee following manual therapy interventions as well as mobility and strengthening exercises performed today. Patient noted with pelvic rotation which corrected with muscle energy technique, also incorporated pelvic stability exercise  to assist with maintaining neutral position. Patient noted with increased tenderness in lumbar region following manual therapy interventions. Educated patient on monitoring how she feels following manual therapy interventions of low back pain so that we can adequately adjust treatment next session accordingly.     Zachary Is progressing well towards her goals.   Pt prognosis is Good.     Pt will continue to benefit from skilled outpatient physical therapy to address the deficits listed in the problem list box on initial evaluation, provide pt/family education and to maximize pt's level of independence in the home and community environment.     Pt's spiritual, cultural and educational needs considered and pt agreeable to plan of care and goals.     Anticipated barriers to physical therapy: seizure disorder, migraines, Raynaud's disease, lupus nephritis Sjogren's syndrome, scleroderma     Goals:   Short Term Goals:  6 weeks 2/11/2022   1. Pain: Pt will demonstrate improved pain by reports of less than or equal to 5/10 worst pain on the verbal rating scale in order to progress toward maximal functional ability and improve QOL.     2. Function: Patient will demonstrate improved function as indicated by a score of less than or equal to 45% impairment on FOTO.      3. Mobility: Patient will improve AROM to 50% of stated goals, listed in objective measures above, in order to progress towards independence with functional activities.      4. Strength: Patient will improve strength to 3+/5 in all muscle groups, listed in objective measures above, in order to progress towards independence with functional activities.      5. Gait: Patient will demonstrate improved gait mechanics including more functional knee flexion and extension and hip extension in order to improve functional mobility, improve quality of life, and decrease risk of further injury.      6. HEP: Patient will demonstrate independence with HEP in order to  progress toward functional independence.     7. Patient will be able to sit in car for drives up to 30 minutes with 5/10 pain or less to note improving mobility, muscle tone and endurance to allow for improved quality of life.         Long Term Goals:  12 weeks 2/11/2022   1. Pain: Pt will demonstrate improved pain by reports of less than or equal to 2/10 worst pain on the verbal rating scale in order to progress toward maximal functional ability and improve QOL.       2. Function: Patient will demonstrate improved function as indicated by a score of less than or equal to 36% impairment on FOTO.      3. Mobility: Patient will improve AROM to stated goals, listed in objective measures above, in order to return to maximal functional potential and improve quality of life.     4. Strength: Patient will improve strength to stated goals, listed in objective measures above, in order to improve functional independence and quality of life.     5. Gait: Patient will demonstrate normalized gait mechanics with minimal compensation in order to return to PLOF.     6. Patient will return to normal ADL's, IADL's, recreational activities, and work-related activities with less than or equal to 1/10 pain and maximal function.      7. Patient will be able to drive to her hometown 2 hours away with 1/10 pain or less to noted adequate mobility, muscle tone, length, strength and endurance.      8. 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         PLAN     Continue POC and frequency as planned. Continue to work on improving core, hip and lower extremity strengthening while working on normalizing knee mobility to progress towards higher level functional status to improve overall quality of life.      These services are reasonable and necessary for  the conditions set forth above while under my care.    Jaylene Tamayo, PT, DPT

## 2022-02-17 ENCOUNTER — OFFICE VISIT (OUTPATIENT)
Dept: RHEUMATOLOGY | Facility: CLINIC | Age: 47
End: 2022-02-17
Payer: COMMERCIAL

## 2022-02-17 ENCOUNTER — LAB VISIT (OUTPATIENT)
Dept: LAB | Facility: HOSPITAL | Age: 47
End: 2022-02-17
Attending: PHYSICIAN ASSISTANT
Payer: COMMERCIAL

## 2022-02-17 ENCOUNTER — HOSPITAL ENCOUNTER (OUTPATIENT)
Dept: CARDIOLOGY | Facility: HOSPITAL | Age: 47
Discharge: HOME | End: 2022-02-17
Attending: INTERNAL MEDICINE
Payer: COMMERCIAL

## 2022-02-17 VITALS
HEIGHT: 66 IN | SYSTOLIC BLOOD PRESSURE: 139 MMHG | DIASTOLIC BLOOD PRESSURE: 90 MMHG | WEIGHT: 242.31 LBS | BODY MASS INDEX: 38.94 KG/M2 | HEART RATE: 71 BPM

## 2022-02-17 DIAGNOSIS — G56.03 CARPAL TUNNEL SYNDROME, BILATERAL: ICD-10-CM

## 2022-02-17 DIAGNOSIS — R94.39 ABNORMAL STRESS TEST: ICD-10-CM

## 2022-02-17 DIAGNOSIS — M72.2 PLANTAR FASCIITIS, BILATERAL: ICD-10-CM

## 2022-02-17 DIAGNOSIS — M35.01 SJOGREN'S SYNDROME WITH KERATOCONJUNCTIVITIS SICCA: ICD-10-CM

## 2022-02-17 DIAGNOSIS — Z79.899 HIGH RISK MEDICATION USE: ICD-10-CM

## 2022-02-17 DIAGNOSIS — M34.9 SCLERODERMA: ICD-10-CM

## 2022-02-17 DIAGNOSIS — D84.9 IMMUNOSUPPRESSED STATUS: ICD-10-CM

## 2022-02-17 DIAGNOSIS — U07.1 PNEUMONIA DUE TO COVID-19 VIRUS: ICD-10-CM

## 2022-02-17 DIAGNOSIS — G62.9 NEUROPATHY: ICD-10-CM

## 2022-02-17 DIAGNOSIS — I73.00 RAYNAUD'S DISEASE WITHOUT GANGRENE: ICD-10-CM

## 2022-02-17 DIAGNOSIS — I10 ESSENTIAL HYPERTENSION: ICD-10-CM

## 2022-02-17 DIAGNOSIS — M35.01 SJOGREN'S SYNDROME WITH KERATOCONJUNCTIVITIS SICCA: Primary | ICD-10-CM

## 2022-02-17 DIAGNOSIS — U07.1 COVID-19 VIRUS INFECTION: ICD-10-CM

## 2022-02-17 DIAGNOSIS — A41.9 SEPSIS, DUE TO UNSPECIFIED ORGANISM, UNSPECIFIED WHETHER ACUTE ORGAN DYSFUNCTION PRESENT: ICD-10-CM

## 2022-02-17 DIAGNOSIS — R06.03 RESPIRATORY DISTRESS: ICD-10-CM

## 2022-02-17 DIAGNOSIS — R07.9 CHEST PAIN, UNSPECIFIED TYPE: ICD-10-CM

## 2022-02-17 DIAGNOSIS — M32.14 LUPUS NEPHRITIS, ISN/RPS CLASS V: ICD-10-CM

## 2022-02-17 DIAGNOSIS — M79.609 PAIN IN EXTREMITY, UNSPECIFIED EXTREMITY: ICD-10-CM

## 2022-02-17 DIAGNOSIS — J12.82 PNEUMONIA DUE TO COVID-19 VIRUS: ICD-10-CM

## 2022-02-17 DIAGNOSIS — M35.00 SJOGREN'S SYNDROME, WITH UNSPECIFIED ORGAN INVOLVEMENT: ICD-10-CM

## 2022-02-17 DIAGNOSIS — R06.02 SOB (SHORTNESS OF BREATH): ICD-10-CM

## 2022-02-17 PROCEDURE — 99215 OFFICE O/P EST HI 40 MIN: CPT | Mod: S$GLB,,, | Performed by: PHYSICIAN ASSISTANT

## 2022-02-17 PROCEDURE — 99999 PR PBB SHADOW E&M-EST. PATIENT-LVL III: CPT | Mod: PBBFAC,,, | Performed by: PHYSICIAN ASSISTANT

## 2022-02-17 PROCEDURE — 93227 HOLTER MONITOR - 48 HOUR (CUPID ONLY): ICD-10-PCS | Mod: ,,, | Performed by: INTERNAL MEDICINE

## 2022-02-17 PROCEDURE — 3008F BODY MASS INDEX DOCD: CPT | Mod: CPTII,S$GLB,, | Performed by: PHYSICIAN ASSISTANT

## 2022-02-17 PROCEDURE — 3080F PR MOST RECENT DIASTOLIC BLOOD PRESSURE >= 90 MM HG: ICD-10-PCS | Mod: CPTII,S$GLB,, | Performed by: PHYSICIAN ASSISTANT

## 2022-02-17 PROCEDURE — 99215 PR OFFICE/OUTPT VISIT, EST, LEVL V, 40-54 MIN: ICD-10-PCS | Mod: S$GLB,,, | Performed by: PHYSICIAN ASSISTANT

## 2022-02-17 PROCEDURE — 3066F PR DOCUMENTATION OF TREATMENT FOR NEPHROPATHY: ICD-10-PCS | Mod: CPTII,S$GLB,, | Performed by: PHYSICIAN ASSISTANT

## 2022-02-17 PROCEDURE — 99999 PR PBB SHADOW E&M-EST. PATIENT-LVL III: ICD-10-PCS | Mod: PBBFAC,,, | Performed by: PHYSICIAN ASSISTANT

## 2022-02-17 PROCEDURE — 1159F PR MEDICATION LIST DOCUMENTED IN MEDICAL RECORD: ICD-10-PCS | Mod: CPTII,S$GLB,, | Performed by: PHYSICIAN ASSISTANT

## 2022-02-17 PROCEDURE — 80074 ACUTE HEPATITIS PANEL: CPT | Performed by: PHYSICIAN ASSISTANT

## 2022-02-17 PROCEDURE — 3080F DIAST BP >= 90 MM HG: CPT | Mod: CPTII,S$GLB,, | Performed by: PHYSICIAN ASSISTANT

## 2022-02-17 PROCEDURE — 1159F MED LIST DOCD IN RCRD: CPT | Mod: CPTII,S$GLB,, | Performed by: PHYSICIAN ASSISTANT

## 2022-02-17 PROCEDURE — 93227 XTRNL ECG REC<48 HR R&I: CPT | Mod: ,,, | Performed by: INTERNAL MEDICINE

## 2022-02-17 PROCEDURE — 1160F PR REVIEW ALL MEDS BY PRESCRIBER/CLIN PHARMACIST DOCUMENTED: ICD-10-PCS | Mod: CPTII,S$GLB,, | Performed by: PHYSICIAN ASSISTANT

## 2022-02-17 PROCEDURE — 93225 XTRNL ECG REC<48 HRS REC: CPT

## 2022-02-17 PROCEDURE — 3066F NEPHROPATHY DOC TX: CPT | Mod: CPTII,S$GLB,, | Performed by: PHYSICIAN ASSISTANT

## 2022-02-17 PROCEDURE — 86480 TB TEST CELL IMMUN MEASURE: CPT | Performed by: PHYSICIAN ASSISTANT

## 2022-02-17 PROCEDURE — 3075F SYST BP GE 130 - 139MM HG: CPT | Mod: CPTII,S$GLB,, | Performed by: PHYSICIAN ASSISTANT

## 2022-02-17 PROCEDURE — 1160F RVW MEDS BY RX/DR IN RCRD: CPT | Mod: CPTII,S$GLB,, | Performed by: PHYSICIAN ASSISTANT

## 2022-02-17 PROCEDURE — 3008F PR BODY MASS INDEX (BMI) DOCUMENTED: ICD-10-PCS | Mod: CPTII,S$GLB,, | Performed by: PHYSICIAN ASSISTANT

## 2022-02-17 PROCEDURE — 3075F PR MOST RECENT SYSTOLIC BLOOD PRESS GE 130-139MM HG: ICD-10-PCS | Mod: CPTII,S$GLB,, | Performed by: PHYSICIAN ASSISTANT

## 2022-02-17 RX ORDER — AZATHIOPRINE 100 MG/1
100 TABLET ORAL DAILY
Qty: 90 TABLET | Refills: 1 | Status: SHIPPED | OUTPATIENT
Start: 2022-02-17 | End: 2022-08-23 | Stop reason: SDUPTHER

## 2022-02-17 RX ORDER — AZATHIOPRINE 50 MG/1
50 TABLET ORAL DAILY
Qty: 90 TABLET | Refills: 1 | Status: SHIPPED | OUTPATIENT
Start: 2022-02-17 | End: 2022-08-12

## 2022-02-17 RX ORDER — CYCLOBENZAPRINE HCL 10 MG
10 TABLET ORAL 2 TIMES DAILY PRN
Qty: 60 TABLET | Refills: 0 | Status: SHIPPED | OUTPATIENT
Start: 2022-02-17

## 2022-02-17 NOTE — PROGRESS NOTES
RHEUMATOLOGY OUTPATIENT CLINIC NOTE    2/16/2022    Attending Rheumatologist: Bridget Bhakta PA-C  Primary Care Provider: Danna Camara DO   Chief Complaint/Reason For Consultation:  Disease Management      Subjective:        Zachary Lucia is a 46 y.o. female here today for follow up sjogrens/scl overlap.  She has +AMAURY SSA and SSB. SCL 70+.  She takes hcq bid and sees optho routinely for screening, taking imuran 150mg/d as well. +raynauds, dry mouth, mild dry eyes +gerd. raynauds stable. She has been having an increase in her joint pain and stiffness in her hands w/ stiffness lasting 60-90 min. Rheumatologic systems otherwise negative. C/o pain in the left elbow that radiates up toward her neck. Also having some numbness and tingling in her hands. C/o severe foot pain in the mornings when she first gets out of bed. Pain improves as she becomes more active     CT 2018 chest wnl no ILD, fatty liver noted, avoiding mtx (has taken in past) , has normal lfts. Echo wnl 2018.  She does have asthma that has been controlled      In the past she was seen by Nephrology for microscopic hematuria, She had a kidney biopsy done which was suggestive of membranous nephropathy/membranous lupus nephritis/class 5. Taken off lisinopril because of cough. No ACEI or ARB at this time.    Serologies    Lab Results   Component Value Date    AMAURY Positive (A) 11/29/2017      Lab Results   Component Value Date    RF <10.0 11/29/2017      Lab Results   Component Value Date    HEPAIGM Negative 02/01/2018    HEPBIGM Negative 02/01/2018    HEPCAB Negative 02/01/2018         Current Rheum Medications:  · Imuran 150mg/day, hcq 200mg BID, prednisone prn  Previous Rheum Medications:   · Norvasc (stable raynauds, no meds needed at this time), cellcept (SE), MTX (avoiding with fatty liver), gabapentin (failed- no relief in sx)    Past Medical History:   Diagnosis Date    Abnormal stress test 1/25/2022    Asthma      "Essential hypertension, malignant 1/8/2020    Lupus (systemic lupus erythematosus)     Membranous glomerulonephritis, 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    Scleroderma     Seizures 01/12/2017     Social History     Socioeconomic History    Marital status:      Spouse name: Darrell    Number of children: 0   Tobacco Use    Smoking status: Never Smoker    Smokeless tobacco: Never Used   Substance and Sexual Activity    Alcohol use: Yes    Drug use: No    Sexual activity: Yes     Review of patient's allergies indicates:   Allergen Reactions    Lisinopril      cough    Sulfa (sulfonamide antibiotics) Swelling       Objective:   BP (!) 139/90   Pulse 71   Ht 5' 6" (1.676 m)   Wt 109.9 kg (242 lb 4.6 oz)   BMI 39.11 kg/m²     Immunization History   Administered Date(s) Administered    COVID-19, MRNA, LN-S, PF (Pfizer) (Purple Cap) 06/09/2021, 06/09/2021, 06/30/2021, 06/30/2021, 12/31/2021    Tdap 04/05/2012, 04/05/2012       Current Outpatient Medications:     aspirin (ECOTRIN) 81 MG EC tablet, Take 1 tablet (81 mg total) by mouth once daily., Disp: 30 tablet, Rfl: 0    azaTHIOprine (IMURAN) 100 mg tablet, Take 1 tablet (100 mg total) by mouth once daily., Disp: 90 tablet, Rfl: 1    azaTHIOprine (IMURAN) 50 mg Tab, Take 1 tablet (50 mg total) by mouth once daily in evening, Disp: 90 tablet, Rfl: 1    betamethasone dipropionate (DIPROLENE) 0.05 % ointment, Apply topically 2 (two) times daily as needed. Steroid. Use for flares, Disp: 45 g, Rfl: 3    cholecalciferol, vitamin D3, 1,250 mcg (50,000 unit) capsule, Take 1 capsule (50,000 Units total) by mouth once a week., Disp: 15 capsule, Rfl: 1    crisaborole (EUCRISA) 2 % Oint, AAA bid prn.  Non-steroid.  Safe to use long-term., Disp: 60 g, Rfl: 3    cyclobenzaprine (FLEXERIL) 10 MG tablet, TAKE 1 TABLET (10 MG TOTAL) BY MOUTH 2 (TWO) TIMES DAILY AS NEEDED FOR MUSCLE SPASMS., Disp: 60 tablet, Rfl: 0    ferrous sulfate (FEOSOL) 325 mg (65 mg iron) Tab tablet, " Take 1 tablet by mouth once daily., Disp: , Rfl:     fluticasone-salmeterol diskus inhaler 100-50 mcg, Inhale 1 puff into the lungs 2 (two) times daily. Controller, Disp: 1 each, Rfl: 11    HYDROcodone-acetaminophen (NORCO) 7.5-325 mg per tablet, Take 1 tablet by mouth 4 (four) times daily as needed., Disp: , Rfl:     hydrOXYchloroQUINE (PLAQUENIL) 200 mg tablet, Take 1 tablet (200 mg total) by mouth 2 (two) times daily., Disp: 180 tablet, Rfl: 3     mg tablet, Take 800 mg by mouth every 8 (eight) hours as needed., Disp: , Rfl: 0    levetiracetam XR (KEPPRA XR) 500 mg Tb24 24 hr tablet, Take 500 mg by mouth once daily., Disp: , Rfl:     levocetirizine (XYZAL) 5 MG tablet, Take 1 tablet (5 mg total) by mouth every evening., Disp: 90 tablet, Rfl: 3    pantoprazole (PROTONIX) 40 MG tablet, Take 1 tablet (40 mg total) by mouth once daily., Disp: 30 tablet, Rfl: 11    predniSONE (DELTASONE) 10 MG tablet, TAKE 1 TABLET BY MOUTH EVERY DAY, Disp: 60 tablet, Rfl: 1    predniSONE (DELTASONE) 5 MG tablet, Take 1 tablet (5 mg total) by mouth once daily. For 3-5 days as needed during flares, Disp: 30 tablet, Rfl: 0    PROAIR HFA 90 mcg/actuation inhaler, INHALE 2 PUFFS INTO THE LUNGS EVERY 6 (SIX) HOURS AS NEEDED FOR WHEEZING. RESCUE, Disp: 8.5 Inhaler, Rfl: 0    pulse oximeter (PULSE OXIMETER) device, Use by Apply Externally route 2 (two) times a day. Use twice daily at 8 AM and 3 PM and record the value in [facility] as directed., Disp: 1 each, Rfl: 0    Physical Exam   Constitutional: She is oriented to person, place, and time. No distress.   HENT:   Head: Normocephalic and atraumatic.   Pulmonary/Chest: Effort normal. No respiratory distress.   Abdominal: Normal appearance.   Musculoskeletal:         General: No swelling or tenderness. Normal range of motion.      Cervical back: Normal range of motion.   Neurological: She is alert and oriented to person, place, and time.   Skin: Skin is warm and dry.    Psychiatric: Her behavior is normal. Mood normal.   Nursing note and vitals reviewed.      Imaging:  All imaging reviewed and independently interpreted by me.    cxray 6/2021:   Lungs are clear without focal consolidation, edema, or mass.  There is no pneumothorax or pleural effusion.  Cardiomediastinal silhouette is within normal limits.  No acute osseous abnormality.     Echo 6.21.21:   · The left ventricle is normal in size with concentric remodeling and normal systolic function.  · The estimated ejection fraction is 60%.  · Normal left ventricular diastolic function.  · Normal right ventricular size with normal right ventricular systolic function.  · Normal central venous pressure (3 mmHg).  · The estimated PA systolic pressure is 32 mmHg.     6mwt 6.11.21 Interpretation:   Total distance walked in six minutes is normal indicating normal   functional capacity.   Patient did not meet criteria for oxygen prescription. Clinical   correlation suggested.      pfts interpretation 6.11.21  The low FEV0.5/FEV1 ratio suggests central or upper airway obstruction may be present. Spirometry remains unimproved following bronchodilator. Lung volumes show mild restriction is present. Airway mechanics show airway resistance is increased. Specific   conductance remains normal. DLCO is mildly decreased. MVV is severely decreased.        Assessment:     1. Sjogren's syndrome with keratoconjunctivitis sicca    2. Scleroderma    3. Lupus nephritis, ISN/RPS class V    4. Raynaud's disease without gangrene    5. High risk medication use    6. Immunosuppressed status    7. Sjogren's syndrome, with unspecified organ involvement    8. Neuropathy    9. Carpal tunnel syndrome, bilateral    10. Plantar fasciitis, bilateral          Overlap scl/sjogrens with SLE membranous nephritis class 5 on kidney biopsy, = no h/o clots, beta 2+ x 1 repeat neg,  +rp, sicca, gerd, rna polymerase neg, scl 70 +       Membranous Nephritis suggestive of sle  nephritis 5: intolerant to cellcept now on imuran 150mg/d    Plan:   Assessment and plan reviewed with Dr. Daniels today.     · Worsening joint pain and stiffness, discussed starting benlysta vs. sjogrens trial with patient. She would like to come in next week for further information on the trial.  · Update TB gold and hep panel today  · Nerve conduction study mel UE for eval of paresthesias. Carpal tunnel/cubital tunnel  · Compromised immune system secondary to autoimmune disease and use of immunosuppressive drugs. Monitor carefully for infections and toxicities- Currently denies issues with recurrent infections. Advised to get immediate medical care if any infection.  · Recommend Yearly Skin Cancer Screening by Dermatology for all patients on biologic or Araseli. advised strict adherence to age appropriate vaccinations (shingles, pneumonia, flu and covid) and cancer screenings with PCP   · Patient advised to hold DMARD and/or biologic therapy for signs of infection or for surgery. If you are unsure what to do please call our office for instruction.[facility] [phone]  · no current medication related issues, no evidence of toxicity. I ordered labs for toxicity monitoring;  results reviewed and discussed findings with the patient   · Return to clinic: 3 mos w/ labs, will schedule next week for more info on sjogrens trial    The patient understands, chooses and consents to this plan and accepts all the risks which include but are not limited to the risks mentioned above.     Method of contact with patient concerns: Naila attn Rheumatology    60 minutes of total time spent on the encounter, which includes face to face time and non-face to face time preparing to see the patient (eg, review of tests), Obtaining and/or reviewing separately obtained history, Documenting clinical information in the electronic or other health record, Independently interpreting results (not separately reported) and  communicating results to the patient/family/caregiver, or Care coordination (not separately reported).             Bridget Bhakta PA-C  Rheumatology Department   [facility]

## 2022-02-18 ENCOUNTER — CLINICAL SUPPORT (OUTPATIENT)
Dept: REHABILITATION | Facility: HOSPITAL | Age: 47
End: 2022-02-18
Payer: COMMERCIAL

## 2022-02-18 DIAGNOSIS — Z74.09 DECREASED STRENGTH, ENDURANCE, AND MOBILITY: ICD-10-CM

## 2022-02-18 DIAGNOSIS — M25.461 SWELLING OF JOINT OF RIGHT KNEE: ICD-10-CM

## 2022-02-18 DIAGNOSIS — R53.1 DECREASED STRENGTH, ENDURANCE, AND MOBILITY: ICD-10-CM

## 2022-02-18 DIAGNOSIS — R68.89 DECREASED STRENGTH, ENDURANCE, AND MOBILITY: ICD-10-CM

## 2022-02-18 DIAGNOSIS — R26.89 FUNCTIONAL GAIT ABNORMALITY: ICD-10-CM

## 2022-02-18 PROCEDURE — 97140 MANUAL THERAPY 1/> REGIONS: CPT | Mod: CQ

## 2022-02-18 PROCEDURE — 97110 THERAPEUTIC EXERCISES: CPT | Mod: CQ

## 2022-02-18 NOTE — PROGRESS NOTES
[facility]   Physical Therapy Treatment Note     Name: Zachary Escalona [employer]  Clinic Number: [id number]    Therapy Diagnosis:   Encounter Diagnoses   Name Primary?    Functional gait abnormality     Decreased strength, endurance, and mobility     Swelling of joint of right knee      Physician: Kaya Fatima PA-C    Visit Date: 2/18/2022    Physician Orders: PT Eval and Treat  Medical Diagnosis from Referral: Primary osteoarthritis of right knee  Evaluation Date: 2/11/2022  Authorization Period Expiration: 4/1/2022  Plan of Care Expiration: 5/11/2022  Visit # / Visits authorized: 2/ 20 (+1 for evaluation)  FOTO: 1/3    PTA Visit #: 1/5     Progress Note Due on 3/11/2022    Time In: 710 AM  Time Out: 755 AM  Total Billable Time: 45 minutes    Precautions: Standard and seizure disorder, migraines, Raynaud's disease, lupus nephritis Sjogren's syndrome, scleroderma     SUBJECTIVE     Pt reports: she felt so much better after last session, it started hurting some when she was driving here this morning but not as bad as it was.    She was compliant with home exercise program.  Response to previous treatment: No increase in pain  Functional change: With utilization of seated exercises less pain and stiffness is present when sitting for extended durations    Pain: 3/10  Location: right medial knee     OBJECTIVE     Objective Measures updated at progress report unless specified.     Treatment     Zachary received therapeutic exercises to develop strength, endurance, ROM, flexibility, posture and core stabilization for (30) minutes including:     Intervention Performed Today    Heel Slides NP 3 minutes 5 second holds    Recumbent Bike x 5 minutes level 2 working on equal weightbearing B        Shuttle x Bilateral 4 bands 2 x10  Single leg 3 bands 2 x10 B        LAQ x 3s x20 B        Bridging  x x10          Plan for Next Visit: Continue to progress lower extremity strength and  mobility     Zachary received the following manual therapy techniques: Myofacial release and Soft tissue Mobilization were applied to the: right knee for (10) minutes, including:    Manual Intervention Performed Today    Soft Tissue Mobilization x right vastus medialis/VMO, medial hamstrings, hip adductors  and pes anserine, edema massage, sacral scrubbing   Joint Mobilizations     Mobilization with movement NP Gluteal release with hip rotation B   Muscle Energy Technique  NP For pelvic rotation noted   Functional Dry Needling        Plan for Next Visit: Continue as needed     Zachary participated in neuromuscular re-education activities to improve: Proprioception and Posture for (0) minutes. The following activities were included:    Intervention Performed Today    Belt Block  10 second holds each for 4 minutes    Side Lying Clams  x20 reps B                                   Plan for Next Visit: Continue to progress core and hip strength       Patient Education and Home Exercises     Home Exercises Provided and Patient Education Provided     Education provided:   - Education provided throughout the session on the proper form and sequencing of all exercises provided in HEP    Written Home Exercises Provided: Patient instructed to cont prior HEP. Exercises were reviewed and Zachary was able to demonstrate them prior to the end of the session.  Zachary demonstrated good  understanding of the education provided. See EMR under Patient Instructions for exercises provided during therapy sessions      ASSESSMENT     Patient was able to tolerate addition of shuttle exercises, LAQ, and bridging without any increase in pain or symptoms. STM was done again this session due to the benefits felt after last session. She had relief in pain/tension by the end of the session. Will continue to progress strengthening and range of motion exercises as able next session.    Zachary Is progressing well towards her goals.   Pt  prognosis is Good.     Pt will continue to benefit from skilled outpatient physical therapy to address the deficits listed in the problem list box on initial evaluation, provide pt/family education and to maximize pt's level of independence in the home and community environment.     Pt's spiritual, cultural and educational needs considered and pt agreeable to plan of care and goals.     Anticipated barriers to physical therapy: seizure disorder, migraines, Raynaud's disease, lupus nephritis Sjogren's syndrome, scleroderma     Goals:   Short Term Goals:  6 weeks 2/11/2022   1. Pain: Pt will demonstrate improved pain by reports of less than or equal to 5/10 worst pain on the verbal rating scale in order to progress toward maximal functional ability and improve QOL.     2. Function: Patient will demonstrate improved function as indicated by a score of less than or equal to 45% impairment on FOTO.      3. Mobility: Patient will improve AROM to 50% of stated goals, listed in objective measures above, in order to progress towards independence with functional activities.      4. Strength: Patient will improve strength to 3+/5 in all muscle groups, listed in objective measures above, in order to progress towards independence with functional activities.      5. Gait: Patient will demonstrate improved gait mechanics including more functional knee flexion and extension and hip extension in order to improve functional mobility, improve quality of life, and decrease risk of further injury.      6. HEP: Patient will demonstrate independence with HEP in order to progress toward functional independence.     7. Patient will be able to sit in car for drives up to 30 minutes with 5/10 pain or less to note improving mobility, muscle tone and endurance to allow for improved quality of life.         Long Term Goals:  12 weeks 2/11/2022   1. Pain: Pt will demonstrate improved pain by reports of less than or equal to 2/10 worst pain on the  verbal rating scale in order to progress toward maximal functional ability and improve QOL.       2. Function: Patient will demonstrate improved function as indicated by a score of less than or equal to 36% impairment on FOTO.      3. Mobility: Patient will improve AROM to stated goals, listed in objective measures above, in order to return to maximal functional potential and improve quality of life.     4. Strength: Patient will improve strength to stated goals, listed in objective measures above, in order to improve functional independence and quality of life.     5. Gait: Patient will demonstrate normalized gait mechanics with minimal compensation in order to return to PLOF.     6. Patient will return to normal ADL's, IADL's, recreational activities, and work-related activities with less than or equal to 1/10 pain and maximal function.      7. Patient will be able to drive to her hometown 2 hours away with 1/10 pain or less to noted adequate mobility, muscle tone, length, strength and endurance.      8. 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         PLAN     Continue POC and frequency as planned. Continue to work on improving core, hip and lower extremity strengthening while working on normalizing knee mobility to progress towards higher level functional status to improve overall quality of life.      These services are reasonable and necessary for the conditions set forth above while under my care.    Young Loyola, PTA

## 2022-02-21 LAB
HAV IGM SERPL QL IA: NEGATIVE
HBV CORE IGM SERPL QL IA: NEGATIVE
HBV SURFACE AG SERPL QL IA: NEGATIVE
HCV AB SERPL QL IA: NEGATIVE
OHS CV EVENT MONITOR DAY: 0
OHS CV HOLTER LENGTH DECIMAL HOURS: 48
OHS CV HOLTER LENGTH HOURS: 48
OHS CV HOLTER LENGTH MINUTES: 0
OHS CV HOLTER SINUS AVERAGE HR: 78
OHS CV HOLTER SINUS MAX HR: 133
OHS CV HOLTER SINUS MIN HR: 56

## 2022-02-22 ENCOUNTER — CLINICAL SUPPORT (OUTPATIENT)
Dept: REHABILITATION | Facility: HOSPITAL | Age: 47
End: 2022-02-22
Payer: COMMERCIAL

## 2022-02-22 ENCOUNTER — TELEPHONE (OUTPATIENT)
Dept: CARDIOLOGY | Facility: CLINIC | Age: 47
End: 2022-02-22
Payer: COMMERCIAL

## 2022-02-22 DIAGNOSIS — M25.461 SWELLING OF JOINT OF RIGHT KNEE: ICD-10-CM

## 2022-02-22 DIAGNOSIS — R53.1 DECREASED STRENGTH, ENDURANCE, AND MOBILITY: ICD-10-CM

## 2022-02-22 DIAGNOSIS — R68.89 DECREASED STRENGTH, ENDURANCE, AND MOBILITY: ICD-10-CM

## 2022-02-22 DIAGNOSIS — Z74.09 DECREASED STRENGTH, ENDURANCE, AND MOBILITY: ICD-10-CM

## 2022-02-22 DIAGNOSIS — R26.89 FUNCTIONAL GAIT ABNORMALITY: Primary | ICD-10-CM

## 2022-02-22 LAB
GAMMA INTERFERON BACKGROUND BLD IA-ACNC: 0.04 IU/ML
M TB IFN-G CD4+ BCKGRND COR BLD-ACNC: 0 IU/ML
M TB IFN-G CD4+CD8+ BCKGRND COR BLD-ACNC: 0 IU/ML
MITOGEN IGNF BCKGRD COR BLD-ACNC: >10 IU/ML
TB GOLD PLUS: NEGATIVE

## 2022-02-22 PROCEDURE — 97110 THERAPEUTIC EXERCISES: CPT | Mod: CQ

## 2022-02-22 NOTE — TELEPHONE ENCOUNTER
The patient has been notified of this information and all questions answered.    Mild extra heart beats, stable, will evaluate at next visit  Patient verbalizes understanding.

## 2022-02-22 NOTE — TELEPHONE ENCOUNTER
----- Message from Elie Sumner MD sent at 2/21/2022  4:40 PM CST -----  Mild extra heart beats, stable, will evaluate at next visit

## 2022-02-22 NOTE — PROGRESS NOTES
[facility]   Physical Therapy Treatment Note     Name: Zachary Escalona [employer]  Clinic Number: [id number]    Therapy Diagnosis:   Encounter Diagnoses   Name Primary?    Functional gait abnormality Yes    Decreased strength, endurance, and mobility     Swelling of joint of right knee      Physician: Kaya Fatima PA-C    Visit Date: 2/22/2022    Physician Orders: PT Eval and Treat  Medical Diagnosis from Referral: Primary osteoarthritis of right knee  Evaluation Date: 2/11/2022  Authorization Period Expiration: 4/1/2022  Plan of Care Expiration: 5/11/2022  Visit # / Visits authorized: 3/20 (+1 for evaluation)  FOTO: 1/3    PTA Visit #: 2/5     Progress Note Due on 3/11/2022    Time In: 0945  Time Out: 1030  Total Billable Time: 40 minutes    Precautions: Standard and seizure disorder, migraines, Raynaud's disease, lupus nephritis Sjogren's syndrome, scleroderma     SUBJECTIVE     Pt reports: feeling better overall. Pain described as a dull pain. Has been taking ergo breaks during her work day with decreased knee stiffness/pain.     She was compliant with home exercise program.    Response to previous treatment: feeling good after last session    Functional change: sitting for extended durations    Pain: 2/10  Location: right medial knee     OBJECTIVE     Objective Measures updated at progress report unless specified.     Treatment     Zachary received therapeutic exercises to develop strength, endurance, ROM, flexibility, posture and core stabilization for (40) minutes including:     Intervention Performed Today    Heel Slides  3 minutes 5 second holds    Recumbent Bike x 5 minutes level 2 working on equal weightbearing B   Straight leg raise  X  x 2 x 8 left (added)  3 x 8 Right (added)   Shuttle X  x Bilateral 4 bands 3 x 10 (added)   Single leg 3 bands 3 x10 Bilateral (added)   Quad sets x 3 minute 5 second hold bilateral with towel roll under right knee   LAQ  3 seconds  x 20 Bilateral   Bridging with abdominal brace x 3 x 8 (increased)   Prone hip extension X  x 2 x 8 left (added)  3 x 8 Right (added)   Side lying hip abduction with abdominal brace X  x 2 x 8 left (added)  3 x 8 Right (added)     Plan for Next Visit: Continue to progress lower extremity strength and mobility     Zachary received the following manual therapy techniques: Myofacial release and Soft tissue Mobilization were applied to the: right knee for (0) minutes, including:    Manual Intervention Performed Today    Soft Tissue Mobilization  right vastus medialis/VMO, medial hamstrings, hip adductors  and pes anserine, edema massage, sacral scrubbing   Joint Mobilizations     Mobilization with movement  Gluteal release with hip rotation B   Muscle Energy Technique   For pelvic rotation noted   Functional Dry Needling        Plan for Next Visit: Continue as needed     Zachary participated in neuromuscular re-education activities to improve: Proprioception and Posture for (0) minutes. The following activities were included:    Intervention Performed Today    Belt Block  10 second holds each for 4 minutes    Side Lying Clams  x20 reps B                                   Plan for Next Visit: Continue to progress core and hip strength       Patient Education and Home Exercises     Home Exercises Provided and Patient Education Provided     Education provided:   - Education provided throughout the session on the proper form and sequencing of all exercises provided in HEP    Written Home Exercises Provided: Patient instructed to cont prior HEP. Exercises were reviewed and Zachary was able to demonstrate them prior to the end of the session.  Zachary demonstrated good  understanding of the education provided. See EMR under Patient Instructions for exercises provided during therapy sessions      ASSESSMENT     Patient was able to tolerate additional exercises today with no complaints. She performed shuttle with improved  knee alignment after instructions. With medial/lateral shift of right patella, patient experienced increase pain with quad set.       Zachary Is progressing well towards her goals.   Pt prognosis is Good.     Pt will continue to benefit from skilled outpatient physical therapy to address the deficits listed in the problem list box on initial evaluation, provide pt/family education and to maximize pt's level of independence in the home and community environment.     Pt's spiritual, cultural and educational needs considered and pt agreeable to plan of care and goals.     Anticipated barriers to physical therapy: seizure disorder, migraines, Raynaud's disease, lupus nephritis Sjogren's syndrome, scleroderma     Goals:   Short Term Goals:  6 weeks 2/11/2022   1. Pain: Pt will demonstrate improved pain by reports of less than or equal to 5/10 worst pain on the verbal rating scale in order to progress toward maximal functional ability and improve QOL.     2. Function: Patient will demonstrate improved function as indicated by a score of less than or equal to 45% impairment on FOTO.      3. Mobility: Patient will improve AROM to 50% of stated goals, listed in objective measures above, in order to progress towards independence with functional activities.      4. Strength: Patient will improve strength to 3+/5 in all muscle groups, listed in objective measures above, in order to progress towards independence with functional activities.      5. Gait: Patient will demonstrate improved gait mechanics including more functional knee flexion and extension and hip extension in order to improve functional mobility, improve quality of life, and decrease risk of further injury.      6. HEP: Patient will demonstrate independence with HEP in order to progress toward functional independence.     7. Patient will be able to sit in car for drives up to 30 minutes with 5/10 pain or less to note improving mobility, muscle tone and endurance  to allow for improved quality of life.         Long Term Goals:  12 weeks 2/11/2022   1. Pain: Pt will demonstrate improved pain by reports of less than or equal to 2/10 worst pain on the verbal rating scale in order to progress toward maximal functional ability and improve QOL.       2. Function: Patient will demonstrate improved function as indicated by a score of less than or equal to 36% impairment on FOTO.      3. Mobility: Patient will improve AROM to stated goals, listed in objective measures above, in order to return to maximal functional potential and improve quality of life.     4. Strength: Patient will improve strength to stated goals, listed in objective measures above, in order to improve functional independence and quality of life.     5. Gait: Patient will demonstrate normalized gait mechanics with minimal compensation in order to return to PLOF.     6. Patient will return to normal ADL's, IADL's, recreational activities, and work-related activities with less than or equal to 1/10 pain and maximal function.      7. Patient will be able to drive to her hometown 2 hours away with 1/10 pain or less to noted adequate mobility, muscle tone, length, strength and endurance.      8. 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         PLAN     Continue POC and frequency as planned. Continue to work on improving core, hip and lower extremity strengthening while working on normalizing knee mobility to progress towards higher level functional status to improve overall quality of life.      These services are reasonable and necessary for the conditions set forth above while under my care.    Desmond Menchaca, PTA

## 2022-02-24 ENCOUNTER — PROCEDURE VISIT (OUTPATIENT)
Dept: ORTHOPEDICS | Facility: CLINIC | Age: 47
End: 2022-02-24
Payer: COMMERCIAL

## 2022-02-24 VITALS — HEIGHT: 66 IN | WEIGHT: 242.31 LBS | BODY MASS INDEX: 38.94 KG/M2

## 2022-02-24 DIAGNOSIS — M17.11 PRIMARY OSTEOARTHRITIS OF RIGHT KNEE: Primary | ICD-10-CM

## 2022-02-24 PROCEDURE — 20610 DRAIN/INJ JOINT/BURSA W/O US: CPT | Mod: RT,S$GLB,, | Performed by: PHYSICIAN ASSISTANT

## 2022-02-24 PROCEDURE — 20610 LARGE JOINT ASPIRATION/INJECTION: R KNEE: ICD-10-PCS | Mod: RT,S$GLB,, | Performed by: PHYSICIAN ASSISTANT

## 2022-02-24 NOTE — PROCEDURES
Large Joint Aspiration/Injection: R knee    Date/Time: 2/24/2022 9:00 AM  Performed by: Kaya Fatima PA-C  Authorized by: Kaya Fatima PA-C     Consent Done?:  Yes (Verbal)  Indications:  Joint swelling and pain  Site marked: the procedure site was marked    Timeout: prior to procedure the correct patient, procedure, and site was verified    Prep: patient was prepped and draped in usual sterile fashion      Local anesthesia used?: Yes    Local anesthetic:  Topical anesthetic    Details:  Needle Size:  22 G  Ultrasonic Guidance for needle placement?: No    Approach:  Superior  Location:  Knee  Site:  R knee  Medications:  20 mg sodium hyaluronate (EUFLEXXA) 10 mg/mL(mw 2.4 -3.6 million)  Patient tolerance:  Patient tolerated the procedure well with no immediate complications     1/3

## 2022-02-25 ENCOUNTER — CLINICAL SUPPORT (OUTPATIENT)
Dept: REHABILITATION | Facility: HOSPITAL | Age: 47
End: 2022-02-25
Payer: COMMERCIAL

## 2022-02-25 ENCOUNTER — DOCUMENTATION ONLY (OUTPATIENT)
Dept: REHABILITATION | Facility: HOSPITAL | Age: 47
End: 2022-02-25

## 2022-02-25 DIAGNOSIS — R53.1 DECREASED STRENGTH, ENDURANCE, AND MOBILITY: ICD-10-CM

## 2022-02-25 DIAGNOSIS — R26.89 FUNCTIONAL GAIT ABNORMALITY: Primary | ICD-10-CM

## 2022-02-25 DIAGNOSIS — Z74.09 DECREASED STRENGTH, ENDURANCE, AND MOBILITY: ICD-10-CM

## 2022-02-25 DIAGNOSIS — M25.461 SWELLING OF JOINT OF RIGHT KNEE: ICD-10-CM

## 2022-02-25 DIAGNOSIS — R68.89 DECREASED STRENGTH, ENDURANCE, AND MOBILITY: ICD-10-CM

## 2022-02-25 PROCEDURE — 97110 THERAPEUTIC EXERCISES: CPT | Mod: CQ

## 2022-02-25 NOTE — PROGRESS NOTES
[facility] [place] [facility]   Physical Therapy Treatment Note     Name: Zachary Escalona Pataskala  Clinic Number: [id number]    Therapy Diagnosis:   Encounter Diagnoses   Name Primary?    Functional gait abnormality Yes    Decreased strength, endurance, and mobility     Swelling of joint of right knee      Physician: Kaya Fatima PA-C    Visit Date: 2/25/2022    Physician Orders: PT Eval and Treat  Medical Diagnosis from Referral: Primary osteoarthritis of right knee  Evaluation Date: 2/11/2022  Authorization Period Expiration: 4/1/2022  Plan of Care Expiration: 5/11/2022  Visit # / Visits authorized: 4/20 (+1 for evaluation)  FOTO: 1/3    PTA Visit #: 3/5     Progress Note Due on 3/11/2022    Time In: 0750  Time Out: 0830  Total Billable Time: 40 minutes    Precautions: Standard and seizure disorder, migraines, Raynaud's disease, lupus nephritis Sjogren's syndrome, scleroderma     SUBJECTIVE     Pt reports: feeling a little irritated due to right knee injection yesterday.      She was compliant with home exercise program.    Response to previous treatment: feeling good after last session    Functional change: sitting (driving) for extended durations, more right knee extension    Pain: 0/10  Location: right medial knee (no pain today)    OBJECTIVE     Objective Measures updated at progress report unless specified.     Treatment     Zachary received therapeutic exercises to develop strength, endurance, ROM, flexibility, posture and core stabilization for (40) minutes including:     Intervention Performed Today    Heel Slides x 3 minutes 5 second holds    Recumbent Bike x 5 minutes level 1 for range of motion   Straight leg raise    x 2 x 8 left (added)  3 x 8 Right    Shuttle    Bilateral 4 bands 3 x 10 (added)   Single leg 3 bands 3 x10 Bilateral (added)   Quad sets x 3 minute 5 second hold bilateral    LAQ x 3 seconds x 30 right   Bridging with abdominal brace x 3 x 8    Prone hip extension    x 2 x 8 left (added)  3 x 8 Right    Side lying hip abduction with abdominal brace   x 2 x 8 left (added)  3 x 8 Right    Step ups x   Up with right and down with left x 10   Heel/toe raises x 3 x 10 no hnds   March in place x 3 x 10 no yonatan          Plan for Next Visit: Continue to progress lower extremity strength and mobility     Zachary received the following manual therapy techniques: Myofacial release and Soft tissue Mobilization were applied to the: right knee for (0) minutes, including:    Manual Intervention Performed Today    Soft Tissue Mobilization  right vastus medialis/VMO, medial hamstrings, hip adductors  and pes anserine, edema massage, sacral scrubbing   Joint Mobilizations     Mobilization with movement  Gluteal release with hip rotation B   Muscle Energy Technique   For pelvic rotation noted   Functional Dry Needling        Plan for Next Visit: Continue as needed     Zachary participated in neuromuscular re-education activities to improve: Proprioception and Posture for (0) minutes. The following activities were included:    Intervention Performed Today    Belt Block  10 second holds each for 4 minutes    Side Lying Clams  x20 reps B                                   Plan for Next Visit: Continue to progress core and hip strength       Patient Education and Home Exercises     Home Exercises Provided and Patient Education Provided     Education provided:   - Education provided throughout the session on the proper form and sequencing of all exercises provided in HEP    Written Home Exercises Provided: Patient instructed to cont prior HEP. Exercises were reviewed and Zachary was able to demonstrate them prior to the end of the session.  Zachary demonstrated good  understanding of the education provided. See EMR under Patient Instructions for exercises provided during therapy sessions      ASSESSMENT     Exercises were performed today with her right lower extremity to allow more time for  progression of exercises. Patient was able to tolerate additional exercises today with no complaints and with no towel under right knee with quad sets indicating increased knee extension. She performed standing exercises with no hand support and with good balance.     Zachary Is progressing well towards her goals.   Pt prognosis is Good.     Pt will continue to benefit from skilled outpatient physical therapy to address the deficits listed in the problem list box on initial evaluation, provide pt/family education and to maximize pt's level of independence in the home and community environment.     Pt's spiritual, cultural and educational needs considered and pt agreeable to plan of care and goals.     Anticipated barriers to physical therapy: seizure disorder, migraines, Raynaud's disease, lupus nephritis Sjogren's syndrome, scleroderma     Goals:   Short Term Goals:  6 weeks 2/11/2022   1. Pain: Pt will demonstrate improved pain by reports of less than or equal to 5/10 worst pain on the verbal rating scale in order to progress toward maximal functional ability and improve QOL.     2. Function: Patient will demonstrate improved function as indicated by a score of less than or equal to 45% impairment on FOTO.      3. Mobility: Patient will improve AROM to 50% of stated goals, listed in objective measures above, in order to progress towards independence with functional activities.      4. Strength: Patient will improve strength to 3+/5 in all muscle groups, listed in objective measures above, in order to progress towards independence with functional activities.      5. Gait: Patient will demonstrate improved gait mechanics including more functional knee flexion and extension and hip extension in order to improve functional mobility, improve quality of life, and decrease risk of further injury.      6. HEP: Patient will demonstrate independence with HEP in order to progress toward functional independence.      7. Patient will be able to sit in car for drives up to 30 minutes with 5/10 pain or less to note improving mobility, muscle tone and endurance to allow for improved quality of life.         Long Term Goals:  12 weeks 2/11/2022   1. Pain: Pt will demonstrate improved pain by reports of less than or equal to 2/10 worst pain on the verbal rating scale in order to progress toward maximal functional ability and improve QOL.       2. Function: Patient will demonstrate improved function as indicated by a score of less than or equal to 36% impairment on FOTO.      3. Mobility: Patient will improve AROM to stated goals, listed in objective measures above, in order to return to maximal functional potential and improve quality of life.     4. Strength: Patient will improve strength to stated goals, listed in objective measures above, in order to improve functional independence and quality of life.     5. Gait: Patient will demonstrate normalized gait mechanics with minimal compensation in order to return to PLOF.     6. Patient will return to normal ADL's, IADL's, recreational activities, and work-related activities with less than or equal to 1/10 pain and maximal function.      7. Patient will be able to drive to her hometown 2 hours away with 1/10 pain or less to noted adequate mobility, muscle tone, length, strength and endurance.      8. 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         PLAN     Continue POC and frequency as planned. Continue to work on improving core, hip and lower extremity strengthening while working on normalizing knee mobility to progress towards higher level functional status to improve overall quality of life.      These services are reasonable and necessary for the conditions set forth above while under my  care.    Desmond Menchaca, PTA

## 2022-02-25 NOTE — PROGRESS NOTES
PT/PTA met face to face to discuss pt's treatment plan and progress towards established goals. Pt will be seen by a physical therapist minimally every 6th visit or every 30 days.        Desmond Menchaca PTA

## 2022-03-01 NOTE — PROGRESS NOTES
Subjective:   Patient ID:  Zachary Lucia is a 46 y.o. female who presents for follow-up of No chief complaint on file.  Intermittent mild chest discomfort some of the typical and atypical.  Positive family history of coronary disease on her mother side.  Will go ahead with a stress test echo done recently was completely normal.  EKG showed nonspecific changes.        Overall patient is stable.  We would like to go ahead with a nuclear stress test as the regular stress test did show ST segment changes.  Patient has had no chest discomfort on this test.  Follow-up evaluation after nuclear stress test is performed.  Patient has a very high risk factor for family history of coronary disease.     The patient presents the office having intermittent chest discomfort.  Nuclear stress test showed inferior basal ischemia.  ST changes in inferior leads.  Patient continues intermittent symptoms.  Will go ahead with heart catheterization to rule out significant coronary disease.  Patient agreeable to this plan.       Clinically stable doing well this time no exertional symptoms chest pain shortness breath.  Overall feeling well.      Review of Systems   Constitutional: Negative for chills, diaphoresis, night sweats, weight gain and weight loss.   HENT: Negative for congestion, hoarse voice, sore throat and stridor.    Eyes: Negative for double vision and pain.   Cardiovascular: Negative for chest pain, claudication, cyanosis, dyspnea on exertion, irregular heartbeat, leg swelling, near-syncope, orthopnea, palpitations, paroxysmal nocturnal dyspnea and syncope.   Respiratory: Negative for cough, hemoptysis, shortness of breath, sleep disturbances due to breathing, snoring, sputum production and wheezing.    Endocrine: Negative for cold intolerance, heat intolerance and polydipsia.   Hematologic/Lymphatic: Negative for bleeding problem. Does not bruise/bleed easily.   Skin: Negative for color change, dry skin  and rash.   Musculoskeletal: Negative for joint swelling and muscle cramps.   Gastrointestinal: Negative for bloating, abdominal pain, constipation, diarrhea, dysphagia, melena, nausea and vomiting.   Genitourinary: Negative for flank pain and urgency.   Neurological: Negative for dizziness, focal weakness, headaches, light-headedness, loss of balance, seizures and weakness.   Psychiatric/Behavioral: Negative for altered mental status and memory loss. The patient is not nervous/anxious.      Family History   Problem Relation Age of Onset    Arthritis Mother     Glaucoma Mother     Hypertension Mother     Diabetes Father     Diabetes Maternal Grandmother     Hypertension Maternal Grandmother     Arthritis Maternal Grandmother     Cataracts Maternal Grandmother     Diabetes Maternal Grandfather     Heart disease Maternal Grandfather     Hypertension Maternal Grandfather     Diabetes Paternal Grandmother     Heart disease Paternal Grandmother     Hypertension Paternal Grandmother     Heart disease Paternal Grandfather      Past Medical History:   Diagnosis Date    Abnormal stress test 1/25/2022    Asthma     Essential hypertension, malignant 1/8/2020    Lupus (systemic lupus erythematosus)     Membranous glomerulonephritis, 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    Scleroderma     Seizures 01/12/2017     Social History     Socioeconomic History    Marital status:      Spouse name: Darrell    Number of children: 0   Tobacco Use    Smoking status: Never Smoker    Smokeless tobacco: Never Used   Substance and Sexual Activity    Alcohol use: Yes    Drug use: No    Sexual activity: Yes     Current Outpatient Medications on File Prior to Visit   Medication Sig Dispense Refill    azaTHIOprine (IMURAN) 100 mg tablet Take 1 tablet (100 mg total) by mouth once daily. 90 tablet 1    azaTHIOprine (IMURAN) 50 mg Tab Take 1 tablet (50 mg total) by mouth once daily in evening 90 tablet 1    betamethasone  dipropionate (DIPROLENE) 0.05 % ointment Apply topically 2 (two) times daily as needed. Steroid. Use for flares 45 g 3    cholecalciferol, vitamin D3, 1,250 mcg (50,000 unit) capsule Take 1 capsule (50,000 Units total) by mouth once a week. 15 capsule 1    crisaborole (EUCRISA) 2 % Oint AAA bid prn.  Non-steroid.  Safe to use long-term. 60 g 3    cyclobenzaprine (FLEXERIL) 10 MG tablet Take 1 tablet (10 mg total) by mouth 2 (two) times daily as needed for Muscle spasms. 60 tablet 0    ferrous sulfate (FEOSOL) 325 mg (65 mg iron) Tab tablet Take 1 tablet by mouth once daily.      fluticasone-salmeterol diskus inhaler 100-50 mcg Inhale 1 puff into the lungs 2 (two) times daily. Controller 1 each 11    HYDROcodone-acetaminophen (NORCO) 7.5-325 mg per tablet Take 1 tablet by mouth 4 (four) times daily as needed.      hydrOXYchloroQUINE (PLAQUENIL) 200 mg tablet Take 1 tablet (200 mg total) by mouth 2 (two) times daily. 180 tablet 3     mg tablet Take 800 mg by mouth every 8 (eight) hours as needed.  0    levetiracetam XR (KEPPRA XR) 500 mg Tb24 24 hr tablet Take 500 mg by mouth once daily.      predniSONE (DELTASONE) 10 MG tablet TAKE 1 TABLET BY MOUTH EVERY DAY 60 tablet 1    PROAIR HFA 90 mcg/actuation inhaler INHALE 2 PUFFS INTO THE LUNGS EVERY 6 (SIX) HOURS AS NEEDED FOR WHEEZING. RESCUE 8.5 Inhaler 0    pulse oximeter (PULSE OXIMETER) device Use by Apply Externally route 2 (two) times a day. Use twice daily at 8 AM and 3 PM and record the value in [employer] as directed. 1 each 0     No current facility-administered medications on file prior to visit.     Review of patient's allergies indicates:   Allergen Reactions    Lisinopril      cough    Sulfa (sulfonamide antibiotics) Swelling       Objective:     Physical Exam  Eyes:      Pupils: Pupils are equal, round, and reactive to light.   Neck:      Trachea: No tracheal deviation.   Cardiovascular:      Rate and Rhythm: Normal rate and regular  rhythm.      Pulses: Intact distal pulses.           Carotid pulses are 2+ on the right side and 2+ on the left side.       Radial pulses are 2+ on the right side and 2+ on the left side.        Femoral pulses are 2+ on the right side and 2+ on the left side.       Popliteal pulses are 2+ on the right side and 2+ on the left side.        Dorsalis pedis pulses are 2+ on the right side and 2+ on the left side.        Posterior tibial pulses are 2+ on the right side and 2+ on the left side.      Heart sounds: Normal heart sounds. No murmur heard.    No friction rub. No gallop.   Pulmonary:      Effort: Pulmonary effort is normal. No respiratory distress.      Breath sounds: Normal breath sounds. No stridor. No wheezing or rales.   Chest:      Chest wall: No tenderness.   Abdominal:      General: There is no distension.      Tenderness: There is no abdominal tenderness. There is no rebound.   Musculoskeletal:         General: No tenderness.      Cervical back: Normal range of motion.   Skin:     General: Skin is warm and dry.   Neurological:      Mental Status: She is alert and oriented to person, place, and time.     · Cardiac catheterization report on 05/20/2022:  ·   · The pre-procedure left ventricular end diastolic pressure was 20.  · The post-procedure left ventricular end diastolic pressure was 20.  · The ejection fraction was calculated to be 60%.  · There was no aortic valve stenosis.  · There was no mitral valve stenosis.  · There was no mitral valve prolapse evident. The annulus was normal. There was normal mitral valve motion.  · The estimated blood loss was none.  · The coronary arteries were normal..  · Tortuous coronary arteries.      Ref Range & Units 3 mo ago   (11/29/21) 1 yr ago   (6/9/20) 2 yr ago   (5/30/19) 2 yr ago   (3/11/19)    Cholesterol 120 - 199 mg/dL 180  176 R  192 R  179 CM    Comment: The National Cholesterol Education Program (NCEP) has set the   following guidelines (reference ranges)  for Cholesterol:   Optimal.....................<200 mg/dL   Borderline High.............200-239 mg/dL   High........................> or = 240 mg/dL    Triglycerides 30 - 150 mg/dL 101  71 R  85 R  79 CM    Comment: The National Cholesterol Education Program (NCEP) has set the   following guidelines (reference values) for triglycerides:   Normal......................<150 mg/dL   Borderline High.............150-199 mg/dL   High........................200-499 mg/dL    HDL 40 - 75 mg/dL 53  59 R  54 R  53 CM    Comment: The National Cholesterol Education Program (NCEP) has set the   following guidelines (reference values) for HDL Cholesterol:   Low...............<40 mg/dL   Optimal...........>60 mg/dL    LDL Cholesterol 63.0 - 159.0 mg/dL 106.8    110.2 CM    Comment: The National Cholesterol Education Program (NCEP) has set the   following guidelines (reference values) for LDL Cholesterol       Assessment:     1. Abnormal stress test    2. Pneumonia due to COVID-19 virus    3. SOB (shortness of breath)    4. COVID-19 virus infection    5. Essential hypertension    6. Respiratory distress    7. Scleroderma    8. Sepsis, due to unspecified organism, unspecified whether acute organ dysfunction present    9. Sjogren's syndrome with keratoconjunctivitis sicca    10. Pain in extremity, unspecified extremity    11. Chest pain, unspecified type        Plan:     Abnormal stress test    Pneumonia due to COVID-19 virus    SOB (shortness of breath)    COVID-19 virus infection    Essential hypertension    Respiratory distress    Scleroderma    Sepsis, due to unspecified organism, unspecified whether acute organ dysfunction present    Sjogren's syndrome with keratoconjunctivitis sicca    Pain in extremity, unspecified extremity    Chest pain, unspecified type      Impression 1. Chest pain resolved normal coronary anatomy  2. Peripheral pain improved  3.  Sjogren syndrome stable on stable this time.   Follow-up evaluation again in 6  months.

## 2022-03-02 ENCOUNTER — OFFICE VISIT (OUTPATIENT)
Dept: CARDIOLOGY | Facility: CLINIC | Age: 47
End: 2022-03-02
Payer: COMMERCIAL

## 2022-03-02 VITALS
WEIGHT: 241.19 LBS | HEART RATE: 80 BPM | DIASTOLIC BLOOD PRESSURE: 88 MMHG | SYSTOLIC BLOOD PRESSURE: 124 MMHG | BODY MASS INDEX: 38.76 KG/M2 | OXYGEN SATURATION: 98 % | HEIGHT: 66 IN

## 2022-03-02 DIAGNOSIS — I10 ESSENTIAL HYPERTENSION: ICD-10-CM

## 2022-03-02 DIAGNOSIS — J12.82 PNEUMONIA DUE TO COVID-19 VIRUS: ICD-10-CM

## 2022-03-02 DIAGNOSIS — R06.03 RESPIRATORY DISTRESS: ICD-10-CM

## 2022-03-02 DIAGNOSIS — R07.9 CHEST PAIN, UNSPECIFIED TYPE: ICD-10-CM

## 2022-03-02 DIAGNOSIS — R06.02 SOB (SHORTNESS OF BREATH): ICD-10-CM

## 2022-03-02 DIAGNOSIS — A41.9 SEPSIS, DUE TO UNSPECIFIED ORGANISM, UNSPECIFIED WHETHER ACUTE ORGAN DYSFUNCTION PRESENT: ICD-10-CM

## 2022-03-02 DIAGNOSIS — U07.1 PNEUMONIA DUE TO COVID-19 VIRUS: ICD-10-CM

## 2022-03-02 DIAGNOSIS — U07.1 COVID-19 VIRUS INFECTION: ICD-10-CM

## 2022-03-02 DIAGNOSIS — R94.39 ABNORMAL STRESS TEST: Primary | ICD-10-CM

## 2022-03-02 DIAGNOSIS — M79.609 PAIN IN EXTREMITY, UNSPECIFIED EXTREMITY: ICD-10-CM

## 2022-03-02 DIAGNOSIS — M34.9 SCLERODERMA: ICD-10-CM

## 2022-03-02 DIAGNOSIS — M35.01 SJOGREN'S SYNDROME WITH KERATOCONJUNCTIVITIS SICCA: ICD-10-CM

## 2022-03-02 PROCEDURE — 3008F BODY MASS INDEX DOCD: CPT | Mod: CPTII,S$GLB,, | Performed by: INTERNAL MEDICINE

## 2022-03-02 PROCEDURE — 3066F NEPHROPATHY DOC TX: CPT | Mod: CPTII,S$GLB,, | Performed by: INTERNAL MEDICINE

## 2022-03-02 PROCEDURE — 3079F DIAST BP 80-89 MM HG: CPT | Mod: CPTII,S$GLB,, | Performed by: INTERNAL MEDICINE

## 2022-03-02 PROCEDURE — 99999 PR PBB SHADOW E&M-EST. PATIENT-LVL IV: CPT | Mod: PBBFAC,,, | Performed by: INTERNAL MEDICINE

## 2022-03-02 PROCEDURE — 1159F MED LIST DOCD IN RCRD: CPT | Mod: CPTII,S$GLB,, | Performed by: INTERNAL MEDICINE

## 2022-03-02 PROCEDURE — 99214 PR OFFICE/OUTPT VISIT, EST, LEVL IV, 30-39 MIN: ICD-10-PCS | Mod: S$GLB,,, | Performed by: INTERNAL MEDICINE

## 2022-03-02 PROCEDURE — 1160F PR REVIEW ALL MEDS BY PRESCRIBER/CLIN PHARMACIST DOCUMENTED: ICD-10-PCS | Mod: CPTII,S$GLB,, | Performed by: INTERNAL MEDICINE

## 2022-03-02 PROCEDURE — 1159F PR MEDICATION LIST DOCUMENTED IN MEDICAL RECORD: ICD-10-PCS | Mod: CPTII,S$GLB,, | Performed by: INTERNAL MEDICINE

## 2022-03-02 PROCEDURE — 3074F SYST BP LT 130 MM HG: CPT | Mod: CPTII,S$GLB,, | Performed by: INTERNAL MEDICINE

## 2022-03-02 PROCEDURE — 1160F RVW MEDS BY RX/DR IN RCRD: CPT | Mod: CPTII,S$GLB,, | Performed by: INTERNAL MEDICINE

## 2022-03-02 PROCEDURE — 3079F PR MOST RECENT DIASTOLIC BLOOD PRESSURE 80-89 MM HG: ICD-10-PCS | Mod: CPTII,S$GLB,, | Performed by: INTERNAL MEDICINE

## 2022-03-02 PROCEDURE — 99999 PR PBB SHADOW E&M-EST. PATIENT-LVL IV: ICD-10-PCS | Mod: PBBFAC,,, | Performed by: INTERNAL MEDICINE

## 2022-03-02 PROCEDURE — 3008F PR BODY MASS INDEX (BMI) DOCUMENTED: ICD-10-PCS | Mod: CPTII,S$GLB,, | Performed by: INTERNAL MEDICINE

## 2022-03-02 PROCEDURE — 3066F PR DOCUMENTATION OF TREATMENT FOR NEPHROPATHY: ICD-10-PCS | Mod: CPTII,S$GLB,, | Performed by: INTERNAL MEDICINE

## 2022-03-02 PROCEDURE — 99214 OFFICE O/P EST MOD 30 MIN: CPT | Mod: S$GLB,,, | Performed by: INTERNAL MEDICINE

## 2022-03-02 PROCEDURE — 3074F PR MOST RECENT SYSTOLIC BLOOD PRESSURE < 130 MM HG: ICD-10-PCS | Mod: CPTII,S$GLB,, | Performed by: INTERNAL MEDICINE

## 2022-03-03 ENCOUNTER — PROCEDURE VISIT (OUTPATIENT)
Dept: ORTHOPEDICS | Facility: CLINIC | Age: 47
End: 2022-03-03
Payer: COMMERCIAL

## 2022-03-03 VITALS — HEIGHT: 66 IN | WEIGHT: 241.19 LBS | BODY MASS INDEX: 38.76 KG/M2

## 2022-03-03 DIAGNOSIS — M17.11 PRIMARY OSTEOARTHRITIS OF RIGHT KNEE: Primary | ICD-10-CM

## 2022-03-03 PROCEDURE — 20610 LARGE JOINT ASPIRATION/INJECTION: R KNEE: ICD-10-PCS | Mod: RT,S$GLB,, | Performed by: PHYSICIAN ASSISTANT

## 2022-03-03 PROCEDURE — 99499 NO LOS: ICD-10-PCS | Mod: S$GLB,,, | Performed by: PHYSICIAN ASSISTANT

## 2022-03-03 PROCEDURE — 20610 DRAIN/INJ JOINT/BURSA W/O US: CPT | Mod: RT,S$GLB,, | Performed by: PHYSICIAN ASSISTANT

## 2022-03-03 PROCEDURE — 99499 UNLISTED E&M SERVICE: CPT | Mod: S$GLB,,, | Performed by: PHYSICIAN ASSISTANT

## 2022-03-03 NOTE — PROCEDURES
Large Joint Aspiration/Injection: R knee    Date/Time: 3/3/2022 9:00 AM  Performed by: Kaya Fatima PA-C  Authorized by: Kaya Fatima PA-C     Consent Done?:  Yes (Verbal)  Indications:  Joint swelling and pain  Site marked: the procedure site was marked    Timeout: prior to procedure the correct patient, procedure, and site was verified    Prep: patient was prepped and draped in usual sterile fashion      Local anesthesia used?: Yes    Local anesthetic:  Topical anesthetic    Details:  Needle Size:  22 G  Ultrasonic Guidance for needle placement?: No    Approach:  Superior  Location:  Knee  Site:  R knee  Medications:  20 mg sodium hyaluronate (EUFLEXXA) 10 mg/mL(mw 2.4 -3.6 million)  Patient tolerance:  Patient tolerated the procedure well with no immediate complications     2/3

## 2022-03-04 ENCOUNTER — CLINICAL SUPPORT (OUTPATIENT)
Dept: REHABILITATION | Facility: HOSPITAL | Age: 47
End: 2022-03-04
Payer: COMMERCIAL

## 2022-03-04 DIAGNOSIS — R53.1 DECREASED STRENGTH, ENDURANCE, AND MOBILITY: ICD-10-CM

## 2022-03-04 DIAGNOSIS — M25.461 SWELLING OF JOINT OF RIGHT KNEE: ICD-10-CM

## 2022-03-04 DIAGNOSIS — Z74.09 DECREASED STRENGTH, ENDURANCE, AND MOBILITY: ICD-10-CM

## 2022-03-04 DIAGNOSIS — R68.89 DECREASED STRENGTH, ENDURANCE, AND MOBILITY: ICD-10-CM

## 2022-03-04 DIAGNOSIS — R26.89 FUNCTIONAL GAIT ABNORMALITY: Primary | ICD-10-CM

## 2022-03-04 PROCEDURE — 97140 MANUAL THERAPY 1/> REGIONS: CPT

## 2022-03-04 PROCEDURE — 97110 THERAPEUTIC EXERCISES: CPT

## 2022-03-04 NOTE — PROGRESS NOTES
[facility]   Physical Therapy Treatment Note     Name: Zachary Escalona [place]  Clinic Number: [id number]    Therapy Diagnosis:   Encounter Diagnoses   Name Primary?    Functional gait abnormality Yes    Decreased strength, endurance, and mobility     Swelling of joint of right knee      Physician: Kaya Fatima PA-C    Visit Date: 3/4/2022    Physician Orders: PT Eval and Treat  Medical Diagnosis from Referral: Primary osteoarthritis of right knee  Evaluation Date: 2/11/2022  Authorization Period Expiration: 4/1/2022  Plan of Care Expiration: 5/11/2022  Visit # / Visits authorized: 5/20 (+1 for evaluation)  FOTO: 1/3    PTA Visit #: 0/5     Progress Note Due on 3/11/2022    Time In: 0706  Time Out: 0747  Total Billable Time: 41 minutes    Precautions: Standard and seizure disorder, migraines, Raynaud's disease, lupus nephritis Sjogren's syndrome, scleroderma     SUBJECTIVE     Pt reports: that she is feeling a little better today. Patient reports that she received her second Visco injection yesterday in which she normally has more pain the day of and feels better the following day. Patient reports that she feels like her right knee is a little more swollen today though.    She was compliant with home exercise program.    Response to previous treatment: feeling good after last session    Functional change: able to drive or sit for at least an hour before her knee would not have allowed for that, improved ability to perform stair negotiation with less pain.     Pain: 1/10  Location: right medial knee (no pain today)    OBJECTIVE     Objective Measures updated at progress report unless specified.     Treatment     Zachary received therapeutic exercises to develop strength, endurance, ROM, flexibility, posture and core stabilization for (33) minutes including:     Intervention Performed Today    Heel Slides x 3 minutes 5 second holds    Recumbent Bike x 5 minutes level 2  (increased resistance)   Straight leg raise  x 3 x 8 reps B   Shuttle    Bilateral 4 bands 3 x 10 (added)   Single leg 3 bands 3 x10 Bilateral (added)   Quad sets  3 minute 5 second hold bilateral    Long arc quads  x x 30 reps B 3 lbs    Bridging with abdominal brace x 3 x 10 reps     Prone hip extension    2 x 8 left (added)  3 x 8 Right    Side lying Hip Series (progressed) X  x 2 x 10 reps B abduction  x10 reps circles forward/backward, flexion/extension   Step ups x   x 15 reps B   Heel/toe raises x x 20 no hnds   March in place  3 x 10 no yonatan          Plan for Next Visit: Continue to progress lower extremity strength and mobility     Zachary received the following manual therapy techniques: Myofacial release and Soft tissue Mobilization were applied to the: right knee for (8) minutes, including:    Manual Intervention Performed Today    Soft Tissue Mobilization x right knee grossly   Joint Mobilizations     Mobilization with movement  Gluteal release with hip rotation B   Muscle Energy Technique   For pelvic rotation noted   Functional Dry Needling        Plan for Next Visit: Continue as needed     Zachary participated in neuromuscular re-education activities to improve: Proprioception and Posture for (0) minutes. The following activities were included:    Intervention Performed Today    Belt Block  10 second holds each for 4 minutes    Side Lying Clams  x20 reps B                                   Plan for Next Visit: Continue to progress core and hip strength       Patient Education and Home Exercises     Home Exercises Provided and Patient Education Provided     Education provided:   - Education provided throughout the session on the proper form and sequencing of all exercises provided in HEP    Written Home Exercises Provided: Patient instructed to cont prior HEP. Exercises were reviewed and Zachary was able to demonstrate them prior to the end of the session.  Zachary demonstrated good   understanding of the education provided. See EMR under Patient Instructions for exercises provided during therapy sessions      ASSESSMENT     Performed edema massage to right knee grossly to assist with fluid reduction with some improvements noted. Focused mainly today on progressing lower extremity strengthening program with incorporation of additional weight as well as a few new exercises. Patient noted with considerably more muscular fatigue in gluteal region with new exercises. Patient overall reported feeling really good upon exiting session today.     Zachary Is progressing well towards her goals.   Pt prognosis is Good.     Pt will continue to benefit from skilled outpatient physical therapy to address the deficits listed in the problem list box on initial evaluation, provide pt/family education and to maximize pt's level of independence in the home and community environment.     Pt's spiritual, cultural and educational needs considered and pt agreeable to plan of care and goals.     Anticipated barriers to physical therapy: seizure disorder, migraines, Raynaud's disease, lupus nephritis Sjogren's syndrome, scleroderma     Goals:   Short Term Goals:  6 weeks 2/11/2022   1. Pain: Pt will demonstrate improved pain by reports of less than or equal to 5/10 worst pain on the verbal rating scale in order to progress toward maximal functional ability and improve QOL.     2. Function: Patient will demonstrate improved function as indicated by a score of less than or equal to 45% impairment on FOTO.      3. Mobility: Patient will improve AROM to 50% of stated goals, listed in objective measures above, in order to progress towards independence with functional activities.      4. Strength: Patient will improve strength to 3+/5 in all muscle groups, listed in objective measures above, in order to progress towards independence with functional activities.      5. Gait: Patient will demonstrate improved gait mechanics  including more functional knee flexion and extension and hip extension in order to improve functional mobility, improve quality of life, and decrease risk of further injury.      6. HEP: Patient will demonstrate independence with HEP in order to progress toward functional independence.     7. Patient will be able to sit in car for drives up to 30 minutes with 5/10 pain or less to note improving mobility, muscle tone and endurance to allow for improved quality of life.         Long Term Goals:  12 weeks 2/11/2022   1. Pain: Pt will demonstrate improved pain by reports of less than or equal to 2/10 worst pain on the verbal rating scale in order to progress toward maximal functional ability and improve QOL.       2. Function: Patient will demonstrate improved function as indicated by a score of less than or equal to 36% impairment on FOTO.      3. Mobility: Patient will improve AROM to stated goals, listed in objective measures above, in order to return to maximal functional potential and improve quality of life.     4. Strength: Patient will improve strength to stated goals, listed in objective measures above, in order to improve functional independence and quality of life.     5. Gait: Patient will demonstrate normalized gait mechanics with minimal compensation in order to return to PLOF.     6. Patient will return to normal ADL's, IADL's, recreational activities, and work-related activities with less than or equal to 1/10 pain and maximal function.      7. Patient will be able to drive to her hometown 2 hours away with 1/10 pain or less to noted adequate mobility, muscle tone, length, strength and endurance.      8. 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         PLAN     Continue POC and frequency as planned. Continue to  work on improving core, hip and lower extremity strengthening while working on normalizing knee mobility to progress towards higher level functional status to improve overall quality of life.      These services are reasonable and necessary for the conditions set forth above while under my care.    Jaylene Tamayo, PT, DPT

## 2022-03-09 ENCOUNTER — PROCEDURE VISIT (OUTPATIENT)
Dept: ORTHOPEDICS | Facility: CLINIC | Age: 47
End: 2022-03-09
Payer: COMMERCIAL

## 2022-03-09 VITALS — HEIGHT: 66 IN | BODY MASS INDEX: 38.73 KG/M2 | WEIGHT: 241 LBS

## 2022-03-09 DIAGNOSIS — M17.11 PRIMARY OSTEOARTHRITIS OF RIGHT KNEE: Primary | ICD-10-CM

## 2022-03-09 PROCEDURE — 20610 DRAIN/INJ JOINT/BURSA W/O US: CPT | Mod: RT,S$GLB,, | Performed by: PHYSICIAN ASSISTANT

## 2022-03-09 PROCEDURE — 20610 LARGE JOINT ASPIRATION/INJECTION: R KNEE: ICD-10-PCS | Mod: RT,S$GLB,, | Performed by: PHYSICIAN ASSISTANT

## 2022-03-09 NOTE — PROCEDURES
Large Joint Aspiration/Injection: R knee    Date/Time: 3/9/2022 8:00 AM  Performed by: Kaya Fatima PA-C  Authorized by: Kaya Fatima PA-C     Consent Done?:  Yes (Verbal)  Indications:  Joint swelling and pain  Site marked: the procedure site was marked    Timeout: prior to procedure the correct patient, procedure, and site was verified    Prep: patient was prepped and draped in usual sterile fashion      Local anesthesia used?: Yes    Local anesthetic:  Topical anesthetic    Details:  Needle Size:  22 G  Ultrasonic Guidance for needle placement?: No    Approach:  Superior  Location:  Knee  Site:  R knee  Medications:  20 mg sodium hyaluronate (EUFLEXXA) 10 mg/mL(mw 2.4 -3.6 million)  Patient tolerance:  Patient tolerated the procedure well with no immediate complications     3/3

## 2022-03-10 ENCOUNTER — PATIENT MESSAGE (OUTPATIENT)
Dept: ENDOSCOPY | Facility: HOSPITAL | Age: 47
End: 2022-03-10
Payer: COMMERCIAL

## 2022-03-16 ENCOUNTER — PATIENT OUTREACH (OUTPATIENT)
Dept: ADMINISTRATIVE | Facility: HOSPITAL | Age: 47
End: 2022-03-16
Payer: COMMERCIAL

## 2022-03-16 ENCOUNTER — CLINICAL SUPPORT (OUTPATIENT)
Dept: REHABILITATION | Facility: HOSPITAL | Age: 47
End: 2022-03-16
Payer: COMMERCIAL

## 2022-03-16 DIAGNOSIS — Z74.09 DECREASED STRENGTH, ENDURANCE, AND MOBILITY: ICD-10-CM

## 2022-03-16 DIAGNOSIS — R68.89 DECREASED STRENGTH, ENDURANCE, AND MOBILITY: ICD-10-CM

## 2022-03-16 DIAGNOSIS — M25.461 SWELLING OF JOINT OF RIGHT KNEE: ICD-10-CM

## 2022-03-16 DIAGNOSIS — R26.89 FUNCTIONAL GAIT ABNORMALITY: Primary | ICD-10-CM

## 2022-03-16 DIAGNOSIS — R53.1 DECREASED STRENGTH, ENDURANCE, AND MOBILITY: ICD-10-CM

## 2022-03-16 PROCEDURE — 97140 MANUAL THERAPY 1/> REGIONS: CPT

## 2022-03-16 PROCEDURE — 97110 THERAPEUTIC EXERCISES: CPT

## 2022-03-16 NOTE — PROGRESS NOTES
[facility]   Physical Therapy Treatment Note + Progress Note    Name: Zachary Escalona [place]  Clinic Number: [id number]    Therapy Diagnosis:   Encounter Diagnoses   Name Primary?    Functional gait abnormality Yes    Decreased strength, endurance, and mobility     Swelling of joint of right knee      Physician: Kaya Fatima PA-C    Visit Date: 3/16/2022    Physician Orders: PT Eval and Treat  Medical Diagnosis from Referral: Primary osteoarthritis of right knee  Evaluation Date: 2/11/2022  Authorization Period Expiration: 4/1/2022  Plan of Care Expiration: 5/11/2022  Visit # / Visits authorized: 6/20 (+1 for evaluation)  FOTO: 1/3    PTA Visit #: 0/5     Progress Note Due on 4/16/2022    Time In: 0800  Time Out: 0840  Total Billable Time: 40 minutes    Precautions: Standard and seizure disorder, migraines, Raynaud's disease, lupus nephritis Sjogren's syndrome, scleroderma     SUBJECTIVE     Pt reports: to therapy with antalgic gait present reporting that she is not doing great today. Patient reports that after the second Euflexxa injection her knee swole up real bad when she drove to [place] 2 weeks ago and then did a turn around that same day. Patient reports that she cried the whole way home as her knee was in so much pain. Patient reports that her right knee has been swollen and more painful ever since.     She was compliant with home exercise program.    Response to previous treatment: feeling good after last session    Functional change: able to drive or sit for at least an hour before her knee would not have allowed for that, improved ability to perform stair negotiation with less pain.     Pain: 4/10  Location: right medial knee     OBJECTIVE     Objective Measures updated at progress report unless specified.     Sensation:  Sensation is intact to light touch with hypersensitivity along right medial knee.  Posture:  Pt presents with postural abnormalities which  include: right knee flexed slightly in stance, slight right lateral shift despite knee pain  Palpation: Increased tone and tenderness noted with palpation of right quadriceps, vastus medialis/VMO, medial hamstrings, hip adductors  and ITB and left adductors although not as tender. Increased tenderness noted with palpation of right medial tibiofemoral joint line , lateral tibiofemoral joint line  and pes anserine.   Girth Measurements: Measured at mid patella reading 50.5 cm on right and 48 cm on left. (not assessed today)  Movement Analysis: Patient noted with upper extremity support when performing sit to stand transfers after sitting for short duration with apprehension and pain present requiring extended time to complete.    Gait Analysis: The patient ambulated with the following assistive device: none; the pt presents with the following gait abnormalities: decreased step length on right, decreased stance time on right, decreased heel strike on right, decreased hip extension on right, decreased knee flexion on right, decreased knee extension on right and left lateral trunk lean and weight shift to assist with forward progression of right lower extremity     (x = not tested due to pain and/or inability to obtain test position)     Range of Motion/Strength:      Hip Right  3/16/2022 Left  3/16/2022 Pain/Dysfunction with Movement Goal   AROM           Flexion (120)  Full Full No pain No fullness present   Extension (30) 0  0 Pain in right knee 10 B No pain   Abduction (45) 30 40 Tightness in adductors B 40 B No pain   IR (45) 15 30 Pain in right knee 40 B No pain   ER (45) 45  50 Pain in right knee 40 B No pain      Knee Right  3/16/2022 Left  3/16/2022 Pain/Dysfunction with Movement Goal   AROM           Flexion (135)  123  135 No pain 125 B No pain   Extension (0)  4 flexion  4 flexion Pain in right knee 0 No pain      L/E MMT Right Left Pain/Dysfunction with Movement Goal   Hip Flexion  4/5 4/5 Pain in central  low back  4+/5 B   Hip Extension  3+/5 3+/5 No pain 4+/5 B   Hip Abduction  3+/5 3+/5 No pain 4+/5 B   Hip Adduction 4-/5 3+/5 No pain  4+/5 B   Knee Extension 4-/5 4-/5 No pain 5/5 B   Knee Flexion 4-/5 4-/5 No pain 5/5 B   Hip IR 3+/5 3+/5 Mild pain in right knee  4+/5 B   Hip ER 3+/5 3+/5  No pain 4+/5 B   Ankle DF 4/5 4/5 No pain 5/5 B   Ankle PF 4-/5 4-/5 No pain 5/5 B      MUSCLE LENGTH:      Muscle Tested  Right  3/16/2022 Left   3/16/2022 Goal   Quadratus Lumborum decreased decreased Normal B   Hip Flexors  decreased decreased Normal B   Quadriceps decreased normal Normal B   Adductors decreased decreased     Hamstrings  decreased decreased Normal B   *decreased but improving      Treatment     Zachary received therapeutic exercises to develop strength, endurance, ROM, flexibility, posture and core stabilization for (30) minutes including:     Intervention Performed Today    Heel Slides x 4 minutes 5 second holds    Recumbent Bike x 5 minutes level 1 (decreased resistance today to focus on improving mobility to tolerance)   Tailgaters x 3 minutes 4 lbs on each lower extremity   Straight leg raise  x 3 x 8 reps B   Shuttle    Bilateral 4 bands 3 x 10 (added)   Single leg 3 bands 3 x10 Bilateral (added)   Quad sets  3 minute 5 second hold bilateral    Long arc quads   x 30 reps B 3 lbs    Bridging with abdominal brace  3 x 10 reps     Prone hip extension    2 x 8 left (added)  3 x 8 Right    Side lying Hip Series (progressed)  2 x 10 reps B abduction  x10 reps circles forward/backward, flexion/extension   Step ups  x 15 reps B   Heel/toe raises  x 20 no hnds   March in place  3 x 10 no yonatan   Re-Assessed Objective Measurements x 10 minutes      Plan for Next Visit: Continue to progress lower extremity strength and mobility     Zachary received the following manual therapy techniques: Myofacial release and Soft tissue Mobilization were applied to the: right knee for (10) minutes, including:    Manual  Intervention Performed Today    Soft Tissue Mobilization x right knee grossly, adductors, quadriceps    Joint Mobilizations     Mobilization with movement  Gluteal release with hip rotation B   Muscle Energy Technique   For pelvic rotation noted   Functional Dry Needling        Plan for Next Visit: Continue as needed     Zachary participated in neuromuscular re-education activities to improve: Proprioception and Posture for (0) minutes. The following activities were included:    Intervention Performed Today    Belt Block  10 second holds each for 4 minutes    Side Lying Clams  x20 reps B                                   Plan for Next Visit: Continue to progress core and hip strength       Patient Education and Home Exercises     Home Exercises Provided and Patient Education Provided     Education provided:   - Education provided throughout the session on the proper form and sequencing of all exercises provided in HEP    Written Home Exercises Provided: Patient instructed to cont prior HEP. Exercises were reviewed and Zachary was able to demonstrate them prior to the end of the session.  Zachary demonstrated good  understanding of the education provided. See EMR under Patient Instructions for exercises provided during therapy sessions      ASSESSMENT     Patient noted with more fullness in right knee today so focusing on mobility exercises to assist in alleviating edema as well as manual therapy interventions performed today. Patient overall despite being more flared up today than she has been in previous visits, demonstrates improved knee and hip mobility bilaterally as well as improved strength throughout bilateral lower extremities with less pain. Patient is still having the most difficulty with driving long durations greater than one hour and recently more limited due to flare up of pain. Patient will continue to benefit from skilled Physical Therapy to improve remaining deficits and improve her ability to  drive longer distances and improve overall quality of life.    Zachary Is progressing well towards her goals.   Pt prognosis is Good.     Pt will continue to benefit from skilled outpatient physical therapy to address the deficits listed in the problem list box on initial evaluation, provide pt/family education and to maximize pt's level of independence in the home and community environment.     Pt's spiritual, cultural and educational needs considered and pt agreeable to plan of care and goals.     Anticipated barriers to physical therapy: seizure disorder, migraines, Raynaud's disease, lupus nephritis Sjogren's syndrome, scleroderma     Goals:   Short Term Goals:  6 weeks 3/16/2022   1. Pain: Pt will demonstrate improved pain by reports of less than or equal to 5/10 worst pain on the verbal rating scale in order to progress toward maximal functional ability and improve QOL.  Progressing   2. Function: Patient will demonstrate improved function as indicated by a score of less than or equal to 45% impairment on FOTO.   Not assessed today   3. Mobility: Patient will improve AROM to 50% of stated goals, listed in objective measures above, in order to progress towards independence with functional activities.   Met   4. Strength: Patient will improve strength to 3+/5 in all muscle groups, listed in objective measures above, in order to progress towards independence with functional activities.   Met   5. Gait: Patient will demonstrate improved gait mechanics including more functional knee flexion and extension and hip extension in order to improve functional mobility, improve quality of life, and decrease risk of further injury.   Progressing   6. HEP: Patient will demonstrate independence with HEP in order to progress toward functional independence.  Met   7. Patient will be able to sit in car for drives up to 30 minutes with 5/10 pain or less to note improving mobility, muscle tone and endurance to allow for improved  quality of life.   Met      Long Term Goals:  12 weeks 3/16/2022   1. Pain: Pt will demonstrate improved pain by reports of less than or equal to 2/10 worst pain on the verbal rating scale in order to progress toward maximal functional ability and improve QOL.    Progressing   2. Function: Patient will demonstrate improved function as indicated by a score of less than or equal to 36% impairment on FOTO.   Not assessed today   3. Mobility: Patient will improve AROM to stated goals, listed in objective measures above, in order to return to maximal functional potential and improve quality of life.  Progressing   4. Strength: Patient will improve strength to stated goals, listed in objective measures above, in order to improve functional independence and quality of life.  Progressing   5. Gait: Patient will demonstrate normalized gait mechanics with minimal compensation in order to return to PLOF.  Progressing   6. Patient will return to normal ADL's, IADL's, recreational activities, and work-related activities with less than or equal to 1/10 pain and maximal function.   Progressing   7. Patient will be able to drive to her hometown 2 hours away with 1/10 pain or less to noted adequate mobility, muscle tone, length, strength and endurance.   Progressing   8. 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  Progressing      PLAN     Continue POC and frequency as planned. Continue to work on improving core, hip and lower extremity strengthening while working on normalizing knee mobility to progress towards higher level functional status to improve overall quality of life.      These services are reasonable and necessary for the conditions set forth above while under my care.    Jaylene Tamayo, PT, DPT

## 2022-03-16 NOTE — PROGRESS NOTES
BR PAP REPORT: Chart was reviewed for overdue pap smear. Called pt no answer left a message.    Mammogram was due as well.

## 2022-03-18 ENCOUNTER — DOCUMENTATION ONLY (OUTPATIENT)
Dept: REHABILITATION | Facility: HOSPITAL | Age: 47
End: 2022-03-18
Payer: COMMERCIAL

## 2022-03-21 ENCOUNTER — CLINICAL SUPPORT (OUTPATIENT)
Dept: REHABILITATION | Facility: HOSPITAL | Age: 47
End: 2022-03-21
Payer: COMMERCIAL

## 2022-03-21 DIAGNOSIS — Z74.09 DECREASED STRENGTH, ENDURANCE, AND MOBILITY: ICD-10-CM

## 2022-03-21 DIAGNOSIS — R53.1 DECREASED STRENGTH, ENDURANCE, AND MOBILITY: ICD-10-CM

## 2022-03-21 DIAGNOSIS — M25.461 SWELLING OF JOINT OF RIGHT KNEE: ICD-10-CM

## 2022-03-21 DIAGNOSIS — R68.89 DECREASED STRENGTH, ENDURANCE, AND MOBILITY: ICD-10-CM

## 2022-03-21 DIAGNOSIS — R26.89 FUNCTIONAL GAIT ABNORMALITY: Primary | ICD-10-CM

## 2022-03-21 PROCEDURE — 97110 THERAPEUTIC EXERCISES: CPT | Mod: CQ

## 2022-03-21 PROCEDURE — 97140 MANUAL THERAPY 1/> REGIONS: CPT | Mod: CQ

## 2022-03-21 NOTE — PROGRESS NOTES
[facility]   Physical Therapy Treatment Note     Name: Zachary Escalona [place]  Clinic Number: [id number]    Therapy Diagnosis:   Encounter Diagnoses   Name Primary?    Functional gait abnormality Yes    Decreased strength, endurance, and mobility     Swelling of joint of right knee      Physician: Kaya Fatima PA-C    Visit Date: 3/21/2022    Physician Orders: PT Eval and Treat  Medical Diagnosis from Referral: Primary osteoarthritis of right knee  Evaluation Date: 2/11/2022  Authorization Period Expiration: 4/1/2022  Plan of Care Expiration: 5/11/2022  Visit # / Visits authorized: 7/20 (+1 for evaluation)  FOTO: 1/3    PTA Visit #: 1/5     Progress Note Due on 4/16/2022    Time In: 0700  Time Out: 0745  Total Billable Time: 40 minutes    Precautions: Standard and seizure disorder, migraines, Raynaud's disease, lupus nephritis Sjogren's syndrome, scleroderma     SUBJECTIVE     Pt reports: she is hurting a little today but over the weekend during and after a car trip to [place] her knee hurts a lot and swells.      She was compliant with home exercise program.    Response to previous treatment: feeling good after last session    Functional change: able to drive or sit for at least an hour before her knee would not have allowed for that, improved ability to perform stair negotiation with less pain.     Pain: 1/10  Location: right medial knee     OBJECTIVE     Objective Measures updated at progress report unless specified.      Treatment     Zachary received therapeutic exercises to develop strength, endurance, range of motion , flexibility, posture and core stabilization for (30) minutes including:     Intervention Performed Today    Heel Slides x 4 minutes 5 second holds    Recumbent Bike x 5 minutes level 1 (decreased resistance today to focus on improving mobility to tolerance)   Tailgaters x 3 minutes 4 pounds on each lower extremity   Straight leg raise  x 3 x 10 reps  Bilateral left only (increased)   Shuttle    Bilateral 4 bands 3 x 10 (added)   Single leg 3 bands 3 x10 Bilateral    Quad sets  3 minute 5 second hold bilateral    Long arc quads   x 30 reps Bilateral 3 pounds   Bridging with abdominal brace  3 x 10 reps     Prone hip extension x  x 2 x 10 left   3 x 10 Right (increased)    Prone quad stretch with strap x 3 x 30 seconds   Side lying Hip Series (progressed) X  x 2 x 10 reps Bilateral abduction  X 10 reps circles forward/backward, flexion/extension   Step ups  x 15 reps Bilateral    Heel/toe raises  x 20 no hnds   March in place  3 x 10 no hands   Re-Assessed Objective Measurements  10 minutes      Plan for Next Visit: Continue to progress lower extremity strength and mobility     Zachary received the following manual therapy techniques: Myofacial release and Soft tissue Mobilization were applied to the: right knee for (10) minutes, including:    Manual Intervention Performed Today    Soft Tissue Mobilization x right knee grossly, medical tibial tuberosity and medial patella tendon, quadriceps    Joint Mobilizations     Mobilization with movement  Gluteal release with hip rotation B   Muscle Energy Technique   For pelvic rotation noted   Functional Dry Needling        Plan for Next Visit: Continue as needed     Zachary participated in neuromuscular re-education activities to improve: Proprioception and Posture for (0) minutes. The following activities were included:    Intervention Performed Today    Belt Block  10 second holds each for 4 minutes    Side Lying Clams  x20 reps B                                   Plan for Next Visit: Continue to progress core and hip strength       Patient Education and Home Exercises     Home Exercises Provided and Patient Education Provided     Education provided:   - Education provided throughout the session on the proper form and sequencing of all exercises provided in home exercise program     Written Home Exercises Provided:  Patient instructed to cont prior home exercise program . Exercises were reviewed and Zachary was able to demonstrate them prior to the end of the session.  Zachary demonstrated good  understanding of the education provided. See electronic medical record  under Patient Instructions for exercises provided during therapy sessions          ASSESSMENT     Patient was able to perform more reps of selected exercises (see above for details). Noted no antalgic gait with entering/leaving this session. Patient with minimal edema noted in right knee with manual therapy with tenderness at medial patella tendon and medial tibial tuberosity. Patient continues to have difficulty with driving greater than one hour. Patient may benefit from continued skilled Physical Therapy to improve remaining deficits and improve her ability to drive longer distances and improve overall quality of life.    Zachary Is progressing well towards her goals.   Pt prognosis is Good.     Pt will continue to benefit from skilled outpatient physical therapy to address the deficits listed in the problem list box on initial evaluation, provide pt/family education and to maximize pt's level of independence in the home and community environment.     Pt's spiritual, cultural and educational needs considered and pt agreeable to plan of care and goals.     Anticipated barriers to physical therapy: seizure disorder, migraines, Raynaud's disease, lupus nephritis Sjogren's syndrome, scleroderma     Goals:   Short Term Goals:  6 weeks 3/16/2022   1. Pain: Pt will demonstrate improved pain by reports of less than or equal to 5/10 worst pain on the verbal rating scale in order to progress toward maximal functional ability and improve quality of life .  Progressing   2. Function: Patient will demonstrate improved function as indicated by a score of less than or equal to 45% impairment on FOTO.   Not assessed today   3. Mobility: Patient will improve active range of  motion 50% of stated goals, listed in objective measures above, in order to progress towards independence with functional activities.   Met   4. Strength: Patient will improve strength to 3+/5 in all muscle groups, listed in objective measures above, in order to progress towards independence with functional activities.   Met   5. Gait: Patient will demonstrate improved gait mechanics including more functional knee flexion and extension and hip extension in order to improve functional mobility, improve quality of life, and decrease risk of further injury.   Progressing   6. Home exercise program : Patient will demonstrate independence with home exercise program  in order to progress toward functional independence.  Met   7. Patient will be able to sit in car for drives up to 30 minutes with 5/10 pain or less to note improving mobility, muscle tone and endurance to allow for improved quality of life.   Met      Long Term Goals:  12 weeks 3/16/2022   1. Pain: Pt will demonstrate improved pain by reports of less than or equal to 2/10 worst pain on the verbal rating scale in order to progress toward maximal functional ability and improve QOL.    Progressing   2. Function: Patient will demonstrate improved function as indicated by a score of less than or equal to 36% impairment on FOTO.   Not assessed today   3. Mobility: Patient will improve AROM to stated goals, listed in objective measures above, in order to return to maximal functional potential and improve quality of life.  Progressing   4. Strength: Patient will improve strength to stated goals, listed in objective measures above, in order to improve functional independence and quality of life.  Progressing   5. Gait: Patient will demonstrate normalized gait mechanics with minimal compensation in order to return to prior level of function .  Progressing   6. Patient will return to normal activties of daily living , IADL's, recreational activities, and work-related  activities with less than or equal to 1/10 pain and maximal function.   Progressing   7. Patient will be able to drive to her hometown 2 hours away with 1/10 pain or less to noted adequate mobility, muscle tone, length, strength and endurance.   Progressing   8. 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  Progressing      PLAN     Continue plan of care and frequency as planned. Continue to work on improving core, hip and lower extremity strengthening while working on normalizing knee mobility to progress towards higher level functional status to improve overall quality of life.      These services are reasonable and necessary for the conditions set forth above while under my care.    Desmond Menchaca, PTA

## 2022-03-23 ENCOUNTER — CLINICAL SUPPORT (OUTPATIENT)
Dept: REHABILITATION | Facility: HOSPITAL | Age: 47
End: 2022-03-23
Payer: COMMERCIAL

## 2022-03-23 DIAGNOSIS — R26.89 FUNCTIONAL GAIT ABNORMALITY: Primary | ICD-10-CM

## 2022-03-23 DIAGNOSIS — R53.1 DECREASED STRENGTH, ENDURANCE, AND MOBILITY: ICD-10-CM

## 2022-03-23 DIAGNOSIS — M25.461 SWELLING OF JOINT OF RIGHT KNEE: ICD-10-CM

## 2022-03-23 DIAGNOSIS — R68.89 DECREASED STRENGTH, ENDURANCE, AND MOBILITY: ICD-10-CM

## 2022-03-23 DIAGNOSIS — Z74.09 DECREASED STRENGTH, ENDURANCE, AND MOBILITY: ICD-10-CM

## 2022-03-23 PROCEDURE — 97112 NEUROMUSCULAR REEDUCATION: CPT

## 2022-03-23 PROCEDURE — 97140 MANUAL THERAPY 1/> REGIONS: CPT

## 2022-03-23 PROCEDURE — 97110 THERAPEUTIC EXERCISES: CPT

## 2022-03-23 NOTE — PROGRESS NOTES
[facility]ERAP[facility] AND [facility]LL[facility]   Physical Therapy Treatment Note     Name: Zachary Escalona [place]  Clinic Number: [id number]    Therapy Diagnosis:   Encounter Diagnoses   Name Primary?    Functional gait abnormality Yes    Decreased strength, endurance, and mobility     Swelling of joint of right knee      Physician: Kaya Fatima PA-C    Visit Date: 3/23/2022    Physician Orders: PT Eval and Treat  Medical Diagnosis from Referral: Primary osteoarthritis of right knee  Evaluation Date: 2/11/2022  Authorization Period Expiration: 4/1/2022  Plan of Care Expiration: 5/11/2022  Visit # / Visits authorized: 8/20 (+1 for evaluation)  FOTO: 2/3    PTA Visit #: 0/5     Progress Note Due on 4/16/2022    Time In: 0716  Time Out: 0746  Total Billable Time: 30 minutes    Precautions: Standard and seizure disorder, migraines, Raynaud's disease, lupus nephritis Sjogren's syndrome, scleroderma     SUBJECTIVE     Pt reports: she is hurting a bit today although not unbearable. Patient reports to therapy late due to bad traffic.     She was compliant with home exercise program.    Response to previous treatment: feeling good after last session    Functional change: able to drive or sit for at least an hour before her knee would not have allowed for that, improved ability to perform stair negotiation with less pain.     Pain: 1/10  Location: right medial knee     OBJECTIVE     Objective Measures updated at progress report unless specified.      Treatment     Zachary received therapeutic exercises to develop strength, endurance, range of motion , flexibility, posture and core stabilization for (12) minutes including:     Intervention Performed Today    Heel Slides  4 minutes 5 second holds    Recumbent Bike x 5 minutes level 1   Tailgaters  3 minutes 4 pounds on each lower extremity   Straight leg raise   3 x 10 reps Bilateral left only (increased)        Quad sets  3 minute 5 second hold bilateral    Long arc  quads   x 30 reps Bilateral 3 pounds   Bridging with abdominal brace from [employer] (progressed) x 3 x 10 reps     Prone hip extension  2 x 10 left   3 x 10 Right (increased)    Prone quad stretch with strap  3 x 30 seconds   Side lying Hip Series (progressed) x X 15 reps abduction, circles forward/backward, flexion/extension   Step ups  x 15 reps Bilateral    Heel/toe raises  x 20 no hnds   March in place  3 x 10 no hands   Standing Hip Series (added) Next session    Re-Assessed Objective Measurements  10 minutes      Plan for Next Visit: Continue to progress lower extremity strength and mobility     Zachary received the following manual therapy techniques: Myofacial release and Soft tissue Mobilization were applied to the: right knee for (8) minutes, including:    Manual Intervention Performed Today    Soft Tissue Mobilization x right knee grossly   Joint Mobilizations     Mobilization with movement  Gluteal release with hip rotation B   Muscle Energy Technique   For pelvic rotation noted   Functional Dry Needling        Plan for Next Visit: Continue as needed     Zachary participated in neuromuscular re-education activities to improve: Proprioception and Posture for (10) minutes. The following activities were included:    Intervention Performed Today    Belt Block  10 second holds each for 4 minutes    Side Lying Clams  x20 reps B   Sit to Stands (added) x 2x10 reps    Shuttle  X  x Double Leg 5 bands x20 reps   Single Leg                          Plan for Next Visit: Continue to progress core and hip strength       Patient Education and Home Exercises     Home Exercises Provided and Patient Education Provided     Education provided:   - Education provided throughout the session on the proper form and sequencing of all exercises provided in home exercise program     Written Home Exercises Provided: Patient instructed to cont prior home exercise program . Exercises were reviewed and Zachary was able to  demonstrate them prior to the end of the session.  Zachary demonstrated good  understanding of the education provided. See electronic medical record  under Patient Instructions for exercises provided during therapy sessions    ASSESSMENT     Patient tolerated treatment well today, progressing strengthening program today incorporating sit to stands and reintroducing shuttle exercises since recent exacerbation of symptoms. Patient noted with good muscular fatigue, no complaints of pain. Increased repetitions for sidelying series to progress endurance with significantly more muscular fatigue. Incorporated theraball for bridges to progress gluteal activation with considerably more muscle burn experienced. Overall patient left session with muscle soreness but no pain present.    Zachary Is progressing well towards her goals.   Pt prognosis is Good.     Pt will continue to benefit from skilled outpatient physical therapy to address the deficits listed in the problem list box on initial evaluation, provide pt/family education and to maximize pt's level of independence in the home and community environment.     Pt's spiritual, cultural and educational needs considered and pt agreeable to plan of care and goals.     Anticipated barriers to physical therapy: seizure disorder, migraines, Raynaud's disease, lupus nephritis Sjogren's syndrome, scleroderma     Goals:   Short Term Goals:  6 weeks 3/16/2022   1. Pain: Pt will demonstrate improved pain by reports of less than or equal to 5/10 worst pain on the verbal rating scale in order to progress toward maximal functional ability and improve quality of life .  Progressing   2. Function: Patient will demonstrate improved function as indicated by a score of less than or equal to 45% impairment on FOTO.   Not assessed today   3. Mobility: Patient will improve active range of motion 50% of stated goals, listed in objective measures above, in order to progress towards independence  with functional activities.   Met   4. Strength: Patient will improve strength to 3+/5 in all muscle groups, listed in objective measures above, in order to progress towards independence with functional activities.   Met   5. Gait: Patient will demonstrate improved gait mechanics including more functional knee flexion and extension and hip extension in order to improve functional mobility, improve quality of life, and decrease risk of further injury.   Progressing   6. Home exercise program : Patient will demonstrate independence with home exercise program  in order to progress toward functional independence.  Met   7. Patient will be able to sit in car for drives up to 30 minutes with 5/10 pain or less to note improving mobility, muscle tone and endurance to allow for improved quality of life.   Met      Long Term Goals:  12 weeks 3/16/2022   1. Pain: Pt will demonstrate improved pain by reports of less than or equal to 2/10 worst pain on the verbal rating scale in order to progress toward maximal functional ability and improve QOL.    Progressing   2. Function: Patient will demonstrate improved function as indicated by a score of less than or equal to 36% impairment on FOTO.   Not assessed today   3. Mobility: Patient will improve AROM to stated goals, listed in objective measures above, in order to return to maximal functional potential and improve quality of life.  Progressing   4. Strength: Patient will improve strength to stated goals, listed in objective measures above, in order to improve functional independence and quality of life.  Progressing   5. Gait: Patient will demonstrate normalized gait mechanics with minimal compensation in order to return to prior level of function .  Progressing   6. Patient will return to normal activties of daily living , IADL's, recreational activities, and work-related activities with less than or equal to 1/10 pain and maximal function.   Progressing   7. Patient will be  able to drive to her hometown 2 hours away with 1/10 pain or less to noted adequate mobility, muscle tone, length, strength and endurance.   Progressing   8. 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  Progressing      PLAN     Continue plan of care and frequency as planned. Continue to work on improving core, hip and lower extremity strengthening while working on normalizing knee mobility to progress towards higher level functional status to improve overall quality of life.      These services are reasonable and necessary for the conditions set forth above while under my care.    Jaylene Tamayo, PT, DPT

## 2022-04-04 ENCOUNTER — CLINICAL SUPPORT (OUTPATIENT)
Dept: REHABILITATION | Facility: HOSPITAL | Age: 47
End: 2022-04-04
Payer: COMMERCIAL

## 2022-04-04 DIAGNOSIS — Z74.09 DECREASED STRENGTH, ENDURANCE, AND MOBILITY: ICD-10-CM

## 2022-04-04 DIAGNOSIS — M25.461 SWELLING OF JOINT OF RIGHT KNEE: ICD-10-CM

## 2022-04-04 DIAGNOSIS — R68.89 DECREASED STRENGTH, ENDURANCE, AND MOBILITY: ICD-10-CM

## 2022-04-04 DIAGNOSIS — R53.1 DECREASED STRENGTH, ENDURANCE, AND MOBILITY: ICD-10-CM

## 2022-04-04 DIAGNOSIS — R26.89 FUNCTIONAL GAIT ABNORMALITY: Primary | ICD-10-CM

## 2022-04-04 PROCEDURE — 97110 THERAPEUTIC EXERCISES: CPT | Mod: CQ

## 2022-04-04 PROCEDURE — 97112 NEUROMUSCULAR REEDUCATION: CPT | Mod: CQ

## 2022-04-04 NOTE — PROGRESS NOTES
[facility]   Physical Therapy Treatment Note     Name: Zachary Escalona Estelline  Clinic Number: [id number]    Therapy Diagnosis:   Encounter Diagnoses   Name Primary?    Functional gait abnormality Yes    Decreased strength, endurance, and mobility     Swelling of joint of right knee      Physician: Kaya Fatima PA-C    Visit Date: 4/4/2022    Physician Orders: PT Eval and Treat  Medical Diagnosis from Referral: Primary osteoarthritis of right knee  Evaluation Date: 2/11/2022  Authorization Period Expiration: 4/1/2022  Plan of Care Expiration: 5/11/2022  Visit # / Visits authorized: 9/20 (+1 for evaluation)  FOTO: 2/3    PTA Visit #: 1/5     Progress Note Due on 4/16/2022    Time In: 0705  Time Out: 0745  Total Billable Time: 40 minutes    Precautions: Standard and seizure disorder, migraines, Raynaud's disease, lupus nephritis Sjogren's syndrome, scleroderma     SUBJECTIVE     Pt reports: she has no knee pain today but has fibromyalgia pain. Reports she frequently has swelling at her right shin. Drove to [place], [place] over the weekend with her experiencing right knee pain on her return trip yesterday     She was compliant with home exercise program.    Response to previous treatment: feeling good after last session    Functional change: able to drive or sit for at least an hour before her knee would not have allowed for that, improved ability to perform stair negotiation with less pain.     Pain: 0/10  Location: right medial knee     OBJECTIVE     Objective Measures updated at progress report unless specified.      Treatment     Zachary received therapeutic exercises to develop strength, endurance, range of motion , flexibility, posture and core stabilization for (31) minutes including:     Intervention Performed Today    Heel Slides  4 minutes 5 second holds    Recumbent Bike x 5 minutes level 1   Tailgaters  3 minutes 4 pounds on each lower extremity   Straight leg raise    3 x 10 reps Bilateral left only (increased)        Quad sets  3 minute 5 second hold bilateral    Long arc quads   x 30 reps Bilateral 3 pounds   Bridging with abdominal brace from [employer] (progressed)  3 x 10 reps     Prone hip extension  2 x 10 left   3 x 10 Right (increased)    Prone quad stretch with strap  3 x 30 seconds   Side lying Hip Series (progressed)  X 15 reps abduction, circles forward/backward, flexion/extension   Step ups x x 15 reps Bilateral forward and lateral each (progressed)    Heel/toe raises x 3 x 10 no hands on turf   March in place x 3 x 10 no hands on turf        Standing Hip Series  x 3 x 10 flexion, abduction, and extension on turf no hands (increased)   Re-Assessed Objective Measurements  10 minutes      Plan for Next Visit: Continue to progress lower extremity strength and mobility     Zachary received the following manual therapy techniques: Myofacial release and Soft tissue Mobilization were applied to the: right knee for (0) minutes, including:    Manual Intervention Performed Today    Soft Tissue Mobilization  right knee grossly   Joint Mobilizations     Mobilization with movement  Gluteal release with hip rotation Bilateral   Muscle Energy Technique   For pelvic rotation noted   Functional Dry Needling        Plan for Next Visit: Continue as needed     Zachary participated in neuromuscular re-education activities to improve: Proprioception and Posture for (9) minutes. The following activities were included:    Intervention Performed Today    Belt Block  10 second holds each for 4 minutes    Side Lying Clams  X 20 reps Bilateral   Sit to Stands  x 2 x 10 reps    Shuttle     Double Leg 5 bands x 20 reps   Single Leg    Side steps x 4 laps length of mirror on turf no hands   Unilateral standing x 5 x 10 second hold on turf no hands               Plan for Next Visit: Continue to progress core and hip strength       Patient Education and Home Exercises     Home Exercises Provided  and Patient Education Provided     Education provided:   - Education provided throughout the session on the proper form and sequencing of all exercises provided in home exercise program     Written Home Exercises Provided: Patient instructed to cont prior home exercise program . Exercises were reviewed and Zachary was able to demonstrate them prior to the end of the session.  Zachary demonstrated good  understanding of the education provided. See electronic medical record  under Patient Instructions for exercises provided during therapy sessions    ASSESSMENT     Patient tolerated treatment well today with progressions for strength and balance. Patient noted with muscular fatigue but no complaints of pain. She completed exercises today with no rest between each exercise. Overall patient left session with muscle soreness but no pain present.     Zachary Is progressing well towards her goals.   Pt prognosis is Good.     Pt will continue to benefit from skilled outpatient physical therapy to address the deficits listed in the problem list box on initial evaluation, provide pt/family education and to maximize pt's level of independence in the home and community environment.     Pt's spiritual, cultural and educational needs considered and pt agreeable to plan of care and goals.     Anticipated barriers to physical therapy: seizure disorder, migraines, Raynaud's disease, lupus nephritis Sjogren's syndrome, scleroderma     Goals:   Short Term Goals:  6 weeks 3/16/2022   1. Pain: Pt will demonstrate improved pain by reports of less than or equal to 5/10 worst pain on the verbal rating scale in order to progress toward maximal functional ability and improve quality of life .  Progressing   2. Function: Patient will demonstrate improved function as indicated by a score of less than or equal to 45% impairment on FOTO.   Not assessed today   3. Mobility: Patient will improve active range of motion 50% of stated goals,  listed in objective measures above, in order to progress towards independence with functional activities.   Met   4. Strength: Patient will improve strength to 3+/5 in all muscle groups, listed in objective measures above, in order to progress towards independence with functional activities.   Met   5. Gait: Patient will demonstrate improved gait mechanics including more functional knee flexion and extension and hip extension in order to improve functional mobility, improve quality of life, and decrease risk of further injury.   Progressing   6. Home exercise program : Patient will demonstrate independence with home exercise program  in order to progress toward functional independence.  Met   7. Patient will be able to sit in car for drives up to 30 minutes with 5/10 pain or less to note improving mobility, muscle tone and endurance to allow for improved quality of life.   Met      Long Term Goals:  12 weeks 3/16/2022   1. Pain: Pt will demonstrate improved pain by reports of less than or equal to 2/10 worst pain on the verbal rating scale in order to progress toward maximal functional ability and improve QOL.    Progressing   2. Function: Patient will demonstrate improved function as indicated by a score of less than or equal to 36% impairment on FOTO.   Not assessed today   3. Mobility: Patient will improve AROM to stated goals, listed in objective measures above, in order to return to maximal functional potential and improve quality of life.  Progressing   4. Strength: Patient will improve strength to stated goals, listed in objective measures above, in order to improve functional independence and quality of life.  Progressing   5. Gait: Patient will demonstrate normalized gait mechanics with minimal compensation in order to return to prior level of function .  Progressing   6. Patient will return to normal activties of daily living , IADL's, recreational activities, and work-related activities with less than or  equal to 1/10 pain and maximal function.   Progressing   7. Patient will be able to drive to her hometown 2 hours away with 1/10 pain or less to noted adequate mobility, muscle tone, length, strength and endurance.   Progressing   8. 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  Progressing      PLAN     Continue plan of care and frequency as planned. Continue to work on improving core, hip and lower extremity strengthening while working on normalizing knee mobility to progress towards higher level functional status to improve overall quality of life.      These services are reasonable and necessary for the conditions set forth above while under my care.    Desmond Menchaca, PTA

## 2022-04-06 ENCOUNTER — CLINICAL SUPPORT (OUTPATIENT)
Dept: REHABILITATION | Facility: HOSPITAL | Age: 47
End: 2022-04-06
Payer: COMMERCIAL

## 2022-04-06 DIAGNOSIS — R68.89 DECREASED STRENGTH, ENDURANCE, AND MOBILITY: ICD-10-CM

## 2022-04-06 DIAGNOSIS — R26.89 FUNCTIONAL GAIT ABNORMALITY: Primary | ICD-10-CM

## 2022-04-06 DIAGNOSIS — M25.461 SWELLING OF JOINT OF RIGHT KNEE: ICD-10-CM

## 2022-04-06 DIAGNOSIS — Z74.09 DECREASED STRENGTH, ENDURANCE, AND MOBILITY: ICD-10-CM

## 2022-04-06 DIAGNOSIS — R53.1 DECREASED STRENGTH, ENDURANCE, AND MOBILITY: ICD-10-CM

## 2022-04-06 PROCEDURE — 97112 NEUROMUSCULAR REEDUCATION: CPT

## 2022-04-06 PROCEDURE — 97110 THERAPEUTIC EXERCISES: CPT

## 2022-04-06 NOTE — PROGRESS NOTES
[facility]   Physical Therapy Treatment Note     Name: Zachary Escalona Adger  Clinic Number: [id number]    Therapy Diagnosis:   Encounter Diagnoses   Name Primary?    Functional gait abnormality Yes    Decreased strength, endurance, and mobility     Swelling of joint of right knee      Physician: Kaya Fatima PA-C    Visit Date: 4/6/2022    Physician Orders: PT Eval and Treat  Medical Diagnosis from Referral: Primary osteoarthritis of right knee  Evaluation Date: 2/11/2022  Authorization Period Expiration: 12/31/2022  Plan of Care Expiration: 5/11/2022  Visit # / Visits authorized: 10/20 (+1 for evaluation)  FOTO: 2/3    PTA Visit #: 0/5     Progress Note Due on 4/16/2022    Time In: 0753  Time Out: 0832  Total Billable Time: 39 minutes    Precautions: Standard and seizure disorder, migraines, Raynaud's disease, lupus nephritis Sjogren's syndrome, scleroderma     SUBJECTIVE     Pt reports: that her knees are feeling fine today. Patient is driving to [email or web address] again today, will monitor how her knees feel both going and coming.    She was compliant with home exercise program.    Response to previous treatment: feeling good after last session    Functional change: able to drive or sit for at least an hour before her knee would not have allowed for that, improved ability to perform stair negotiation with less pain.     Pain: 0/10  Location: right medial knee     OBJECTIVE     Objective Measures updated at progress report unless specified.      Treatment     Zachary received therapeutic exercises to develop strength, endurance, range of motion , flexibility, posture and core stabilization for (29) minutes including:     Intervention Performed Today    Heel Slides  4 minutes 5 second holds    Recumbent Bike x 6 minutes level 2   Tailgaters  3 minutes 4 pounds on each lower extremity   Straight leg raise (added weight) x 2 x 10 reps Bilateral 2 lbs        Quad sets  3 minute 5  second hold bilateral    Long arc quads   x 30 reps Bilateral 3 pounds   Bridging with abdominal brace from [employer] x 3 x 10 reps     Prone hip extension  2 x 10 left   3 x 10 Right (increased)    Prone quad stretch with strap  3 x 30 seconds   Side lying Hip Series (progressed)  X 15 reps abduction, circles forward/backward, flexion/extension   Step ups 6 Inch  x 2 x 10 reps Bilateral forward and lateral each (progressed)    Heel Raises Standing X  x 3 x 10 double leg no hands  2 x 10 single leg B with B UE support (added)   March in place  3 x 10 no hands on turf        Standing Hip Series (added resistance) x 2 x 10 flexion, abduction, and extension red theraband    Re-Assessed Objective Measurements  10 minutes      Plan for Next Visit: Continue to progress lower extremity strength and mobility     Zachary received the following manual therapy techniques: Myofacial release and Soft tissue Mobilization were applied to the: right knee for (0) minutes, including:    Manual Intervention Performed Today    Soft Tissue Mobilization  right knee grossly   Joint Mobilizations     Mobilization with movement  Gluteal release with hip rotation Bilateral   Muscle Energy Technique   For pelvic rotation noted   Functional Dry Needling        Plan for Next Visit: Continue as needed     Zachary participated in neuromuscular re-education activities to improve: Proprioception and Posture for (10) minutes. The following activities were included:    Intervention Performed Today    Belt Block  10 second holds each for 4 minutes    Side Lying Clams  X 20 reps Bilateral   Sit to Stands  x 2 x 10 reps    Shuttle     Double Leg 5 bands x 20 reps   Single Leg    Side steps (added resistance) x 4 laps back and forth length of parallel bars red theraband    Unilateral standing  5 x 10 second hold on turf no hands               Plan for Next Visit: Continue to progress core and hip strength       Patient Education and Home Exercises      Home Exercises Provided and Patient Education Provided     Education provided:   - Education provided throughout the session on the proper form and sequencing of all exercises provided in home exercise program     Written Home Exercises Provided: Patient instructed to cont prior home exercise program . Exercises were reviewed and Zachary was able to demonstrate them prior to the end of the session.  Zachary demonstrated good  understanding of the education provided. See electronic medical record  under Patient Instructions for exercises provided during therapy sessions    ASSESSMENT     Progress previous exercises as well as incorporating additional functional strengthening today with good muscular fatigue reported. Patient reported soreness at the end of the session although did not report any additional pain.     Zachary Is progressing well towards her goals.   Pt prognosis is Good.     Pt will continue to benefit from skilled outpatient physical therapy to address the deficits listed in the problem list box on initial evaluation, provide pt/family education and to maximize pt's level of independence in the home and community environment.     Pt's spiritual, cultural and educational needs considered and pt agreeable to plan of care and goals.     Anticipated barriers to physical therapy: seizure disorder, migraines, Raynaud's disease, lupus nephritis Sjogren's syndrome, scleroderma     Goals:   Short Term Goals:  6 weeks 3/16/2022   1. Pain: Pt will demonstrate improved pain by reports of less than or equal to 5/10 worst pain on the verbal rating scale in order to progress toward maximal functional ability and improve quality of life .  Progressing   2. Function: Patient will demonstrate improved function as indicated by a score of less than or equal to 45% impairment on FOTO.   Not assessed today   3. Mobility: Patient will improve active range of motion 50% of stated goals, listed in objective measures  above, in order to progress towards independence with functional activities.   Met   4. Strength: Patient will improve strength to 3+/5 in all muscle groups, listed in objective measures above, in order to progress towards independence with functional activities.   Met   5. Gait: Patient will demonstrate improved gait mechanics including more functional knee flexion and extension and hip extension in order to improve functional mobility, improve quality of life, and decrease risk of further injury.   Progressing   6. Home exercise program : Patient will demonstrate independence with home exercise program  in order to progress toward functional independence.  Met   7. Patient will be able to sit in car for drives up to 30 minutes with 5/10 pain or less to note improving mobility, muscle tone and endurance to allow for improved quality of life.   Met      Long Term Goals:  12 weeks 3/16/2022   1. Pain: Pt will demonstrate improved pain by reports of less than or equal to 2/10 worst pain on the verbal rating scale in order to progress toward maximal functional ability and improve QOL.    Progressing   2. Function: Patient will demonstrate improved function as indicated by a score of less than or equal to 36% impairment on FOTO.   Not assessed today   3. Mobility: Patient will improve AROM to stated goals, listed in objective measures above, in order to return to maximal functional potential and improve quality of life.  Progressing   4. Strength: Patient will improve strength to stated goals, listed in objective measures above, in order to improve functional independence and quality of life.  Progressing   5. Gait: Patient will demonstrate normalized gait mechanics with minimal compensation in order to return to prior level of function .  Progressing   6. Patient will return to normal activties of daily living , IADL's, recreational activities, and work-related activities with less than or equal to 1/10 pain and  maximal function.   Progressing   7. Patient will be able to drive to her hometown 2 hours away with 1/10 pain or less to noted adequate mobility, muscle tone, length, strength and endurance.   Progressing   8. 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  Progressing      PLAN     Continue plan of care and frequency as planned. Continue to work on improving core, hip and lower extremity strengthening while working on normalizing knee mobility to progress towards higher level functional status to improve overall quality of life.      These services are reasonable and necessary for the conditions set forth above while under my care.    Jaylene Tamayo, PT, DPT

## 2022-04-11 ENCOUNTER — CLINICAL SUPPORT (OUTPATIENT)
Dept: REHABILITATION | Facility: HOSPITAL | Age: 47
End: 2022-04-11
Payer: COMMERCIAL

## 2022-04-11 DIAGNOSIS — R68.89 DECREASED STRENGTH, ENDURANCE, AND MOBILITY: ICD-10-CM

## 2022-04-11 DIAGNOSIS — R26.89 FUNCTIONAL GAIT ABNORMALITY: Primary | ICD-10-CM

## 2022-04-11 DIAGNOSIS — M25.461 SWELLING OF JOINT OF RIGHT KNEE: ICD-10-CM

## 2022-04-11 DIAGNOSIS — R53.1 DECREASED STRENGTH, ENDURANCE, AND MOBILITY: ICD-10-CM

## 2022-04-11 DIAGNOSIS — Z74.09 DECREASED STRENGTH, ENDURANCE, AND MOBILITY: ICD-10-CM

## 2022-04-11 PROCEDURE — 97112 NEUROMUSCULAR REEDUCATION: CPT | Mod: CQ

## 2022-04-11 PROCEDURE — 97110 THERAPEUTIC EXERCISES: CPT | Mod: CQ

## 2022-04-11 NOTE — PROGRESS NOTES
[facility]   Physical Therapy Treatment Note     Name: Zachary Escalona Bisbee  Clinic Number: [id number]    Therapy Diagnosis:   Encounter Diagnoses   Name Primary?    Functional gait abnormality Yes    Decreased strength, endurance, and mobility     Swelling of joint of right knee      Physician: Kaya Fatima PA-C    Visit Date: 4/11/2022    Physician Orders: PT Eval and Treat  Medical Diagnosis from Referral: Primary osteoarthritis of right knee  Evaluation Date: 2/11/2022  Authorization Period Expiration: 12/31/2022  Plan of Care Expiration: 5/11/2022  Visit # / Visits authorized: 11/20 (+1 for evaluation)  FOTO: 2/3    PTA Visit #: 1/5     Progress Note Due on 4/16/2022    Time In: 0700  Time Out: 0743  Total Billable Time: 40 minutes    Precautions: Standard and seizure disorder, migraines, Raynaud's disease, lupus nephritis Sjogren's syndrome, scleroderma     SUBJECTIVE     Pt reports: that her knees are feeling fine today. Patient reports not driving any significant distance over the weekend.     She was compliant with home exercise program.    Response to previous treatment: feeling good after last session    Functional change: able to drive or sit for at least an hour before her knee would not have allowed for that, improved ability to perform stair negotiation with less pain.     Pain: 0/10  Location: right medial knee     OBJECTIVE     Objective Measures updated at progress report unless specified.      Treatment     Zachary received therapeutic exercises to develop strength, endurance, range of motion , flexibility, posture and core stabilization for (25) minutes including:     Intervention Performed Today    Heel Slides  4 minutes 5 second holds    Recumbent Bike x 6 minutes level 2   Tailgaters x 3 minutes 4 pounds on each lower extremity   Straight leg raise (added weight)  2 x 10 reps Bilateral 2 lbs        Quad sets  3 minute 5 second hold bilateral     Long arc quads   x 30 reps Bilateral 3 pounds   Bridging with abdominal brace from [employer]  3 x 10 reps     Prone hip extension x 3 x 10 bilateral (increased)   3 x 10 Right    Prone quad stretch with strap x 3 x 30 seconds bilateral   Side lying Hip Series (progressed) x X 15 reps abduction, circles forward/backward, flexion/extension abdominal brace with each   Step ups 6 Inch   2 x 10 reps Bilateral forward and lateral each (progressed)    Heel Raises Standing    3 x 10 double leg no hands  2 x 10 single leg B with B UE support (added)   March in place x 3 x 10 no hands on turf        Standing Hip Series (added resistance)  2 x 10 flexion, abduction, and extension red theraband    Re-Assessed Objective Measurements  10 minutes      Plan for Next Visit: Continue to progress lower extremity strength and mobility     Zachary received the following manual therapy techniques: Myofacial release and Soft tissue Mobilization were applied to the: right knee for (0) minutes, including:    Manual Intervention Performed Today    Soft Tissue Mobilization  right knee grossly   Joint Mobilizations     Mobilization with movement  Gluteal release with hip rotation Bilateral   Muscle Energy Technique   For pelvic rotation noted   Functional Dry Needling        Plan for Next Visit: Continue as needed     Zachary participated in neuromuscular re-education activities to improve: Proprioception and Posture for (15) minutes. The following activities were included:    Intervention Performed Today    Belt Block  10 second holds each for 4 minutes    Side Lying Clams x X 30 reps Bilateral (increased)   Sit to Stands   2 x 10 reps    Shuttle  x  x Double Leg 6 bands x 20 reps yellow band (increased)  Single Leg Bilateral lower extremity yellow band at knees x 20 each (increased)   Side steps (added resistance)  4 laps back and forth length of parallel bars red theraband    Unilateral standing x 5 x 10 second hold on turf no hands  (increased)               Plan for Next Visit: Continue to progress core and hip strength       Patient Education and Home Exercises     Home Exercises Provided and Patient Education Provided     Education provided:   - Education provided throughout the session on the proper form and sequencing of all exercises provided in home exercise program   - to engage abdominals for side lying activity    Written Home Exercises Provided: Patient instructed to cont prior home exercise program . Exercises were reviewed and Zachary was able to demonstrate them prior to the end of the session.  Zachary demonstrated good  understanding of the education provided. See electronic medical record  under Patient Instructions for exercises provided during therapy sessions    ASSESSMENT     Progressed exercises with fatigue noted but no complaints of pain. Patient reported soreness at the end of the session although did not report any additional pain.     Zachary Is progressing well towards her goals.   Pt prognosis is Good.     Pt will continue to benefit from skilled outpatient physical therapy to address the deficits listed in the problem list box on initial evaluation, provide pt/family education and to maximize pt's level of independence in the home and community environment.     Pt's spiritual, cultural and educational needs considered and pt agreeable to plan of care and goals.     Anticipated barriers to physical therapy: seizure disorder, migraines, Raynaud's disease, lupus nephritis Sjogren's syndrome, scleroderma     Goals:   Short Term Goals:  6 weeks 3/16/2022   1. Pain: Pt will demonstrate improved pain by reports of less than or equal to 5/10 worst pain on the verbal rating scale in order to progress toward maximal functional ability and improve quality of life .  Progressing   2. Function: Patient will demonstrate improved function as indicated by a score of less than or equal to 45% impairment on FOTO.   Not assessed  today   3. Mobility: Patient will improve active range of motion 50% of stated goals, listed in objective measures above, in order to progress towards independence with functional activities.   Met   4. Strength: Patient will improve strength to 3+/5 in all muscle groups, listed in objective measures above, in order to progress towards independence with functional activities.   Met   5. Gait: Patient will demonstrate improved gait mechanics including more functional knee flexion and extension and hip extension in order to improve functional mobility, improve quality of life, and decrease risk of further injury.   Progressing   6. Home exercise program : Patient will demonstrate independence with home exercise program  in order to progress toward functional independence.  Met   7. Patient will be able to sit in car for drives up to 30 minutes with 5/10 pain or less to note improving mobility, muscle tone and endurance to allow for improved quality of life.   Met      Long Term Goals:  12 weeks 3/16/2022   1. Pain: Pt will demonstrate improved pain by reports of less than or equal to 2/10 worst pain on the verbal rating scale in order to progress toward maximal functional ability and improve quality of life.    Progressing   2. Function: Patient will demonstrate improved function as indicated by a score of less than or equal to 36% impairment on FOTO.   Not assessed today   3. Mobility: Patient will improve active range of motion to stated goals, listed in objective measures above, in order to return to maximal functional potential and improve quality of life.  Progressing   4. Strength: Patient will improve strength to stated goals, listed in objective measures above, in order to improve functional independence and quality of life.  Progressing   5. Gait: Patient will demonstrate normalized gait mechanics with minimal compensation in order to return to prior level of function .  Progressing   6. Patient will return  to normal activties of daily living , IADL's, recreational activities, and work-related activities with less than or equal to 1/10 pain and maximal function.   Progressing   7. Patient will be able to drive to her hometown 2 hours away with 1/10 pain or less to noted adequate mobility, muscle tone, length, strength and endurance.   Progressing   8. 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  Progressing      PLAN     Continue plan of care and frequency as planned. Continue to work on improving core, hip and lower extremity strengthening while working on normalizing knee mobility to progress towards higher level functional status to improve overall quality of life.      These services are reasonable and necessary for the conditions set forth above while under my care.    Desmond Menchaca, PTA

## 2022-04-13 ENCOUNTER — DOCUMENTATION ONLY (OUTPATIENT)
Dept: REHABILITATION | Facility: HOSPITAL | Age: 47
End: 2022-04-13
Payer: COMMERCIAL

## 2022-04-18 DIAGNOSIS — Z12.11 ENCOUNTER FOR SCREENING COLONOSCOPY: Primary | ICD-10-CM

## 2022-04-25 ENCOUNTER — OFFICE VISIT (OUTPATIENT)
Dept: ORTHOPEDICS | Facility: CLINIC | Age: 47
End: 2022-04-25
Payer: COMMERCIAL

## 2022-04-25 DIAGNOSIS — M17.11 PRIMARY OSTEOARTHRITIS OF RIGHT KNEE: Primary | ICD-10-CM

## 2022-04-25 DIAGNOSIS — M25.461 SWELLING OF JOINT OF RIGHT KNEE: ICD-10-CM

## 2022-04-25 PROCEDURE — 1159F PR MEDICATION LIST DOCUMENTED IN MEDICAL RECORD: ICD-10-PCS | Mod: CPTII,95,, | Performed by: PHYSICIAN ASSISTANT

## 2022-04-25 PROCEDURE — 99213 PR OFFICE/OUTPT VISIT, EST, LEVL III, 20-29 MIN: ICD-10-PCS | Mod: 95,,, | Performed by: PHYSICIAN ASSISTANT

## 2022-04-25 PROCEDURE — 1159F MED LIST DOCD IN RCRD: CPT | Mod: CPTII,95,, | Performed by: PHYSICIAN ASSISTANT

## 2022-04-25 PROCEDURE — 99213 OFFICE O/P EST LOW 20 MIN: CPT | Mod: 95,,, | Performed by: PHYSICIAN ASSISTANT

## 2022-04-25 PROCEDURE — 3066F PR DOCUMENTATION OF TREATMENT FOR NEPHROPATHY: ICD-10-PCS | Mod: CPTII,95,, | Performed by: PHYSICIAN ASSISTANT

## 2022-04-25 PROCEDURE — 1160F PR REVIEW ALL MEDS BY PRESCRIBER/CLIN PHARMACIST DOCUMENTED: ICD-10-PCS | Mod: CPTII,95,, | Performed by: PHYSICIAN ASSISTANT

## 2022-04-25 PROCEDURE — 3066F NEPHROPATHY DOC TX: CPT | Mod: CPTII,95,, | Performed by: PHYSICIAN ASSISTANT

## 2022-04-25 PROCEDURE — 1160F RVW MEDS BY RX/DR IN RCRD: CPT | Mod: CPTII,95,, | Performed by: PHYSICIAN ASSISTANT

## 2022-04-25 NOTE — PROGRESS NOTES
Telemedicine Visit  The patient location is: [facility]   The chief complaint leading to consultation is: see HPI  Each patient to whom he or she provides medical services by telemedicine is:  (1) informed of the relationship between the physician and patient and the respective role of any other health care provider with respect to management of the patient; and (2) notified that he or she may decline to receive medical services by telemedicine and may withdraw from such care at any time.The patient location is: home     VISIT TYPE X   Virtual visit with synchronous audio and video     Telephone E/M service     Attempted virtual but audio component was cutting out.  Had to finish visit via telemedicine.     Patient ID: Zachary Lucia  YOB: 1975  MRN: [id number]    Chief Complaint: Swelling of the Right Knee and Arthritis      History of Present Illness: Zachary Lucia is a 46 y.o. female presents today for a recheck on her right knee osteoarthritis with a history of right knee Visco injections completed on 03/09/2022.  Patient states that she has noticed improvement with injections.  Does notice pain occasionally especially when taking long car drives to [facility] to visit her .  Would not states that she has no pain in her knee.  Denies any fevers, chills, night sweats, numbness and tingling.    HPI    Past Medical History:   Past Medical History:   Diagnosis Date    Abnormal stress test 1/25/2022    Asthma     Essential hypertension, malignant 1/8/2020    Lupus (systemic lupus erythematosus)     Membranous glomerulonephritis, 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    Scleroderma     Seizures 01/12/2017     Past Surgical History:   Procedure Laterality Date    LEFT HEART CATHETERIZATION Left 1/25/2022    Procedure: CATHETERIZATION, HEART, LEFT;  Surgeon: Rocío Davidson MD;  [facility] CATH LAB;  Service: Cardiology;  Laterality: Left;    RENAL  BIOPSY  11/16/2018     Family History   Problem Relation Age of Onset    Arthritis Mother     Glaucoma Mother     Hypertension Mother     Diabetes Father     Diabetes Maternal Grandmother     Hypertension Maternal Grandmother     Arthritis Maternal Grandmother     Cataracts Maternal Grandmother     Diabetes Maternal Grandfather     Heart disease Maternal Grandfather     Hypertension Maternal Grandfather     Diabetes Paternal Grandmother     Heart disease Paternal Grandmother     Hypertension Paternal Grandmother     Heart disease Paternal Grandfather      Social History     Socioeconomic History    Marital status:      Spouse name: Darrell    Number of children: 0   Tobacco Use    Smoking status: Never Smoker    Smokeless tobacco: Never Used   Substance and Sexual Activity    Alcohol use: Yes    Drug use: No    Sexual activity: Yes     Medication List with Changes/Refills   Current Medications    AZATHIOPRINE (IMURAN) 100 MG TABLET    Take 1 tablet (100 mg total) by mouth once daily.    AZATHIOPRINE (IMURAN) 50 MG TAB    Take 1 tablet (50 mg total) by mouth once daily in evening    BETAMETHASONE DIPROPIONATE (DIPROLENE) 0.05 % OINTMENT    Apply topically 2 (two) times daily as needed. Steroid. Use for flares    CHOLECALCIFEROL, VITAMIN D3, 1,250 MCG (50,000 UNIT) CAPSULE    Take 1 capsule (50,000 Units total) by mouth once a week.    CRISABOROLE (EUCRISA) 2 % OINT    AAA bid prn.  Non-steroid.  Safe to use long-term.    CYCLOBENZAPRINE (FLEXERIL) 10 MG TABLET    Take 1 tablet (10 mg total) by mouth 2 (two) times daily as needed for Muscle spasms.    FERROUS SULFATE (FEOSOL) 325 MG (65 MG IRON) TAB TABLET    Take 1 tablet by mouth once daily.    FLUTICASONE-SALMETEROL DISKUS INHALER 100-50 MCG    Inhale 1 puff into the lungs 2 (two) times daily. Controller    HYDROCODONE-ACETAMINOPHEN (NORCO) 7.5-325 MG PER TABLET    Take 1 tablet by mouth 4 (four) times daily as needed.     HYDROXYCHLOROQUINE (PLAQUENIL) 200 MG TABLET    Take 1 tablet (200 mg total) by mouth 2 (two) times daily.     MG TABLET    Take 800 mg by mouth every 8 (eight) hours as needed.    LEVETIRACETAM XR (KEPPRA XR) 500 MG TB24 24 HR TABLET    Take 500 mg by mouth once daily.    PREDNISONE (DELTASONE) 10 MG TABLET    TAKE 1 TABLET BY MOUTH EVERY DAY    PROAIR HFA 90 MCG/ACTUATION INHALER    INHALE 2 PUFFS INTO THE LUNGS EVERY 6 (SIX) HOURS AS NEEDED FOR WHEEZING. RESCUE    PULSE OXIMETER (PULSE OXIMETER) DEVICE    Use by Apply Externally route 2 (two) times a day. Use twice daily at 8 AM and 3 PM and record the value in [employer] as directed.     Review of patient's allergies indicates:   Allergen Reactions    Lisinopril      cough    Sulfa (sulfonamide antibiotics) Swelling     Review of Systems   Constitutional: Negative for chills and fever.   HENT: Negative for sore throat.    Eyes: Negative for pain.   Cardiovascular: Negative for chest pain and leg swelling.   Respiratory: Negative for cough and shortness of breath.    Skin: Negative for itching and rash.   Musculoskeletal: Positive for joint pain.   Gastrointestinal: Negative for abdominal pain, nausea and vomiting.   Genitourinary: Negative for dysuria.   Neurological: Negative for dizziness, numbness and paresthesias.       Physical Exam:   NEURO: Awake, alert, and oriented x 3.  PSYCH: Mood & affect are appropriate.  Ortho/SPM Exam  Limited Physical Exam due to Telemedicine Visit    Imaging:  X-Ray Knee 1 or 2 View Right  Order date: 12/22/2021  Authorizing: Robert Chambers MD  Ordered by Robert Chambers MD on 12/22/2021.       Narrative & Impression    EXAMINATION:  XR KNEE 1 OR 2 VIEW RIGHT     CLINICAL HISTORY:  Pain in right knee     TECHNIQUE:  AP and lateral views of the right knee were performed.     COMPARISON:  November 2021     FINDINGS:  Joint spaces are maintained.  No fracture or dislocation is seen on this single  view.     Impression:     Please see above        Electronically signed by: Ministerio Hansen MD  Date:                                            12/22/2021  Time:                                           09:17         No new radiographic images obtained at today's visit. Previous images reviewed at this time.    Relevant imaging results reviewed and interpreted by me, discussed with the patient and / or family today.      Other Tests:     No other tests performed today.    Patient Instructions     Assessment:  Zachary Lucia is a 46 y.o. 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   Right knee OA   Right visco 3/9/22    Encounter Diagnoses   Name Primary?    Primary osteoarthritis of right knee Yes    Swelling of joint of right knee       Plan:   Continue conservative treatment    Follow up in 4.5 months    Follow-up: 4.5 months or sooner if there are any problems between now and then.    Thank you for choosing [facility] and Dr. Robert Chambers for your orthopedic & sports medicine care. It is our goal to provide you with exceptional care that will help keep you healthy, active, and get you back in the game.    If you felt that you received exemplary care today, please consider leaving us feedback on [employer] at [email or web address].     Please do not hesitate to reach out to us via email, phone, or MyChart with any questions, concerns, or feedback.    If you are experiencing pain/discomfort ,or have questions after 5pm and would like to be connected to the [facility] on-call team, please call this number and specify which Sports Medicine provider is treating you: [phone]        Provider Note/Medical Decision Making:   Attempted to perform virtual visit,  audio component continued to cut out.  Completed visit via telemedicine.  Discussed with patient continue conservative treatment and recheck in 4.5 months.     I discussed worrisome and red flag signs and symptoms with the patient. The patient expressed understanding and agreed to alert me immediately or to go to the emergency room if they experience any of these.    Treatment plan was developed with input from the patient/family, and they expressed understanding and agreement with the plan. All questions were answered today.    Total time spent with patient: see X jyoti on chart below.   More than half of the time was spent counseling or coordinating care including prognosis, differential diagnosis, risks and benefits of treatment, instructions, compliance risk reductions      PHONE       [id number] 5-10  x   [id number] 11-20     [id number] 21-30         Disclaimer: This note was prepared using a voice recognition system and is likely to have sound alike errors within the text.

## 2022-04-25 NOTE — PATIENT INSTRUCTIONS
Assessment:  Zachary Lucia is a 46 y.o. 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  Right knee OA  Right visco 3/9/22    Encounter Diagnoses   Name Primary?    Primary osteoarthritis of right knee Yes    Swelling of joint of right knee       Plan:  Continue conservative treatment   Follow up in 4.5 months    Follow-up: 4.5 months or sooner if there are any problems between now and then.    Thank you for choosing [facility] and Dr. Robert Chambers for your orthopedic & sports medicine care. It is our goal to provide you with exceptional care that will help keep you healthy, active, and get you back in the game.    If you felt that you received exemplary care today, please consider leaving us feedback on [employer] at [email or web address].     Please do not hesitate to reach out to us via email, phone, or MyChart with any questions, concerns, or feedback.    If you are experiencing pain/discomfort ,or have questions after 5pm and would like to be connected to the [facility] on-call team, please call this number and specify which Sports Medicine provider is treating you: [phone]

## 2022-05-02 LAB
BCS RECOMMENDATION EXT: NORMAL
HPV 16: NEGATIVE
HPV 18: NEGATIVE
HPV HIGH RISK INTERP: NEGATIVE
HPV, HR, OTHER GENOTYPES: NEGATIVE
PAP RECOMMENDATION EXT: NORMAL

## 2022-05-05 ENCOUNTER — DOCUMENTATION ONLY (OUTPATIENT)
Dept: REHABILITATION | Facility: HOSPITAL | Age: 47
End: 2022-05-05
Payer: COMMERCIAL

## 2022-05-05 DIAGNOSIS — M25.461 SWELLING OF JOINT OF RIGHT KNEE: ICD-10-CM

## 2022-05-05 DIAGNOSIS — Z74.09 DECREASED STRENGTH, ENDURANCE, AND MOBILITY: ICD-10-CM

## 2022-05-05 DIAGNOSIS — R26.89 FUNCTIONAL GAIT ABNORMALITY: Primary | ICD-10-CM

## 2022-05-05 DIAGNOSIS — R53.1 DECREASED STRENGTH, ENDURANCE, AND MOBILITY: ICD-10-CM

## 2022-05-05 DIAGNOSIS — R68.89 DECREASED STRENGTH, ENDURANCE, AND MOBILITY: ICD-10-CM

## 2022-05-05 NOTE — PROGRESS NOTES
[facility]  Physical Therapy Discharge Note    Name: Zachary Escalona Khari  Clinic Number: [id number]    Therapy Diagnosis:   Encounter Diagnoses   Name Primary?    Functional gait abnormality Yes    Decreased strength, endurance, and mobility     Swelling of joint of right knee      Physician: Kaya Fatima PA-C    Physician Orders: PT Eval and Treat  Medical Diagnosis from Referral: Primary osteoarthritis of right knee  Evaluation Date: 2/11/2022      Date of Last visit: 4/6/2022  Total Visits Received: 11    ASSESSMENT      Patient was progressing well and getting stronger with therapy although still having difficulty and significant pain when riding in extended car rides. Patient did not return to clinic for the full duration of therapy visits.     Discharge reason: Patient has not attended therapy since 4/6/2022    Discharge FOTO Score: Did not capture 3rd FOTO score as the patient did not return to clinic as planned    Goals:   Short Term Goals:  6 weeks 3/16/2022   1. Pain: Pt will demonstrate improved pain by reports of less than or equal to 5/10 worst pain on the verbal rating scale in order to progress toward maximal functional ability and improve quality of life .  Progressing   2. Function: Patient will demonstrate improved function as indicated by a score of less than or equal to 45% impairment on FOTO.   Not assessed today   3. Mobility: Patient will improve active range of motion 50% of stated goals, listed in objective measures above, in order to progress towards independence with functional activities.   Met   4. Strength: Patient will improve strength to 3+/5 in all muscle groups, listed in objective measures above, in order to progress towards independence with functional activities.   Met   5. Gait: Patient will demonstrate improved gait mechanics including more functional knee flexion and extension and hip extension in order to improve functional  mobility, improve quality of life, and decrease risk of further injury.   Progressing   6. Home exercise program : Patient will demonstrate independence with home exercise program  in order to progress toward functional independence.  Met   7. Patient will be able to sit in car for drives up to 30 minutes with 5/10 pain or less to note improving mobility, muscle tone and endurance to allow for improved quality of life.   Met      Long Term Goals:  12 weeks 3/16/2022   1. Pain: Pt will demonstrate improved pain by reports of less than or equal to 2/10 worst pain on the verbal rating scale in order to progress toward maximal functional ability and improve QOL.    Progressing   2. Function: Patient will demonstrate improved function as indicated by a score of less than or equal to 36% impairment on FOTO.   Not assessed today   3. Mobility: Patient will improve AROM to stated goals, listed in objective measures above, in order to return to maximal functional potential and improve quality of life.  Progressing   4. Strength: Patient will improve strength to stated goals, listed in objective measures above, in order to improve functional independence and quality of life.  Progressing   5. Gait: Patient will demonstrate normalized gait mechanics with minimal compensation in order to return to prior level of function .  Progressing   6. Patient will return to normal activties of daily living , IADL's, recreational activities, and work-related activities with less than or equal to 1/10 pain and maximal function.   Progressing   7. Patient will be able to drive to her hometown 2 hours away with 1/10 pain or less to noted adequate mobility, muscle tone, length, strength and endurance.   Progressing   8. Patient will be able to negotiate 2 flights of stairs without pain and with ease to note adequate mobility, strength and endurance to improve her ability to access her storage unit and the community and improve overall quality  of life.  Progressing        PLAN   This patient is discharged from Physical Therapy      Jaylene Tamayo, PT, DPT

## 2022-05-11 ENCOUNTER — LAB VISIT (OUTPATIENT)
Dept: LAB | Facility: HOSPITAL | Age: 47
End: 2022-05-11
Attending: PHYSICIAN ASSISTANT
Payer: COMMERCIAL

## 2022-05-11 DIAGNOSIS — M35.01 SJOGREN'S SYNDROME WITH KERATOCONJUNCTIVITIS SICCA: ICD-10-CM

## 2022-05-11 DIAGNOSIS — M32.14 LUPUS NEPHRITIS, ISN/RPS CLASS V: ICD-10-CM

## 2022-05-11 DIAGNOSIS — M34.9 SCLERODERMA: ICD-10-CM

## 2022-05-11 LAB
ALBUMIN SERPL BCP-MCNC: 3.8 G/DL (ref 3.5–5.2)
ALP SERPL-CCNC: 45 U/L (ref 55–135)
ALT SERPL W/O P-5'-P-CCNC: 17 U/L (ref 10–44)
ANION GAP SERPL CALC-SCNC: 10 MMOL/L (ref 8–16)
AST SERPL-CCNC: 14 U/L (ref 10–40)
BASOPHILS # BLD AUTO: 0.03 K/UL (ref 0–0.2)
BASOPHILS NFR BLD: 0.6 % (ref 0–1.9)
BILIRUB SERPL-MCNC: 0.4 MG/DL (ref 0.1–1)
BILIRUB UR QL STRIP: NEGATIVE
BUN SERPL-MCNC: 14 MG/DL (ref 6–20)
C3 SERPL-MCNC: 127 MG/DL (ref 50–180)
C4 SERPL-MCNC: 23 MG/DL (ref 11–44)
CALCIUM SERPL-MCNC: 8.7 MG/DL (ref 8.7–10.5)
CHLORIDE SERPL-SCNC: 106 MMOL/L (ref 95–110)
CLARITY UR: CLEAR
CO2 SERPL-SCNC: 23 MMOL/L (ref 23–29)
COLOR UR: YELLOW
CREAT SERPL-MCNC: 1 MG/DL (ref 0.5–1.4)
CREAT UR-MCNC: 96 MG/DL (ref 15–325)
CRP SERPL-MCNC: 4.6 MG/L (ref 0–8.2)
DIFFERENTIAL METHOD: ABNORMAL
EOSINOPHIL # BLD AUTO: 0.2 K/UL (ref 0–0.5)
EOSINOPHIL NFR BLD: 3.7 % (ref 0–8)
ERYTHROCYTE [DISTWIDTH] IN BLOOD BY AUTOMATED COUNT: 14.6 % (ref 11.5–14.5)
ERYTHROCYTE [SEDIMENTATION RATE] IN BLOOD BY WESTERGREN METHOD: 12 MM/HR (ref 0–36)
EST. GFR  (AFRICAN AMERICAN): >60 ML/MIN/1.73 M^2
EST. GFR  (NON AFRICAN AMERICAN): >60 ML/MIN/1.73 M^2
GLUCOSE SERPL-MCNC: 90 MG/DL (ref 70–110)
GLUCOSE UR QL STRIP: NEGATIVE
HCT VFR BLD AUTO: 34.3 % (ref 37–48.5)
HGB BLD-MCNC: 10.9 G/DL (ref 12–16)
HGB UR QL STRIP: NEGATIVE
IMM GRANULOCYTES # BLD AUTO: 0.01 K/UL (ref 0–0.04)
IMM GRANULOCYTES NFR BLD AUTO: 0.2 % (ref 0–0.5)
KETONES UR QL STRIP: NEGATIVE
LEUKOCYTE ESTERASE UR QL STRIP: NEGATIVE
LYMPHOCYTES # BLD AUTO: 1.7 K/UL (ref 1–4.8)
LYMPHOCYTES NFR BLD: 33.1 % (ref 18–48)
MCH RBC QN AUTO: 26.3 PG (ref 27–31)
MCHC RBC AUTO-ENTMCNC: 31.8 G/DL (ref 32–36)
MCV RBC AUTO: 83 FL (ref 82–98)
MONOCYTES # BLD AUTO: 0.4 K/UL (ref 0.3–1)
MONOCYTES NFR BLD: 7.9 % (ref 4–15)
NEUTROPHILS # BLD AUTO: 2.8 K/UL (ref 1.8–7.7)
NEUTROPHILS NFR BLD: 54.5 % (ref 38–73)
NITRITE UR QL STRIP: NEGATIVE
NRBC BLD-RTO: 0 /100 WBC
PH UR STRIP: 6 [PH] (ref 5–8)
PLATELET # BLD AUTO: 310 K/UL (ref 150–450)
PMV BLD AUTO: 9 FL (ref 9.2–12.9)
POTASSIUM SERPL-SCNC: 3.8 MMOL/L (ref 3.5–5.1)
PROT SERPL-MCNC: 7.2 G/DL (ref 6–8.4)
PROT UR QL STRIP: NEGATIVE
PROT UR-MCNC: <7 MG/DL (ref 0–15)
PROT/CREAT UR: NORMAL MG/G{CREAT} (ref 0–0.2)
RBC # BLD AUTO: 4.15 M/UL (ref 4–5.4)
SODIUM SERPL-SCNC: 139 MMOL/L (ref 136–145)
SP GR UR STRIP: 1.01 (ref 1–1.03)
URN SPEC COLLECT METH UR: NORMAL
WBC # BLD AUTO: 5.07 K/UL (ref 3.9–12.7)

## 2022-05-11 PROCEDURE — 86160 COMPLEMENT ANTIGEN: CPT | Mod: 59 | Performed by: PHYSICIAN ASSISTANT

## 2022-05-11 PROCEDURE — 85025 COMPLETE CBC W/AUTO DIFF WBC: CPT | Performed by: PHYSICIAN ASSISTANT

## 2022-05-11 PROCEDURE — 81003 URINALYSIS AUTO W/O SCOPE: CPT | Performed by: PHYSICIAN ASSISTANT

## 2022-05-11 PROCEDURE — 86160 COMPLEMENT ANTIGEN: CPT | Performed by: PHYSICIAN ASSISTANT

## 2022-05-11 PROCEDURE — 85652 RBC SED RATE AUTOMATED: CPT | Performed by: PHYSICIAN ASSISTANT

## 2022-05-11 PROCEDURE — 86225 DNA ANTIBODY NATIVE: CPT | Performed by: PHYSICIAN ASSISTANT

## 2022-05-11 PROCEDURE — 84156 ASSAY OF PROTEIN URINE: CPT | Performed by: PHYSICIAN ASSISTANT

## 2022-05-11 PROCEDURE — 86140 C-REACTIVE PROTEIN: CPT | Performed by: PHYSICIAN ASSISTANT

## 2022-05-11 PROCEDURE — 36415 COLL VENOUS BLD VENIPUNCTURE: CPT | Performed by: PHYSICIAN ASSISTANT

## 2022-05-11 PROCEDURE — 80053 COMPREHEN METABOLIC PANEL: CPT | Performed by: PHYSICIAN ASSISTANT

## 2022-05-12 LAB — DSDNA AB SER-ACNC: NORMAL [IU]/ML

## 2022-05-18 ENCOUNTER — OFFICE VISIT (OUTPATIENT)
Dept: RHEUMATOLOGY | Facility: CLINIC | Age: 47
End: 2022-05-18
Payer: COMMERCIAL

## 2022-05-18 ENCOUNTER — PATIENT MESSAGE (OUTPATIENT)
Dept: RHEUMATOLOGY | Facility: CLINIC | Age: 47
End: 2022-05-18

## 2022-05-18 VITALS
SYSTOLIC BLOOD PRESSURE: 137 MMHG | HEIGHT: 66 IN | WEIGHT: 246.69 LBS | BODY MASS INDEX: 39.65 KG/M2 | HEART RATE: 74 BPM | DIASTOLIC BLOOD PRESSURE: 98 MMHG

## 2022-05-18 DIAGNOSIS — M35.01 SJOGREN'S SYNDROME WITH KERATOCONJUNCTIVITIS SICCA: Primary | ICD-10-CM

## 2022-05-18 DIAGNOSIS — M32.14 LUPUS NEPHRITIS, ISN/RPS CLASS V: ICD-10-CM

## 2022-05-18 DIAGNOSIS — R30.0 DYSURIA: ICD-10-CM

## 2022-05-18 DIAGNOSIS — D84.9 IMMUNOSUPPRESSED STATUS: ICD-10-CM

## 2022-05-18 DIAGNOSIS — I73.00 RAYNAUD'S DISEASE WITHOUT GANGRENE: ICD-10-CM

## 2022-05-18 DIAGNOSIS — M34.9 SCLERODERMA: ICD-10-CM

## 2022-05-18 DIAGNOSIS — Z79.899 HIGH RISK MEDICATION USE: ICD-10-CM

## 2022-05-18 PROCEDURE — 1159F MED LIST DOCD IN RCRD: CPT | Mod: CPTII,S$GLB,, | Performed by: PHYSICIAN ASSISTANT

## 2022-05-18 PROCEDURE — 3066F PR DOCUMENTATION OF TREATMENT FOR NEPHROPATHY: ICD-10-PCS | Mod: CPTII,S$GLB,, | Performed by: PHYSICIAN ASSISTANT

## 2022-05-18 PROCEDURE — 3066F NEPHROPATHY DOC TX: CPT | Mod: CPTII,S$GLB,, | Performed by: PHYSICIAN ASSISTANT

## 2022-05-18 PROCEDURE — 3008F PR BODY MASS INDEX (BMI) DOCUMENTED: ICD-10-PCS | Mod: CPTII,S$GLB,, | Performed by: PHYSICIAN ASSISTANT

## 2022-05-18 PROCEDURE — 3075F PR MOST RECENT SYSTOLIC BLOOD PRESS GE 130-139MM HG: ICD-10-PCS | Mod: CPTII,S$GLB,, | Performed by: PHYSICIAN ASSISTANT

## 2022-05-18 PROCEDURE — 3008F BODY MASS INDEX DOCD: CPT | Mod: CPTII,S$GLB,, | Performed by: PHYSICIAN ASSISTANT

## 2022-05-18 PROCEDURE — 3080F PR MOST RECENT DIASTOLIC BLOOD PRESSURE >= 90 MM HG: ICD-10-PCS | Mod: CPTII,S$GLB,, | Performed by: PHYSICIAN ASSISTANT

## 2022-05-18 PROCEDURE — 99215 OFFICE O/P EST HI 40 MIN: CPT | Mod: S$GLB,,, | Performed by: PHYSICIAN ASSISTANT

## 2022-05-18 PROCEDURE — 99999 PR PBB SHADOW E&M-EST. PATIENT-LVL III: CPT | Mod: PBBFAC,,, | Performed by: PHYSICIAN ASSISTANT

## 2022-05-18 PROCEDURE — 99999 PR PBB SHADOW E&M-EST. PATIENT-LVL III: ICD-10-PCS | Mod: PBBFAC,,, | Performed by: PHYSICIAN ASSISTANT

## 2022-05-18 PROCEDURE — 1159F PR MEDICATION LIST DOCUMENTED IN MEDICAL RECORD: ICD-10-PCS | Mod: CPTII,S$GLB,, | Performed by: PHYSICIAN ASSISTANT

## 2022-05-18 PROCEDURE — 3075F SYST BP GE 130 - 139MM HG: CPT | Mod: CPTII,S$GLB,, | Performed by: PHYSICIAN ASSISTANT

## 2022-05-18 PROCEDURE — 3080F DIAST BP >= 90 MM HG: CPT | Mod: CPTII,S$GLB,, | Performed by: PHYSICIAN ASSISTANT

## 2022-05-18 PROCEDURE — 99215 PR OFFICE/OUTPT VISIT, EST, LEVL V, 40-54 MIN: ICD-10-PCS | Mod: S$GLB,,, | Performed by: PHYSICIAN ASSISTANT

## 2022-05-18 RX ORDER — BELIMUMAB 200 MG/ML
200 SOLUTION SUBCUTANEOUS
Qty: 4 EACH | Refills: 11 | Status: SHIPPED | OUTPATIENT
Start: 2022-05-18 | End: 2023-05-11

## 2022-05-18 NOTE — PROGRESS NOTES
RHEUMATOLOGY OUTPATIENT CLINIC NOTE    5/18/2022    Attending Rheumatologist: Bridget Bhakta PA-C  Primary Care Provider: Danna Camara DO   Chief Complaint/Reason For Consultation:  Disease Management      Subjective:        Zachary Lucia is a 46 y.o. female here today for follow up sjogrens/scl overlap.  She has +AMAURY SSA and SSB. SCL 70+. +raynauds, dry mouth, mild dry eyes +gerd. raynauds stable. She has been having an increase in her joint pain and stiffness in her hands w/ stiffness lasting 60-90 min. Rheumatologic systems otherwise negative. C/o pain in the left elbow that radiates up toward her neck. Also having some numbness and tingling in her hands. She does c/o change in her urine over the past few days- possible infection.  She has also started to incorporate some anti-inflammatory dietary changes.     CT 2018 chest wnl no ILD, fatty liver noted, avoiding mtx (has taken in past) , has normal lfts. Echo wnl 2018.  She does have asthma that has been controlled      In the past she was seen by Nephrology for microscopic hematuria, She had a kidney biopsy done which was suggestive of membranous nephropathy/membranous lupus nephritis/class 5. Taken off lisinopril because of cough. No ACEI or ARB at this time.    Serologies  Lab Results   Component Value Date    TBGOLDPLUS Negative 02/17/2022      Lab Results   Component Value Date    AMAURY Positive (A) 11/29/2017      Lab Results   Component Value Date    RF <10.0 11/29/2017      Lab Results   Component Value Date    HEPAIGM Negative 02/17/2022    HEPBIGM Negative 02/17/2022    HEPCAB Negative 02/17/2022         Current Rheum Medications:  · Imuran 150 mg/day,  mg BID, prednisone prn  Previous Rheum Medications:   · Norvasc, cellcept (SE), MTX avoiding w/ fatty liver), gabapentin (failed)    Past Medical History:   Diagnosis Date    Abnormal stress test 1/25/2022    Asthma     Essential hypertension, malignant 1/8/2020  "   Lupus (systemic lupus erythematosus)     Membranous glomerulonephritis, 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    Scleroderma     Seizures 01/12/2017     Social History     Socioeconomic History    Marital status:      Spouse name: Darrell    Number of children: 0   Tobacco Use    Smoking status: Never Smoker    Smokeless tobacco: Never Used   Substance and Sexual Activity    Alcohol use: Yes    Drug use: No    Sexual activity: Yes     Review of patient's allergies indicates:   Allergen Reactions    Lisinopril      cough    Sulfa (sulfonamide antibiotics) Swelling       Objective:   BP (!) 137/98   Pulse 74   Ht 5' 6" (1.676 m)   Wt 111.9 kg (246 lb 11.1 oz)   BMI 39.82 kg/m²     Immunization History   Administered Date(s) Administered    COVID-19, MRNA, LN-S, PF (Pfizer) (Purple Cap) 06/09/2021, 06/09/2021, 06/30/2021, 06/30/2021, 12/31/2021    Tdap 04/05/2012, 04/05/2012       Current Outpatient Medications:     azaTHIOprine (IMURAN) 100 mg tablet, Take 1 tablet (100 mg total) by mouth once daily., Disp: 90 tablet, Rfl: 1    azaTHIOprine (IMURAN) 50 mg Tab, Take 1 tablet (50 mg total) by mouth once daily in evening, Disp: 90 tablet, Rfl: 1    betamethasone dipropionate (DIPROLENE) 0.05 % ointment, Apply topically 2 (two) times daily as needed. Steroid. Use for flares, Disp: 45 g, Rfl: 3    cholecalciferol, vitamin D3, 1,250 mcg (50,000 unit) capsule, Take 1 capsule (50,000 Units total) by mouth once a week., Disp: 15 capsule, Rfl: 1    crisaborole (EUCRISA) 2 % Oint, AAA bid prn.  Non-steroid.  Safe to use long-term., Disp: 60 g, Rfl: 3    cyclobenzaprine (FLEXERIL) 10 MG tablet, Take 1 tablet (10 mg total) by mouth 2 (two) times daily as needed for Muscle spasms., Disp: 60 tablet, Rfl: 0    ferrous sulfate (FEOSOL) 325 mg (65 mg iron) Tab tablet, Take 1 tablet by mouth once daily., Disp: , Rfl:     HYDROcodone-acetaminophen (NORCO) 7.5-325 mg per tablet, Take 1 tablet by mouth 4 (four) times daily " as needed., Disp: , Rfl:     hydrOXYchloroQUINE (PLAQUENIL) 200 mg tablet, Take 1 tablet (200 mg total) by mouth 2 (two) times daily., Disp: 180 tablet, Rfl: 3    levetiracetam XR (KEPPRA XR) 500 mg Tb24 24 hr tablet, Take 500 mg by mouth once daily., Disp: , Rfl:     predniSONE (DELTASONE) 10 MG tablet, TAKE 1 TABLET BY MOUTH EVERY DAY, Disp: 60 tablet, Rfl: 1    PROAIR HFA 90 mcg/actuation inhaler, INHALE 2 PUFFS INTO THE LUNGS EVERY 6 (SIX) HOURS AS NEEDED FOR WHEEZING. RESCUE, Disp: 8.5 Inhaler, Rfl: 0    pulse oximeter (PULSE OXIMETER) device, Use by Apply Externally route 2 (two) times a day. Use twice daily at 8 AM and 3 PM and [facility] the [facility] as directed., Disp: 1 each, Rfl: 0    belimumab (BENLYSTA) 200 mg/mL Syrg, Inject 1 mL (200 mg total) into the skin every 7 days., Disp: 4 each, Rfl: 11    fluticasone-salmeterol diskus inhaler 100-50 mcg, Inhale 1 puff into the lungs 2 (two) times daily. Controller, Disp: 1 each, Rfl: 11     mg tablet, Take 800 mg by mouth every 8 (eight) hours as needed., Disp: , Rfl: 0       Recent Results (from the past 336 hour(s))   Protein/Creatinine Ratio, Urine    Collection Time: 05/11/22  8:33 AM   Result Value Ref Range    Protein, Urine Random <7 0 - 15 mg/dL    Creatinine, Urine 96.0 15.0 - 325.0 mg/dL    Prot/Creat Ratio, Urine Unable to calculate 0.00 - 0.20   Urinalysis    Collection Time: 05/11/22  8:33 AM   Result Value Ref Range    Specimen UA Urine, Clean Catch     Color, UA Yellow Yellow, Straw, Letty    Appearance, UA Clear Clear    pH, UA 6.0 5.0 - 8.0    Specific Gravity, UA 1.010 1.005 - 1.030    Protein, UA Negative Negative    Glucose, UA Negative Negative    Ketones, UA Negative Negative    Bilirubin (UA) Negative Negative    Occult Blood UA Negative Negative    Nitrite, UA Negative Negative    Leukocytes, UA Negative Negative   C3 Complement    Collection Time: 05/11/22  8:42 AM   Result Value Ref Range    Complement (C-3) 127 50  - 180 mg/dL   C4 Complement    Collection Time: 05/11/22  8:42 AM   Result Value Ref Range    Complement (C-4) 23 11 - 44 mg/dL   Anti-DNA Ab, Double-Stranded    Collection Time: 05/11/22  8:42 AM   Result Value Ref Range    ds DNA Ab Negative 1:10 Negative 1:10   CBC Auto Differential    Collection Time: 05/11/22  8:42 AM   Result Value Ref Range    WBC 5.07 3.90 - 12.70 K/uL    RBC 4.15 4.00 - 5.40 M/uL    Hemoglobin 10.9 (L) 12.0 - 16.0 g/dL    Hematocrit 34.3 (L) 37.0 - 48.5 %    MCV 83 82 - 98 fL    MCH 26.3 (L) 27.0 - 31.0 pg    MCHC 31.8 (L) 32.0 - 36.0 g/dL    RDW 14.6 (H) 11.5 - 14.5 %    Platelets 310 150 - 450 K/uL    MPV 9.0 (L) 9.2 - 12.9 fL    Immature Granulocytes 0.2 0.0 - 0.5 %    Gran # (ANC) 2.8 1.8 - 7.7 K/uL    Immature Grans (Abs) 0.01 0.00 - 0.04 K/uL    Lymph # 1.7 1.0 - 4.8 K/uL    Mono # 0.4 0.3 - 1.0 K/uL    Eos # 0.2 0.0 - 0.5 K/uL    Baso # 0.03 0.00 - 0.20 K/uL    nRBC 0 0 /100 WBC    Gran % 54.5 38.0 - 73.0 %    Lymph % 33.1 18.0 - 48.0 %    Mono % 7.9 4.0 - 15.0 %    Eosinophil % 3.7 0.0 - 8.0 %    Basophil % 0.6 0.0 - 1.9 %    Differential Method Automated    Comprehensive Metabolic Panel    Collection Time: 05/11/22  8:42 AM   Result Value Ref Range    Sodium 139 136 - 145 mmol/L    Potassium 3.8 3.5 - 5.1 mmol/L    Chloride 106 95 - 110 mmol/L    CO2 23 23 - 29 mmol/L    Glucose 90 70 - 110 mg/dL    BUN 14 6 - 20 mg/dL    Creatinine 1.0 0.5 - 1.4 mg/dL    Calcium 8.7 8.7 - 10.5 mg/dL    Total Protein 7.2 6.0 - 8.4 g/dL    Albumin 3.8 3.5 - 5.2 g/dL    Total Bilirubin 0.4 0.1 - 1.0 mg/dL    Alkaline Phosphatase 45 (L) 55 - 135 U/L    AST 14 10 - 40 U/L    ALT 17 10 - 44 U/L    Anion Gap 10 8 - 16 mmol/L    eGFR if African American >60 >60 mL/min/1.73 m^2    eGFR if non African American >60 >60 mL/min/1.73 m^2   C-Reactive Protein    Collection Time: 05/11/22  8:42 AM   Result Value Ref Range    CRP 4.6 0.0 - 8.2 mg/L   Sedimentation rate    Collection Time: 05/11/22  8:42 AM    Result Value Ref Range    Sed Rate 12 0 - 36 mm/Hr         Imaging:  All imaging reviewed and independently interpreted by me.    cxray 6/2021:   Lungs are clear without focal consolidation, edema, or mass.  There is no pneumothorax or pleural effusion.  Cardiomediastinal silhouette is within normal limits.  No acute osseous abnormality.     Echo 6.21.21:   · The left ventricle is normal in size with concentric remodeling and normal systolic function.  · The estimated ejection fraction is 60%.  · Normal left ventricular diastolic function.  · Normal right ventricular size with normal right ventricular systolic function.  · Normal central venous pressure (3 mmHg).  · The estimated PA systolic pressure is 32 mmHg.     6mwt 6.11.21 Interpretation:   Total distance walked in six minutes is normal indicating normal   functional capacity.   Patient did not meet criteria for oxygen prescription. Clinical   correlation suggested.      pfts interpretation 6.11.21  The low FEV0.5/FEV1 ratio suggests central or upper airway obstruction may be present. Spirometry remains unimproved following bronchodilator. Lung volumes show mild restriction is present. Airway mechanics show airway resistance is increased. Specific   conductance remains normal. DLCO is mildly decreased. MVV is severely decreased.     Assessment:     1. Sjogren's syndrome with keratoconjunctivitis sicca    2. Scleroderma    3. Lupus nephritis, ISN/RPS class V    4. Raynaud's disease without gangrene    5. High risk medication use    6. Immunosuppressed status    7. Dysuria        Overlap scl/sjogrens with SLE membranous nephritis class 5 on kidney biopsy, = no h/o clots, beta 2+ x 1 repeat neg,  +rp, sicca, gerd, rna polymerase neg, scl 70 +       Membranous Nephritis suggestive of sle nephritis 5: intolerant to cellcept now on imuran 150mg/d    Plan:       · Start Benlysta for improved control of joint pains and swelling.  Continue Plaquenil and  Imuran  · Weight loss and continued anti-inflammatory diet encouraged  · Check urinalysis and urine culture today.  · Compromised immune system secondary to autoimmune disease and use of immunosuppressive drugs. Monitor carefully for infections and toxicities- Currently denies issues with recurrent infections. Advised to get immediate medical care if any infection.  · Recommend Yearly Skin Cancer Screening by Dermatology for all patients on biologic or Araseli. advised strict adherence to age appropriate vaccinations (shingles, pneumonia, flu and covid) and cancer screenings with PCP   · Patient advised to hold DMARD and/or biologic therapy for signs of infection or for surgery. If you are unsure what to do please call our office for instruction.[facility] [phone]  · no current medication related issues, no evidence of toxicity. I ordered labs for toxicity monitoring;  results reviewed and discussed findings with the patient   · Return to clinic: 3 mos w/ safety labs    The patient understands, chooses and consents to this plan and accepts all the risks which include but are not limited to the risks mentioned above.     Method of contact with patient concerns: [facility]    60 minutes of total time spent on the encounter, which includes face to face time and non-face to face time preparing to see the patient (eg, review of tests), Obtaining and/or reviewing separately obtained history, Documenting clinical information in the electronic or other health record, Independently interpreting results (not separately reported) and communicating results to the patient/family/caregiver, or Care coordination (not separately reported).          Disclaimer: This note was prepared using a voice recognition system and is likely to have sound alike errors within the text.       Bridget Bhakta PA-C  Rheumatology Department   [facility]

## 2022-05-20 ENCOUNTER — SPECIALTY PHARMACY (OUTPATIENT)
Dept: PHARMACY | Facility: CLINIC | Age: 47
End: 2022-05-20
Payer: COMMERCIAL

## 2022-05-23 NOTE — TELEPHONE ENCOUNTER
Riya Wilhelm, this is Jessica Carlson with [facility].  We are working on your prescription that your doctor has sent us. We will be working with your insurance to get this approved for you. We will be calling you along the way with updates on your medication.  If you have any questions, you can reach us at [phone].    Welcome call outcome: Patient/caregiver reached     PA required and submitted  Key:[id number]

## 2022-05-25 NOTE — TELEPHONE ENCOUNTER
"7/17/2018       RE: Patsy Olson  [address] [place] [id number]     Dear Colleague,    Thank you for referring your patient, Patsy Olson, to the [facility] SPORTS AND ORTHOPAEDIC WALK IN CLINIC at [facility]. Please see a copy of my visit note below.    CHIEF COMPLAINT:  Pain of the Lower Back       HISTORY OF PRESENT ILLNESS  Ms. Olson is a pleasant 18 year old year old female who presents to clinic today with cervicalgia.  Patsy explains that she was diagnosed with strep throat about 2 weeks ago.  Recalls very sedentary at that time.  After recovering and being more active, low back pain began around 1 week ago.  Pain today severe at low back and associated \"deep pain of anterior thighs\".  No history of low back issues. No inciting event or injury over the last two days.     Onset: rapid  Location: bilateral low back  Quality:  sharp and tingling  Duration: 2 days   Severity: Mod to severe  Timing:worse since this AM  Modifying factors:  resting and non-use makes it better, movement, walking and stairs makes it worse  Associated signs & symptoms: tingling anterior thighs  Previous similar pain: No  Treatments to date: Advil  Urinary symptoms: No dysuria, urinary frequency, urgency    Additional history: as documented    MEDICAL HISTORY  There is no problem list on file for this patient.      Current Outpatient Prescriptions   Medication Sig Dispense Refill     levonorgestrel (KYLEENA) 19.5 MG IUD 1 each by Intrauterine route once         No Known Allergies    No family history on file.    Additional medical/Social/Surgical histories reviewed in [facility] and updated as appropriate.     REVIEW OF SYSTEMS (7/17/2018)  CONSTITUTIONAL: Denies fever and weight loss  EYES: Denies acute vision changes  ENT: Denies hearing changes or difficulty swallowing  CARDIAC: Denies chest pain or edema  RESPIRATORY: Denies dyspnea, cough or wheeze  GASTROINTESTINAL: Denies " -PA approved from 05/24/2022 to 05/24/2023  Case ID: [id number]    [facility] OON. Patient must fill at [facility] ([phone])    -Patient informed via telephone  -Will transfer Rx and close out at [facility].      "abdominal pain, nausea, vomiting  MUSCULOSKELETAL: See HPI  SKIN: Denies any recent rash or lesion  NEUROLOGICAL: Denies numbness or focal weakness  PSYCHIATRIC: No history of psychiatric symptoms or problems  ENDOCRINE: Diagnosis of diabetes:No  HEMATOLOGY: Denies episodes of easy bleeding      PHYSICAL EXAM  /87  Pulse 84  Ht 1.664 m (5' 5.5\")    General  - normal appearance, in no obvious distress  CV  - normal peripheral perfusion  Pulm  - normal respiratory pattern, non-labored  Musculoskeletal - lumbar spine  - stance: slow to rise and sit  - inspection: normal bone and joint alignment, no obvious scoliosis  - palpation: bilateral lumbar paravertebral spasm, TTP on left  - ROM: pain with bilateral rotation,extension, no increased pain with flexion but returning to neutral painful.  - strength: lower extremities 5/5 in all planes  - special tests:  (-) straight leg raise bilaterally  (+) slump test on left  No CVA tenderness to percussion  Neuro  - patellar and Achilles DTRs 2+ bilaterally, no sensory or motor deficit, grossly normal coordination, normal muscle tone  Skin  - no ecchymosis, erythema, warmth, or induration, no obvious rash  Psych  - interactive, appropriate, normal mood and affect      IMAGING : XR lumbar spine. Final results and radiologist's interpretation, available in the [facility] health record. Images were reviewed with the patient/family members in the office today. My personal interpretation of the performed imaging is no acute osseous abnormalities.  No chronic space narrowing.     ASSESSMENT & PLAN  Ms. Olson is a 18 year old year old female who presents to clinic today with acute low back pain with anterior radiculopathy worse over the last 6 hours.  Red flag symptoms discussed will return sooner in said instance.    Diagnosis: Acute low back pain with radiculopathy    -Ice to low back  -Activity modifications including time off from work  -Home exercise program and stretches " discussed  -Diclofenac twice daily for 1 week  -Tizanidine nightly for the next week, may use 3 times daily today  -Follow up in 1 week if no resolution of pain    It was a pleasure seeing Patsy today.    Billy Dick DO, CAM  Primary Care Sports Medicine  E just fine I talked to her arms were just took a suture his leg          SPORTS & ORTHOPEDIC WALK-IN VISIT 7/17/2018    Primary Care Physician: Dr. Resendez    Low back pain radiating down into both legs, left side more than right. Had mild pain for the last few weeks, felt fine yesterday and then woke up with excruciating pain today. Pain and tingling down into front of thighs, deep. Pain started right after recovering from strep a few weeks ago. Pt reports that her boyfriend tried to massage her back to make it feel better and had said that the area (over vertebrae) of her pain felt different than the other areas.     Reason for visit:     What part of your body is injured / painful?  bilateral low back    What caused the injury /pain? No inciting event     How long ago did your injury occur or pain begin? several weeks ago - worse today    What are your most bothersome symptoms? Pain and Tingling    How would you characterize your symptom?  sharp and tingling    What makes your symptoms better? Nothing - took two advil this morning with no relief    What makes your symptoms worse? Movement, walking, stairs     Have you been previously seen for this problem? No    Medical History:    Any recent changes to your medical history? No    Any new medication prescribed since last visit? No    Have you had surgery on this body part before? No    Social History:    Occupation:     Handedness: Right    Exercise: 3-4 days/week    Review of Systems:    Do you have fever, chills, weight loss? Yes, recent strep throat    Do you have any vision problems? No    Do you have any chest pain or edema? Yes, ED visit a month ago    Do you have any shortness of breath or  wheezing?  Yes, see above and with pain    Do you have stomach problems? No    Do you have any numbness or focal weakness? Yes, legs    Do you have diabetes? No    Do you have problems with bleeding or clotting? No    Do you have an rashes or other skin lesions? No         Again, thank you for allowing me to participate in the care of your patient.      Sincerely,    Billy Dick, DO

## 2022-05-26 ENCOUNTER — PATIENT OUTREACH (OUTPATIENT)
Dept: ADMINISTRATIVE | Facility: HOSPITAL | Age: 47
End: 2022-05-26
Payer: COMMERCIAL

## 2022-05-26 NOTE — LETTER
AUTHORIZATION FOR RELEASE OF   CONFIDENTIAL INFORMATION        We are seeing Zachary Lucia, date of birth 1975, in the clinic at [facility]. Danna Camara DO is the patient's PCP. Zachary Lucia has an outstanding lab/procedure at the time we reviewed her chart. In order to help keep her health information updated, she has authorized us to request the following medical record(s):        ( x )  MAMMOGRAM                                      (  )  COLONOSCOPY      ( x )  PAP SMEAR                                          (  )  OUTSIDE LAB RESULTS     (  )  DEXA SCAN                                          (  )  EYE EXAM            (  )  FOOT EXAM                                          (  )  ENTIRE RECORD     (  )  OUTSIDE IMMUNIZATIONS                 (  )  _______________         Please fax records to [facility], Angela Harris, DO, [phone]     If you have any questions, please contact    [facility] at [phone]           Patient Name: Zachary Lucia  : 1975  Patient Phone #: [phone]

## 2022-05-26 NOTE — PROGRESS NOTES
Working cervical screening report; Chart/[facility] reviewed for results; No records found  Called patient who states that she just completed her mammogram and pap smear this month with Dr. Lakeshia Shelton.    [facility] faxed to Dr. Lakeshia Shelton 1x to request pap smear and mammogram records. Remind me set 1 week.

## 2022-06-02 NOTE — PROGRESS NOTES
2nd attempt  [facility] faxed to Dr. Lakeshia Shelton 1x to request pap smear and mammogram records. Remind me set 1 week.

## 2022-06-08 ENCOUNTER — PATIENT MESSAGE (OUTPATIENT)
Dept: RESEARCH | Facility: HOSPITAL | Age: 47
End: 2022-06-08
Payer: COMMERCIAL

## 2022-08-18 ENCOUNTER — LAB VISIT (OUTPATIENT)
Dept: LAB | Facility: HOSPITAL | Age: 47
End: 2022-08-18
Payer: COMMERCIAL

## 2022-08-18 DIAGNOSIS — M34.9 SCLERODERMA: ICD-10-CM

## 2022-08-18 DIAGNOSIS — M32.14 LUPUS NEPHRITIS, ISN/RPS CLASS V: ICD-10-CM

## 2022-08-18 DIAGNOSIS — M35.01 SJOGREN'S SYNDROME WITH KERATOCONJUNCTIVITIS SICCA: ICD-10-CM

## 2022-08-18 LAB
ALBUMIN SERPL BCP-MCNC: 3.8 G/DL (ref 3.5–5.2)
ALP SERPL-CCNC: 48 U/L (ref 55–135)
ALT SERPL W/O P-5'-P-CCNC: 20 U/L (ref 10–44)
ANION GAP SERPL CALC-SCNC: 9 MMOL/L (ref 8–16)
AST SERPL-CCNC: 21 U/L (ref 10–40)
BASOPHILS # BLD AUTO: 0.03 K/UL (ref 0–0.2)
BASOPHILS NFR BLD: 0.5 % (ref 0–1.9)
BILIRUB SERPL-MCNC: 0.5 MG/DL (ref 0.1–1)
BUN SERPL-MCNC: 12 MG/DL (ref 6–20)
CALCIUM SERPL-MCNC: 9.6 MG/DL (ref 8.7–10.5)
CHLORIDE SERPL-SCNC: 105 MMOL/L (ref 95–110)
CO2 SERPL-SCNC: 24 MMOL/L (ref 23–29)
CREAT SERPL-MCNC: 1 MG/DL (ref 0.5–1.4)
CRP SERPL-MCNC: 14 MG/L (ref 0–8.2)
DIFFERENTIAL METHOD: ABNORMAL
EOSINOPHIL # BLD AUTO: 0.2 K/UL (ref 0–0.5)
EOSINOPHIL NFR BLD: 3.5 % (ref 0–8)
ERYTHROCYTE [DISTWIDTH] IN BLOOD BY AUTOMATED COUNT: 14 % (ref 11.5–14.5)
ERYTHROCYTE [SEDIMENTATION RATE] IN BLOOD BY PHOTOMETRIC METHOD: 30 MM/HR (ref 0–36)
EST. GFR  (NO RACE VARIABLE): >60 ML/MIN/1.73 M^2
GLUCOSE SERPL-MCNC: 97 MG/DL (ref 70–110)
HCT VFR BLD AUTO: 34.3 % (ref 37–48.5)
HGB BLD-MCNC: 11 G/DL (ref 12–16)
IMM GRANULOCYTES # BLD AUTO: 0.02 K/UL (ref 0–0.04)
IMM GRANULOCYTES NFR BLD AUTO: 0.3 % (ref 0–0.5)
LYMPHOCYTES # BLD AUTO: 2 K/UL (ref 1–4.8)
LYMPHOCYTES NFR BLD: 32.9 % (ref 18–48)
MCH RBC QN AUTO: 26.8 PG (ref 27–31)
MCHC RBC AUTO-ENTMCNC: 32.1 G/DL (ref 32–36)
MCV RBC AUTO: 84 FL (ref 82–98)
MONOCYTES # BLD AUTO: 0.7 K/UL (ref 0.3–1)
MONOCYTES NFR BLD: 12.1 % (ref 4–15)
NEUTROPHILS # BLD AUTO: 3 K/UL (ref 1.8–7.7)
NEUTROPHILS NFR BLD: 50.7 % (ref 38–73)
NRBC BLD-RTO: 0 /100 WBC
PLATELET # BLD AUTO: 372 K/UL (ref 150–450)
PMV BLD AUTO: 9.3 FL (ref 9.2–12.9)
POTASSIUM SERPL-SCNC: 3.7 MMOL/L (ref 3.5–5.1)
PROT SERPL-MCNC: 7.4 G/DL (ref 6–8.4)
RBC # BLD AUTO: 4.11 M/UL (ref 4–5.4)
SODIUM SERPL-SCNC: 138 MMOL/L (ref 136–145)
WBC # BLD AUTO: 5.93 K/UL (ref 3.9–12.7)

## 2022-08-18 PROCEDURE — 85025 COMPLETE CBC W/AUTO DIFF WBC: CPT | Performed by: PHYSICIAN ASSISTANT

## 2022-08-18 PROCEDURE — 80053 COMPREHEN METABOLIC PANEL: CPT | Performed by: PHYSICIAN ASSISTANT

## 2022-08-18 PROCEDURE — 85652 RBC SED RATE AUTOMATED: CPT | Performed by: PHYSICIAN ASSISTANT

## 2022-08-18 PROCEDURE — 36415 COLL VENOUS BLD VENIPUNCTURE: CPT | Performed by: PHYSICIAN ASSISTANT

## 2022-08-18 PROCEDURE — 86140 C-REACTIVE PROTEIN: CPT | Performed by: PHYSICIAN ASSISTANT

## 2022-08-23 ENCOUNTER — OFFICE VISIT (OUTPATIENT)
Dept: RHEUMATOLOGY | Facility: CLINIC | Age: 47
End: 2022-08-23
Payer: COMMERCIAL

## 2022-08-23 ENCOUNTER — TELEPHONE (OUTPATIENT)
Dept: RHEUMATOLOGY | Facility: CLINIC | Age: 47
End: 2022-08-23

## 2022-08-23 VITALS
BODY MASS INDEX: 39.82 KG/M2 | WEIGHT: 247.81 LBS | HEART RATE: 71 BPM | DIASTOLIC BLOOD PRESSURE: 84 MMHG | HEIGHT: 66 IN | SYSTOLIC BLOOD PRESSURE: 128 MMHG

## 2022-08-23 DIAGNOSIS — M34.9 SCLERODERMA: ICD-10-CM

## 2022-08-23 DIAGNOSIS — G62.9 NEUROPATHY: ICD-10-CM

## 2022-08-23 DIAGNOSIS — M32.14 LUPUS NEPHRITIS, ISN/RPS CLASS V: ICD-10-CM

## 2022-08-23 DIAGNOSIS — D84.9 IMMUNOSUPPRESSED STATUS: ICD-10-CM

## 2022-08-23 DIAGNOSIS — Z79.899 HIGH RISK MEDICATION USE: ICD-10-CM

## 2022-08-23 DIAGNOSIS — I73.00 RAYNAUD'S DISEASE WITHOUT GANGRENE: ICD-10-CM

## 2022-08-23 DIAGNOSIS — M35.01 SJOGREN'S SYNDROME WITH KERATOCONJUNCTIVITIS SICCA: Primary | ICD-10-CM

## 2022-08-23 PROCEDURE — 1159F MED LIST DOCD IN RCRD: CPT | Mod: CPTII,S$GLB,, | Performed by: PHYSICIAN ASSISTANT

## 2022-08-23 PROCEDURE — 3074F PR MOST RECENT SYSTOLIC BLOOD PRESSURE < 130 MM HG: ICD-10-PCS | Mod: CPTII,S$GLB,, | Performed by: PHYSICIAN ASSISTANT

## 2022-08-23 PROCEDURE — 3074F SYST BP LT 130 MM HG: CPT | Mod: CPTII,S$GLB,, | Performed by: PHYSICIAN ASSISTANT

## 2022-08-23 PROCEDURE — 3079F DIAST BP 80-89 MM HG: CPT | Mod: CPTII,S$GLB,, | Performed by: PHYSICIAN ASSISTANT

## 2022-08-23 PROCEDURE — 99999 PR PBB SHADOW E&M-EST. PATIENT-LVL III: CPT | Mod: PBBFAC,,, | Performed by: PHYSICIAN ASSISTANT

## 2022-08-23 PROCEDURE — 3066F PR DOCUMENTATION OF TREATMENT FOR NEPHROPATHY: ICD-10-PCS | Mod: CPTII,S$GLB,, | Performed by: PHYSICIAN ASSISTANT

## 2022-08-23 PROCEDURE — 3008F PR BODY MASS INDEX (BMI) DOCUMENTED: ICD-10-PCS | Mod: CPTII,S$GLB,, | Performed by: PHYSICIAN ASSISTANT

## 2022-08-23 PROCEDURE — 1159F PR MEDICATION LIST DOCUMENTED IN MEDICAL RECORD: ICD-10-PCS | Mod: CPTII,S$GLB,, | Performed by: PHYSICIAN ASSISTANT

## 2022-08-23 PROCEDURE — 3066F NEPHROPATHY DOC TX: CPT | Mod: CPTII,S$GLB,, | Performed by: PHYSICIAN ASSISTANT

## 2022-08-23 PROCEDURE — 3079F PR MOST RECENT DIASTOLIC BLOOD PRESSURE 80-89 MM HG: ICD-10-PCS | Mod: CPTII,S$GLB,, | Performed by: PHYSICIAN ASSISTANT

## 2022-08-23 PROCEDURE — 99215 OFFICE O/P EST HI 40 MIN: CPT | Mod: S$GLB,,, | Performed by: PHYSICIAN ASSISTANT

## 2022-08-23 PROCEDURE — 3008F BODY MASS INDEX DOCD: CPT | Mod: CPTII,S$GLB,, | Performed by: PHYSICIAN ASSISTANT

## 2022-08-23 PROCEDURE — 99999 PR PBB SHADOW E&M-EST. PATIENT-LVL III: ICD-10-PCS | Mod: PBBFAC,,, | Performed by: PHYSICIAN ASSISTANT

## 2022-08-23 PROCEDURE — 99215 PR OFFICE/OUTPT VISIT, EST, LEVL V, 40-54 MIN: ICD-10-PCS | Mod: S$GLB,,, | Performed by: PHYSICIAN ASSISTANT

## 2022-08-23 RX ORDER — PREDNISONE 5 MG/1
TABLET ORAL
Qty: 40 TABLET | Refills: 0 | Status: SHIPPED | OUTPATIENT
Start: 2022-08-23 | End: 2022-09-08

## 2022-08-23 RX ORDER — AZATHIOPRINE 100 MG/1
100 TABLET ORAL DAILY
Qty: 90 TABLET | Refills: 1 | Status: SHIPPED | OUTPATIENT
Start: 2022-08-23 | End: 2023-08-10 | Stop reason: SDUPTHER

## 2022-08-23 NOTE — TELEPHONE ENCOUNTER
Labs scheduled for 8.24.22, pt scheduled xray via [employer]. Follow up visit scheduled for 10.25.22 at 7.30 am.

## 2022-08-23 NOTE — TELEPHONE ENCOUNTER
----- Message from Bridget Bhakta PA-C sent at 8/23/2022  1:10 PM CDT -----  Please schedule follow up appt and for her to have lab and xray done tomorrow AM around 715 AM if possible

## 2022-08-23 NOTE — Clinical Note
Please schedule follow up appt and for her to have lab and xray done tomorrow AM around 715 AM if possible

## 2022-08-23 NOTE — PROGRESS NOTES
"     RHEUMATOLOGY OUTPATIENT CLINIC NOTE    8/23/2022    Attending Rheumatologist: Bridget Bhakta PA-C  Primary Care Provider: Danna Camara DO   Chief Complaint/Reason For Consultation:  Disease Management      Subjective:        Zachary Lucia is a 46 y.o. female here today for follow up sjogrens/scl overlap. She has +AMAURY SSA and SSB. SCL 70+. +raynauds, dry mouth, mild dry eyes +gerd. raynauds stable. she has been on benlysta for about 2 mos now and does feel like it is helping overall with joint pain. She is having a flare in her ankles, hands and elbows for about 1 mo now w/ increased swelling and pain. Also has trouble with her knees mel- seeing ortho for this. Has had IAC and HA injections. Still having CTS symptoms. Trying to eliminate dairy from her diet. Took 10 mg prednisone last night. Takes aleve intermittently- doesn't do regularly, doesnt want to be a "pill head"     CT 2018 chest wnl no ILD, fatty liver noted. Echo wnl 2018.      In the past she was seen by Nephrology for microscopic hematuria, She had a kidney biopsy done which was suggestive of membranous nephropathy/membranous lupus nephritis/class 5. Taken off lisinopril because of cough. No ACEI or ARB at this time.    Serologies  Lab Results   Component Value Date    TBGOLDPLUS Negative 02/17/2022     Lab Results   Component Value Date    AMAURY Positive (A) 11/29/2017     Lab Results   Component Value Date    RF <10.0 11/29/2017     Lab Results   Component Value Date    HEPAIGM Negative 02/17/2022    HEPBIGM Negative 02/17/2022    HEPCAB Negative 02/17/2022       Latest Reference Range & Units 05/15/19 10:22   AMAURY Screen Negative <1:160  Positive !   AMAURY HEP-2 Titer  Positive 1:640 Speckled Atypical   ds DNA Ab Negative 1:10  Negative 1:10   Anti-SSA Antibody 0.00 - 19.99 EU 88.76 (H)   Anti-SSA Interpretation Negative  Positive !   Anti-SSB Antibody 0.00 - 19.99 .56 (H)   Anti-SSB Interpretation Negative  Positive ! " "  Anti Sm Antibody 0.00 - 19.99 EU 5.92   Anti-Sm Interpretation Negative  Negative   Anti Sm/RNP Antibody 0.00 - 19.99 EU 10.07   Anti-Sm/RNP Interpretation Negative  Negative      Latest Reference Range & Units 02/01/18 09:57   Scleroderma SCL- <20 UNITS 43 (H)     Current Rheum Medications:  · Imuran 150 mg/day,  mg BID, prednisone prn, benlysta  Previous Rheum Medications:   · Norvasc, cellcept (SE), MTX (avoiding w/ fatty liver), gabapentin (failed)    Past Medical History:   Diagnosis Date    Abnormal stress test 1/25/2022    Asthma     Essential hypertension, malignant 1/8/2020    Lupus (systemic lupus erythematosus)     Membranous glomerulonephritis, 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    Scleroderma     Seizures 01/12/2017     Social History     Socioeconomic History    Marital status:      Spouse name: Darrell    Number of children: 0   Tobacco Use    Smoking status: Never Smoker    Smokeless tobacco: Never Used   Substance and Sexual Activity    Alcohol use: Yes    Drug use: No    Sexual activity: Yes     Review of patient's allergies indicates:   Allergen Reactions    Lisinopril      cough    Sulfa (sulfonamide antibiotics) Swelling       Objective:   /84   Pulse 71   Ht 5' 6" (1.676 m)   Wt 112.4 kg (247 lb 12.8 oz)   BMI 40.00 kg/m²     Immunization History   Administered Date(s) Administered    COVID-19, MRNA, LN-S, PF (Pfizer) (Purple Cap) 06/09/2021, 06/09/2021, 06/30/2021, 06/30/2021, 12/31/2021    Tdap 04/05/2012, 04/05/2012       Current Outpatient Medications:     azaTHIOprine (IMURAN) 100 mg tablet, Take 1 tablet (100 mg total) by mouth once daily., Disp: 90 tablet, Rfl: 1    azaTHIOprine (IMURAN) 50 mg Tab, TAKE 1 TABLET (50 MG TOTAL) BY MOUTH ONCE DAILY IN EVENING, Disp: 90 tablet, Rfl: 1    belimumab (BENLYSTA) 200 mg/mL Syrg, Inject 1 mL (200 mg total) into the skin every 7 days., Disp: 4 each, Rfl: 11    betamethasone dipropionate (DIPROLENE) 0.05 % ointment, " Apply topically 2 (two) times daily as needed. Steroid. Use for flares, Disp: 45 g, Rfl: 3    cholecalciferol, vitamin D3, 1,250 mcg (50,000 unit) capsule, Take 1 capsule (50,000 Units total) by mouth once a week., Disp: 15 capsule, Rfl: 1    crisaborole (EUCRISA) 2 % Oint, AAA bid prn.  Non-steroid.  Safe to use long-term., Disp: 60 g, Rfl: 3    cyclobenzaprine (FLEXERIL) 10 MG tablet, Take 1 tablet (10 mg total) by mouth 2 (two) times daily as needed for Muscle spasms., Disp: 60 tablet, Rfl: 0    ferrous sulfate (FEOSOL) 325 mg (65 mg iron) Tab tablet, Take 1 tablet by mouth once daily., Disp: , Rfl:     HYDROcodone-acetaminophen (NORCO) 7.5-325 mg per tablet, Take 1 tablet by mouth 4 (four) times daily as needed., Disp: , Rfl:     hydrOXYchloroQUINE (PLAQUENIL) 200 mg tablet, Take 1 tablet (200 mg total) by mouth 2 (two) times daily., Disp: 180 tablet, Rfl: 3     mg tablet, Take 800 mg by mouth every 8 (eight) hours as needed., Disp: , Rfl: 0    levetiracetam XR (KEPPRA XR) 500 mg Tb24 24 hr tablet, Take 500 mg by mouth once daily., Disp: , Rfl:     predniSONE (DELTASONE) 10 MG tablet, TAKE 1 TABLET BY MOUTH EVERY DAY, Disp: 60 tablet, Rfl: 1    PROAIR HFA 90 mcg/actuation inhaler, INHALE 2 PUFFS INTO THE LUNGS EVERY 6 (SIX) HOURS AS NEEDED FOR WHEEZING. RESCUE, Disp: 8.5 Inhaler, Rfl: 0    fluticasone-salmeterol diskus inhaler 100-50 mcg, Inhale 1 puff into the lungs 2 (two) times daily. Controller, Disp: 1 each, Rfl: 11    pulse oximeter (PULSE OXIMETER) device, Use by Apply Externally route 2 (two) times a day. Use twice daily at 8 AM and 3 PM and [facility] as directed., Disp: 1 each, Rfl: 0       Recent Results (from the past 336 hour(s))   CBC Auto Differential    Collection Time: 08/18/22 10:07 AM   Result Value Ref Range    WBC 5.93 3.90 - 12.70 K/uL    RBC 4.11 4.00 - 5.40 M/uL    Hemoglobin 11.0 (L) 12.0 - 16.0 g/dL    Hematocrit 34.3 (L) 37.0 - 48.5 %    MCV 84 82 -  98 fL    MCH 26.8 (L) 27.0 - 31.0 pg    MCHC 32.1 32.0 - 36.0 g/dL    RDW 14.0 11.5 - 14.5 %    Platelets 372 150 - 450 K/uL    MPV 9.3 9.2 - 12.9 fL    Immature Granulocytes 0.3 0.0 - 0.5 %    Gran # (ANC) 3.0 1.8 - 7.7 K/uL    Immature Grans (Abs) 0.02 0.00 - 0.04 K/uL    Lymph # 2.0 1.0 - 4.8 K/uL    Mono # 0.7 0.3 - 1.0 K/uL    Eos # 0.2 0.0 - 0.5 K/uL    Baso # 0.03 0.00 - 0.20 K/uL    nRBC 0 0 /100 WBC    Gran % 50.7 38.0 - 73.0 %    Lymph % 32.9 18.0 - 48.0 %    Mono % 12.1 4.0 - 15.0 %    Eosinophil % 3.5 0.0 - 8.0 %    Basophil % 0.5 0.0 - 1.9 %    Differential Method Automated    Comprehensive Metabolic Panel    Collection Time: 08/18/22 10:07 AM   Result Value Ref Range    Sodium 138 136 - 145 mmol/L    Potassium 3.7 3.5 - 5.1 mmol/L    Chloride 105 95 - 110 mmol/L    CO2 24 23 - 29 mmol/L    Glucose 97 70 - 110 mg/dL    BUN 12 6 - 20 mg/dL    Creatinine 1.0 0.5 - 1.4 mg/dL    Calcium 9.6 8.7 - 10.5 mg/dL    Total Protein 7.4 6.0 - 8.4 g/dL    Albumin 3.8 3.5 - 5.2 g/dL    Total Bilirubin 0.5 0.1 - 1.0 mg/dL    Alkaline Phosphatase 48 (L) 55 - 135 U/L    AST 21 10 - 40 U/L    ALT 20 10 - 44 U/L    Anion Gap 9 8 - 16 mmol/L    eGFR >60 >60 mL/min/1.73 m^2   C-Reactive Protein    Collection Time: 08/18/22 10:07 AM   Result Value Ref Range    CRP 14.0 (H) 0.0 - 8.2 mg/L   Sedimentation rate    Collection Time: 08/18/22 10:07 AM   Result Value Ref Range    Sed Rate 30 0 - 36 mm/Hr         Imaging:  All imaging reviewed and independently interpreted by me.    cxray 6/2021:   Lungs are clear without focal consolidation, edema, or mass.  There is no pneumothorax or pleural effusion.  Cardiomediastinal silhouette is within normal limits.  No acute osseous abnormality.     Echo 6.21.21:   · The left ventricle is normal in size with concentric remodeling and normal systolic function.  · The estimated ejection fraction is 60%.  · Normal left ventricular diastolic function.  · Normal right ventricular size with  normal right ventricular systolic function.  · Normal central venous pressure (3 mmHg).  · The estimated PA systolic pressure is 32 mmHg.     6mwt 6.11.21 Interpretation:   Total distance walked in six minutes is normal indicating normal   functional capacity.   Patient did not meet criteria for oxygen prescription. Clinical   correlation suggested.      pfts interpretation 6.11.21  The low FEV0.5/FEV1 ratio suggests central or upper airway obstruction may be present. Spirometry remains unimproved following bronchodilator. Lung volumes show mild restriction is present. Airway mechanics show airway resistance is increased. Specific   conductance remains normal. DLCO is mildly decreased. MVV is severely decreased.     Assessment:     1. Sjogren's syndrome with keratoconjunctivitis sicca    2. Lupus nephritis, ISN/RPS class V    3. Raynaud's disease without gangrene    4. High risk medication use    5. Immunosuppressed status    6. Neuropathy    7. Scleroderma        Overlap scl/sjogrens with SLE membranous nephritis class 5 on kidney biopsy, = no h/o clots, beta 2+ x 1 repeat neg,  +rp, sicca, gerd, rna polymerase neg, scl 70 +       Membranous Nephritis suggestive of sle nephritis 5: intolerant to cellcept now on imuran 150mg/d    Plan:       · Continue benlysta, HCQ and imuran at this time. Update disease activity markers w/ c3, c4, u/a, protein creat ratio, spep, cryoglobulins. Update PFT, 6 min walk test  · Weight loss and continued anti-inflammatory diet encouraged  · Schedule CXR  · Compromised immune system secondary to autoimmune disease and use of immunosuppressive drugs. Monitor carefully for infections and toxicities- Currently denies issues with recurrent infections. Advised to get immediate medical care if any infection.  · Recommend Yearly Skin Cancer Screening by Dermatology for all patients on biologic or Araseli. advised strict adherence to age appropriate vaccinations (shingles, pneumonia, flu and  covid) and cancer screenings with PCP   · Patient advised to hold DMARD and/or biologic therapy for signs of infection or for surgery. If you are unsure what to do please call our office for instruction.[facility] [phone]  · no current medication related issues, no evidence of toxicity. I ordered labs for toxicity monitoring;  results reviewed and discussed findings with the patient   · Return to clinic: 2 mos w/ safety labs    The patient understands, chooses and consents to this plan and accepts all the risks which include but are not limited to the risks mentioned above.     Method of contact with patient concerns: [facility]    60 minutes of total time spent on the encounter, which includes face to face time and non-face to face time preparing to see the patient (eg, review of tests), Obtaining and/or reviewing separately obtained history, Documenting clinical information in the electronic or other health record, Independently interpreting results (not separately reported) and communicating results to the patient/family/caregiver, or Care coordination (not separately reported).          Disclaimer: This note was prepared using a voice recognition system and is likely to have sound alike errors within the text.       Bridget Bhakta PA-C  Rheumatology Department   [facility] [facility]

## 2022-08-24 ENCOUNTER — HOSPITAL ENCOUNTER (OUTPATIENT)
Dept: RADIOLOGY | Facility: HOSPITAL | Age: 47
Discharge: HOME | End: 2022-08-24
Attending: PHYSICIAN ASSISTANT
Payer: COMMERCIAL

## 2022-08-24 DIAGNOSIS — M35.01 SJOGREN'S SYNDROME WITH KERATOCONJUNCTIVITIS SICCA: ICD-10-CM

## 2022-08-24 PROCEDURE — 71046 X-RAY EXAM CHEST 2 VIEWS: CPT | Mod: 26,,, | Performed by: RADIOLOGY

## 2022-08-24 PROCEDURE — 71046 XR CHEST PA AND LATERAL: ICD-10-PCS | Mod: 26,,, | Performed by: RADIOLOGY

## 2022-08-24 PROCEDURE — 71046 X-RAY EXAM CHEST 2 VIEWS: CPT | Mod: TC

## 2022-08-25 ENCOUNTER — TELEPHONE (OUTPATIENT)
Dept: ORTHOPEDICS | Facility: CLINIC | Age: 47
End: 2022-08-25
Payer: COMMERCIAL

## 2022-08-25 NOTE — TELEPHONE ENCOUNTER
Called pt to reschedule upcoming appointment with Kaya Fatima PA-C. She said she was fine pushing it back to a later date. She ok'd new appointment date and time.

## 2022-09-07 ENCOUNTER — OFFICE VISIT (OUTPATIENT)
Dept: CARDIOLOGY | Facility: CLINIC | Age: 47
End: 2022-09-07
Payer: COMMERCIAL

## 2022-09-07 VITALS
SYSTOLIC BLOOD PRESSURE: 130 MMHG | WEIGHT: 248.88 LBS | DIASTOLIC BLOOD PRESSURE: 78 MMHG | HEIGHT: 66 IN | BODY MASS INDEX: 40 KG/M2 | HEART RATE: 70 BPM | OXYGEN SATURATION: 99 %

## 2022-09-07 DIAGNOSIS — R94.39 ABNORMAL STRESS TEST: Primary | ICD-10-CM

## 2022-09-07 DIAGNOSIS — R07.9 CHEST PAIN, UNSPECIFIED TYPE: ICD-10-CM

## 2022-09-07 DIAGNOSIS — I10 ESSENTIAL HYPERTENSION: ICD-10-CM

## 2022-09-07 DIAGNOSIS — R06.03 RESPIRATORY DISTRESS: ICD-10-CM

## 2022-09-07 DIAGNOSIS — M32.14 LUPUS NEPHRITIS, ISN/RPS CLASS V: ICD-10-CM

## 2022-09-07 DIAGNOSIS — M35.01 SJOGREN'S SYNDROME WITH KERATOCONJUNCTIVITIS SICCA: ICD-10-CM

## 2022-09-07 DIAGNOSIS — U07.1 PNEUMONIA DUE TO COVID-19 VIRUS: ICD-10-CM

## 2022-09-07 DIAGNOSIS — J12.82 PNEUMONIA DUE TO COVID-19 VIRUS: ICD-10-CM

## 2022-09-07 DIAGNOSIS — A41.9 SEPSIS, DUE TO UNSPECIFIED ORGANISM, UNSPECIFIED WHETHER ACUTE ORGAN DYSFUNCTION PRESENT: ICD-10-CM

## 2022-09-07 DIAGNOSIS — R06.02 SOB (SHORTNESS OF BREATH): ICD-10-CM

## 2022-09-07 DIAGNOSIS — U07.1 COVID-19 VIRUS INFECTION: ICD-10-CM

## 2022-09-07 PROCEDURE — 3075F SYST BP GE 130 - 139MM HG: CPT | Mod: CPTII,S$GLB,, | Performed by: INTERNAL MEDICINE

## 2022-09-07 PROCEDURE — 3078F DIAST BP <80 MM HG: CPT | Mod: CPTII,S$GLB,, | Performed by: INTERNAL MEDICINE

## 2022-09-07 PROCEDURE — 1160F PR REVIEW ALL MEDS BY PRESCRIBER/CLIN PHARMACIST DOCUMENTED: ICD-10-PCS | Mod: CPTII,S$GLB,, | Performed by: INTERNAL MEDICINE

## 2022-09-07 PROCEDURE — 3008F BODY MASS INDEX DOCD: CPT | Mod: CPTII,S$GLB,, | Performed by: INTERNAL MEDICINE

## 2022-09-07 PROCEDURE — 3066F PR DOCUMENTATION OF TREATMENT FOR NEPHROPATHY: ICD-10-PCS | Mod: CPTII,S$GLB,, | Performed by: INTERNAL MEDICINE

## 2022-09-07 PROCEDURE — 3008F PR BODY MASS INDEX (BMI) DOCUMENTED: ICD-10-PCS | Mod: CPTII,S$GLB,, | Performed by: INTERNAL MEDICINE

## 2022-09-07 PROCEDURE — 1159F PR MEDICATION LIST DOCUMENTED IN MEDICAL RECORD: ICD-10-PCS | Mod: CPTII,S$GLB,, | Performed by: INTERNAL MEDICINE

## 2022-09-07 PROCEDURE — 99214 OFFICE O/P EST MOD 30 MIN: CPT | Mod: S$GLB,,, | Performed by: INTERNAL MEDICINE

## 2022-09-07 PROCEDURE — 1160F RVW MEDS BY RX/DR IN RCRD: CPT | Mod: CPTII,S$GLB,, | Performed by: INTERNAL MEDICINE

## 2022-09-07 PROCEDURE — 99214 PR OFFICE/OUTPT VISIT, EST, LEVL IV, 30-39 MIN: ICD-10-PCS | Mod: S$GLB,,, | Performed by: INTERNAL MEDICINE

## 2022-09-07 PROCEDURE — 3078F PR MOST RECENT DIASTOLIC BLOOD PRESSURE < 80 MM HG: ICD-10-PCS | Mod: CPTII,S$GLB,, | Performed by: INTERNAL MEDICINE

## 2022-09-07 PROCEDURE — 99999 PR PBB SHADOW E&M-EST. PATIENT-LVL V: CPT | Mod: PBBFAC,,, | Performed by: INTERNAL MEDICINE

## 2022-09-07 PROCEDURE — 1159F MED LIST DOCD IN RCRD: CPT | Mod: CPTII,S$GLB,, | Performed by: INTERNAL MEDICINE

## 2022-09-07 PROCEDURE — 99999 PR PBB SHADOW E&M-EST. PATIENT-LVL V: ICD-10-PCS | Mod: PBBFAC,,, | Performed by: INTERNAL MEDICINE

## 2022-09-07 PROCEDURE — 3075F PR MOST RECENT SYSTOLIC BLOOD PRESS GE 130-139MM HG: ICD-10-PCS | Mod: CPTII,S$GLB,, | Performed by: INTERNAL MEDICINE

## 2022-09-07 PROCEDURE — 3066F NEPHROPATHY DOC TX: CPT | Mod: CPTII,S$GLB,, | Performed by: INTERNAL MEDICINE

## 2022-09-07 NOTE — PROGRESS NOTES
Subjective:   Patient ID:  Zachary Lucia is a 46 y.o. female who presents for follow-up of No chief complaint on file.    Intermittent mild chest discomfort some of the typical and atypical.  Positive family history of coronary disease on her mother side.  Will go ahead with a stress test echo done recently was completely normal.  EKG showed nonspecific changes.     Overall patient is stable.  We would like to go ahead with a nuclear stress test as the regular stress test did show ST segment changes.  Patient has had no chest discomfort on this test.  Follow-up evaluation after nuclear stress test is performed.  Patient has a very high risk factor for family history of coronary disease.     The patient presents the office having intermittent chest discomfort.  Nuclear stress test showed inferior basal ischemia.  ST changes in inferior leads.  Patient continues intermittent symptoms.  Will go ahead with heart catheterization to rule out significant coronary disease.  Patient agreeable to this plan.     Clinically stable doing well this time no exertional symptoms chest pain shortness breath.  Overall feeling well.     Patient presents the office today clinically stable no exertional symptoms some mild chest discomfort but heart catheterization earlier in the year was normal.  Blood pressure slightly elevated on admission but now has resolved stable.  Patient doing well cholesterol profile is improved.      Review of Systems   Constitutional: Negative for chills, diaphoresis, night sweats, weight gain and weight loss.   HENT:  Negative for congestion, hoarse voice, sore throat and stridor.    Eyes:  Negative for double vision and pain.   Cardiovascular:  Negative for chest pain, claudication, cyanosis, dyspnea on exertion, irregular heartbeat, leg swelling, near-syncope, orthopnea, palpitations, paroxysmal nocturnal dyspnea and syncope.   Respiratory:  Negative for cough, hemoptysis, shortness of  breath, sleep disturbances due to breathing, snoring, sputum production and wheezing.    Endocrine: Negative for cold intolerance, heat intolerance and polydipsia.   Hematologic/Lymphatic: Negative for bleeding problem. Does not bruise/bleed easily.   Skin:  Negative for color change, dry skin and rash.   Musculoskeletal:  Negative for joint swelling and muscle cramps.   Gastrointestinal:  Negative for bloating, abdominal pain, constipation, diarrhea, dysphagia, melena, nausea and vomiting.   Genitourinary:  Negative for flank pain and urgency.   Neurological:  Negative for dizziness, focal weakness, headaches, light-headedness, loss of balance, seizures and weakness.   Psychiatric/Behavioral:  Negative for altered mental status and memory loss. The patient is not nervous/anxious.    Family History   Problem Relation Age of Onset    Arthritis Mother     Glaucoma Mother     Hypertension Mother     Diabetes Father     Diabetes Maternal Grandmother     Hypertension Maternal Grandmother     Arthritis Maternal Grandmother     Cataracts Maternal Grandmother     Diabetes Maternal Grandfather     Heart disease Maternal Grandfather     Hypertension Maternal Grandfather     Diabetes Paternal Grandmother     Heart disease Paternal Grandmother     Hypertension Paternal Grandmother     Heart disease Paternal Grandfather      Past Medical History:   Diagnosis Date    Abnormal stress test 1/25/2022    Asthma     Essential hypertension, malignant 1/8/2020    Lupus (systemic lupus erythematosus)     Membranous glomerulonephritis, 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 90 tablet 1    azaTHIOprine (IMURAN) 50 mg Tab TAKE 1 TABLET (50 MG TOTAL) BY MOUTH ONCE DAILY IN EVENING 90 tablet 1    belimumab (BENLYSTA) 200 mg/mL Syrg Inject 1 mL (200 mg total) into the skin every 7 days. 4 each 11    betamethasone dipropionate (DIPROLENE) 0.05 % ointment Apply topically 2 (two) times daily as needed. Steroid. Use for flares 45 g 3    cholecalciferol, vitamin D3, 1,250 mcg (50,000 unit) capsule Take 1 capsule (50,000 Units total) by mouth once a week. 15 capsule 1    crisaborole (EUCRISA) 2 % Oint AAA bid prn.  Non-steroid.  Safe to use long-term. 60 g 3    cyclobenzaprine (FLEXERIL) 10 MG tablet Take 1 tablet (10 mg total) by mouth 2 (two) times daily as needed for Muscle spasms. 60 tablet 0    ferrous sulfate (FEOSOL) 325 mg (65 mg iron) Tab tablet Take 1 tablet by mouth once daily.      fluticasone-salmeterol diskus inhaler 100-50 mcg Inhale 1 puff into the lungs 2 (two) times daily. Controller 1 each 11    HYDROcodone-acetaminophen (NORCO) 7.5-325 mg per tablet Take 1 tablet by mouth 4 (four) times daily as needed.      hydrOXYchloroQUINE (PLAQUENIL) 200 mg tablet Take 1 tablet (200 mg total) by mouth 2 (two) times daily. 180 tablet 3     mg tablet Take 800 mg by mouth every 8 (eight) hours as needed.  0    levetiracetam XR (KEPPRA XR) 500 mg Tb24 24 hr tablet Take 500 mg by mouth once daily.      predniSONE (DELTASONE) 10 MG tablet TAKE 1 TABLET BY MOUTH EVERY DAY 60 tablet 1    predniSONE (DELTASONE) 5 MG tablet Take 4 tablets (20 mg total) by mouth once daily for 4 days, THEN 3 tablets (15 mg total) once daily for 4 days, THEN 2 tablets (10 mg total) once daily for 4 days, THEN 1 tablet (5 mg total) once daily for 4 days. 40 tablet 0    PROAIR HFA 90 mcg/actuation inhaler INHALE 2 PUFFS INTO THE LUNGS EVERY 6 (SIX) HOURS AS  NEEDED FOR WHEEZING. RESCUE 8.5 Inhaler 0    pulse oximeter (PULSE OXIMETER) device Use by Apply Externally route 2 (two) times a day. Use twice daily at 8 AM and 3 PM and record the value in [employer] as directed. 1 each 0     No current facility-administered medications on file prior to visit.     Review of patient's allergies indicates:   Allergen Reactions    Lisinopril      cough    Sulfa (sulfonamide antibiotics) Swelling       Objective:     Physical Exam  Eyes:      Pupils: Pupils are equal, round, and reactive to light.   Neck:      Trachea: No tracheal deviation.   Cardiovascular:      Rate and Rhythm: Normal rate and regular rhythm.      Pulses: Intact distal pulses.           Carotid pulses are 2+ on the right side and 2+ on the left side.       Radial pulses are 2+ on the right side and 2+ on the left side.        Femoral pulses are 2+ on the right side and 2+ on the left side.       Popliteal pulses are 2+ on the right side and 2+ on the left side.        Dorsalis pedis pulses are 2+ on the right side and 2+ on the left side.        Posterior tibial pulses are 2+ on the right side and 2+ on the left side.      Heart sounds: Normal heart sounds. No murmur heard.    No friction rub. No gallop.   Pulmonary:      Effort: Pulmonary effort is normal. No respiratory distress.      Breath sounds: Normal breath sounds. No stridor. No wheezing or rales.   Chest:      Chest wall: No tenderness.   Abdominal:      General: There is no distension.      Tenderness: There is no abdominal tenderness. There is no rebound.   Musculoskeletal:         General: No tenderness.      Cervical back: Normal range of motion.   Skin:     General: Skin is warm and dry.   Neurological:      Mental Status: She is alert and oriented to person, place, and time.     Cardiac catheterization report 1/12/22  The pre-procedure left ventricular end diastolic pressure was 20.  The post-procedure left ventricular end diastolic pressure was  20.  The ejection fraction was calculated to be 60%.  There was no aortic valve stenosis.  There was no mitral valve stenosis.  There was no mitral valve prolapse evident. The annulus was normal. There was normal mitral valve motion.  The estimated blood loss was none.  The coronary arteries were normal..  Tortuous coronary arteries.    Component Ref Range & Units 9 mo ago   (11/29/21) 2 yr ago   (6/9/20) 3 yr ago   (5/30/19) 3 yr ago   (3/11/19)   Cholesterol 120 - 199 mg/dL 180  176 R  192 R  179 CM    Comment: The National Cholesterol Education Program (NCEP) has set the   following guidelines (reference ranges) for Cholesterol:   Optimal.....................<200 mg/dL   Borderline High.............200-239 mg/dL   High........................> or = 240 mg/dL    Triglycerides 30 - 150 mg/dL 101  71 R  85 R  79 CM    Comment: The National Cholesterol Education Program (NCEP) has set the   following guidelines (reference values) for triglycerides:   Normal......................<150 mg/dL   Borderline High.............150-199 mg/dL   High........................200-499 mg/dL    HDL 40 - 75 mg/dL 53  59 R  54 R  53 CM    Comment: The National Cholesterol Education Program (NCEP) has set the   following guidelines (reference values) for HDL Cholesterol:   Low...............<40 mg/dL   Optimal...........>60 mg/dL    LDL Cholesterol 63.0 - 159.0 mg/dL 106.8    110.2 CM    Comment: The National Cholesterol Education Program (NCEP) has set the   following guidelines (reference values) for LDL Cholesterol:   Optimal.......................<130 mg/dL   Borderline High...............130-159 mg/dL   High..........................160-189 mg/dL   Very High.....................>190 mg/dL    HDL/Cholesterol Ratio 20.0 - 50.0 % 29.4    29.6    Total Cholesterol/HDL Ratio 2.0 - 5.0 3.4    3.4    Non-HDL Cholesterol mg/dL 127    126 CM    Comment: Risk category and Non-HDL cholesterol goals:   Coronary heart disease (CHD)or equivalent  (10-year risk of CHD >20%):   Non-HDL cholesterol goal     <130 mg/dL   Two or more CHD risk factors and 10-year risk of CHD <= 20%:   Non-HDL cholesterol goal     <160 mg/dL   0 to 1 CHD risk factor:   Non-HDL cholesterol goal     <190 mg/dL    Resulting Agency  [facility]              Specimen Collected: 11/29/21 10:20 Last Resulted: 11/29/21 21:19       Lab Flowsheet    Order Details    View Encounter    Lab and Collection Details    Routing    Result History     View Encounter Conversation        CM=Additional comments  R=Reference range differs from displayed range        Result Care Coordination    Patient Communication  Append Comments  Seen Back to Top    Normal lipids.   Written by Danna Camara DO on 11/30/2021  8:29 PM CST  Seen by patient Zachary Celina Escalona Khari on 12/22/2021  9:43 AM       Satisfied Health Maintenance Topics       Back to Top  Lipid Panel (Every 5 Years)  Next due on 11/29/2026  Address Topic           Assessment:     1. Abnormal stress test    2. Essential hypertension    3. Sjogren's syndrome with keratoconjunctivitis sicca    4. Lupus nephritis, ISN/RPS class V    5. Respiratory distress    6. Pneumonia due to COVID-19 virus    7. SOB (shortness of breath)    8. COVID-19 virus infection    9. Chest pain, unspecified type    10. Sepsis, due to unspecified organism, unspecified whether acute organ dysfunction present        Plan:     Abnormal stress test    Essential hypertension    Sjogren's syndrome with keratoconjunctivitis sicca    Lupus nephritis, ISN/RPS class V    Respiratory distress    Pneumonia due to COVID-19 virus    SOB (shortness of breath)    COVID-19 virus infection    Chest pain, unspecified type    Sepsis, due to unspecified organism, unspecified whether acute organ dysfunction present        Impression 1 chest pain mostly resolved will continue follow-up.  Heart catheterization showed no evidence of cardiac disease   2. Respiratory distress  improving  3. Hypertension under better control and patient is trying to lose weight no medications at this time will follow  4. Hyperlipidemia stable improving will continue to follow this point low risk patient doing well this time follow-up evaluation 6 months.  Hopefully the patient will lose weight and exercise more.

## 2022-09-16 ENCOUNTER — TELEPHONE (OUTPATIENT)
Dept: RHEUMATOLOGY | Facility: CLINIC | Age: 47
End: 2022-09-16
Payer: COMMERCIAL

## 2022-09-16 NOTE — TELEPHONE ENCOUNTER
Spoke w/ patient, informed patient that I needed to r/s their appt due to the provider being out of office. Patient agreed and their appt is r/s. Patient verbalized understanding and was grateful for the call-DD

## 2022-09-19 ENCOUNTER — OFFICE VISIT (OUTPATIENT)
Dept: ORTHOPEDICS | Facility: CLINIC | Age: 47
End: 2022-09-19
Payer: COMMERCIAL

## 2022-09-19 DIAGNOSIS — M17.0 BILATERAL PRIMARY OSTEOARTHRITIS OF KNEE: Primary | ICD-10-CM

## 2022-09-19 PROCEDURE — 99999 PR PBB SHADOW E&M-EST. PATIENT-LVL III: CPT | Mod: PBBFAC,,, | Performed by: PHYSICIAN ASSISTANT

## 2022-09-19 PROCEDURE — 3066F PR DOCUMENTATION OF TREATMENT FOR NEPHROPATHY: ICD-10-PCS | Mod: CPTII,S$GLB,, | Performed by: PHYSICIAN ASSISTANT

## 2022-09-19 PROCEDURE — 1160F RVW MEDS BY RX/DR IN RCRD: CPT | Mod: CPTII,S$GLB,, | Performed by: PHYSICIAN ASSISTANT

## 2022-09-19 PROCEDURE — 3066F NEPHROPATHY DOC TX: CPT | Mod: CPTII,S$GLB,, | Performed by: PHYSICIAN ASSISTANT

## 2022-09-19 PROCEDURE — 1159F PR MEDICATION LIST DOCUMENTED IN MEDICAL RECORD: ICD-10-PCS | Mod: CPTII,S$GLB,, | Performed by: PHYSICIAN ASSISTANT

## 2022-09-19 PROCEDURE — 99213 OFFICE O/P EST LOW 20 MIN: CPT | Mod: S$GLB,,, | Performed by: PHYSICIAN ASSISTANT

## 2022-09-19 PROCEDURE — 99213 PR OFFICE/OUTPT VISIT, EST, LEVL III, 20-29 MIN: ICD-10-PCS | Mod: S$GLB,,, | Performed by: PHYSICIAN ASSISTANT

## 2022-09-19 PROCEDURE — 1159F MED LIST DOCD IN RCRD: CPT | Mod: CPTII,S$GLB,, | Performed by: PHYSICIAN ASSISTANT

## 2022-09-19 PROCEDURE — 99999 PR PBB SHADOW E&M-EST. PATIENT-LVL III: ICD-10-PCS | Mod: PBBFAC,,, | Performed by: PHYSICIAN ASSISTANT

## 2022-09-19 PROCEDURE — 1160F PR REVIEW ALL MEDS BY PRESCRIBER/CLIN PHARMACIST DOCUMENTED: ICD-10-PCS | Mod: CPTII,S$GLB,, | Performed by: PHYSICIAN ASSISTANT

## 2022-09-19 NOTE — PROGRESS NOTES
Patient ID: Zachary Lucia  YOB: 1975  MRN: [id number]    Chief Complaint: Pain of the Right Knee and Pain of the Left Knee      History of Present Illness: Zachary uLcia is a  46 y.o. female   Technical support @ [facility] with a chief complaint of Pain of the Right Knee and Pain of the Left Knee  Presents today for bilateral knee pain, has previously had euflexxa injections in the past on the the right knee with improvement but has noticed increase pain on the past few months for bilateral knee pain. States that she continues to do conservative treatment with some improvement. Denies any fevers, chills, night sweats, numbness and tingling.     HPI    Past Medical History:   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    Scleroderma     Seizures 01/12/2017     Past Surgical History:   Procedure Laterality Date    LEFT HEART CATHETERIZATION Left 1/25/2022    Procedure: CATHETERIZATION, HEART, LEFT;  Surgeon: Rocío Davidson MD;  Location: [facility] CATH LAB;  Service: Cardiology;  Laterality: Left;    RENAL BIOPSY  11/16/2018     Family History   Problem Relation Age of Onset    Arthritis Mother     Glaucoma Mother     Hypertension Mother     Diabetes Father     Diabetes Maternal Grandmother     Hypertension Maternal Grandmother     Arthritis Maternal Grandmother     Cataracts Maternal Grandmother     Diabetes Maternal Grandfather     Heart disease Maternal Grandfather     Hypertension Maternal Grandfather     Diabetes Paternal Grandmother     Heart disease Paternal Grandmother     Hypertension Paternal Grandmother     Heart disease Paternal Grandfather     Cancer Maternal Uncle      Social History     Socioeconomic History    Marital status:      Spouse name: Darrell Alves  children: 0   Tobacco Use    Smoking status: Never    Smokeless tobacco: Never   Substance and Sexual Activity    Alcohol use: Yes    Drug use: No    Sexual activity: Yes     Medication List with Changes/Refills   Current Medications    AZATHIOPRINE (IMURAN) 100 MG TABLET    Take 1 tablet (100 mg total) by mouth once daily.    AZATHIOPRINE (IMURAN) 50 MG TAB    TAKE 1 TABLET (50 MG TOTAL) BY MOUTH ONCE DAILY IN EVENING    BELIMUMAB (BENLYSTA) 200 MG/ML SYRG    Inject 1 mL (200 mg total) into the skin every 7 days.    BETAMETHASONE DIPROPIONATE (DIPROLENE) 0.05 % OINTMENT    Apply topically 2 (two) times daily as needed. Steroid. Use for flares    CHOLECALCIFEROL, VITAMIN D3, 1,250 MCG (50,000 UNIT) CAPSULE    Take 1 capsule (50,000 Units total) by mouth once a week.    CRISABOROLE (EUCRISA) 2 % OINT    AAA bid prn.  Non-steroid.  Safe to use long-term.    CYCLOBENZAPRINE (FLEXERIL) 10 MG TABLET    Take 1 tablet (10 mg total) by mouth 2 (two) times daily as needed for Muscle spasms.    FERROUS SULFATE (FEOSOL) 325 MG (65 MG IRON) TAB TABLET    Take 1 tablet by mouth once daily.    FLUTICASONE-SALMETEROL DISKUS INHALER 100-50 MCG    Inhale 1 puff into the lungs 2 (two) times daily. Controller    HYDROCODONE-ACETAMINOPHEN (NORCO) 7.5-325 MG PER TABLET    Take 1 tablet by mouth 4 (four) times daily as needed.    HYDROXYCHLOROQUINE (PLAQUENIL) 200 MG TABLET    Take 1 tablet (200 mg total) by mouth 2 (two) times daily.     MG TABLET    Take 800 mg by mouth every 8 (eight) hours as needed.    LEVETIRACETAM XR (KEPPRA XR) 500 MG TB24 24 HR TABLET    Take 500 mg by mouth once daily.    PREDNISONE (DELTASONE) 10 MG TABLET    TAKE 1 TABLET BY MOUTH EVERY DAY    PROAIR HFA 90 MCG/ACTUATION INHALER    INHALE 2 PUFFS INTO THE LUNGS EVERY 6 (SIX) HOURS AS NEEDED FOR WHEEZING. RESCUE    PULSE OXIMETER (PULSE OXIMETER) DEVICE    Use by Apply Externally route 2 (two) times a day. Use twice daily at 8 AM and 3 PM and record the  value in MyChart as directed.     Review of patient's allergies indicates:   Allergen Reactions    Lisinopril      cough    Sulfa (sulfonamide antibiotics) Swelling     Review of Systems   Constitutional: Negative for chills and fever.   HENT:  Negative for sore throat.    Eyes:  Negative for pain.   Cardiovascular:  Negative for chest pain and leg swelling.   Respiratory:  Negative for cough and shortness of breath.    Skin:  Negative for itching and rash.   Musculoskeletal:  Positive for joint pain.   Gastrointestinal:  Negative for abdominal pain, nausea and vomiting.   Genitourinary:  Negative for dysuria.   Neurological:  Negative for dizziness, numbness and paresthesias.     Physical Exam:   There is no height or weight on file to calculate BMI.  There were no vitals filed for this visit.   GENERAL: Well appearing, appropriate for stated age, no acute distress.  CARDIOVASCULAR: Pulses regular by peripheral palpation.  PULMONARY: Respirations are even and non-labored.  NEURO: Awake, alert, and oriented x 3.  PSYCH: Mood & affect are appropriate.  HEENT: Head is normocephalic and atraumatic.  General    Nursing note and vitals reviewed.          Right Knee Exam   Right knee exam is normal.    Inspection   Effusion: absent    Tenderness   The patient is experiencing no tenderness and medial joint line.   The patient is tender to palpation of the no tenderness and medial joint line.    Crepitus   The patient has crepitus of the patella.    Range of Motion   Extension:  0   Flexion:  120     Tests   Ligament Examination   Lachman: normal (-1 to 2mm)   PCL-Posterior Drawer: normal (0 to 2mm)     MCL - Valgus: normal (0 to 2mm)  LCL - Varus: normal    Other   Sensation: normal    Left Knee Exam   Left knee exam is normal.    Inspection   Effusion: absent    Tenderness   The patient is experiencing no tenderness and medial joint line.   The patient tender to palpation of the no tenderness and medial joint  line.    Crepitus   The patient has crepitus of the patella.    Range of Motion   Extension:  0   Flexion:  120     Tests   Stability   Lachman: normal (-1 to 2mm)   PCL-Posterior Drawer: normal (0 to 2mm)  MCL - Valgus: normal (0 to 2mm)  LCL - Varus: normal (0 to 2mm)    Other   Sensation: normal    Muscle Strength   Right Lower Extremity   Hip Abduction: 5/5   Quadriceps:  4/5   Hamstrin/5   Left Lower Extremity   Hip Abduction: 5/5   Quadriceps:  4/5   Hamstrin/5     Vascular Exam     Right Pulses  Dorsalis Pedis:      2+  Posterior Tibial:      2+        Left Pulses  Dorsalis Pedis:      2+  Posterior Tibial:      2+      All compartments are soft and compressible. Calf soft non-tender. Intact EHL, FHL, gastroc soleus, and tibialis anterior. Sensation intact to light touch in superficial peroneal, deep peroneal, tibial, sural, and saphenous nerve distributions. Foot warm and well perfused with capillary refill of less than 2 seconds and palpable pedal pulses.       Imaging:    X-Ray Chest PA And Lateral  Narrative: EXAMINATION:  XR CHEST PA AND LATERAL    CLINICAL HISTORY:  Sjogren syndrome with keratoconjunctivitis    TECHNIQUE:  PA and lateral views of the chest were performed.    COMPARISON:  Prior radiographs    FINDINGS:  Cardiac silhouette and mediastinal contours are normal.  Lungs are clear.  Osseous structures are intact.  Impression: No acute cardiopulmonary process.    Electronically signed by: Oli Pena MD  Date:    2022  Time:    07:46      Relevant imaging results reviewed and interpreted by me, discussed with the patient and / or family today.     Other Tests:     Patient Instructions   Assessment:  Zachary Lucia is a  46 y.o. female   Technical support @ [facility] with a chief complaint of Pain of the Right Knee and Pain of the Left Knee  Presents today for a recheck on bilateral knee pain, has had previous injections in the past with euflexxa that she  completed on 3/9/22.   Bilateral knee OA    Encounter Diagnosis   Name Primary?    Bilateral primary osteoarthritis of knee Yes      Plan:  Bilateral visco injections with Fatemeh, prefers early am 730  Continue conservative treatment       Follow-up: 4 weeks with Fatemeh Bilateral visco or sooner if there are any problems between now and then.    Leave Review:   Google: Leave Google Review  Healthgrades: Leave Healthgrades Review    After Hours Number: [phone]      Provider Note/Medical Decision Making:   MEDICAL NECESSITY FOR VISCOSUPPLEMENTATION: After thorough evaluation of the patient, I have determined that visco-supplementation is medically necessary. The patient has painful DJD of the knee with failure of conservative therapy including lifestyle modifications and rehabilitation exercises. Oral analgesis/NSAIDs have not adequately controlled symptoms and there is radiographic evidence of joint space narrowing, subchondral sclerosis, and some early osteophytic changes, or in lack of radiographic evidence, there is arthroscopic or other evidence of chondrosis.  Kellgren- Jeff grade 2 or greater.     I discussed worrisome and red flag signs and symptoms with the patient. The patient expressed understanding and agreed to alert me immediately or to go to the emergency room if they experience any of these.   Treatment plan was developed with input from the patient/family, and they expressed understanding and agreement with the plan. All questions were answered today.        Disclaimer: This note was prepared using a voice recognition system and is likely to have sound alike errors within the text.

## 2022-09-19 NOTE — PATIENT INSTRUCTIONS
Assessment:  Zachary Celina Lucia is a  46 y.o. female   Technical support @ [facility] with a chief complaint of Pain of the Right Knee and Pain of the Left Knee  Presents today for a recheck on bilateral knee pain, has had previous injections in the past with euflexxa that she completed on 3/9/22.   Bilateral knee OA    Encounter Diagnosis   Name Primary?    Bilateral primary osteoarthritis of knee Yes      Plan:  Bilateral visco injections with Fatemeh, prefers early am 730  Continue conservative treatment     Follow-up: 4 weeks with Fatemeh Bilateral visco or sooner if there are any problems between now and then.    Leave Review:   Google: Leave Google Review  Healthgrades: Leave Healthgrades Review    After Hours Number: [phone]

## 2022-10-11 ENCOUNTER — OFFICE VISIT (OUTPATIENT)
Dept: ORTHOPEDICS | Facility: CLINIC | Age: 47
End: 2022-10-11
Payer: COMMERCIAL

## 2022-10-11 VITALS — HEIGHT: 66 IN | WEIGHT: 248 LBS | BODY MASS INDEX: 39.86 KG/M2

## 2022-10-11 DIAGNOSIS — M17.12 PRIMARY OSTEOARTHRITIS OF LEFT KNEE: Primary | ICD-10-CM

## 2022-10-11 DIAGNOSIS — M17.11 PRIMARY OSTEOARTHRITIS OF RIGHT KNEE: ICD-10-CM

## 2022-10-11 PROCEDURE — 99499 NO LOS: ICD-10-PCS | Mod: S$GLB,,, | Performed by: PHYSICIAN ASSISTANT

## 2022-10-11 PROCEDURE — 99499 UNLISTED E&M SERVICE: CPT | Mod: S$GLB,,, | Performed by: PHYSICIAN ASSISTANT

## 2022-10-11 PROCEDURE — 3066F NEPHROPATHY DOC TX: CPT | Mod: CPTII,S$GLB,, | Performed by: PHYSICIAN ASSISTANT

## 2022-10-11 PROCEDURE — 99999 PR PBB SHADOW E&M-EST. PATIENT-LVL III: ICD-10-PCS | Mod: PBBFAC,,, | Performed by: PHYSICIAN ASSISTANT

## 2022-10-11 PROCEDURE — 3008F BODY MASS INDEX DOCD: CPT | Mod: CPTII,S$GLB,, | Performed by: PHYSICIAN ASSISTANT

## 2022-10-11 PROCEDURE — 20610 LARGE JOINT ASPIRATION/INJECTION: BILATERAL KNEE: ICD-10-PCS | Mod: 50,S$GLB,, | Performed by: PHYSICIAN ASSISTANT

## 2022-10-11 PROCEDURE — 3066F PR DOCUMENTATION OF TREATMENT FOR NEPHROPATHY: ICD-10-PCS | Mod: CPTII,S$GLB,, | Performed by: PHYSICIAN ASSISTANT

## 2022-10-11 PROCEDURE — 1159F PR MEDICATION LIST DOCUMENTED IN MEDICAL RECORD: ICD-10-PCS | Mod: CPTII,S$GLB,, | Performed by: PHYSICIAN ASSISTANT

## 2022-10-11 PROCEDURE — 20610 DRAIN/INJ JOINT/BURSA W/O US: CPT | Mod: 50,S$GLB,, | Performed by: PHYSICIAN ASSISTANT

## 2022-10-11 PROCEDURE — 1159F MED LIST DOCD IN RCRD: CPT | Mod: CPTII,S$GLB,, | Performed by: PHYSICIAN ASSISTANT

## 2022-10-11 PROCEDURE — 99999 PR PBB SHADOW E&M-EST. PATIENT-LVL III: CPT | Mod: PBBFAC,,, | Performed by: PHYSICIAN ASSISTANT

## 2022-10-11 PROCEDURE — 3008F PR BODY MASS INDEX (BMI) DOCUMENTED: ICD-10-PCS | Mod: CPTII,S$GLB,, | Performed by: PHYSICIAN ASSISTANT

## 2022-10-11 PROCEDURE — 1160F PR REVIEW ALL MEDS BY PRESCRIBER/CLIN PHARMACIST DOCUMENTED: ICD-10-PCS | Mod: CPTII,S$GLB,, | Performed by: PHYSICIAN ASSISTANT

## 2022-10-11 PROCEDURE — 1160F RVW MEDS BY RX/DR IN RCRD: CPT | Mod: CPTII,S$GLB,, | Performed by: PHYSICIAN ASSISTANT

## 2022-10-11 NOTE — PROCEDURES
Large Joint Aspiration/Injection: bilateral knee    Date/Time: 10/11/2022 7:30 AM  Performed by: Fatemeh Calderon PA-C  Authorized by: Fatemeh Calderon PA-C     Consent Done?:  Yes (Verbal)  Indications:  Joint swelling and pain  Site marked: the procedure site was marked    Timeout: prior to procedure the correct patient, procedure, and site was verified    Prep: patient was prepped and draped in usual sterile fashion      Local anesthesia used?: Yes    Local anesthetic:  Topical anesthetic    Details:  Needle Size:  22 G  Ultrasonic Guidance for needle placement?: No    Approach:  Anterolateral  Location:  Knee  Laterality:  Bilateral  Site:  Bilateral knee  Medications (Right):  20 mg sodium hyaluronate (EUFLEXXA) 10 mg/mL(mw 2.4 -3.6 million)  Medications (Left):  20 mg sodium hyaluronate (EUFLEXXA) 10 mg/mL(mw 2.4 -3.6 million)  Patient tolerance:  Patient tolerated the procedure well with no immediate complications    Bilateral Euflexxa 1/3

## 2022-10-18 ENCOUNTER — OFFICE VISIT (OUTPATIENT)
Dept: ORTHOPEDICS | Facility: CLINIC | Age: 47
End: 2022-10-18
Payer: COMMERCIAL

## 2022-10-18 VITALS — BODY MASS INDEX: 39.86 KG/M2 | WEIGHT: 248 LBS | HEIGHT: 66 IN

## 2022-10-18 DIAGNOSIS — M17.0 BILATERAL PRIMARY OSTEOARTHRITIS OF KNEE: Primary | ICD-10-CM

## 2022-10-18 PROCEDURE — 20610 LARGE JOINT ASPIRATION/INJECTION: BILATERAL KNEE: ICD-10-PCS | Mod: 50,S$GLB,, | Performed by: PHYSICIAN ASSISTANT

## 2022-10-18 PROCEDURE — 99999 PR PBB SHADOW E&M-EST. PATIENT-LVL III: CPT | Mod: PBBFAC,,, | Performed by: PHYSICIAN ASSISTANT

## 2022-10-18 PROCEDURE — 20610 DRAIN/INJ JOINT/BURSA W/O US: CPT | Mod: 50,S$GLB,, | Performed by: PHYSICIAN ASSISTANT

## 2022-10-18 PROCEDURE — 1159F MED LIST DOCD IN RCRD: CPT | Mod: CPTII,S$GLB,, | Performed by: PHYSICIAN ASSISTANT

## 2022-10-18 PROCEDURE — 99999 PR PBB SHADOW E&M-EST. PATIENT-LVL III: ICD-10-PCS | Mod: PBBFAC,,, | Performed by: PHYSICIAN ASSISTANT

## 2022-10-18 PROCEDURE — 3066F NEPHROPATHY DOC TX: CPT | Mod: CPTII,S$GLB,, | Performed by: PHYSICIAN ASSISTANT

## 2022-10-18 PROCEDURE — 3066F PR DOCUMENTATION OF TREATMENT FOR NEPHROPATHY: ICD-10-PCS | Mod: CPTII,S$GLB,, | Performed by: PHYSICIAN ASSISTANT

## 2022-10-18 PROCEDURE — 99499 UNLISTED E&M SERVICE: CPT | Mod: S$GLB,,, | Performed by: PHYSICIAN ASSISTANT

## 2022-10-18 PROCEDURE — 1160F RVW MEDS BY RX/DR IN RCRD: CPT | Mod: CPTII,S$GLB,, | Performed by: PHYSICIAN ASSISTANT

## 2022-10-18 PROCEDURE — 99499 NO LOS: ICD-10-PCS | Mod: S$GLB,,, | Performed by: PHYSICIAN ASSISTANT

## 2022-10-18 PROCEDURE — 1160F PR REVIEW ALL MEDS BY PRESCRIBER/CLIN PHARMACIST DOCUMENTED: ICD-10-PCS | Mod: CPTII,S$GLB,, | Performed by: PHYSICIAN ASSISTANT

## 2022-10-18 PROCEDURE — 1159F PR MEDICATION LIST DOCUMENTED IN MEDICAL RECORD: ICD-10-PCS | Mod: CPTII,S$GLB,, | Performed by: PHYSICIAN ASSISTANT

## 2022-10-18 NOTE — PROCEDURES
Large Joint Aspiration/Injection: bilateral knee    Date/Time: 10/18/2022 7:30 AM  Performed by: Fatemeh Calderon PA-C  Authorized by: Fatemeh Calderon PA-C     Consent Done?:  Yes (Verbal)  Indications:  Joint swelling and pain  Site marked: the procedure site was marked    Timeout: prior to procedure the correct patient, procedure, and site was verified    Prep: patient was prepped and draped in usual sterile fashion      Local anesthesia used?: Yes    Local anesthetic:  Topical anesthetic    Details:  Needle Size:  22 G  Ultrasonic Guidance for needle placement?: No    Approach:  Anterolateral  Location:  Knee  Laterality:  Bilateral  Site:  Bilateral knee  Medications (Right):  20 mg sodium hyaluronate (EUFLEXXA) 10 mg/mL(mw 2.4 -3.6 million)  Medications (Left):  20 mg sodium hyaluronate (EUFLEXXA) 10 mg/mL(mw 2.4 -3.6 million)  Patient tolerance:  Patient tolerated the procedure well with no immediate complications      Euflexxa bilaterally 2/3

## 2022-10-20 ENCOUNTER — PATIENT MESSAGE (OUTPATIENT)
Dept: ADMINISTRATIVE | Facility: HOSPITAL | Age: 47
End: 2022-10-20
Payer: COMMERCIAL

## 2022-10-25 ENCOUNTER — PATIENT OUTREACH (OUTPATIENT)
Dept: ADMINISTRATIVE | Facility: HOSPITAL | Age: 47
End: 2022-10-25
Payer: COMMERCIAL

## 2022-10-25 ENCOUNTER — OFFICE VISIT (OUTPATIENT)
Dept: ORTHOPEDICS | Facility: CLINIC | Age: 47
End: 2022-10-25
Payer: COMMERCIAL

## 2022-10-25 DIAGNOSIS — M17.0 BILATERAL PRIMARY OSTEOARTHRITIS OF KNEE: Primary | ICD-10-CM

## 2022-10-25 PROCEDURE — 99999 PR PBB SHADOW E&M-EST. PATIENT-LVL III: CPT | Mod: PBBFAC,,, | Performed by: PHYSICIAN ASSISTANT

## 2022-10-25 PROCEDURE — 20610 LARGE JOINT ASPIRATION/INJECTION: BILATERAL KNEE: ICD-10-PCS | Mod: 50,S$GLB,, | Performed by: PHYSICIAN ASSISTANT

## 2022-10-25 PROCEDURE — 1159F PR MEDICATION LIST DOCUMENTED IN MEDICAL RECORD: ICD-10-PCS | Mod: CPTII,S$GLB,, | Performed by: PHYSICIAN ASSISTANT

## 2022-10-25 PROCEDURE — 99999 PR PBB SHADOW E&M-EST. PATIENT-LVL III: ICD-10-PCS | Mod: PBBFAC,,, | Performed by: PHYSICIAN ASSISTANT

## 2022-10-25 PROCEDURE — 1160F PR REVIEW ALL MEDS BY PRESCRIBER/CLIN PHARMACIST DOCUMENTED: ICD-10-PCS | Mod: CPTII,S$GLB,, | Performed by: PHYSICIAN ASSISTANT

## 2022-10-25 PROCEDURE — 3066F NEPHROPATHY DOC TX: CPT | Mod: CPTII,S$GLB,, | Performed by: PHYSICIAN ASSISTANT

## 2022-10-25 PROCEDURE — 99499 NO LOS: ICD-10-PCS | Mod: S$GLB,,, | Performed by: PHYSICIAN ASSISTANT

## 2022-10-25 PROCEDURE — 1160F RVW MEDS BY RX/DR IN RCRD: CPT | Mod: CPTII,S$GLB,, | Performed by: PHYSICIAN ASSISTANT

## 2022-10-25 PROCEDURE — 1159F MED LIST DOCD IN RCRD: CPT | Mod: CPTII,S$GLB,, | Performed by: PHYSICIAN ASSISTANT

## 2022-10-25 PROCEDURE — 3066F PR DOCUMENTATION OF TREATMENT FOR NEPHROPATHY: ICD-10-PCS | Mod: CPTII,S$GLB,, | Performed by: PHYSICIAN ASSISTANT

## 2022-10-25 PROCEDURE — 20610 DRAIN/INJ JOINT/BURSA W/O US: CPT | Mod: 50,S$GLB,, | Performed by: PHYSICIAN ASSISTANT

## 2022-10-25 PROCEDURE — 99499 UNLISTED E&M SERVICE: CPT | Mod: S$GLB,,, | Performed by: PHYSICIAN ASSISTANT

## 2022-10-25 NOTE — PROCEDURES
Large Joint Aspiration/Injection: bilateral knee    Date/Time: 10/25/2022 7:30 AM  Performed by: Fatemeh Calderon PA-C  Authorized by: Fatemeh Calderon PA-C     Consent Done?:  Yes (Verbal)  Indications:  Joint swelling and pain  Site marked: the procedure site was marked    Timeout: prior to procedure the correct patient, procedure, and site was verified    Prep: patient was prepped and draped in usual sterile fashion      Local anesthesia used?: Yes    Local anesthetic:  Topical anesthetic    Details:  Needle Size:  22 G  Ultrasonic Guidance for needle placement?: No    Approach:  Anterolateral  Location:  Knee  Laterality:  Bilateral  Site:  Bilateral knee  Medications (Right):  20 mg sodium hyaluronate (EUFLEXXA) 10 mg/mL(mw 2.4 -3.6 million)  Medications (Left):  20 mg sodium hyaluronate (EUFLEXXA) 10 mg/mL(mw 2.4 -3.6 million)  Patient tolerance:  Patient tolerated the procedure well with no immediate complications    Bilateral Euflexxa 3/3

## 2022-11-08 NOTE — PROGRESS NOTES
"     RHEUMATOLOGY OUTPATIENT CLINIC NOTE    Attending Rheumatologist: Bridget Bhakta PA-C  Primary Care Provider: Danna Camara DO   Chief Complaint/Reason For Consultation:  Sjogren's syndrome      Subjective:        Zachary Lucia is a 47 y.o. female here today for follow up sjogrens/scl overlap. She has +AMAURY SSA and SSB. SCL 70+. +raynauds, dry mouth, mild dry eyes +gerd. raynauds stable. benlysta seems to be helping overall with her joint flares.  Estimates she is flaring 1-2 times per week. Uses prednisone for flares- typically takes at 20-30 mg as a single dose for a flare Also has trouble with her knees mel- seeing ortho for this. Has had IAC and HA injections. Still having CTS symptoms. Trying to work on weight loss. Takes aleve intermittently- doesn't do regularly, doesnt want to be a "pill head"     CT 2018 chest wnl no ILD, fatty liver noted. Echo wnl 2018.      In the past she was seen by Nephrology for microscopic hematuria, She had a kidney biopsy done which was suggestive of membranous nephropathy/membranous lupus nephritis/class 5. Taken off lisinopril because of cough.     Serologies  Lab Results   Component Value Date    TBGOLDPLUS Negative 02/17/2022     Lab Results   Component Value Date    AMAURY Positive (A) 11/29/2017     Lab Results   Component Value Date    RF <10.0 11/29/2017     Lab Results   Component Value Date    HEPAIGM Negative 02/17/2022    HEPBIGM Negative 02/17/2022    HEPCAB Negative 02/17/2022       Latest Reference Range & Units 05/15/19 10:22   AMAURY Screen Negative <1:160  Positive !   AMAURY HEP-2 Titer  Positive 1:640 Speckled Atypical   ds DNA Ab Negative 1:10  Negative 1:10   Anti-SSA Antibody 0.00 - 19.99 EU 88.76 (H)   Anti-SSA Interpretation Negative  Positive !   Anti-SSB Antibody 0.00 - 19.99 .56 (H)   Anti-SSB Interpretation Negative  Positive !   Anti Sm Antibody 0.00 - 19.99 EU 5.92   Anti-Sm Interpretation Negative  Negative   Anti Sm/RNP " "Antibody 0.00 - 19.99 EU 10.07   Anti-Sm/RNP Interpretation Negative  Negative      Latest Reference Range & Units 02/01/18 09:57   Scleroderma SCL- <20 UNITS 43 (H)     Current Rheum Medications:  Imuran 150 mg/day,  mg BID, prednisone prn, benlysta  Previous Rheum Medications:   Norvasc, cellcept (SE), MTX (avoiding w/ fatty liver), gabapentin (failed)    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    Scleroderma     Seizures 01/12/2017     Social History     Socioeconomic History    Marital status:      Spouse name: Darrell    Number of children: 0   Tobacco Use    Smoking status: Never    Smokeless tobacco: Never   Substance and Sexual Activity    Alcohol use: Yes    Drug use: No    Sexual activity: Yes     Review of patient's allergies indicates:   Allergen Reactions    Lisinopril      cough    Sulfa (sulfonamide antibiotics) Swelling       Objective:   BP (!) 132/91   Pulse 78   Ht 5' 6" (1.676 m)   Wt 110.3 kg (243 lb 2.7 oz)   BMI 39.25 kg/m²     Immunization History   Administered Date(s) Administered    COVID-19, MRNA, LN-S, PF (Pfizer) (Purple Cap) 06/09/2021, 06/09/2021, 06/30/2021, 06/30/2021, 12/31/2021    Tdap 04/05/2012, 04/05/2012       Current Outpatient Medications:     azaTHIOprine (IMURAN) 100 mg tablet, Take 1 tablet (100 mg total) by mouth once daily., Disp: 90 tablet, Rfl: 1    azaTHIOprine (IMURAN) 50 mg Tab, TAKE 1 TABLET (50 MG TOTAL) BY MOUTH ONCE DAILY IN EVENING, Disp: 90 tablet, Rfl: 1    belimumab (BENLYSTA) 200 mg/mL Syrg, Inject 1 mL (200 mg total) into the skin every 7 days., Disp: 4 each, Rfl: 11    betamethasone dipropionate (DIPROLENE) 0.05 % ointment, Apply topically 2 (two) times daily as needed. Steroid. Use for flares, Disp: 45 g, Rfl: 3    cholecalciferol, vitamin D3, 1,250 mcg " (50,000 unit) capsule, Take 1 capsule (50,000 Units total) by mouth once a week., Disp: 15 capsule, Rfl: 1    crisaborole (EUCRISA) 2 % Oint, AAA bid prn.  Non-steroid.  Safe to use long-term., Disp: 60 g, Rfl: 3    cyclobenzaprine (FLEXERIL) 10 MG tablet, Take 1 tablet (10 mg total) by mouth 2 (two) times daily as needed for Muscle spasms., Disp: 60 tablet, Rfl: 0    ferrous sulfate (FEOSOL) 325 mg (65 mg iron) Tab tablet, Take 1 tablet by mouth once daily., Disp: , Rfl:     HYDROcodone-acetaminophen (NORCO) 7.5-325 mg per tablet, Take 1 tablet by mouth 4 (four) times daily as needed., Disp: , Rfl:     hydrOXYchloroQUINE (PLAQUENIL) 200 mg tablet, Take 1 tablet (200 mg total) by mouth 2 (two) times daily., Disp: 180 tablet, Rfl: 3     mg tablet, Take 800 mg by mouth every 8 (eight) hours as needed., Disp: , Rfl: 0    levetiracetam XR (KEPPRA XR) 500 mg Tb24 24 hr tablet, Take 500 mg by mouth once daily., Disp: , Rfl:     predniSONE (DELTASONE) 10 MG tablet, TAKE 1 TABLET BY MOUTH EVERY DAY, Disp: 60 tablet, Rfl: 1    PROAIR HFA 90 mcg/actuation inhaler, INHALE 2 PUFFS INTO THE LUNGS EVERY 6 (SIX) HOURS AS NEEDED FOR WHEEZING. RESCUE, Disp: 8.5 Inhaler, Rfl: 0    pulse oximeter (PULSE OXIMETER) device, Use by Apply Externally route 2 (two) times a day. Use twice daily at 8 AM and 3 PM and [facility] as directed., Disp: 1 each, Rfl: 0    fluticasone-salmeterol diskus inhaler 100-50 mcg, Inhale 1 puff into the lungs 2 (two) times daily. Controller, Disp: 1 each, Rfl: 11       Recent Results (from the past 336 hour(s))   CBC Auto Differential    Collection Time: 11/09/22  8:54 AM   Result Value Ref Range    WBC 5.10 3.90 - 12.70 K/uL    RBC 4.25 4.00 - 5.40 M/uL    Hemoglobin 11.4 (L) 12.0 - 16.0 g/dL    Hematocrit 35.0 (L) 37.0 - 48.5 %    MCV 82 82 - 98 fL    MCH 26.8 (L) 27.0 - 31.0 pg    MCHC 32.6 32.0 - 36.0 g/dL    RDW 13.8 11.5 - 14.5 %    Platelets 355 150 - 450 K/uL    MPV 8.7 (L) 9.2 -  12.9 fL    Immature Granulocytes 0.4 0.0 - 0.5 %    Gran # (ANC) 2.7 1.8 - 7.7 K/uL    Immature Grans (Abs) 0.02 0.00 - 0.04 K/uL    Lymph # 1.8 1.0 - 4.8 K/uL    Mono # 0.5 0.3 - 1.0 K/uL    Eos # 0.1 0.0 - 0.5 K/uL    Baso # 0.03 0.00 - 0.20 K/uL    nRBC 0 0 /100 WBC    Gran % 52.5 38.0 - 73.0 %    Lymph % 35.3 18.0 - 48.0 %    Mono % 9.4 4.0 - 15.0 %    Eosinophil % 1.8 0.0 - 8.0 %    Basophil % 0.6 0.0 - 1.9 %    Differential Method Automated    Urinalysis    Collection Time: 11/09/22  9:11 AM   Result Value Ref Range    Specimen UA Urine, Clean Catch     Color, UA Yellow Yellow, Straw, Letty    Appearance, UA Hazy (A) Clear    pH, UA 7.0 5.0 - 8.0    Specific Gravity, UA 1.015 1.005 - 1.030    Protein, UA Negative Negative    Glucose, UA Negative Negative    Ketones, UA Negative Negative    Bilirubin (UA) Negative Negative    Occult Blood UA Negative Negative    Nitrite, UA Negative Negative    Leukocytes, UA Negative Negative           Imaging:  All imaging reviewed and independently interpreted by me.    cxray 6/2021:   Lungs are clear without focal consolidation, edema, or mass.  There is no pneumothorax or pleural effusion.  Cardiomediastinal silhouette is within normal limits.  No acute osseous abnormality.     Echo 6.21.21:   The left ventricle is normal in size with concentric remodeling and normal systolic function.  The estimated ejection fraction is 60%.  Normal left ventricular diastolic function.  Normal right ventricular size with normal right ventricular systolic function.  Normal central venous pressure (3 mmHg).  The estimated PA systolic pressure is 32 mmHg.     6mwt 6.11.21 Interpretation:   Total distance walked in six minutes is normal indicating normal   functional capacity.   Patient did not meet criteria for oxygen prescription. Clinical   correlation suggested.      pfts interpretation 6.11.21  The low FEV0.5/FEV1 ratio suggests central or upper airway obstruction may be present.  Spirometry remains unimproved following bronchodilator. Lung volumes show mild restriction is present. Airway mechanics show airway resistance is increased. Specific   conductance remains normal. DLCO is mildly decreased. MVV is severely decreased.     Assessment:     1. Sjogren's syndrome with keratoconjunctivitis sicca    2. Raynaud's disease without gangrene    3. High risk medication use    4. Immunosuppressed status    5. 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  Update PFT, 6 min walk test  Get baseline dexa  Compromised immune system secondary to autoimmune disease and use of immunosuppressive drugs. Monitor carefully for infections and toxicities- Currently denies issues with recurrent infections. Advised to get immediate medical care if any infection.  Recommend Yearly Skin Cancer Screening by Dermatology for all patients on biologic or Araseli. advised strict adherence to age appropriate vaccinations (shingles, pneumonia, flu and covid) and cancer screenings with PCP   Patient advised to hold DMARD and/or biologic therapy for signs of infection or for surgery. If you are unsure what to do please call our office for instruction.[facility] [phone]  no current medication related issues, no evidence of toxicity. I ordered labs for toxicity monitoring;  results reviewed and discussed findings with the patient   Return to clinic: 3 mos w/ safety labs 1 week early    The patient understands, chooses and consents to this plan and accepts all the risks which include but are not limited to the risks mentioned above.     Method of contact with patient concerns: Naila laboy  Rheumatology    40 minutes of total time spent on the encounter, which includes face to face time and non-face to face time preparing to see the patient (eg, review of tests), Obtaining and/or reviewing separately obtained history, Documenting clinical information in the electronic or other health record, Independently interpreting results (not separately reported) and communicating results to the patient/family/caregiver, or Care coordination (not separately reported).          Disclaimer: This note was prepared using a voice recognition system and is likely to have sound alike errors within the text.       Bridget Bhakta PA-C  Rheumatology Department   [facility]

## 2022-11-09 ENCOUNTER — OFFICE VISIT (OUTPATIENT)
Dept: RHEUMATOLOGY | Facility: CLINIC | Age: 47
End: 2022-11-09
Payer: COMMERCIAL

## 2022-11-09 ENCOUNTER — LAB VISIT (OUTPATIENT)
Dept: LAB | Facility: HOSPITAL | Age: 47
End: 2022-11-09
Payer: COMMERCIAL

## 2022-11-09 VITALS
SYSTOLIC BLOOD PRESSURE: 132 MMHG | HEART RATE: 78 BPM | BODY MASS INDEX: 39.08 KG/M2 | WEIGHT: 243.19 LBS | DIASTOLIC BLOOD PRESSURE: 91 MMHG | HEIGHT: 66 IN

## 2022-11-09 DIAGNOSIS — D84.9 IMMUNOSUPPRESSED STATUS: ICD-10-CM

## 2022-11-09 DIAGNOSIS — Z79.52 LONG TERM SYSTEMIC STEROID USER: ICD-10-CM

## 2022-11-09 DIAGNOSIS — I73.00 RAYNAUD'S DISEASE WITHOUT GANGRENE: ICD-10-CM

## 2022-11-09 DIAGNOSIS — M34.9 SCLERODERMA: ICD-10-CM

## 2022-11-09 DIAGNOSIS — M35.01 SJOGREN'S SYNDROME WITH KERATOCONJUNCTIVITIS SICCA: ICD-10-CM

## 2022-11-09 DIAGNOSIS — M35.01 SJOGREN'S SYNDROME WITH KERATOCONJUNCTIVITIS SICCA: Primary | ICD-10-CM

## 2022-11-09 DIAGNOSIS — Z79.899 HIGH RISK MEDICATION USE: ICD-10-CM

## 2022-11-09 DIAGNOSIS — M32.14 LUPUS NEPHRITIS, ISN/RPS CLASS V: ICD-10-CM

## 2022-11-09 LAB
ALBUMIN SERPL BCP-MCNC: 3.9 G/DL (ref 3.5–5.2)
ALP SERPL-CCNC: 49 U/L (ref 55–135)
ALT SERPL W/O P-5'-P-CCNC: 13 U/L (ref 10–44)
ANION GAP SERPL CALC-SCNC: 7 MMOL/L (ref 8–16)
AST SERPL-CCNC: 12 U/L (ref 10–40)
BASOPHILS # BLD AUTO: 0.03 K/UL (ref 0–0.2)
BASOPHILS NFR BLD: 0.6 % (ref 0–1.9)
BILIRUB SERPL-MCNC: 0.6 MG/DL (ref 0.1–1)
BILIRUB UR QL STRIP: NEGATIVE
BUN SERPL-MCNC: 11 MG/DL (ref 6–20)
CALCIUM SERPL-MCNC: 9.3 MG/DL (ref 8.7–10.5)
CHLORIDE SERPL-SCNC: 103 MMOL/L (ref 95–110)
CLARITY UR: ABNORMAL
CO2 SERPL-SCNC: 27 MMOL/L (ref 23–29)
COLOR UR: YELLOW
CREAT SERPL-MCNC: 0.8 MG/DL (ref 0.5–1.4)
CREAT UR-MCNC: 76 MG/DL (ref 15–325)
CRP SERPL-MCNC: 10.3 MG/L (ref 0–8.2)
DIFFERENTIAL METHOD: ABNORMAL
EOSINOPHIL # BLD AUTO: 0.1 K/UL (ref 0–0.5)
EOSINOPHIL NFR BLD: 1.8 % (ref 0–8)
ERYTHROCYTE [DISTWIDTH] IN BLOOD BY AUTOMATED COUNT: 13.8 % (ref 11.5–14.5)
ERYTHROCYTE [SEDIMENTATION RATE] IN BLOOD BY PHOTOMETRIC METHOD: 33 MM/HR (ref 0–36)
EST. GFR  (NO RACE VARIABLE): >60 ML/MIN/1.73 M^2
GLUCOSE SERPL-MCNC: 96 MG/DL (ref 70–110)
GLUCOSE UR QL STRIP: NEGATIVE
HCT VFR BLD AUTO: 35 % (ref 37–48.5)
HGB BLD-MCNC: 11.4 G/DL (ref 12–16)
HGB UR QL STRIP: NEGATIVE
IMM GRANULOCYTES # BLD AUTO: 0.02 K/UL (ref 0–0.04)
IMM GRANULOCYTES NFR BLD AUTO: 0.4 % (ref 0–0.5)
KETONES UR QL STRIP: NEGATIVE
LEUKOCYTE ESTERASE UR QL STRIP: NEGATIVE
LYMPHOCYTES # BLD AUTO: 1.8 K/UL (ref 1–4.8)
LYMPHOCYTES NFR BLD: 35.3 % (ref 18–48)
MCH RBC QN AUTO: 26.8 PG (ref 27–31)
MCHC RBC AUTO-ENTMCNC: 32.6 G/DL (ref 32–36)
MCV RBC AUTO: 82 FL (ref 82–98)
MONOCYTES # BLD AUTO: 0.5 K/UL (ref 0.3–1)
MONOCYTES NFR BLD: 9.4 % (ref 4–15)
NEUTROPHILS # BLD AUTO: 2.7 K/UL (ref 1.8–7.7)
NEUTROPHILS NFR BLD: 52.5 % (ref 38–73)
NITRITE UR QL STRIP: NEGATIVE
NRBC BLD-RTO: 0 /100 WBC
PH UR STRIP: 7 [PH] (ref 5–8)
PLATELET # BLD AUTO: 355 K/UL (ref 150–450)
PMV BLD AUTO: 8.7 FL (ref 9.2–12.9)
POTASSIUM SERPL-SCNC: 4 MMOL/L (ref 3.5–5.1)
PROT SERPL-MCNC: 7.3 G/DL (ref 6–8.4)
PROT UR QL STRIP: NEGATIVE
PROT UR-MCNC: <7 MG/DL (ref 0–15)
PROT/CREAT UR: NORMAL MG/G{CREAT} (ref 0–0.2)
RBC # BLD AUTO: 4.25 M/UL (ref 4–5.4)
SODIUM SERPL-SCNC: 137 MMOL/L (ref 136–145)
SP GR UR STRIP: 1.01 (ref 1–1.03)
URN SPEC COLLECT METH UR: ABNORMAL
WBC # BLD AUTO: 5.1 K/UL (ref 3.9–12.7)

## 2022-11-09 PROCEDURE — 99999 PR PBB SHADOW E&M-EST. PATIENT-LVL IV: CPT | Mod: PBBFAC,,, | Performed by: PHYSICIAN ASSISTANT

## 2022-11-09 PROCEDURE — 3066F PR DOCUMENTATION OF TREATMENT FOR NEPHROPATHY: ICD-10-PCS | Mod: CPTII,S$GLB,, | Performed by: PHYSICIAN ASSISTANT

## 2022-11-09 PROCEDURE — 3080F PR MOST RECENT DIASTOLIC BLOOD PRESSURE >= 90 MM HG: ICD-10-PCS | Mod: CPTII,S$GLB,, | Performed by: PHYSICIAN ASSISTANT

## 2022-11-09 PROCEDURE — 1159F PR MEDICATION LIST DOCUMENTED IN MEDICAL RECORD: ICD-10-PCS | Mod: CPTII,S$GLB,, | Performed by: PHYSICIAN ASSISTANT

## 2022-11-09 PROCEDURE — 85652 RBC SED RATE AUTOMATED: CPT | Performed by: PHYSICIAN ASSISTANT

## 2022-11-09 PROCEDURE — 1160F PR REVIEW ALL MEDS BY PRESCRIBER/CLIN PHARMACIST DOCUMENTED: ICD-10-PCS | Mod: CPTII,S$GLB,, | Performed by: PHYSICIAN ASSISTANT

## 2022-11-09 PROCEDURE — 3008F PR BODY MASS INDEX (BMI) DOCUMENTED: ICD-10-PCS | Mod: CPTII,S$GLB,, | Performed by: PHYSICIAN ASSISTANT

## 2022-11-09 PROCEDURE — 36415 COLL VENOUS BLD VENIPUNCTURE: CPT | Performed by: PHYSICIAN ASSISTANT

## 2022-11-09 PROCEDURE — 99999 PR PBB SHADOW E&M-EST. PATIENT-LVL IV: ICD-10-PCS | Mod: PBBFAC,,, | Performed by: PHYSICIAN ASSISTANT

## 2022-11-09 PROCEDURE — 1159F MED LIST DOCD IN RCRD: CPT | Mod: CPTII,S$GLB,, | Performed by: PHYSICIAN ASSISTANT

## 2022-11-09 PROCEDURE — 80053 COMPREHEN METABOLIC PANEL: CPT | Performed by: PHYSICIAN ASSISTANT

## 2022-11-09 PROCEDURE — 3075F SYST BP GE 130 - 139MM HG: CPT | Mod: CPTII,S$GLB,, | Performed by: PHYSICIAN ASSISTANT

## 2022-11-09 PROCEDURE — 84156 ASSAY OF PROTEIN URINE: CPT | Performed by: PHYSICIAN ASSISTANT

## 2022-11-09 PROCEDURE — 99215 OFFICE O/P EST HI 40 MIN: CPT | Mod: S$GLB,,, | Performed by: PHYSICIAN ASSISTANT

## 2022-11-09 PROCEDURE — 99215 PR OFFICE/OUTPT VISIT, EST, LEVL V, 40-54 MIN: ICD-10-PCS | Mod: S$GLB,,, | Performed by: PHYSICIAN ASSISTANT

## 2022-11-09 PROCEDURE — 81003 URINALYSIS AUTO W/O SCOPE: CPT | Performed by: PHYSICIAN ASSISTANT

## 2022-11-09 PROCEDURE — 3075F PR MOST RECENT SYSTOLIC BLOOD PRESS GE 130-139MM HG: ICD-10-PCS | Mod: CPTII,S$GLB,, | Performed by: PHYSICIAN ASSISTANT

## 2022-11-09 PROCEDURE — 3066F NEPHROPATHY DOC TX: CPT | Mod: CPTII,S$GLB,, | Performed by: PHYSICIAN ASSISTANT

## 2022-11-09 PROCEDURE — 3080F DIAST BP >= 90 MM HG: CPT | Mod: CPTII,S$GLB,, | Performed by: PHYSICIAN ASSISTANT

## 2022-11-09 PROCEDURE — 85025 COMPLETE CBC W/AUTO DIFF WBC: CPT | Performed by: PHYSICIAN ASSISTANT

## 2022-11-09 PROCEDURE — 3008F BODY MASS INDEX DOCD: CPT | Mod: CPTII,S$GLB,, | Performed by: PHYSICIAN ASSISTANT

## 2022-11-09 PROCEDURE — 1160F RVW MEDS BY RX/DR IN RCRD: CPT | Mod: CPTII,S$GLB,, | Performed by: PHYSICIAN ASSISTANT

## 2022-11-09 PROCEDURE — 86140 C-REACTIVE PROTEIN: CPT | Performed by: PHYSICIAN ASSISTANT

## 2022-11-10 ENCOUNTER — OFFICE VISIT (OUTPATIENT)
Dept: OPHTHALMOLOGY | Facility: CLINIC | Age: 47
End: 2022-11-10
Payer: COMMERCIAL

## 2022-11-10 ENCOUNTER — PATIENT MESSAGE (OUTPATIENT)
Dept: OPHTHALMOLOGY | Facility: CLINIC | Age: 47
End: 2022-11-10

## 2022-11-10 DIAGNOSIS — H52.4 BILATERAL PRESBYOPIA: ICD-10-CM

## 2022-11-10 DIAGNOSIS — Z79.899 LONG-TERM USE OF PLAQUENIL: ICD-10-CM

## 2022-11-10 DIAGNOSIS — M35.00 SJOGREN'S SYNDROME, WITH UNSPECIFIED ORGAN INVOLVEMENT: Primary | ICD-10-CM

## 2022-11-10 DIAGNOSIS — H52.03 HYPEROPIA, BILATERAL: ICD-10-CM

## 2022-11-10 PROCEDURE — 92015 PR REFRACTION: ICD-10-PCS | Mod: S$GLB,,, | Performed by: OPTOMETRIST

## 2022-11-10 PROCEDURE — 1159F MED LIST DOCD IN RCRD: CPT | Mod: CPTII,S$GLB,, | Performed by: OPTOMETRIST

## 2022-11-10 PROCEDURE — 92014 COMPRE OPH EXAM EST PT 1/>: CPT | Mod: S$GLB,,, | Performed by: OPTOMETRIST

## 2022-11-10 PROCEDURE — 92014 PR EYE EXAM, EST PATIENT,COMPREHESV: ICD-10-PCS | Mod: S$GLB,,, | Performed by: OPTOMETRIST

## 2022-11-10 PROCEDURE — 92134 POSTERIOR SEGMENT OCT RETINA (OCULAR COHERENCE TOMOGRAPHY)-BOTH EYES: ICD-10-PCS | Mod: S$GLB,,, | Performed by: OPTOMETRIST

## 2022-11-10 PROCEDURE — 3066F NEPHROPATHY DOC TX: CPT | Mod: CPTII,S$GLB,, | Performed by: OPTOMETRIST

## 2022-11-10 PROCEDURE — 99999 PR PBB SHADOW E&M-EST. PATIENT-LVL III: ICD-10-PCS | Mod: PBBFAC,,, | Performed by: OPTOMETRIST

## 2022-11-10 PROCEDURE — 92015 DETERMINE REFRACTIVE STATE: CPT | Mod: S$GLB,,, | Performed by: OPTOMETRIST

## 2022-11-10 PROCEDURE — 1159F PR MEDICATION LIST DOCUMENTED IN MEDICAL RECORD: ICD-10-PCS | Mod: CPTII,S$GLB,, | Performed by: OPTOMETRIST

## 2022-11-10 PROCEDURE — 3066F PR DOCUMENTATION OF TREATMENT FOR NEPHROPATHY: ICD-10-PCS | Mod: CPTII,S$GLB,, | Performed by: OPTOMETRIST

## 2022-11-10 PROCEDURE — 92134 CPTRZ OPH DX IMG PST SGM RTA: CPT | Mod: S$GLB,,, | Performed by: OPTOMETRIST

## 2022-11-10 PROCEDURE — 92083 EXTENDED VISUAL FIELD XM: CPT | Mod: S$GLB,,, | Performed by: OPTOMETRIST

## 2022-11-10 PROCEDURE — 92083 HUMPHREY VISUAL FIELD - OU - BOTH EYES: ICD-10-PCS | Mod: S$GLB,,, | Performed by: OPTOMETRIST

## 2022-11-10 PROCEDURE — 99999 PR PBB SHADOW E&M-EST. PATIENT-LVL III: CPT | Mod: PBBFAC,,, | Performed by: OPTOMETRIST

## 2022-11-10 NOTE — PROGRESS NOTES
HPI    Decrease distance visual acuity.  Hard to focus on the computer without glasses.  Plaq check.  Review 10-2 and MOCT.  Last eye exam 10/19/2021 TRF.  Update glasses RX.  Patient forgot glasses today.  Last edited by Myah Maya MA on 11/10/2022 10:15 AM.            Assessment /Plan     For exam results, see Encounter Report.    Sjogren's syndrome, with unspecified organ involvement  -     Posterior Segment OCT Retina-Both eyes  -     Marcelino Visual Field - OU - Extended - Both Eyes    Long-term use of Plaquenil  -     Posterior Segment OCT Retina-Both eyes  -     Marcelino Visual Field - OU - Extended - Both Eyes    Hyperopia, bilateral    Bilateral presbyopia      No active anterior or posterior uveitis OU.  No ocular changes from Plaquenil use  Normal VF and mOCT OU    Dispense Final Rx for glasses.  RTC 1 year  Discussed above and answered questions.

## 2022-11-29 ENCOUNTER — OFFICE VISIT (OUTPATIENT)
Dept: INTERNAL MEDICINE | Facility: CLINIC | Age: 47
End: 2022-11-29
Payer: COMMERCIAL

## 2022-11-29 VITALS
TEMPERATURE: 98 F | DIASTOLIC BLOOD PRESSURE: 82 MMHG | BODY MASS INDEX: 39.46 KG/M2 | HEIGHT: 66 IN | RESPIRATION RATE: 20 BRPM | OXYGEN SATURATION: 98 % | WEIGHT: 245.56 LBS | SYSTOLIC BLOOD PRESSURE: 130 MMHG | HEART RATE: 66 BPM

## 2022-11-29 DIAGNOSIS — Z00.00 ANNUAL PHYSICAL EXAM: Primary | ICD-10-CM

## 2022-11-29 DIAGNOSIS — Z12.11 COLON CANCER SCREENING: ICD-10-CM

## 2022-11-29 DIAGNOSIS — Z80.0 FAMILY HISTORY- STOMACH CANCER: ICD-10-CM

## 2022-11-29 DIAGNOSIS — Z23 IMMUNIZATION DUE: ICD-10-CM

## 2022-11-29 DIAGNOSIS — F43.21 SITUATIONAL DEPRESSION: ICD-10-CM

## 2022-11-29 DIAGNOSIS — R10.13 EPIGASTRIC PAIN: ICD-10-CM

## 2022-11-29 PROCEDURE — 99213 OFFICE O/P EST LOW 20 MIN: CPT | Mod: 25,S$GLB,, | Performed by: INTERNAL MEDICINE

## 2022-11-29 PROCEDURE — 1159F MED LIST DOCD IN RCRD: CPT | Mod: CPTII,S$GLB,, | Performed by: INTERNAL MEDICINE

## 2022-11-29 PROCEDURE — 3008F BODY MASS INDEX DOCD: CPT | Mod: CPTII,S$GLB,, | Performed by: INTERNAL MEDICINE

## 2022-11-29 PROCEDURE — 99396 PR PREVENTIVE VISIT,EST,40-64: ICD-10-PCS | Mod: 25,S$GLB,, | Performed by: INTERNAL MEDICINE

## 2022-11-29 PROCEDURE — 3079F PR MOST RECENT DIASTOLIC BLOOD PRESSURE 80-89 MM HG: ICD-10-PCS | Mod: CPTII,S$GLB,, | Performed by: INTERNAL MEDICINE

## 2022-11-29 PROCEDURE — 99213 PR OFFICE/OUTPT VISIT, EST, LEVL III, 20-29 MIN: ICD-10-PCS | Mod: 25,S$GLB,, | Performed by: INTERNAL MEDICINE

## 2022-11-29 PROCEDURE — 1159F PR MEDICATION LIST DOCUMENTED IN MEDICAL RECORD: ICD-10-PCS | Mod: CPTII,S$GLB,, | Performed by: INTERNAL MEDICINE

## 2022-11-29 PROCEDURE — 3066F NEPHROPATHY DOC TX: CPT | Mod: CPTII,S$GLB,, | Performed by: INTERNAL MEDICINE

## 2022-11-29 PROCEDURE — 99396 PREV VISIT EST AGE 40-64: CPT | Mod: 25,S$GLB,, | Performed by: INTERNAL MEDICINE

## 2022-11-29 PROCEDURE — 99999 PR PBB SHADOW E&M-EST. PATIENT-LVL V: ICD-10-PCS | Mod: PBBFAC,,, | Performed by: INTERNAL MEDICINE

## 2022-11-29 PROCEDURE — 90471 TDAP VACCINE GREATER THAN OR EQUAL TO 7YO IM: ICD-10-PCS | Mod: S$GLB,,, | Performed by: INTERNAL MEDICINE

## 2022-11-29 PROCEDURE — 90715 TDAP VACCINE GREATER THAN OR EQUAL TO 7YO IM: ICD-10-PCS | Mod: S$GLB,,, | Performed by: INTERNAL MEDICINE

## 2022-11-29 PROCEDURE — 3079F DIAST BP 80-89 MM HG: CPT | Mod: CPTII,S$GLB,, | Performed by: INTERNAL MEDICINE

## 2022-11-29 PROCEDURE — 3008F PR BODY MASS INDEX (BMI) DOCUMENTED: ICD-10-PCS | Mod: CPTII,S$GLB,, | Performed by: INTERNAL MEDICINE

## 2022-11-29 PROCEDURE — 90471 IMMUNIZATION ADMIN: CPT | Mod: S$GLB,,, | Performed by: INTERNAL MEDICINE

## 2022-11-29 PROCEDURE — 3066F PR DOCUMENTATION OF TREATMENT FOR NEPHROPATHY: ICD-10-PCS | Mod: CPTII,S$GLB,, | Performed by: INTERNAL MEDICINE

## 2022-11-29 PROCEDURE — 3075F PR MOST RECENT SYSTOLIC BLOOD PRESS GE 130-139MM HG: ICD-10-PCS | Mod: CPTII,S$GLB,, | Performed by: INTERNAL MEDICINE

## 2022-11-29 PROCEDURE — 90715 TDAP VACCINE 7 YRS/> IM: CPT | Mod: S$GLB,,, | Performed by: INTERNAL MEDICINE

## 2022-11-29 PROCEDURE — 3075F SYST BP GE 130 - 139MM HG: CPT | Mod: CPTII,S$GLB,, | Performed by: INTERNAL MEDICINE

## 2022-11-29 PROCEDURE — 99999 PR PBB SHADOW E&M-EST. PATIENT-LVL V: CPT | Mod: PBBFAC,,, | Performed by: INTERNAL MEDICINE

## 2022-11-29 RX ORDER — SERTRALINE HYDROCHLORIDE 50 MG/1
50 TABLET, FILM COATED ORAL DAILY
Qty: 30 TABLET | Refills: 1 | Status: SHIPPED | OUTPATIENT
Start: 2022-11-29 | End: 2022-12-15

## 2022-11-29 NOTE — PROGRESS NOTES
Zachary Escalona Khari  47 y.o. Black or  female  [id number]    Chief Complaint:  Chief Complaint   Patient presents with    Annual Exam    Depression       History of Present Illness:  Presents for an annual exam.   Her primary concern is depression. She lost her uncle in June and it has taken a toll on her. She is crying all the time and having a difficult time at work.   She has pain in the upper part of her abdomen. At times it feels like she has acid reflux but she is concerned because her uncle that recently passed had stomach cancer.     Laboratory   Lab Results   Component Value Date    WBC 5.10 11/09/2022    HGB 11.4 (L) 11/09/2022    HCT 35.0 (L) 11/09/2022     11/09/2022    CHOL 180 11/29/2021    TRIG 101 11/29/2021    HDL 53 11/29/2021    ALT 13 11/09/2022    AST 12 11/09/2022     11/09/2022    K 4.0 11/09/2022     11/09/2022    CREATININE 0.8 11/09/2022    BUN 11 11/09/2022    CO2 27 11/09/2022    TSH 0.891 11/29/2021    INR 1.0 01/12/2022    HGBA1C 5.2 03/17/2021     Review of Systems   Constitutional:  Negative for fever.   Respiratory:  Negative for cough and shortness of breath.    Cardiovascular:  Negative for chest pain and leg swelling.   Gastrointestinal:  Positive for abdominal pain and heartburn. Negative for constipation and diarrhea.   Genitourinary:  Negative for dysuria.        Abnormal urine odor; recent normal UA   Musculoskeletal:  Positive for joint pain.   Neurological:  Positive for headaches. Negative for dizziness.   Psychiatric/Behavioral:  Positive for depression. The patient has insomnia.      The following were reviewed: Active problem list, medication list, allergies, family history, social history, and Health Maintenance.     History:  Past Medical History:   Diagnosis Date    Abnormal stress test 1/25/2022    Asthma     Essential hypertension, malignant 1/8/2020    Lupus (systemic lupus erythematosus)     Membranous  glomerulonephritis, 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    Scleroderma     Seizures 01/12/2017     Past Surgical History:   Procedure Laterality Date    LEFT HEART CATHETERIZATION Left 1/25/2022    Procedure: CATHETERIZATION, HEART, LEFT;  Surgeon: Rocío Davidson MD;  Location: [facility] CATH LAB;  Service: Cardiology;  Laterality: Left;    RENAL BIOPSY  11/16/2018     Family History   Problem Relation Age of Onset    Arthritis Mother     Glaucoma Mother     Hypertension Mother     Diabetes Father     Diabetes Maternal Grandmother     Hypertension Maternal Grandmother     Arthritis Maternal Grandmother     Cataracts Maternal Grandmother     Diabetes Maternal Grandfather     Heart disease Maternal Grandfather     Hypertension Maternal Grandfather     Diabetes Paternal Grandmother     Heart disease Paternal Grandmother     Hypertension Paternal Grandmother     Heart disease Paternal Grandfather     Cancer Maternal Uncle      Social History     Socioeconomic History    Marital status:      Spouse name: Darrell    Number of children: 0   Tobacco Use    Smoking status: Never    Smokeless tobacco: Never   Substance and Sexual Activity    Alcohol use: Yes     Comment: OCC    Drug use: No    Sexual activity: Yes     Patient Active Problem List   Diagnosis    Seizure disorder    Sjogren's syndrome    Migraine without status migrainosus, not intractable    Generalized anxiety disorder    Scleroderma    Abnormal urine color    Raynaud's disease without gangrene    Lupus nephritis, ISN/RPS class V    On antiepileptic therapy    Vitamin D deficiency disease    Other proteinuria    Right flank pain    Abnormal stress test     Review of patient's allergies indicates:   Allergen Reactions    Lisinopril      cough    Sulfa (sulfonamide antibiotics) Swelling       Health Maintenance  Health Maintenance Topics with due status: Not Due       Topic Last Completion Date    Lipid Panel 11/29/2021    Cervical Cancer Screening 05/02/2022    Mammogram  05/02/2022    TETANUS VACCINE 11/29/2022     Health Maintenance Due   Topic Date Due    Colorectal Cancer Screening  Never done    COVID-19 Vaccine (4 - Booster for Pfizer series) 02/25/2022       Medications:  Current Outpatient Medications on File Prior to Visit   Medication Sig Dispense Refill    azaTHIOprine (IMURAN) 100 mg tablet Take 1 tablet (100 mg total) by mouth once daily. 90 tablet 1    azaTHIOprine (IMURAN) 50 mg Tab TAKE 1 TABLET (50 MG TOTAL) BY MOUTH ONCE DAILY IN EVENING 90 tablet 1    belimumab (BENLYSTA) 200 mg/mL Syrg Inject 1 mL (200 mg total) into the skin every 7 days. 4 each 11    betamethasone dipropionate (DIPROLENE) 0.05 % ointment Apply topically 2 (two) times daily as needed. Steroid. Use for flares 45 g 3    cholecalciferol, vitamin D3, 1,250 mcg (50,000 unit) capsule Take 1 capsule (50,000 Units total) by mouth once a week. 15 capsule 1    crisaborole (EUCRISA) 2 % Oint AAA bid prn.  Non-steroid.  Safe to use long-term. 60 g 3    cyclobenzaprine (FLEXERIL) 10 MG tablet Take 1 tablet (10 mg total) by mouth 2 (two) times daily as needed for Muscle spasms. 60 tablet 0    ferrous sulfate (FEOSOL) 325 mg (65 mg iron) Tab tablet Take 1 tablet by mouth once daily.      fluticasone-salmeterol diskus inhaler 100-50 mcg Inhale 1 puff into the lungs 2 (two) times daily. Controller 1 each 11    HYDROcodone-acetaminophen (NORCO) 7.5-325 mg per tablet Take 1 tablet by mouth 4 (four) times daily as needed.      hydrOXYchloroQUINE (PLAQUENIL) 200 mg tablet Take 1 tablet (200 mg total) by mouth 2 (two) times daily. 180 tablet 3     mg tablet Take 800 mg by mouth every 8 (eight) hours as needed.  0    levetiracetam XR (KEPPRA XR) 500 mg Tb24 24 hr tablet Take 500 mg by mouth once daily.      predniSONE (DELTASONE) 10 MG tablet TAKE 1 TABLET BY MOUTH EVERY DAY 60 tablet 1    PROAIR HFA 90 mcg/actuation inhaler INHALE 2 PUFFS INTO THE LUNGS EVERY 6 (SIX) HOURS AS NEEDED FOR WHEEZING. RESCUE 8.5  Inhaler 0    pulse oximeter (PULSE OXIMETER) device Use by Apply Externally route 2 (two) times a day. Use twice daily at 8 AM and 3 PM and record the value in MyChart as directed. 1 each 0     No current facility-administered medications on file prior to visit.       Medications have been reviewed and reconciled with patient at visit today.    Exam:  Vitals:    11/29/22 0900   BP: 130/82   Pulse: 66   Resp: 20   Temp: 98 °F (36.7 °C)     Weight: 111.4 kg (245 lb 9.5 oz)   Body mass index is 39.64 kg/m².      Physical Exam  Vitals reviewed.   Constitutional:       General: She is not in acute distress.     Appearance: She is well-developed. She is not ill-appearing.   Eyes:      General: No scleral icterus.  Cardiovascular:      Rate and Rhythm: Normal rate and regular rhythm.      Heart sounds: Normal heart sounds.   Pulmonary:      Effort: Pulmonary effort is normal. No respiratory distress.      Breath sounds: Normal breath sounds.   Abdominal:      General: Bowel sounds are normal.      Palpations: Abdomen is soft.   Skin:     General: Skin is warm and dry.   Neurological:      Mental Status: She is alert and oriented to person, place, and time.   Psychiatric:         Mood and Affect: Mood is depressed.         Behavior: Behavior normal.     Assessment:  The primary encounter diagnosis was Annual physical exam. Diagnoses of Situational depression, Epigastric pain, Family history- stomach cancer, Colon cancer screening, and Immunization due were also pertinent to this visit.    Plan:  Annual physical exam  -     Counseled regarding age appropriate screenings and immunizations  -     Counseled regarding lifestyle modifications    Situational depression  -     start sertraline (ZOLOFT) 50 MG tablet; Take 1 tablet (50 mg total) by mouth once daily.  Dispense: 30 tablet; Refill: 1. Discussed medication risks and benefits.   -     Ambulatory referral/consult to Psychiatry; Future; Expected date:  12/06/2022    Epigastric pain  -     Ambulatory referral/consult to Endo Procedure ; Future; Expected date: 11/29/2022    Family history- stomach cancer  -     Ambulatory referral/consult to Endo Procedure ; Future; Expected date: 11/29/2022    Colon cancer screening  -     Ambulatory referral/consult to Endo Procedure ; Future; Expected date: 11/29/2022    Immunization due  -     (In Office Administered) Tdap Vaccine    Follow up in about 4 weeks (around 12/27/2022).

## 2022-12-01 ENCOUNTER — HOSPITAL ENCOUNTER (OUTPATIENT)
Dept: PREADMISSION TESTING | Facility: HOSPITAL | Age: 47
Discharge: HOME | End: 2022-12-01
Payer: COMMERCIAL

## 2022-12-01 DIAGNOSIS — R10.13 EPIGASTRIC PAIN: Primary | ICD-10-CM

## 2022-12-01 DIAGNOSIS — Z12.11 COLON CANCER SCREENING: ICD-10-CM

## 2022-12-01 DIAGNOSIS — Z80.0 FAMILY HISTORY- STOMACH CANCER: ICD-10-CM

## 2022-12-01 RX ORDER — POLYETHYLENE GLYCOL 3350, SODIUM SULFATE ANHYDROUS, SODIUM BICARBONATE, SODIUM CHLORIDE, POTASSIUM CHLORIDE 236; 22.74; 6.74; 5.86; 2.97 G/4L; G/4L; G/4L; G/4L; G/4L
4 POWDER, FOR SOLUTION ORAL ONCE
Qty: 4000 ML | Refills: 0 | Status: SHIPPED | OUTPATIENT
Start: 2022-12-01 | End: 2022-12-01

## 2022-12-06 ENCOUNTER — APPOINTMENT (OUTPATIENT)
Dept: RADIOLOGY | Facility: HOSPITAL | Age: 47
End: 2022-12-06
Attending: PHYSICIAN ASSISTANT
Payer: COMMERCIAL

## 2022-12-06 DIAGNOSIS — Z79.52 LONG TERM SYSTEMIC STEROID USER: ICD-10-CM

## 2022-12-06 PROCEDURE — 77080 DXA BONE DENSITY AXIAL: CPT | Mod: 26,,, | Performed by: RADIOLOGY

## 2022-12-06 PROCEDURE — 77080 DXA BONE DENSITY AXIAL: CPT | Mod: TC

## 2022-12-06 PROCEDURE — 77080 DEXA BONE DENSITY SPINE HIP: ICD-10-PCS | Mod: 26,,, | Performed by: RADIOLOGY

## 2022-12-27 ENCOUNTER — PATIENT MESSAGE (OUTPATIENT)
Dept: INTERNAL MEDICINE | Facility: CLINIC | Age: 47
End: 2022-12-27

## 2022-12-27 ENCOUNTER — OFFICE VISIT (OUTPATIENT)
Dept: INTERNAL MEDICINE | Facility: CLINIC | Age: 47
End: 2022-12-27
Payer: COMMERCIAL

## 2022-12-27 VITALS
BODY MASS INDEX: 39.72 KG/M2 | SYSTOLIC BLOOD PRESSURE: 114 MMHG | HEIGHT: 66 IN | RESPIRATION RATE: 20 BRPM | OXYGEN SATURATION: 98 % | HEART RATE: 86 BPM | TEMPERATURE: 99 F | WEIGHT: 247.13 LBS | DIASTOLIC BLOOD PRESSURE: 70 MMHG

## 2022-12-27 DIAGNOSIS — E66.01 SEVERE OBESITY: Primary | ICD-10-CM

## 2022-12-27 DIAGNOSIS — R05.1 ACUTE COUGH: ICD-10-CM

## 2022-12-27 DIAGNOSIS — F43.21 SITUATIONAL DEPRESSION: Primary | ICD-10-CM

## 2022-12-27 DIAGNOSIS — E66.01 SEVERE OBESITY: ICD-10-CM

## 2022-12-27 PROCEDURE — 3078F PR MOST RECENT DIASTOLIC BLOOD PRESSURE < 80 MM HG: ICD-10-PCS | Mod: CPTII,S$GLB,, | Performed by: INTERNAL MEDICINE

## 2022-12-27 PROCEDURE — 1159F MED LIST DOCD IN RCRD: CPT | Mod: CPTII,S$GLB,, | Performed by: INTERNAL MEDICINE

## 2022-12-27 PROCEDURE — 3074F SYST BP LT 130 MM HG: CPT | Mod: CPTII,S$GLB,, | Performed by: INTERNAL MEDICINE

## 2022-12-27 PROCEDURE — 1160F RVW MEDS BY RX/DR IN RCRD: CPT | Mod: CPTII,S$GLB,, | Performed by: INTERNAL MEDICINE

## 2022-12-27 PROCEDURE — 3074F PR MOST RECENT SYSTOLIC BLOOD PRESSURE < 130 MM HG: ICD-10-PCS | Mod: CPTII,S$GLB,, | Performed by: INTERNAL MEDICINE

## 2022-12-27 PROCEDURE — 99214 OFFICE O/P EST MOD 30 MIN: CPT | Mod: S$GLB,,, | Performed by: INTERNAL MEDICINE

## 2022-12-27 PROCEDURE — 3078F DIAST BP <80 MM HG: CPT | Mod: CPTII,S$GLB,, | Performed by: INTERNAL MEDICINE

## 2022-12-27 PROCEDURE — 99214 PR OFFICE/OUTPT VISIT, EST, LEVL IV, 30-39 MIN: ICD-10-PCS | Mod: S$GLB,,, | Performed by: INTERNAL MEDICINE

## 2022-12-27 PROCEDURE — 3008F BODY MASS INDEX DOCD: CPT | Mod: CPTII,S$GLB,, | Performed by: INTERNAL MEDICINE

## 2022-12-27 PROCEDURE — 1160F PR REVIEW ALL MEDS BY PRESCRIBER/CLIN PHARMACIST DOCUMENTED: ICD-10-PCS | Mod: CPTII,S$GLB,, | Performed by: INTERNAL MEDICINE

## 2022-12-27 PROCEDURE — 3066F PR DOCUMENTATION OF TREATMENT FOR NEPHROPATHY: ICD-10-PCS | Mod: CPTII,S$GLB,, | Performed by: INTERNAL MEDICINE

## 2022-12-27 PROCEDURE — 1159F PR MEDICATION LIST DOCUMENTED IN MEDICAL RECORD: ICD-10-PCS | Mod: CPTII,S$GLB,, | Performed by: INTERNAL MEDICINE

## 2022-12-27 PROCEDURE — 3066F NEPHROPATHY DOC TX: CPT | Mod: CPTII,S$GLB,, | Performed by: INTERNAL MEDICINE

## 2022-12-27 PROCEDURE — 99999 PR PBB SHADOW E&M-EST. PATIENT-LVL V: CPT | Mod: PBBFAC,,, | Performed by: INTERNAL MEDICINE

## 2022-12-27 PROCEDURE — 3008F PR BODY MASS INDEX (BMI) DOCUMENTED: ICD-10-PCS | Mod: CPTII,S$GLB,, | Performed by: INTERNAL MEDICINE

## 2022-12-27 PROCEDURE — 99999 PR PBB SHADOW E&M-EST. PATIENT-LVL V: ICD-10-PCS | Mod: PBBFAC,,, | Performed by: INTERNAL MEDICINE

## 2022-12-27 RX ORDER — PROMETHAZINE HYDROCHLORIDE AND DEXTROMETHORPHAN HYDROBROMIDE 6.25; 15 MG/5ML; MG/5ML
5 SYRUP ORAL EVERY 8 HOURS PRN
Qty: 118 ML | Refills: 0 | Status: SHIPPED | OUTPATIENT
Start: 2022-12-27 | End: 2023-01-06

## 2022-12-27 NOTE — PROGRESS NOTES
Zachary Celina Escalona Khari  47 y.o.  Black or  female    Chief Complaint   Patient presents with    Depression    Cough     Sxs started 2 weeks ago        HPI:  Presents to the clinic to follow up on depression. She was started on sertraline at her last visit. She feels it has helped slightly. She denies side effects. She is scheduled to see psychiatry.   She has been coughing for the past 2 weeks. She feels as if it is a cold. It has improved somewhat. She denies a fever, chills, chest pain or shortness of breath.   She wants help losing weight.     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    Scleroderma     Seizures 01/12/2017         Current Outpatient Medications:     azaTHIOprine (IMURAN) 100 mg tablet, Take 1 tablet (100 mg total) by mouth once daily., Disp: 90 tablet, Rfl: 1    azaTHIOprine (IMURAN) 50 mg Tab, TAKE 1 TABLET (50 MG TOTAL) BY MOUTH ONCE DAILY IN EVENING, Disp: 90 tablet, Rfl: 1    belimumab (BENLYSTA) 200 mg/mL Syrg, Inject 1 mL (200 mg total) into the skin every 7 days., Disp: 4 each, Rfl: 11    betamethasone dipropionate (DIPROLENE) 0.05 % ointment, Apply topically 2 (two) times daily as needed. Steroid. Use for flares, Disp: 45 g, Rfl: 3    cholecalciferol, vitamin D3, 1,250 mcg (50,000 unit) capsule, Take 1 capsule (50,000 Units total) by mouth once a week., Disp: 15 capsule, Rfl: 1    crisaborole (EUCRISA) 2 % Oint, AAA bid prn.  Non-steroid.  Safe to use long-term., Disp: 60 g, Rfl: 3    cyclobenzaprine (FLEXERIL) 10 MG tablet, Take 1 tablet (10 mg total) by mouth 2 (two) times daily as needed for Muscle spasms., Disp: 60 tablet, Rfl: 0    ferrous sulfate (FEOSOL) 325 mg (65 mg iron) Tab tablet, Take 1 tablet by mouth once daily., Disp: , Rfl:     fluticasone-salmeterol diskus inhaler  100-50 mcg, Inhale 1 puff into the lungs 2 (two) times daily. Controller, Disp: 1 each, Rfl: 11    HYDROcodone-acetaminophen (NORCO) 7.5-325 mg per tablet, Take 1 tablet by mouth 4 (four) times daily as needed., Disp: , Rfl:     hydrOXYchloroQUINE (PLAQUENIL) 200 mg tablet, Take 1 tablet (200 mg total) by mouth 2 (two) times daily., Disp: 180 tablet, Rfl: 3     mg tablet, Take 800 mg by mouth every 8 (eight) hours as needed., Disp: , Rfl: 0    levetiracetam XR (KEPPRA XR) 500 mg Tb24 24 hr tablet, Take 500 mg by mouth once daily., Disp: , Rfl:     predniSONE (DELTASONE) 10 MG tablet, TAKE 1 TABLET BY MOUTH EVERY DAY, Disp: 60 tablet, Rfl: 1    PROAIR HFA 90 mcg/actuation inhaler, INHALE 2 PUFFS INTO THE LUNGS EVERY 6 (SIX) HOURS AS NEEDED FOR WHEEZING. RESCUE, Disp: 8.5 Inhaler, Rfl: 0    pulse oximeter (PULSE OXIMETER) device, Use by Apply Externally route 2 (two) times a day. Use twice daily at 8 AM and 3 PM and record the value in MyChart as directed., Disp: 1 each, Rfl: 0    sertraline (ZOLOFT) 50 MG tablet, TAKE 1 TABLET BY MOUTH EVERY DAY, Disp: 90 tablet, Rfl: 3    Review of patient's allergies indicates:   Allergen Reactions    Lisinopril      cough    Sulfa (sulfonamide antibiotics) Swelling       PE: Reviewed vitals  GENERAL: Alert and oriented, no acute distress  HEART: Regular rate  LUNGS: Unlabored respirations     ASSESSMENT/PLAN:    Zachary was seen today for depression and cough.    Diagnoses and all orders for this visit:    Situational depression  -     continue sertraline  -     f/u with psychiatry    Acute cough  -     promethazine-dextromethorphan (PROMETHAZINE-DM) 6.25-15 mg/5 mL Syrp; Take 5 mLs by mouth every 8 (eight) hours as needed.    Severe obesity  -     Lifestyle modifications discussed  -     Discussed pharmacotherapy--she will check with her insurance regarding coverage    F/U in 3 months.

## 2022-12-30 ENCOUNTER — TELEPHONE (OUTPATIENT)
Dept: PSYCHIATRY | Facility: CLINIC | Age: 47
End: 2022-12-30
Payer: COMMERCIAL

## 2023-01-12 ENCOUNTER — ANESTHESIA EVENT (OUTPATIENT)
Dept: ENDOSCOPY | Facility: HOSPITAL | Age: 48
End: 2023-01-12
Payer: COMMERCIAL

## 2023-01-12 ENCOUNTER — HOSPITAL ENCOUNTER (OUTPATIENT)
Facility: HOSPITAL | Age: 48
Discharge: HOME | End: 2023-01-12
Attending: INTERNAL MEDICINE | Admitting: INTERNAL MEDICINE
Payer: COMMERCIAL

## 2023-01-12 ENCOUNTER — ANESTHESIA (OUTPATIENT)
Dept: ENDOSCOPY | Facility: HOSPITAL | Age: 48
End: 2023-01-12
Payer: COMMERCIAL

## 2023-01-12 PROCEDURE — 25000003 PHARM REV CODE 250: Performed by: NURSE ANESTHETIST, CERTIFIED REGISTERED

## 2023-01-12 PROCEDURE — 25000003 PHARM REV CODE 250: Performed by: INTERNAL MEDICINE

## 2023-01-12 PROCEDURE — 63600175 PHARM REV CODE 636 W HCPCS: Performed by: NURSE ANESTHETIST, CERTIFIED REGISTERED

## 2023-01-12 PROCEDURE — 88305 TISSUE EXAM BY PATHOLOGIST: ICD-10-PCS | Mod: 26,,, | Performed by: PATHOLOGY

## 2023-01-12 PROCEDURE — 88305 TISSUE EXAM BY PATHOLOGIST: CPT | Performed by: PATHOLOGY

## 2023-01-12 PROCEDURE — 43239 EGD BIOPSY SINGLE/MULTIPLE: CPT | Mod: 51,,, | Performed by: INTERNAL MEDICINE

## 2023-01-12 PROCEDURE — 43239 EGD BIOPSY SINGLE/MULTIPLE: CPT | Performed by: INTERNAL MEDICINE

## 2023-01-12 PROCEDURE — 88305 TISSUE EXAM BY PATHOLOGIST: CPT | Mod: 26,,, | Performed by: PATHOLOGY

## 2023-01-12 PROCEDURE — 43239 PR EGD, FLEX, W/BIOPSY, SGL/MULTI: ICD-10-PCS | Mod: 51,,, | Performed by: INTERNAL MEDICINE

## 2023-01-12 PROCEDURE — 45380 COLONOSCOPY AND BIOPSY: CPT | Mod: PT | Performed by: INTERNAL MEDICINE

## 2023-01-12 PROCEDURE — 37000008 HC ANESTHESIA 1ST 15 MINUTES: Performed by: INTERNAL MEDICINE

## 2023-01-12 PROCEDURE — 45380 COLONOSCOPY AND BIOPSY: CPT | Mod: 33,,, | Performed by: INTERNAL MEDICINE

## 2023-01-12 PROCEDURE — 27201012 HC FORCEPS, HOT/COLD, DISP: Performed by: INTERNAL MEDICINE

## 2023-01-12 PROCEDURE — 45380 PR COLONOSCOPY,BIOPSY: ICD-10-PCS | Mod: 33,,, | Performed by: INTERNAL MEDICINE

## 2023-01-12 PROCEDURE — 37000009 HC ANESTHESIA EA ADD 15 MINS: Performed by: INTERNAL MEDICINE

## 2023-01-12 RX ORDER — DEXTROMETHORPHAN/PSEUDOEPHED 2.5-7.5/.8
DROPS ORAL
Status: COMPLETED | OUTPATIENT
Start: 2023-01-12 | End: 2023-01-12

## 2023-01-12 RX ORDER — SODIUM CHLORIDE, SODIUM LACTATE, POTASSIUM CHLORIDE, CALCIUM CHLORIDE 600; 310; 30; 20 MG/100ML; MG/100ML; MG/100ML; MG/100ML
INJECTION, SOLUTION INTRAVENOUS CONTINUOUS
Status: DISCONTINUED | OUTPATIENT
Start: 2023-01-12 | End: 2023-01-12 | Stop reason: HOSPADM

## 2023-01-12 RX ORDER — LIDOCAINE HYDROCHLORIDE 20 MG/ML
INJECTION INTRAVENOUS
Status: DISCONTINUED | OUTPATIENT
Start: 2023-01-12 | End: 2023-01-12

## 2023-01-12 RX ORDER — PROPOFOL 10 MG/ML
VIAL (ML) INTRAVENOUS
Status: DISCONTINUED | OUTPATIENT
Start: 2023-01-12 | End: 2023-01-12

## 2023-01-12 RX ADMIN — PROPOFOL 50 MG: 10 INJECTION, EMULSION INTRAVENOUS at 08:01

## 2023-01-12 RX ADMIN — PROPOFOL 100 MG: 10 INJECTION, EMULSION INTRAVENOUS at 08:01

## 2023-01-12 RX ADMIN — Medication 100 MG: at 08:01

## 2023-01-12 NOTE — TRANSFER OF CARE
"Anesthesia Transfer of Care Note    Patient: Zachary Lucia    Procedure(s) Performed: Procedure(s) (LRB):  EGD (ESOPHAGOGASTRODUODENOSCOPY) (N/A)  COLONOSCOPY (N/A)    Patient location: PACU    Anesthesia Type: MAC    Transport from OR: Transported from OR on room air with adequate spontaneous ventilation    Post pain: adequate analgesia    Post assessment: no apparent anesthetic complications    Post vital signs: stable    Level of consciousness: responds to stimulation    Nausea/Vomiting: no nausea/vomiting    Complications: none    Transfer of care protocol was followed      Last vitals:   Visit Vitals  BP (!) 132/92 (BP Location: Left arm, Patient Position: Lying)   Pulse 75   Temp 37 °C (98.6 °F)   Resp 20   Ht 5' 6" (1.676 m)   Wt 113.4 kg (250 lb)   LMP 12/16/2022 (Exact Date)   SpO2 97%   Breastfeeding No   BMI 40.35 kg/m²     "

## 2023-01-12 NOTE — PROVATION PATIENT INSTRUCTIONS
Discharge Summary/Instructions after an Endoscopic Procedure  Patient Name: Zachary Lucia  Patient MRN: [id number]  Patient   YOB: 1975  Thursday, January 12, 2023 Supriya Hughes MD  Dear patient,  As a result of recent federal legislation (The Federal Cures Act), you may   receive lab or pathology results from your procedure in your [facility]   account before your physician is able to contact you. Your physician or   their representative will relay the results to you with their   recommendations at their soonest availability.  Thank you,  RESTRICTIONS:  During your procedure today, you received medications for sedation.  These   medications may affect your judgment, balance and coordination.  Therefore,   for 24 hours, you have the following restrictions:   - DO NOT drive a car, operate machinery, make legal/financial decisions,   sign important papers or drink alcohol.    ACTIVITY:  Today: no heavy lifting, straining or running due to procedural   sedation/anesthesia.  The following day: return to full activity including work.  DIET:  Eat and drink normally unless instructed otherwise.     TREATMENT FOR COMMON SIDE EFFECTS:  - Mild abdominal pain, nausea, belching, bloating or excessive gas:  rest,   eat lightly and use a heating pad.  - Sore Throat: treat with throat lozenges and/or gargle with warm salt   water.  - Because air was used during the procedure, expelling large amounts of air   from your rectum or belching is normal.  - If a bowel prep was taken, you may not have a bowel movement for 1-3 days.    This is normal.  SYMPTOMS TO WATCH FOR AND REPORT TO YOUR PHYSICIAN:  1. Abdominal pain or bloating, other than gas cramps.  2. Chest pain.  3. Back pain.  4. Signs of infection such as: chills or fever occurring within 24 hours   after the procedure.  5. Rectal bleeding, which would show as bright red, maroon, or black stools.   (A tablespoon of blood from the rectum is not serious,  especially if   hemorrhoids are present.)  6. Vomiting.  7. Weakness or dizziness.  GO DIRECTLY TO THE NEAREST EMERGENCY ROOM IF YOU HAVE ANY OF THE FOLLOWING:      Difficulty breathing              Chills and/or fever over 101 F   Persistent vomiting and/or vomiting blood   Severe abdominal pain   Severe chest pain   Black, tarry stools   Bleeding- more than one tablespoon   Any other symptom or condition that you feel may need urgent attention  Your doctor recommends these additional instructions:  If any biopsies were taken, your doctors clinic will contact you in 1 to 2   weeks with any results.  - Patient has a contact number available for emergencies.  The signs and   symptoms of potential delayed complications were discussed with the   patient.  Return to normal activities tomorrow.  Written discharge   instructions were provided to the patient.   - Discharge patient to home (via wheelchair).   - Resume previous diet today.   - Continue present medications.   - Await pathology results.  For questions, problems or results please call your physician Supriya Hughes MD at Work:  [phone]  If you have any questions about the above instructions, call the GI   department at [phone] or call the endoscopy unit at [phone]   from 7am until 3 pm.  [facility] [place], EMERGENCY ROOM PHONE NUMBER:   [phone]  IF A COMPLICATION OR EMERGENCY SITUATION ARISES AND YOU ARE UNABLE TO REACH   YOUR PHYSICIAN - GO DIRECTLY TO THE EMERGENCY ROOM.  I have read or have had read to me these discharge instructions for my   procedure and have received a written copy.  I understand these   instructions and will follow-up with my physician if I have any questions.     __________________________________       _____________________________________  Nurse Signature                                          Patient/Designated   Responsible Party Signature  MD Supriya Montague,  MD  1/12/2023 8:42:47 AM  This report has been verified and signed electronically.  Dear patient,  As a result of recent federal legislation (The Federal Cures Act), you may   receive lab or pathology results from your procedure in your [facility]   account before your physician is able to contact you. Your physician or   their representative will relay the results to you with their   recommendations at their soonest availability.  Thank you,  PROVATION

## 2023-01-12 NOTE — H&P
PRE PROCEDURE H&P    Patient Name: Zachary Lucia  MRN: [id number]  : 1975  Date of Procedure:  2023  Referring Physician: Danna Camara DO  Primary Physician: Danna Camara DO  Procedure Physician: Supriya Hughes MD       Planned Procedure: Colonoscopy and EGD  Diagnosis: crcs, family history of gastric cancer, abd pain  Chief Complaint: Same as above    HPI: Patient is an 47 y.o. female is here for the above.     Last colonoscopy: over 10 years   Family history: family h/o gastric but no colon  Anticoagulation: none    Past Medical History:   Past Medical History:   Diagnosis Date    Abnormal stress test 2022    Asthma     Essential hypertension, malignant 2020    Lupus (systemic lupus erythematosus)     Membranous glomerulonephritis, stage 4 2019    Scleroderma     Seizures 2017        Past Surgical History:  Past Surgical History:   Procedure Laterality Date    LEFT HEART CATHETERIZATION Left 2022    Procedure: CATHETERIZATION, HEART, LEFT;  Surgeon: Rocío Davidson MD;  Location: [facility] CATH LAB;  Service: Cardiology;  Laterality: Left;    RENAL BIOPSY  2018        Home Medications:  Prior to Admission medications    Medication Sig Start Date End Date Taking? Authorizing Provider   azaTHIOprine (IMURAN) 100 mg tablet Take 1 tablet (100 mg total) by mouth once daily. 22  Yes Bridget Bhakta PA-C   azaTHIOprine (IMURAN) 50 mg Tab TAKE 1 TABLET (50 MG TOTAL) BY MOUTH ONCE DAILY IN EVENING 22  Yes Bridget Bhakta PA-C   belimumab (BENLYSTA) 200 mg/mL Syrg Inject 1 mL (200 mg total) into the skin every 7 days. 22  Yes Bridget Bhakta PA-C   HYDROcodone-acetaminophen (NORCO) 7.5-325 mg per tablet Take 1 tablet by mouth 4 (four) times daily as needed. 12/10/21  Yes Historical Provider   hydrOXYchloroQUINE (PLAQUENIL) 200 mg tablet Take 1 tablet (200 mg total) by mouth 2 (two) times daily. 21  Yes Danna Murphy PA-C   levetiracetam XR  (KEPPRA XR) 500 mg Tb24 24 hr tablet Take 500 mg by mouth once daily.   Yes Historical Provider   semaglutide, weight loss, 0.25 mg/0.5 mL PnIj Inject 0.25 mg into the skin every 7 days. 12/29/22  Yes Danna Camara, DO   sertraline (ZOLOFT) 50 MG tablet TAKE 1 TABLET BY MOUTH EVERY DAY 12/15/22  Yes Danna Camara,    betamethasone dipropionate (DIPROLENE) 0.05 % ointment Apply topically 2 (two) times daily as needed. Steroid. Use for flares 5/25/21   Deanna Little MD   cholecalciferol, vitamin D3, 1,250 mcg (50,000 unit) capsule Take 1 capsule (50,000 Units total) by mouth once a week. 6/1/20   Jenny Dixon MD   crisaborole (EUCRISA) 2 % Oint AAA bid prn.  Non-steroid.  Safe to use long-term. 5/25/21   Deanna Little MD   cyclobenzaprine (FLEXERIL) 10 MG tablet Take 1 tablet (10 mg total) by mouth 2 (two) times daily as needed for Muscle spasms. 2/17/22   Bridget Bhakta PA-C   ferrous sulfate (FEOSOL) 325 mg (65 mg iron) Tab tablet Take 1 tablet by mouth once daily. 8/3/21   Historical Provider   fluticasone-salmeterol diskus inhaler 100-50 mcg Inhale 1 puff into the lungs 2 (two) times daily. Controller 5/30/19 12/27/22  Jenny Dixon MD    mg tablet Take 800 mg by mouth every 8 (eight) hours as needed. 4/8/19   Historical Provider   predniSONE (DELTASONE) 10 MG tablet TAKE 1 TABLET BY MOUTH EVERY DAY 9/27/21   Danna Murphy PA-C   PROAIR HFA 90 mcg/actuation inhaler INHALE 2 PUFFS INTO THE LUNGS EVERY 6 (SIX) HOURS AS NEEDED FOR WHEEZING. RESCUE 5/23/19   Jenny Dixon MD   pulse oximeter (PULSE OXIMETER) device Use by Apply Externally route 2 (two) times a day. Use twice daily at 8 AM and 3 PM and [facility] as directed. 8/1/20   Mariajose Chavez NP        Allergies:  Review of patient's allergies indicates:   Allergen Reactions    Lisinopril      cough    Sulfa (sulfonamide antibiotics) Swelling        Social History:   Social History     Socioeconomic History    Marital  "status:      Spouse name: Darrell    Number of children: 0   Tobacco Use    Smoking status: Never    Smokeless tobacco: Never   Substance and Sexual Activity    Alcohol use: Yes     Comment: OCC    Drug use: No    Sexual activity: Yes       Family History:  Family History   Problem Relation Age of Onset    Arthritis Mother     Glaucoma Mother     Hypertension Mother     Diabetes Father     Diabetes Maternal Grandmother     Hypertension Maternal Grandmother     Arthritis Maternal Grandmother     Cataracts Maternal Grandmother     Diabetes Maternal Grandfather     Heart disease Maternal Grandfather     Hypertension Maternal Grandfather     Diabetes Paternal Grandmother     Heart disease Paternal Grandmother     Hypertension Paternal Grandmother     Heart disease Paternal Grandfather     Cancer Maternal Uncle        ROS: No acute cardiac events, no acute respiratory complaints.     Physical Exam (all patients):    BP (!) 132/92 (BP Location: Left arm, Patient Position: Lying)   Pulse 75   Temp 98.6 °F (37 °C)   Resp 20   Ht 5' 6" (1.676 m)   Wt 113.4 kg (250 lb)   LMP 12/16/2022 (Exact Date)   SpO2 97%   Breastfeeding No   BMI 40.35 kg/m²   Lungs: Clear to auscultation bilaterally, respirations unlabored  Heart: Regular rate and rhythm, S1 and S2 normal, no obvious murmurs  Abdomen:         Soft, non-tender, bowel sounds normal, no masses, no organomegaly    Lab Results   Component Value Date    WBC 5.10 11/09/2022    MCV 82 11/09/2022    RDW 13.8 11/09/2022     11/09/2022    INR 1.0 01/12/2022    GLU 96 11/09/2022    HGBA1C 5.2 03/17/2021    BUN 11 11/09/2022     11/09/2022    K 4.0 11/09/2022     11/09/2022        SEDATION PLAN: per anesthesia      History reviewed, vital signs satisfactory, cardiopulmonary status satisfactory, sedation options, risks and plans have been discussed with the patient  All their questions were answered and the patient agrees to the sedation procedures " as planned and the patient is deemed an appropriate candidate for the sedation as planned.    Procedure explained to patient, informed consent obtained and placed in chart.    Supriya Hughes  1/12/2023  8:04 AM

## 2023-01-12 NOTE — ANESTHESIA POSTPROCEDURE EVALUATION
Anesthesia Post Evaluation    Patient: Zachary Lucia    Procedure(s) Performed: Procedure(s) (LRB):  EGD (ESOPHAGOGASTRODUODENOSCOPY) (N/A)  COLONOSCOPY (N/A)    Final Anesthesia Type: MAC      Patient location during evaluation: PACU  Patient participation: Yes- Able to Participate  Level of consciousness: awake and alert  Post-procedure vital signs: reviewed and stable  Pain management: adequate  Airway patency: patent  ALESSANDRA mitigation strategies: Multimodal analgesia  PONV status at discharge: No PONV  Anesthetic complications: no      Cardiovascular status: blood pressure returned to baseline  Respiratory status: unassisted and spontaneous ventilation  Hydration status: euvolemic  Follow-up not needed.          Vitals Value Taken Time   /96 01/12/23 0857   Temp 36.4 °C (97.5 °F) 01/12/23 0837   Pulse 71 01/12/23 0857   Resp 20 01/12/23 0857   SpO2 97 % 01/12/23 0857         Event Time   Out of Recovery 09:06:09         Pain/Vin Score: Vin Score: 10 (1/12/2023  8:57 AM)

## 2023-01-12 NOTE — PLAN OF CARE
Dr. Hughes at  to discuss findings. VSS. No  Pain, no GI bleeding. Pt to be discharged from unit.

## 2023-01-12 NOTE — ANESTHESIA PREPROCEDURE EVALUATION
01/12/2023  Zachary Lucia is a 47 y.o., female.      Pre-op Assessment    I have reviewed the Patient Summary Reports.     I have reviewed the Nursing Notes. I have reviewed the NPO Status.   I have reviewed the Medications.     Review of Systems  Anesthesia Hx:  Denies Family Hx of Anesthesia complications.   Denies Personal Hx of Anesthesia complications.   Cardiovascular:   Hypertension Raynauds   Pulmonary:   Asthma    Renal/:   Chronic Renal Disease SLE   Neurological:   Headaches Seizures    Endocrine:   sjogren's   Psych:   anxiety          Physical Exam  General: Well nourished    Airway:  Mallampati: II   Mouth Opening: Normal  TM Distance: Normal  Tongue: Normal  Neck ROM: Normal ROM    Dental:  Intact        Anesthesia Plan  Type of Anesthesia, risks & benefits discussed:    Anesthesia Type: MAC  Intra-op Monitoring Plan: Standard ASA Monitors  Post Op Pain Control Plan: IV/PO Opioids PRN  Induction:  IV  Informed Consent: Informed consent signed with the Patient and all parties understand the risks and agree with anesthesia plan.  All questions answered.   ASA Score: 3  Anesthesia Plan Notes: Pt unable to urinate, explained risk of anesthesia if pregnant. Pt understands and wishes to proceed      Ready For Surgery From Anesthesia Perspective.     .

## 2023-01-12 NOTE — PROVATION PATIENT INSTRUCTIONS
Discharge Summary/Instructions after an Endoscopic Procedure  Patient Name: Zachary Lucia  Patient MRN: [id number]  Patient   YOB: 1975  Thursday, January 12, 2023 Supriya Hughes MD  Dear patient,  As a result of recent federal legislation (The Federal Cures Act), you may   receive lab or pathology results from your procedure in your [facility]   account before your physician is able to contact you. Your physician or   their representative will relay the results to you with their   recommendations at their soonest availability.  Thank you,  RESTRICTIONS:  During your procedure today, you received medications for sedation.  These   medications may affect your judgment, balance and coordination.  Therefore,   for 24 hours, you have the following restrictions:   - DO NOT drive a car, operate machinery, make legal/financial decisions,   sign important papers or drink alcohol.    ACTIVITY:  Today: no heavy lifting, straining or running due to procedural   sedation/anesthesia.  The following day: return to full activity including work.  DIET:  Eat and drink normally unless instructed otherwise.     TREATMENT FOR COMMON SIDE EFFECTS:  - Mild abdominal pain, nausea, belching, bloating or excessive gas:  rest,   eat lightly and use a heating pad.  - Sore Throat: treat with throat lozenges and/or gargle with warm salt   water.  - Because air was used during the procedure, expelling large amounts of air   from your rectum or belching is normal.  - If a bowel prep was taken, you may not have a bowel movement for 1-3 days.    This is normal.  SYMPTOMS TO WATCH FOR AND REPORT TO YOUR PHYSICIAN:  1. Abdominal pain or bloating, other than gas cramps.  2. Chest pain.  3. Back pain.  4. Signs of infection such as: chills or fever occurring within 24 hours   after the procedure.  5. Rectal bleeding, which would show as bright red, maroon, or black stools.   (A tablespoon of blood from the rectum is not serious,  especially if   hemorrhoids are present.)  6. Vomiting.  7. Weakness or dizziness.  GO DIRECTLY TO THE NEAREST EMERGENCY ROOM IF YOU HAVE ANY OF THE FOLLOWING:      Difficulty breathing              Chills and/or fever over 101 F   Persistent vomiting and/or vomiting blood   Severe abdominal pain   Severe chest pain   Black, tarry stools   Bleeding- more than one tablespoon   Any other symptom or condition that you feel may need urgent attention  Your doctor recommends these additional instructions:  If any biopsies were taken, your doctors clinic will contact you in 1 to 2   weeks with any results.  - Patient has a contact number available for emergencies.  The signs and   symptoms of potential delayed complications were discussed with the   patient.  Return to normal activities tomorrow.  Written discharge   instructions were provided to the patient.   - Discharge patient to home (via wheelchair).   - Resume previous diet today.   - Continue present medications.   - Await pathology results.   - Repeat colonoscopy in 7 years for surveillance.  For questions, problems or results please call your physician Supriya Hughes MD at Work:  [phone]  If you have any questions about the above instructions, call the GI   department at [phone] or call the endoscopy unit at [phone]   from 7am until 3 pm.  [facility], EMERGENCY ROOM PHONE NUMBER:   [phone]  IF A COMPLICATION OR EMERGENCY SITUATION ARISES AND YOU ARE UNABLE TO REACH   YOUR PHYSICIAN - GO DIRECTLY TO THE EMERGENCY ROOM.  I have read or have had read to me these discharge instructions for my   procedure and have received a written copy.  I understand these   instructions and will follow-up with my physician if I have any questions.     __________________________________       _____________________________________  Nurse Signature                                          Patient/Designated   Responsible Party  Signature  MD Supriya Montague MD  1/12/2023 8:33:51 AM  This report has been verified and signed electronically.  Dear patient,  As a result of recent federal legislation (The Federal Cures Act), you may   receive lab or pathology results from your procedure in your [facility]   account before your physician is able to contact you. Your physician or   their representative will relay the results to you with their   recommendations at their soonest availability.  Thank you,  PROVATION

## 2023-01-17 VITALS
HEART RATE: 71 BPM | RESPIRATION RATE: 20 BRPM | HEIGHT: 66 IN | OXYGEN SATURATION: 97 % | WEIGHT: 250 LBS | DIASTOLIC BLOOD PRESSURE: 96 MMHG | BODY MASS INDEX: 40.18 KG/M2 | TEMPERATURE: 98 F | SYSTOLIC BLOOD PRESSURE: 144 MMHG

## 2023-01-19 LAB
FINAL PATHOLOGIC DIAGNOSIS: NORMAL
Lab: NORMAL

## 2023-02-01 ENCOUNTER — LAB VISIT (OUTPATIENT)
Dept: LAB | Facility: HOSPITAL | Age: 48
End: 2023-02-01
Attending: PHYSICIAN ASSISTANT
Payer: COMMERCIAL

## 2023-02-01 DIAGNOSIS — M34.9 SCLERODERMA: ICD-10-CM

## 2023-02-01 DIAGNOSIS — M35.01 SJOGREN'S SYNDROME WITH KERATOCONJUNCTIVITIS SICCA: ICD-10-CM

## 2023-02-01 DIAGNOSIS — M32.14 LUPUS NEPHRITIS, ISN/RPS CLASS V: ICD-10-CM

## 2023-02-01 LAB
ALBUMIN SERPL BCP-MCNC: 3.9 G/DL (ref 3.5–5.2)
ALP SERPL-CCNC: 56 U/L (ref 55–135)
ALT SERPL W/O P-5'-P-CCNC: 12 U/L (ref 10–44)
ANION GAP SERPL CALC-SCNC: 14 MMOL/L (ref 8–16)
AST SERPL-CCNC: 13 U/L (ref 10–40)
BASOPHILS # BLD AUTO: 0.03 K/UL (ref 0–0.2)
BASOPHILS NFR BLD: 0.5 % (ref 0–1.9)
BILIRUB SERPL-MCNC: 0.4 MG/DL (ref 0.1–1)
BUN SERPL-MCNC: 11 MG/DL (ref 6–20)
CALCIUM SERPL-MCNC: 9 MG/DL (ref 8.7–10.5)
CHLORIDE SERPL-SCNC: 105 MMOL/L (ref 95–110)
CO2 SERPL-SCNC: 19 MMOL/L (ref 23–29)
CREAT SERPL-MCNC: 0.8 MG/DL (ref 0.5–1.4)
CRP SERPL-MCNC: 14.6 MG/L (ref 0–8.2)
DIFFERENTIAL METHOD: ABNORMAL
EOSINOPHIL # BLD AUTO: 0.2 K/UL (ref 0–0.5)
EOSINOPHIL NFR BLD: 3.4 % (ref 0–8)
ERYTHROCYTE [DISTWIDTH] IN BLOOD BY AUTOMATED COUNT: 13.8 % (ref 11.5–14.5)
ERYTHROCYTE [SEDIMENTATION RATE] IN BLOOD BY PHOTOMETRIC METHOD: 32 MM/HR (ref 0–36)
EST. GFR  (NO RACE VARIABLE): >60 ML/MIN/1.73 M^2
GLUCOSE SERPL-MCNC: 95 MG/DL (ref 70–110)
HCT VFR BLD AUTO: 34.6 % (ref 37–48.5)
HGB BLD-MCNC: 11.3 G/DL (ref 12–16)
IMM GRANULOCYTES # BLD AUTO: 0.01 K/UL (ref 0–0.04)
IMM GRANULOCYTES NFR BLD AUTO: 0.2 % (ref 0–0.5)
LYMPHOCYTES # BLD AUTO: 1.5 K/UL (ref 1–4.8)
LYMPHOCYTES NFR BLD: 24.6 % (ref 18–48)
MCH RBC QN AUTO: 26.4 PG (ref 27–31)
MCHC RBC AUTO-ENTMCNC: 32.7 G/DL (ref 32–36)
MCV RBC AUTO: 81 FL (ref 82–98)
MONOCYTES # BLD AUTO: 0.6 K/UL (ref 0.3–1)
MONOCYTES NFR BLD: 9.3 % (ref 4–15)
NEUTROPHILS # BLD AUTO: 3.7 K/UL (ref 1.8–7.7)
NEUTROPHILS NFR BLD: 62 % (ref 38–73)
NRBC BLD-RTO: 0 /100 WBC
PLATELET # BLD AUTO: 343 K/UL (ref 150–450)
PMV BLD AUTO: 9 FL (ref 9.2–12.9)
POTASSIUM SERPL-SCNC: 3.9 MMOL/L (ref 3.5–5.1)
PROT SERPL-MCNC: 7.4 G/DL (ref 6–8.4)
RBC # BLD AUTO: 4.28 M/UL (ref 4–5.4)
SODIUM SERPL-SCNC: 138 MMOL/L (ref 136–145)
WBC # BLD AUTO: 5.94 K/UL (ref 3.9–12.7)

## 2023-02-01 PROCEDURE — 80053 COMPREHEN METABOLIC PANEL: CPT | Performed by: PHYSICIAN ASSISTANT

## 2023-02-01 PROCEDURE — 36415 COLL VENOUS BLD VENIPUNCTURE: CPT | Performed by: PHYSICIAN ASSISTANT

## 2023-02-01 PROCEDURE — 85652 RBC SED RATE AUTOMATED: CPT | Performed by: PHYSICIAN ASSISTANT

## 2023-02-01 PROCEDURE — 85025 COMPLETE CBC W/AUTO DIFF WBC: CPT | Performed by: PHYSICIAN ASSISTANT

## 2023-02-01 PROCEDURE — 86140 C-REACTIVE PROTEIN: CPT | Performed by: PHYSICIAN ASSISTANT

## 2023-02-08 NOTE — PROGRESS NOTES
RHEUMATOLOGY OUTPATIENT CLINIC NOTE    Attending Rheumatologist: Bridget Bhakta PA-C  Primary Care Provider: Danna Camara DO   Chief Complaint/Reason For Consultation:  sjogren's syndrome      Subjective:        Zachary Lucia is a 47 y.o. female here today for follow up sjogrens/scl overlap. She has +AMAURY SSA and SSB. SCL 70+. +raynauds, dry mouth, mild dry eyes +gerd. raynauds stable- no trouble this winter. benlysta seems to be helping overall with her joint flares. Does note some dry itchy skin around her benlysta injection sites. Has not tried anything topical yet. Estimates she is taking prednisone 10 mg twice per week. Also has trouble with her knees mel- seeing ortho for this. Has had IAC and HA injections. Recent dental and eye exams- no HCQ toxicity; no dental issues 2' dry mouth. Has had not updated PFT and pulmonary stress test yet.     CT 2018 chest wnl no ILD, fatty liver noted. Echo wnl 2018.      In the past she was seen by Nephrology for microscopic hematuria, She had a kidney biopsy done which was suggestive of membranous nephropathy/membranous lupus nephritis/class 5. Taken off lisinopril because of cough.     Serologies  Lab Results   Component Value Date    TBGOLDPLUS Negative 02/17/2022     Lab Results   Component Value Date    AMAURY Positive (A) 11/29/2017     Lab Results   Component Value Date    RF <10.0 11/29/2017     Lab Results   Component Value Date    HEPAIGM Negative 02/17/2022    HEPBIGM Negative 02/17/2022    HEPCAB Negative 02/17/2022       Latest Reference Range & Units 05/15/19 10:22   AMAURY Screen Negative <1:160  Positive !   AMAURY HEP-2 Titer  Positive 1:640 Speckled Atypical   ds DNA Ab Negative 1:10  Negative 1:10   Anti-SSA Antibody 0.00 - 19.99 EU 88.76 (H)   Anti-SSA Interpretation Negative  Positive !   Anti-SSB Antibody 0.00 - 19.99 .56 (H)   Anti-SSB Interpretation Negative  Positive !   Anti Sm Antibody 0.00 - 19.99 EU 5.92   Anti-Sm  "Interpretation Negative  Negative   Anti Sm/RNP Antibody 0.00 - 19.99 EU 10.07   Anti-Sm/RNP Interpretation Negative  Negative      Latest Reference Range & Units 02/01/18 09:57   Scleroderma SCL- <20 UNITS 43 (H)     Current Rheum Medications:  Imuran 150 mg/day,  mg BID, prednisone 10 mg prn, benlysta SC weekly  Previous Rheum Medications:   Norvasc, cellcept (SE), MTX (avoiding w/ fatty liver), gabapentin (failed)    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    Scleroderma     Seizures 01/12/2017     Social History     Tobacco Use    Smoking status: Never    Smokeless tobacco: Never   Substance Use Topics    Alcohol use: Yes     Comment: OCC   \  Review of patient's allergies indicates:   Allergen Reactions    Lisinopril      cough    Sulfa (sulfonamide antibiotics) Swelling       Objective:   BP (!) 141/94   Ht 5' 6" (1.676 m)   BMI 40.35 kg/m²     Immunization History   Administered Date(s) Administered    COVID-19, MRNA, LN-S, PF (Pfizer) (Purple Cap) 06/09/2021, 06/09/2021, 06/30/2021, 06/30/2021, 12/31/2021    Tdap 04/05/2012, 04/05/2012, 11/29/2022     Current Outpatient Medications   Medication Instructions    azaTHIOprine (IMURAN) 50 mg, Oral, Daily    azaTHIOprine (IMURAN) 100 mg, Oral, Daily    BENLYSTA 200 mg, Subcutaneous, Every 7 days    betamethasone dipropionate (DIPROLENE) 0.05 % ointment Topical (Top), 2 times daily PRN, Steroid. Use for flares    cholecalciferol (vitamin D3) 50,000 Units, Oral, Weekly    crisaborole (EUCRISA) 2 % Oint AAA bid prn.  Non-steroid.  Safe to use long-term.    cyclobenzaprine (FLEXERIL) 10 mg, Oral, 2 times daily PRN    ferrous sulfate (FEOSOL) 325 mg (65 mg iron) Tab tablet 1 tablet, Oral, Daily    fluticasone-salmeterol diskus inhaler 100-50 mcg 1 puff, Inhalation, 2 times daily, " Controller    HYDROcodone-acetaminophen (NORCO) 7.5-325 mg per tablet 1 tablet, Oral, 4 times daily PRN    hydrOXYchloroQUINE (PLAQUENIL) 200 mg, Oral, 2 times daily     mg, Oral, Every 8 hours PRN    levetiracetam XR (KEPPRA XR) 500 mg, Oral, Daily    predniSONE (DELTASONE) 10 MG tablet TAKE 1 TABLET BY MOUTH EVERY DAY    PROAIR HFA 90 mcg/actuation inhaler INHALE 2 PUFFS INTO THE LUNGS EVERY 6 (SIX) HOURS AS NEEDED FOR WHEEZING. RESCUE    pulse oximeter (PULSE OXIMETER) device Use by Apply Externally route 2 (two) times a day. Use twice daily at 8 AM and 3 PM and [facility] as [facility].    semaglutide (weight loss) 0.25 mg, Subcutaneous, Every 7 days    sertraline (ZOLOFT) 50 MG tablet TAKE 1 TABLET BY MOUTH EVERY DAY        Recent Results (from the past 336 hour(s))   CBC Auto Differential    Collection Time: 02/01/23  7:14 AM   Result Value Ref Range    WBC 5.94 3.90 - 12.70 K/uL    RBC 4.28 4.00 - 5.40 M/uL    Hemoglobin 11.3 (L) 12.0 - 16.0 g/dL    Hematocrit 34.6 (L) 37.0 - 48.5 %    MCV 81 (L) 82 - 98 fL    MCH 26.4 (L) 27.0 - 31.0 pg    MCHC 32.7 32.0 - 36.0 g/dL    RDW 13.8 11.5 - 14.5 %    Platelets 343 150 - 450 K/uL    MPV 9.0 (L) 9.2 - 12.9 fL    Immature Granulocytes 0.2 0.0 - 0.5 %    Gran # (ANC) 3.7 1.8 - 7.7 K/uL    Immature Grans (Abs) 0.01 0.00 - 0.04 K/uL    Lymph # 1.5 1.0 - 4.8 K/uL    Mono # 0.6 0.3 - 1.0 K/uL    Eos # 0.2 0.0 - 0.5 K/uL    Baso # 0.03 0.00 - 0.20 K/uL    nRBC 0 0 /100 WBC    Gran % 62.0 38.0 - 73.0 %    Lymph % 24.6 18.0 - 48.0 %    Mono % 9.3 4.0 - 15.0 %    Eosinophil % 3.4 0.0 - 8.0 %    Basophil % 0.5 0.0 - 1.9 %    Differential Method Automated    Comprehensive Metabolic Panel    Collection Time: 02/01/23  7:14 AM   Result Value Ref Range    Sodium 138 136 - 145 mmol/L    Potassium 3.9 3.5 - 5.1 mmol/L    Chloride 105 95 - 110 mmol/L    CO2 19 (L) 23 - 29 mmol/L    Glucose 95 70 - 110 mg/dL    BUN 11 6 - 20 mg/dL    Creatinine 0.8 0.5 - 1.4  mg/dL    Calcium 9.0 8.7 - 10.5 mg/dL    Total Protein 7.4 6.0 - 8.4 g/dL    Albumin 3.9 3.5 - 5.2 g/dL    Total Bilirubin 0.4 0.1 - 1.0 mg/dL    Alkaline Phosphatase 56 55 - 135 U/L    AST 13 10 - 40 U/L    ALT 12 10 - 44 U/L    Anion Gap 14 8 - 16 mmol/L    eGFR >60 >60 mL/min/1.73 m^2   C-Reactive Protein    Collection Time: 02/01/23  7:14 AM   Result Value Ref Range    CRP 14.6 (H) 0.0 - 8.2 mg/L   Sedimentation rate    Collection Time: 02/01/23  7:14 AM   Result Value Ref Range    Sed Rate 32 0 - 36 mm/Hr       Imaging:  All imaging reviewed and independently interpreted by me.    cxray 6/2021:   Lungs are clear without focal consolidation, edema, or mass.  There is no pneumothorax or pleural effusion.  Cardiomediastinal silhouette is within normal limits.  No acute osseous abnormality.     Echo 6.21.21:   The left ventricle is normal in size with concentric remodeling and normal systolic function.  The estimated ejection fraction is 60%.  Normal left ventricular diastolic function.  Normal right ventricular size with normal right ventricular systolic function.  Normal central venous pressure (3 mmHg).  The estimated PA systolic pressure is 32 mmHg.     6mwt 6.11.21 Interpretation:   Total distance walked in six minutes is normal indicating normal   functional capacity.   Patient did not meet criteria for oxygen prescription. Clinical   correlation suggested.      pfts interpretation 6.11.21  The low FEV0.5/FEV1 ratio suggests central or upper airway obstruction may be present. Spirometry remains unimproved following bronchodilator. Lung volumes show mild restriction is present. Airway mechanics show airway resistance is increased. Specific   conductance remains normal. DLCO is mildly decreased. MVV is severely decreased.     Results for orders placed in visit on 12/06/22    DXA Bone Density Spine And Hip    Narrative  EXAMINATION:  DEXA BONE DENSITY SPINE HIP    CLINICAL HISTORY:  Long term (current) use of  systemic steroids    COMPARISON:  None    FINDINGS:  The L1 to L4 vertebral bone mineral density is equal to 1.377 g/cm squared with a T score of 1.5.    The left femoral neck bone mineral density is equal to 1.0185 g/cm squared with a T score of 1.1.    The total hip bone mineral density is equal to 1.237 g/cm squared with a T score of 1.8.    Impression  No evidence of significant bone density loss    Electronically signed by: Nathaniel Oswald MD  Date:    12/06/2022  Time:    11:13      Assessment:     1. Sjogren's syndrome with keratoconjunctivitis sicca    2. Raynaud's disease without gangrene    3. High risk medication use    4. Immunosuppressed status    5. Long term systemic steroid user    6. 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  Update PFT, 6 min walk test  Trial OTC cortisone cream for itchy flaky skin around injection sites; rotate injection sites regularly  Compromised immune system secondary to autoimmune disease and use of immunosuppressive drugs. Monitor carefully for infections and toxicities- Currently denies issues with recurrent infections. Advised to get immediate medical care if any infection.  Recommend Yearly Skin Cancer Screening by Dermatology for all patients on biologic or Araseli. advised strict adherence to age appropriate vaccinations (shingles, pneumonia, flu and covid) and cancer screenings with PCP   Patient advised to hold DMARD and/or biologic therapy for signs of infection or for surgery. If you are unsure what to do please call our office for instruction.[facility] [phone]  no current medication related issues, no evidence of  toxicity. I ordered labs for toxicity monitoring;  results reviewed and discussed findings with the patient   Return to clinic: 3 mos w/ safety labs and 6 mos w/ OV and safety labs    The patient understands, chooses and consents to this plan and accepts all the risks which include but are not limited to the risks mentioned above.     Method of contact with patient concerns: [facility]    40 minutes of total time spent on the encounter, which includes face to face time and non-face to face time preparing to see the patient (eg, review of tests), Obtaining and/or reviewing separately obtained history, Documenting clinical information in the electronic or other health record, Independently interpreting results (not separately reported) and communicating results to the patient/family/caregiver, or Care coordination (not separately reported).          Disclaimer: This note was prepared using a voice recognition system and is likely to have sound alike errors within the text.       Bridget Bhakta PA-C  Rheumatology Department   [facility]

## 2023-02-09 ENCOUNTER — OFFICE VISIT (OUTPATIENT)
Dept: RHEUMATOLOGY | Facility: CLINIC | Age: 48
End: 2023-02-09
Payer: COMMERCIAL

## 2023-02-09 VITALS — HEIGHT: 66 IN | DIASTOLIC BLOOD PRESSURE: 94 MMHG | BODY MASS INDEX: 40.35 KG/M2 | SYSTOLIC BLOOD PRESSURE: 141 MMHG

## 2023-02-09 DIAGNOSIS — Z79.899 HIGH RISK MEDICATION USE: ICD-10-CM

## 2023-02-09 DIAGNOSIS — Z79.52 LONG TERM SYSTEMIC STEROID USER: ICD-10-CM

## 2023-02-09 DIAGNOSIS — M35.01 SJOGREN'S SYNDROME WITH KERATOCONJUNCTIVITIS SICCA: Primary | ICD-10-CM

## 2023-02-09 DIAGNOSIS — D84.9 IMMUNOSUPPRESSED STATUS: ICD-10-CM

## 2023-02-09 DIAGNOSIS — I73.00 RAYNAUD'S DISEASE WITHOUT GANGRENE: ICD-10-CM

## 2023-02-09 DIAGNOSIS — M32.14 LUPUS NEPHRITIS, ISN/RPS CLASS V: ICD-10-CM

## 2023-02-09 PROCEDURE — 99999 PR PBB SHADOW E&M-EST. PATIENT-LVL III: ICD-10-PCS | Mod: PBBFAC,,, | Performed by: PHYSICIAN ASSISTANT

## 2023-02-09 PROCEDURE — 3080F DIAST BP >= 90 MM HG: CPT | Mod: CPTII,S$GLB,, | Performed by: PHYSICIAN ASSISTANT

## 2023-02-09 PROCEDURE — 1159F MED LIST DOCD IN RCRD: CPT | Mod: CPTII,S$GLB,, | Performed by: PHYSICIAN ASSISTANT

## 2023-02-09 PROCEDURE — 99999 PR PBB SHADOW E&M-EST. PATIENT-LVL III: CPT | Mod: PBBFAC,,, | Performed by: PHYSICIAN ASSISTANT

## 2023-02-09 PROCEDURE — 3080F PR MOST RECENT DIASTOLIC BLOOD PRESSURE >= 90 MM HG: ICD-10-PCS | Mod: CPTII,S$GLB,, | Performed by: PHYSICIAN ASSISTANT

## 2023-02-09 PROCEDURE — 1159F PR MEDICATION LIST DOCUMENTED IN MEDICAL RECORD: ICD-10-PCS | Mod: CPTII,S$GLB,, | Performed by: PHYSICIAN ASSISTANT

## 2023-02-09 PROCEDURE — 3077F SYST BP >= 140 MM HG: CPT | Mod: CPTII,S$GLB,, | Performed by: PHYSICIAN ASSISTANT

## 2023-02-09 PROCEDURE — 3077F PR MOST RECENT SYSTOLIC BLOOD PRESSURE >= 140 MM HG: ICD-10-PCS | Mod: CPTII,S$GLB,, | Performed by: PHYSICIAN ASSISTANT

## 2023-02-09 PROCEDURE — 3008F BODY MASS INDEX DOCD: CPT | Mod: CPTII,S$GLB,, | Performed by: PHYSICIAN ASSISTANT

## 2023-02-09 PROCEDURE — 3008F PR BODY MASS INDEX (BMI) DOCUMENTED: ICD-10-PCS | Mod: CPTII,S$GLB,, | Performed by: PHYSICIAN ASSISTANT

## 2023-02-09 PROCEDURE — 99215 OFFICE O/P EST HI 40 MIN: CPT | Mod: S$GLB,,, | Performed by: PHYSICIAN ASSISTANT

## 2023-02-09 PROCEDURE — 99215 PR OFFICE/OUTPT VISIT, EST, LEVL V, 40-54 MIN: ICD-10-PCS | Mod: S$GLB,,, | Performed by: PHYSICIAN ASSISTANT

## 2023-02-09 NOTE — Clinical Note
Please schedule PFT, pulmonary stress test 3 mos for safety labs + tb gold and hepatitis labs only and 6 mos w/ OV and safety labs

## 2023-03-03 ENCOUNTER — OFFICE VISIT (OUTPATIENT)
Dept: PSYCHIATRY | Facility: CLINIC | Age: 48
End: 2023-03-03
Payer: COMMERCIAL

## 2023-03-03 DIAGNOSIS — F43.21 GRIEF REACTION: Primary | ICD-10-CM

## 2023-03-03 DIAGNOSIS — F43.21 SITUATIONAL DEPRESSION: ICD-10-CM

## 2023-03-03 PROCEDURE — 90791 PSYCH DIAGNOSTIC EVALUATION: CPT | Mod: 95,,, | Performed by: SOCIAL WORKER

## 2023-03-03 PROCEDURE — 90791 PR PSYCHIATRIC DIAGNOSTIC EVALUATION: ICD-10-PCS | Mod: 95,,, | Performed by: SOCIAL WORKER

## 2023-03-03 NOTE — PROGRESS NOTES
Psychiatry Initial Visit (PhD/LCSW)  Diagnostic Interview - CPT 69456    Due to the nature of this visit type, a virtual visit with synchronous audio and video, each patient to whom this provider administers behavioral health services by telemedicine is: (1) informed of the relationship between the provider and patient and the respective role of any other health care provider with respect to management of the patient; and (2) notified that he or she may decline to receive services by telemedicine and may withdraw from such care at any time.    The patient was informed of the following:     Provider's contact info:  [facility] [address], [address] Floor, [address]  [place] [address]  (Phone) [phone]    If technology issues occur, call office phone: : [phone]  If crisis: Dial 911 or go to nearest Emergency Room (ER)  If questions related to privacy practices: contact [facility] Information Department: [phone]    For security purposes, the pt identified that they were at [address] [place] [address] [place] [address] during today's session and contact number is [phone].    The pt's emergency contact(s) is Extended Emergency Contact Information  Primary Emergency Contact: Darrell Lucia  Address: [address] [place] [address] [place] [address] [place]  Home Phone: [phone]  Mobile Phone: [phone]  Relation: Spouse  Secondary Emergency Contact: Radha Escalona  Address: [address] [place] [address] [facility]  Home Phone: [phone]  Mobile Phone: [phone]  Relation: Mother.    Crisis Disclaimer: Patient was informed that due to the virtual nature of the visit, that if a crisis develops, protocols will be implemented to ensure patient safety, including but not limited to: 1) Initiating a welfare check with local law enforcement and/or 2) Calling 911.    Date: 3/3/2023    Site: [facility]    Referral source: Dr. Danna Camara, DO    Clinical status of patient: Outpatient    [facility] Celina Lucia, a 47 y.o. female, for initial evaluation visit.  Met with patient.    Chief complaint/reason for encounter: depression and anxiety    PHQ-9 Depression Patient Health Questionnaire 3/3/2023   Patient agreed to terms: Yes   Little interest or pleasure in doing things 3   Feeling down, depressed, or hopeless 2   Trouble falling or staying asleep, or sleeping too much 3   Feeling tired or having little energy 3   Poor appetite or overeating 2   Feeling bad about yourself - or that you are a failure or have let yourself or your family down 2   Trouble concentrating on things, such as reading the newspaper or watching television 2   Moving or speaking so slowly that other people could have noticed. Or the opposite - being so fidgety or restless that you have been moving around a lot more than usual 2   Thoughts that you would be better off dead, or of hurting yourself in some way 0   PHQ-9 Total Score 19   If you checked off any problems, how difficult have these problems made it for you to do your work, take care of things at home, or get along with other people? Extremely dIfficult   Interpretation Moderately Severe       No flowsheet data found.    History of present illness:  Met with this patient for her online initial evaluation appointment.  The patient was in her home throughout the appointment.  The patient stated that she is presenting for counseling due to ongoing stress, depression and anxiety and grief following the death of her grandmother and her uncle. Based on her PHQ score, the patient is in the moderately severe category. The patient is prescribed an SSRI through her PCP.  The patient was referred by her primary care physician who reported the following:     "Her primary concern is depression. She lost her uncle in June and it has taken a toll on her. She is crying all the time " "and having a difficult time at work.   She has pain in the upper part of her abdomen. At times it feels like she has acid reflux but she is concerned because her uncle that recently passed had stomach cancer."    The patient was feeling some distress since the death of her grandmother in particular.  She stated that this grandmother was the hub of the family, the central figure where most holidays occurred at her house.  She often had regular dinners for all of the family there as well.  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  She is the person that the customer talks to if they are not satisfied with their 1st experience with the .  The patient states that this is actually a very difficult and stressful job and she does not get paid more for it.  She would like to take some time off from work on FMLA to take care of her stress and anxiety.  Suggested that she see her PCP for the paperwork for this.  She stated that she would do that and would make a follow-up appointment.      Pain: noncontributory    Symptoms:   Mood: depressed mood, fatigue, and worthlessness/guilt  Anxiety: excessive anxiety/worry, restlessness/keyed up, and irritability  Substance abuse: denied  Cognitive functioning: denied  Health behaviors: noncontributory    Psychiatric history: psychotropic management by PCP    Medical history:   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    Scleroderma     Seizures 01/12/2017       Family history of psychiatric illness:   Family History   Problem Relation Age of Onset    " Arthritis Mother     Glaucoma Mother     Hypertension Mother     Diabetes Father     Diabetes Maternal Grandmother     Hypertension Maternal Grandmother     Arthritis Maternal Grandmother     Cataracts Maternal Grandmother     Diabetes Maternal Grandfather     Heart disease Maternal Grandfather     Hypertension Maternal Grandfather     Diabetes Paternal Grandmother     Heart disease Paternal Grandmother     Hypertension Paternal Grandmother     Heart disease Paternal Grandfather     Cancer Maternal Uncle         Social history (marriage, employment, etc.):   Social History     Social History Narrative    Not on file        Substance use:     Social History     Tobacco Use    Smoking status: Never    Smokeless tobacco: Never   Substance Use Topics    Alcohol use: Yes     Comment: OCC        Current medications and drug reactions (include OTC, herbal):    Current Outpatient Medications:     azaTHIOprine (IMURAN) 100 mg tablet, Take 1 tablet (100 mg total) by mouth once daily., Disp: 90 tablet, Rfl: 1    azaTHIOprine (IMURAN) 50 mg Tab, TAKE 1 TABLET (50 MG TOTAL) BY MOUTH ONCE DAILY IN EVENING, Disp: 90 tablet, Rfl: 1    belimumab (BENLYSTA) 200 mg/mL Syrg, Inject 1 mL (200 mg total) into the skin every 7 days., Disp: 4 each, Rfl: 11    betamethasone dipropionate (DIPROLENE) 0.05 % ointment, Apply topically 2 (two) times daily as needed. Steroid. Use for flares, Disp: 45 g, Rfl: 3    cholecalciferol, vitamin D3, 1,250 mcg (50,000 unit) capsule, Take 1 capsule (50,000 Units total) by mouth once a week., Disp: 15 capsule, Rfl: 1    crisaborole (EUCRISA) 2 % Oint, AAA bid prn.  Non-steroid.  Safe to use long-term., Disp: 60 g, Rfl: 3    cyclobenzaprine (FLEXERIL) 10 MG tablet, Take 1 tablet (10 mg total) by mouth 2 (two) times daily as needed for Muscle spasms., Disp: 60 tablet, Rfl: 0    ferrous sulfate (FEOSOL) 325 mg (65 mg iron) Tab tablet, Take 1 tablet by mouth once daily., Disp: , Rfl:     fluticasone-salmeterol  diskus inhaler 100-50 mcg, Inhale 1 puff into the lungs 2 (two) times daily. Controller, Disp: 1 each, Rfl: 11    HYDROcodone-acetaminophen (NORCO) 7.5-325 mg per tablet, Take 1 tablet by mouth 4 (four) times daily as needed., Disp: , Rfl:     hydrOXYchloroQUINE (PLAQUENIL) 200 mg tablet, Take 1 tablet (200 mg total) by mouth 2 (two) times daily., Disp: 180 tablet, Rfl: 3     mg tablet, Take 800 mg by mouth every 8 (eight) hours as needed., Disp: , Rfl: 0    levetiracetam XR (KEPPRA XR) 500 mg Tb24 24 hr tablet, Take 500 mg by mouth once daily., Disp: , Rfl:     predniSONE (DELTASONE) 10 MG tablet, TAKE 1 TABLET BY MOUTH EVERY DAY, Disp: 60 tablet, Rfl: 1    PROAIR HFA 90 mcg/actuation inhaler, INHALE 2 PUFFS INTO THE LUNGS EVERY 6 (SIX) HOURS AS NEEDED FOR WHEEZING. RESCUE, Disp: 8.5 Inhaler, Rfl: 0    pulse oximeter (PULSE OXIMETER) device, Use by Apply Externally route 2 (two) times a day. Use twice daily at 8 AM and 3 PM and record the value in MyChart as directed., Disp: 1 each, Rfl: 0    sertraline (ZOLOFT) 50 MG tablet, TAKE 1 TABLET BY MOUTH EVERY DAY, Disp: 90 tablet, Rfl: 3    WEGOVY 0.25 mg/0.5 mL PnIj, INJECT 0.25 MG INTO THE SKIN EVERY 7 DAYS, Disp: 2 each, Rfl: 1      Strengths and liabilities: Strength: Patient accepts guidance/feedback    Current Evaluation:     Mental Status Exam:  General Appearance:  unremarkable, age appropriate   Speech: normal tone, normal rate, normal pitch, normal volume      Level of Cooperation: cooperative      Thought Processes: normal and logical   Mood: steady      Thought Content: normal, no suicidality, no homicidality, delusions, or paranoia   Affect: congruent and appropriate   Orientation: Oriented x3   Memory: recent >  intact   Attention Span & Concentration: intact   Fund of General Knowledge: intact and appropriate to age and level of education   Abstract Reasoning: interpretation of similarities was abstract   Judgment & Insight: fair     Language   intact     Diagnostic Impression - Plan:       ICD-10-CM ICD-9-CM   1. Grief reaction  F43.21 309.0   2. Situational depression  F43.21 309.0       Plan:individual psychotherapy    Return to Clinic: as scheduled    Length of Service (minutes): 60       Maine Viveros LCSW  03/26/2023   3:35 PM

## 2023-03-06 ENCOUNTER — CLINICAL SUPPORT (OUTPATIENT)
Dept: PULMONOLOGY | Facility: CLINIC | Age: 48
End: 2023-03-06
Payer: COMMERCIAL

## 2023-03-06 VITALS — WEIGHT: 241.88 LBS | HEIGHT: 66 IN | BODY MASS INDEX: 38.87 KG/M2

## 2023-03-06 DIAGNOSIS — M34.9 SCLERODERMA: ICD-10-CM

## 2023-03-06 LAB
BRPFT: NORMAL
DLCO ADJ PRE: 15.09 ML/(MIN*MMHG)
DLCO SINGLE BREATH LLN: 19.95
DLCO SINGLE BREATH PRE REF: 54.6 %
DLCO SINGLE BREATH REF: 25.68
DLCOC SBVA LLN: 3.44
DLCOC SBVA PRE REF: 135.2 %
DLCOC SBVA REF: 4.85
DLCOC SINGLE BREATH LLN: 19.95
DLCOC SINGLE BREATH PRE REF: 58.8 %
DLCOC SINGLE BREATH REF: 25.68
DLCOVA LLN: 3.44
DLCOVA PRE REF: 125.6 %
DLCOVA PRE: 6.09 ML/(MIN*MMHG*L)
DLCOVA REF: 4.85
DLVAADJ PRE: 6.55 ML/(MIN*MMHG*L)
ERV LLN: -16449.01
ERV PRE REF: 56.4 %
ERV REF: 0.99
FEF 25 75 CHG: 123.6 %
FEF 25 75 LLN: 1.25
FEF 25 75 POST REF: 102.4 %
FEF 25 75 PRE REF: 45.8 %
FEF 25 75 REF: 2.55
FET100 CHG: -8.4 %
FEV1 CHG: 20.2 %
FEV1 FVC CHG: 30.1 %
FEV1 FVC LLN: 70
FEV1 FVC POST REF: 107.6 %
FEV1 FVC PRE REF: 82.7 %
FEV1 FVC REF: 81
FEV1 LLN: 1.95
FEV1 POST REF: 81.1 %
FEV1 PRE REF: 67.5 %
FEV1 REF: 2.57
FRCPLETH LLN: 1.99
FRCPLETH PREREF: 84.6 %
FRCPLETH REF: 2.81
FVC CHG: -7.6 %
FVC LLN: 2.45
FVC POST REF: 75 %
FVC PRE REF: 81.2 %
FVC REF: 3.2
IVC PRE: 1.58 L
IVC SINGLE BREATH LLN: 2.45
IVC SINGLE BREATH PRE REF: 49.3 %
IVC SINGLE BREATH REF: 3.2
MVV LLN: 96
MVV PRE REF: 55.1 %
MVV REF: 112
PEF CHG: 17 %
PEF LLN: 4.45
PEF POST REF: 58.1 %
PEF PRE REF: 49.7 %
PEF REF: 6.59
POST FEF 25 75: 2.61 L/S
POST FET 100: 6.25 SEC
POST FEV1 FVC: 86.95 %
POST FEV1: 2.09 L
POST FVC: 2.4 L
POST PEF: 3.83 L/S
PRE DLCO: 14.02 ML/(MIN*MMHG)
PRE ERV: 0.56 L
PRE FEF 25 75: 1.17 L/S
PRE FET 100: 6.82 SEC
PRE FEV1 FVC: 66.85 %
PRE FEV1: 1.74 L
PRE FRC PL: 2.38 L
PRE FVC: 2.6 L
PRE MVV: 62 L/MIN
PRE PEF: 3.28 L/S
PRE RV: 1.39 L
PRE TLC: 4.1 L
RAW LLN: 3.06
RAW PRE REF: 116.5 %
RAW PRE: 3.56 CMH2O*S/L
RAW REF: 3.06
RV LLN: 1.25
RV PRE REF: 76.3 %
RV REF: 1.82
RVTLC LLN: 26
RVTLC PRE REF: 95.4 %
RVTLC PRE: 33.97 %
RVTLC REF: 36
TLC LLN: 4.31
TLC PRE REF: 77.3 %
TLC REF: 5.3
VA PRE: 2.42 L
VA SINGLE BREATH LLN: 5.15
VA SINGLE BREATH PRE REF: 46.9 %
VA SINGLE BREATH REF: 5.15
VC LLN: 2.45
VC PRE REF: 84.6 %
VC PRE: 2.71 L
VC REF: 3.2
VTGRAWPRE: 2.54 L

## 2023-03-06 PROCEDURE — 94060 EVALUATION OF WHEEZING: CPT | Mod: S$GLB,,, | Performed by: INTERNAL MEDICINE

## 2023-03-06 PROCEDURE — 94726 PULM FUNCT TST PLETHYSMOGRAP: ICD-10-PCS | Mod: S$GLB,,, | Performed by: INTERNAL MEDICINE

## 2023-03-06 PROCEDURE — 94618 PULMONARY STRESS TESTING: CPT | Mod: S$GLB,,, | Performed by: INTERNAL MEDICINE

## 2023-03-06 PROCEDURE — 94618 PULMONARY STRESS TESTING: ICD-10-PCS | Mod: S$GLB,,, | Performed by: INTERNAL MEDICINE

## 2023-03-06 PROCEDURE — 94726 PLETHYSMOGRAPHY LUNG VOLUMES: CPT | Mod: S$GLB,,, | Performed by: INTERNAL MEDICINE

## 2023-03-06 PROCEDURE — 99999 PR PBB SHADOW E&M-EST. PATIENT-LVL II: ICD-10-PCS | Mod: PBBFAC,,,

## 2023-03-06 PROCEDURE — 99999 PR PBB SHADOW E&M-EST. PATIENT-LVL II: CPT | Mod: PBBFAC,,,

## 2023-03-06 PROCEDURE — 94060 PR EVAL OF BRONCHOSPASM: ICD-10-PCS | Mod: S$GLB,,, | Performed by: INTERNAL MEDICINE

## 2023-03-06 NOTE — PROCEDURES
[facility]  Six [facility]   SUMMARY   Ordering Provider: Bridget Bhakta PA-C   Interpreting Provider: Amrik Osman MD  Performing nurse/tech/RT: VT, RT  Diagnosis:  (Scleroderma)  Height: 5' 6" (167.6 cm)  Weight: 109.7 kg (241 lb 13.5 oz)  BMI (Calculated): 39.1   Patient Race:     Phase Oxygen Assessment Supplemental O2 Heart   Rate Blood Pressure Aleisha Dyspnea Scale Rating   Resting 98 % Room Air 81 bpm (!) 142/95   5-6   Exercise        Minute        1 94 % Room Air 119 bpm     2 97 % Room Air 128 bpm     3 98 % Room Air 131 bpm     4 99 % Room Air 131 bpm     5 99 % Room Air 131 bpm     6  98 % Room Air 13 bpm 131/88 4   Recovery        Minute        1 98 % Room Air 97 bpm     2 98 % Room Air 86 bpm     3 97 % Room Air 92 bpm     4 98 % Room Air 88 bpm 131/87 2     Six Minute Walk Summary  6MWT Status: completed without stopping  Patient Reported: Dyspnea, Lightheadedness, Other (Comment) (chest tightness)     Interpretation:  Did the patient stop or pause?: No                     Total Time Walked (Calculated): 360 seconds  Final Partial Lap Distance (feet): 100 feet  Total Distance Meters (Calculated): 457.2 meters  Predicted Distance Meters (Calculated): 486.2 meters  Percentage of Predicted (Calculated): 94.04  Peak VO2 (Calculated): 17.7  Mets: 5.06  Has The Patient Had a Previous Six Minute Walk Test?: Yes       Previous 6MWT Results  Has The Patient Had a Previous Six Minute Walk Test?: Yes  Date of Previous Test: 06/11/21  Total Time Walked: 360 seconds  Total Distance (meters): 480.06  Predicted Distance (meters): 480.34 meters  Percentage of Predicted: 99.94  Percent Change (Calculated): 0.05    Interpretation:  Total distance walked in six minutes is normal indicating normal functional capacity.   Patient did not meet criteria for oxygen prescription. Clinical correlation suggested.    [] Mild exercise-induced hypoxemia described as an arterial oxygen " saturation of 93-95% (or 3-4% less than at rest),  [] Moderate exercise-induced hypoxemia as 89-93%  [] Severe exercise induced hypoxemia as < 89% O2 saturation.  Medicare Criteria for oxygen prescription comments:   When arterial oxygen saturation is at or below 88% during exercise on room air (severe exercise induced hypoxemia) then the patient falls under Medicare Group 1 criteria for supplemental oxygen prescription.  Details about Medicare Group Criteria coverage can be found at http://www.cms.[facility].gov/manuals/downloads/     Amrik Osman MD

## 2023-03-08 ENCOUNTER — OFFICE VISIT (OUTPATIENT)
Dept: CARDIOLOGY | Facility: CLINIC | Age: 48
End: 2023-03-08
Payer: COMMERCIAL

## 2023-03-08 VITALS
SYSTOLIC BLOOD PRESSURE: 130 MMHG | OXYGEN SATURATION: 97 % | HEART RATE: 78 BPM | BODY MASS INDEX: 38.76 KG/M2 | HEIGHT: 66 IN | WEIGHT: 241.19 LBS | DIASTOLIC BLOOD PRESSURE: 78 MMHG

## 2023-03-08 DIAGNOSIS — M34.9 SCLERODERMA: ICD-10-CM

## 2023-03-08 DIAGNOSIS — U07.1 COVID-19: ICD-10-CM

## 2023-03-08 DIAGNOSIS — R06.02 SOB (SHORTNESS OF BREATH): ICD-10-CM

## 2023-03-08 DIAGNOSIS — R06.03 RESPIRATORY DISTRESS: ICD-10-CM

## 2023-03-08 DIAGNOSIS — M35.01 SJOGREN'S SYNDROME WITH KERATOCONJUNCTIVITIS SICCA: ICD-10-CM

## 2023-03-08 DIAGNOSIS — U07.1 PNEUMONIA DUE TO COVID-19 VIRUS: ICD-10-CM

## 2023-03-08 DIAGNOSIS — I10 ESSENTIAL HYPERTENSION: ICD-10-CM

## 2023-03-08 DIAGNOSIS — M79.609 PAIN IN EXTREMITY, UNSPECIFIED EXTREMITY: ICD-10-CM

## 2023-03-08 DIAGNOSIS — R07.9 CHEST PAIN, UNSPECIFIED TYPE: ICD-10-CM

## 2023-03-08 DIAGNOSIS — M32.14 LUPUS NEPHRITIS, ISN/RPS CLASS V: ICD-10-CM

## 2023-03-08 DIAGNOSIS — J12.82 PNEUMONIA DUE TO COVID-19 VIRUS: ICD-10-CM

## 2023-03-08 DIAGNOSIS — R94.39 ABNORMAL STRESS TEST: Primary | ICD-10-CM

## 2023-03-08 PROCEDURE — 1159F MED LIST DOCD IN RCRD: CPT | Mod: CPTII,S$GLB,, | Performed by: INTERNAL MEDICINE

## 2023-03-08 PROCEDURE — 1159F PR MEDICATION LIST DOCUMENTED IN MEDICAL RECORD: ICD-10-PCS | Mod: CPTII,S$GLB,, | Performed by: INTERNAL MEDICINE

## 2023-03-08 PROCEDURE — 99999 PR PBB SHADOW E&M-EST. PATIENT-LVL IV: CPT | Mod: PBBFAC,,, | Performed by: INTERNAL MEDICINE

## 2023-03-08 PROCEDURE — 3075F PR MOST RECENT SYSTOLIC BLOOD PRESS GE 130-139MM HG: ICD-10-PCS | Mod: CPTII,S$GLB,, | Performed by: INTERNAL MEDICINE

## 2023-03-08 PROCEDURE — 99214 OFFICE O/P EST MOD 30 MIN: CPT | Mod: S$GLB,,, | Performed by: INTERNAL MEDICINE

## 2023-03-08 PROCEDURE — 1160F PR REVIEW ALL MEDS BY PRESCRIBER/CLIN PHARMACIST DOCUMENTED: ICD-10-PCS | Mod: CPTII,S$GLB,, | Performed by: INTERNAL MEDICINE

## 2023-03-08 PROCEDURE — 99214 PR OFFICE/OUTPT VISIT, EST, LEVL IV, 30-39 MIN: ICD-10-PCS | Mod: S$GLB,,, | Performed by: INTERNAL MEDICINE

## 2023-03-08 PROCEDURE — 3075F SYST BP GE 130 - 139MM HG: CPT | Mod: CPTII,S$GLB,, | Performed by: INTERNAL MEDICINE

## 2023-03-08 PROCEDURE — 3008F PR BODY MASS INDEX (BMI) DOCUMENTED: ICD-10-PCS | Mod: CPTII,S$GLB,, | Performed by: INTERNAL MEDICINE

## 2023-03-08 PROCEDURE — 3078F PR MOST RECENT DIASTOLIC BLOOD PRESSURE < 80 MM HG: ICD-10-PCS | Mod: CPTII,S$GLB,, | Performed by: INTERNAL MEDICINE

## 2023-03-08 PROCEDURE — 3078F DIAST BP <80 MM HG: CPT | Mod: CPTII,S$GLB,, | Performed by: INTERNAL MEDICINE

## 2023-03-08 PROCEDURE — 3008F BODY MASS INDEX DOCD: CPT | Mod: CPTII,S$GLB,, | Performed by: INTERNAL MEDICINE

## 2023-03-08 PROCEDURE — 99999 PR PBB SHADOW E&M-EST. PATIENT-LVL IV: ICD-10-PCS | Mod: PBBFAC,,, | Performed by: INTERNAL MEDICINE

## 2023-03-08 PROCEDURE — 1160F RVW MEDS BY RX/DR IN RCRD: CPT | Mod: CPTII,S$GLB,, | Performed by: INTERNAL MEDICINE

## 2023-03-08 NOTE — PROGRESS NOTES
Subjective:   Patient ID:  Zachary Lucia is a 47 y.o. female who presents for follow-up of No chief complaint on file.       Intermittent mild chest discomfort some of the typical and atypical.  Positive family history of coronary disease on her mother side.  Will go ahead with a stress test echo done recently was completely normal.  EKG showed nonspecific changes.     Overall patient is stable.  We would like to go ahead with a nuclear stress test as the regular stress test did show ST segment changes.  Patient has had no chest discomfort on this test.  Follow-up evaluation after nuclear stress test is performed.  Patient has a very high risk factor for family history of coronary disease.     The patient presents the office having intermittent chest discomfort.  Nuclear stress test showed inferior basal ischemia.  ST changes in inferior leads.  Patient continues intermittent symptoms.  Will go ahead with heart catheterization to rule out significant coronary disease.  Patient agreeable to this plan.     Clinically stable doing well this time no exertional symptoms chest pain shortness breath.  Overall feeling well.     Patient presents the office today clinically stable no exertional symptoms some mild chest discomfort but heart catheterization earlier in the year was normal.  Blood pressure slightly elevated on admission but now has resolved stable.  Patient doing well cholesterol profile is improved.  03/08/2023:   Overall patient is doing well.  All coronary evaluation showed no evidence of coronary disease.  No significant arrhythmias seen by Holter monitor.  Cardiac echo shows preserved LV function.  No evidence of right heart pressure involvement.    Patient suffers from restrictive lung disease and some of her pressures probably from that needs to be addressed with pulmonary inhalation treatments.      Review of Systems   Constitutional: Negative for chills, diaphoresis, night sweats,  weight gain and weight loss.   HENT:  Negative for congestion, hoarse voice, sore throat and stridor.    Eyes:  Negative for double vision and pain.   Cardiovascular:  Negative for chest pain, claudication, cyanosis, dyspnea on exertion, irregular heartbeat, leg swelling, near-syncope, orthopnea, palpitations, paroxysmal nocturnal dyspnea and syncope.   Respiratory:  Negative for cough, hemoptysis, shortness of breath, sleep disturbances due to breathing, snoring, sputum production and wheezing.    Endocrine: Negative for cold intolerance, heat intolerance and polydipsia.   Hematologic/Lymphatic: Negative for bleeding problem. Does not bruise/bleed easily.   Skin:  Negative for color change, dry skin and rash.   Musculoskeletal:  Negative for joint swelling and muscle cramps.   Gastrointestinal:  Negative for bloating, abdominal pain, constipation, diarrhea, dysphagia, melena, nausea and vomiting.   Genitourinary:  Negative for flank pain and urgency.   Neurological:  Negative for dizziness, focal weakness, headaches, light-headedness, loss of balance, seizures and weakness.   Psychiatric/Behavioral:  Negative for altered mental status and memory loss. The patient is not nervous/anxious.    Family History   Problem Relation Age of Onset    Arthritis Mother     Glaucoma Mother     Hypertension Mother     Diabetes Father     Diabetes Maternal Grandmother     Hypertension Maternal Grandmother     Arthritis Maternal Grandmother     Cataracts Maternal Grandmother     Diabetes Maternal Grandfather     Heart disease Maternal Grandfather     Hypertension Maternal Grandfather     Diabetes Paternal Grandmother     Heart disease Paternal Grandmother     Hypertension Paternal Grandmother     Heart disease Paternal Grandfather     Cancer Maternal Uncle      Past Medical History:   Diagnosis Date    Abnormal stress test 1/25/2022    Asthma     Essential hypertension, malignant 1/8/2020    Lupus (systemic lupus erythematosus)      Membranous glomerulonephritis, 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    Scleroderma     Seizures 01/12/2017     Social History     Socioeconomic History    Marital status:      Spouse name: Darrell    Number of children: 0   Tobacco Use    Smoking status: Never    Smokeless tobacco: Never   Substance and Sexual Activity    Alcohol use: Yes     Comment: OCC    Drug use: No    Sexual activity: Yes     Current Outpatient Medications on File Prior to Visit   Medication Sig Dispense Refill    azaTHIOprine (IMURAN) 100 mg tablet Take 1 tablet (100 mg total) by mouth once daily. 90 tablet 1    azaTHIOprine (IMURAN) 50 mg Tab TAKE 1 TABLET (50 MG TOTAL) BY MOUTH ONCE DAILY IN EVENING 90 tablet 1    belimumab (BENLYSTA) 200 mg/mL Syrg Inject 1 mL (200 mg total) into the skin every 7 days. 4 each 11    betamethasone dipropionate (DIPROLENE) 0.05 % ointment Apply topically 2 (two) times daily as needed. Steroid. Use for flares 45 g 3    cholecalciferol, vitamin D3, 1,250 mcg (50,000 unit) capsule Take 1 capsule (50,000 Units total) by mouth once a week. 15 capsule 1    crisaborole (EUCRISA) 2 % Oint AAA bid prn.  Non-steroid.  Safe to use long-term. 60 g 3    cyclobenzaprine (FLEXERIL) 10 MG tablet Take 1 tablet (10 mg total) by mouth 2 (two) times daily as needed for Muscle spasms. 60 tablet 0    ferrous sulfate (FEOSOL) 325 mg (65 mg iron) Tab tablet Take 1 tablet by mouth once daily.      fluticasone-salmeterol diskus inhaler 100-50 mcg Inhale 1 puff into the lungs 2 (two) times daily. Controller 1 each 11    HYDROcodone-acetaminophen (NORCO) 7.5-325 mg per tablet Take 1 tablet by mouth 4 (four) times daily as needed.      hydrOXYchloroQUINE (PLAQUENIL) 200 mg tablet Take 1 tablet (200 mg total) by mouth 2 (two) times daily. 180 tablet 3     mg tablet Take 800 mg by mouth every 8 (eight) hours as needed.  0    levetiracetam XR (KEPPRA XR) 500 mg Tb24 24 hr tablet Take 500 mg by mouth once daily.      predniSONE (DELTASONE) 10 MG  tablet TAKE 1 TABLET BY MOUTH EVERY DAY 60 tablet 1    PROAIR HFA 90 mcg/actuation inhaler INHALE 2 PUFFS INTO THE LUNGS EVERY 6 (SIX) HOURS AS NEEDED FOR WHEEZING. RESCUE 8.5 Inhaler 0    pulse oximeter (PULSE OXIMETER) device Use by Apply Externally route 2 (two) times a day. Use twice daily at 8 AM and 3 PM and record the value in [place] as directed. 1 each 0    semaglutide, weight loss, 0.25 mg/0.5 mL PnIj Inject 0.25 mg into the skin every 7 days. 2 mL 1    sertraline (ZOLOFT) 50 MG tablet TAKE 1 TABLET BY MOUTH EVERY DAY 90 tablet 3     No current facility-administered medications on file prior to visit.     Review of patient's allergies indicates:   Allergen Reactions    Lisinopril      cough    Sulfa (sulfonamide antibiotics) Swelling       Objective:     Physical Exam  Eyes:      Pupils: Pupils are equal, round, and reactive to light.   Neck:      Trachea: No tracheal deviation.   Cardiovascular:      Rate and Rhythm: Normal rate and regular rhythm.      Pulses: Intact distal pulses.           Carotid pulses are 2+ on the right side and 2+ on the left side.       Radial pulses are 2+ on the right side and 2+ on the left side.        Femoral pulses are 2+ on the right side and 2+ on the left side.       Popliteal pulses are 2+ on the right side and 2+ on the left side.        Dorsalis pedis pulses are 2+ on the right side and 2+ on the left side.        Posterior tibial pulses are 2+ on the right side and 2+ on the left side.      Heart sounds: Normal heart sounds. No murmur heard.    No friction rub. No gallop.   Pulmonary:      Effort: Pulmonary effort is normal. No respiratory distress.      Breath sounds: Normal breath sounds. No stridor. No wheezing or rales.   Chest:      Chest wall: No tenderness.   Abdominal:      General: There is no distension.      Tenderness: There is no abdominal tenderness. There is no rebound.   Musculoskeletal:         General: No tenderness.      Cervical back: Normal range  of motion.   Skin:     General: Skin is warm and dry.   Neurological:      Mental Status: She is alert and oriented to person, place, and time.       Left heart catheterization 01/25/2022:   The pre-procedure left ventricular end diastolic pressure was 20.  The post-procedure left ventricular end diastolic pressure was 20.  The ejection fraction was calculated to be 60%.  There was no aortic valve stenosis.  There was no mitral valve stenosis.  There was no mitral valve prolapse evident. The annulus was normal. There was normal mitral valve motion.  The estimated blood loss was none.  The coronary arteries were normal..  Tortuous coronary arteries.     Assessment:     1. Abnormal stress test    2. Respiratory distress    3. Chest pain, unspecified type    4. COVID-19    5. Essential hypertension    6. Pneumonia due to COVID-19 virus    7. Sjogren's syndrome with keratoconjunctivitis sicca    8. SOB (shortness of breath)    9. Scleroderma    10. Lupus nephritis, ISN/RPS class V    11. Pain in extremity, unspecified extremity        Plan:     Abnormal stress test    Respiratory distress    Chest pain, unspecified type    COVID-19    Essential hypertension    Pneumonia due to COVID-19 virus    Sjogren's syndrome with keratoconjunctivitis sicca    SOB (shortness of breath)    Scleroderma    Lupus nephritis, ISN/RPS class V    Pain in extremity, unspecified extremity    Impression 1. Chest pain most likely related to pulmonary disease secondary to scleroderma.  Treated with pulmonary evaluation and inhalation treatments   2. Arrhythmias stable will follow long-term.  Follow-up evaluation again in 3-6 months.  I will after long discussion today the patient understands more specifically that her problems are pulmonary and not cardiac ischemia.  We will be available for further evaluation as she has more symptoms.

## 2023-03-21 ENCOUNTER — TELEPHONE (OUTPATIENT)
Dept: ORTHOPEDICS | Facility: CLINIC | Age: 48
End: 2023-03-21
Payer: COMMERCIAL

## 2023-03-21 NOTE — TELEPHONE ENCOUNTER
Lvm notifying pt of provider being out of office at their appointment time. Provided call back number to reschedule

## 2023-03-22 ENCOUNTER — PATIENT MESSAGE (OUTPATIENT)
Dept: ORTHOPEDICS | Facility: CLINIC | Age: 48
End: 2023-03-22
Payer: COMMERCIAL

## 2023-03-22 DIAGNOSIS — M17.0 BILATERAL PRIMARY OSTEOARTHRITIS OF KNEE: Primary | ICD-10-CM

## 2023-03-24 ENCOUNTER — OFFICE VISIT (OUTPATIENT)
Dept: ORTHOPEDICS | Facility: CLINIC | Age: 48
End: 2023-03-24
Payer: COMMERCIAL

## 2023-03-24 ENCOUNTER — HOSPITAL ENCOUNTER (OUTPATIENT)
Dept: RADIOLOGY | Facility: HOSPITAL | Age: 48
Discharge: HOME | End: 2023-03-24
Attending: PHYSICIAN ASSISTANT
Payer: COMMERCIAL

## 2023-03-24 DIAGNOSIS — M17.0 BILATERAL PRIMARY OSTEOARTHRITIS OF KNEE: ICD-10-CM

## 2023-03-24 DIAGNOSIS — M17.0 BILATERAL PRIMARY OSTEOARTHRITIS OF KNEE: Primary | ICD-10-CM

## 2023-03-24 PROCEDURE — 1159F PR MEDICATION LIST DOCUMENTED IN MEDICAL RECORD: ICD-10-PCS | Mod: CPTII,S$GLB,, | Performed by: PHYSICIAN ASSISTANT

## 2023-03-24 PROCEDURE — 73564 X-RAY EXAM KNEE 4 OR MORE: CPT | Mod: 26,,, | Performed by: RADIOLOGY

## 2023-03-24 PROCEDURE — 99213 OFFICE O/P EST LOW 20 MIN: CPT | Mod: S$GLB,,, | Performed by: PHYSICIAN ASSISTANT

## 2023-03-24 PROCEDURE — 99999 PR PBB SHADOW E&M-EST. PATIENT-LVL III: ICD-10-PCS | Mod: PBBFAC,,, | Performed by: PHYSICIAN ASSISTANT

## 2023-03-24 PROCEDURE — 99213 PR OFFICE/OUTPT VISIT, EST, LEVL III, 20-29 MIN: ICD-10-PCS | Mod: S$GLB,,, | Performed by: PHYSICIAN ASSISTANT

## 2023-03-24 PROCEDURE — 1160F PR REVIEW ALL MEDS BY PRESCRIBER/CLIN PHARMACIST DOCUMENTED: ICD-10-PCS | Mod: CPTII,S$GLB,, | Performed by: PHYSICIAN ASSISTANT

## 2023-03-24 PROCEDURE — 73564 XR KNEE ORTHO BILAT WITH FLEXION: ICD-10-PCS | Mod: 26,,, | Performed by: RADIOLOGY

## 2023-03-24 PROCEDURE — 99999 PR PBB SHADOW E&M-EST. PATIENT-LVL III: CPT | Mod: PBBFAC,,, | Performed by: PHYSICIAN ASSISTANT

## 2023-03-24 PROCEDURE — 1160F RVW MEDS BY RX/DR IN RCRD: CPT | Mod: CPTII,S$GLB,, | Performed by: PHYSICIAN ASSISTANT

## 2023-03-24 PROCEDURE — 1159F MED LIST DOCD IN RCRD: CPT | Mod: CPTII,S$GLB,, | Performed by: PHYSICIAN ASSISTANT

## 2023-03-24 PROCEDURE — 73564 X-RAY EXAM KNEE 4 OR MORE: CPT | Mod: TC,50

## 2023-03-24 NOTE — PROGRESS NOTES
Patient ID: Zachary Lucia  YOB: 1975  MRN: [id number]    Chief Complaint: Pain of the Left Knee and Pain of the Right Knee    Referred By: Prior patient    History of Present Illness: Zachary Lucia is a  47 y.o. 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 She rates her pain as a 6/10 today. She reports relief from her visco injections that lasted about 4 months. She is interested in discussing re-ordering them.    HPI    Past Medical History:   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    Scleroderma     Seizures 01/12/2017     Past Surgical History:   Procedure Laterality Date    COLONOSCOPY N/A 1/12/2023    Procedure: COLONOSCOPY;  Surgeon: Supriya Hughes MD;  Location: [facility] ENDO;  Service: Endoscopy;  Laterality: N/A;    ESOPHAGOGASTRODUODENOSCOPY N/A 1/12/2023    Procedure: EGD (ESOPHAGOGASTRODUODENOSCOPY);  Surgeon: Supriya Hughes MD;  Location: [facility] ENDO;  Service: Endoscopy;  Laterality: N/A;    LEFT HEART CATHETERIZATION Left 1/25/2022    Procedure: CATHETERIZATION, HEART, LEFT;  Surgeon: Rocío Davidson MD;  Location: [facility] CATH LAB;  Service: Cardiology;  Laterality: Left;    RENAL BIOPSY  11/16/2018     Family History   Problem Relation Age of Onset    Arthritis Mother     Glaucoma Mother     Hypertension Mother     Diabetes Father     Diabetes Maternal Grandmother     Hypertension Maternal Grandmother     Arthritis Maternal Grandmother     Cataracts Maternal Grandmother     Diabetes Maternal Grandfather     Heart disease Maternal Grandfather     Hypertension Maternal Grandfather     Diabetes Paternal Grandmother     Heart  disease Paternal Grandmother     Hypertension Paternal Grandmother     Heart disease Paternal Grandfather     Cancer Maternal Uncle      Social History     Socioeconomic History    Marital status:      Spouse name: Darrell    Number of children: 0   Tobacco Use    Smoking status: Never    Smokeless tobacco: Never   Substance and Sexual Activity    Alcohol use: Yes     Comment: OCC    Drug use: No    Sexual activity: Yes     Medication List with Changes/Refills   Current Medications    AZATHIOPRINE (IMURAN) 100 MG TABLET    Take 1 tablet (100 mg total) by mouth once daily.    AZATHIOPRINE (IMURAN) 50 MG TAB    TAKE 1 TABLET (50 MG TOTAL) BY MOUTH ONCE DAILY IN EVENING    BELIMUMAB (BENLYSTA) 200 MG/ML SYRG    Inject 1 mL (200 mg total) into the skin every 7 days.    BETAMETHASONE DIPROPIONATE (DIPROLENE) 0.05 % OINTMENT    Apply topically 2 (two) times daily as needed. Steroid. Use for flares    CHOLECALCIFEROL, VITAMIN D3, 1,250 MCG (50,000 UNIT) CAPSULE    Take 1 capsule (50,000 Units total) by mouth once a week.    CRISABOROLE (EUCRISA) 2 % OINT    AAA bid prn.  Non-steroid.  Safe to use long-term.    CYCLOBENZAPRINE (FLEXERIL) 10 MG TABLET    Take 1 tablet (10 mg total) by mouth 2 (two) times daily as needed for Muscle spasms.    FERROUS SULFATE (FEOSOL) 325 MG (65 MG IRON) TAB TABLET    Take 1 tablet by mouth once daily.    FLUTICASONE-SALMETEROL DISKUS INHALER 100-50 MCG    Inhale 1 puff into the lungs 2 (two) times daily. Controller    HYDROCODONE-ACETAMINOPHEN (NORCO) 7.5-325 MG PER TABLET    Take 1 tablet by mouth 4 (four) times daily as needed.    HYDROXYCHLOROQUINE (PLAQUENIL) 200 MG TABLET    Take 1 tablet (200 mg total) by mouth 2 (two) times daily.     MG TABLET    Take 800 mg by mouth every 8 (eight) hours as needed.    LEVETIRACETAM XR (KEPPRA XR) 500 MG TB24 24 HR TABLET    Take 500 mg by mouth once daily.    PREDNISONE (DELTASONE) 10 MG TABLET    TAKE 1 TABLET BY MOUTH EVERY DAY     PROAIR HFA 90 MCG/ACTUATION INHALER    INHALE 2 PUFFS INTO THE LUNGS EVERY 6 (SIX) HOURS AS NEEDED FOR WHEEZING. RESCUE    PULSE OXIMETER (PULSE OXIMETER) DEVICE    Use by Apply Externally route 2 (two) times a day. Use twice daily at 8 AM and 3 PM and record the value in MyChart as directed.    SERTRALINE (ZOLOFT) 50 MG TABLET    TAKE 1 TABLET BY MOUTH EVERY DAY    WEGOVY 0.25 MG/0.5 ML PNIJ    INJECT 0.25 MG INTO THE SKIN EVERY 7 DAYS     Review of patient's allergies indicates:   Allergen Reactions    Lisinopril      cough    Sulfa (sulfonamide antibiotics) Swelling     Review of Systems   Constitutional: Negative for chills and fever.   HENT:  Negative for sore throat.    Eyes:  Negative for pain.   Cardiovascular:  Negative for chest pain and leg swelling.   Respiratory:  Negative for cough and shortness of breath.    Skin:  Negative for itching and rash.   Musculoskeletal:  Positive for joint pain and joint swelling.   Gastrointestinal:  Negative for abdominal pain, nausea and vomiting.   Genitourinary:  Negative for dysuria.   Neurological:  Negative for dizziness, numbness and paresthesias.     Physical Exam:   There is no height or weight on file to calculate BMI.  There were no vitals filed for this visit.   GENERAL: Well appearing, appropriate for stated age, no acute distress.  CARDIOVASCULAR: Pulses regular by peripheral palpation.  PULMONARY: Respirations are even and non-labored.  NEURO: Awake, alert, and oriented x 3.  PSYCH: Mood & affect are appropriate.  HEENT: Head is normocephalic and atraumatic.  General    Nursing note and vitals reviewed.          Right Knee Exam   Right knee exam is normal.    Inspection   Effusion: absent    Tenderness   The patient is tender to palpation of the medial joint line.    Crepitus   The patient has crepitus of the patella.    Range of Motion   Extension:  0   Flexion:  120     Tests   Ligament Examination   Lachman: normal (-1 to 2mm)   PCL-Posterior Drawer:  normal (0 to 2mm)     MCL - Valgus: normal (0 to 2mm)  LCL - Varus: normal    Other   Sensation: normal    Left Knee Exam   Left knee exam is normal.    Inspection   Effusion: absent    Tenderness   The patient tender to palpation of the medial joint line.    Crepitus   The patient has crepitus of the patella.    Range of Motion   Extension:  0   Flexion:  120     Tests   Stability   Lachman: normal (-1 to 2mm)   PCL-Posterior Drawer: normal (0 to 2mm)  MCL - Valgus: normal (0 to 2mm)  LCL - Varus: normal (0 to 2mm)    Other   Sensation: normal    Muscle Strength   Right Lower Extremity   Hip Abduction: 5/5   Quadriceps:  5/5   Hamstrin/5   Left Lower Extremity   Hip Abduction: 5/5   Quadriceps:  5/5   Hamstrin/5     Vascular Exam     Right Pulses  Dorsalis Pedis:      2+  Posterior Tibial:      2+        Left Pulses  Dorsalis Pedis:      2+  Posterior Tibial:      2+      Intact EHL, FHL, gastrocsoleus, and tibialis anterior. Sensation intact to light touch in superficial peroneal, deep peroneal, tibial, sural, and saphenous nerve distributions. Foot warm and well perfused with capillary refill of less than 2 seconds and palpable pedal pulses.      Imaging:    X-ray Knee Ortho Bilateral with Flexion  Narrative: EXAM: XR KNEE ORTHO BILAT WITH FLEXION    CLINICAL HISTORY: Bilateral primary osteoarthritis of knee    TECHNIQUE: Bilateral knee x-ray 5 views    FINDINGS: No fracture is identified.  Joint alignment is anatomic.  No significant joint effusion identified.  Mild joint space loss of the right medial compartment and lateral patellofemoral compartment.  The remaining joint spaces appear relatively well preserved.  No erosive change identified.  No loose intra-articular osteochondral bodies or osteochondral defects identified.  Impression: No acute findings.  Chronic findings, as above.    Finalized on: 3/24/2023 11:56 AM By:  Donnell Diehl MD  [facility]# [id number]      2023 11:[id number]     CHASIDYRJESSICA      Relevant imaging results reviewed and interpreted by me, discussed with the patient and / or family today.     Other Tests:       Patient Instructions   Assessment:  Zachary Lucia is a  47 y.o. 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    Bilateral primary osteoarthritis of knee Yes      Plan:  Continue knee bracing, oral anti-inflammatories, and topical anti-inflammatories  Visco Bilateral knee   Start visco in 4 weeks- prefers 730     Follow-up: 4 weeks or sooner if there are any problems between now and then.    Leave Review:   Google: Leave Google Review  Healthgrades: Leave Healthgrades Review    After Hours Number: [phone]      Provider Note/Medical Decision Making:   MEDICAL NECESSITY FOR VISCOSUPPLEMENTATION: After thorough evaluation of the patient, I have determined that visco-supplementation is medically necessary. The patient has painful DJD of the knee with failure of conservative therapy including lifestyle modifications and rehabilitation exercises. Oral analgesis/NSAIDs have not adequately controlled symptoms and there is radiographic evidence of joint space narrowing, subchondral sclerosis, and some early osteophytic changes Kellgren- Jeff grade 2 or greater, or in lack of radiographic evidence, there is arthroscopic or other evidence of chondrosis.  Patient states that she has noticed improvement in both knees.     I discussed worrisome and red flag signs and symptoms with the patient. The patient expressed understanding and agreed to alert me immediately or to go to the emergency room if they experience any of these.   Treatment plan was developed with input from the patient/family, and they expressed  understanding and agreement with the plan. All questions were answered today.        Disclaimer: This note was prepared using a voice recognition system and is likely to have sound alike errors within the text.

## 2023-03-24 NOTE — PATIENT INSTRUCTIONS
Assessment:  Zacharykamaljit Lucia is a  47 y.o. 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    Bilateral primary osteoarthritis of knee Yes      Plan:  Continue knee bracing, oral anti-inflammatories, and topical anti-inflammatories  Visco Bilateral knee   Start visco in 4 weeks- prefers 730     Follow-up: 4 weeks or sooner if there are any problems between now and then.    Leave Review:   Google: Leave Google Review  Healthgrades: Leave Healthgrades Review    After Hours Number: [phone]

## 2023-03-28 ENCOUNTER — OFFICE VISIT (OUTPATIENT)
Dept: INTERNAL MEDICINE | Facility: CLINIC | Age: 48
End: 2023-03-28
Payer: COMMERCIAL

## 2023-03-28 VITALS
BODY MASS INDEX: 39.22 KG/M2 | RESPIRATION RATE: 20 BRPM | TEMPERATURE: 97 F | OXYGEN SATURATION: 100 % | WEIGHT: 244.06 LBS | HEIGHT: 66 IN | SYSTOLIC BLOOD PRESSURE: 128 MMHG | DIASTOLIC BLOOD PRESSURE: 76 MMHG | HEART RATE: 84 BPM

## 2023-03-28 DIAGNOSIS — F43.21 SITUATIONAL DEPRESSION: Primary | ICD-10-CM

## 2023-03-28 DIAGNOSIS — Z56.6 STRESS AT WORK: ICD-10-CM

## 2023-03-28 PROCEDURE — 3008F BODY MASS INDEX DOCD: CPT | Mod: CPTII,S$GLB,, | Performed by: INTERNAL MEDICINE

## 2023-03-28 PROCEDURE — 1160F RVW MEDS BY RX/DR IN RCRD: CPT | Mod: CPTII,S$GLB,, | Performed by: INTERNAL MEDICINE

## 2023-03-28 PROCEDURE — 3008F PR BODY MASS INDEX (BMI) DOCUMENTED: ICD-10-PCS | Mod: CPTII,S$GLB,, | Performed by: INTERNAL MEDICINE

## 2023-03-28 PROCEDURE — 1159F PR MEDICATION LIST DOCUMENTED IN MEDICAL RECORD: ICD-10-PCS | Mod: CPTII,S$GLB,, | Performed by: INTERNAL MEDICINE

## 2023-03-28 PROCEDURE — 3074F SYST BP LT 130 MM HG: CPT | Mod: CPTII,S$GLB,, | Performed by: INTERNAL MEDICINE

## 2023-03-28 PROCEDURE — 3078F DIAST BP <80 MM HG: CPT | Mod: CPTII,S$GLB,, | Performed by: INTERNAL MEDICINE

## 2023-03-28 PROCEDURE — 1159F MED LIST DOCD IN RCRD: CPT | Mod: CPTII,S$GLB,, | Performed by: INTERNAL MEDICINE

## 2023-03-28 PROCEDURE — 99999 PR PBB SHADOW E&M-EST. PATIENT-LVL V: ICD-10-PCS | Mod: PBBFAC,,, | Performed by: INTERNAL MEDICINE

## 2023-03-28 PROCEDURE — 99999 PR PBB SHADOW E&M-EST. PATIENT-LVL V: CPT | Mod: PBBFAC,,, | Performed by: INTERNAL MEDICINE

## 2023-03-28 PROCEDURE — 3078F PR MOST RECENT DIASTOLIC BLOOD PRESSURE < 80 MM HG: ICD-10-PCS | Mod: CPTII,S$GLB,, | Performed by: INTERNAL MEDICINE

## 2023-03-28 PROCEDURE — 99213 PR OFFICE/OUTPT VISIT, EST, LEVL III, 20-29 MIN: ICD-10-PCS | Mod: S$GLB,,, | Performed by: INTERNAL MEDICINE

## 2023-03-28 PROCEDURE — 3074F PR MOST RECENT SYSTOLIC BLOOD PRESSURE < 130 MM HG: ICD-10-PCS | Mod: CPTII,S$GLB,, | Performed by: INTERNAL MEDICINE

## 2023-03-28 PROCEDURE — 1160F PR REVIEW ALL MEDS BY PRESCRIBER/CLIN PHARMACIST DOCUMENTED: ICD-10-PCS | Mod: CPTII,S$GLB,, | Performed by: INTERNAL MEDICINE

## 2023-03-28 PROCEDURE — 99213 OFFICE O/P EST LOW 20 MIN: CPT | Mod: S$GLB,,, | Performed by: INTERNAL MEDICINE

## 2023-03-28 SDOH — SOCIAL DETERMINANTS OF HEALTH (SDOH): OTHER PHYSICAL AND MENTAL STRAIN RELATED TO WORK: Z56.6

## 2023-03-28 NOTE — PROGRESS NOTES
Zachary Escalona Khari  47 y.o.  Black or  female    Chief Complaint   Patient presents with    Depression       HPI:  Presents to the clinic to follow up on depression. She is taking sertraline as prescribed and has seen psychiatry. She is under a lot of stress at work and feels that is affecting her significantly. She is considering FMLA.     PMH: Reviewed    MEDS: Reviewed med card    ALLERGIES: Reviewed allergy card    PE: Reviewed vitals  GENERAL: Alert and oriented, no acute distress  HEART: Regular rate  LUNGS: Unlabored respirations     ASSESSMENT/PLAN:    Zachary was seen today for depression.    Diagnoses and all orders for this visit:    Situational depression    Stress at work    Continue sertraline.     Continue counseling.     She will bring FMLA paperwork for completion.     F/U in 8 weeks.

## 2023-04-27 NOTE — PROGRESS NOTES
Here today for VF and mOCT for plaquenil therapy.          Assessment /Plan     For exam results, see Encounter Report.    Sjogren's syndrome, with unspecified organ involvement    Long-term use of Plaquenil      No active anterior or posterior uveitis OU.  No ocular changes from Plaquenil use    RTC 1 year repeat VF and mOCT1                 
[FreeTextEntry1] : MRI of the lumbar spine 05/26/2022: Broad-based central posterior disc herniation L3-L4 indenting the thecal sac. Right foraminal herniation with annular tear at L4-L5 abutting the right foramina and the exiting right L4 nerve root. \par \par  MRI of the cervical spine 05/26/2022: Broad-based right paracentral disc herniation with annular tear at C4-C5 abutting the ventral aspect of the spinal cord. Straightening of the normal lordosis which may be secondary to spasms. \par \par SOAPP: Scored a 0 , low risk.\par  \par [place] REGISTRY: Reviewed .\par  \par UDS: No data obtained today. \par  \par Medications that trigger a UDS: Benzodiazepines (Ativan, Xanax, Valium) etc, Barbiturates, Narcotics (Avinza, Butrans, hydrocodone, Codeine, Pura, Methadone, Morphine, MS Contin, Opana, oxycodone, Oxycontin, Suboxone etc), Pregabalin (Lyrica), Tramadol (Ultacet, Utram etc), Tapentadol, (Nucynta) and Elist Drugs (cocaine, THC, Etc.)\par  \par Risk factors: Bipolar Illness, positive for any an illicit drugs, history of any ETOH and drug abuse, any signs of diversion, Sharing Meds, selling meds. Non consistent [place] drug reporting and above 120meq of morphine\par  \par Low risk: Patient has combination of a low risk SOAP and no risk factors. UDS would be repeated randomly every quarter\par

## 2023-05-01 ENCOUNTER — PROCEDURE VISIT (OUTPATIENT)
Dept: ORTHOPEDICS | Facility: CLINIC | Age: 48
End: 2023-05-01
Payer: COMMERCIAL

## 2023-05-01 DIAGNOSIS — M17.0 BILATERAL PRIMARY OSTEOARTHRITIS OF KNEE: Primary | ICD-10-CM

## 2023-05-01 PROCEDURE — 99499 NO LOS: ICD-10-PCS | Mod: S$GLB,,, | Performed by: PHYSICIAN ASSISTANT

## 2023-05-01 PROCEDURE — 20610 LARGE JOINT ASPIRATION/INJECTION: BILATERAL KNEE: ICD-10-PCS | Mod: 50,S$GLB,, | Performed by: PHYSICIAN ASSISTANT

## 2023-05-01 PROCEDURE — 99499 UNLISTED E&M SERVICE: CPT | Mod: S$GLB,,, | Performed by: PHYSICIAN ASSISTANT

## 2023-05-01 PROCEDURE — 20610 DRAIN/INJ JOINT/BURSA W/O US: CPT | Mod: 50,S$GLB,, | Performed by: PHYSICIAN ASSISTANT

## 2023-05-01 NOTE — PROCEDURES
Large Joint Aspiration/Injection: bilateral knee    Date/Time: 5/1/2023 7:30 AM  Performed by: Kaya Ordoñez PA-C  Authorized by: Kaya Ordoñez PA-C     Consent Done?:  Yes (Verbal)  Indications:  Pain  Site marked: the procedure site was marked    Timeout: prior to procedure the correct patient, procedure, and site was verified    Prep: patient was prepped and draped in usual sterile fashion      Local anesthesia used?: Yes    Local anesthetic:  Topical anesthetic    Details:  Needle Size:  22 G  Ultrasonic Guidance for needle placement?: No    Approach:  Superior  Location:  Knee  Laterality:  Bilateral  Site:  Bilateral knee  Medications (Right):  20 mg sodium hyaluronate (EUFLEXXA) 10 mg/mL(mw 2.4 -3.6 million)  Medications (Left):  20 mg sodium hyaluronate (EUFLEXXA) 10 mg/mL(mw 2.4 -3.6 million)  Patient tolerance:  Patient tolerated the procedure well with no immediate complications     Bilateral Euflexxa injection  1/3

## 2023-05-08 ENCOUNTER — OFFICE VISIT (OUTPATIENT)
Dept: INTERNAL MEDICINE | Facility: CLINIC | Age: 48
End: 2023-05-08
Payer: COMMERCIAL

## 2023-05-08 ENCOUNTER — LAB VISIT (OUTPATIENT)
Dept: LAB | Facility: HOSPITAL | Age: 48
End: 2023-05-08
Attending: PHYSICIAN ASSISTANT
Payer: COMMERCIAL

## 2023-05-08 ENCOUNTER — PROCEDURE VISIT (OUTPATIENT)
Dept: ORTHOPEDICS | Facility: CLINIC | Age: 48
End: 2023-05-08
Payer: COMMERCIAL

## 2023-05-08 ENCOUNTER — TELEPHONE (OUTPATIENT)
Dept: CARDIOLOGY | Facility: CLINIC | Age: 48
End: 2023-05-08
Payer: COMMERCIAL

## 2023-05-08 ENCOUNTER — TELEPHONE (OUTPATIENT)
Dept: INTERNAL MEDICINE | Facility: CLINIC | Age: 48
End: 2023-05-08
Payer: COMMERCIAL

## 2023-05-08 VITALS — HEIGHT: 66 IN | WEIGHT: 244.06 LBS | BODY MASS INDEX: 39.22 KG/M2

## 2023-05-08 DIAGNOSIS — Z56.6 STRESS AT WORK: ICD-10-CM

## 2023-05-08 DIAGNOSIS — Z79.899 HIGH RISK MEDICATION USE: ICD-10-CM

## 2023-05-08 DIAGNOSIS — F41.9 ANXIETY: ICD-10-CM

## 2023-05-08 DIAGNOSIS — U07.1 PNEUMONIA DUE TO COVID-19 VIRUS: ICD-10-CM

## 2023-05-08 DIAGNOSIS — M34.9 SCLERODERMA: ICD-10-CM

## 2023-05-08 DIAGNOSIS — D84.9 IMMUNOSUPPRESSED STATUS: ICD-10-CM

## 2023-05-08 DIAGNOSIS — M32.14 LUPUS NEPHRITIS, ISN/RPS CLASS V: ICD-10-CM

## 2023-05-08 DIAGNOSIS — R07.9 CHEST PAIN, UNSPECIFIED TYPE: ICD-10-CM

## 2023-05-08 DIAGNOSIS — M35.01 SJOGREN'S SYNDROME WITH KERATOCONJUNCTIVITIS SICCA: ICD-10-CM

## 2023-05-08 DIAGNOSIS — U07.1 COVID-19: ICD-10-CM

## 2023-05-08 DIAGNOSIS — I10 ESSENTIAL HYPERTENSION: ICD-10-CM

## 2023-05-08 DIAGNOSIS — R06.02 SOB (SHORTNESS OF BREATH): ICD-10-CM

## 2023-05-08 DIAGNOSIS — F43.21 SITUATIONAL DEPRESSION: Primary | ICD-10-CM

## 2023-05-08 DIAGNOSIS — J12.82 PNEUMONIA DUE TO COVID-19 VIRUS: ICD-10-CM

## 2023-05-08 DIAGNOSIS — R06.03 RESPIRATORY DISTRESS: ICD-10-CM

## 2023-05-08 DIAGNOSIS — M79.609 PAIN IN EXTREMITY, UNSPECIFIED EXTREMITY: ICD-10-CM

## 2023-05-08 DIAGNOSIS — G47.9 DIFFICULTY SLEEPING: ICD-10-CM

## 2023-05-08 DIAGNOSIS — M17.0 BILATERAL PRIMARY OSTEOARTHRITIS OF KNEE: Primary | ICD-10-CM

## 2023-05-08 DIAGNOSIS — R94.39 ABNORMAL STRESS TEST: ICD-10-CM

## 2023-05-08 LAB
ALBUMIN SERPL BCP-MCNC: 4.3 G/DL (ref 3.5–5.2)
ALP SERPL-CCNC: 62 U/L (ref 55–135)
ALT SERPL W/O P-5'-P-CCNC: 19 U/L (ref 10–44)
ANION GAP SERPL CALC-SCNC: 10 MMOL/L (ref 8–16)
AST SERPL-CCNC: 17 U/L (ref 10–40)
BASOPHILS # BLD AUTO: 0.04 K/UL (ref 0–0.2)
BASOPHILS NFR BLD: 0.6 % (ref 0–1.9)
BILIRUB SERPL-MCNC: 0.3 MG/DL (ref 0.1–1)
BUN SERPL-MCNC: 11 MG/DL (ref 6–20)
CALCIUM SERPL-MCNC: 9.7 MG/DL (ref 8.7–10.5)
CHLORIDE SERPL-SCNC: 106 MMOL/L (ref 95–110)
CHOLEST SERPL-MCNC: 183 MG/DL (ref 120–199)
CHOLEST/HDLC SERPL: 3.4 {RATIO} (ref 2–5)
CO2 SERPL-SCNC: 24 MMOL/L (ref 23–29)
CREAT SERPL-MCNC: 1.1 MG/DL (ref 0.5–1.4)
CRP SERPL-MCNC: 9.4 MG/L (ref 0–8.2)
DIFFERENTIAL METHOD: ABNORMAL
EOSINOPHIL # BLD AUTO: 0.3 K/UL (ref 0–0.5)
EOSINOPHIL NFR BLD: 4.8 % (ref 0–8)
ERYTHROCYTE [DISTWIDTH] IN BLOOD BY AUTOMATED COUNT: 14.5 % (ref 11.5–14.5)
ERYTHROCYTE [SEDIMENTATION RATE] IN BLOOD BY PHOTOMETRIC METHOD: 38 MM/HR (ref 0–36)
EST. GFR  (NO RACE VARIABLE): >60 ML/MIN/1.73 M^2
GLUCOSE SERPL-MCNC: 103 MG/DL (ref 70–110)
HBV CORE AB SERPL QL IA: NORMAL
HBV SURFACE AB SER-ACNC: <3 MIU/ML
HBV SURFACE AB SER-ACNC: NORMAL M[IU]/ML
HBV SURFACE AG SERPL QL IA: NORMAL
HCT VFR BLD AUTO: 33.6 % (ref 37–48.5)
HCV AB SERPL QL IA: NORMAL
HDLC SERPL-MCNC: 54 MG/DL (ref 40–75)
HDLC SERPL: 29.5 % (ref 20–50)
HGB BLD-MCNC: 10.8 G/DL (ref 12–16)
IMM GRANULOCYTES # BLD AUTO: 0.02 K/UL (ref 0–0.04)
IMM GRANULOCYTES NFR BLD AUTO: 0.3 % (ref 0–0.5)
LDLC SERPL CALC-MCNC: 103 MG/DL (ref 63–159)
LYMPHOCYTES # BLD AUTO: 1.7 K/UL (ref 1–4.8)
LYMPHOCYTES NFR BLD: 27.4 % (ref 18–48)
MCH RBC QN AUTO: 26.3 PG (ref 27–31)
MCHC RBC AUTO-ENTMCNC: 32.1 G/DL (ref 32–36)
MCV RBC AUTO: 82 FL (ref 82–98)
MONOCYTES # BLD AUTO: 0.5 K/UL (ref 0.3–1)
MONOCYTES NFR BLD: 8 % (ref 4–15)
NEUTROPHILS # BLD AUTO: 3.7 K/UL (ref 1.8–7.7)
NEUTROPHILS NFR BLD: 58.9 % (ref 38–73)
NONHDLC SERPL-MCNC: 129 MG/DL
NRBC BLD-RTO: 0 /100 WBC
PLATELET # BLD AUTO: 361 K/UL (ref 150–450)
PMV BLD AUTO: 8.9 FL (ref 9.2–12.9)
POTASSIUM SERPL-SCNC: 3.9 MMOL/L (ref 3.5–5.1)
PROT SERPL-MCNC: 8.2 G/DL (ref 6–8.4)
RBC # BLD AUTO: 4.1 M/UL (ref 4–5.4)
SODIUM SERPL-SCNC: 140 MMOL/L (ref 136–145)
TRIGL SERPL-MCNC: 130 MG/DL (ref 30–150)
WBC # BLD AUTO: 6.23 K/UL (ref 3.9–12.7)

## 2023-05-08 PROCEDURE — 86704 HEP B CORE ANTIBODY TOTAL: CPT | Performed by: PHYSICIAN ASSISTANT

## 2023-05-08 PROCEDURE — 86480 TB TEST CELL IMMUN MEASURE: CPT | Performed by: PHYSICIAN ASSISTANT

## 2023-05-08 PROCEDURE — 86803 HEPATITIS C AB TEST: CPT | Performed by: PHYSICIAN ASSISTANT

## 2023-05-08 PROCEDURE — 1159F MED LIST DOCD IN RCRD: CPT | Mod: CPTII,95,, | Performed by: INTERNAL MEDICINE

## 2023-05-08 PROCEDURE — 36415 COLL VENOUS BLD VENIPUNCTURE: CPT | Performed by: PHYSICIAN ASSISTANT

## 2023-05-08 PROCEDURE — 80061 LIPID PANEL: CPT | Performed by: INTERNAL MEDICINE

## 2023-05-08 PROCEDURE — 85025 COMPLETE CBC W/AUTO DIFF WBC: CPT | Performed by: PHYSICIAN ASSISTANT

## 2023-05-08 PROCEDURE — 86706 HEP B SURFACE ANTIBODY: CPT | Mod: 91 | Performed by: PHYSICIAN ASSISTANT

## 2023-05-08 PROCEDURE — 99499 UNLISTED E&M SERVICE: CPT | Mod: S$GLB,,, | Performed by: PHYSICIAN ASSISTANT

## 2023-05-08 PROCEDURE — 80053 COMPREHEN METABOLIC PANEL: CPT | Performed by: PHYSICIAN ASSISTANT

## 2023-05-08 PROCEDURE — 1160F RVW MEDS BY RX/DR IN RCRD: CPT | Mod: CPTII,95,, | Performed by: INTERNAL MEDICINE

## 2023-05-08 PROCEDURE — 1160F PR REVIEW ALL MEDS BY PRESCRIBER/CLIN PHARMACIST DOCUMENTED: ICD-10-PCS | Mod: CPTII,95,, | Performed by: INTERNAL MEDICINE

## 2023-05-08 PROCEDURE — 99499 NO LOS: ICD-10-PCS | Mod: S$GLB,,, | Performed by: PHYSICIAN ASSISTANT

## 2023-05-08 PROCEDURE — 86140 C-REACTIVE PROTEIN: CPT | Performed by: PHYSICIAN ASSISTANT

## 2023-05-08 PROCEDURE — 87340 HEPATITIS B SURFACE AG IA: CPT | Performed by: PHYSICIAN ASSISTANT

## 2023-05-08 PROCEDURE — 20610 LARGE JOINT ASPIRATION/INJECTION: BILATERAL KNEE: ICD-10-PCS | Mod: 50,S$GLB,, | Performed by: PHYSICIAN ASSISTANT

## 2023-05-08 PROCEDURE — 20610 DRAIN/INJ JOINT/BURSA W/O US: CPT | Mod: 50,S$GLB,, | Performed by: PHYSICIAN ASSISTANT

## 2023-05-08 PROCEDURE — 85652 RBC SED RATE AUTOMATED: CPT | Performed by: PHYSICIAN ASSISTANT

## 2023-05-08 PROCEDURE — 1159F PR MEDICATION LIST DOCUMENTED IN MEDICAL RECORD: ICD-10-PCS | Mod: CPTII,95,, | Performed by: INTERNAL MEDICINE

## 2023-05-08 PROCEDURE — 99214 PR OFFICE/OUTPT VISIT, EST, LEVL IV, 30-39 MIN: ICD-10-PCS | Mod: 95,,, | Performed by: INTERNAL MEDICINE

## 2023-05-08 PROCEDURE — 99214 OFFICE O/P EST MOD 30 MIN: CPT | Mod: 95,,, | Performed by: INTERNAL MEDICINE

## 2023-05-08 SDOH — SOCIAL DETERMINANTS OF HEALTH (SDOH): OTHER PHYSICAL AND MENTAL STRAIN RELATED TO WORK: Z56.6

## 2023-05-08 NOTE — PROGRESS NOTES
Subjective     Patient ID: Zachary Lucia is a 47 y.o. female.    Chief Complaint: Follow-up (Paperwork )    The patient location is: [place]   The chief complaint leading to consultation is: FMLA paperwork completion    Visit type: audiovisual    Face to Face time with patient: 35 minutes   35 minutes of total time spent on the encounter, which includes face to face time and non-face to face time preparing to see the patient (eg, review of tests), Obtaining and/or reviewing separately obtained history, Documenting clinical information in the electronic or other health record, Independently interpreting results (not separately reported) and communicating results to the patient/family/caregiver, or Care coordination (not separately reported).         Each patient to whom he or she provides medical services by telemedicine is:  (1) informed of the relationship between the physician and patient and the respective role of any other health care provider with respect to management of the patient; and (2) notified that he or she may decline to receive medical services by telemedicine and may withdraw from such care at any time.    Notes:   Depression/anxiety--compliant with sertraline. It has helped. She has had 2 panic attacks in the past month. She continues to see a counselor and her next appointment is in one week.   She has difficulty sleeping.   She has been off work since 5/1 due to stress.       Review of Systems   Constitutional:  Positive for unexpected weight change. Negative for activity change.   HENT:  Negative for hearing loss, rhinorrhea and trouble swallowing.    Eyes:  Negative for discharge and visual disturbance.   Respiratory:  Negative for chest tightness and wheezing.    Cardiovascular:  Negative for palpitations.   Gastrointestinal:  Negative for blood in stool, constipation and diarrhea.   Endocrine: Negative for polydipsia and polyuria.   Genitourinary:  Negative for difficulty  urinating, dysuria, hematuria and menstrual problem.   Psychiatric/Behavioral:  Positive for dysphoric mood. Negative for confusion.         Objective     Physical Exam  Constitutional:       General: She is not in acute distress.     Appearance: She is well-developed. She is not ill-appearing.   Pulmonary:      Effort: Pulmonary effort is normal. No respiratory distress.   Neurological:      Mental Status: She is alert and oriented to person, place, and time.   Psychiatric:         Behavior: Behavior normal.         Thought Content: Thought content normal.         Judgment: Judgment normal.          Assessment and Plan     Problem List Items Addressed This Visit    None  Visit Diagnoses       Situational depression    -  Primary    Anxiety        Difficulty sleeping        Stress at work                Zachary was seen today for follow-up.    Diagnoses and all orders for this visit:    Situational depression  -     continue counseling  -     continue sertraline    Anxiety  -     continue counseling  -     continue sertraline    Difficulty sleeping  -     continue counseling    Stress at work  -     FMLA paperwork completed    F/U as previously scheduled.

## 2023-05-08 NOTE — TELEPHONE ENCOUNTER
The patient has been notified of this information and all questions answered.      ----- Message from Elie Sumner MD sent at 5/8/2023 10:05 AM CDT -----  Excellent lipid profile continue all medications  ----- Message -----  From: Lon, [employer] [facility] Interface  Sent: 5/8/2023   9:50 AM CDT  To: Elie Sumner MD

## 2023-05-08 NOTE — TELEPHONE ENCOUNTER
----- Message from Ezequiel Pa sent at 5/8/2023 11:36 AM CDT -----  Contact: [phone]  Patient would like to consult with a nurse in regards to paperwork. Please call to advise at [phone]. Thanks r/s

## 2023-05-08 NOTE — PROCEDURES
Large Joint Aspiration/Injection: bilateral knee    Date/Time: 5/8/2023 7:30 AM  Performed by: Kaya Ordoñez PA-C  Authorized by: Kaya Ordoñez PA-C     Consent Done?:  Yes (Verbal)  Indications:  Pain  Site marked: the procedure site was marked    Timeout: prior to procedure the correct patient, procedure, and site was verified    Prep: patient was prepped and draped in usual sterile fashion      Local anesthesia used?: Yes    Local anesthetic:  Topical anesthetic    Details:  Needle Size:  22 G  Ultrasonic Guidance for needle placement?: No    Approach:  Superior  Location:  Knee  Laterality:  Bilateral  Site:  Bilateral knee  Medications (Right):  20 mg sodium hyaluronate (EUFLEXXA) 10 mg/mL(mw 2.4 -3.6 million)  Medications (Left):  20 mg sodium hyaluronate (EUFLEXXA) 10 mg/mL(mw 2.4 -3.6 million)  Patient tolerance:  Patient tolerated the procedure well with no immediate complications     Bilateral Euflexxa injection  2/3

## 2023-05-08 NOTE — TELEPHONE ENCOUNTER
Patient is calling to check status of paperwork. I did explained to her that paperwork can take up to 7 business days to be completed if not in office(visit). Patient stated that she completed a visit in March but they had just sent her the paperwork on Tuesday. Paperwork is due today 5/8/23. Can you please advise?

## 2023-05-09 LAB
GAMMA INTERFERON BACKGROUND BLD IA-ACNC: 0.03 IU/ML
M TB IFN-G CD4+ BCKGRND COR BLD-ACNC: 0.01 IU/ML
MITOGEN IGNF BCKGRD COR BLD-ACNC: 9.97 IU/ML
TB GOLD PLUS: NEGATIVE
TB2 - NIL: 0 IU/ML

## 2023-05-11 DIAGNOSIS — M32.14 LUPUS NEPHRITIS, ISN/RPS CLASS V: ICD-10-CM

## 2023-05-11 RX ORDER — BELIMUMAB 200 MG/ML
SOLUTION SUBCUTANEOUS
Qty: 4 EACH | Refills: 3 | Status: SHIPPED | OUTPATIENT
Start: 2023-05-11 | End: 2023-08-10 | Stop reason: SDUPTHER

## 2023-05-12 ENCOUNTER — TELEPHONE (OUTPATIENT)
Dept: INTERNAL MEDICINE | Facility: CLINIC | Age: 48
End: 2023-05-12
Payer: COMMERCIAL

## 2023-05-12 NOTE — TELEPHONE ENCOUNTER
----- Message from Jhony Shea sent at 5/12/2023  9:33 AM CDT -----  Who Called: ignacio hernandez      What is the reqeust in detail: need a detailed objective exam findings as to how the pt demonstrated , in there mental state , need by 10am Monday sent via fax ([phone]) .please advsie       Can the clinic reply by [facility]? No       Best Call Back Number:[phone]      Additional Information:

## 2023-05-12 NOTE — TELEPHONE ENCOUNTER
Case # [id number] they said you were not clear on her impairment what is keeping her from working

## 2023-05-15 ENCOUNTER — TELEPHONE (OUTPATIENT)
Dept: INTERNAL MEDICINE | Facility: CLINIC | Age: 48
End: 2023-05-15
Payer: COMMERCIAL

## 2023-05-15 ENCOUNTER — TELEPHONE (OUTPATIENT)
Dept: SPORTS MEDICINE | Facility: CLINIC | Age: 48
End: 2023-05-15
Payer: COMMERCIAL

## 2023-05-15 ENCOUNTER — OFFICE VISIT (OUTPATIENT)
Dept: PSYCHIATRY | Facility: CLINIC | Age: 48
End: 2023-05-15
Payer: COMMERCIAL

## 2023-05-15 DIAGNOSIS — F43.21 GRIEF REACTION: ICD-10-CM

## 2023-05-15 DIAGNOSIS — F43.21 SITUATIONAL DEPRESSION: Primary | ICD-10-CM

## 2023-05-15 DIAGNOSIS — F41.9 ANXIETY: ICD-10-CM

## 2023-05-15 PROCEDURE — 90834 PSYTX W PT 45 MINUTES: CPT | Mod: 95,,, | Performed by: SOCIAL WORKER

## 2023-05-15 PROCEDURE — 90834 PR PSYCHOTHERAPY W/PATIENT, 45 MIN: ICD-10-PCS | Mod: 95,,, | Performed by: SOCIAL WORKER

## 2023-05-15 NOTE — PROGRESS NOTES
Individual Psychotherapy Follow-up Visit Progress Note (PhD/LCSW)     Due to the nature of this visit type, a virtual visit with synchronous audio and video, each patient to whom this provider administers behavioral health services by telemedicine is: (1) informed of the relationship between the provider and patient and the respective role of any other health care provider with respect to management of the patient; and (2) notified that he or she may decline to receive services by telemedicine and may withdraw from such care at any time.    The patient was informed of the following:     Provider's contact info:  [facility]  [address] [place] [address], 3rd Floor, [address]  [place] [address]  (Phone) [phone]    If technology issues occur, call office phone: : [phone]  If crisis: Dial 911 or go to nearest Emergency Room (ER)  If questions related to privacy practices: contact [facility] Information Department: [phone]    For security purposes, the pt identified that they were at [address] [place] [address] [place] [address] during today's session and contact number is [phone].    The pt's emergency contact(s) is Extended Emergency Contact Information  Primary Emergency Contact: Darrell Lucia  Address: [address] [place] [address] [place] [address] [facility]  Home Phone: [phone]  Mobile Phone: [phone]  Relation: Spouse  Secondary Emergency Contact: Radha Escalona  Address: [address] [place] [address] [facility]  Home Phone: [phone]  Mobile Phone: [phone]  Relation: Mother.    Crisis Disclaimer: Patient was informed that due to the virtual nature of the visit, that if a crisis develops, protocols will be implemented to ensure patient safety, including but not limited to: 1) Initiating a welfare check with local law enforcement and/or 2) Calling 911.    Outpatient - Insight oriented  psychotherapy 45 min - CPT code 56568    Date: 9:07 AM      5/15/2023  MRN: [id number]  Primary Care Provider: DO Zachary Del Valle Celina Lucia is a 47 y.o. female who presents today for follow-up of depression, anxiety, and adjustment problems. Met with patient.        Subjective:       Patient report: Met with this patient for her online follow up appointment. She was in her car throughout the appointment.  The patient stated that she was having continuing seizures and had taken leave from her job.  Her PCP had given her 6 weeks off and she has been off so far for 3 weeks.  She stated the 1st 2 weeks were very bad and she was having many seizures a day.  She stated that she had come to [facility] where her mother lives. she stated that mother's day was hard because of her grandmother's death and she went to visit the [facility] site.  Her  was trying to get her out of her funk.  But recently her 's brother was also murdered.  Her mom is also still grieving her brother's death of stomach cancer. The family thinks that the uncle was not treated well by his wife and his children.  The patient also feels that the uncle's family did not take responsibility and she and her mother did most of the caretaking of the uncle.  Worked with this patient on helping her to release and process her feelings related to the grief of her uncle and she also discussed the discord within the family concerning the uncle's care.  The patient was taught behavioral coping strategies such as setting boundaries and increasing assertiveness as ways to reduce feelings of depression and anxiety.  The patient was able to see her strengths and coping abilities and stated that she would make a follow-up appointment.     Current symptoms:   Depression: depressed mood, worthlessness/guilt, and poor concentration  Anxiety: excessive anxiety/worry, restlessness/keyed up, and irritability  Substance abuse: denied  Cognitive  functioning: denied  Dodie: none noted  Psychosis: none noted    PHQ-9 Depression Patient Health Questionnaire 6/2/2023 5/24/2023 5/15/2023 3/3/2023   Patient agreed to terms: Yes Yes Yes Yes   Little interest or pleasure in doing things 2 2 2 3   Feeling down, depressed, or hopeless 2 2 2 2   Trouble falling or staying asleep, or sleeping too much 2 2 3 3   Feeling tired or having little energy 2 2 3 3   Poor appetite or overeating 2 2 3 2   Feeling bad about yourself - or that you are a failure or have let yourself or your family down 2 2 2 2   Trouble concentrating on things, such as reading the newspaper or watching television 2 1 2 2   Moving or speaking so slowly that other people could have noticed. Or the opposite - being so fidgety or restless that you have been moving around a lot more than usual 1 0 0 2   Thoughts that you would be better off dead, or of hurting yourself in some way 0 0 0 0   PHQ-9 Total Score 15 13 17 19   If you checked off any problems, how difficult have these problems made it for you to do your work, take care of things at home, or get along with other people? Extremely dIfficult Very difficult Extremely dIfficult Extremely dIfficult   Interpretation Moderately Severe Moderate Moderately Severe Moderately Severe       No flowsheet data found.    Psychosocial stressors and topics discussed: identifying feelings, coping strategies, social isolation, boundaries, and grief      Objective:       Mental Status Evaluation  Appearance: unremarkable, age appropriate  Behavior: normal, cooperative  Speech: normal tone, normal rate, normal pitch, normal volume  Mood: anxious, dysthymic  Affect: congruent and appropriate  Thought Process: normal and logical  Thought Content: normal, no suicidality, no homicidality, delusions, or paranoia  Sensorium: grossly intact  Cognition: grossly intact  Insight: fair  Judgment: adequate to circumstances    Risk parameters:  Patient reports no suicidal  ideation  Patient reports no homicidal ideation  Patient reports no self-injurious behavior  Patient reports no violent behavior      Assessment & Plan:       Therapeutic interventions used: Educated pt re: how anxious fears are maintained by a cycle of unwarranted fear and avoidance that precludes positive, corrective experiences with the feared object/situation  Discussed how treatment targets worry, anxiety symptoms, and avoidance to help pt manage worry effectively  Assigned pt to practice relaxation on a daily basis  Encouraged pt to share feelings of depression to clarify them and gain insight into their causes  Assessed pt's current and past mood episodes, including the features, frequency, intensity and duration   Consistent eye contact, active listening, unconditional positive regard and warm acceptance were used to help build trust  Asked pt to describe his/her feelings about himself/herself and how he/she sees himself/herself as compared with others     The patient's response to the interventions is accepting    The patient's progress toward treatment goals is fair     Homework assigned: practice relaxation skills daily and implement sleep hygiene routine     Treatment plan:   A. Target symptoms: Depression, Anxiety, Adjustment Disorder, and Grief   B. Therapeutic modalities: insight oriented psychotherapy  C. Why chosen therapy is appropriate versus another modality: patient responds to this modality   D. Outcome monitoring methods: self-report     Visit Diagnosis:   1. Situational depression    2. Grief reaction    3. Anxiety        Follow-up: individual psychotherapy    Return to Clinic: as scheduled    Length of Service (minutes): 45       Maine Viveros LCSW  06/09/2023   9:07 AM

## 2023-05-15 NOTE — TELEPHONE ENCOUNTER
"----- Message from Prisca Nir sent at 5/15/2023  1:36 PM CDT -----  Regarding: Call back  "Type:  Patient Call Back    Who Called: [employer] ([facility]    What is the reqeust in detail:Requesting for and call back to know if doctor can do an phone conference with and disability doctor on the 17th or 18th between 9-5. Please advise    Can the clinic reply by [facility]?no    Best Call Back Number:[phone]      Additional Information:            "

## 2023-05-17 ENCOUNTER — TELEPHONE (OUTPATIENT)
Dept: INTERNAL MEDICINE | Facility: CLINIC | Age: 48
End: 2023-05-17
Payer: COMMERCIAL

## 2023-05-17 NOTE — TELEPHONE ENCOUNTER
----- Message from Vinh Jacobo sent at 5/17/2023  4:50 PM CDT -----  Contact: Dr. Emi Middleton is needing a call back in regards to having a peer to peer. Please give him a call back at [phone]

## 2023-05-18 NOTE — TELEPHONE ENCOUNTER
----- Message from Mariana Prajapati sent at 5/18/2023  4:42 PM CDT -----   is requesting a call back in regards to a peer to peer . Please call him back at [phone]    Thanks  CF

## 2023-05-22 ENCOUNTER — OFFICE VISIT (OUTPATIENT)
Dept: ORTHOPEDICS | Facility: CLINIC | Age: 48
End: 2023-05-22
Payer: COMMERCIAL

## 2023-05-22 VITALS — BODY MASS INDEX: 39.22 KG/M2 | WEIGHT: 244.06 LBS | HEIGHT: 66 IN

## 2023-05-22 DIAGNOSIS — M17.0 BILATERAL PRIMARY OSTEOARTHRITIS OF KNEE: Primary | ICD-10-CM

## 2023-05-22 PROCEDURE — 99999 PR PBB SHADOW E&M-EST. PATIENT-LVL III: ICD-10-PCS | Mod: PBBFAC,,, | Performed by: PHYSICIAN ASSISTANT

## 2023-05-22 PROCEDURE — 20610 DRAIN/INJ JOINT/BURSA W/O US: CPT | Mod: 50,S$GLB,, | Performed by: PHYSICIAN ASSISTANT

## 2023-05-22 PROCEDURE — 99499 UNLISTED E&M SERVICE: CPT | Mod: S$GLB,,, | Performed by: PHYSICIAN ASSISTANT

## 2023-05-22 PROCEDURE — 99999 PR PBB SHADOW E&M-EST. PATIENT-LVL III: CPT | Mod: PBBFAC,,, | Performed by: PHYSICIAN ASSISTANT

## 2023-05-22 PROCEDURE — 99499 NO LOS: ICD-10-PCS | Mod: S$GLB,,, | Performed by: PHYSICIAN ASSISTANT

## 2023-05-22 PROCEDURE — 20610 LARGE JOINT ASPIRATION/INJECTION: BILATERAL KNEE: ICD-10-PCS | Mod: 50,S$GLB,, | Performed by: PHYSICIAN ASSISTANT

## 2023-05-24 ENCOUNTER — TELEPHONE (OUTPATIENT)
Dept: PSYCHIATRY | Facility: CLINIC | Age: 48
End: 2023-05-24
Payer: COMMERCIAL

## 2023-05-24 ENCOUNTER — OFFICE VISIT (OUTPATIENT)
Dept: PSYCHIATRY | Facility: CLINIC | Age: 48
End: 2023-05-24
Payer: COMMERCIAL

## 2023-05-24 ENCOUNTER — PATIENT MESSAGE (OUTPATIENT)
Dept: INTERNAL MEDICINE | Facility: CLINIC | Age: 48
End: 2023-05-24
Payer: COMMERCIAL

## 2023-05-24 DIAGNOSIS — F43.21 SITUATIONAL DEPRESSION: Primary | ICD-10-CM

## 2023-05-24 DIAGNOSIS — F41.9 ANXIETY: ICD-10-CM

## 2023-05-24 DIAGNOSIS — F43.21 GRIEF REACTION: ICD-10-CM

## 2023-05-24 DIAGNOSIS — G40.909 SEIZURE DISORDER: Primary | ICD-10-CM

## 2023-05-24 PROCEDURE — 90834 PSYTX W PT 45 MINUTES: CPT | Mod: 95,,, | Performed by: SOCIAL WORKER

## 2023-05-24 PROCEDURE — 90834 PR PSYCHOTHERAPY W/PATIENT, 45 MIN: ICD-10-PCS | Mod: 95,,, | Performed by: SOCIAL WORKER

## 2023-05-24 NOTE — TELEPHONE ENCOUNTER
----- Message from Jah Nicolas sent at 5/24/2023 12:07 PM CDT -----  Contact: [phone]  Patient needs first available appointment due to follow up . Please call patient at  [phone].  Thanks KB
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
## 2023-05-25 ENCOUNTER — OFFICE VISIT (OUTPATIENT)
Dept: NEUROLOGY | Facility: CLINIC | Age: 48
End: 2023-05-25
Payer: COMMERCIAL

## 2023-05-25 ENCOUNTER — TELEPHONE (OUTPATIENT)
Dept: NEUROLOGY | Facility: CLINIC | Age: 48
End: 2023-05-25
Payer: COMMERCIAL

## 2023-05-25 ENCOUNTER — LAB VISIT (OUTPATIENT)
Dept: LAB | Facility: HOSPITAL | Age: 48
End: 2023-05-25
Attending: PSYCHIATRY & NEUROLOGY
Payer: COMMERCIAL

## 2023-05-25 VITALS
SYSTOLIC BLOOD PRESSURE: 124 MMHG | WEIGHT: 240.31 LBS | RESPIRATION RATE: 16 BRPM | HEART RATE: 76 BPM | HEIGHT: 66 IN | BODY MASS INDEX: 38.62 KG/M2 | DIASTOLIC BLOOD PRESSURE: 88 MMHG | OXYGEN SATURATION: 99 %

## 2023-05-25 DIAGNOSIS — M32.14 LUPUS NEPHRITIS, ISN/RPS CLASS V: ICD-10-CM

## 2023-05-25 DIAGNOSIS — R73.9 HYPERGLYCEMIA: ICD-10-CM

## 2023-05-25 DIAGNOSIS — G43.909 MIGRAINE WITHOUT STATUS MIGRAINOSUS, NOT INTRACTABLE, UNSPECIFIED MIGRAINE TYPE: ICD-10-CM

## 2023-05-25 DIAGNOSIS — M34.9 SCLERODERMA: ICD-10-CM

## 2023-05-25 DIAGNOSIS — R94.39 ABNORMAL STRESS TEST: ICD-10-CM

## 2023-05-25 DIAGNOSIS — I73.00 RAYNAUD'S DISEASE WITHOUT GANGRENE: ICD-10-CM

## 2023-05-25 DIAGNOSIS — D50.8 IRON DEFICIENCY ANEMIA SECONDARY TO INADEQUATE DIETARY IRON INTAKE: ICD-10-CM

## 2023-05-25 DIAGNOSIS — M35.09 SJOGREN'S SYNDROME WITH OTHER ORGAN INVOLVEMENT: ICD-10-CM

## 2023-05-25 DIAGNOSIS — K21.9 GASTROESOPHAGEAL REFLUX DISEASE, UNSPECIFIED WHETHER ESOPHAGITIS PRESENT: ICD-10-CM

## 2023-05-25 DIAGNOSIS — F41.1 GENERALIZED ANXIETY DISORDER: ICD-10-CM

## 2023-05-25 DIAGNOSIS — G40.919 INTRACTABLE EPILEPSY WITHOUT STATUS EPILEPTICUS, UNSPECIFIED EPILEPSY TYPE: ICD-10-CM

## 2023-05-25 DIAGNOSIS — G40.409 EPILEPSY, GRAND MAL: ICD-10-CM

## 2023-05-25 DIAGNOSIS — E55.9 VITAMIN D DEFICIENCY DISEASE: ICD-10-CM

## 2023-05-25 DIAGNOSIS — Z79.899 ON ANTIEPILEPTIC THERAPY: ICD-10-CM

## 2023-05-25 DIAGNOSIS — R10.9 RIGHT FLANK PAIN: ICD-10-CM

## 2023-05-25 DIAGNOSIS — G40.409 EPILEPSY, GRAND MAL: Primary | ICD-10-CM

## 2023-05-25 DIAGNOSIS — R80.8 OTHER PROTEINURIA: ICD-10-CM

## 2023-05-25 DIAGNOSIS — R20.2 PARESTHESIAS: ICD-10-CM

## 2023-05-25 PROBLEM — R39.89 ABNORMAL URINE COLOR: Status: RESOLVED | Noted: 2018-05-22 | Resolved: 2023-05-25

## 2023-05-25 PROCEDURE — 99417 PROLNG OP E/M EACH 15 MIN: CPT | Mod: S$GLB,,, | Performed by: PSYCHIATRY & NEUROLOGY

## 2023-05-25 PROCEDURE — 36415 COLL VENOUS BLD VENIPUNCTURE: CPT | Performed by: PSYCHIATRY & NEUROLOGY

## 2023-05-25 PROCEDURE — 80177 DRUG SCRN QUAN LEVETIRACETAM: CPT | Performed by: PSYCHIATRY & NEUROLOGY

## 2023-05-25 PROCEDURE — 3008F BODY MASS INDEX DOCD: CPT | Mod: CPTII,S$GLB,, | Performed by: PSYCHIATRY & NEUROLOGY

## 2023-05-25 PROCEDURE — 3074F PR MOST RECENT SYSTOLIC BLOOD PRESSURE < 130 MM HG: ICD-10-PCS | Mod: CPTII,S$GLB,, | Performed by: PSYCHIATRY & NEUROLOGY

## 2023-05-25 PROCEDURE — 1159F PR MEDICATION LIST DOCUMENTED IN MEDICAL RECORD: ICD-10-PCS | Mod: CPTII,S$GLB,, | Performed by: PSYCHIATRY & NEUROLOGY

## 2023-05-25 PROCEDURE — 3074F SYST BP LT 130 MM HG: CPT | Mod: CPTII,S$GLB,, | Performed by: PSYCHIATRY & NEUROLOGY

## 2023-05-25 PROCEDURE — 1159F MED LIST DOCD IN RCRD: CPT | Mod: CPTII,S$GLB,, | Performed by: PSYCHIATRY & NEUROLOGY

## 2023-05-25 PROCEDURE — 99417 PR PROLONGED SVC, OUTPT, W/WO DIRECT PT CONTACT,  EA ADDTL 15 MIN: ICD-10-PCS | Mod: S$GLB,,, | Performed by: PSYCHIATRY & NEUROLOGY

## 2023-05-25 PROCEDURE — 99205 OFFICE O/P NEW HI 60 MIN: CPT | Mod: S$GLB,,, | Performed by: PSYCHIATRY & NEUROLOGY

## 2023-05-25 PROCEDURE — 3079F DIAST BP 80-89 MM HG: CPT | Mod: CPTII,S$GLB,, | Performed by: PSYCHIATRY & NEUROLOGY

## 2023-05-25 PROCEDURE — 3079F PR MOST RECENT DIASTOLIC BLOOD PRESSURE 80-89 MM HG: ICD-10-PCS | Mod: CPTII,S$GLB,, | Performed by: PSYCHIATRY & NEUROLOGY

## 2023-05-25 PROCEDURE — 99205 PR OFFICE/OUTPT VISIT, NEW, LEVL V, 60-74 MIN: ICD-10-PCS | Mod: S$GLB,,, | Performed by: PSYCHIATRY & NEUROLOGY

## 2023-05-25 PROCEDURE — 99999 PR PBB SHADOW E&M-EST. PATIENT-LVL V: ICD-10-PCS | Mod: PBBFAC,,, | Performed by: PSYCHIATRY & NEUROLOGY

## 2023-05-25 PROCEDURE — 3008F PR BODY MASS INDEX (BMI) DOCUMENTED: ICD-10-PCS | Mod: CPTII,S$GLB,, | Performed by: PSYCHIATRY & NEUROLOGY

## 2023-05-25 PROCEDURE — 99999 PR PBB SHADOW E&M-EST. PATIENT-LVL V: CPT | Mod: PBBFAC,,, | Performed by: PSYCHIATRY & NEUROLOGY

## 2023-05-25 RX ORDER — LEVETIRACETAM 500 MG/1
2000 TABLET, EXTENDED RELEASE ORAL DAILY
Qty: 360 TABLET | Refills: 3 | Status: SHIPPED | OUTPATIENT
Start: 2023-05-25 | End: 2023-07-17 | Stop reason: SDUPTHER

## 2023-05-25 NOTE — PROGRESS NOTES
Subjective:       Patient ID: Zachary Lucia is a 47 y.o. female.    Chief Complaint: Seizures          HPI      The patient is here for seizure evaluation. The patient is unaccompanied.    The patient started having seizure in 2006. The patient describes aura of LT facial tingling. The patient is amnestic to the seizure after that. The seizure is described as grand mal seizure/GTC consisting of generalized tensing and muscle jerking with eyes deviated upwards with foamy secretions from the mouth, tongue biting and urine incontinence. The seizure lasts for 1-2 minutes followed by 30-60 minutes of confusion. No history of meningitis or encephalitis No toxic exposure or lead poisoning. No family history of epilepsy (paternal cousin had childhood epilepsy).  No history of strokes or aneurysmal bleeding. No history of TBI. She was started on  mg BID that was changed to LEV XR 1000 mg QHS. The patient continued to have seizures every 1-2 months. Of note, she was diagnosed with CTD in 2018 and has been on IST. She is concerned because she had 3 seizures over 4 days in .     The patient does complain of migraine without aura (throbbing, moderate-severe, light-noise sensitivity and nausea) that seems be sporadic and infrequent. Tried Fioricet, CCB (Amlodipine),  TCA (Elavil), SSRIS (Zoloft) and AEDs (GBP).     The patient also reports constant B/L hand numbness, pain and tingling and intermittent toes numbness and tingling since 2018. No history of DM or Thyroid disease. No excessive alcohol intake. Of note, she was diagnosed with CTD in 2018 and has been on IST. The patient was found to have low B12 and Vitamin D and stopped taking supplements. Lab [facility] [id number] showed NL HA1C, TFT, SPEP-IPEP , Low Vitamin D and Vitamin B12  and Positive AMAURY, SSA, SSB and RNP Antibodies.             Review of Systems   Constitutional:  Positive for fatigue. Negative for appetite change.   HENT:  Negative for  hearing loss and tinnitus.    Eyes:  Negative for photophobia and visual disturbance.   Respiratory:  Negative for apnea and shortness of breath.    Cardiovascular:  Negative for chest pain and palpitations.   Gastrointestinal:  Negative for nausea and vomiting.   Endocrine: Negative for cold intolerance and heat intolerance.   Genitourinary:  Negative for difficulty urinating and urgency.   Musculoskeletal:  Positive for arthralgias. Negative for back pain, gait problem, joint swelling, myalgias, neck pain and neck stiffness.   Skin:  Positive for color change. Negative for rash.   Allergic/Immunologic: Negative for environmental allergies and immunocompromised state.   Neurological:  Positive for seizures, numbness and headaches. Negative for dizziness, tremors, syncope, facial asymmetry, speech difficulty, weakness and light-headedness.   Hematological:  Negative for adenopathy. Does not bruise/bleed easily.   Psychiatric/Behavioral:  Negative for agitation, behavioral problems, confusion, decreased concentration, dysphoric mood, hallucinations, self-injury, sleep disturbance and suicidal ideas. The patient is not hyperactive.                Current Outpatient Medications:     azaTHIOprine (IMURAN) 100 mg tablet, Take 1 tablet (100 mg total) by mouth once daily., Disp: 90 tablet, Rfl: 1    azaTHIOprine (IMURAN) 50 mg Tab, TAKE 1 TABLET (50 MG TOTAL) BY MOUTH ONCE DAILY IN EVENING, Disp: 90 tablet, Rfl: 1    belimumab (BENLYSTA) 200 mg/mL Syrg, INJECT 1 SYRINGE UNDER THE SKIN EVERY 7 DAYS., Disp: 4 each, Rfl: 3    betamethasone dipropionate (DIPROLENE) 0.05 % ointment, Apply topically 2 (two) times daily as needed. Steroid. Use for flares, Disp: 45 g, Rfl: 3    cholecalciferol, vitamin D3, 1,250 mcg (50,000 unit) capsule, Take 1 capsule (50,000 Units total) by mouth once a week., Disp: 15 capsule, Rfl: 1    crisaborole (EUCRISA) 2 % Oint, AAA bid prn.  Non-steroid.  Safe to use long-term., Disp: 60 g, Rfl: 3     cyclobenzaprine (FLEXERIL) 10 MG tablet, Take 1 tablet (10 mg total) by mouth 2 (two) times daily as needed for Muscle spasms., Disp: 60 tablet, Rfl: 0    ferrous sulfate (FEOSOL) 325 mg (65 mg iron) Tab tablet, Take 1 tablet by mouth once daily., Disp: , Rfl:     fluticasone-salmeterol diskus inhaler 100-50 mcg, Inhale 1 puff into the lungs 2 (two) times daily. Controller, Disp: 1 each, Rfl: 11    HYDROcodone-acetaminophen (NORCO) 7.5-325 mg per tablet, Take 1 tablet by mouth 4 (four) times daily as needed., Disp: , Rfl:     hydrOXYchloroQUINE (PLAQUENIL) 200 mg tablet, Take 1 tablet (200 mg total) by mouth 2 (two) times daily., Disp: 180 tablet, Rfl: 3     mg tablet, Take 800 mg by mouth every 8 (eight) hours as needed., Disp: , Rfl: 0    predniSONE (DELTASONE) 10 MG tablet, TAKE 1 TABLET BY MOUTH EVERY DAY, Disp: 60 tablet, Rfl: 1    PROAIR HFA 90 mcg/actuation inhaler, INHALE 2 PUFFS INTO THE LUNGS EVERY 6 (SIX) HOURS AS NEEDED FOR WHEEZING. RESCUE, Disp: 8.5 Inhaler, Rfl: 0    sertraline (ZOLOFT) 50 MG tablet, TAKE 1 TABLET BY MOUTH EVERY DAY, Disp: 90 tablet, Rfl: 3    WEGOVY 0.25 mg/0.5 mL PnIj, INJECT 0.25 MG INTO THE SKIN EVERY 7 DAYS, Disp: 2 each, Rfl: 1    levetiracetam XR (KEPPRA XR) 500 mg Tb24 24 hr tablet, Take 4 tablets (2,000 mg total) by mouth once daily., Disp: 360 tablet, Rfl: 3  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    Scleroderma     Seizures 01/12/2017     Past Surgical History:   Procedure Laterality Date    COLONOSCOPY N/A 1/12/2023    Procedure: COLONOSCOPY;  Surgeon: Supriya Hughes MD;  Location: [facility];  Service: Endoscopy;  Laterality: N/A;    ESOPHAGOGASTRODUODENOSCOPY N/A 1/12/2023    Procedure: EGD (ESOPHAGOGASTRODUODENOSCOPY);  Surgeon: Supriya Hughes MD;  Location: [facility];  Service:  Endoscopy;  Laterality: N/A;    LEFT HEART CATHETERIZATION Left 1/25/2022    Procedure: CATHETERIZATION, HEART, LEFT;  Surgeon: Rocío Davidson MD;  Location: [facility] CATH LAB;  Service: Cardiology;  Laterality: Left;    RENAL BIOPSY  11/16/2018     Social History     Socioeconomic History    Marital status:      Spouse name: Darrell    Number of children: 0   Tobacco Use    Smoking status: Never    Smokeless tobacco: Never   Substance and Sexual Activity    Alcohol use: Yes     Comment: OCC    Drug use: No    Sexual activity: Yes             Past/Current Medical/Surgical History, Past/Current Social History, Past/Current Family History and Past/Current Medications were reviewed in detail.        Objective:           VITAL SIGNS WERE REVIEWED      GENERAL APPEARANCE:     The patient looks uncomfortable.    BMI 38.79    No signs of respiratory distress.    Normal breathing pattern.    No dysmorphic features    Normal eye contact.       GENERAL MEDICAL EXAM:    HEENT:  Head is atraumatic normocephalic.     FUNDOSCOPIC (OPHTHALMOSCOPIC) EXAMINATION showed no disc edema.      NECK: No JVD. No visible lesions or goiters.     CHEST-CARDIOPULMONARY: No cyanosis. No tachypnea. Normal respiratory effort.    [id number]: No jaundice. No stomas or lesions. No visible hernias. No catheters.     SKIN, HAIR, NAILS: No pathognomonic skin rash.No neurofibromatosis. No visible lesions.No stigmata of autoimmune disease. No clubbing.    LIMBS: No varicose veins. No visible swelling.    MUSCULOSKELETAL: No visible deformities.No visible lesions.           Neurologic Exam     Mental Status   Oriented to person, place, and time.   Follows 3 step commands.   Attention: normal. Concentration: normal.   Speech: speech is normal   Level of consciousness: alert  Able to name object. Able to repeat. Normal comprehension.     Cranial Nerves   Cranial nerves II through XII intact.     CN II   Visual fields full to  confrontation.   Visual acuity: normal  Right visual field deficit: none  Left visual field deficit: none     CN III, IV, VI   Pupils are equal, round, and reactive to light.  Extraocular motions are normal.   Right pupil: Size: 2 mm. Shape: regular. Reactivity: brisk. Consensual response: intact. Accommodation: intact.   Left pupil: Size: 2 mm. Shape: regular. Reactivity: brisk. Consensual response: intact. Accommodation: intact.   CN III: no CN III palsy  CN VI: no CN VI palsy  Nystagmus: none   Diplopia: none  Ophthalmoparesis: none  Upgaze: normal  Downgaze: normal  Conjugate gaze: present  Vestibulo-ocular reflex: present    CN V   Facial sensation intact.   Right facial sensation deficit: none  Left facial sensation deficit: none  Jaw jerk: normal    CN VII   Facial expression full, symmetric.   Right facial weakness: none  Left facial weakness: none    CN VIII   CN VIII normal.   Hearing: intact    CN IX, X   CN IX normal.   CN X normal.   Palate: symmetric    CN XI   CN XI normal.   Right sternocleidomastoid strength: normal  Left sternocleidomastoid strength: normal  Right trapezius strength: normal  Left trapezius strength: normal    CN XII   CN XII normal.   Tongue: not atrophic  Fasciculations: absent  Tongue deviation: none    Motor Exam   Muscle bulk: normal  Overall muscle tone: normal  Right arm tone: normal  Left arm tone: normal  Right arm pronator drift: absent  Left arm pronator drift: absent  Right leg tone: normal  Left leg tone: normal    Strength   Strength 5/5 throughout.   Right neck flexion: 5/5  Left neck flexion: 5/5  Right neck extension: 5/5  Left neck extension: 5/5  Right deltoid: 5/5  Left deltoid: 5/5  Right biceps: 5/5  Left biceps: 5/5  Right triceps: 5/5  Left triceps: 5/5  Right wrist flexion: 5/5  Left wrist flexion: 5/5  Right wrist extension: 5/5  Left wrist extension: 5/5  Right interossei: 5/5  Left interossei: 5/5  Right iliopsoas: 5/5  Left iliopsoas: 5/5  Right  quadriceps: 5/5  Left quadriceps: 5/5  Right hamstrin/5  Left hamstrin/5  Right glutei: 5/5  Left glutei: 5/5  Right anterior tibial: 5/5  Left anterior tibial: 5/5  Right posterior tibial: 5/5  Left posterior tibial: 5/5  Right peroneal: 5/5  Left peroneal: 5/5  Right gastroc: 5/5  Left gastroc: 5/5    Sensory Exam   Light touch normal.   Right arm light touch: normal  Left arm light touch: normal  Right leg light touch: normal  Left leg light touch: normal  Vibration normal.   Right arm vibration: normal  Left arm vibration: normal  Right leg vibration: normal  Left leg vibration: normal  Proprioception normal.   Right arm proprioception: normal  Left arm proprioception: normal  Right leg proprioception: normal  Left leg proprioception: normal  Pinprick normal.   Right arm pinprick: normal  Left arm pinprick: normal  Right leg pinprick: normal  Left leg pinprick: normal  Sensory deficit distribution on right: median  Sensory deficit distribution on left: median  Graphesthesia: normal  Stereognosis: normal    Gait, Coordination, and Reflexes     Gait  Gait: normal    Coordination   Romberg: negative  Finger to nose coordination: normal  Heel to shin coordination: normal  Tandem walking coordination: normal    Tremor   Resting tremor: absent  Intention tremor: absent  Action tremor: absent    Reflexes   Right brachioradialis: 2+  Left brachioradialis: 2+  Right biceps: 2+  Left biceps: 2+  Right triceps: 2+  Left triceps: 2+  Right patellar: 2+  Left patellar: 2+  Right achilles: 2+  Left achilles: 2+  Right plantar: normal  Left plantar: normal  Right Myrick: absent  Left Myrick: absent  Right ankle clonus: absent  Left ankle clonus: absent  Right pendular knee jerk: absent  Left pendular knee jerk: absent            Lab Results   Component Value Date    WBC 6.23 2023    HGB 10.8 (L) 2023    HCT 33.6 (L) 2023    MCV 82 2023     2023     Sodium   Date Value Ref Range  Status   05/08/2023 140 136 - 145 mmol/L Final     Potassium   Date Value Ref Range Status   05/08/2023 3.9 3.5 - 5.1 mmol/L Final     Chloride   Date Value Ref Range Status   05/08/2023 106 95 - 110 mmol/L Final     CO2   Date Value Ref Range Status   05/08/2023 24 23 - 29 mmol/L Final     Glucose   Date Value Ref Range Status   05/08/2023 103 70 - 110 mg/dL Final     BUN   Date Value Ref Range Status   05/08/2023 11 6 - 20 mg/dL Final     Creatinine   Date Value Ref Range Status   05/08/2023 1.1 0.5 - 1.4 mg/dL Final     Calcium   Date Value Ref Range Status   05/08/2023 9.7 8.7 - 10.5 mg/dL Final     Total Protein   Date Value Ref Range Status   05/08/2023 8.2 6.0 - 8.4 g/dL Final     Albumin   Date Value Ref Range Status   05/08/2023 4.3 3.5 - 5.2 g/dL Final     Total Bilirubin   Date Value Ref Range Status   05/08/2023 0.3 0.1 - 1.0 mg/dL Final     Comment:     For infants and newborns, interpretation of results should be based  on gestational age, weight and in agreement with clinical  observations.    Premature Infant recommended reference ranges:  Up to 24 hours.............<8.0 mg/dL  Up to 48 hours............<12.0 mg/dL  3-5 days..................<15.0 mg/dL  6-29 days.................<15.0 mg/dL       Alkaline Phosphatase   Date Value Ref Range Status   05/08/2023 62 55 - 135 U/L Final     AST   Date Value Ref Range Status   05/08/2023 17 10 - 40 U/L Final     ALT   Date Value Ref Range Status   05/08/2023 19 10 - 44 U/L Final     Anion Gap   Date Value Ref Range Status   05/08/2023 10 8 - 16 mmol/L Final     eGFR if    Date Value Ref Range Status   05/11/2022 >60 >60 mL/min/1.73 m^2 Final     eGFR if non    Date Value Ref Range Status   05/11/2022 >60 >60 mL/min/1.73 m^2 Final     Comment:     Calculation used to obtain the estimated glomerular filtration  rate (eGFR) is the CKD-EPI equation.        Lab Results   Component Value Date    [id number] 278 05/29/2019     Lab  Results   Component Value Date    TSH 0.891 11/29/2021    FREET4 1.02 02/01/2018       LABORATORY EVALUATION      [id number]     Lab [facility] showed NL HA1C, TFT, SPEP-IPEP , Low Vitamin D and Vitamin B12  and Positive AMAURY, SSA, SSB and RNP Antibodies.       RADIOLOGY EVALUATION     06-    MRI Brain NL       NEUROPHYSIOLOGY EVALUATION    06-    EEG NL           07- THROUGH 07-     AEEG  HOURS: SW, LT TL F7-T7, FOUR EVENTS (NON-EPILEPTIC-PSYCHOGENIC) .      08-    NCS/EMG BUE RLE No PN. Mild-Moderate RT CTS and Very Mild LT CTS.          AED MONITORING      AED: LEV  RANGE:  03-60 Dose    DATE LEVEL    05-   1000 mg QHS                                            Reviewed the neuroimaging independently       Assessment:       1. Epilepsy, grand mal    2. Intractable epilepsy without status epilepticus, unspecified epilepsy type    3. Sjogren's syndrome with other organ involvement    4. Migraine without status migrainosus, not intractable, unspecified migraine type    5. Generalized anxiety disorder    6. Scleroderma    7. Raynaud's disease without gangrene    8. Lupus nephritis, ISN/RPS class V    9. On antiepileptic therapy    10. Vitamin D deficiency disease    11. Other proteinuria    12. Right flank pain    13. Abnormal stress test    14. Gastroesophageal reflux disease, unspecified whether esophagitis present    15. Hyperglycemia    16. Iron deficiency anemia secondary to inadequate dietary iron intake    17. Paresthesias          EPILEPSY CLASSIFICATION    SEMIOLOGY: SENSORY AURA (LEFT FACE)--> GTC    EPILEPTOGENIC ZONE (S):  UNKNOWN     ETIOLOGY: UNKNOWN     PRIOR AEDS: GBP (PAIN)    CURRENT AEDS: LEV     LAST SEIZURE DATE:        COMPREHENSIVE LIST OF AEDs:     Acetazolamide (AZM-Diamox)   Benzos: clonazepam (CZP Klonopin), lorazepam (LZP-Ativan), diazepam (DZP-Valium), clorazepate (CLZ- Tranxene)  Brivaracetam (BRV-Briviact)  Cannabidiol (CBD-  Epidiolex)  Carbamazepine (CBZ-Tegretol)  Cenobamate (CNB-Xcopri)  Clobazam (CLB-Onfi)  Eslicarbazepine (ESL-Aptiom)  Ethosuximide (ESX-Zarontin)  Felbamate (FBM-Felbatol)  Fenfluramine (FFA-Fintepla)  Gabapentin (GBP-Neurontin)  Lacosamide (LCM-Vimpat)  Lamotrigine (LTG-Lamitcal)  Levetiracetam (LEV- Keppra)  Oxcarbazepine (OXC-Trileptal)  Perampanel (PML-Fycompa)  Phenobarbital (PB)  Phenytoin (PHT-Dilantin)  Pregabalin (PGB-Lyrica)  Primidone (PRM)   Retigabine (RTG- Potiga) Discontinued in 2017  Rufinamide (RFN-Benzil)  Stiripentol (STP-Diacomit)  Tiagabine (TGB-Gabitril)  Topiramate (TPM-Topamax)  Valproate (VPA-Depakote)  Vigabatrin (VGB-Sabril)  Zonisamide (ZNS-Zonegran)    Plan:         CARRIES A DIAGNOSIS OF GRAND MAL EPILEPSY         EEG and AEEG to evaluate for epileptiform discharges.     Brain MRI WWO to evaluate for epileptogenic lesions.    The patient was encouraged to maintain full traditional seizure precautions which include but not limited to avoidance of driving, biking, high altitudes (ladders, escalators, rock climbing, mountain climbing, skiing, connor diving, moderate to difficult hiking), proximity to fire or fire source, proximity to body of water or swimming alone, operating heavy and potentially risky machinery, using sharp objects, using exercise machines like treadmill and weight lifting. Walking while accompanied on soft surfaces like grass is preferable.The patient was encouraged to shower (without accumulation of water) instead of taking a bath if unsupervised. The patient was made aware that these precautions are especially important during concurrent illnesses, fever, infections, vomiting, changing medications and running out of anti-seizure medications. Instructed the patient to avoid night shifts, sleep deprivation and alcohol or recreational drug use as adequate sleep and avoidance of alcohol/drugs are very important measures to assure good seizure control. The patient was also  advised not to care for young children without company. The patient is advised to pad the side rails with pillows and blankets if applicable.I strongly recommended lowering the bed to the floor level to decrease the risk of falls during nocturnal seizures that occur during sleep. I also instructed the patient to avoid safety sensitive duties. In general, any activity that requires full awareness and would result in serious injury to self and others if a seizure takes place should be avoided.      Made the patient aware that the duration of restrictions/precautions varies and in [place] it is 6 months. The patient verbalized  full understanding.        Change Levetiracetam/Keppra-LEV XR from 1000 mg QHS to 2000 mg QHS. Check LEV today.     Continue AEDs INDEFINITELY, FOR GOOD AND NEVER SKIP A DOSE. The patient verbalized full understanding. Stressed the extreme medical, personal safety, public safety and legal importance of compliance and seizure control. Will give as many refills as possible with or without face-to-face evaluation to make sure the patient and any patient with epilepsy will never run out of medications. Running out of seizure medications should never happen under our care. I explained to the patient that he should not, under any circumstances, adjust or stop taking his seizure medication without discussing with me. Warned the patient about SUDEP. The patient verbalized full understanding.       AVOID any substance that could lower seizure threshold including but not limited to:        ALCOHOL AND WITHDRAWAL      TRAMADOL.     MEPERIDINE (DEMEROL)     ALL STIMULANTS-ALL ADHD MEDICATIONS.      CLOZAPINE.      BUPROPION (WELLBUTRIN)     CIPROFLOXACIN.    CYCLOSPORINE.     METOCLOPRAMIDE (REGLAN).     TETRAHYDROCANNABINOL (THC)    KRATOM            COMMON MIGRAINE WITHOUT AURA, EPISODIC, LOW FREQUENCY         BRAIN MRI WWO.    HEADACHE DIARY     DISCUSSED THE THREE-FOLD MANAGEMENT OF MIGRAINE:      LIFESTYLE  CHANGES:       Good sleep hygiene  Avoid general triggers like lack of sleep/too much sleep, prolonged sun exposure, excessive screen time and specific triggers based on you own diary   Minimize physical and emotional stress  Smoking avoidance and cessation  Limit caffeine drinks to 1-2 a day   Good hydration   Small frequent meals and avoid skipping meals   Moderate 30-minute-long aerobic exercises 3 times/week. Avoid strenuous exercise         ABORTIVE MEDICATIONS (ACUTE-RESCUE MEDICATIONS):     Should only be taken 2-3 times/week to avoid rebound and overuse headaches.      Triptans and DHE are C/I in Raynaud's.    Failed Firoicet.       AVOID NARCOTICS (OPIATES)      1. No randomized controlled study shows pain-free results with opioids in the treatment of migraine.     2. The physiologic consequences of opioid use are adverse, occur quickly, and can be permanent. Decreased gray matter, release of calcitonin gene-related peptide, dynorphin, and pro-inflammatory peptides, and activation of excitatory glutamate receptors are all associated with opioid exposure.     3. Opioids are pro-nociceptive, prevent reversal of migraine central sensitization, and interfere with triptan effectiveness.     4.Opioids precipitate bad clinical outcomes, especially transformation to daily headache.     5. They cause disease progression, comorbidity, and excessive health care consumption.           NEXT OPTIONS:      Gepants: Nuretc (rimegepant)75 mg >Ubrelvy (ubrogepant) 100 mg          IMPENDING STATUS: Prednisone and Vistaril.    STATUS MIGRAINOSUS: ED-Infusion for Status Protocol.        PREVENTATIVE (MORE ACCURATELY MIGRAINE REDUCTION) MEDICATIONS:           Since the patient's headache is very infrequent will hold off.     Tried CCB (Amlodipine),  TCA (Elavil), SSRIS (Zoloft) and AEDs (GBP).     BB are C/I in Raynaud's.    HELPFUL SUPPLEMENTS:     Helpful supplements include Co-Q 10, B2, Mg, Feverfew (Dolovent combination) and  alycia (Petadolex)        NEUROPHARMACOLOGY     NEXT OPTIONS:       Topiramate/Topamax (TPM) slow titration to 50 mg BID which can cause mental slowing, transient tingling, kidney stones, weight loss, cleft lip and palate and rarely glaucoma and visual field defects . The patient was encouraged to drink a lot of fluids.     Zonisamide/Zonegran (ZNS) 100-400 mg QHS is a good alternative to TPM in case of SE/AE.     Lamotrigine/Lamictal  (LTG)slow titration to 100 mg BID which can cause serious skin rash and rare cardiac arrhythmias. LTG is superior to other therapies for specifically reducing migraine aura.     ANTI-CGRP AGENTS: Qulipta (alogepant) 60 mg QD, Erenumab (Aimovig) 140 mg SQ Pen monthly (Reported cases of Constipation and BP elevation) , Galcanezumab (Emgality) 120 mg SQ Pen monthly after a loading dose of 240 mg  and Fremnezumab (Ajovy) (Ligand Blocker): 225 mg SQ monthly or 675 mg every 3 months     Botox 200 units every 3 months .        LAST RESORT OPTIONS:      Namenda 10 mg BID     Valproic acid/ Depakote         NEUROMODULATION     Cefaly, Relivion, Nerivio and GammaCore (VNS)           PARESTHESIAS, HANDS AND FEET, B12 AND VITAMIN D DEFICIENCY, CONNECTIVE TISSUE DISEASE, IMMUNOSUPPRESSIVE THERAPY            NCS/EMG BUE RLE.    Start Vitamin D 5000 units QD. Check level in .    Start B12 SL 2500 mcg QD.Check level in .                     MEDICAL/SURGICAL COMORBIDITIES     All relevant medical comorbidities noted and managed by primary care physician and medical care team.          HEALTHY LIFESTYLE AND PREVENTATIVE CARE    The patient to adhere to the age-appropriate health maintenance guidelines including screening tests and vaccinations. The patient to adhere to  healthy lifestyle, optimal weight, exercise, healthy diet, good sleep hygiene and avoiding drugs including smoking, alcohol and recreational drugs.          RTC in 3 months          Daren Lamb MD, FAAN    Attending  Neurologist/Epileptologist         Diplomate, American Board of Psychiatry and Neurology    Diplomate, American Board of Clinical Neurophysiology     Fellow, American Academy of Neurology           I spent a total of 94 minutes on the day of the visit.  This includes face to face time and non-face to face time preparing to see the patient (eg, review of tests), obtaining and/or reviewing separately obtained history, documenting clinical information in the electronic or other health record, independently interpreting results and communicating results to the patient/family/caregiver, or care coordinator.

## 2023-05-30 LAB — LEVETIRACETAM SERPL-MCNC: 15.3 UG/ML (ref 3–60)

## 2023-06-02 ENCOUNTER — OFFICE VISIT (OUTPATIENT)
Dept: PSYCHIATRY | Facility: CLINIC | Age: 48
End: 2023-06-02
Payer: COMMERCIAL

## 2023-06-02 DIAGNOSIS — F41.9 ANXIETY: ICD-10-CM

## 2023-06-02 DIAGNOSIS — Z63.0 MARITAL DYSFUNCTION: ICD-10-CM

## 2023-06-02 DIAGNOSIS — F43.21 SITUATIONAL DEPRESSION: Primary | ICD-10-CM

## 2023-06-02 DIAGNOSIS — F43.21 GRIEF REACTION: ICD-10-CM

## 2023-06-02 PROCEDURE — 90834 PR PSYCHOTHERAPY W/PATIENT, 45 MIN: ICD-10-PCS | Mod: 95,,, | Performed by: SOCIAL WORKER

## 2023-06-02 PROCEDURE — 90834 PSYTX W PT 45 MINUTES: CPT | Mod: 95,,, | Performed by: SOCIAL WORKER

## 2023-06-02 SDOH — SOCIAL DETERMINANTS OF HEALTH (SDOH): PROBLEMS IN RELATIONSHIP WITH SPOUSE OR PARTNER: Z63.0
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
## 2023-06-05 ENCOUNTER — PATIENT MESSAGE (OUTPATIENT)
Dept: INTERNAL MEDICINE | Facility: CLINIC | Age: 48
End: 2023-06-05
Payer: COMMERCIAL

## 2023-06-06 ENCOUNTER — PATIENT MESSAGE (OUTPATIENT)
Dept: INTERNAL MEDICINE | Facility: CLINIC | Age: 48
End: 2023-06-06

## 2023-06-06 ENCOUNTER — OFFICE VISIT (OUTPATIENT)
Dept: INTERNAL MEDICINE | Facility: CLINIC | Age: 48
End: 2023-06-06
Payer: COMMERCIAL

## 2023-06-06 ENCOUNTER — TELEPHONE (OUTPATIENT)
Dept: INTERNAL MEDICINE | Facility: CLINIC | Age: 48
End: 2023-06-06

## 2023-06-06 VITALS
HEIGHT: 66 IN | WEIGHT: 240.5 LBS | TEMPERATURE: 97 F | RESPIRATION RATE: 20 BRPM | HEART RATE: 74 BPM | DIASTOLIC BLOOD PRESSURE: 70 MMHG | OXYGEN SATURATION: 100 % | BODY MASS INDEX: 38.65 KG/M2 | SYSTOLIC BLOOD PRESSURE: 118 MMHG

## 2023-06-06 DIAGNOSIS — Z12.31 ENCOUNTER FOR SCREENING MAMMOGRAM FOR MALIGNANT NEOPLASM OF BREAST: ICD-10-CM

## 2023-06-06 DIAGNOSIS — F51.02 INSOMNIA DUE TO STRESS: ICD-10-CM

## 2023-06-06 DIAGNOSIS — F43.21 SITUATIONAL DEPRESSION: Primary | ICD-10-CM

## 2023-06-06 DIAGNOSIS — G40.909 SEIZURE DISORDER: ICD-10-CM

## 2023-06-06 PROCEDURE — 3074F SYST BP LT 130 MM HG: CPT | Mod: CPTII,S$GLB,, | Performed by: INTERNAL MEDICINE

## 2023-06-06 PROCEDURE — 3078F DIAST BP <80 MM HG: CPT | Mod: CPTII,S$GLB,, | Performed by: INTERNAL MEDICINE

## 2023-06-06 PROCEDURE — 3008F BODY MASS INDEX DOCD: CPT | Mod: CPTII,S$GLB,, | Performed by: INTERNAL MEDICINE

## 2023-06-06 PROCEDURE — 1160F PR REVIEW ALL MEDS BY PRESCRIBER/CLIN PHARMACIST DOCUMENTED: ICD-10-PCS | Mod: CPTII,S$GLB,, | Performed by: INTERNAL MEDICINE

## 2023-06-06 PROCEDURE — 99214 OFFICE O/P EST MOD 30 MIN: CPT | Mod: S$GLB,,, | Performed by: INTERNAL MEDICINE

## 2023-06-06 PROCEDURE — 1159F PR MEDICATION LIST DOCUMENTED IN MEDICAL RECORD: ICD-10-PCS | Mod: CPTII,S$GLB,, | Performed by: INTERNAL MEDICINE

## 2023-06-06 PROCEDURE — 3074F PR MOST RECENT SYSTOLIC BLOOD PRESSURE < 130 MM HG: ICD-10-PCS | Mod: CPTII,S$GLB,, | Performed by: INTERNAL MEDICINE

## 2023-06-06 PROCEDURE — 1159F MED LIST DOCD IN RCRD: CPT | Mod: CPTII,S$GLB,, | Performed by: INTERNAL MEDICINE

## 2023-06-06 PROCEDURE — 99999 PR PBB SHADOW E&M-EST. PATIENT-LVL V: CPT | Mod: PBBFAC,,, | Performed by: INTERNAL MEDICINE

## 2023-06-06 PROCEDURE — 99999 PR PBB SHADOW E&M-EST. PATIENT-LVL V: ICD-10-PCS | Mod: PBBFAC,,, | Performed by: INTERNAL MEDICINE

## 2023-06-06 PROCEDURE — 1160F RVW MEDS BY RX/DR IN RCRD: CPT | Mod: CPTII,S$GLB,, | Performed by: INTERNAL MEDICINE

## 2023-06-06 PROCEDURE — 3008F PR BODY MASS INDEX (BMI) DOCUMENTED: ICD-10-PCS | Mod: CPTII,S$GLB,, | Performed by: INTERNAL MEDICINE

## 2023-06-06 PROCEDURE — 3078F PR MOST RECENT DIASTOLIC BLOOD PRESSURE < 80 MM HG: ICD-10-PCS | Mod: CPTII,S$GLB,, | Performed by: INTERNAL MEDICINE

## 2023-06-06 PROCEDURE — 99214 PR OFFICE/OUTPT VISIT, EST, LEVL IV, 30-39 MIN: ICD-10-PCS | Mod: S$GLB,,, | Performed by: INTERNAL MEDICINE

## 2023-06-06 RX ORDER — SERTRALINE HYDROCHLORIDE 100 MG/1
100 TABLET, FILM COATED ORAL DAILY
Qty: 30 TABLET | Refills: 1 | Status: SHIPPED | OUTPATIENT
Start: 2023-06-06 | End: 2023-06-23

## 2023-06-06 NOTE — PROGRESS NOTES
Zachary Escalona Khari  47 y.o.  Black or  female    Chief Complaint   Patient presents with    Depression       HPI:  Presents to the clinic to follow up on depression. She continues to be under a lot of stress. She recently caught her  in the act of cheating. She is trying to figure out where she will live.   She is not sleeping well and is having more seizures.   She  needs to extend her leave from work.      PMH: Reviewed    MEDS: Reviewed med card    ALLERGIES: Reviewed allergy card    PE: Reviewed vitals  GENERAL: Alert and oriented, no acute distress  HEART: Regular rate  LUNGS: Unlabored respirations  PSYCH: Anxious; depressed mood     ASSESSMENT/PLAN:    Zachary was seen today for depression.    Diagnoses and all orders for this visit:    Situational depression  -     change sertraline (ZOLOFT) 100 MG tablet; Take 1 tablet (100 mg total) by mouth once daily.    Insomnia due to stress  -     change sertraline  -     f/u with psychiatry    Seizure disorder  -     f/u with neurology    Encounter for screening mammogram for malignant neoplasm of breast  -     Mammo Digital Screening Bilat w/ [facility]; Future    [facility] paperwork completed.     F/U in 4 weeks.

## 2023-06-06 NOTE — TELEPHONE ENCOUNTER
----- Message from Elke Jimenez sent at 6/6/2023 12:42 PM CDT -----  Regarding: [facility] Paperwork  Contact: Zachary Sharma is requesting a callback from the nurse in regards to the [facility] paperwork that was to be sent today to her disability .  Zachary is expecting a callback and she is willing to pick the paperwork up if they are completed but has not been sent.     Zachary can be reached at [phone] ([phone])     Thanks

## 2023-06-08 ENCOUNTER — TELEPHONE (OUTPATIENT)
Dept: INTERNAL MEDICINE | Facility: CLINIC | Age: 48
End: 2023-06-08
Payer: COMMERCIAL

## 2023-06-08 ENCOUNTER — OFFICE VISIT (OUTPATIENT)
Dept: INTERNAL MEDICINE | Facility: CLINIC | Age: 48
End: 2023-06-08
Payer: COMMERCIAL

## 2023-06-08 DIAGNOSIS — F51.02 INSOMNIA DUE TO STRESS: ICD-10-CM

## 2023-06-08 DIAGNOSIS — Z02.89 ENCOUNTER FOR COMPLETION OF FORM WITH PATIENT: Primary | ICD-10-CM

## 2023-06-08 DIAGNOSIS — F41.0 PANIC ATTACKS: ICD-10-CM

## 2023-06-08 DIAGNOSIS — F43.21 SITUATIONAL DEPRESSION: ICD-10-CM

## 2023-06-08 PROCEDURE — 99214 OFFICE O/P EST MOD 30 MIN: CPT | Mod: 95,,, | Performed by: INTERNAL MEDICINE

## 2023-06-08 PROCEDURE — 1160F PR REVIEW ALL MEDS BY PRESCRIBER/CLIN PHARMACIST DOCUMENTED: ICD-10-PCS | Mod: CPTII,95,, | Performed by: INTERNAL MEDICINE

## 2023-06-08 PROCEDURE — 1159F MED LIST DOCD IN RCRD: CPT | Mod: CPTII,95,, | Performed by: INTERNAL MEDICINE

## 2023-06-08 PROCEDURE — 1159F PR MEDICATION LIST DOCUMENTED IN MEDICAL RECORD: ICD-10-PCS | Mod: CPTII,95,, | Performed by: INTERNAL MEDICINE

## 2023-06-08 PROCEDURE — 99214 PR OFFICE/OUTPT VISIT, EST, LEVL IV, 30-39 MIN: ICD-10-PCS | Mod: 95,,, | Performed by: INTERNAL MEDICINE

## 2023-06-08 PROCEDURE — 1160F RVW MEDS BY RX/DR IN RCRD: CPT | Mod: CPTII,95,, | Performed by: INTERNAL MEDICINE

## 2023-06-08 NOTE — TELEPHONE ENCOUNTER
----- Message from Eloisa Engel sent at 6/8/2023  9:01 AM CDT -----  Susana from [employer]- you completed a form and need a little more information on  detailed mental status information,Please call [phone] fax [phone].Thanks

## 2023-06-08 NOTE — TELEPHONE ENCOUNTER
This matter was being [facility]. Paperwork has been handed to Dr. Camara.

## 2023-06-08 NOTE — PROGRESS NOTES
Subjective     Patient ID: Zachary Lucia is a 47 y.o. female.    Chief Complaint: Paperwork    The patient location is: [place]   The chief complaint leading to consultation is: paperwork completion    Visit type: audiovisual    Face to Face time with patient: 11  11 minutes of total time spent on the encounter, which includes face to face time and non-face to face time preparing to see the patient (eg, review of tests), Obtaining and/or reviewing separately obtained history, Documenting clinical information in the electronic or other health record, Independently interpreting results (not separately reported) and communicating results to the patient/family/caregiver, or Care coordination (not separately reported).         Each patient to whom he or she provides medical services by telemedicine is:  (1) informed of the relationship between the physician and patient and the respective role of any other health care provider with respect to management of the patient; and (2) notified that he or she may decline to receive medical services by telemedicine and may withdraw from such care at any time.    Notes:   Needs additional paperwork completed for FMLA. She is currently off due to depression, insomnia, stress and panic attacks. She was seen on 6/6/23 and sertraline was increased to 100 mg daily. She is scheduled to f/u on 7/5 and will see psychiatry next week.     Review of Systems   Constitutional:  Positive for activity change. Negative for unexpected weight change.   HENT:  Negative for hearing loss, rhinorrhea and trouble swallowing.    Eyes:  Negative for discharge and visual disturbance.   Respiratory:  Positive for chest tightness. Negative for wheezing.    Cardiovascular:  Positive for chest pain and palpitations.   Gastrointestinal:  Negative for blood in stool, constipation, diarrhea and vomiting.   Endocrine: Negative for polydipsia and polyuria.   Genitourinary:  Negative for  difficulty urinating, dysuria, hematuria and menstrual problem.   Musculoskeletal:  Negative for arthralgias, joint swelling and neck pain.   Neurological:  Positive for weakness and headaches.   Psychiatric/Behavioral:  Positive for confusion, dysphoric mood and sleep disturbance. The patient is nervous/anxious.         Objective     Physical Exam  Constitutional:       General: She is not in acute distress.     Appearance: She is well-developed. She is not ill-appearing.   Pulmonary:      Effort: Pulmonary effort is normal. No respiratory distress.   Neurological:      Mental Status: She is alert and oriented to person, place, and time.   Psychiatric:         Behavior: Behavior normal.         Thought Content: Thought content normal.         Judgment: Judgment normal.       Zachary was seen today for paperwork.    Diagnoses and all orders for this visit:    Encounter for completion of form with patient    Situational depression    Insomnia due to stress    Panic attacks    Continue sertraline.     F/U with psychiatry.     Paperwork completed.

## 2023-06-13 ENCOUNTER — HOSPITAL ENCOUNTER (OUTPATIENT)
Dept: PULMONOLOGY | Facility: HOSPITAL | Age: 48
Discharge: HOME | End: 2023-06-13
Attending: PSYCHIATRY & NEUROLOGY
Payer: COMMERCIAL

## 2023-06-13 ENCOUNTER — PATIENT MESSAGE (OUTPATIENT)
Dept: NEUROLOGY | Facility: CLINIC | Age: 48
End: 2023-06-13

## 2023-06-13 ENCOUNTER — HOSPITAL ENCOUNTER (OUTPATIENT)
Dept: RADIOLOGY | Facility: HOSPITAL | Age: 48
Discharge: HOME | End: 2023-06-13
Attending: PSYCHIATRY & NEUROLOGY
Payer: COMMERCIAL

## 2023-06-13 DIAGNOSIS — G40.409 EPILEPSY, GRAND MAL: ICD-10-CM

## 2023-06-13 PROCEDURE — 95819 PR EEG,W/AWAKE & ASLEEP RECORD: ICD-10-PCS | Mod: 26,,, | Performed by: PSYCHIATRY & NEUROLOGY

## 2023-06-13 PROCEDURE — 95819 EEG AWAKE AND ASLEEP: CPT | Mod: 26,,, | Performed by: PSYCHIATRY & NEUROLOGY

## 2023-06-14 ENCOUNTER — HOSPITAL ENCOUNTER (OUTPATIENT)
Dept: RADIOLOGY | Facility: HOSPITAL | Age: 48
Discharge: HOME | End: 2023-06-14
Attending: PSYCHIATRY & NEUROLOGY
Payer: COMMERCIAL

## 2023-06-14 PROCEDURE — 70553 MRI BRAIN STEM W/O & W/DYE: CPT | Mod: TC,PN

## 2023-06-14 PROCEDURE — 70553 MRI BRAIN STEM W/O & W/DYE: CPT | Mod: 26,,, | Performed by: RADIOLOGY

## 2023-06-14 PROCEDURE — 25500020 PHARM REV CODE 255: Mod: PN | Performed by: PSYCHIATRY & NEUROLOGY

## 2023-06-14 PROCEDURE — A9585 GADOBUTROL INJECTION: HCPCS | Mod: PN | Performed by: PSYCHIATRY & NEUROLOGY

## 2023-06-14 PROCEDURE — 70553 MRI BRAIN EPILEPSY W W/O CONTRAST: ICD-10-PCS | Mod: 26,,, | Performed by: RADIOLOGY

## 2023-06-14 RX ORDER — GADOBUTROL 604.72 MG/ML
10 INJECTION INTRAVENOUS
Status: COMPLETED | OUTPATIENT
Start: 2023-06-14 | End: 2023-06-14

## 2023-06-14 RX ADMIN — GADOBUTROL 10 ML: 604.72 INJECTION INTRAVENOUS at 02:06

## 2023-06-15 ENCOUNTER — OFFICE VISIT (OUTPATIENT)
Dept: PSYCHIATRY | Facility: CLINIC | Age: 48
End: 2023-06-15
Payer: COMMERCIAL

## 2023-06-15 DIAGNOSIS — F32.0 CURRENT MILD EPISODE OF MAJOR DEPRESSIVE DISORDER, UNSPECIFIED WHETHER RECURRENT: Primary | ICD-10-CM

## 2023-06-15 DIAGNOSIS — F41.9 ANXIETY: ICD-10-CM

## 2023-06-15 DIAGNOSIS — F43.21 GRIEF REACTION: ICD-10-CM

## 2023-06-15 DIAGNOSIS — Z63.0 MARITAL DYSFUNCTION: ICD-10-CM

## 2023-06-15 DIAGNOSIS — E66.01 SEVERE OBESITY: ICD-10-CM

## 2023-06-15 PROCEDURE — 90834 PSYTX W PT 45 MINUTES: CPT | Mod: 95,,, | Performed by: SOCIAL WORKER

## 2023-06-15 PROCEDURE — 90834 PR PSYCHOTHERAPY W/PATIENT, 45 MIN: ICD-10-PCS | Mod: 95,,, | Performed by: SOCIAL WORKER

## 2023-06-15 SDOH — SOCIAL DETERMINANTS OF HEALTH (SDOH): PROBLEMS IN RELATIONSHIP WITH SPOUSE OR PARTNER: Z63.0

## 2023-06-15 NOTE — TELEPHONE ENCOUNTER
Care Due:                  Date            Visit Type   Department     Provider  --------------------------------------------------------------------------------                                ESTABLISHED                              PATIENT -    [facility] INTERNAL  Last Visit: 06-      [facility]      PARIS Camara                              EP -                              PRIMARY      [facility] INTER[facility]  Next Visit: 07-      CARE ([facility])   [facility]       Danna Camara                                                            Last  Test          Frequency    Reason                     Performed    Due Date  --------------------------------------------------------------------------------    HBA1C.......  6 months...  WEGOVY...................  Not Found    Overdue    [employer] Embedded Care Due Messages. Reference number: [id number].   6/15/2023 6:09:35 PM CDT

## 2023-06-16 ENCOUNTER — PATIENT MESSAGE (OUTPATIENT)
Dept: INTERNAL MEDICINE | Facility: CLINIC | Age: 48
End: 2023-06-16
Payer: COMMERCIAL

## 2023-06-16 ENCOUNTER — TELEPHONE (OUTPATIENT)
Dept: RHEUMATOLOGY | Facility: CLINIC | Age: 48
End: 2023-06-16
Payer: COMMERCIAL

## 2023-06-16 RX ORDER — SEMAGLUTIDE 0.5 MG/.5ML
0.5 INJECTION, SOLUTION SUBCUTANEOUS
Qty: 2 ML | Refills: 1 | OUTPATIENT
Start: 2023-06-16

## 2023-06-16 RX ORDER — SEMAGLUTIDE 0.5 MG/.5ML
0.5 INJECTION, SOLUTION SUBCUTANEOUS
Qty: 2 ML | Refills: 1 | Status: SHIPPED | OUTPATIENT
Start: 2023-06-16 | End: 2024-02-26

## 2023-06-16 NOTE — TELEPHONE ENCOUNTER
Refill Routing Note   Medication(s) are not appropriate for processing by [facility] for the following reason(s):      Required labs outdated    [facility] action(s):  Defer Care Due:  Labs due          Appointments  past 12m or future 3m with PCP    Date Provider   Last Visit   6/8/2023 Danna Camara, DO   Next Visit   7/5/2023 Danna Camara, DO   ED visits in past 90 days: 0        Note composed:8:02 PM 06/15/2023

## 2023-06-20 DIAGNOSIS — F43.21 SITUATIONAL DEPRESSION: ICD-10-CM

## 2023-06-20 NOTE — TELEPHONE ENCOUNTER
Refill Routing Note   Medication(s) are not appropriate for processing by [facility] for the following reason(s):      New or recently adjusted medication    [facility] action(s):  Defer Care Due:  None identified          Appointments  past 12m or future 3m with PCP    Date Provider   Last Visit   6/8/2023 Danna Camara, DO   Next Visit   7/5/2023 Danna Camara, DO   ED visits in past 90 days: 0        Note composed:5:46 PM 06/20/2023

## 2023-06-20 NOTE — TELEPHONE ENCOUNTER
No care due was identified.  [facility] Embedded Care Due Messages. Reference number: [id number].   6/20/2023 3:00:58 PM CDT

## 2023-06-21 ENCOUNTER — HOSPITAL ENCOUNTER (OUTPATIENT)
Dept: PULMONOLOGY | Facility: HOSPITAL | Age: 48
Discharge: HOME | End: 2023-06-21
Attending: PSYCHIATRY & NEUROLOGY
Payer: COMMERCIAL

## 2023-06-21 ENCOUNTER — PATIENT MESSAGE (OUTPATIENT)
Dept: INTERNAL MEDICINE | Facility: CLINIC | Age: 48
End: 2023-06-21
Payer: COMMERCIAL

## 2023-06-21 ENCOUNTER — TELEPHONE (OUTPATIENT)
Dept: NEUROLOGY | Facility: CLINIC | Age: 48
End: 2023-06-21
Payer: COMMERCIAL

## 2023-06-21 PROCEDURE — 95819 EEG AWAKE AND ASLEEP: CPT

## 2023-06-21 NOTE — TELEPHONE ENCOUNTER
----- Message from Daren Lamb MD sent at 6/21/2023  2:48 PM CDT -----  06-    EEG NL     Ordered AEEG

## 2023-06-21 NOTE — PROCEDURES
DATE EEG PERFORMED:  2023.        DATE EEG INTERPRETED: 2023                DURATION OF EE MINUTES            LEVEL OF CONSCIOUSENESS     Awake and Sleep.            EEG BACKGROUND     The posterior dominant basic rhythm reaches 9-10 Hz, symmetric, reactive, well-modulated and well-sustained.            EEG CLASSIFICATION     Normal           IMPRESSION        The EEG is normal in the awake and sleep states.         There are no epileptiform discharges or lateralizing signs. No typical events were recorded. There is no electrographic evidence of seizure.There is no electrographic evidence of status epilepticus.            PLEASE NOTE THAT A NON-EPILEPTIFORM EEG DOES NOT RULE OUT EPILEPSY.           IAN BRISCOE MD, FAAN     Diplomate, American Board of Psychiatry and Neurology     Diplomate, American Board of Clinical Neurophysiology

## 2023-06-23 RX ORDER — SERTRALINE HYDROCHLORIDE 100 MG/1
TABLET, FILM COATED ORAL
Qty: 90 TABLET | Refills: 0 | Status: SHIPPED | OUTPATIENT
Start: 2023-06-23 | End: 2023-09-21
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
## 2023-07-05 ENCOUNTER — OFFICE VISIT (OUTPATIENT)
Dept: INTERNAL MEDICINE | Facility: CLINIC | Age: 48
End: 2023-07-05
Payer: COMMERCIAL

## 2023-07-05 VITALS
DIASTOLIC BLOOD PRESSURE: 86 MMHG | SYSTOLIC BLOOD PRESSURE: 124 MMHG | OXYGEN SATURATION: 99 % | HEART RATE: 83 BPM | BODY MASS INDEX: 39.01 KG/M2 | HEIGHT: 66 IN | RESPIRATION RATE: 18 BRPM | TEMPERATURE: 98 F | WEIGHT: 242.75 LBS

## 2023-07-05 DIAGNOSIS — F43.21 SITUATIONAL DEPRESSION: Primary | ICD-10-CM

## 2023-07-05 PROCEDURE — 1160F RVW MEDS BY RX/DR IN RCRD: CPT | Mod: CPTII,S$GLB,, | Performed by: INTERNAL MEDICINE

## 2023-07-05 PROCEDURE — 3074F SYST BP LT 130 MM HG: CPT | Mod: CPTII,S$GLB,, | Performed by: INTERNAL MEDICINE

## 2023-07-05 PROCEDURE — 99999 PR PBB SHADOW E&M-EST. PATIENT-LVL IV: ICD-10-PCS | Mod: PBBFAC,,, | Performed by: INTERNAL MEDICINE

## 2023-07-05 PROCEDURE — 3008F BODY MASS INDEX DOCD: CPT | Mod: CPTII,S$GLB,, | Performed by: INTERNAL MEDICINE

## 2023-07-05 PROCEDURE — 1160F PR REVIEW ALL MEDS BY PRESCRIBER/CLIN PHARMACIST DOCUMENTED: ICD-10-PCS | Mod: CPTII,S$GLB,, | Performed by: INTERNAL MEDICINE

## 2023-07-05 PROCEDURE — 99999 PR PBB SHADOW E&M-EST. PATIENT-LVL IV: CPT | Mod: PBBFAC,,, | Performed by: INTERNAL MEDICINE

## 2023-07-05 PROCEDURE — 99213 PR OFFICE/OUTPT VISIT, EST, LEVL III, 20-29 MIN: ICD-10-PCS | Mod: S$GLB,,, | Performed by: INTERNAL MEDICINE

## 2023-07-05 PROCEDURE — 3008F PR BODY MASS INDEX (BMI) DOCUMENTED: ICD-10-PCS | Mod: CPTII,S$GLB,, | Performed by: INTERNAL MEDICINE

## 2023-07-05 PROCEDURE — 99213 OFFICE O/P EST LOW 20 MIN: CPT | Mod: S$GLB,,, | Performed by: INTERNAL MEDICINE

## 2023-07-05 PROCEDURE — 3074F PR MOST RECENT SYSTOLIC BLOOD PRESSURE < 130 MM HG: ICD-10-PCS | Mod: CPTII,S$GLB,, | Performed by: INTERNAL MEDICINE

## 2023-07-05 PROCEDURE — 1159F PR MEDICATION LIST DOCUMENTED IN MEDICAL RECORD: ICD-10-PCS | Mod: CPTII,S$GLB,, | Performed by: INTERNAL MEDICINE

## 2023-07-05 PROCEDURE — 3079F DIAST BP 80-89 MM HG: CPT | Mod: CPTII,S$GLB,, | Performed by: INTERNAL MEDICINE

## 2023-07-05 PROCEDURE — 1159F MED LIST DOCD IN RCRD: CPT | Mod: CPTII,S$GLB,, | Performed by: INTERNAL MEDICINE

## 2023-07-05 PROCEDURE — 3079F PR MOST RECENT DIASTOLIC BLOOD PRESSURE 80-89 MM HG: ICD-10-PCS | Mod: CPTII,S$GLB,, | Performed by: INTERNAL MEDICINE

## 2023-07-05 NOTE — PROGRESS NOTES
Zachary Escalona Khari  47 y.o.  Black or  female    Chief Complaint   Patient presents with    Depression       HPI:  Presents to the clinic to follow up on depression. She continues to get psychotherapy and has an upcoming appointment. She has returned to work and needs [place] paperwork completed for the days she missed (6/7-6/26).    PMH: Reviewed    MEDS: Reviewed med card    ALLERGIES: Reviewed allergy card    PE: Reviewed vitals  GENERAL: Alert and oriented, no acute distress  HEART: Regular rate  LUNGS: Unlabored respirations     ASSESSMENT/PLAN:    Zachary was seen today for depression.    Diagnoses and all orders for this visit:    Situational depression  -     f/u with psychiatry     Paperwork completed.     RTC: As needed      
Negative

## 2023-07-14 ENCOUNTER — OFFICE VISIT (OUTPATIENT)
Dept: PSYCHIATRY | Facility: CLINIC | Age: 48
End: 2023-07-14
Payer: COMMERCIAL

## 2023-07-14 DIAGNOSIS — F41.9 ANXIETY: ICD-10-CM

## 2023-07-14 DIAGNOSIS — F32.0 CURRENT MILD EPISODE OF MAJOR DEPRESSIVE DISORDER, UNSPECIFIED WHETHER RECURRENT: Primary | ICD-10-CM

## 2023-07-14 DIAGNOSIS — F43.21 SITUATIONAL DEPRESSION: ICD-10-CM

## 2023-07-14 DIAGNOSIS — Z63.0 MARITAL DYSFUNCTION: ICD-10-CM

## 2023-07-14 PROCEDURE — 90834 PSYTX W PT 45 MINUTES: CPT | Mod: 95,,, | Performed by: SOCIAL WORKER

## 2023-07-14 PROCEDURE — 90834 PR PSYCHOTHERAPY W/PATIENT, 45 MIN: ICD-10-PCS | Mod: 95,,, | Performed by: SOCIAL WORKER

## 2023-07-14 SDOH — SOCIAL DETERMINANTS OF HEALTH (SDOH): PROBLEMS IN RELATIONSHIP WITH SPOUSE OR PARTNER: Z63.0

## 2023-07-17 DIAGNOSIS — G40.409 EPILEPSY, GRAND MAL: ICD-10-CM

## 2023-07-18 RX ORDER — LEVETIRACETAM 500 MG/1
2000 TABLET, EXTENDED RELEASE ORAL DAILY
Qty: 360 TABLET | Refills: 3 | Status: SHIPPED | OUTPATIENT
Start: 2023-07-18 | End: 2023-07-19 | Stop reason: SDUPTHER

## 2023-07-19 ENCOUNTER — PATIENT MESSAGE (OUTPATIENT)
Dept: NEUROLOGY | Facility: CLINIC | Age: 48
End: 2023-07-19
Payer: COMMERCIAL

## 2023-07-19 DIAGNOSIS — G40.409 EPILEPSY, GRAND MAL: ICD-10-CM

## 2023-07-19 RX ORDER — LEVETIRACETAM 500 MG/1
2000 TABLET, EXTENDED RELEASE ORAL DAILY
Qty: 360 TABLET | Refills: 3 | Status: SHIPPED | OUTPATIENT
Start: 2023-07-19 | End: 2024-02-26 | Stop reason: SDUPTHER

## 2023-07-19 NOTE — TELEPHONE ENCOUNTER
Iv called the pharmacy 3 times to get a status No answer been on hold . Can we just send a refill in?

## 2023-07-20 LAB — BCS RECOMMENDATION EXT: NORMAL

## 2023-07-25 PROCEDURE — 95726 PR EEG, W/VIDEO, CONT RECORD, CMPLT STDY, I&R, >84 HRS: ICD-10-PCS | Mod: S$GLB,,, | Performed by: PSYCHIATRY & NEUROLOGY

## 2023-07-25 PROCEDURE — 95726 EEG PHY/QHP>84 HR W/VEEG: CPT | Mod: S$GLB,,, | Performed by: PSYCHIATRY & NEUROLOGY

## 2023-08-03 ENCOUNTER — LAB VISIT (OUTPATIENT)
Dept: LAB | Facility: HOSPITAL | Age: 48
End: 2023-08-03
Payer: COMMERCIAL

## 2023-08-03 DIAGNOSIS — M35.01 SJOGREN'S SYNDROME WITH KERATOCONJUNCTIVITIS SICCA: ICD-10-CM

## 2023-08-03 LAB
ALBUMIN SERPL BCP-MCNC: 3.9 G/DL (ref 3.5–5.2)
ALP SERPL-CCNC: 51 U/L (ref 55–135)
ALT SERPL W/O P-5'-P-CCNC: 13 U/L (ref 10–44)
ANION GAP SERPL CALC-SCNC: 9 MMOL/L (ref 8–16)
AST SERPL-CCNC: 14 U/L (ref 10–40)
BASOPHILS # BLD AUTO: 0.04 K/UL (ref 0–0.2)
BASOPHILS NFR BLD: 0.7 % (ref 0–1.9)
BILIRUB SERPL-MCNC: 0.4 MG/DL (ref 0.1–1)
BUN SERPL-MCNC: 15 MG/DL (ref 6–20)
CALCIUM SERPL-MCNC: 9.4 MG/DL (ref 8.7–10.5)
CHLORIDE SERPL-SCNC: 104 MMOL/L (ref 95–110)
CO2 SERPL-SCNC: 24 MMOL/L (ref 23–29)
CREAT SERPL-MCNC: 0.9 MG/DL (ref 0.5–1.4)
CRP SERPL-MCNC: 7.2 MG/L (ref 0–8.2)
DIFFERENTIAL METHOD: ABNORMAL
EOSINOPHIL # BLD AUTO: 0.3 K/UL (ref 0–0.5)
EOSINOPHIL NFR BLD: 5.2 % (ref 0–8)
ERYTHROCYTE [DISTWIDTH] IN BLOOD BY AUTOMATED COUNT: 13.9 % (ref 11.5–14.5)
ERYTHROCYTE [SEDIMENTATION RATE] IN BLOOD BY PHOTOMETRIC METHOD: 10 MM/HR (ref 0–36)
EST. GFR  (NO RACE VARIABLE): >60 ML/MIN/1.73 M^2
GLUCOSE SERPL-MCNC: 106 MG/DL (ref 70–110)
HCT VFR BLD AUTO: 34.5 % (ref 37–48.5)
HGB BLD-MCNC: 10.8 G/DL (ref 12–16)
IMM GRANULOCYTES # BLD AUTO: 0.02 K/UL (ref 0–0.04)
IMM GRANULOCYTES NFR BLD AUTO: 0.4 % (ref 0–0.5)
LYMPHOCYTES # BLD AUTO: 1.6 K/UL (ref 1–4.8)
LYMPHOCYTES NFR BLD: 30.7 % (ref 18–48)
MCH RBC QN AUTO: 25.5 PG (ref 27–31)
MCHC RBC AUTO-ENTMCNC: 31.3 G/DL (ref 32–36)
MCV RBC AUTO: 82 FL (ref 82–98)
MONOCYTES # BLD AUTO: 0.4 K/UL (ref 0.3–1)
MONOCYTES NFR BLD: 8.2 % (ref 4–15)
NEUTROPHILS # BLD AUTO: 2.9 K/UL (ref 1.8–7.7)
NEUTROPHILS NFR BLD: 54.8 % (ref 38–73)
NRBC BLD-RTO: 0 /100 WBC
PLATELET # BLD AUTO: 319 K/UL (ref 150–450)
PMV BLD AUTO: 8.9 FL (ref 9.2–12.9)
POTASSIUM SERPL-SCNC: 3.9 MMOL/L (ref 3.5–5.1)
PROT SERPL-MCNC: 7.5 G/DL (ref 6–8.4)
RBC # BLD AUTO: 4.23 M/UL (ref 4–5.4)
SODIUM SERPL-SCNC: 137 MMOL/L (ref 136–145)
WBC # BLD AUTO: 5.34 K/UL (ref 3.9–12.7)

## 2023-08-03 PROCEDURE — 85025 COMPLETE CBC W/AUTO DIFF WBC: CPT | Performed by: PHYSICIAN ASSISTANT

## 2023-08-03 PROCEDURE — 80053 COMPREHEN METABOLIC PANEL: CPT | Performed by: PHYSICIAN ASSISTANT

## 2023-08-03 PROCEDURE — 85652 RBC SED RATE AUTOMATED: CPT | Performed by: PHYSICIAN ASSISTANT

## 2023-08-03 PROCEDURE — 86140 C-REACTIVE PROTEIN: CPT | Performed by: PHYSICIAN ASSISTANT

## 2023-08-04 ENCOUNTER — TELEPHONE (OUTPATIENT)
Dept: NEUROLOGY | Facility: CLINIC | Age: 48
End: 2023-08-04
Payer: COMMERCIAL

## 2023-08-04 NOTE — TELEPHONE ENCOUNTER
----- Message from Daren Lamb MD sent at 8/4/2023  1:28 PM CDT -----    07- THROUGH 07- (INTERPRETED ON 08-   )    AEEG  HOURS: SW, LT TL F7-T7, THOUGH THE EEG SUGGESTS POTENTIAL EPILEPTIC SEIZURE RISK FROM THE TEMPORAL LOBE ALL THE  FOUR EVENTS CAPTURED WERE NON-EPILEPTIC-PSYCHOGENIC.     REPORT PRINTED AND ADDENDUM TO 05- NOTE BY DAREN LAMB MD

## 2023-08-04 NOTE — PROGRESS NOTES
[email or web address]  [phone]      Outpatient Video EEG - services by [email or web address].  Patient:  Driss Nieves          : 1975     Age: 47  Gender: female     Study#:      Referring Physician: Daren Lamb M.D.  CLTM: Chelsea Chaidez  Reading Physician: Daren Lamb M.D.        Recording start: 2023 5:31 PM  Duration: 4 days, 17 hours, 17 minutes.  Recording stop: 2023 10:48 AM   Interpretation: 2023    __________________________________________________________________________________________      TECHNIQUE:      This is a 19 channel digital ambulatory video EEG, recorded using scalp electrodes according to international 10-20 electrode placement system. This video EEG was intermittently monitored for recording integrity and abnormalities that would warrant intervention.      CLINICAL HISTORY:       A 47 year old female with history of seizures of unknown etiology consisting of facial numbness progressing to generalized tonic/clonic activity associated with urinary incontinence, tongue bite and post-ictal confusion presents for seizure management with c/o ongoing events (3 events over 4 days May). On Levetiracetam. Routine EEG was normal. Prolonged study requested to evaluate for epileptiform discharges and seizure activity.      LEVEL OF CONSCIOUSENESS:     Awake and Sleep.     EEG BACKGROUND:     The posterior dominant basic rhythm reaches 10 Hz, symmetric, reactive, well-modulated and well-sustained.      EEG CLASSIFICATION:     Sharp Waves, Left Temporal,  Maximum at F7-T7.     Paroxysmal Events:     Patient reported 4 events on event log and event button was pressed:     at 7:45 PM: headache, head trembling      at 9:57 PM: dizziness, inability to walk whole body shaking with head arching back lasting 15 seconds on video     at 8:10 AM: light headedness, arms and hands trembling patient turning to right side with arms trembling/shaking  in front of body lasting 30 seconds on video.    July 28 at 6:40 PM: light headedness, headache, dizziness      During all events, no electro-encephalographic correlation is seen, there are no associated changes in EEG activity, preserved posterior dominant rhythm is present when eyes are closed.    IMPRESSION:     This 113 hour in-home video-EEG monitoring study is suggestive of increased risk of focal (left temporal) epileptic seizures., however,  all events recorded are non-epileptic in nature.      Please note that an epileptiform EEG is suggestive, and not diagnostic, of epilepsy. Epilepsy diagnosis requires that capture of epileptic seizure(s).  Clinical correlation is advised.     There is no electrographic evidence of seizure. There is no electrographic evidence of status epilepticus.       Daren Lamb MD, FAAN        2023-08-04 13:17    Diplomate, American Board of Psychiatry and Neurology  Diplomate, American Board of Clinical Neurophysiology   Fellow, American Academy of Neurology

## 2023-08-08 NOTE — PROGRESS NOTES
Individual Psychotherapy Follow-up Visit Progress Note (PhD/LCSW)     Due to the nature of this visit type, a virtual visit with synchronous audio and video, each patient to whom this provider administers behavioral health services by telemedicine is: (1) informed of the relationship between the provider and patient and the respective role of any other health care provider with respect to management of the patient; and (2) notified that he or she may decline to receive services by telemedicine and may withdraw from such care at any time. If technological issues occur, at the professional discretion of the clinical provider, synchronous audio only services may be utilized after unsuccessful attempt(s) to connect via audiovisual services; similarly, if audio only visit occurs, patient's verbal consent will be obtained prior to receipt of service. Prevailing clinical standards of care are upheld despite service methodology; having said this, if the clinical provider is unable to meet the prevailing standards of care, the patient will be rescheduled for the provider's soonest availability - as clinically appropriate.     The patient was informed of the following:     Provider's contact info:  [facility] [address], [address] Floor, [address]  [place] [address]  (Phone) [phone]    If technology issues occur, call office phone: Ph: [phone]  If crisis: Dial 911 or go to nearest Emergency Room (ER)  If questions related to privacy practices: contact [facility] Information Department: [phone]    For security purposes, the pt identified that they were at [address] [place] [address] [place] [address] during today's session and contact number is [phone].    The pt's emergency contact(s) is Extended Emergency Contact Information  Primary Emergency Contact: Darrell Lucia  Address: [address] [place] [address] [place] [address] [facility]  Home  Phone: [phone]  Mobile Phone: [phone]  Relation: Spouse  Secondary Emergency Contact: Radha Escalona  Address: [address] [place] [address]  Home Phone: [phone]  Mobile Phone: [phone]  Relation: Mother.    Crisis Disclaimer: Patient was informed that due to the virtual nature of the visit, that if a crisis develops, protocols will be implemented to ensure patient safety, including but not limited to: 1) Initiating a welfare check with local law enforcement and/or 2) Calling 911.    Outpatient Psychotherapy - 45 minutes with patient (38-52 minutes) - [id number]    Date: 2023    Visit Type: Telehealth        2023  MRN: [id number]  Primary Care Provider: Danna Camara DO Kentrell Monyaa Lucia is a 47 y.o. female who presents today for follow-up of depression. Met with patient.      Preferred Name: Zachary   Transgender Identity Form    Gender Identity Form  Transition Summary         Subjective:     Last encounter (with this provider): 6/15/2023     Content of Current Session:  Met with this patient for her online follow-up appointment.  The patient was in her home throughout the appointment.  She stated that she continues to have 1-3 seizures per day and has even woken up having a seizure.  She stated that she will soon have to go back to work but on this day she is experiencing grieving since it has been 1 year since her uncle .  The patient was very close to her uncle and has continued to grieve his death for the past year.  The patient continues to have marital problems since she caught her  having sex with a friend in their house not long ago.  Although she and her  have  officially, she continues to stay in the house because she has a better wife I connection for her work.  The patient works out of her home.  When she has days off she goes up to the [place] area to stay with her mother.  She states that she  and her  are getting along okay although she does not expect that they will reconcile their marriage.  The patient was able to see her strengths and coping ability throughout her session and agrees that she has become stronger through this series of disappointments.  She stated she would make follow-up appointments.    Therapeutic Interventions Utilized During Current Session: Acceptance and Commitment Therapy, Cognitive Behavioral Therapy, Supportive Therapy    No flowsheet data found.   0-4 = Minimal anxiety  5-9 = Mild anxiety  10-14 = Moderate anxiety  15-21 = Severe anxiety     PHQ-9 Depression Patient Health Questionnaire 7/14/2023 6/15/2023 6/2/2023 5/24/2023 5/15/2023 3/3/2023   Patient agreed to terms: Yes Yes Yes Yes Yes Yes   Little interest or pleasure in doing things 1 2 2 2 2 3   Feeling down, depressed, or hopeless 1 2 2 2 2 2   Trouble falling or staying asleep, or sleeping too much 1 1 2 2 3 3   Feeling tired or having little energy 2 1 2 2 3 3   Poor appetite or overeating 2 2 2 2 3 2   Feeling bad about yourself - or that you are a failure or have let yourself or your family down 1 2 2 2 2 2   Trouble concentrating on things, such as reading the newspaper or watching television 1 2 2 1 2 2   Moving or speaking so slowly that other people could have noticed. Or the opposite - being so fidgety or restless that you have been moving around a lot more than usual 1 1 1 0 0 2   Thoughts that you would be better off dead, or of hurting yourself in some way 0 0 0 0 0 0   PHQ-9 Total Score 10 13 15 13 17 19   If you checked off any problems, how difficult have these problems made it for you to do your work, take care of things at home, or get along with other people? Somewhat difficult Very difficult Extremely dIfficult Very difficult Extremely dIfficult Extremely dIfficult   Interpretation Moderate Moderate Moderately Severe Moderate Moderately Severe Moderately Severe     0-4 = No intervention  5  to 9 = Mild  10 to 14 = Moderate  15 to 19 = Moderately severe  ?20 = Severe      Objective:       Mental Status Evaluation  Appearance: unremarkable, age appropriate  Behavior: normal, cooperative  Speech: normal tone, normal rate, normal pitch, normal volume  Mood: anxious, depressed  Affect: congruent and appropriate  Thought Process: normal and logical  Thought Content: normal, no suicidality, no homicidality, delusions, or paranoia  Sensorium: grossly intact  Cognition: grossly intact  Insight: fair  Judgment: adequate to circumstances    Risk parameters:  Patient reports no suicidal ideation  Patient reports no homicidal ideation  Patient reports no self-injurious behavior  Patient reports no violent behavior      Assessment & Plan:     The patient's response to the interventions is accepting    The patient's progress toward treatment goals is fair     Homework assigned: daily journaling, practice relaxation skills daily, and implement sleep hygiene routine     Treatment plan:   A. Target symptoms: Depression, Anxiety, Mood Disorder, and Poor Coping Skills   B. Therapeutic modalities: insight oriented psychotherapy  C. Why chosen therapy is appropriate versus another modality: patient responds to this modality   D. Outcome monitoring methods: self report, observation, rating scales, feedback from clinical staff      Visit Diagnosis:   1. Current mild episode of major depressive disorder, unspecified whether recurrent    2. Marital dysfunction    3. Anxiety    4. Situational depression        Follow-up: individual psychotherapy    Return to Clinic: as scheduled  Pt Reported to Schedule Self via Epic EMR MyChart Application and/or Department Support Staff        7/14/2023  4:13 PM

## 2023-08-10 ENCOUNTER — OFFICE VISIT (OUTPATIENT)
Dept: RHEUMATOLOGY | Facility: CLINIC | Age: 48
End: 2023-08-10
Payer: COMMERCIAL

## 2023-08-10 VITALS
DIASTOLIC BLOOD PRESSURE: 88 MMHG | HEART RATE: 67 BPM | SYSTOLIC BLOOD PRESSURE: 133 MMHG | WEIGHT: 241.38 LBS | BODY MASS INDEX: 38.79 KG/M2 | HEIGHT: 66 IN

## 2023-08-10 DIAGNOSIS — D84.821 IMMUNOCOMPROMISED STATE DUE TO DRUG THERAPY: ICD-10-CM

## 2023-08-10 DIAGNOSIS — Z51.81 MEDICATION MONITORING ENCOUNTER: ICD-10-CM

## 2023-08-10 DIAGNOSIS — M34.9 SCLERODERMA: ICD-10-CM

## 2023-08-10 DIAGNOSIS — Z79.899 IMMUNOCOMPROMISED STATE DUE TO DRUG THERAPY: ICD-10-CM

## 2023-08-10 DIAGNOSIS — Z79.899 HIGH RISK MEDICATION USE: ICD-10-CM

## 2023-08-10 DIAGNOSIS — M32.14 LUPUS NEPHRITIS, ISN/RPS CLASS V: ICD-10-CM

## 2023-08-10 DIAGNOSIS — M35.01 SJOGREN'S SYNDROME WITH KERATOCONJUNCTIVITIS SICCA: Primary | ICD-10-CM

## 2023-08-10 DIAGNOSIS — R53.1 WEAKNESS: ICD-10-CM

## 2023-08-10 DIAGNOSIS — I73.00 RAYNAUD'S DISEASE WITHOUT GANGRENE: ICD-10-CM

## 2023-08-10 PROCEDURE — 3079F PR MOST RECENT DIASTOLIC BLOOD PRESSURE 80-89 MM HG: ICD-10-PCS | Mod: CPTII,S$GLB,, | Performed by: PHYSICIAN ASSISTANT

## 2023-08-10 PROCEDURE — 99215 PR OFFICE/OUTPT VISIT, EST, LEVL V, 40-54 MIN: ICD-10-PCS | Mod: S$GLB,,, | Performed by: PHYSICIAN ASSISTANT

## 2023-08-10 PROCEDURE — 1160F PR REVIEW ALL MEDS BY PRESCRIBER/CLIN PHARMACIST DOCUMENTED: ICD-10-PCS | Mod: CPTII,S$GLB,, | Performed by: PHYSICIAN ASSISTANT

## 2023-08-10 PROCEDURE — 3075F PR MOST RECENT SYSTOLIC BLOOD PRESS GE 130-139MM HG: ICD-10-PCS | Mod: CPTII,S$GLB,, | Performed by: PHYSICIAN ASSISTANT

## 2023-08-10 PROCEDURE — 3008F BODY MASS INDEX DOCD: CPT | Mod: CPTII,S$GLB,, | Performed by: PHYSICIAN ASSISTANT

## 2023-08-10 PROCEDURE — 1159F MED LIST DOCD IN RCRD: CPT | Mod: CPTII,S$GLB,, | Performed by: PHYSICIAN ASSISTANT

## 2023-08-10 PROCEDURE — 99999 PR PBB SHADOW E&M-EST. PATIENT-LVL III: ICD-10-PCS | Mod: PBBFAC,,, | Performed by: PHYSICIAN ASSISTANT

## 2023-08-10 PROCEDURE — 99215 OFFICE O/P EST HI 40 MIN: CPT | Mod: S$GLB,,, | Performed by: PHYSICIAN ASSISTANT

## 2023-08-10 PROCEDURE — 3079F DIAST BP 80-89 MM HG: CPT | Mod: CPTII,S$GLB,, | Performed by: PHYSICIAN ASSISTANT

## 2023-08-10 PROCEDURE — 1159F PR MEDICATION LIST DOCUMENTED IN MEDICAL RECORD: ICD-10-PCS | Mod: CPTII,S$GLB,, | Performed by: PHYSICIAN ASSISTANT

## 2023-08-10 PROCEDURE — 99999 PR PBB SHADOW E&M-EST. PATIENT-LVL III: CPT | Mod: PBBFAC,,, | Performed by: PHYSICIAN ASSISTANT

## 2023-08-10 PROCEDURE — 3008F PR BODY MASS INDEX (BMI) DOCUMENTED: ICD-10-PCS | Mod: CPTII,S$GLB,, | Performed by: PHYSICIAN ASSISTANT

## 2023-08-10 PROCEDURE — 3075F SYST BP GE 130 - 139MM HG: CPT | Mod: CPTII,S$GLB,, | Performed by: PHYSICIAN ASSISTANT

## 2023-08-10 PROCEDURE — 1160F RVW MEDS BY RX/DR IN RCRD: CPT | Mod: CPTII,S$GLB,, | Performed by: PHYSICIAN ASSISTANT

## 2023-08-10 RX ORDER — AZATHIOPRINE 100 MG/1
100 TABLET ORAL DAILY
Qty: 90 TABLET | Refills: 1 | Status: SHIPPED | OUTPATIENT
Start: 2023-08-10

## 2023-08-10 RX ORDER — AZATHIOPRINE 50 MG/1
50 TABLET ORAL DAILY
Qty: 90 TABLET | Refills: 1 | Status: SHIPPED | OUTPATIENT
Start: 2023-08-10

## 2023-08-10 RX ORDER — HYDROXYCHLOROQUINE SULFATE 200 MG/1
200 TABLET, FILM COATED ORAL 2 TIMES DAILY
Qty: 180 TABLET | Refills: 3 | Status: SHIPPED | OUTPATIENT
Start: 2023-08-10

## 2023-08-10 RX ORDER — BELIMUMAB 200 MG/ML
SOLUTION SUBCUTANEOUS
Qty: 4 EACH | Refills: 3 | Status: SHIPPED | OUTPATIENT
Start: 2023-08-10 | End: 2023-08-10 | Stop reason: SDUPTHER

## 2023-08-10 RX ORDER — BELIMUMAB 200 MG/ML
SOLUTION SUBCUTANEOUS
Qty: 4 EACH | Refills: 3 | Status: ACTIVE | OUTPATIENT
Start: 2023-08-10 | End: 2024-02-07 | Stop reason: SDUPTHER

## 2023-08-10 RX ORDER — PREDNISONE 10 MG/1
10 TABLET ORAL DAILY
Qty: 90 TABLET | Refills: 1 | Status: SHIPPED | OUTPATIENT
Start: 2023-08-10 | End: 2023-12-18

## 2023-08-10 NOTE — PROGRESS NOTES
Subjective:      Patient ID: Zachary Lucia is a 47 y.o. female.    Chief Complaint: Disease Management      HPI   Zachary Lucia  is a 47 y.o. female seen today for follow-up on Sjogren's syndrome, lupus, scleroderma overlap.  Reports pain 0/10.  Overall feels lupus is stable.  On Benlysta, Imuran, HC Q as below.  Had Plaquenil monitoring exam done November 2022.  She has not an upcoming appointment planned for November 2023.  Has had echo as well as 6 minute walk test and CT scan of the chest.  No recent studies.    She is on chronic steroids 10 mg daily.  She takes 5 mg b.i.d..  Had a bone density scan showing no significant bone loss.  2 falls but no fractures since last visit.    Biggest complaint today is weakness in her hands.  She has associated numbness and tingling in the 4th and 5th digits bilaterally.  Says she will be holding something in the next thing she knows she has dropped it.    Also with history osteoarthritis bilateral knees.  She is currently seeing Orthopedics.  Recently had Euflexxa injections done in May 2023.  Knees are doing much better.    In the past she was seen by Nephrology for microscopic hematuria, She had a kidney biopsy done which was suggestive of membranous nephropathy/membranous lupus nephritis/class 5. Taken off lisinopril because of cough.     Patient denies fevers, chills, photosensitivity, eye pain, shortness of breath, chest pain, hematuria, blood in the stool, rash, sicca symptoms, raynauds, finger/oral ulcerations, LAD, bone pain, weakness hands.  Rheumatologic systems otherwise negative.    Serologies/Labs:  +AMAURY 1:640, +SSA/SSB  Neg sm/rnp, sm and dsDNA  +Scl 70  Neg RF  Current Treatment:  Benlysta SQ once weekly   mg bid  Imuran 150 mg daily  PDN 10 mg daily  Previous Treatment:   Cellcept - se    PFT done 6/2023      Current Outpatient Medications:     betamethasone dipropionate (DIPROLENE) 0.05 % ointment, Apply topically  2 (two) times daily as needed. Steroid. Use for flares, Disp: 45 g, Rfl: 3    cholecalciferol, vitamin D3, 1,250 mcg (50,000 unit) capsule, Take 1 capsule (50,000 Units total) by mouth once a week., Disp: 15 capsule, Rfl: 1    crisaborole (EUCRISA) 2 % Oint, AAA bid prn.  Non-steroid.  Safe to use long-term., Disp: 60 g, Rfl: 3    cyclobenzaprine (FLEXERIL) 10 MG tablet, Take 1 tablet (10 mg total) by mouth 2 (two) times daily as needed for Muscle spasms., Disp: 60 tablet, Rfl: 0    HYDROcodone-acetaminophen (NORCO) 7.5-325 mg per tablet, Take 1 tablet by mouth 4 (four) times daily as needed., Disp: , Rfl:      mg tablet, Take 800 mg by mouth every 8 (eight) hours as needed., Disp: , Rfl: 0    levetiracetam XR (KEPPRA XR) 500 mg Tb24 24 hr tablet, Take 4 tablets (2,000 mg total) by mouth once daily., Disp: 360 tablet, Rfl: 3    semaglutide, weight loss, (WEGOVY) 0.5 mg/0.5 mL PnIj, Inject 0.5 mg into the skin every 7 days., Disp: 2 mL, Rfl: 1    sertraline (ZOLOFT) 100 MG tablet, TAKE 1 TABLET BY MOUTH EVERY DAY, Disp: 90 tablet, Rfl: 0    azaTHIOprine (IMURAN) 100 mg tablet, Take 1 tablet (100 mg total) by mouth once daily., Disp: 90 tablet, Rfl: 1    azaTHIOprine (IMURAN) 50 mg Tab, Take 1 tablet (50 mg total) by mouth once daily., Disp: 90 tablet, Rfl: 1    belimumab (BENLYSTA) 200 mg/mL Syrg, INJECT 1 SYRINGE UNDER THE SKIN EVERY 7 DAYS., Disp: 4 each, Rfl: 3    fluticasone-salmeterol diskus inhaler 100-50 mcg, Inhale 1 puff into the lungs 2 (two) times daily. 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    Scleroderma     Seizures 01/12/2017     Family History   Problem Relation Age of Onset    Arthritis Mother     Glaucoma Mother     Hypertension Mother     Diabetes Father     Diabetes Maternal Grandmother     Hypertension Maternal Grandmother     Arthritis Maternal Grandmother     Cataracts Maternal Grandmother     Diabetes Maternal Grandfather     Heart disease Maternal Grandfather     Hypertension Maternal Grandfather     Diabetes Paternal Grandmother     Heart disease Paternal Grandmother     Hypertension Paternal Grandmother     Heart disease Paternal Grandfather     Cancer Maternal Uncle      Social History     Socioeconomic History    Marital status:      Spouse name: Darrell    Number of children: 0   Tobacco Use    Smoking status: Never    Smokeless tobacco: Never   Substance and Sexual Activity    Alcohol use: Yes     Comment: OCC    Drug use: No    Sexual activity: Yes     Review of patient's allergies indicates:   Allergen Reactions    Lisinopril      cough    Sulfa (sulfonamide antibiotics) Swelling       Objective:   /88   Pulse 67   Ht 5' 6" (1.676 m)   Wt 109.5 kg (241 lb 6.5 oz)   LMP 07/06/2023   BMI 38.96 kg/m²   Immunization History   Administered Date(s) Administered    COVID-19, MRNA, LN-S, PF (Pfizer) (Purple Cap) 06/09/2021, 06/09/2021, 06/30/2021, 06/30/2021, 12/31/2021    Tdap 04/05/2012, 04/05/2012, 11/29/2022       Physical Exam   Constitutional: She is oriented to person, place, and time. No distress.   HENT:   Head: Normocephalic and atraumatic.   Pulmonary/Chest: Effort normal.   Abdominal: She exhibits no distension.   Musculoskeletal:         General: No swelling or tenderness. Normal range of motion.      Cervical back: Normal range of motion.   Lymphadenopathy:     She has no cervical adenopathy.   Neurological: She is alert and oriented to person, place, and time.   Skin: Skin is warm and dry. " No rash noted.   Psychiatric: Mood normal.   Nursing note and vitals reviewed.    No synovitis, no dactylitis, no enthesitis  No effusions of large or small joints  100% fist formation  Well preserved ROM    BUE  Neg tinels carpal tunnel  +Tinels cubital tunnel  Good  strength      Recent Results (from the past 672 hour(s))   CBC Auto Differential    Collection Time: 08/03/23  8:27 AM   Result Value Ref Range    WBC 5.34 3.90 - 12.70 K/uL    RBC 4.23 4.00 - 5.40 M/uL    Hemoglobin 10.8 (L) 12.0 - 16.0 g/dL    Hematocrit 34.5 (L) 37.0 - 48.5 %    MCV 82 82 - 98 fL    MCH 25.5 (L) 27.0 - 31.0 pg    MCHC 31.3 (L) 32.0 - 36.0 g/dL    RDW 13.9 11.5 - 14.5 %    Platelets 319 150 - 450 K/uL    MPV 8.9 (L) 9.2 - 12.9 fL    Immature Granulocytes 0.4 0.0 - 0.5 %    Gran # (ANC) 2.9 1.8 - 7.7 K/uL    Immature Grans (Abs) 0.02 0.00 - 0.04 K/uL    Lymph # 1.6 1.0 - 4.8 K/uL    Mono # 0.4 0.3 - 1.0 K/uL    Eos # 0.3 0.0 - 0.5 K/uL    Baso # 0.04 0.00 - 0.20 K/uL    nRBC 0 0 /100 WBC    Gran % 54.8 38.0 - 73.0 %    Lymph % 30.7 18.0 - 48.0 %    Mono % 8.2 4.0 - 15.0 %    Eosinophil % 5.2 0.0 - 8.0 %    Basophil % 0.7 0.0 - 1.9 %    Differential Method Automated    Comprehensive Metabolic Panel    Collection Time: 08/03/23  8:27 AM   Result Value Ref Range    Sodium 137 136 - 145 mmol/L    Potassium 3.9 3.5 - 5.1 mmol/L    Chloride 104 95 - 110 mmol/L    CO2 24 23 - 29 mmol/L    Glucose 106 70 - 110 mg/dL    BUN 15 6 - 20 mg/dL    Creatinine 0.9 0.5 - 1.4 mg/dL    Calcium 9.4 8.7 - 10.5 mg/dL    Total Protein 7.5 6.0 - 8.4 g/dL    Albumin 3.9 3.5 - 5.2 g/dL    Total Bilirubin 0.4 0.1 - 1.0 mg/dL    Alkaline Phosphatase 51 (L) 55 - 135 U/L    AST 14 10 - 40 U/L    ALT 13 10 - 44 U/L    eGFR >60 >60 mL/min/1.73 m^2    Anion Gap 9 8 - 16 mmol/L   C-Reactive Protein    Collection Time: 08/03/23  8:27 AM   Result Value Ref Range    CRP 7.2 0.0 - 8.2 mg/L   Sedimentation rate    Collection Time: 08/03/23  8:27 AM   Result Value Ref  Range    Sed Rate 10 0 - 36 mm/Hr       Lab Results   Component Value Date    TBGOLDPLUS Negative 05/08/2023      Lab Results   Component Value Date    HEPAIGM Negative 02/17/2022    HEPBIGM Negative 02/17/2022    HEPBCAB Non-reactive 05/08/2023    HEPCAB Non-reactive 05/08/2023        Imaging  I have personally reviewed images and reports as below.  I agree with the interpretation.  CT Chest Without Contrast  Order: [id number]  Status: Final result     Visible to patient: Yes (seen)     Next appt: 08/16/2023 at 08:30 AM in Podiatry (Jerald Junior DPM)     Dx: Shortness of breath; Scleroderma; Sjo...     1 Result Note    1 Patient Communication    1 Follow-up Encounter  Details    Reading Physician Reading Date Result Priority   Germán Lutz MD  [phone]  [phone] 3/28/2018      Narrative & Impression  CT chest, high resolution protocol:     Technique: Noncontiguous <1.25> mm thick axial images of the lungs were acquired at <2 cm> intervals without IV or oral contrast.  Three series of images were acquired with the patient in supine inspiration, supine expiration, and prone inspiration.  This protocol is optimized for pulmonary parenchymal evaluation, and offers suboptimal assessment of the davey and mediastinum.     Comparison: <None.>     History:  Shortness of breath     Findings:      Interstitium: <No interlobular septal thickening or honeycombing.>  Airspaces: <No airspace opacities.>  Nodules: <None.>  Emphysematous changes: <None.  No air trapping is seen on the expiratory series.>  Bronchiectasis: <None.>  Pleural effusion: <None.>     There is no gross abnormality in the davey, axilla, or mediastinum.  There is no adenopathy.  Visualized portions of the upper abdomen demonstrate mild constipation and decreased attenuation the liver consistent with hepatic steatosis.  There is no acute bony abnormality.  IMPRESSION:         No significant abnormalities within the chest.     See above for  incidental findings..     All CT scans at this facility use dose modulation, iterative reconstruction, and/or weight base dosing when appropriate to reduce radiation dose to as low as reasonably achievable.        Electronically signed by: ARIELLA MOSLEY MD  Date:                                            03/28/18  Time:                                           15:21    DXA Bone Density Spine And Hip  Order: [id number]  Status: Final result     Visible to patient: Yes (seen)     Next appt: Today at 12:40 PM in Lab (LABORATORY, [facility])     Dx: Long term systemic steroid user     0 Result Notes    1 Patient Communication  Details    Reading Physician Reading Date Result Priority   Nathaniel Oswald MD  [phone] 12/6/2022 Routine     Narrative & Impression  EXAMINATION:  DEXA BONE DENSITY SPINE HIP     CLINICAL HISTORY:  Long term (current) use of systemic steroids     TECHNIQUE:  DXA scanning was performed over the left hip and lumbar spine.  Review of the images confirms satisfactory positioning and technique.     COMPARISON:  None     FINDINGS:  The L1 to L4 vertebral bone mineral density is equal to 1.377 g/cm squared with a T score of 1.5.     The left femoral neck bone mineral density is equal to 1.0185 g/cm squared with a T score of 1.1.     The total hip bone mineral density is equal to 1.237 g/cm squared with a T score of 1.8.     Impression:     No evidence of significant bone density loss       Echo reviewed as below from 6/23/2021  The left ventricle is normal in size with concentric remodeling and normal systolic function.  The estimated ejection fraction is 60%.  Normal left ventricular diastolic function.  Normal right ventricular size with normal right ventricular systolic function.  Normal central venous pressure (3 mmHg).  The estimated PA systolic pressure is 32 mmHg.    Assessment:     1. Sjogren's syndrome with keratoconjunctivitis sicca    2. Lupus nephritis, ISN/RPS class V    3. Scleroderma     4. Raynaud's disease without gangrene    5. High risk medication use    6. Medication monitoring encounter    7. Immunocompromised state due to drug therapy    8. Weakness            Plan:     Zachary was seen today for disease management.    Diagnoses and all orders for this visit:    Sjogren's syndrome with keratoconjunctivitis sicca  -     Protein Electrophoresis, Serum; Standing  -     Immunofixation Electrophoresis; Standing  -     CBC Auto Differential; Standing  -     Comprehensive Metabolic Panel; Standing  -     C-Reactive Protein; Standing  -     Sedimentation rate; Standing  -     Cryoglobulin; Standing  -     C3 Complement; Standing  -     C4 Complement; Standing  -     Protein/Creatinine Ratio, Urine; Standing  -     hydrOXYchloroQUINE (PLAQUENIL) 200 mg tablet; Take 1 tablet (200 mg total) by mouth 2 (two) times daily.  -     predniSONE (DELTASONE) 10 MG tablet; Take 1 tablet (10 mg total) by mouth once daily.    Lupus nephritis, ISN/RPS class V  -     CBC Auto Differential; Standing  -     Comprehensive Metabolic Panel; Standing  -     C-Reactive Protein; Standing  -     Sedimentation rate; Standing  -     C3 Complement; Standing  -     C4 Complement; Standing  -     Protein/Creatinine Ratio, Urine; Standing  -     Urinalysis Microscopic; Standing  -     ANTI-DNA ANTIBODY, DOUBLE-STRANDED; Future  -     Discontinue: belimumab (BENLYSTA) 200 mg/mL Syrg; INJECT 1 SYRINGE UNDER THE SKIN EVERY 7 DAYS.  -     predniSONE (DELTASONE) 10 MG tablet; Take 1 tablet (10 mg total) by mouth once daily.  -     belimumab (BENLYSTA) 200 mg/mL Syrg; INJECT 1 SYRINGE UNDER THE SKIN EVERY 7 DAYS.    Scleroderma  -     azaTHIOprine (IMURAN) 50 mg Tab; Take 1 tablet (50 mg total) by mouth once daily.  -     azaTHIOprine (IMURAN) 100 mg tablet; Take 1 tablet (100 mg total) by mouth once daily.  -     predniSONE (DELTASONE) 10 MG tablet; Take 1 tablet (10 mg total) by mouth once daily.  -     CT Chest Without Contrast;  Future    Raynaud's disease without gangrene    High risk medication use    Medication monitoring encounter    Immunocompromised state due to drug therapy    Weakness  -     CK; Future  -     Aldolase; Future        SLE  Need remaining SLE labs today - C3, C4, PC ratio, dsDNA and micro UA  Reg 4 labs reviewed as above and stable  C/w Benlysta SQ weekly, HCQ bid and Imuran 150 mg/d  Yearly eye exams for HCQ monitoring - next one due Nov 2023  Sjogren's Syndrome   Discussed conservative treatment  Xerostomia  Biotene products and Xylimelt Lozenges  Avoid sleeping w fan on  Limit alcohol, coffee, nicotene and cola intake  Keratoconjunctivitis Sicca  OTC Refresh or Systane drops.    Limit alcohol/nicotene intake  Consider humidifier  Avoid using fans  Consider Salagen verses Evoxac if no improvement follow-up  Plaquenil 200 mg b.i.d. as above  Yearly labs today - spep, andrea, cryoglobulins, C3, C4, pc ratio  Scleroderma overlap  CT chest  Consider repeat echo - No CP today  N/T w weakness Juvenal hands  + Tinels cubital tunnel -> suspect cubital tunnel syndrome  Discussed EMG - patient deferred for now  Check CK and aldolase today  Chronic Steroid use  DEXA wnl 12/2022  Rec adequate calcium/vit d intake  Consider slow titration down on prednisone next visit if stable  Recheck DEXA 12/2024  Drug therapy requiring intensive monitoring for toxicity  High Risk Medication Monitoring encounter  No current medication related issues, no evidence of toxicity  I ordered labs for toxicity monitoring, have personally reviewed the findings, and discussed them with the patient.  Pending labs will be sent via the portal  Compromised immune system secondary to autoimmune disease and/or use of immunosuppressive drugs.  Monitor carefully for infections.  Advised patient to get immediate medical care if any infection arises.  Also advised strict adherence age-appropriate vaccinations and cancer screenings with PCP.  Patient advised to hold  DMARD and/or biologic therapy for signs of infection or for surgery. If you are unsure what to do please call our office for instruction.[facility] [phone]  Return to clinic: 3 mos w SLE labs prior    45 minutes of total time spent on the encounter, which includes face to face time and non-face to face time preparing to see the patient (eg, review of tests), Obtaining and/or reviewing separately obtained history, Documenting clinical information in the electronic or other health record, Independently interpreting results (not separately reported) and communicating results to the patient/family/caregiver, or Care coordination (not separately reported).     Follow up in about 3 months (around 11/10/2023).    The patient understands, chooses and consents to this plan and accepts all the risks which include but are not limited to the risks mentioned above.     Disclaimer: This note was prepared using a voice recognition system and is likely to have sound alike errors within the text.

## 2023-08-11 ENCOUNTER — LAB VISIT (OUTPATIENT)
Dept: LAB | Facility: HOSPITAL | Age: 48
End: 2023-08-11
Attending: INTERNAL MEDICINE
Payer: COMMERCIAL

## 2023-08-11 ENCOUNTER — TELEPHONE (OUTPATIENT)
Dept: PHARMACY | Facility: CLINIC | Age: 48
End: 2023-08-11
Payer: COMMERCIAL

## 2023-08-11 DIAGNOSIS — M32.14 LUPUS NEPHRITIS, ISN/RPS CLASS V: ICD-10-CM

## 2023-08-11 DIAGNOSIS — M35.01 SJOGREN'S SYNDROME WITH KERATOCONJUNCTIVITIS SICCA: ICD-10-CM

## 2023-08-11 DIAGNOSIS — R53.1 WEAKNESS: ICD-10-CM

## 2023-08-11 LAB
ALBUMIN SERPL BCP-MCNC: 3.9 G/DL (ref 3.5–5.2)
ALP SERPL-CCNC: 54 U/L (ref 55–135)
ALT SERPL W/O P-5'-P-CCNC: 15 U/L (ref 10–44)
ANION GAP SERPL CALC-SCNC: 8 MMOL/L (ref 8–16)
AST SERPL-CCNC: 16 U/L (ref 10–40)
BACTERIA #/AREA URNS HPF: NORMAL /HPF
BASOPHILS # BLD AUTO: 0.04 K/UL (ref 0–0.2)
BASOPHILS NFR BLD: 0.9 % (ref 0–1.9)
BILIRUB SERPL-MCNC: 0.3 MG/DL (ref 0.1–1)
BUN SERPL-MCNC: 6 MG/DL (ref 6–20)
C3 SERPL-MCNC: 147 MG/DL (ref 50–180)
C4 SERPL-MCNC: 29 MG/DL (ref 11–44)
CALCIUM SERPL-MCNC: 9.1 MG/DL (ref 8.7–10.5)
CHLORIDE SERPL-SCNC: 106 MMOL/L (ref 95–110)
CK SERPL-CCNC: 109 U/L (ref 20–180)
CO2 SERPL-SCNC: 24 MMOL/L (ref 23–29)
CREAT SERPL-MCNC: 1 MG/DL (ref 0.5–1.4)
CREAT UR-MCNC: 101.4 MG/DL (ref 15–325)
CRP SERPL-MCNC: 8.7 MG/L (ref 0–8.2)
DIFFERENTIAL METHOD: ABNORMAL
EOSINOPHIL # BLD AUTO: 0.3 K/UL (ref 0–0.5)
EOSINOPHIL NFR BLD: 5.8 % (ref 0–8)
ERYTHROCYTE [DISTWIDTH] IN BLOOD BY AUTOMATED COUNT: 14 % (ref 11.5–14.5)
ERYTHROCYTE [SEDIMENTATION RATE] IN BLOOD BY WESTERGREN METHOD: 14 MM/HR (ref 0–20)
EST. GFR  (NO RACE VARIABLE): >60 ML/MIN/1.73 M^2
GLUCOSE SERPL-MCNC: 109 MG/DL (ref 70–110)
HCT VFR BLD AUTO: 32.8 % (ref 37–48.5)
HGB BLD-MCNC: 10.5 G/DL (ref 12–16)
IMM GRANULOCYTES # BLD AUTO: 0.01 K/UL (ref 0–0.04)
IMM GRANULOCYTES NFR BLD AUTO: 0.2 % (ref 0–0.5)
LYMPHOCYTES # BLD AUTO: 1.4 K/UL (ref 1–4.8)
LYMPHOCYTES NFR BLD: 30.6 % (ref 18–48)
MCH RBC QN AUTO: 26.2 PG (ref 27–31)
MCHC RBC AUTO-ENTMCNC: 32 G/DL (ref 32–36)
MCV RBC AUTO: 82 FL (ref 82–98)
MICROSCOPIC COMMENT: NORMAL
MONOCYTES # BLD AUTO: 0.3 K/UL (ref 0.3–1)
MONOCYTES NFR BLD: 7.3 % (ref 4–15)
NEUTROPHILS # BLD AUTO: 2.6 K/UL (ref 1.8–7.7)
NEUTROPHILS NFR BLD: 55.2 % (ref 38–73)
NRBC BLD-RTO: 0 /100 WBC
PLATELET # BLD AUTO: 353 K/UL (ref 150–450)
PMV BLD AUTO: 9.2 FL (ref 9.2–12.9)
POTASSIUM SERPL-SCNC: 3.6 MMOL/L (ref 3.5–5.1)
PROT SERPL-MCNC: 7.5 G/DL (ref 6–8.4)
PROT UR-MCNC: <7 MG/DL (ref 0–15)
PROT/CREAT UR: NORMAL MG/G{CREAT} (ref 0–0.2)
RBC # BLD AUTO: 4.01 M/UL (ref 4–5.4)
SODIUM SERPL-SCNC: 138 MMOL/L (ref 136–145)
SQUAMOUS #/AREA URNS HPF: 3 /HPF
WBC # BLD AUTO: 4.67 K/UL (ref 3.9–12.7)
WBC #/AREA URNS HPF: 1 /HPF (ref 0–5)

## 2023-08-11 PROCEDURE — 84165 PROTEIN E-PHORESIS SERUM: CPT | Mod: 26,,, | Performed by: PATHOLOGY

## 2023-08-11 PROCEDURE — 85025 COMPLETE CBC W/AUTO DIFF WBC: CPT | Performed by: PHYSICIAN ASSISTANT

## 2023-08-11 PROCEDURE — 84165 PATHOLOGIST INTERPRETATION SPE: ICD-10-PCS | Mod: 26,,, | Performed by: PATHOLOGY

## 2023-08-11 PROCEDURE — 85651 RBC SED RATE NONAUTOMATED: CPT | Performed by: PHYSICIAN ASSISTANT

## 2023-08-11 PROCEDURE — 82595 ASSAY OF CRYOGLOBULIN: CPT | Performed by: PHYSICIAN ASSISTANT

## 2023-08-11 PROCEDURE — 80053 COMPREHEN METABOLIC PANEL: CPT | Performed by: PHYSICIAN ASSISTANT

## 2023-08-11 PROCEDURE — 86160 COMPLEMENT ANTIGEN: CPT | Mod: 59 | Performed by: PHYSICIAN ASSISTANT

## 2023-08-11 PROCEDURE — 82570 ASSAY OF URINE CREATININE: CPT | Performed by: PHYSICIAN ASSISTANT

## 2023-08-11 PROCEDURE — 86140 C-REACTIVE PROTEIN: CPT | Performed by: PHYSICIAN ASSISTANT

## 2023-08-11 PROCEDURE — 84165 PROTEIN E-PHORESIS SERUM: CPT | Performed by: PHYSICIAN ASSISTANT

## 2023-08-11 PROCEDURE — 82085 ASSAY OF ALDOLASE: CPT | Performed by: PHYSICIAN ASSISTANT

## 2023-08-11 PROCEDURE — 86160 COMPLEMENT ANTIGEN: CPT | Performed by: PHYSICIAN ASSISTANT

## 2023-08-11 PROCEDURE — 86334 PATHOLOGIST INTERPRETATION IFE: ICD-10-PCS | Mod: 26,,, | Performed by: PATHOLOGY

## 2023-08-11 PROCEDURE — 81000 URINALYSIS NONAUTO W/SCOPE: CPT | Performed by: PHYSICIAN ASSISTANT

## 2023-08-11 PROCEDURE — 82550 ASSAY OF CK (CPK): CPT | Performed by: PHYSICIAN ASSISTANT

## 2023-08-11 PROCEDURE — 86225 DNA ANTIBODY NATIVE: CPT | Performed by: PHYSICIAN ASSISTANT

## 2023-08-11 PROCEDURE — 86334 IMMUNOFIX E-PHORESIS SERUM: CPT | Mod: 26,,, | Performed by: PATHOLOGY

## 2023-08-11 PROCEDURE — 86334 IMMUNOFIX E-PHORESIS SERUM: CPT | Performed by: PHYSICIAN ASSISTANT

## 2023-08-11 NOTE — TELEPHONE ENCOUNTER
Choco, this is Angelika Mcarthur, clinical pharmacist with [facility] that is part of your care team.  We have begun working on your prescription that your doctor has sent us. Our next steps include:     Working with your insurance company to obtain approval for your medication  Working with you to ensure your medication is affordable     We will be calling you along the way with updates on your medication but if you have any concerns or receive information that you would like to discuss please reach us at [phone].    Welcome call outcome: Left voicemail

## 2023-08-14 LAB
ALBUMIN SERPL ELPH-MCNC: 3.95 G/DL (ref 3.35–5.55)
ALPHA1 GLOB SERPL ELPH-MCNC: 0.26 G/DL (ref 0.17–0.41)
ALPHA2 GLOB SERPL ELPH-MCNC: 0.72 G/DL (ref 0.43–0.99)
B-GLOBULIN SERPL ELPH-MCNC: 0.77 G/DL (ref 0.5–1.1)
DSDNA AB SER-ACNC: NORMAL [IU]/ML
GAMMA GLOB SERPL ELPH-MCNC: 1.41 G/DL (ref 0.67–1.58)
INTERPRETATION SERPL IFE-IMP: NORMAL
PROT SERPL-MCNC: 7.1 G/DL (ref 6–8.4)

## 2023-08-15 ENCOUNTER — SPECIALTY PHARMACY (OUTPATIENT)
Dept: PHARMACY | Facility: CLINIC | Age: 48
End: 2023-08-15
Payer: COMMERCIAL

## 2023-08-15 ENCOUNTER — PATIENT MESSAGE (OUTPATIENT)
Dept: ADMINISTRATIVE | Facility: HOSPITAL | Age: 48
End: 2023-08-15
Payer: COMMERCIAL

## 2023-08-15 LAB
ALDOLASE SERPL-CCNC: 2.4 U/L (ref 1.2–7.6)
PATHOLOGIST INTERPRETATION IFE: NORMAL
PATHOLOGIST INTERPRETATION SPE: NORMAL

## 2023-08-15 NOTE — TELEPHONE ENCOUNTER
PA Approved through 7/3/24 for Benlysta. [facility] OON, plan requires [facility] ([phone]).  Benlysta rx routed. Patient informed via telephone. Closing enrollment.

## 2023-08-16 ENCOUNTER — OFFICE VISIT (OUTPATIENT)
Dept: PODIATRY | Facility: CLINIC | Age: 48
End: 2023-08-16
Payer: COMMERCIAL

## 2023-08-16 ENCOUNTER — HOSPITAL ENCOUNTER (OUTPATIENT)
Dept: RADIOLOGY | Facility: HOSPITAL | Age: 48
Discharge: HOME | End: 2023-08-16
Attending: PODIATRIST
Payer: COMMERCIAL

## 2023-08-16 ENCOUNTER — PATIENT OUTREACH (OUTPATIENT)
Dept: ADMINISTRATIVE | Facility: HOSPITAL | Age: 48
End: 2023-08-16
Payer: COMMERCIAL

## 2023-08-16 VITALS — WEIGHT: 241 LBS | BODY MASS INDEX: 38.73 KG/M2 | HEIGHT: 66 IN

## 2023-08-16 DIAGNOSIS — B35.1 ONYCHOMYCOSIS: ICD-10-CM

## 2023-08-16 DIAGNOSIS — M35.09 SJOGREN'S SYNDROME WITH OTHER ORGAN INVOLVEMENT: ICD-10-CM

## 2023-08-16 DIAGNOSIS — L30.9 DERMATITIS: ICD-10-CM

## 2023-08-16 DIAGNOSIS — M76.71 PERONEAL TENDONITIS, RIGHT: ICD-10-CM

## 2023-08-16 DIAGNOSIS — M79.671 PAIN IN RIGHT FOOT: ICD-10-CM

## 2023-08-16 DIAGNOSIS — M77.8 ENTHESOPATHY OF RIGHT FOOT: ICD-10-CM

## 2023-08-16 DIAGNOSIS — M79.675 PAIN OF LEFT GREAT TOE: ICD-10-CM

## 2023-08-16 DIAGNOSIS — M34.9 SCLERODERMA: ICD-10-CM

## 2023-08-16 DIAGNOSIS — M76.71 PERONEAL TENDONITIS, RIGHT: Primary | ICD-10-CM

## 2023-08-16 DIAGNOSIS — M25.571 PAIN IN JOINT INVOLVING RIGHT ANKLE AND FOOT: ICD-10-CM

## 2023-08-16 PROCEDURE — 73630 X-RAY EXAM OF FOOT: CPT | Mod: TC,RT

## 2023-08-16 PROCEDURE — 1159F MED LIST DOCD IN RCRD: CPT | Mod: CPTII,S$GLB,, | Performed by: PODIATRIST

## 2023-08-16 PROCEDURE — 88305 TISSUE EXAM BY PATHOLOGIST: ICD-10-PCS | Mod: 26,,, | Performed by: PATHOLOGY

## 2023-08-16 PROCEDURE — 3008F PR BODY MASS INDEX (BMI) DOCUMENTED: ICD-10-PCS | Mod: CPTII,S$GLB,, | Performed by: PODIATRIST

## 2023-08-16 PROCEDURE — 88305 TISSUE EXAM BY PATHOLOGIST: CPT | Performed by: PATHOLOGY

## 2023-08-16 PROCEDURE — 99999 PR PBB SHADOW E&M-EST. PATIENT-LVL III: ICD-10-PCS | Mod: PBBFAC,,, | Performed by: PODIATRIST

## 2023-08-16 PROCEDURE — 11104 PUNCH BX SKIN SINGLE LESION: CPT | Mod: 51,S$GLB,, | Performed by: PODIATRIST

## 2023-08-16 PROCEDURE — 11104 PR PUNCH BIOPSY, SKIN, SINGLE LESION: ICD-10-PCS | Mod: 51,S$GLB,, | Performed by: PODIATRIST

## 2023-08-16 PROCEDURE — 99204 OFFICE O/P NEW MOD 45 MIN: CPT | Mod: 25,S$GLB,, | Performed by: PODIATRIST

## 2023-08-16 PROCEDURE — 1160F RVW MEDS BY RX/DR IN RCRD: CPT | Mod: CPTII,S$GLB,, | Performed by: PODIATRIST

## 2023-08-16 PROCEDURE — 73630 X-RAY EXAM OF FOOT: CPT | Mod: 26,RT,, | Performed by: RADIOLOGY

## 2023-08-16 PROCEDURE — 73630 XR FOOT COMPLETE 3 VIEW RIGHT: ICD-10-PCS | Mod: 26,RT,, | Performed by: RADIOLOGY

## 2023-08-16 PROCEDURE — 11730 PR REMOVAL OF NAIL PLATE: ICD-10-PCS | Mod: TA,S$GLB,, | Performed by: PODIATRIST

## 2023-08-16 PROCEDURE — 11730 AVULSION NAIL PLATE SIMPLE 1: CPT | Mod: TA,S$GLB,, | Performed by: PODIATRIST

## 2023-08-16 PROCEDURE — 1160F PR REVIEW ALL MEDS BY PRESCRIBER/CLIN PHARMACIST DOCUMENTED: ICD-10-PCS | Mod: CPTII,S$GLB,, | Performed by: PODIATRIST

## 2023-08-16 PROCEDURE — 1159F PR MEDICATION LIST DOCUMENTED IN MEDICAL RECORD: ICD-10-PCS | Mod: CPTII,S$GLB,, | Performed by: PODIATRIST

## 2023-08-16 PROCEDURE — 3008F BODY MASS INDEX DOCD: CPT | Mod: CPTII,S$GLB,, | Performed by: PODIATRIST

## 2023-08-16 PROCEDURE — 88305 TISSUE EXAM BY PATHOLOGIST: CPT | Mod: 26,,, | Performed by: PATHOLOGY

## 2023-08-16 PROCEDURE — 99999 PR PBB SHADOW E&M-EST. PATIENT-LVL III: CPT | Mod: PBBFAC,,, | Performed by: PODIATRIST

## 2023-08-16 PROCEDURE — 99204 PR OFFICE/OUTPT VISIT, NEW, LEVL IV, 45-59 MIN: ICD-10-PCS | Mod: 25,S$GLB,, | Performed by: PODIATRIST

## 2023-08-16 RX ORDER — KETOCONAZOLE 20 MG/G
CREAM TOPICAL DAILY
Qty: 30 G | Refills: 2 | Status: SHIPPED | OUTPATIENT
Start: 2023-08-16 | End: 2024-02-26

## 2023-08-16 RX ORDER — TERBINAFINE HYDROCHLORIDE 250 MG/1
250 TABLET ORAL DAILY
Qty: 90 TABLET | Refills: 0 | Status: SHIPPED | OUTPATIENT
Start: 2023-08-16 | End: 2023-08-16

## 2023-08-16 NOTE — PROGRESS NOTES
Subjective:       Patient ID: Zachary Lucia is a 47 y.o. female.    Chief Complaint: Foot Pain (C/o bumps on both feet, fungus on 2 great toes, right foot pain, rates pain 5/10, pt is wearing tennis shoes and socks, pt was last seen on 7/5/23 by PCP Danna Camara DO)    HPI: Zachary Lucia presents to the office today with complaints of moderate pains at the lateral aspect of the right foot.  Patient rates the pain at approximately 7/10.  States severely antalgic gait pattern.  States mild edema. States weakness with gait. The patient states the pain does radiate up the outside of the ankle/lower leg at times. States no recent trauma. States no NSAID type medications for alleviation. Is currently on Prednisone taper, but states no relief. States no prior xray evaluation.  The pains been present now for the past several days/weeks. Danna Camara DO is the primary care provider.  Prolonged walking and standing does exacerbate the symptoms.  Also states a chronic dermatitis about the bilateral lower extremity, left greater than right.  Was given topical medication by Dermatology past, without resolution.  Denies a biopsy.  Also states thickening with pain to the left great toenail plate.  States fungus to several nail plates.    Review of patient's allergies indicates:   Allergen Reactions    Lisinopril      cough    Sulfa (sulfonamide antibiotics) Swelling       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    Scleroderma     Seizures 01/12/2017       Family History   Problem Relation Age of Onset    Arthritis Mother     Glaucoma Mother     Hypertension Mother     Diabetes Father     Diabetes Maternal Grandmother     Hypertension Maternal Grandmother     Arthritis Maternal Grandmother     Cataracts  Maternal Grandmother     Diabetes Maternal Grandfather     Heart disease Maternal Grandfather     Hypertension Maternal Grandfather     Diabetes Paternal Grandmother     Heart disease Paternal Grandmother     Hypertension Paternal Grandmother     Heart disease Paternal Grandfather     Cancer Maternal Uncle        Social History     Socioeconomic History    Marital status:      Spouse name: Darrell    Number of children: 0   Tobacco Use    Smoking status: Never    Smokeless tobacco: Never   Substance and Sexual Activity    Alcohol use: Yes     Comment: OCC    Drug use: No    Sexual activity: Yes       Past Surgical History:   Procedure Laterality Date    COLONOSCOPY N/A 1/12/2023    Procedure: COLONOSCOPY;  Surgeon: Supriya Hughes MD;  Location: [facility] ENDO;  Service: Endoscopy;  Laterality: N/A;    ESOPHAGOGASTRODUODENOSCOPY N/A 1/12/2023    Procedure: EGD (ESOPHAGOGASTRODUODENOSCOPY);  Surgeon: Supriya Hughes MD;  Location: [facility] ENDO;  Service: Endoscopy;  Laterality: N/A;    LEFT HEART CATHETERIZATION Left 1/25/2022    Procedure: CATHETERIZATION, HEART, LEFT;  Surgeon: Rocío Davidson MD;  Location: [facility] CATH LAB;  Service: Cardiology;  Laterality: Left;    RENAL BIOPSY  11/16/2018       Review of Systems   Constitutional:  Negative for chills, fatigue and fever.   HENT:  Negative for hearing loss.    Eyes:  Negative for photophobia and visual disturbance.   Respiratory:  Negative for cough, chest tightness, shortness of breath and wheezing.    Cardiovascular:  Positive for leg swelling. Negative for chest pain and palpitations.   Gastrointestinal:  Negative for constipation, diarrhea, nausea and vomiting.   Endocrine: Negative for cold intolerance and heat intolerance.   Genitourinary:  Negative for flank pain.   Musculoskeletal:  Positive for gait problem. Negative for neck pain and neck stiffness.   Skin:  Positive for rash.   Neurological:  Negative for light-headedness and headaches.  "  Psychiatric/Behavioral:  Negative for sleep disturbance.           Objective:   Ht 5' 6" (1.676 m)   Wt 109.3 kg (241 lb)   LMP 07/06/2023   BMI 38.90 kg/m²     X-Ray Foot Complete Right  Narrative: EXAM: XR FOOT COMPLETE 3 VIEW RIGHT    CLINICAL HISTORY: Right foot pain.    FINDINGS:  3 views right foot.  Mild hallux valgus.  No fracture is identified.  Joint alignment is anatomic.  No significant arthritic changes are present.  Impression:  No acute radiographic abnormality of the right foot.    Finalized on: 8/16/2023 10:27 AM By:  Germán Lutz MD  [facility]# [id number]      2023-08-16 10:29:21.871    [facility]       Physical Exam    LOWER EXTREMITY PHYSICAL EXAMINATION  ORTHOPEDIC:  Moderate discomfort along the course of the right peroneal tendon. Mild edema is noted along the course. There is mild retromalleolar edema noted. No subluxing peroneals are noted. Mild discomfort to palpation lateral malleolus. moderate pain to palpation of the 5th metatarsal base. There is mild discomfort to palpation of 5th metatarsal proximal metaphysis and diaphysis.  There is no discomfort to the sinus tarsi. There is no tenderness to palpation of the ATFL and none to the CFL and none to the PTFL. Mild CC joint pains. Gait pattern is antalgic. MMT with isolation of peroneal tendon, is weak as compared to contralateral.     DERMATOLOGY: Skin is supple, dry and intact.  Erythematous, slightly scaling, petechial rashes noted, bilateral.  No weeping is noted. On the left foot, nails 1 are suggestive of onychomycotic changes. On the right foot, nails 1 are suggestive of onychomycotic changes. These nail plates are thickened, are dystrophic, chaulky in appearance and malodorous with substantial subungual debris. These nail plates are yellow to brown in appearance. The remaining nail plates are elongated and do not have suggestive clinical features of onychomycosis.  Ingrowing nail with discomfort, left great toenail plate.    NEUROLOGY: " Sensation to light touch is intact. Proprioception is intact. Sensation to pin prick is intact. Deep tendon reflexes of the lower extremity are WNL.    VASCULAR: On the B/L foot, the dorsalis pedis pulse is B/KL/4 and the posterior tibial pulse is 2/4. Capillary refill time is less than 3 seconds. Hair growth is present on the dorsum of the foot and at the digits. No rubor is present. Proximal to distal temperature is warm to warm.      Assessment:     1. Peroneal tendonitis, right    2. Pain in joint involving right ankle and foot    3. Dermatitis    4. Onychomycosis    5. Pain of left great toe    6. Scleroderma    7. Sjogren's syndrome with other organ involvement        Plan:     Peroneal tendonitis, right  Pain in joint involving right ankle and foot  -     X-Ray Foot Complete Right; Future; Expected date: 08/16/2023    Thorough discussion is had with the patient today, concerning the diagnosis, its etiology, and the treatment algorithm at present.     XRAYS are reviewed in detail with the patient. All questions and concerns regarding findings and its/their implications are outlined and discussed.    Start CAM Walker for 10-14 days.    If persistent pathology, follow up.    Dermatitis  -     Specimen to Pathology Dermatology and skin neoplasms    A TIME-OUT was completed. Local infiltrative block proximal to the area of pathology with 1% Lidocaine + Epinephrine; for anesthesia and hemostasis. The area is cleaned with betadine paint/alcohol prep or Chlorhexidine scrub. Using a 3mm/4mm punch biopsy needle, the suspicious skin lesion as mentioned above is sampled at the central aspect. The lesion(s) are placed in a formalin filled pathology cup and will be dispatched for review. Hemostasis is achieved with compression s/p. Local woundcare with topical ABx. ointment and dry and sterile dressings. The patient tolerated the procedure well and without complications.    Onychomycosis  -     ketoconazole (NIZORAL) 2 %  cream; Apply topically once daily.  Dispense: 30 g; Refill: 2    Onychomycosis  Pain of left great toe  A TIME-OUT was completed. Oral, written and verbal consent were obtained from patient, prior to procedure being performed as discussed below.    The LLE 1st toe was anesthetized with a total of 3cc of 0.50% Marcaine w/ Epinephrine. The area was then prepped appropriately with betadine paint. Following this, a [place] Elevator was utilized to free up the medial and lateral nail borders as well as the nail plate itself, from the surrounding soft tissues.  Next, a Platypus Nail Pulling Forcep was used to slowly free the nail from the eponychium.    Once freed from the soft tissue structures, the nail bed was visualized and no defects or lacerations were noted. No osseous exposure is noted. There is scant sanguinous drainage. No purulence is noted. A curette was then utilized to remove any and all debris from either nail fold. Sterile Adaptic, antibiotic cream and a sterile bandage with Coban was placed on the digit.      Patient was informed of the possible complications related to nail regrowth, e.g., nail dystrophy or onychomycosis. Patient was given written and oral instructions for care including the office phone number if any questions or concerns arise.      Patient to start daily application of topical ABx. ointment with a bandage.     Patient to follow up in approx. 10 to 14 days, if needed.    Scleroderma  Sjogren's syndrome with other organ involvement  No PO Lamisil due to these Dx.         Future Appointments   Date Time Provider Department Center   8/23/2023 10:50 AM LABORATORY, AJITH [facility] LAB [place]   8/25/2023  9:00 AM Shabana Larsen NP [facility] NEURO [facility]   9/11/2023  8:20 AM Elie Sumner MD [facility] CARDIO [place]   10/2/2023  1:00 PM Kaya Ordoñez PA-C [facility] SPOMED [place]   11/1/2023  8:15 AM Nikita Love OD [facility] OPHTHAL [facility]   11/1/2023  8:30 AM [facility]    11/1/2023 10:10 AM LABORATORY, AJITH [facility] ON [facility] [place]   11/14/2023  8:30 AM Stacie Marmolejo PA-C [facility]

## 2023-08-16 NOTE — LETTER
August 16, 2023      Lakeshia Shelton MD             [facility]  [address]  [place] [facility] [place] [facility] [address]  Phone: [phone]   Patient: Zachary Lucia   MR Number: [id number]   YOB: 1975         To whom it may concern       We are seeing Zachary Lucia, YOB: 1975, at [facility]. Danna Camara DO is their primary care physician. To help with our health maintenance records could you please send the following:     Most recent Mammogram Result.      Please send fax to [phone].    Thank-you in advance for your assistance. If you have any questions or concerns, please don't hesitate to contact me at [phone].     Fallon TAPIA LPN Care Coordination   [facility]  Phone [phone],  Fax [phone]  [phone]

## 2023-08-16 NOTE — TELEPHONE ENCOUNTER
Pt reports mammo done 7/20/23 at [facility].  Faxed request for copy of report.

## 2023-08-16 NOTE — PROGRESS NOTES
Pt responded to portal questionnaire.  Discussed mammo. Pt states she had mammo completed July 20 through Dr Lakeshia Shelton at [facility]. Advised her I would request copy of report.  Faxed request.

## 2023-08-18 NOTE — PROGRESS NOTES
07- Mammogram Screening received. Sent to [facility] to upload and link

## 2023-08-21 LAB — CRYOGLOB SER QL: NORMAL

## 2023-08-23 ENCOUNTER — LAB VISIT (OUTPATIENT)
Dept: LAB | Facility: HOSPITAL | Age: 48
End: 2023-08-23
Attending: PSYCHIATRY & NEUROLOGY
Payer: COMMERCIAL

## 2023-08-23 DIAGNOSIS — Z79.899 ON ANTIEPILEPTIC THERAPY: ICD-10-CM

## 2023-08-23 DIAGNOSIS — I73.00 RAYNAUD'S DISEASE WITHOUT GANGRENE: ICD-10-CM

## 2023-08-23 DIAGNOSIS — R20.2 PARESTHESIAS: ICD-10-CM

## 2023-08-23 DIAGNOSIS — R94.39 ABNORMAL STRESS TEST: ICD-10-CM

## 2023-08-23 DIAGNOSIS — R10.9 RIGHT FLANK PAIN: ICD-10-CM

## 2023-08-23 DIAGNOSIS — M34.9 SCLERODERMA: ICD-10-CM

## 2023-08-23 DIAGNOSIS — R80.8 OTHER PROTEINURIA: ICD-10-CM

## 2023-08-23 DIAGNOSIS — G43.909 MIGRAINE WITHOUT STATUS MIGRAINOSUS, NOT INTRACTABLE, UNSPECIFIED MIGRAINE TYPE: ICD-10-CM

## 2023-08-23 DIAGNOSIS — G40.409 EPILEPSY, GRAND MAL: ICD-10-CM

## 2023-08-23 DIAGNOSIS — M35.09 SJOGREN'S SYNDROME WITH OTHER ORGAN INVOLVEMENT: ICD-10-CM

## 2023-08-23 DIAGNOSIS — E55.9 VITAMIN D DEFICIENCY DISEASE: ICD-10-CM

## 2023-08-23 DIAGNOSIS — F41.1 GENERALIZED ANXIETY DISORDER: ICD-10-CM

## 2023-08-23 DIAGNOSIS — G40.919 INTRACTABLE EPILEPSY WITHOUT STATUS EPILEPTICUS, UNSPECIFIED EPILEPSY TYPE: ICD-10-CM

## 2023-08-23 DIAGNOSIS — K21.9 GASTROESOPHAGEAL REFLUX DISEASE, UNSPECIFIED WHETHER ESOPHAGITIS PRESENT: ICD-10-CM

## 2023-08-23 DIAGNOSIS — M32.14 LUPUS NEPHRITIS, ISN/RPS CLASS V: ICD-10-CM

## 2023-08-23 DIAGNOSIS — R73.9 HYPERGLYCEMIA: ICD-10-CM

## 2023-08-23 DIAGNOSIS — D50.8 IRON DEFICIENCY ANEMIA SECONDARY TO INADEQUATE DIETARY IRON INTAKE: ICD-10-CM

## 2023-08-23 LAB — VIT B12 SERPL-MCNC: 1476 PG/ML (ref 210–950)

## 2023-08-23 PROCEDURE — 82607 VITAMIN B-12: CPT | Performed by: PSYCHIATRY & NEUROLOGY

## 2023-08-25 ENCOUNTER — OFFICE VISIT (OUTPATIENT)
Dept: NEUROLOGY | Facility: CLINIC | Age: 48
End: 2023-08-25
Payer: COMMERCIAL

## 2023-08-25 VITALS
SYSTOLIC BLOOD PRESSURE: 126 MMHG | HEART RATE: 71 BPM | WEIGHT: 243.38 LBS | BODY MASS INDEX: 39.12 KG/M2 | DIASTOLIC BLOOD PRESSURE: 85 MMHG | HEIGHT: 66 IN | RESPIRATION RATE: 16 BRPM

## 2023-08-25 DIAGNOSIS — R56.9 NONEPILEPTIC EPISODE: Primary | ICD-10-CM

## 2023-08-25 DIAGNOSIS — F41.1 GENERALIZED ANXIETY DISORDER: ICD-10-CM

## 2023-08-25 DIAGNOSIS — Z65.8 PSYCHOSOCIAL STRESSORS: ICD-10-CM

## 2023-08-25 DIAGNOSIS — G43.909 MIGRAINE WITHOUT STATUS MIGRAINOSUS, NOT INTRACTABLE, UNSPECIFIED MIGRAINE TYPE: ICD-10-CM

## 2023-08-25 DIAGNOSIS — M35.09 SJOGREN'S SYNDROME WITH OTHER ORGAN INVOLVEMENT: ICD-10-CM

## 2023-08-25 DIAGNOSIS — G40.409 EPILEPSY, GRAND MAL: ICD-10-CM

## 2023-08-25 PROCEDURE — 1159F PR MEDICATION LIST DOCUMENTED IN MEDICAL RECORD: ICD-10-PCS | Mod: CPTII,S$GLB,, | Performed by: NURSE PRACTITIONER

## 2023-08-25 PROCEDURE — 1159F MED LIST DOCD IN RCRD: CPT | Mod: CPTII,S$GLB,, | Performed by: NURSE PRACTITIONER

## 2023-08-25 PROCEDURE — 3079F PR MOST RECENT DIASTOLIC BLOOD PRESSURE 80-89 MM HG: ICD-10-PCS | Mod: CPTII,S$GLB,, | Performed by: NURSE PRACTITIONER

## 2023-08-25 PROCEDURE — 99999 PR PBB SHADOW E&M-EST. PATIENT-LVL V: ICD-10-PCS | Mod: PBBFAC,,, | Performed by: NURSE PRACTITIONER

## 2023-08-25 PROCEDURE — 1160F RVW MEDS BY RX/DR IN RCRD: CPT | Mod: CPTII,S$GLB,, | Performed by: NURSE PRACTITIONER

## 2023-08-25 PROCEDURE — 3008F BODY MASS INDEX DOCD: CPT | Mod: CPTII,S$GLB,, | Performed by: NURSE PRACTITIONER

## 2023-08-25 PROCEDURE — 99215 PR OFFICE/OUTPT VISIT, EST, LEVL V, 40-54 MIN: ICD-10-PCS | Mod: S$GLB,,, | Performed by: NURSE PRACTITIONER

## 2023-08-25 PROCEDURE — 3079F DIAST BP 80-89 MM HG: CPT | Mod: CPTII,S$GLB,, | Performed by: NURSE PRACTITIONER

## 2023-08-25 PROCEDURE — 3074F SYST BP LT 130 MM HG: CPT | Mod: CPTII,S$GLB,, | Performed by: NURSE PRACTITIONER

## 2023-08-25 PROCEDURE — 99215 OFFICE O/P EST HI 40 MIN: CPT | Mod: S$GLB,,, | Performed by: NURSE PRACTITIONER

## 2023-08-25 PROCEDURE — 1160F PR REVIEW ALL MEDS BY PRESCRIBER/CLIN PHARMACIST DOCUMENTED: ICD-10-PCS | Mod: CPTII,S$GLB,, | Performed by: NURSE PRACTITIONER

## 2023-08-25 PROCEDURE — 3008F PR BODY MASS INDEX (BMI) DOCUMENTED: ICD-10-PCS | Mod: CPTII,S$GLB,, | Performed by: NURSE PRACTITIONER

## 2023-08-25 PROCEDURE — 3074F PR MOST RECENT SYSTOLIC BLOOD PRESSURE < 130 MM HG: ICD-10-PCS | Mod: CPTII,S$GLB,, | Performed by: NURSE PRACTITIONER

## 2023-08-25 PROCEDURE — 99999 PR PBB SHADOW E&M-EST. PATIENT-LVL V: CPT | Mod: PBBFAC,,, | Performed by: NURSE PRACTITIONER

## 2023-08-25 NOTE — PROGRESS NOTES
Subjective:       Patient ID: Zachary Lucia is a 47 y.o. female.    Chief Complaint: Epilepsy, grand mal           HPI      The patient is here for seizure evaluation. The patient is unaccompanied.    The patient started having seizure in 2006. The patient describes aura of LT facial tingling. The patient is amnestic to the seizure after that. The seizure is described as grand mal seizure/GTC consisting of generalized tensing and muscle jerking with eyes deviated upwards with foamy secretions from the mouth, tongue biting and urine incontinence. The seizure lasts for 1-2 minutes followed by 30-60 minutes of confusion. No history of meningitis or encephalitis No toxic exposure or lead poisoning. No family history of epilepsy (paternal cousin had childhood epilepsy).  No history of strokes or aneurysmal bleeding. No history of TBI. She was started on  mg BID that was changed to LEV XR 1000 mg QHS. The patient continued to have seizures every 1-2 months. Of note, she was diagnosed with CTD in 2018 and has been on IST. She is concerned because she had 3 seizures over 4 days in .     The patient does complain of migraine without aura (throbbing, moderate-severe, light-noise sensitivity and nausea) that seems be sporadic and infrequent. Tried Fioricet, CCB (Amlodipine),  TCA (Elavil), SSRIS (Zoloft) and AEDs (GBP).     The patient also reports constant B/L hand numbness, pain and tingling and intermittent toes numbness and tingling since 2018. No history of DM or Thyroid disease. No excessive alcohol intake. Of note, she was diagnosed with CTD in 2018 and has been on IST. The patient was found to have low B12 and Vitamin D and stopped taking supplements. Lab [facility] [id number] showed NL HA1C, TFT, SPEP-IPEP , Low Vitamin D and Vitamin B12  and Positive AMAURY, SSA, SSB and RNP Antibodies.       INTERVAL HISTORY 08-: The patient presents with new onset of  dizziness. By dizziness the patient  means lightheadedness. The symptoms mostly occur upon standing. No palpitation upon standing, she reports chest tightness.  No other triggers. No LOC.  Reports 1 seizure-like episodes on 08- that proceeded after an heated discussion with spouse. The event described as non-epileptic event, no LOC, no tongue biting, no urinary incontinence. No new medication was added. Discussed AEEG results 07- THROUGH 07- AEEG  HOURS: SW, LT TL F7-T7, FOUR EVENTS (NON-EPILEPTIC-PSYCHOGENIC).  Patient reports work related stressors,  No tremors. No history of parkinsonism. No vomiting, diarrhea or bleeding. No anemia. No CHF or cardiac arrhthymias. Reports having double vision, trouble swallowing, reports loss of  balance. No focal/lateralized weakness. Reports complaint of hand and feet numbness-tingling. EMG NCS is pending . Vitamin D 5000 units QD and B12 SL 2500 mcg QD.       Review of Systems   Constitutional:  Positive for fatigue. Negative for appetite change.   HENT:  Negative for hearing loss and tinnitus.    Eyes:  Negative for photophobia and visual disturbance.   Respiratory:  Negative for apnea and shortness of breath.    Cardiovascular:  Negative for chest pain and palpitations.   Gastrointestinal:  Negative for nausea and vomiting.   Endocrine: Negative for cold intolerance and heat intolerance.   Genitourinary:  Negative for difficulty urinating and urgency.   Musculoskeletal:  Positive for arthralgias. Negative for back pain, gait problem, joint swelling, myalgias, neck pain and neck stiffness.   Skin:  Positive for color change. Negative for rash.   Allergic/Immunologic: Negative for environmental allergies and immunocompromised state.   Neurological:  Positive for seizures, numbness and headaches. Negative for dizziness, tremors, syncope, facial asymmetry, speech difficulty, weakness and light-headedness.   Hematological:  Negative for adenopathy. Does not bruise/bleed easily.    Psychiatric/Behavioral:  Negative for agitation, behavioral problems, confusion, decreased concentration, dysphoric mood, hallucinations, self-injury, sleep disturbance and suicidal ideas. The patient is not hyperactive.                  Current Outpatient Medications:     azaTHIOprine (IMURAN) 100 mg tablet, Take 1 tablet (100 mg total) by mouth once daily., Disp: 90 tablet, Rfl: 1    azaTHIOprine (IMURAN) 50 mg Tab, Take 1 tablet (50 mg total) by mouth once daily., Disp: 90 tablet, Rfl: 1    belimumab (BENLYSTA) 200 mg/mL Syrg, INJECT 1 SYRINGE UNDER THE SKIN EVERY 7 DAYS., Disp: 4 each, Rfl: 3    betamethasone dipropionate (DIPROLENE) 0.05 % ointment, Apply topically 2 (two) times daily as needed. Steroid. Use for flares, Disp: 45 g, Rfl: 3    cholecalciferol, vitamin D3, 1,250 mcg (50,000 unit) capsule, Take 1 capsule (50,000 Units total) by mouth once a week., Disp: 15 capsule, Rfl: 1    crisaborole (EUCRISA) 2 % Oint, AAA bid prn.  Non-steroid.  Safe to use long-term., Disp: 60 g, Rfl: 3    cyclobenzaprine (FLEXERIL) 10 MG tablet, Take 1 tablet (10 mg total) by mouth 2 (two) times daily as needed for Muscle spasms., Disp: 60 tablet, Rfl: 0    HYDROcodone-acetaminophen (NORCO) 7.5-325 mg per tablet, Take 1 tablet by mouth 4 (four) times daily as needed., Disp: , Rfl:     hydrOXYchloroQUINE (PLAQUENIL) 200 mg tablet, Take 1 tablet (200 mg total) by mouth 2 (two) times daily., Disp: 180 tablet, Rfl: 3     mg tablet, Take 800 mg by mouth every 8 (eight) hours as needed., Disp: , Rfl: 0    ketoconazole (NIZORAL) 2 % cream, Apply topically once daily., Disp: 30 g, Rfl: 2    levetiracetam XR (KEPPRA XR) 500 mg Tb24 24 hr tablet, Take 4 tablets (2,000 mg total) by mouth once daily., Disp: 360 tablet, Rfl: 3    predniSONE (DELTASONE) 10 MG tablet, Take 1 tablet (10 mg total) by mouth once daily., Disp: 90 tablet, Rfl: 1    semaglutide, weight loss, (WEGOVY) 0.5 mg/0.5 mL PnIj, Inject 0.5 mg into the skin every  7 days., Disp: 2 mL, Rfl: 1    sertraline (ZOLOFT) 100 MG tablet, TAKE 1 TABLET BY MOUTH EVERY DAY, Disp: 90 tablet, Rfl: 0    fluticasone-salmeterol diskus inhaler 100-50 mcg, Inhale 1 puff into the lungs 2 (two) times daily. Controller, Disp: 1 each, Rfl: 11  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    Scleroderma     Seizures 01/12/2017     Past Surgical History:   Procedure Laterality Date    COLONOSCOPY N/A 1/12/2023    Procedure: COLONOSCOPY;  Surgeon: Supriya Hughes MD;  Location: [facility];  Service: Endoscopy;  Laterality: N/A;    ESOPHAGOGASTRODUODENOSCOPY N/A 1/12/2023    Procedure: EGD (ESOPHAGOGASTRODUODENOSCOPY);  Surgeon: Supriya Hughes MD;  Location: [facility] ENDO;  Service: Endoscopy;  Laterality: N/A;    LEFT HEART CATHETERIZATION Left 1/25/2022    Procedure: CATHETERIZATION, HEART, LEFT;  Surgeon: Rocío Davidson MD;  Location: [facility] CATH LAB;  Service: Cardiology;  Laterality: Left;    RENAL BIOPSY  11/16/2018     Social History     Socioeconomic History    Marital status:      Spouse name: Darrell    Number of children: 0   Tobacco Use    Smoking status: Never    Smokeless tobacco: Never   Substance and Sexual Activity    Alcohol use: Yes     Comment: OCC    Drug use: No    Sexual activity: Yes             Past/Current Medical/Surgical History, Past/Current Social History, Past/Current Family History and Past/Current Medications were reviewed in detail.        Objective:           VITAL SIGNS WERE REVIEWED      GENERAL APPEARANCE:     The patient looks uncomfortable.    BMI 38.79    No signs of respiratory distress.    Normal breathing pattern.    No dysmorphic features    Normal eye contact.       GENERAL MEDICAL EXAM:    HEENT:  Head is atraumatic normocephalic.     FUNDOSCOPIC (OPHTHALMOSCOPIC) EXAMINATION  showed no disc edema.      NECK: No JVD. No visible lesions or goiters.     CHEST-CARDIOPULMONARY: No cyanosis. No tachypnea. Normal respiratory effort.    [id number]: No jaundice. No stomas or lesions. No visible hernias. No catheters.     SKIN, HAIR, NAILS: No pathognomonic skin rash.No neurofibromatosis. No visible lesions.No stigmata of autoimmune disease. No clubbing.    LIMBS: No varicose veins. No visible swelling.    MUSCULOSKELETAL: No visible deformities.No visible lesions.           Neurologic Exam     Mental Status   Oriented to person, place, and time.   Follows 3 step commands.   Attention: normal. Concentration: normal.   Speech: speech is normal   Level of consciousness: alert  Able to name object. Able to repeat. Normal comprehension.     Cranial Nerves   Cranial nerves II through XII intact.     CN II   Visual fields full to confrontation.   Visual acuity: normal  Right visual field deficit: none  Left visual field deficit: none     CN III, IV, VI   Pupils are equal, round, and reactive to light.  Extraocular motions are normal.   Right pupil: Size: 2 mm. Shape: regular. Reactivity: brisk. Consensual response: intact. Accommodation: intact.   Left pupil: Size: 2 mm. Shape: regular. Reactivity: brisk. Consensual response: intact. Accommodation: intact.   CN III: no CN III palsy  CN VI: no CN VI palsy  Nystagmus: none   Diplopia: none  Ophthalmoparesis: none  Upgaze: normal  Downgaze: normal  Conjugate gaze: present  Vestibulo-ocular reflex: present    CN V   Facial sensation intact.   Right facial sensation deficit: none  Left facial sensation deficit: none  Jaw jerk: normal    CN VII   Facial expression full, symmetric.   Right facial weakness: none  Left facial weakness: none    CN VIII   CN VIII normal.   Hearing: intact    CN IX, X   CN IX normal.   CN X normal.   Palate: symmetric    CN XI   CN XI normal.   Right sternocleidomastoid strength: normal  Left  sternocleidomastoid strength: normal  Right trapezius strength: normal  Left trapezius strength: normal    CN XII   CN XII normal.   Tongue: not atrophic  Fasciculations: absent  Tongue deviation: none    Motor Exam   Muscle bulk: normal  Overall muscle tone: normal  Right arm tone: normal  Left arm tone: normal  Right arm pronator drift: absent  Left arm pronator drift: absent  Right leg tone: normal  Left leg tone: normal    Strength   Strength 5/5 throughout.   Right neck flexion: 5/5  Left neck flexion: 5/5  Right neck extension: 5/5  Left neck extension: 5/5  Right deltoid: 5/5  Left deltoid: 5/5  Right biceps: 5/5  Left biceps: 5/5  Right triceps: 5/5  Left triceps: 5/5  Right wrist flexion: 5/5  Left wrist flexion: 5/5  Right wrist extension: 5/5  Left wrist extension: 5/5  Right interossei: 5/5  Left interossei: 5/5  Right iliopsoas: 5/5  Left iliopsoas: 5/5  Right quadriceps: 5/5  Left quadriceps: 5/5  Right hamstrin/5  Left hamstrin/5  Right glutei: 5/5  Left glutei: 5/5  Right anterior tibial: 5/5  Left anterior tibial: 5/5  Right posterior tibial: 5/5  Left posterior tibial: 5/5  Right peroneal: 5/5  Left peroneal: 5/5  Right gastroc: 5/5  Left gastroc: 5/5    Sensory Exam   Light touch normal.   Right arm light touch: normal  Left arm light touch: normal  Right leg light touch: normal  Left leg light touch: normal  Vibration normal.   Right arm vibration: normal  Left arm vibration: normal  Right leg vibration: normal  Left leg vibration: normal  Proprioception normal.   Right arm proprioception: normal  Left arm proprioception: normal  Right leg proprioception: normal  Left leg proprioception: normal  Pinprick normal.   Right arm pinprick: normal  Left arm pinprick: normal  Right leg pinprick: normal  Left leg pinprick: normal  Sensory deficit distribution on right: median  Sensory deficit distribution on left: median  Graphesthesia: normal  Stereognosis: normal    Gait, Coordination, and  Reflexes     Gait  Gait: normal    Coordination   Romberg: negative  Finger to nose coordination: normal  Heel to shin coordination: normal  Tandem walking coordination: normal    Tremor   Resting tremor: absent  Intention tremor: absent  Action tremor: absent    Reflexes   Right brachioradialis: 2+  Left brachioradialis: 2+  Right biceps: 2+  Left biceps: 2+  Right triceps: 2+  Left triceps: 2+  Right patellar: 2+  Left patellar: 2+  Right achilles: 2+  Left achilles: 2+  Right plantar: normal  Left plantar: normal  Right Myrick: absent  Left Myrick: absent  Right ankle clonus: absent  Left ankle clonus: absent  Right pendular knee jerk: absent  Left pendular knee jerk: absent              Lab Results   Component Value Date    WBC 4.67 08/11/2023    HGB 10.5 (L) 08/11/2023    HCT 32.8 (L) 08/11/2023    MCV 82 08/11/2023     08/11/2023     Sodium   Date Value Ref Range Status   08/11/2023 138 136 - 145 mmol/L Final     Potassium   Date Value Ref Range Status   08/11/2023 3.6 3.5 - 5.1 mmol/L Final     Chloride   Date Value Ref Range Status   08/11/2023 106 95 - 110 mmol/L Final     CO2   Date Value Ref Range Status   08/11/2023 24 23 - 29 mmol/L Final     Glucose   Date Value Ref Range Status   08/11/2023 109 70 - 110 mg/dL Final     BUN   Date Value Ref Range Status   08/11/2023 6 6 - 20 mg/dL Final     Creatinine   Date Value Ref Range Status   08/11/2023 1.0 0.5 - 1.4 mg/dL Final     Calcium   Date Value Ref Range Status   08/11/2023 9.1 8.7 - 10.5 mg/dL Final     Total Protein   Date Value Ref Range Status   08/11/2023 7.5 6.0 - 8.4 g/dL Final     Albumin   Date Value Ref Range Status   08/11/2023 3.9 3.5 - 5.2 g/dL Final     Total Bilirubin   Date Value Ref Range Status   08/11/2023 0.3 0.1 - 1.0 mg/dL Final     Comment:     For infants and newborns, interpretation of results should be based  on gestational age, weight and in agreement with clinical  observations.    Premature Infant recommended  reference ranges:  Up to 24 hours.............<8.0 mg/dL  Up to 48 hours............<12.0 mg/dL  3-5 days..................<15.0 mg/dL  6-29 days.................<15.0 mg/dL       Alkaline Phosphatase   Date Value Ref Range Status   08/11/2023 54 (L) 55 - 135 U/L Final     AST   Date Value Ref Range Status   08/11/2023 16 10 - 40 U/L Final     ALT   Date Value Ref Range Status   08/11/2023 15 10 - 44 U/L Final     Anion Gap   Date Value Ref Range Status   08/11/2023 8 8 - 16 mmol/L Final     eGFR if    Date Value Ref Range Status   05/11/2022 >60 >60 mL/min/1.73 m^2 Final     eGFR if non    Date Value Ref Range Status   05/11/2022 >60 >60 mL/min/1.73 m^2 Final     Comment:     Calculation used to obtain the estimated glomerular filtration  rate (eGFR) is the CKD-EPI equation.        Lab Results   Component Value Date    [id number] 1476 (H) 08/23/2023     Lab Results   Component Value Date    TSH 0.891 11/29/2021    FREET4 1.02 02/01/2018       LABORATORY EVALUATION      [id number]     Lab [facility] showed NL HA1C, TFT, SPEP-IPEP , Low Vitamin D and Vitamin B12  and Positive AMAURY, SSA, SSB and RNP Antibodies.       RADIOLOGY EVALUATION     06-    MRI Brain NL       NEUROPHYSIOLOGY EVALUATION    06-    EEG NL     07- THROUGH 07-     AEEG  HOURS: SW, LT TL F7-T7, FOUR EVENTS (NON-EPILEPTIC-PSYCHOGENIC) .      AED MONITORING      AED: LEV  RANGE:  03-60 Dose    DATE LEVEL    05-   1000 mg QHS                                            Reviewed the neuroimaging independently       Assessment:       1. Nonepileptic episode    2. Psychosocial stressors    3. Epilepsy, grand mal    4. Sjogren's syndrome with other organ involvement    5. Migraine without status migrainosus, not intractable, unspecified migraine type    6. Generalized anxiety disorder            EPILEPSY CLASSIFICATION    SEMIOLOGY: SENSORY AURA (LEFT FACE)--> GTC    EPILEPTOGENIC ZONE (S):   UNKNOWN     ETIOLOGY: UNKNOWN     PRIOR AEDS: GBP (PAIN)    CURRENT AEDS: LEV     LAST SEIZURE DATE:       COMPREHENSIVE LIST OF AEDs:     Acetazolamide (AZM-Diamox)   Benzos: clonazepam (CZP Klonopin), lorazepam (LZP-Ativan), diazepam (DZP-Valium), clorazepate (CLZ- Tranxene)  Brivaracetam (BRV-Briviact)  Cannabidiol (CBD- Epidiolex)  Carbamazepine (CBZ-Tegretol)  Cenobamate (CNB-Xcopri)  Clobazam (CLB-Onfi)  Eslicarbazepine (ESL-Aptiom)  Ethosuximide (ESX-Zarontin)  Felbamate (FBM-Felbatol)  Fenfluramine (FFA-Fintepla)  Gabapentin (GBP-Neurontin)  Lacosamide (LCM-Vimpat)  Lamotrigine (LTG-Lamitcal)  Levetiracetam (LEV- Keppra)  Oxcarbazepine (OXC-Trileptal)  Perampanel (PML-Fycompa)  Phenobarbital (PB)  Phenytoin (PHT-Dilantin)  Pregabalin (PGB-Lyrica)  Primidone (PRM)   Retigabine (RTG- Potiga) Discontinued in 2017  Rufinamide (RFN-Benzil)  Stiripentol (STP-Diacomit)  Tiagabine (TGB-Gabitril)  Topiramate (TPM-Topamax)  Valproate (VPA-Depakote)  Vigabatrin (VGB-Sabril)  Zonisamide (ZNS-Zonegran)    Plan:         CARRIES A DIAGNOSIS OF GRAND MAL EPILEPSY/PSYCHOGENIC NON-EPILEPTIC SEIZURES        The patient was encouraged to maintain full traditional seizure precautions which include but not limited to avoidance of driving, biking, high altitudes (ladders, escalators, rock climbing, mountain climbing, skiing, connor diving, moderate to difficult hiking), proximity to fire or fire source, proximity to body of water or swimming alone, operating heavy and potentially risky machinery, using sharp objects, using exercise machines like treadmill and weight lifting. Walking while accompanied on soft surfaces like grass is preferable.The patient was encouraged to shower (without accumulation of water) instead of taking a bath if unsupervised. The patient was made aware that these precautions are especially important during concurrent illnesses, fever, infections, vomiting, changing medications and running out of  anti-seizure medications. Instructed the patient to avoid night shifts, sleep deprivation and alcohol or recreational drug use as adequate sleep and avoidance of alcohol/drugs are very important measures to assure good seizure control. The patient was also advised not to care for young children without company. The patient is advised to pad the side rails with pillows and blankets if applicable.I strongly recommended lowering the bed to the floor level to decrease the risk of falls during nocturnal seizures that occur during sleep. I also instructed the patient to avoid safety sensitive duties. In general, any activity that requires full awareness and would result in serious injury to self and others if a seizure takes place should be avoided.      Made the patient aware that the duration of restrictions/precautions varies and in [place] it is 6 months. The patient verbalized  full understanding.        Continue Levetiracetam/Keppra-LEV XR 2000 mg QHS. Check LEV today.       NON-EPILEPTIC SEIZURES    Discussed with patient regarding Psychogenic non-epileptic seizures are events resembling an epileptic seizure, but without the characteristic electrical discharges associated with epilepsy. They are of psychological origin, and are one type of non-epileptic seizure mimics. PNES are also known less specifically as non-epileptic attack disorder and functional neurological symptom disorder. I explained to patient that he will require different treatment to address the underlying cause for this phenomenon. A specific traumatic event, such as physical or sexual abuse, incest, divorce, death of a loved one, or other great loss or sudden change, can be identified in many people with PNES. Recommend CBT By definition, PNES are a physical manifestation of a psychological disturbance and are a type of Somatoform Disorder.     Follow up with Psychiatry     Continue AEDs INDEFINITELY, FOR GOOD AND NEVER SKIP A DOSE. The patient  verbalized full understanding. Stressed the extreme medical, personal safety, public safety and legal importance of compliance and seizure control. Will give as many refills as possible with or without face-to-face evaluation to make sure the patient and any patient with epilepsy will never run out of medications. Running out of seizure medications should never happen under our care. I explained to the patient that he should not, under any circumstances, adjust or stop taking his seizure medication without discussing with me. Warned the patient about SUDEP. The patient verbalized full understanding.       AVOID any substance that could lower seizure threshold including but not limited to:        ALCOHOL AND WITHDRAWAL      TRAMADOL.     MEPERIDINE (DEMEROL)     ALL STIMULANTS-ALL ADHD MEDICATIONS.      CLOZAPINE.      BUPROPION (WELLBUTRIN)     CIPROFLOXACIN.    CYCLOSPORINE.     METOCLOPRAMIDE (REGLAN).     TETRAHYDROCANNABINOL (THC)    KRATOM            COMMON MIGRAINE WITHOUT AURA, EPISODIC, LOW FREQUENCY     HEADACHE DIARY     DISCUSSED THE THREE-FOLD MANAGEMENT OF MIGRAINE:      LIFESTYLE CHANGES:       Good sleep hygiene  Avoid general triggers like lack of sleep/too much sleep, prolonged sun exposure, excessive screen time and specific triggers based on you own diary   Minimize physical and emotional stress  Smoking avoidance and cessation  Limit caffeine drinks to 1-2 a day   Good hydration   Small frequent meals and avoid skipping meals   Moderate 30-minute-long aerobic exercises 3 times/week. Avoid strenuous exercise         ABORTIVE MEDICATIONS (ACUTE-RESCUE MEDICATIONS):     Should only be taken 2-3 times/week to avoid rebound and overuse headaches.      Triptans and DHE are C/I in [facility].    Failed Firoicet.       AVOID NARCOTICS (OPIATES)      1. No randomized controlled study shows pain-free results with opioids in the treatment of migraine.     2. The physiologic consequences of opioid use are  adverse, occur quickly, and can be permanent. Decreased gray matter, release of calcitonin gene-related peptide, dynorphin, and pro-inflammatory peptides, and activation of excitatory glutamate receptors are all associated with opioid exposure.     3. Opioids are pro-nociceptive, prevent reversal of migraine central sensitization, and interfere with triptan effectiveness.     4.Opioids precipitate bad clinical outcomes, especially transformation to daily headache.     5. They cause disease progression, comorbidity, and excessive health care consumption.           NEXT OPTIONS:      Gepants: Nuretc (rimegepant)75 mg >Ubrelvy (ubrogepant) 100 mg          IMPENDING STATUS: Prednisone and Vistaril.    STATUS MIGRAINOSUS: ED-Infusion for Status Protocol.        PREVENTATIVE (MORE ACCURATELY MIGRAINE REDUCTION) MEDICATIONS:           Since the patient's headache is very infrequent will hold off.     Tried CCB (Amlodipine),  TCA (Elavil), SSRIS (Zoloft) and AEDs (GBP).     BB are C/I in [facility].    HELPFUL SUPPLEMENTS:     Helpful supplements include Co-Q 10, B2, Mg, Feverfew (Dolovent combination) and butterbur (Petadolex)        NEUROPHARMACOLOGY     NEXT OPTIONS:       Topiramate/Topamax (TPM) slow titration to 50 mg BID which can cause mental slowing, transient tingling, kidney stones, weight loss, cleft lip and palate and rarely glaucoma and visual field defects . The patient was encouraged to drink a lot of fluids.     Zonisamide/Zonegran (ZNS) 100-400 mg QHS is a good alternative to TPM in case of SE/AE.     Lamotrigine/Lamictal  (LTG)slow titration to 100 mg BID which can cause serious skin rash and rare cardiac arrhythmias. LTG is superior to other therapies for specifically reducing migraine aura.     ANTI-CGRP AGENTS: Qulipta (alogepant) 60 mg QD, Erenumab (Aimovig) 140 mg SQ Pen monthly (Reported cases of Constipation and BP elevation) , Galcanezumab (Emgality) 120 mg SQ Pen monthly after a loading dose of  240 mg  and Fremnezumab (Ajovy) (Ligand Blocker): 225 mg SQ monthly or 675 mg every 3 months     Botox 200 units every 3 months .        LAST RESORT OPTIONS:      Namenda 10 mg BID     Valproic acid/ Depakote         NEUROMODULATION     Cefaly, Relivion, Nerivio and GammaCore (VNS)           PARESTHESIAS, HANDS AND FEET, B12 AND VITAMIN D DEFICIENCY, CONNECTIVE TISSUE DISEASE, IMMUNOSUPPRESSIVE THERAPY        NCS/EMG BUE RLE.    Continue Vitamin D 5000 units QD. Check level in .    Continue B12 SL 2500 mcg QD.Check level in .      DIZZINESS WITH CHEST TIGHTNESS    Refer Cardiology                MEDICAL/SURGICAL COMORBIDITIES     All relevant medical comorbidities noted and managed by primary care physician and medical care team.          HEALTHY LIFESTYLE AND PREVENTATIVE CARE    The patient to adhere to the age-appropriate health maintenance guidelines including screening tests and vaccinations. The patient to adhere to  healthy lifestyle, optimal weight, exercise, healthy diet, good sleep hygiene and avoiding drugs including smoking, alcohol and recreational drugs.          RTC in 6 months       Bibi Larsen, MSN NP      Collaborating Provider: Daren Lamb MD, FAAN Neurologist/Epileptologist    I spent a total of 50 minutes on the day of the visit.  This includes face to face time and non-face to face time preparing to see the patient (eg, review of tests), obtaining and/or reviewing separately obtained history, documenting clinical information in the electronic or other health record, independently interpreting results and communicating results to the patient/family/caregiver, or care coordinator.

## 2023-08-29 LAB
FINAL PATHOLOGIC DIAGNOSIS: NORMAL
Lab: NORMAL

## 2023-08-30 DIAGNOSIS — B35.3 TINEA PEDIS OF BOTH FEET: Primary | ICD-10-CM

## 2023-08-30 RX ORDER — TERBINAFINE HYDROCHLORIDE 250 MG/1
250 TABLET ORAL DAILY
Qty: 30 TABLET | Refills: 0 | Status: SHIPPED | OUTPATIENT
Start: 2023-08-30 | End: 2023-09-01 | Stop reason: SDUPTHER

## 2023-08-30 NOTE — PROGRESS NOTES
Reviewed pathology with patient. Will Rx Lamisil. She does have a Hx of SLE Nephritis. She will monitor for medication side effects.

## 2023-08-31 ENCOUNTER — PROCEDURE VISIT (OUTPATIENT)
Dept: NEUROLOGY | Facility: CLINIC | Age: 48
End: 2023-08-31
Payer: COMMERCIAL

## 2023-08-31 DIAGNOSIS — R20.2 PARESTHESIAS: ICD-10-CM

## 2023-08-31 PROCEDURE — 95913 PR NERVE CONDUCTION STUDY; 13 OR MORE STUDIES: ICD-10-PCS | Mod: S$GLB,,, | Performed by: PSYCHIATRY & NEUROLOGY

## 2023-08-31 PROCEDURE — 95913 NRV CNDJ TEST 13/> STUDIES: CPT | Mod: S$GLB,,, | Performed by: PSYCHIATRY & NEUROLOGY

## 2023-09-01 DIAGNOSIS — B35.3 TINEA PEDIS OF BOTH FEET: ICD-10-CM

## 2023-09-01 RX ORDER — TERBINAFINE HYDROCHLORIDE 250 MG/1
250 TABLET ORAL DAILY
Qty: 30 TABLET | Refills: 0 | Status: SHIPPED | OUTPATIENT
Start: 2023-09-01 | End: 2023-10-01

## 2023-09-02 NOTE — PROCEDURES
[facility]   Sheila Maurice  Department of Neurology  [address] [place] [address] [place]  [address]  Phone [phone]     Fax  [phone]        Full Name: Zachary Lucia Gender: Female  Patient ID: [id number] YOB: 1975      Visit Date: 8/31/2023 8:15 AM  Age: 47 Years  Examining Physician: Daren Lamb M.D.  Referring Physician: Daren Lamb MD  History: CTS/PN workups. C/O: Bilateral hand numbness, tingling, and pain, and intermittent toe numbness since 2018. PMHX: Vitamin B12 and D deficiency, anemia, epilepsy, migraine, Sjogren's syndrome, Raynaud's, GERD, asthma, lupus.    SUMMARY        Nerve conduction studies were performed in the left and right upper extremities and the right lower extremity. The left median motor study recording the abductor pollicis brevis showed a normal amplitude, normal distal latency and normal conduction velocity.      The left median sensory response recording digit two showed a normal amplitude, latency and conduction velocity. The left ulnar sensory response recording digit five showed a normal amplitude, latency and conduction velocity. The left radial sensory response recording over the extensor snuff box showed a normal amplitude, latency and conduction velocity.    As routine median motor and sensory studies have a false negative rate of 25%, additional internal comparison studies were done to assess for possible median neuropathy at the wrist. These internal comparison studies (median vs. ulnar palmar mixed; median vs. ulnar sensory recording digit four; median vs. ulnar motor studies to the second lumbrical / interosseous; median vs. radial sensory studies recording digit one; median segmental sensory studies comparing the wrist-palm and palm-digit velocities) increase the electrodiagnostic sensitivity rate to 95%. However, due to statistical issues with multiple tests, it is required that at least two studies are abnormal to reduce the  false positive rate to acceptable levels. Left median-ulnar mixed palmar latencies showed  significant difference.    The right median motor study recording the abductor pollicis brevis showed a normal amplitude, prolonged distal latency and normal conduction velocity.     The right median sensory response recording digit two showed a normal amplitude, prolonged latency and slow conduction velocity.     The right peroneal motor study recording the extensor digitorum brevis showed a normal amplitude, normal distal latency and normal conduction velocity.  No conduction block or focal slowing was present across the fibular neck. The right tibial motor study recording the abductor hallucis brevis showed a normal amplitude, normal distal latency and normal conduction velocity.     Right sural sensory response showed a normal amplitude and conduction velocity.  Right superficial peroneal sensory response showed a normal amplitude and conduction velocity.      IMPRESSION    There is no electrophysiologic evidence of peripheral polyneuropathy.     There is electrophysiologic evidence of mild-to-moderate right median mononeuropathy across the wrist (carpal tunnel syndrome) and very mild left median mononeuropathy across the wrist (carpal tunnel syndrome).    ---------------------------------             Daren Lamb M.D., F.A.A.N.      Diplomate, American Board of Psychiatry and Neurology  Diplomate, American Board of Clinical Neurophysiology  Fellow, American Academy of Neurology        SENSORY NCS      Nerve / Sites Rec. Site Peak NP Amp PP Amp Dist Umair d Lat.2     ms µV µV cm m/s ms   L Median - Dig II      Wrist II 3.46 11.7 16.3 13 52.0 3.46      Ref.  3.40  20.0  48.0    L Median - Ulnar - Palmar      Median Wrist 2.71 16.9 12.5 8 37.3 2.71      Ulnar Wrist 1.98 10.3 6.6 8 147.7 -0.73   L Ulnar - Dig V      Wrist Dig V 3.06 11.1 6.5 11 52.3 3.06      Ref.  3.10  12.0  48.0    L Radial - Snuff box      Forearm Snuff box  2.42 18.5 19.9 10 55.8 2.42      Ref.  2.70 18.0       R Superficial peroneal      Lat Leg Ankle 3.38 8.5 6.0 10 37.8 3.38      Ref.  4.50  5.0  40.0    R Sural - Lat Mall      Calf Lat Mall 3.12 6.0 6.1 14 56.5 3.12      Ref.  4.50  5.0  40.0    R Median - Dig II      Wrist II 4.23 11.5 15.5 13 39.2 4.23      Ref.  3.40  20.0  48.0        MOTOR NCS      Nerve / Sites Rec. Site Lat Amp Dist Umair     ms mV cm m/s   L Median - APB      Wrist APB 3.85 13.4 8       Ref.  3.90 6.0        Elbow APB 7.92 13.2 23 56.6      Ref.     49.0   L Ulnar - ADM      Wrist ADM 2.75 10.3 8       Ref.  3.10 7.0        B.Elbow ADM 6.40 9.9 22 60.3      Ref.     50.0      A.Elbow ADM 7.67 9.4 10 78.7   R Peroneal - EDB      Ankle EDB 4.27 3.4 8       Ref.  5.50 3.0        FibHead EDB 11.58 2.9 35 47.9      Ref.     40.0      Knee EDB 13.35 2.2 10 56.5   R Tibial - AH      Ankle AH 3.65 8.0 8       Ref.  6.00 8.0        Knee AH 12.58 4.5 41 45.9      Ref.     40.0   R Median - APB      Wrist APB 4.29 8.0 8       Ref.  3.90 6.0        Elbow APB 8.62 7.9 24 55.4                         ---------------------------------             Daren Lamb M.D., F.A.A.N.      Diplomate, American Board of Psychiatry and Neurology  Diplomate, American Board of Clinical Neurophysiology  Fellow, American Academy of Neurology

## 2023-09-05 ENCOUNTER — OFFICE VISIT (OUTPATIENT)
Dept: FAMILY MEDICINE | Facility: CLINIC | Age: 48
End: 2023-09-05
Payer: COMMERCIAL

## 2023-09-05 ENCOUNTER — TELEPHONE (OUTPATIENT)
Dept: NEUROLOGY | Facility: CLINIC | Age: 48
End: 2023-09-05
Payer: COMMERCIAL

## 2023-09-05 VITALS
HEIGHT: 66 IN | TEMPERATURE: 98 F | HEART RATE: 75 BPM | DIASTOLIC BLOOD PRESSURE: 70 MMHG | OXYGEN SATURATION: 99 % | SYSTOLIC BLOOD PRESSURE: 118 MMHG | BODY MASS INDEX: 38.62 KG/M2 | WEIGHT: 240.31 LBS

## 2023-09-05 DIAGNOSIS — T30.0 BURN: Primary | ICD-10-CM

## 2023-09-05 PROCEDURE — 1159F PR MEDICATION LIST DOCUMENTED IN MEDICAL RECORD: ICD-10-PCS | Mod: CPTII,S$GLB,, | Performed by: NURSE PRACTITIONER

## 2023-09-05 PROCEDURE — 3074F PR MOST RECENT SYSTOLIC BLOOD PRESSURE < 130 MM HG: ICD-10-PCS | Mod: CPTII,S$GLB,, | Performed by: NURSE PRACTITIONER

## 2023-09-05 PROCEDURE — 3008F BODY MASS INDEX DOCD: CPT | Mod: CPTII,S$GLB,, | Performed by: NURSE PRACTITIONER

## 2023-09-05 PROCEDURE — 1159F MED LIST DOCD IN RCRD: CPT | Mod: CPTII,S$GLB,, | Performed by: NURSE PRACTITIONER

## 2023-09-05 PROCEDURE — 99999 PR PBB SHADOW E&M-EST. PATIENT-LVL IV: ICD-10-PCS | Mod: PBBFAC,,, | Performed by: NURSE PRACTITIONER

## 2023-09-05 PROCEDURE — 1160F RVW MEDS BY RX/DR IN RCRD: CPT | Mod: CPTII,S$GLB,, | Performed by: NURSE PRACTITIONER

## 2023-09-05 PROCEDURE — 99214 PR OFFICE/OUTPT VISIT, EST, LEVL IV, 30-39 MIN: ICD-10-PCS | Mod: S$GLB,,, | Performed by: NURSE PRACTITIONER

## 2023-09-05 PROCEDURE — 1160F PR REVIEW ALL MEDS BY PRESCRIBER/CLIN PHARMACIST DOCUMENTED: ICD-10-PCS | Mod: CPTII,S$GLB,, | Performed by: NURSE PRACTITIONER

## 2023-09-05 PROCEDURE — 3078F DIAST BP <80 MM HG: CPT | Mod: CPTII,S$GLB,, | Performed by: NURSE PRACTITIONER

## 2023-09-05 PROCEDURE — 3078F PR MOST RECENT DIASTOLIC BLOOD PRESSURE < 80 MM HG: ICD-10-PCS | Mod: CPTII,S$GLB,, | Performed by: NURSE PRACTITIONER

## 2023-09-05 PROCEDURE — 3008F PR BODY MASS INDEX (BMI) DOCUMENTED: ICD-10-PCS | Mod: CPTII,S$GLB,, | Performed by: NURSE PRACTITIONER

## 2023-09-05 PROCEDURE — 99214 OFFICE O/P EST MOD 30 MIN: CPT | Mod: S$GLB,,, | Performed by: NURSE PRACTITIONER

## 2023-09-05 PROCEDURE — 3074F SYST BP LT 130 MM HG: CPT | Mod: CPTII,S$GLB,, | Performed by: NURSE PRACTITIONER

## 2023-09-05 PROCEDURE — 99999 PR PBB SHADOW E&M-EST. PATIENT-LVL IV: CPT | Mod: PBBFAC,,, | Performed by: NURSE PRACTITIONER

## 2023-09-05 RX ORDER — MUPIROCIN 20 MG/G
OINTMENT TOPICAL 3 TIMES DAILY
Qty: 1 G | Refills: 0 | Status: SHIPPED | OUTPATIENT
Start: 2023-09-05

## 2023-09-05 RX ORDER — CEPHALEXIN 500 MG/1
500 CAPSULE ORAL EVERY 6 HOURS
Qty: 28 CAPSULE | Refills: 0 | Status: SHIPPED | OUTPATIENT
Start: 2023-09-05 | End: 2023-09-12

## 2023-09-05 NOTE — TELEPHONE ENCOUNTER
----- Message from Daren Lamb MD sent at 9/1/2023  9:17 PM CDT -----    08-    NCS/EMG BUE RLE No PN. Mild-Moderate RT CTS and Very Mild LT CTS.

## 2023-09-05 NOTE — PROGRESS NOTES
Subjective:       Patient ID: Zachary Lucia is a 47 y.o. female.    Chief Complaint: No chief complaint on file.  Pt reports  to clinic with chief complaint of sustaining a burn 2 weeks ago while cooking.  Reports blister formation.  Has been cleaning area with hydrogen peroxide and applying neosporin to area.  Notes pain.  Drainage of yellow fluid.  Denies worsening erythema.      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 90 tablet 1    azaTHIOprine (IMURAN) 50 mg Tab Take 1 tablet (50 mg total) by mouth once daily. 90 tablet 1    belimumab (BENLYSTA) 200 mg/mL Syrg INJECT 1 SYRINGE UNDER THE SKIN EVERY 7 DAYS. 4 each 3    betamethasone dipropionate (DIPROLENE) 0.05 % ointment Apply topically 2 (two) times daily as needed. Steroid. Use for flares 45 g 3    cholecalciferol, vitamin D3, 1,250 mcg (50,000 unit) capsule Take 1 capsule (50,000 Units total) by mouth once a week. 15 capsule 1    crisaborole (EUCRISA) 2 % Oint AAA bid prn.  Non-steroid.  Safe to use long-term. 60 g 3    cyclobenzaprine (FLEXERIL) 10 MG tablet Take 1 tablet (10 mg total) by mouth 2 (two) times daily as needed for Muscle spasms. 60 tablet 0    HYDROcodone-acetaminophen (NORCO) 7.5-325 mg per tablet Take 1 tablet by mouth 4 (four) times daily as needed.      hydrOXYchloroQUINE (PLAQUENIL) 200 mg tablet Take 1 tablet (200 mg total) by mouth 2 (two) times daily. 180 tablet 3     mg tablet Take 800 mg by mouth every 8 (eight) hours as needed.  0    ketoconazole (NIZORAL) 2 % cream Apply topically once daily. 30 g 2     levetiracetam XR (KEPPRA XR) 500 mg Tb24 24 hr tablet Take 4 tablets (2,000 mg total) by mouth once daily. 360 tablet 3    predniSONE (DELTASONE) 10 MG tablet Take 1 tablet (10 mg total) by mouth once daily. 90 tablet 1    semaglutide, weight loss, (WEGOVY) 0.5 mg/0.5 mL PnIj Inject 0.5 mg into the skin every 7 days. 2 mL 1    sertraline (ZOLOFT) 100 MG tablet TAKE 1 TABLET BY MOUTH EVERY DAY 90 tablet 0    terbinafine HCL (LAMISIL) 250 mg tablet Take 1 tablet (250 mg total) by mouth once daily. 30 tablet 0    fluticasone-salmeterol diskus inhaler 100-50 mcg Inhale 1 puff into the lungs 2 (two) times daily. Controller 1 each 11     No current facility-administered medications on file prior to visit.           Burn  The incident occurred more than 1 week ago. The burns occurred at home. The burns occurred while cooking. The burns were a result of contact with a hot liquid. The burns are located on the left hand and right hand. The pain is mild. She has tried salve for the symptoms. The treatment provided mild relief.     Review of Systems   Constitutional:  Positive for unexpected weight change. Negative for activity change.   HENT:  Negative for hearing loss, rhinorrhea and trouble swallowing.    Eyes:  Positive for visual disturbance. Negative for discharge.   Respiratory:  Positive for chest tightness. Negative for wheezing.    Cardiovascular:  Negative for chest pain and palpitations.   Gastrointestinal:  Negative for blood in stool, constipation, diarrhea and vomiting.   Endocrine: Positive for polydipsia and polyuria.   Genitourinary:  Negative for difficulty urinating, dysuria, hematuria and menstrual problem.   Musculoskeletal:  Negative for arthralgias, joint swelling and neck pain.   Neurological:  Negative for weakness and headaches.   Psychiatric/Behavioral:  Positive for dysphoric mood. Negative for confusion.        Objective:      Physical Exam  Constitutional:       Appearance: She is obese.   HENT:       Head: Normocephalic.      Right Ear: External ear normal.      Left Ear: External ear normal.      Mouth/Throat:      Mouth: Mucous membranes are dry.   Eyes:      Extraocular Movements: Extraocular movements intact.   Cardiovascular:      Rate and Rhythm: Normal rate.   Pulmonary:      Effort: Pulmonary effort is normal. No respiratory distress.   Musculoskeletal:         General: Normal range of motion.      Cervical back: Normal range of motion.   Skin:     Findings: Burn (scabbed, no drainage, no erythema) present.          Neurological:      Mental Status: She is alert and oriented to person, place, and time.   Psychiatric:         Behavior: Behavior normal.         Assessment:       1. Burn        Plan:   Burn  -     cephALEXin (KEFLEX) 500 MG capsule; Take 1 capsule (500 mg total) by mouth every 6 (six) hours. for 7 days  Dispense: 28 capsule; Refill: 0  -     mupirocin (BACTROBAN) 2 % ointment; Apply topically 3 (three) times daily.  Dispense: 1 g; Refill: 0      No follow-ups on file.

## 2023-09-08 DIAGNOSIS — R07.9 CHEST PAIN, UNSPECIFIED TYPE: Primary | ICD-10-CM

## 2023-09-10 NOTE — PROGRESS NOTES
Subjective:   Patient ID:  Zachary Lucia is a 47 y.o. female who presents for follow-up of No chief complaint on file.  Intermittent mild chest discomfort some of the typical and atypical.  Positive family history of coronary disease on her mother side.  Will go ahead with a stress test echo done recently was completely normal.  EKG showed nonspecific changes.     Overall patient is stable.  We would like to go ahead with a nuclear stress test as the regular stress test did show ST segment changes.  Patient has had no chest discomfort on this test.  Follow-up evaluation after nuclear stress test is performed.  Patient has a very high risk factor for family history of coronary disease.     The patient presents the office having intermittent chest discomfort.  Nuclear stress test showed inferior basal ischemia.  ST changes in inferior leads.  Patient continues intermittent symptoms.  Will go ahead with heart catheterization to rule out significant coronary disease.  Patient agreeable to this plan.     Clinically stable doing well this time no exertional symptoms chest pain shortness breath.  Overall feeling well.     Patient presents the office today clinically stable no exertional symptoms some mild chest discomfort but heart catheterization earlier in the year was normal.  Blood pressure slightly elevated on admission but now has resolved stable.  Patient doing well cholesterol profile is improved.  03/08/2023:   Overall patient is doing well.  All coronary evaluation showed no evidence of coronary disease.  No significant arrhythmias seen by Holter monitor.  Cardiac echo shows preserved LV function.  No evidence of right heart pressure involvement.    Patient suffers from restrictive lung disease and some of her pressures probably from that needs to be addressed with pulmonary inhalation treatments.    09/11/2023 overall this patient is doing well has some mild edema salt indiscretion and has  retained fluid over the summer.  Abnormal nuclear stress test 2022 heart catheterization January 2022 showed normal coronary anatomy normal heart function.  Overall the patient has otherwise been doing well no exertional symptoms.  Will plan on mild diuretics this morning will do BNP and lab tests for BUN creatinine and potassium level today follow-up evaluation several months will see how she does on current medications.        Review of Systems   Constitutional: Negative for chills, diaphoresis, night sweats, weight gain and weight loss.   HENT:  Negative for congestion, hoarse voice, sore throat and stridor.    Eyes:  Negative for double vision and pain.   Cardiovascular:  Negative for chest pain, claudication, cyanosis, dyspnea on exertion, irregular heartbeat, leg swelling, near-syncope, orthopnea, palpitations, paroxysmal nocturnal dyspnea and syncope.   Respiratory:  Negative for cough, hemoptysis, shortness of breath, sleep disturbances due to breathing, snoring, sputum production and wheezing.    Endocrine: Negative for cold intolerance, heat intolerance and polydipsia.   Hematologic/Lymphatic: Negative for bleeding problem. Does not bruise/bleed easily.   Skin:  Negative for color change, dry skin and rash.   Musculoskeletal:  Negative for joint swelling and muscle cramps.   Gastrointestinal:  Negative for bloating, abdominal pain, constipation, diarrhea, dysphagia, melena, nausea and vomiting.   Genitourinary:  Negative for flank pain and urgency.   Neurological:  Negative for dizziness, focal weakness, headaches, light-headedness, loss of balance, seizures and weakness.   Psychiatric/Behavioral:  Negative for altered mental status and memory loss. The patient is not nervous/anxious.    Family History   Problem Relation Age of Onset    Arthritis Mother     Glaucoma Mother     Hypertension Mother     Diabetes Father     Diabetes Maternal Grandmother     Hypertension Maternal Grandmother     Arthritis  Maternal Grandmother     Cataracts Maternal Grandmother     Diabetes Maternal Grandfather     Heart disease Maternal Grandfather     Hypertension Maternal Grandfather     Diabetes Paternal Grandmother     Heart disease Paternal Grandmother     Hypertension Paternal Grandmother     Heart disease Paternal Grandfather     Cancer Maternal Uncle      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    Scleroderma     Seizures 01/12/2017     Social History     Socioeconomic History    Marital status:      Spouse name: Darrell    Number of children: 0   Tobacco Use    Smoking status: Never     Passive exposure: Never    Smokeless tobacco: Never   Substance and Sexual Activity    Alcohol use: Yes     Comment: OCC    Drug use: No    Sexual activity: Yes     Current Outpatient Medications on File Prior to Visit   Medication Sig Dispense Refill    azaTHIOprine (IMURAN) 100 mg tablet Take 1 tablet (100 mg total) by mouth once daily. 90 tablet 1    azaTHIOprine (IMURAN) 50 mg Tab Take 1 tablet (50 mg total) by mouth once daily. 90 tablet 1    belimumab (BENLYSTA) 200 mg/mL Syrg INJECT 1 SYRINGE UNDER THE SKIN EVERY 7 DAYS. 4 each 3    betamethasone dipropionate (DIPROLENE) 0.05 % ointment Apply topically 2 (two) times daily as needed. Steroid. Use for flares 45 g 3    cephALEXin (KEFLEX) 500 MG capsule Take 1 capsule (500 mg total) by mouth every 6 (six) hours. for 7 days 28 capsule 0    cholecalciferol, vitamin D3, 1,250 mcg (50,000 unit) capsule Take 1 capsule (50,000 Units total) by mouth once a week. 15 capsule 1    crisaborole (EUCRISA) 2 % Oint AAA bid prn.  Non-steroid.  Safe to use long-term. 60 g 3    cyclobenzaprine (FLEXERIL) 10 MG tablet Take 1 tablet (10 mg total) by mouth 2 (two) times daily as needed for Muscle spasms. 60 tablet 0     fluticasone-salmeterol diskus inhaler 100-50 mcg Inhale 1 puff into the lungs 2 (two) times daily. Controller 1 each 11    HYDROcodone-acetaminophen (NORCO) 7.5-325 mg per tablet Take 1 tablet by mouth 4 (four) times daily as needed.      hydrOXYchloroQUINE (PLAQUENIL) 200 mg tablet Take 1 tablet (200 mg total) by mouth 2 (two) times daily. 180 tablet 3     mg tablet Take 800 mg by mouth every 8 (eight) hours as needed.  0    ketoconazole (NIZORAL) 2 % cream Apply topically once daily. 30 g 2    levetiracetam XR (KEPPRA XR) 500 mg Tb24 24 hr tablet Take 4 tablets (2,000 mg total) by mouth once daily. 360 tablet 3    mupirocin (BACTROBAN) 2 % ointment Apply topically 3 (three) times daily. 1 g 0    predniSONE (DELTASONE) 10 MG tablet Take 1 tablet (10 mg total) by mouth once daily. 90 tablet 1    semaglutide, weight loss, (WEGOVY) 0.5 mg/0.5 mL PnIj Inject 0.5 mg into the skin every 7 days. 2 mL 1    sertraline (ZOLOFT) 100 MG tablet TAKE 1 TABLET BY MOUTH EVERY DAY 90 tablet 0    terbinafine HCL (LAMISIL) 250 mg tablet Take 1 tablet (250 mg total) by mouth once daily. 30 tablet 0     No current facility-administered medications on file prior to visit.     Review of patient's allergies indicates:   Allergen Reactions    Lisinopril      cough    Sulfa (sulfonamide antibiotics) Swelling       Objective:     Physical Exam  Eyes:      Pupils: Pupils are equal, round, and reactive to light.   Neck:      Trachea: No tracheal deviation.   Cardiovascular:      Rate and Rhythm: Normal rate and regular rhythm.      Pulses: Intact distal pulses.           Carotid pulses are 2+ on the right side and 2+ on the left side.       Radial pulses are 2+ on the right side and 2+ on the left side.        Femoral pulses are 2+ on the right side and 2+ on the left side.       Popliteal pulses are 2+ on the right side and 2+ on the left side.        Dorsalis pedis pulses are 2+ on the right side and 2+ on the left side.         Posterior tibial pulses are 2+ on the right side and 2+ on the left side.      Heart sounds: Normal heart sounds. No murmur heard.     No friction rub. No gallop.   Pulmonary:      Effort: Pulmonary effort is normal. No respiratory distress.      Breath sounds: Normal breath sounds. No stridor. No wheezing or rales.   Chest:      Chest wall: No tenderness.   Abdominal:      General: There is no distension.      Tenderness: There is no abdominal tenderness. There is no rebound.   Musculoskeletal:         General: No tenderness.      Cervical back: Normal range of motion.   Skin:     General: Skin is warm and dry.   Neurological:      Mental Status: She is alert and oriented to person, place, and time.   EKG shows normal sinus rhythm within normal limits.This EKG was personally reviewed by myself, I agree with the interpretation.  Assessment:     1. Chest pain, unspecified type    2. Essential hypertension    3. Scleroderma    4. Sepsis, due to unspecified organism, unspecified whether acute organ dysfunction present    5. Abnormal stress test    6. Pneumonia due to COVID-19 virus    7. Lupus nephritis, ISN/RPS class V    8. Respiratory distress    9. Sjogren's syndrome with keratoconjunctivitis sicca    10. Pain in extremity, unspecified extremity    11. COVID-19    12. SOB (shortness of breath)        Plan:     Chest pain, unspecified type    Essential hypertension    Scleroderma    Sepsis, due to unspecified organism, unspecified whether acute organ dysfunction present    Abnormal stress test    Pneumonia due to COVID-19 virus    Lupus nephritis, ISN/RPS class V    Respiratory distress    Sjogren's syndrome with keratoconjunctivitis sicca    Pain in extremity, unspecified extremity    COVID-19    SOB (shortness of breath)    Impression 1 peripheral edema will treat with diuretics lab tests today including BNP   2. Mild chest discomfort atypical heart catheterization last year was normal.  Follow-up evaluation again  if necessary normal EKG will re-evaluate as I think mostly this is atypical similar pains that she is had in the past.    Patient call if she has more advanced symptoms.  Will watch for peripheral edema and lab tests will be called back to the patient today.

## 2023-09-11 ENCOUNTER — OFFICE VISIT (OUTPATIENT)
Dept: CARDIOLOGY | Facility: CLINIC | Age: 48
End: 2023-09-11
Payer: COMMERCIAL

## 2023-09-11 ENCOUNTER — HOSPITAL ENCOUNTER (OUTPATIENT)
Dept: CARDIOLOGY | Facility: HOSPITAL | Age: 48
Discharge: HOME | End: 2023-09-11
Attending: INTERNAL MEDICINE
Payer: COMMERCIAL

## 2023-09-11 VITALS
BODY MASS INDEX: 38.51 KG/M2 | HEIGHT: 66 IN | OXYGEN SATURATION: 100 % | WEIGHT: 239.63 LBS | HEART RATE: 47 BPM | DIASTOLIC BLOOD PRESSURE: 80 MMHG | SYSTOLIC BLOOD PRESSURE: 130 MMHG

## 2023-09-11 DIAGNOSIS — M35.01 SJOGREN'S SYNDROME WITH KERATOCONJUNCTIVITIS SICCA: ICD-10-CM

## 2023-09-11 DIAGNOSIS — U07.1 PNEUMONIA DUE TO COVID-19 VIRUS: ICD-10-CM

## 2023-09-11 DIAGNOSIS — R07.9 CHEST PAIN, UNSPECIFIED TYPE: ICD-10-CM

## 2023-09-11 DIAGNOSIS — R94.39 ABNORMAL STRESS TEST: ICD-10-CM

## 2023-09-11 DIAGNOSIS — A41.9 SEPSIS, DUE TO UNSPECIFIED ORGANISM, UNSPECIFIED WHETHER ACUTE ORGAN DYSFUNCTION PRESENT: ICD-10-CM

## 2023-09-11 DIAGNOSIS — R60.0 LOCALIZED EDEMA: Primary | ICD-10-CM

## 2023-09-11 DIAGNOSIS — U07.1 COVID-19: ICD-10-CM

## 2023-09-11 DIAGNOSIS — J12.82 PNEUMONIA DUE TO COVID-19 VIRUS: ICD-10-CM

## 2023-09-11 DIAGNOSIS — M32.14 LUPUS NEPHRITIS, ISN/RPS CLASS V: ICD-10-CM

## 2023-09-11 DIAGNOSIS — R06.02 SOB (SHORTNESS OF BREATH): ICD-10-CM

## 2023-09-11 DIAGNOSIS — M79.609 PAIN IN EXTREMITY, UNSPECIFIED EXTREMITY: ICD-10-CM

## 2023-09-11 DIAGNOSIS — R06.03 RESPIRATORY DISTRESS: ICD-10-CM

## 2023-09-11 DIAGNOSIS — I10 ESSENTIAL HYPERTENSION: ICD-10-CM

## 2023-09-11 DIAGNOSIS — M34.9 SCLERODERMA: ICD-10-CM

## 2023-09-11 PROCEDURE — 1159F MED LIST DOCD IN RCRD: CPT | Mod: CPTII,S$GLB,, | Performed by: INTERNAL MEDICINE

## 2023-09-11 PROCEDURE — 99999 PR PBB SHADOW E&M-EST. PATIENT-LVL IV: CPT | Mod: PBBFAC,,, | Performed by: INTERNAL MEDICINE

## 2023-09-11 PROCEDURE — 99214 PR OFFICE/OUTPT VISIT, EST, LEVL IV, 30-39 MIN: ICD-10-PCS | Mod: S$GLB,,, | Performed by: INTERNAL MEDICINE

## 2023-09-11 PROCEDURE — 93010 ELECTROCARDIOGRAM REPORT: CPT | Mod: ,,, | Performed by: INTERNAL MEDICINE

## 2023-09-11 PROCEDURE — 1160F PR REVIEW ALL MEDS BY PRESCRIBER/CLIN PHARMACIST DOCUMENTED: ICD-10-PCS | Mod: CPTII,S$GLB,, | Performed by: INTERNAL MEDICINE

## 2023-09-11 PROCEDURE — 3008F BODY MASS INDEX DOCD: CPT | Mod: CPTII,S$GLB,, | Performed by: INTERNAL MEDICINE

## 2023-09-11 PROCEDURE — 1159F PR MEDICATION LIST DOCUMENTED IN MEDICAL RECORD: ICD-10-PCS | Mod: CPTII,S$GLB,, | Performed by: INTERNAL MEDICINE

## 2023-09-11 PROCEDURE — 3079F PR MOST RECENT DIASTOLIC BLOOD PRESSURE 80-89 MM HG: ICD-10-PCS | Mod: CPTII,S$GLB,, | Performed by: INTERNAL MEDICINE

## 2023-09-11 PROCEDURE — 3079F DIAST BP 80-89 MM HG: CPT | Mod: CPTII,S$GLB,, | Performed by: INTERNAL MEDICINE

## 2023-09-11 PROCEDURE — 99214 OFFICE O/P EST MOD 30 MIN: CPT | Mod: S$GLB,,, | Performed by: INTERNAL MEDICINE

## 2023-09-11 PROCEDURE — 3075F SYST BP GE 130 - 139MM HG: CPT | Mod: CPTII,S$GLB,, | Performed by: INTERNAL MEDICINE

## 2023-09-11 PROCEDURE — 93005 ELECTROCARDIOGRAM TRACING: CPT

## 2023-09-11 PROCEDURE — 99999 PR PBB SHADOW E&M-EST. PATIENT-LVL IV: ICD-10-PCS | Mod: PBBFAC,,, | Performed by: INTERNAL MEDICINE

## 2023-09-11 PROCEDURE — 3075F PR MOST RECENT SYSTOLIC BLOOD PRESS GE 130-139MM HG: ICD-10-PCS | Mod: CPTII,S$GLB,, | Performed by: INTERNAL MEDICINE

## 2023-09-11 PROCEDURE — 3008F PR BODY MASS INDEX (BMI) DOCUMENTED: ICD-10-PCS | Mod: CPTII,S$GLB,, | Performed by: INTERNAL MEDICINE

## 2023-09-11 PROCEDURE — 1160F RVW MEDS BY RX/DR IN RCRD: CPT | Mod: CPTII,S$GLB,, | Performed by: INTERNAL MEDICINE

## 2023-09-11 PROCEDURE — 93010 EKG 12-LEAD: ICD-10-PCS | Mod: ,,, | Performed by: INTERNAL MEDICINE

## 2023-09-11 RX ORDER — FUROSEMIDE 20 MG/1
20 TABLET ORAL DAILY
Qty: 30 TABLET | Refills: 2 | Status: SHIPPED | OUTPATIENT
Start: 2023-09-11 | End: 2024-01-19 | Stop reason: SDUPTHER

## 2023-09-11 RX ORDER — FUROSEMIDE 20 MG/1
20 TABLET ORAL 2 TIMES DAILY
Qty: 60 TABLET | Refills: 11 | Status: SHIPPED | OUTPATIENT
Start: 2023-09-11 | End: 2023-09-11

## 2023-09-12 ENCOUNTER — TELEPHONE (OUTPATIENT)
Dept: CARDIOLOGY | Facility: CLINIC | Age: 48
End: 2023-09-12
Payer: COMMERCIAL

## 2023-09-12 NOTE — TELEPHONE ENCOUNTER
The patient has been notified of this information and all questions answered.      ----- Message from Elie Sumner MD sent at 9/11/2023  8:48 PM CDT -----  Stable labs  ----- Message -----  From: [facility]  Sent: 9/11/2023   4:11 PM CDT  To: Elie Sumner MD

## 2023-10-02 ENCOUNTER — OFFICE VISIT (OUTPATIENT)
Dept: SPORTS MEDICINE | Facility: CLINIC | Age: 48
End: 2023-10-02
Payer: COMMERCIAL

## 2023-10-02 VITALS — BODY MASS INDEX: 38.41 KG/M2 | HEIGHT: 66 IN | WEIGHT: 239 LBS

## 2023-10-02 DIAGNOSIS — M17.0 BILATERAL PRIMARY OSTEOARTHRITIS OF KNEE: Primary | ICD-10-CM

## 2023-10-02 PROCEDURE — 1160F PR REVIEW ALL MEDS BY PRESCRIBER/CLIN PHARMACIST DOCUMENTED: ICD-10-PCS | Mod: CPTII,S$GLB,, | Performed by: PHYSICIAN ASSISTANT

## 2023-10-02 PROCEDURE — 99999 PR PBB SHADOW E&M-EST. PATIENT-LVL IV: CPT | Mod: PBBFAC,,, | Performed by: PHYSICIAN ASSISTANT

## 2023-10-02 PROCEDURE — 1159F PR MEDICATION LIST DOCUMENTED IN MEDICAL RECORD: ICD-10-PCS | Mod: CPTII,S$GLB,, | Performed by: PHYSICIAN ASSISTANT

## 2023-10-02 PROCEDURE — 3008F BODY MASS INDEX DOCD: CPT | Mod: CPTII,S$GLB,, | Performed by: PHYSICIAN ASSISTANT

## 2023-10-02 PROCEDURE — 1159F MED LIST DOCD IN RCRD: CPT | Mod: CPTII,S$GLB,, | Performed by: PHYSICIAN ASSISTANT

## 2023-10-02 PROCEDURE — 99213 PR OFFICE/OUTPT VISIT, EST, LEVL III, 20-29 MIN: ICD-10-PCS | Mod: S$GLB,,, | Performed by: PHYSICIAN ASSISTANT

## 2023-10-02 PROCEDURE — 3008F PR BODY MASS INDEX (BMI) DOCUMENTED: ICD-10-PCS | Mod: CPTII,S$GLB,, | Performed by: PHYSICIAN ASSISTANT

## 2023-10-02 PROCEDURE — 99999 PR PBB SHADOW E&M-EST. PATIENT-LVL IV: ICD-10-PCS | Mod: PBBFAC,,, | Performed by: PHYSICIAN ASSISTANT

## 2023-10-02 PROCEDURE — 99213 OFFICE O/P EST LOW 20 MIN: CPT | Mod: S$GLB,,, | Performed by: PHYSICIAN ASSISTANT

## 2023-10-02 PROCEDURE — 1160F RVW MEDS BY RX/DR IN RCRD: CPT | Mod: CPTII,S$GLB,, | Performed by: PHYSICIAN ASSISTANT

## 2023-10-02 NOTE — PATIENT INSTRUCTIONS
Assessment:  Zachary Linyaa Lucia is a  47 y.o. 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 Will be starting a new job on 10/16 at [employer].   Bilateral OA    Encounter Diagnosis   Name Primary?    Bilateral primary osteoarthritis of knee Yes      Plan:  Bilateral knee visco start in 4 weeks    Follow-up: Visco or sooner if there are any problems between now and then.    Leave Review:   Google: Leave Google Review  Healthgrades: Leave Healthgrades Review    After Hours Number: [phone]

## 2023-10-02 NOTE — PROGRESS NOTES
Patient ID: Zachary Lucia  YOB: 1975  MRN: [id number]    Chief Complaint: Pain of the Right Knee and Pain of the Left Knee    Referred By: Previous patient    History of Present Illness: Zachary Lucia is a  47 y.o. 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 She rates her pain a 0 out of 10 today. She states the injections helped the pain.     Previous Visit: 05/22/23  Zachary is here today for her B knee f/u, s/p B visco w/ Fatemeh (10/25/22). She rates her pain as a 6/10 today. She reports relief from her visco injections that lasted about 4 months. She is interested in discussing re-ordering them.      HPI    Past Medical History:   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    Scleroderma     Seizures 01/12/2017     Past Surgical History:   Procedure Laterality Date    COLONOSCOPY N/A 1/12/2023    Procedure: COLONOSCOPY;  Surgeon: Supriya Hughes MD;  Location: [facility];  Service: Endoscopy;  Laterality: N/A;    ESOPHAGOGASTRODUODENOSCOPY N/A 1/12/2023    Procedure: EGD (ESOPHAGOGASTRODUODENOSCOPY);  Surgeon: Supriya Hughes MD;  Location: [facility];  Service: Endoscopy;  Laterality: N/A;    LEFT HEART CATHETERIZATION Left 1/25/2022    Procedure: CATHETERIZATION, HEART, LEFT;  Surgeon: Rocío Davidson MD;  Location: [facility]ATH [facility];  Service: Cardiology;  Laterality: Left;    RENAL BIOPSY  11/16/2018     Family History   Problem Relation Age of Onset    Arthritis Mother     Glaucoma Mother     Hypertension Mother     Diabetes Father     Diabetes Maternal Grandmother     Hypertension Maternal Grandmother     Arthritis Maternal  [facility]  [address] [facility]  [address] [address], [address]  Tel: [phone]    Patient is here for an acute visit     Patient with known chronic obstructive asthma and underlying diabetes mellitus  Started about 8 days ago of coughing  Coughing is productive of sputum  Has intermittent wheezing and congestion  Coughing is worsening  Cough   With discolored sputum  No fever or chills  This is associated with some sinus pressure, postnasal drip  Mild sore throat  No ear pain  No fever or chills  Patient with increase headache  Stems from the neck  The neck is tight  Notes tightness of the neck  Increased pain on movement  Has had increased stressors due to her parent's care  The pain then radiates to her head  The pain is a throbbing headache  Mostly from the neck to the occipital and parietal areas  There is no photosensitivity  Has had tension headaches in the past       Cough   Associated symptoms include headaches, rhinorrhea, shortness of breath and wheezing  Pertinent negatives include no chest pain, chills, fever or sore throat  Migraine    Associated symptoms include coughing, neck pain, rhinorrhea and sinus pressure  Pertinent negatives include no abdominal pain, fever, nausea or sore throat            -----------------------------  Review of Systems   Constitutional: Negative  Negative for activity change, appetite change, chills, diaphoresis, fatigue and fever  HENT: Positive for rhinorrhea, sinus pain and sinus pressure  Negative for congestion, facial swelling and sore throat  Eyes: Negative for discharge and itching  Respiratory: Positive for cough, shortness of breath and wheezing  Negative for apnea, chest tightness and stridor  Cardiovascular: Negative  Negative for chest pain and palpitations  Gastrointestinal: Negative  Negative for abdominal distention, abdominal pain, blood in stool, constipation, diarrhea and nausea  Genitourinary: Negative  Grandmother     Cataracts Maternal Grandmother     Diabetes Maternal Grandfather     Heart disease Maternal Grandfather     Hypertension Maternal Grandfather     Diabetes Paternal Grandmother     Heart disease Paternal Grandmother     Hypertension Paternal Grandmother     Heart disease Paternal Grandfather     Cancer Maternal Uncle      Social History     Socioeconomic History    Marital status:      Spouse name: Darrell    Number of children: 0   Tobacco Use    Smoking status: Never     Passive exposure: Never    Smokeless tobacco: Never   Substance and Sexual Activity    Alcohol use: Yes     Comment: OCC    Drug use: No    Sexual activity: Yes     Medication List with Changes/Refills   Current Medications    AZATHIOPRINE (IMURAN) 100 MG TABLET    Take 1 tablet (100 mg total) by mouth once daily.    AZATHIOPRINE (IMURAN) 50 MG TAB    Take 1 tablet (50 mg total) by mouth once daily.    BELIMUMAB (BENLYSTA) 200 MG/ML SYRG    INJECT 1 SYRINGE UNDER THE SKIN EVERY 7 DAYS.    BETAMETHASONE DIPROPIONATE (DIPROLENE) 0.05 % OINTMENT    Apply topically 2 (two) times daily as needed. Steroid. Use for flares    CHOLECALCIFEROL, VITAMIN D3, 1,250 MCG (50,000 UNIT) CAPSULE    Take 1 capsule (50,000 Units total) by mouth once a week.    CRISABOROLE (EUCRISA) 2 % OINT    AAA bid prn.  Non-steroid.  Safe to use long-term.    CYCLOBENZAPRINE (FLEXERIL) 10 MG TABLET    Take 1 tablet (10 mg total) by mouth 2 (two) times daily as needed for Muscle spasms.    FLUTICASONE-SALMETEROL DISKUS INHALER 100-50 MCG    Inhale 1 puff into the lungs 2 (two) times daily. Controller    FUROSEMIDE (LASIX) 20 MG TABLET    Take 1 tablet (20 mg total) by mouth once daily.    HYDROCODONE-ACETAMINOPHEN (NORCO) 7.5-325 MG PER TABLET    Take 1 tablet by mouth 4 (four) times daily as needed.    HYDROXYCHLOROQUINE (PLAQUENIL) 200 MG TABLET    Take 1 tablet (200 mg total) by mouth 2 (two) times daily.     MG TABLET    Take 800 mg by mouth every  Negative for difficulty urinating, dysuria, flank pain and frequency  Musculoskeletal: Positive for neck pain and neck stiffness  Neurological: Positive for headaches  Social Hx reviewed:    Social History     Social History    Marital status: Single     Spouse name: N/A    Number of children: N/A    Years of education: N/A     Occupational History    Not on file  Social History Main Topics    Smoking status: Current Every Day Smoker     Packs/day: 0 25     Years: 9 00     Types: Cigarettes    Smokeless tobacco: Never Used    Alcohol use Yes      Comment: 2/night    Drug use: No    Sexual activity: Not on file     Other Topics Concern    Not on file     Social History Narrative    No narrative on file       Past Medical History:   Diagnosis Date    Anemia     Cervical radiculopathy     CH[facility]congestive heart failure) [facility]     Chronic low back pain     Chronic obstructive asthma ([facility] ) 2/20/2018    Community acquired pneumonia     Diabetes mellitus ([facility] )     Diabetes mellitus with hyperglycemia ([facility]     Elevated liver enzymes     GERD (gastroesophageal reflux disease)     Paresthesia of upper extremity     Plantar fasciitis     Restless leg     Sexual dysfunction     Sleep apnea, obstructive     Tenosynovitis of finger     Tinea corporis     Weakness of upper extremity            Allergies   Allergen Reactions    Iron Dextran Swelling    Januvia [Sitagliptin] Shortness Of Breath    Jardiance [Empagliflozin] Shortness Of Breath    Latex Other (See Comments)     redness and sore  Adhesives    Clonazepam Other (See Comments)     Unknown reaction    Codeine Itching    Oxycodone-Acetaminophen Anxiety         Vitals:    03/23/18 1129   BP: 142/80   Pulse: 96   Resp: 16   Temp: 98 4 °F (36 9 °C)     Physical Exam   Constitutional: She appears well-developed and well-nourished  HENT:   Head: Normocephalic and atraumatic     Right Ear: Tympanic membrane, external ear and ear 8 (eight) hours as needed.    KETOCONAZOLE (NIZORAL) 2 % CREAM    Apply topically once daily.    LEVETIRACETAM XR (KEPPRA XR) 500 MG TB24 24 HR TABLET    Take 4 tablets (2,000 mg total) by mouth once daily.    MUPIROCIN (BACTROBAN) 2 % OINTMENT    Apply topically 3 (three) times daily.    PREDNISONE (DELTASONE) 10 MG TABLET    Take 1 tablet (10 mg total) by mouth once daily.    SEMAGLUTIDE, WEIGHT LOSS, (WEGOVY) 0.5 MG/0.5 ML PNIJ    Inject 0.5 mg into the skin every 7 days.    SERTRALINE (ZOLOFT) 100 MG TABLET    TAKE 1 TABLET BY MOUTH EVERY DAY    TERBINAFINE HCL (LAMISIL) 250 MG TABLET    Take 1 tablet (250 mg total) by mouth once daily.     Review of patient's allergies indicates:   Allergen Reactions    Lisinopril      cough    Sulfa (sulfonamide antibiotics) Swelling     Review of Systems   Constitutional: Negative for chills and fever.   HENT:  Negative for sore throat.    Eyes:  Negative for pain.   Cardiovascular:  Negative for chest pain and leg swelling.   Respiratory:  Negative for cough and shortness of breath.    Skin:  Negative for itching and rash.   Musculoskeletal:  Positive for arthritis, joint pain and myalgias.   Gastrointestinal:  Negative for abdominal pain, nausea and vomiting.   Genitourinary:  Negative for dysuria.   Neurological:  Negative for dizziness, numbness and paresthesias.       Physical Exam:   Body mass index is 38.58 kg/m².  There were no vitals filed for this visit.   GENERAL: Well appearing, appropriate for stated age, no acute distress.  CARDIOVASCULAR: Pulses regular by peripheral palpation.  PULMONARY: Respirations are even and non-labored.  NEURO: Awake, alert, and oriented x 3.  PSYCH: Mood & affect are appropriate.  HEENT: Head is normocephalic and atraumatic.  General    Nursing note and vitals reviewed.          Right Knee Exam   Right knee exam is normal.    Inspection   Effusion: absent    Tenderness   The patient is experiencing no tenderness.     Range of Motion  canal normal    Left Ear: Tympanic membrane, external ear and ear canal normal    Nose: No mucosal edema or rhinorrhea  Right sinus exhibits no maxillary sinus tenderness and no frontal sinus tenderness  Left sinus exhibits no maxillary sinus tenderness and no frontal sinus tenderness  Eyes: Conjunctivae, EOM and lids are normal  Pupils are equal, round, and reactive to light  Right eye exhibits no discharge and no exudate  Left eye exhibits no discharge and no exudate  Right conjunctiva is not injected  Right conjunctiva has no hemorrhage  Left conjunctiva is not injected  Left conjunctiva has no hemorrhage  Neck: Normal range of motion  Neck supple  Cardiovascular: Normal rate, regular rhythm and normal heart sounds  Pulmonary/Chest: Effort normal  She has wheezes  Abdominal: Soft  Bowel sounds are normal    Musculoskeletal:        Cervical back: She exhibits decreased range of motion, tenderness and spasm  She exhibits no swelling, no edema, no deformity, no laceration and no pain  Skin: Skin is warm and dry  Psychiatric: She has a normal mood and affect  Nursing note and vitals reviewed  Spasm of the upper trapezius bilaterally  Spasm  And tightness of the paracervical muscles of her neck  Problem List Items Addressed This Visit     Chronic obstructive asthma ([facility] ) - Primary    Relevant Medications    predniSONE 20 mg tablet      Other Visit Diagnoses     Acute bronchitis, unspecified organism        Relevant Medications    azithromycin (ZITHROMAX) 250 mg tablet    predniSONE 20 mg tablet    Tension headache        Relevant Medications    baclofen 10 mg tablet    Muscle spasms of neck        Relevant Medications    baclofen 10 mg tablet        Patient with acute bronchitis with underlying asthma  Due to worsening and duration of symptoms will treat with azithromycin and a course of steroids  Will add muscle relaxant to help with the tension headache and spasms of the neck    This is   Extension:  0   Flexion:  120     Tests   Ligament Examination   Lachman: normal (-1 to 2mm)   PCL-Posterior Drawer: normal (0 to 2mm)     MCL - Valgus: normal (0 to 2mm)  LCL - Varus: normal    Other   Sensation: normal    Left Knee Exam   Left knee exam is normal.    Inspection   Effusion: absent    Tenderness   The patient is experiencing no tenderness.     Range of Motion   Extension:  0   Flexion:  120     Tests   Stability   Lachman: normal (-1 to 2mm)   PCL-Posterior Drawer: normal (0 to 2mm)  MCL - Valgus: normal (0 to 2mm)  LCL - Varus: normal (0 to 2mm)    Other   Sensation: normal    Muscle Strength   Right Lower Extremity   Hip Abduction: 5/5   Quadriceps:  5/5   Hamstrin/5   Left Lower Extremity   Hip Abduction: 5/5   Quadriceps:  5/5   Hamstrin/5     Vascular Exam     Right Pulses  Dorsalis Pedis:      2+  Posterior Tibial:      2+        Left Pulses  Dorsalis Pedis:      2+  Posterior Tibial:      2+        All compartments are soft and compressible. Calf soft non-tender. Intact EHL, FHL, gastroc soleus, and tibialis anterior. Sensation intact to light touch in superficial peroneal, deep peroneal, tibial, sural, and saphenous nerve distributions. Foot warm and well perfused with capillary refill of less than 2 seconds and palpable pedal pulses.       Imaging:    X-Ray Foot Complete Right  Narrative: EXAM: XR FOOT COMPLETE 3 VIEW RIGHT    CLINICAL HISTORY: Right foot pain.    FINDINGS:  3 views right foot.  Mild hallux valgus.  No fracture is identified.  Joint alignment is anatomic.  No significant arthritic changes are present.  Impression:  No acute radiographic abnormality of the right foot.    Finalized on: 2023 10:27 AM By:  Germán Lutz MD  [facility]# [id number]      2023 10:29:21.871    [facility]    X-ray Knee Ortho Bilateral with Flexion  Order date: 3/24/2023  Authorizing: Kaya Ordoñez PA-C  Ordered by Kaya Ordoñez PA-C on 3/24/2023.     Narrative &  due to her multiple   Stressors in her family  She is to call next week if not improving    To call our office if any concerns/questions at [phone]  Impression    EXAM: XR KNEE ORTHO BILAT WITH FLEXION     CLINICAL HISTORY: Bilateral primary osteoarthritis of knee     TECHNIQUE: Bilateral knee x-ray 5 views     FINDINGS: No fracture is identified.  Joint alignment is anatomic.  No significant joint effusion identified.  Mild joint space loss of the right medial compartment and lateral patellofemoral compartment.  The remaining joint spaces appear relatively well preserved.  No erosive change identified.  No loose intra-articular osteochondral bodies or osteochondral defects identified.        Impression:     No acute findings.  Chronic findings, as above.     Finalized on: 3/24/2023 11:56 AM By:  Donnell Diehl MD  BR[facility]# [id number]      2023-03-24 11:58:40.872    [facility]       Relevant imaging results reviewed and interpreted by me, discussed with the patient and / or family today.     Other Tests:         Patient Instructions   Assessment:  Zachary Lucia is a  47 y.o. 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 Will be starting a new job on 10/16 at [email or web address].   Bilateral OA    Encounter Diagnosis   Name Primary?    Bilateral primary osteoarthritis of knee Yes      Plan:  Bilateral knee visco start in 4 weeks    Follow-up: Visco or sooner if there are any problems between now and then.    Leave Review:   Google: Leave Google Review  Healthgrades: Leave Healthgrades Review    After Hours Number: [phone]      Provider Note/Medical Decision Making:   MEDICAL NECESSITY FOR VISCOSUPPLEMENTATION: After thorough evaluation of the patient, I have determined that visco-supplementation is medically necessary. The patient has painful DJD of the knee with failure of conservative therapy including lifestyle modifications and rehabilitation exercises. Oral analgesis/NSAIDs have not adequately  controlled symptoms and there is radiographic evidence of joint space narrowing, subchondral sclerosis, and some early osteophytic changes Kellgren- Jeff grade 2 or greater, or in lack of radiographic evidence, there is arthroscopic or other evidence of chondrosis.      I discussed worrisome and red flag signs and symptoms with the patient. The patient expressed understanding and agreed to alert me immediately or to go to the emergency room if they experience any of these.   Treatment plan was developed with input from the patient/family, and they expressed understanding and agreement with the plan. All questions were answered today.        Disclaimer: This note was prepared using a voice recognition system and is likely to have sound alike errors within the text.

## 2023-10-30 ENCOUNTER — PATIENT MESSAGE (OUTPATIENT)
Dept: SPORTS MEDICINE | Facility: CLINIC | Age: 48
End: 2023-10-30
Payer: COMMERCIAL

## 2023-12-16 ENCOUNTER — OFFICE VISIT (OUTPATIENT)
Dept: URGENT CARE | Facility: CLINIC | Age: 48
End: 2023-12-16
Payer: COMMERCIAL

## 2023-12-16 VITALS
HEIGHT: 66 IN | OXYGEN SATURATION: 97 % | BODY MASS INDEX: 40.04 KG/M2 | TEMPERATURE: 99 F | HEART RATE: 81 BPM | DIASTOLIC BLOOD PRESSURE: 67 MMHG | WEIGHT: 249.13 LBS | SYSTOLIC BLOOD PRESSURE: 139 MMHG | RESPIRATION RATE: 18 BRPM

## 2023-12-16 DIAGNOSIS — J40 BRONCHITIS: Primary | ICD-10-CM

## 2023-12-16 DIAGNOSIS — R05.2 SUBACUTE COUGH: ICD-10-CM

## 2023-12-16 PROCEDURE — 99213 OFFICE O/P EST LOW 20 MIN: CPT | Mod: S$GLB,,,

## 2023-12-16 PROCEDURE — 99213 PR OFFICE/OUTPT VISIT, EST, LEVL III, 20-29 MIN: ICD-10-PCS | Mod: S$GLB,,,

## 2023-12-16 RX ORDER — PROMETHAZINE HYDROCHLORIDE AND DEXTROMETHORPHAN HYDROBROMIDE 6.25; 15 MG/5ML; MG/5ML
5 SYRUP ORAL NIGHTLY PRN
Qty: 118 ML | Refills: 0 | Status: SHIPPED | OUTPATIENT
Start: 2023-12-16 | End: 2023-12-26

## 2023-12-16 RX ORDER — BENZONATATE 200 MG/1
200 CAPSULE ORAL 3 TIMES DAILY PRN
Qty: 21 CAPSULE | Refills: 0 | Status: SHIPPED | OUTPATIENT
Start: 2023-12-16 | End: 2023-12-23

## 2023-12-16 NOTE — PROGRESS NOTES
"Subjective:      Patient ID: Zachary Lucia is a 48 y.o. female.    Vitals:  height is 5' 6" (1.676 m) and weight is 113 kg (249 lb 1.9 oz). Her oral temperature is 98.5 °F (36.9 °C). Her blood pressure is 139/67 and her pulse is 81. Her respiration is 18 and oxygen saturation is 97%.     Chief Complaint: Cough    47 y/o female presents today with a persistent dry cough she has had since before Thanksgiving when she had a URI. States URI symptoms had resolved and she was treating cough with Mucinex, theraflu, xycam, cough drops, Nyquil, whiskey, Hotty Toddy drinks but everything was ineffective. States she has not taken anything for the cough in 2 weeks. States cough is dry. States she has a hx of asthma but has not used any rescue inhalers "in years." Reports hx of bronchitis. States cough has gotten worse in the last 2 weeks. Denies sore throat, ear pain, sob, cp. States chest muscles and back muscles hurt from coughing. Allergic to sulfa abx, Lisinopril.     Cough  This is a new problem. The current episode started 1 to 4 weeks ago. The problem has been gradually worsening. The cough is Non-productive. Pertinent negatives include no chest pain, chills, ear congestion, ear pain, fever, headaches, heartburn, hemoptysis, myalgias, nasal congestion, postnasal drip, rash, rhinorrhea, sore throat, shortness of breath, sweats, weight loss or wheezing. Her past medical history is significant for asthma.       Constitution: Negative for chills and fever.   HENT:  Negative for ear pain, congestion, postnasal drip and sore throat.    Cardiovascular:  Negative for chest pain.   Respiratory:  Positive for cough and asthma. Negative for bloody sputum, COPD, shortness of breath, stridor and wheezing.    Gastrointestinal:  Negative for heartburn.   Musculoskeletal:  Negative for muscle ache.   Skin:  Negative for rash.   Allergic/Immunologic: Positive for asthma.   Neurological:  Negative for headaches.    "   Objective:     Vitals:    12/16/23 1711   BP: 139/67   Pulse: 81   Resp: 18   Temp: 98.5 °F (36.9 °C)       Physical Exam   Constitutional: She is oriented to person, place, and time. She appears well-developed. She is cooperative.  Non-toxic appearance. She does not appear ill. No distress.   HENT:   Head: Normocephalic and atraumatic.   Ears:   Right Ear: Hearing, tympanic membrane, external ear and ear canal normal. impacted cerumen  Left Ear: Hearing, tympanic membrane, external ear and ear canal normal. impacted cerumen  Nose: Nose normal. No mucosal edema, rhinorrhea or nasal deformity. No epistaxis. Right sinus exhibits no maxillary sinus tenderness and no frontal sinus tenderness. Left sinus exhibits no maxillary sinus tenderness and no frontal sinus tenderness.   Mouth/Throat: Uvula is midline, oropharynx is clear and moist and mucous membranes are normal. No trismus in the jaw. Normal dentition. No uvula swelling. No oropharyngeal exudate, posterior oropharyngeal edema or posterior oropharyngeal erythema.   Eyes: Conjunctivae and lids are normal. Pupils are equal, round, and reactive to light. Right eye exhibits no discharge. Left eye exhibits no discharge. No scleral icterus.   Neck: Trachea normal and phonation normal. Neck supple. No edema present. No erythema present. No neck rigidity present.   Cardiovascular: Normal rate, regular rhythm, normal heart sounds and normal pulses.   Pulmonary/Chest: Effort normal and breath sounds normal. No accessory muscle usage or stridor. No respiratory distress. She has no decreased breath sounds. She has no wheezes. She has no rhonchi. She has no rales.   No evidence of respiratory distress noted, able to speak in complete sentences without pause; no wheezing, rales, or rhonchi appreciated; O2 sat 97% on RA; low suspicion of pneumonia           Comments: No evidence of respiratory distress noted, able to speak in complete sentences without pause; no wheezing,  rales, or rhonchi appreciated; O2 sat 97% on RA; low suspicion of pneumonia      Abdominal: Normal appearance.   Musculoskeletal: Normal range of motion.         General: No deformity. Normal range of motion.   Neurological: She is alert and oriented to person, place, and time. She displays no weakness. She exhibits normal muscle tone. Coordination and gait normal.   Skin: Skin is warm, dry, intact, not diaphoretic, not pale and no rash.   Psychiatric: Her speech is normal and behavior is normal. Judgment and thought content normal.   Nursing note and vitals reviewed.      Assessment:     1. Bronchitis    2. Subacute cough        Plan:       Bronchitis    Subacute cough  -     benzonatate (TESSALON) 200 MG capsule; Take 1 capsule (200 mg total) by mouth 3 (three) times daily as needed for Cough.  Dispense: 21 capsule; Refill: 0  -     promethazine-dextromethorphan (PROMETHAZINE-DM) 6.25-15 mg/5 mL Syrp; Take 5 mLs by mouth nightly as needed (cough).  Dispense: 118 mL; Refill: 0        Patient Instructions   PLEASE READ YOUR DISCHARGE INSTRUCTIONS ENTIRELY AS IT CONTAINS IMPORTANT INFORMATION.      - Please drink plenty of fluids.  - Please get plenty of rest.  - You can take plain Mucinex (guaifenesin) 1200 mg twice a day to help loosen mucous.   - Use over the counter Flonase as directed  Please return here or go to the Emergency Department for any concerns or worsening of condition.  - Please take an over the counter antihistamine medication (Allegra/Claritin/Zyrtec/Xyzal) of your choice as directed. These are antihistamines that can help with runny nose, nasal congestion, sneezing, and helps to dry up post-nasal drip, which usually causes sore throat and cough.    -If you do NOT have high blood pressure, you may use a decongestant form (D)  of this medication (ie. Claritin- D, zyrtec-D, allegra-D, Mucinex-D) or if you do not take the D form, you can take sudafed (pseudoephedrine) over the counter, which is a  decongestant. Do NOT take two decongestant (D) medications at the same time (such as mucinex-D and claritin-D or plain sudafed and claritin D). Dextromethorphan (DM) is a cough suppressant over the counter (ie. mucinex DM, robitussin, delsym; dayquil/nyquil has DM as well.)    If you do have Hypertension or palpitations, it is safe to take Coricidin HBP for relief of sinus symptoms.    - If not allergic, please take over the counter Tylenol (Acetaminophen) and/or Motrin (Ibuprofen) as directed for control of pain and/or fever.  Avoid tylenol if you have a history of liver disease. Do not take ibuprofen if you have a history of GI bleeding, kidney disease, or if you take blood thinners.  Please follow up with your primary care doctor or specialist as needed.    -IF YOU RECEIVED PRESCRIPTION COUGH SUPPRESSANTS: Take prescription cough meds (pills) as prescribed; take prescription cough syrup at night as needed for cough.  Do not take both the prescribed cough pills and syrup at the same time or within 6 hours of each other.  Do not take the cough syrup with any other sedative medication as it can can cause drowsiness. Do not operate any heavy machinery, drink or drive while taking the cough syrup.    Try taking half a dose first of the cough syrup to see how it affects you.     Sore throat recommendations: Warm fluids, warm salt water gargles, throat lozenges, tea, honey, soup, rest, hydration.    Use over the counter flonase: one spray each nostril twice daily OR two sprays each nostril once daily for sinus congestion and postnasal drip. This is a steroid nasal spray that works locally over time to decrease the inflammation in your nose/sinuses and help with allergic symptoms. This is not an quick- relief spray like afrin, but it works well if used daily.  Discontinue if you develop nose bleed    Sinus rinses DO NOT USE TAP WATER, if you must, water must be a rolling boil for 1 minute, let it cool, then use.  May use  distilled water, or over the counter nasal saline rinses.  Vics vapor rub in shower to help open nasal passages.  May use nasal gel to keep passages moisturized.  May use Nasal saline sprays during the day for added relief of congestion.   For those who go to the gym, please do not use the sauna or steam room now to clear sinuses.    If you  smoke, please stop smoking.      Please return or see your primary care doctor if you develop new or worsening symptoms.     Please arrange follow up with your primary medical clinic as soon as possible. You must understand that you've received an Urgent Care treatment only and that you may be released before all of your medical problems are known or treated. You, the patient, will arrange for follow up as instructed. If your symptoms worsen or fail to improve you should go to the Emergency Room.      Medical Decision Making:   History:   Old Medical Records: I decided to obtain old medical records.  Urgent Care Management:  Discussed physical exam findings with patient. Given presentation and normal lung sounds, CXR is not warranted at this time. Do not suspect asthma exacerbation. Suspect viral bronchitis. Patient has a refill of prednisone at pharmacy she has not picked up. Discussed OTC medications to treat coughing but prescribed cough syrup and Tessalon pearls as well to be started after trying prednisone for 5 days. Explained side effects of steroids and cough syrup. Patient agreeable to plan and wants to proceed. She leaves in NAD, VSS, afebrile.     Additional MDM:     Heart Failure Score:   COPD = No

## 2023-12-17 DIAGNOSIS — M34.9 SCLERODERMA: ICD-10-CM

## 2023-12-17 DIAGNOSIS — M35.01 SJOGREN'S SYNDROME WITH KERATOCONJUNCTIVITIS SICCA: ICD-10-CM

## 2023-12-17 DIAGNOSIS — M32.14 LUPUS NEPHRITIS, ISN/RPS CLASS V: ICD-10-CM

## 2023-12-18 RX ORDER — PREDNISONE 10 MG/1
10 TABLET ORAL
Qty: 90 TABLET | Refills: 1 | Status: SHIPPED | OUTPATIENT
Start: 2023-12-18

## 2024-01-19 ENCOUNTER — LAB VISIT (OUTPATIENT)
Dept: LAB | Facility: HOSPITAL | Age: 49
End: 2024-01-19
Attending: PHYSICIAN ASSISTANT
Payer: COMMERCIAL

## 2024-01-19 ENCOUNTER — OFFICE VISIT (OUTPATIENT)
Dept: CARDIOLOGY | Facility: CLINIC | Age: 49
End: 2024-01-19
Payer: COMMERCIAL

## 2024-01-19 ENCOUNTER — OFFICE VISIT (OUTPATIENT)
Dept: INTERNAL MEDICINE | Facility: CLINIC | Age: 49
End: 2024-01-19
Payer: COMMERCIAL

## 2024-01-19 VITALS
HEIGHT: 66 IN | SYSTOLIC BLOOD PRESSURE: 136 MMHG | HEART RATE: 83 BPM | BODY MASS INDEX: 40.53 KG/M2 | SYSTOLIC BLOOD PRESSURE: 132 MMHG | HEART RATE: 87 BPM | RESPIRATION RATE: 18 BRPM | WEIGHT: 251.75 LBS | OXYGEN SATURATION: 98 % | TEMPERATURE: 97 F | WEIGHT: 252.19 LBS | OXYGEN SATURATION: 99 % | DIASTOLIC BLOOD PRESSURE: 86 MMHG | DIASTOLIC BLOOD PRESSURE: 80 MMHG | BODY MASS INDEX: 40.46 KG/M2 | HEIGHT: 66 IN

## 2024-01-19 DIAGNOSIS — R05.9 COUGH, UNSPECIFIED TYPE: ICD-10-CM

## 2024-01-19 DIAGNOSIS — M34.9 SCLERODERMA: ICD-10-CM

## 2024-01-19 DIAGNOSIS — M32.14 LUPUS NEPHRITIS, ISN/RPS CLASS V: ICD-10-CM

## 2024-01-19 DIAGNOSIS — R60.0 LOCALIZED EDEMA: ICD-10-CM

## 2024-01-19 DIAGNOSIS — J12.82 PNEUMONIA DUE TO COVID-19 VIRUS: ICD-10-CM

## 2024-01-19 DIAGNOSIS — M35.01 SJOGREN'S SYNDROME WITH KERATOCONJUNCTIVITIS SICCA: ICD-10-CM

## 2024-01-19 DIAGNOSIS — R06.02 SOB (SHORTNESS OF BREATH): ICD-10-CM

## 2024-01-19 DIAGNOSIS — D50.8 IRON DEFICIENCY ANEMIA SECONDARY TO INADEQUATE DIETARY IRON INTAKE: ICD-10-CM

## 2024-01-19 DIAGNOSIS — Z00.00 ANNUAL PHYSICAL EXAM: ICD-10-CM

## 2024-01-19 DIAGNOSIS — R06.03 RESPIRATORY DISTRESS: ICD-10-CM

## 2024-01-19 DIAGNOSIS — R07.9 CHEST PAIN, UNSPECIFIED TYPE: Primary | ICD-10-CM

## 2024-01-19 DIAGNOSIS — I10 ESSENTIAL HYPERTENSION: ICD-10-CM

## 2024-01-19 DIAGNOSIS — U07.1 PNEUMONIA DUE TO COVID-19 VIRUS: ICD-10-CM

## 2024-01-19 DIAGNOSIS — F41.1 GENERALIZED ANXIETY DISORDER: ICD-10-CM

## 2024-01-19 DIAGNOSIS — A41.9 SEPSIS, DUE TO UNSPECIFIED ORGANISM, UNSPECIFIED WHETHER ACUTE ORGAN DYSFUNCTION PRESENT: ICD-10-CM

## 2024-01-19 DIAGNOSIS — U07.1 COVID-19: ICD-10-CM

## 2024-01-19 DIAGNOSIS — R94.39 ABNORMAL STRESS TEST: ICD-10-CM

## 2024-01-19 DIAGNOSIS — R09.82 POST-NASAL DRIP: ICD-10-CM

## 2024-01-19 DIAGNOSIS — M79.609 PAIN IN EXTREMITY, UNSPECIFIED EXTREMITY: ICD-10-CM

## 2024-01-19 DIAGNOSIS — M35.09 SJOGREN'S SYNDROME WITH OTHER ORGAN INVOLVEMENT: ICD-10-CM

## 2024-01-19 DIAGNOSIS — Z00.00 ANNUAL PHYSICAL EXAM: Primary | ICD-10-CM

## 2024-01-19 PROCEDURE — 3008F BODY MASS INDEX DOCD: CPT | Mod: CPTII,S$GLB,, | Performed by: INTERNAL MEDICINE

## 2024-01-19 PROCEDURE — 99396 PREV VISIT EST AGE 40-64: CPT | Mod: S$GLB,,, | Performed by: PHYSICIAN ASSISTANT

## 2024-01-19 PROCEDURE — 80061 LIPID PANEL: CPT | Performed by: PHYSICIAN ASSISTANT

## 2024-01-19 PROCEDURE — 83036 HEMOGLOBIN GLYCOSYLATED A1C: CPT | Performed by: PHYSICIAN ASSISTANT

## 2024-01-19 PROCEDURE — 83540 ASSAY OF IRON: CPT | Performed by: PHYSICIAN ASSISTANT

## 2024-01-19 PROCEDURE — 99999 PR PBB SHADOW E&M-EST. PATIENT-LVL V: CPT | Mod: PBBFAC,,, | Performed by: PHYSICIAN ASSISTANT

## 2024-01-19 PROCEDURE — 3079F DIAST BP 80-89 MM HG: CPT | Mod: CPTII,S$GLB,, | Performed by: INTERNAL MEDICINE

## 2024-01-19 PROCEDURE — 99999 PR PBB SHADOW E&M-EST. PATIENT-LVL IV: CPT | Mod: PBBFAC,,, | Performed by: INTERNAL MEDICINE

## 2024-01-19 PROCEDURE — 99214 OFFICE O/P EST MOD 30 MIN: CPT | Mod: S$GLB,,, | Performed by: INTERNAL MEDICINE

## 2024-01-19 PROCEDURE — 36415 COLL VENOUS BLD VENIPUNCTURE: CPT | Performed by: PHYSICIAN ASSISTANT

## 2024-01-19 PROCEDURE — 85025 COMPLETE CBC W/AUTO DIFF WBC: CPT | Performed by: PHYSICIAN ASSISTANT

## 2024-01-19 PROCEDURE — 82728 ASSAY OF FERRITIN: CPT | Performed by: PHYSICIAN ASSISTANT

## 2024-01-19 PROCEDURE — 1159F MED LIST DOCD IN RCRD: CPT | Mod: CPTII,S$GLB,, | Performed by: INTERNAL MEDICINE

## 2024-01-19 PROCEDURE — 1159F MED LIST DOCD IN RCRD: CPT | Mod: CPTII,S$GLB,, | Performed by: PHYSICIAN ASSISTANT

## 2024-01-19 PROCEDURE — 80053 COMPREHEN METABOLIC PANEL: CPT | Performed by: PHYSICIAN ASSISTANT

## 2024-01-19 PROCEDURE — 1160F RVW MEDS BY RX/DR IN RCRD: CPT | Mod: CPTII,S$GLB,, | Performed by: INTERNAL MEDICINE

## 2024-01-19 PROCEDURE — 84443 ASSAY THYROID STIM HORMONE: CPT | Performed by: PHYSICIAN ASSISTANT

## 2024-01-19 PROCEDURE — 3079F DIAST BP 80-89 MM HG: CPT | Mod: CPTII,S$GLB,, | Performed by: PHYSICIAN ASSISTANT

## 2024-01-19 PROCEDURE — 3075F SYST BP GE 130 - 139MM HG: CPT | Mod: CPTII,S$GLB,, | Performed by: INTERNAL MEDICINE

## 2024-01-19 PROCEDURE — 1160F RVW MEDS BY RX/DR IN RCRD: CPT | Mod: CPTII,S$GLB,, | Performed by: PHYSICIAN ASSISTANT

## 2024-01-19 PROCEDURE — 3008F BODY MASS INDEX DOCD: CPT | Mod: CPTII,S$GLB,, | Performed by: PHYSICIAN ASSISTANT

## 2024-01-19 PROCEDURE — 3075F SYST BP GE 130 - 139MM HG: CPT | Mod: CPTII,S$GLB,, | Performed by: PHYSICIAN ASSISTANT

## 2024-01-19 RX ORDER — MONTELUKAST SODIUM 10 MG/1
10 TABLET ORAL NIGHTLY
Qty: 30 TABLET | Refills: 0 | Status: SHIPPED | OUTPATIENT
Start: 2024-01-19 | End: 2024-02-12

## 2024-01-19 RX ORDER — FUROSEMIDE 20 MG/1
20 TABLET ORAL DAILY
Qty: 30 TABLET | Refills: 2 | Status: SHIPPED | OUTPATIENT
Start: 2024-01-19 | End: 2025-01-18

## 2024-01-19 RX ORDER — FLUTICASONE PROPIONATE 50 MCG
2 SPRAY, SUSPENSION (ML) NASAL DAILY
Qty: 9.9 ML | Refills: 0 | Status: SHIPPED | OUTPATIENT
Start: 2024-01-19 | End: 2024-02-12

## 2024-01-19 NOTE — PROGRESS NOTES
Subjective:   Patient ID:  Zachary Lucia is a 48 y.o. female who presents for follow-up of No chief complaint on file.    Intermittent mild chest discomfort some of the typical and atypical.  Positive family history of coronary disease on her mother side.  Will go ahead with a stress test echo done recently was completely normal.  EKG showed nonspecific changes.     Overall patient is stable.  We would like to go ahead with a nuclear stress test as the regular stress test did show ST segment changes.  Patient has had no chest discomfort on this test.  Follow-up evaluation after nuclear stress test is performed.  Patient has a very high risk factor for family history of coronary disease.     The patient presents the office having intermittent chest discomfort.  Nuclear stress test showed inferior basal ischemia.  ST changes in inferior leads.  Patient continues intermittent symptoms.  Will go ahead with heart catheterization to rule out significant coronary disease.  Patient agreeable to this plan.     Clinically stable doing well this time no exertional symptoms chest pain shortness breath.  Overall feeling well.     Patient presents the office today clinically stable no exertional symptoms some mild chest discomfort but heart catheterization earlier in the year was normal.  Blood pressure slightly elevated on admission but now has resolved stable.  Patient doing well cholesterol profile is improved.  03/08/2023:   Overall patient is doing well.  All coronary evaluation showed no evidence of coronary disease.  No significant arrhythmias seen by Holter monitor.  Cardiac echo shows preserved LV function.  No evidence of right heart pressure involvement.    Patient suffers from restrictive lung disease and some of her pressures probably from that needs to be addressed with pulmonary inhalation treatments.     09/11/2023 overall this patient is doing well has some mild edema salt indiscretion and has  retained fluid over the summer.  Abnormal nuclear stress test 2022 heart catheterization January 2022 showed normal coronary anatomy normal heart function.  Overall the patient has otherwise been doing well no exertional symptoms.  Will plan on mild diuretics this morning will do BNP and lab tests for BUN creatinine and potassium level today follow-up evaluation several months will see how she does on current medications.        01/19/2024 overall stable doing well no recurrence symptoms all medications reviewed renewed no changes today follow-up evaluation in 6 months sooner if acute changes.        Review of Systems   Constitutional: Negative for chills, diaphoresis, night sweats, weight gain and weight loss.   HENT:  Negative for congestion, hoarse voice, sore throat and stridor.    Eyes:  Negative for double vision and pain.   Cardiovascular:  Negative for chest pain, claudication, cyanosis, dyspnea on exertion, irregular heartbeat, leg swelling, near-syncope, orthopnea, palpitations, paroxysmal nocturnal dyspnea and syncope.   Respiratory:  Negative for cough, hemoptysis, shortness of breath, sleep disturbances due to breathing, snoring, sputum production and wheezing.    Endocrine: Negative for cold intolerance, heat intolerance and polydipsia.   Hematologic/Lymphatic: Negative for bleeding problem. Does not bruise/bleed easily.   Skin:  Negative for color change, dry skin and rash.   Musculoskeletal:  Negative for joint swelling and muscle cramps.   Gastrointestinal:  Negative for bloating, abdominal pain, constipation, diarrhea, dysphagia, melena, nausea and vomiting.   Genitourinary:  Negative for flank pain and urgency.   Neurological:  Negative for dizziness, focal weakness, headaches, light-headedness, loss of balance, seizures and weakness.   Psychiatric/Behavioral:  Negative for altered mental status and memory loss. The patient is not nervous/anxious.      Family History   Problem Relation Age of  Onset    Arthritis Mother     Glaucoma Mother     Hypertension Mother     Diabetes Mother     Heart disease Mother     Stroke Mother     Diabetes Father     Cancer Father     Diabetes Maternal Grandmother     Hypertension Maternal Grandmother     Arthritis Maternal Grandmother     Cataracts Maternal Grandmother     Diabetes Maternal Grandfather     Heart disease Maternal Grandfather     Hypertension Maternal Grandfather     Diabetes Paternal Grandmother     Heart disease Paternal Grandmother     Hypertension Paternal Grandmother     Asthma Paternal Grandmother     Heart disease Paternal Grandfather     Cancer Maternal Uncle     Hypertension Maternal Uncle     Cancer Maternal Uncle     Cancer Paternal Aunt     Cancer Paternal Aunt     Cancer Paternal Aunt     Heart disease Maternal Uncle     Hypertension Maternal Uncle      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    Scleroderma     Seizures 01/12/2017     Social History     Socioeconomic History    Marital status:      Spouse name: Darrell    Number of children: 0   Tobacco Use    Smoking status: Never     Passive exposure: Never    Smokeless tobacco: Never   Substance and Sexual Activity    Alcohol use: Not Currently     Alcohol/week: 2.0 standard drinks of alcohol     Types: 2 Glasses of wine per week     Comment: OCC    Drug use: No    Sexual activity: Yes     Partners: Male     Social Determinants of Health     Financial Resource Strain: Patient Declined (1/19/2024)    Overall Financial Resource Strain (CARDIA)     Difficulty of Paying Living Expenses: Patient declined   Food Insecurity: Patient Declined (1/19/2024)    Hunger Vital Sign     Worried About Running Out of Food in the Last Year: Patient declined     Ran Out of Food in the Last Year: Patient declined    Transportation Needs: Patient Declined (1/19/2024)    PRAPARE - Transportation     Lack of Transportation (Medical): Patient declined     Lack of Transportation (Non-Medical): Patient declined   Physical Activity: Insufficiently Active (1/19/2024)    Exercise Vital Sign     Days of Exercise per Week: 2 days     Minutes of Exercise per Session: 20 min   Stress: No Stress Concern Present (1/19/2024)    [employer] - Occupational Stress Questionnaire     Feeling of Stress : Only a little   Social Connections: Unknown (1/19/2024)    Social Connection and Isolation Panel [NHANES]     Frequency of Communication with Friends and Family: More than three times a week     Frequency of Social Gatherings with Friends and Family: Twice a week     Active Member of Clubs or Organizations: No     Attends Club or Organization Meetings: Never     Marital Status:    Housing Stability: Unknown (1/19/2024)    Housing Stability Vital Sign     Unable to Pay for Housing in the Last Year: Patient declined     Number of Places Lived in the Last Year: 1     Unstable Housing in the Last Year: No     Current Outpatient Medications on File Prior to Visit   Medication Sig Dispense Refill    azaTHIOprine (IMURAN) 100 mg tablet Take 1 tablet (100 mg total) by mouth once daily. 90 tablet 1    azaTHIOprine (IMURAN) 50 mg Tab Take 1 tablet (50 mg total) by mouth once daily. 90 tablet 1    belimumab (BENLYSTA) 200 mg/mL Syrg INJECT 1 SYRINGE UNDER THE SKIN EVERY 7 DAYS. 4 each 3    betamethasone dipropionate (DIPROLENE) 0.05 % ointment Apply topically 2 (two) times daily as needed. Steroid. Use for flares 45 g 3    cholecalciferol, vitamin D3, 1,250 mcg (50,000 unit) capsule Take 1 capsule (50,000 Units total) by mouth once a week. (Patient not taking: Reported on 12/16/2023) 15 capsule 1    crisaborole (EUCRISA) 2 % Oint AAA bid prn.  Non-steroid.  Safe to use long-term. 60 g 3    cyclobenzaprine (FLEXERIL) 10 MG  tablet Take 1 tablet (10 mg total) by mouth 2 (two) times daily as needed for Muscle spasms. 60 tablet 0    fluticasone-salmeterol diskus inhaler 100-50 mcg Inhale 1 puff into the lungs 2 (two) times daily. Controller 1 each 11    furosemide (LASIX) 20 MG tablet Take 1 tablet (20 mg total) by mouth once daily. 30 tablet 2    HYDROcodone-acetaminophen (NORCO) 7.5-325 mg per tablet Take 1 tablet by mouth 4 (four) times daily as needed.      hydrOXYchloroQUINE (PLAQUENIL) 200 mg tablet Take 1 tablet (200 mg total) by mouth 2 (two) times daily. 180 tablet 3     mg tablet Take 800 mg by mouth every 8 (eight) hours as needed.  0    ketoconazole (NIZORAL) 2 % cream Apply topically once daily. 30 g 2    levetiracetam XR (KEPPRA XR) 500 mg Tb24 24 hr tablet Take 4 tablets (2,000 mg total) by mouth once daily. 360 tablet 3    mupirocin (BACTROBAN) 2 % ointment Apply topically 3 (three) times daily. 1 g 0    predniSONE (DELTASONE) 10 MG tablet TAKE 1 TABLET BY MOUTH EVERY DAY 90 tablet 1    semaglutide, weight loss, (WEGOVY) 0.5 mg/0.5 mL PnIj Inject 0.5 mg into the skin every 7 days. (Patient not taking: Reported on 1/19/2024) 2 mL 1    [DISCONTINUED] sertraline (ZOLOFT) 100 MG tablet TAKE 1 TABLET BY MOUTH EVERY DAY (Patient not taking: Reported on 12/16/2023) 90 tablet 3     No current facility-administered medications on file prior to visit.     Review of patient's allergies indicates:   Allergen Reactions    Lisinopril      cough    Sulfa (sulfonamide antibiotics) Swelling       Objective:     Physical Exam  Eyes:      Pupils: Pupils are equal, round, and reactive to light.   Neck:      Trachea: No tracheal deviation.   Cardiovascular:      Rate and Rhythm: Normal rate and regular rhythm.      Pulses: Intact distal pulses.           Carotid pulses are 2+ on the right side and 2+ on the left side.       Radial pulses are 2+ on the right side and 2+ on the left side.        Femoral pulses are 2+ on the right side and  2+ on the left side.       Popliteal pulses are 2+ on the right side and 2+ on the left side.        Dorsalis pedis pulses are 2+ on the right side and 2+ on the left side.        Posterior tibial pulses are 2+ on the right side and 2+ on the left side.      Heart sounds: Normal heart sounds. No murmur heard.     No friction rub. No gallop.   Pulmonary:      Effort: Pulmonary effort is normal. No respiratory distress.      Breath sounds: Normal breath sounds. No stridor. No wheezing or rales.   Chest:      Chest wall: No tenderness.   Abdominal:      General: There is no distension.      Tenderness: There is no abdominal tenderness. There is no rebound.   Musculoskeletal:         General: No tenderness.      Cervical back: Normal range of motion.   Skin:     General: Skin is warm and dry.   Neurological:      Mental Status: She is alert and oriented to person, place, and time.         Assessment:     1. Chest pain, unspecified type    2. Abnormal stress test    3. Sjogren's syndrome with keratoconjunctivitis sicca    4. Pain in extremity, unspecified extremity    5. Localized edema        Plan:     Chest pain, unspecified type    Abnormal stress test    Sjogren's syndrome with keratoconjunctivitis sicca    Pain in extremity, unspecified extremity    Localized edema    Impression 1 localized edema stable no recurrence symptoms   2.  Negative heart catheterization last year doing well no acute changes follow-up evaluation in 6 months sooner if acute changes.  All questions answered today.

## 2024-01-19 NOTE — PROGRESS NOTES
"Subjective:      Patient ID: Zachary Lucia is a 48 y.o. female.    Chief Complaint: Annual Exam and Cough (X2 months, nothing is helping)    Patient is new to me, being seen today for annual.     ROSEANNA- zoloft 100mg   D/c zoloft a few months ago, doing well off medication    Previously prescribed wegovy for weight loss, took x1mth but increased dose was out of stock     Ongoing cough x2mths   Evaluated in Urgent Care, given cough syrup and tessalon perles   Previously with PND  Other treatment includes mucinex     Last visit July 2023 with PCP.       Review of Systems   Constitutional:  Negative for chills, diaphoresis and fever.   HENT:  Positive for postnasal drip. Negative for congestion, rhinorrhea and sore throat.    Respiratory:  Positive for cough. Negative for shortness of breath and wheezing.         H/o asthma, does not use regular inhaler  Non-smoker   Cardiovascular:  Negative for chest pain.   Gastrointestinal:  Negative for abdominal pain, constipation, diarrhea, nausea and vomiting.        Denies GERD symptoms   Skin:  Negative for rash.   Neurological:  Negative for dizziness, light-headedness and headaches.       Objective:   /86   Pulse 87   Temp 97.4 °F (36.3 °C) (Tympanic)   Resp 18   Ht 5' 6" (1.676 m)   Wt 114.2 kg (251 lb 12.3 oz)   SpO2 99%   BMI 40.64 kg/m²   Physical Exam  Constitutional:       General: She is not in acute distress.     Appearance: Normal appearance. She is well-developed. She is not ill-appearing.   HENT:      Head: Normocephalic and atraumatic.      Right Ear: Hearing, tympanic membrane, ear canal and external ear normal.      Left Ear: Hearing, tympanic membrane, ear canal and external ear normal.      Nose: Nose normal.      Mouth/Throat:      Mouth: Mucous membranes are moist.      Pharynx: Oropharynx is clear.   Cardiovascular:      Rate and Rhythm: Normal rate and regular rhythm.      Heart sounds: Normal heart sounds. No murmur " heard.  Pulmonary:      Effort: Pulmonary effort is normal. No respiratory distress.      Breath sounds: Normal breath sounds. No decreased breath sounds.   Musculoskeletal:      Right lower leg: No edema.      Left lower leg: No edema.   Skin:     General: Skin is warm and dry.      Findings: No rash.   Psychiatric:         Speech: Speech normal.         Behavior: Behavior normal.         Thought Content: Thought content normal.       Assessment:      1. Annual physical exam    2. Generalized anxiety disorder    3. Iron deficiency anemia secondary to inadequate dietary iron intake    4. Scleroderma    5. Sjogren's syndrome with other organ involvement    6. Lupus nephritis, ISN/RPS class V       Plan:   Annual physical exam  -     Comprehensive Metabolic Panel; Future; Expected date: 01/19/2024  -     Hemoglobin A1C; Future; Expected date: 01/19/2024  -     Lipid Panel; Future; Expected date: 01/19/2024  -     TSH; Future; Expected date: 01/19/2024    Generalized anxiety disorder   Stable     Iron deficiency anemia secondary to inadequate dietary iron intake  -     CBC Auto Differential; Future; Expected date: 01/19/2024  -     Iron and TIBC; Future; Expected date: 01/19/2024  -     Ferritin; Future; Expected date: 01/19/2024    Scleroderma   Followed by Rheum     Sjogren's syndrome with other organ involvement   Followed by Rheum     Lupus nephritis, ISN/RPS class V   Followed by Rheum     Trial of flonase and singulair  If no improvement, CXR and consider referral to Pulm, could be related to asthma and cold weather     Declines vaccines    Fasting labs     1yr f/u PCP

## 2024-01-20 LAB
ALBUMIN SERPL BCP-MCNC: 3.9 G/DL (ref 3.5–5.2)
ALP SERPL-CCNC: 49 U/L (ref 55–135)
ALT SERPL W/O P-5'-P-CCNC: 14 U/L (ref 10–44)
ANION GAP SERPL CALC-SCNC: 8 MMOL/L (ref 8–16)
AST SERPL-CCNC: 13 U/L (ref 10–40)
BASOPHILS # BLD AUTO: 0.02 K/UL (ref 0–0.2)
BASOPHILS NFR BLD: 0.4 % (ref 0–1.9)
BILIRUB SERPL-MCNC: 0.5 MG/DL (ref 0.1–1)
BUN SERPL-MCNC: 11 MG/DL (ref 6–20)
CALCIUM SERPL-MCNC: 9.4 MG/DL (ref 8.7–10.5)
CHLORIDE SERPL-SCNC: 104 MMOL/L (ref 95–110)
CHOLEST SERPL-MCNC: 172 MG/DL (ref 120–199)
CHOLEST/HDLC SERPL: 3.4 {RATIO} (ref 2–5)
CO2 SERPL-SCNC: 26 MMOL/L (ref 23–29)
CREAT SERPL-MCNC: 0.9 MG/DL (ref 0.5–1.4)
DIFFERENTIAL METHOD BLD: ABNORMAL
EOSINOPHIL # BLD AUTO: 0.3 K/UL (ref 0–0.5)
EOSINOPHIL NFR BLD: 5 % (ref 0–8)
ERYTHROCYTE [DISTWIDTH] IN BLOOD BY AUTOMATED COUNT: 15.1 % (ref 11.5–14.5)
EST. GFR  (NO RACE VARIABLE): >60 ML/MIN/1.73 M^2
ESTIMATED AVG GLUCOSE: 111 MG/DL (ref 68–131)
FERRITIN SERPL-MCNC: 13 NG/ML (ref 20–300)
GLUCOSE SERPL-MCNC: 84 MG/DL (ref 70–110)
HBA1C MFR BLD: 5.5 % (ref 4–5.6)
HCT VFR BLD AUTO: 35.9 % (ref 37–48.5)
HDLC SERPL-MCNC: 51 MG/DL (ref 40–75)
HDLC SERPL: 29.7 % (ref 20–50)
HGB BLD-MCNC: 10.6 G/DL (ref 12–16)
IMM GRANULOCYTES # BLD AUTO: 0.02 K/UL (ref 0–0.04)
IMM GRANULOCYTES NFR BLD AUTO: 0.4 % (ref 0–0.5)
IRON SERPL-MCNC: 63 UG/DL (ref 30–160)
LDLC SERPL CALC-MCNC: 96.8 MG/DL (ref 63–159)
LYMPHOCYTES # BLD AUTO: 1.9 K/UL (ref 1–4.8)
LYMPHOCYTES NFR BLD: 37.3 % (ref 18–48)
MCH RBC QN AUTO: 24.9 PG (ref 27–31)
MCHC RBC AUTO-ENTMCNC: 29.5 G/DL (ref 32–36)
MCV RBC AUTO: 84 FL (ref 82–98)
MONOCYTES # BLD AUTO: 0.5 K/UL (ref 0.3–1)
MONOCYTES NFR BLD: 9.4 % (ref 4–15)
NEUTROPHILS # BLD AUTO: 2.4 K/UL (ref 1.8–7.7)
NEUTROPHILS NFR BLD: 47.5 % (ref 38–73)
NONHDLC SERPL-MCNC: 121 MG/DL
NRBC BLD-RTO: 0 /100 WBC
PLATELET # BLD AUTO: 342 K/UL (ref 150–450)
PMV BLD AUTO: 10.1 FL (ref 9.2–12.9)
POTASSIUM SERPL-SCNC: 3.6 MMOL/L (ref 3.5–5.1)
PROT SERPL-MCNC: 7.8 G/DL (ref 6–8.4)
RBC # BLD AUTO: 4.26 M/UL (ref 4–5.4)
SATURATED IRON: 13 % (ref 20–50)
SODIUM SERPL-SCNC: 138 MMOL/L (ref 136–145)
TOTAL IRON BINDING CAPACITY: 484 UG/DL (ref 250–450)
TRANSFERRIN SERPL-MCNC: 327 MG/DL (ref 200–375)
TRIGL SERPL-MCNC: 121 MG/DL (ref 30–150)
TSH SERPL DL<=0.005 MIU/L-ACNC: 1.19 UIU/ML (ref 0.4–4)
WBC # BLD AUTO: 5.01 K/UL (ref 3.9–12.7)

## 2024-01-22 ENCOUNTER — PATIENT MESSAGE (OUTPATIENT)
Dept: INTERNAL MEDICINE | Facility: CLINIC | Age: 49
End: 2024-01-22
Payer: COMMERCIAL

## 2024-01-22 DIAGNOSIS — D50.8 IRON DEFICIENCY ANEMIA SECONDARY TO INADEQUATE DIETARY IRON INTAKE: Primary | ICD-10-CM

## 2024-02-07 DIAGNOSIS — M32.14 LUPUS NEPHRITIS, ISN/RPS CLASS V: ICD-10-CM

## 2024-02-08 RX ORDER — BELIMUMAB 200 MG/ML
SOLUTION SUBCUTANEOUS
Qty: 4 EACH | Refills: 1 | Status: SHIPPED | OUTPATIENT
Start: 2024-02-08 | End: 2024-02-27

## 2024-02-11 DIAGNOSIS — R09.82 POST-NASAL DRIP: ICD-10-CM

## 2024-02-11 DIAGNOSIS — R05.9 COUGH, UNSPECIFIED TYPE: ICD-10-CM

## 2024-02-12 RX ORDER — MONTELUKAST SODIUM 10 MG/1
10 TABLET ORAL NIGHTLY
Qty: 90 TABLET | Refills: 0 | Status: SHIPPED | OUTPATIENT
Start: 2024-02-12

## 2024-02-12 RX ORDER — FLUTICASONE PROPIONATE 50 MCG
2 SPRAY, SUSPENSION (ML) NASAL
Qty: 48 ML | Refills: 0 | Status: SHIPPED | OUTPATIENT
Start: 2024-02-12 | End: 2024-02-26

## 2024-02-19 ENCOUNTER — LAB VISIT (OUTPATIENT)
Dept: LAB | Facility: HOSPITAL | Age: 49
End: 2024-02-19
Attending: PHYSICIAN ASSISTANT
Payer: COMMERCIAL

## 2024-02-19 DIAGNOSIS — D50.8 IRON DEFICIENCY ANEMIA SECONDARY TO INADEQUATE DIETARY IRON INTAKE: ICD-10-CM

## 2024-02-19 LAB
BASOPHILS # BLD AUTO: 0.03 K/UL (ref 0–0.2)
BASOPHILS NFR BLD: 0.6 % (ref 0–1.9)
DIFFERENTIAL METHOD BLD: ABNORMAL
EOSINOPHIL # BLD AUTO: 0.2 K/UL (ref 0–0.5)
EOSINOPHIL NFR BLD: 4.4 % (ref 0–8)
ERYTHROCYTE [DISTWIDTH] IN BLOOD BY AUTOMATED COUNT: 15.2 % (ref 11.5–14.5)
FERRITIN SERPL-MCNC: 10 NG/ML (ref 20–300)
HCT VFR BLD AUTO: 33.2 % (ref 37–48.5)
HGB BLD-MCNC: 10.4 G/DL (ref 12–16)
IMM GRANULOCYTES # BLD AUTO: 0.02 K/UL (ref 0–0.04)
IMM GRANULOCYTES NFR BLD AUTO: 0.4 % (ref 0–0.5)
IRON SERPL-MCNC: 35 UG/DL (ref 30–160)
LYMPHOCYTES # BLD AUTO: 1.8 K/UL (ref 1–4.8)
LYMPHOCYTES NFR BLD: 33.9 % (ref 18–48)
MCH RBC QN AUTO: 25.3 PG (ref 27–31)
MCHC RBC AUTO-ENTMCNC: 31.3 G/DL (ref 32–36)
MCV RBC AUTO: 81 FL (ref 82–98)
MONOCYTES # BLD AUTO: 0.5 K/UL (ref 0.3–1)
MONOCYTES NFR BLD: 10 % (ref 4–15)
NEUTROPHILS # BLD AUTO: 2.7 K/UL (ref 1.8–7.7)
NEUTROPHILS NFR BLD: 50.7 % (ref 38–73)
NRBC BLD-RTO: 0 /100 WBC
PLATELET # BLD AUTO: 317 K/UL (ref 150–450)
PMV BLD AUTO: 9.2 FL (ref 9.2–12.9)
RBC # BLD AUTO: 4.11 M/UL (ref 4–5.4)
SATURATED IRON: 8 % (ref 20–50)
TOTAL IRON BINDING CAPACITY: 437 UG/DL (ref 250–450)
TRANSFERRIN SERPL-MCNC: 295 MG/DL (ref 200–375)
WBC # BLD AUTO: 5.28 K/UL (ref 3.9–12.7)

## 2024-02-19 PROCEDURE — 82728 ASSAY OF FERRITIN: CPT | Performed by: PHYSICIAN ASSISTANT

## 2024-02-19 PROCEDURE — 36415 COLL VENOUS BLD VENIPUNCTURE: CPT | Performed by: PHYSICIAN ASSISTANT

## 2024-02-19 PROCEDURE — 83540 ASSAY OF IRON: CPT | Performed by: PHYSICIAN ASSISTANT

## 2024-02-19 PROCEDURE — 85025 COMPLETE CBC W/AUTO DIFF WBC: CPT | Performed by: PHYSICIAN ASSISTANT

## 2024-02-20 ENCOUNTER — TELEPHONE (OUTPATIENT)
Dept: INTERNAL MEDICINE | Facility: CLINIC | Age: 49
End: 2024-02-20
Payer: COMMERCIAL

## 2024-02-20 NOTE — LETTER
February 20, 2024    Zachary Lucia  [address] [place] [id number]             [facility]  [address] [place] [address]  [place] [address]  Phone: [phone]  Fax: [phone] Dear Mrs. Aguilaron Khari:    Here are forms that was never picked up in Dr. Camara office.      If you have any questions or concerns, please don't hesitate to call.    Sincerely,        Danna Camara, DO

## 2024-02-21 DIAGNOSIS — D50.9 IRON DEFICIENCY ANEMIA, UNSPECIFIED IRON DEFICIENCY ANEMIA TYPE: Primary | ICD-10-CM

## 2024-02-26 ENCOUNTER — OFFICE VISIT (OUTPATIENT)
Dept: NEUROLOGY | Facility: CLINIC | Age: 49
End: 2024-02-26
Payer: COMMERCIAL

## 2024-02-26 ENCOUNTER — LAB VISIT (OUTPATIENT)
Dept: LAB | Facility: HOSPITAL | Age: 49
End: 2024-02-26
Attending: NURSE PRACTITIONER
Payer: COMMERCIAL

## 2024-02-26 VITALS
SYSTOLIC BLOOD PRESSURE: 129 MMHG | HEART RATE: 80 BPM | RESPIRATION RATE: 16 BRPM | BODY MASS INDEX: 40.22 KG/M2 | HEIGHT: 66 IN | DIASTOLIC BLOOD PRESSURE: 85 MMHG | WEIGHT: 250.25 LBS

## 2024-02-26 DIAGNOSIS — G40.409 EPILEPSY, GRAND MAL: ICD-10-CM

## 2024-02-26 DIAGNOSIS — R56.9 NONEPILEPTIC EPISODE: ICD-10-CM

## 2024-02-26 DIAGNOSIS — Z65.8 PSYCHOSOCIAL STRESSORS: ICD-10-CM

## 2024-02-26 DIAGNOSIS — F41.1 GENERALIZED ANXIETY DISORDER: ICD-10-CM

## 2024-02-26 DIAGNOSIS — R20.2 PARESTHESIAS: ICD-10-CM

## 2024-02-26 DIAGNOSIS — F41.1 GENERALIZED ANXIETY DISORDER: Primary | ICD-10-CM

## 2024-02-26 PROCEDURE — 1159F MED LIST DOCD IN RCRD: CPT | Mod: CPTII,S$GLB,, | Performed by: NURSE PRACTITIONER

## 2024-02-26 PROCEDURE — 99214 OFFICE O/P EST MOD 30 MIN: CPT | Mod: S$GLB,,, | Performed by: NURSE PRACTITIONER

## 2024-02-26 PROCEDURE — 3079F DIAST BP 80-89 MM HG: CPT | Mod: CPTII,S$GLB,, | Performed by: NURSE PRACTITIONER

## 2024-02-26 PROCEDURE — 3044F HG A1C LEVEL LT 7.0%: CPT | Mod: CPTII,S$GLB,, | Performed by: NURSE PRACTITIONER

## 2024-02-26 PROCEDURE — 99999 PR PBB SHADOW E&M-EST. PATIENT-LVL V: CPT | Mod: PBBFAC,,, | Performed by: NURSE PRACTITIONER

## 2024-02-26 PROCEDURE — 36415 COLL VENOUS BLD VENIPUNCTURE: CPT | Performed by: NURSE PRACTITIONER

## 2024-02-26 PROCEDURE — 3074F SYST BP LT 130 MM HG: CPT | Mod: CPTII,S$GLB,, | Performed by: NURSE PRACTITIONER

## 2024-02-26 PROCEDURE — 3008F BODY MASS INDEX DOCD: CPT | Mod: CPTII,S$GLB,, | Performed by: NURSE PRACTITIONER

## 2024-02-26 PROCEDURE — 1160F RVW MEDS BY RX/DR IN RCRD: CPT | Mod: CPTII,S$GLB,, | Performed by: NURSE PRACTITIONER

## 2024-02-26 PROCEDURE — 80177 DRUG SCRN QUAN LEVETIRACETAM: CPT | Performed by: NURSE PRACTITIONER

## 2024-02-26 RX ORDER — LEVETIRACETAM 500 MG/1
2000 TABLET, EXTENDED RELEASE ORAL DAILY
Qty: 360 TABLET | Refills: 3 | Status: SHIPPED | OUTPATIENT
Start: 2024-02-26

## 2024-02-26 NOTE — PATIENT INSTRUCTIONS
Danna Sena or Email me for a free 15 minute consultation now - [phone]    Danna Quintana    Licensed Professional Counselor, MEd, LPC-S

## 2024-02-26 NOTE — PROGRESS NOTES
Subjective:       Patient ID: Zachary Lucia is a 48 y.o. female.    Chief Complaint: Nonepilectic episode          HPI      The patient is here for seizure evaluation. The patient is unaccompanied.    The patient started having seizure in 2006. The patient describes aura of LT facial tingling. The patient is amnestic to the seizure after that. The seizure is described as grand mal seizure/GTC consisting of generalized tensing and muscle jerking with eyes deviated upwards with foamy secretions from the mouth, tongue biting and urine incontinence. The seizure lasts for 1-2 minutes followed by 30-60 minutes of confusion. No history of meningitis or encephalitis No toxic exposure or lead poisoning. No family history of epilepsy (paternal cousin had childhood epilepsy).  No history of strokes or aneurysmal bleeding. No history of TBI. She was started on  mg BID that was changed to LEV XR 1000 mg QHS. The patient continued to have seizures every 1-2 months. Of note, she was diagnosed with CTD in 2018 and has been on IST. She is concerned because she had 3 seizures over 4 days in .     The patient does complain of migraine without aura (throbbing, moderate-severe, light-noise sensitivity and nausea) that seems be sporadic and infrequent. Tried Fioricet, CCB (Amlodipine),  TCA (Elavil), SSRIS (Zoloft) and AEDs (GBP).     The patient also reports constant B/L hand numbness, pain and tingling and intermittent toes numbness and tingling since 2018. No history of DM or Thyroid disease. No excessive alcohol intake. Of note, she was diagnosed with CTD in 2018 and has been on IST. The patient was found to have low B12 and Vitamin D and stopped taking supplements. Lab [facility] [id number] showed NL HA1C, TFT, SPEP-IPEP , Low Vitamin D and Vitamin B12  and Positive AMAURY, SSA, SSB and RNP Antibodies.       Reports 1 seizure-like episodes on 08- that proceeded after an heated discussion with spouse. The  event described as non-epileptic event, no LOC, no tongue biting, no urinary incontinence. No new medication was added. Discussed AEEG results 07- THROUGH 07- AEEG  HOURS: SW, LT TL F7-T7, FOUR EVENTS (NON-EPILEPTIC-PSYCHOGENIC).  Patient reports work related stressors,  No tremors. No history of parkinsonism. No vomiting, diarrhea.           INTERVAL HISTORY 02-: The patient is present for follow up. Patient doing well. Patient continue to take Keppra XR 2000 mg po HS. NO GTC reported.    Patient continues to have non- epileptic episodes. She is not currently in CBT. Patient is interested in starting treatment.     Headache complaints unchanged low frequency.     Patient continue Vitamin B 12 replacement not change in paraesthesia complaints.           Review of Systems   Constitutional:  Positive for fatigue. Negative for appetite change.   HENT:  Negative for hearing loss and tinnitus.    Eyes:  Negative for photophobia and visual disturbance.   Respiratory:  Negative for apnea and shortness of breath.    Cardiovascular:  Negative for chest pain and palpitations.   Gastrointestinal:  Negative for nausea and vomiting.   Endocrine: Negative for cold intolerance and heat intolerance.   Genitourinary:  Negative for difficulty urinating and urgency.   Musculoskeletal:  Positive for arthralgias. Negative for back pain, gait problem, joint swelling, myalgias, neck pain and neck stiffness.   Skin:  Positive for color change. Negative for rash.   Allergic/Immunologic: Negative for environmental allergies and immunocompromised state.   Neurological:  Positive for seizures, numbness and headaches. Negative for dizziness, tremors, syncope, facial asymmetry, speech difficulty, weakness and light-headedness.   Hematological:  Negative for adenopathy. Does not bruise/bleed easily.   Psychiatric/Behavioral:  Negative for agitation, behavioral problems, confusion, decreased concentration, dysphoric  mood, hallucinations, self-injury, sleep disturbance and suicidal ideas. The patient is not hyperactive.                  Current Outpatient Medications:     azaTHIOprine (IMURAN) 100 mg tablet, Take 1 tablet (100 mg total) by mouth once daily., Disp: 90 tablet, Rfl: 1    azaTHIOprine (IMURAN) 50 mg Tab, Take 1 tablet (50 mg total) by mouth once daily., Disp: 90 tablet, Rfl: 1    belimumab (BENLYSTA) 200 mg/mL Syrg, INJECT 1 SYRINGE UNDER THE SKIN EVERY 7 DAYS., Disp: 4 each, Rfl: 1    cyclobenzaprine (FLEXERIL) 10 MG tablet, Take 1 tablet (10 mg total) by mouth 2 (two) times daily as needed for Muscle spasms., Disp: 60 tablet, Rfl: 0    furosemide (LASIX) 20 MG tablet, Take 1 tablet (20 mg total) by mouth once daily., Disp: 30 tablet, Rfl: 2    HYDROcodone-acetaminophen (NORCO) 7.5-325 mg per tablet, Take 1 tablet by mouth 4 (four) times daily as needed., Disp: , Rfl:     hydrOXYchloroQUINE (PLAQUENIL) 200 mg tablet, Take 1 tablet (200 mg total) by mouth 2 (two) times daily., Disp: 180 tablet, Rfl: 3     mg tablet, Take 800 mg by mouth every 8 (eight) hours as needed., Disp: , Rfl: 0    montelukast (SINGULAIR) 10 mg tablet, TAKE 1 TABLET BY MOUTH EVERY DAY IN THE EVENING, Disp: 90 tablet, Rfl: 0    mupirocin (BACTROBAN) 2 % ointment, Apply topically 3 (three) times daily., Disp: 1 g, Rfl: 0    predniSONE (DELTASONE) 10 MG tablet, TAKE 1 TABLET BY MOUTH EVERY DAY, Disp: 90 tablet, Rfl: 1    cholecalciferol, vitamin D3, 1,250 mcg (50,000 unit) capsule, Take 1 capsule (50,000 Units total) by mouth once a week. (Patient not taking: Reported on 2/26/2024), Disp: 15 capsule, Rfl: 1    fluticasone-salmeterol diskus inhaler 100-50 mcg, Inhale 1 puff into the lungs 2 (two) times daily. Controller, Disp: 1 each, Rfl: 11    levetiracetam XR (KEPPRA XR) 500 mg Tb24 24 hr tablet, Take 4 tablets (2,000 mg total) by mouth once daily., Disp: 360 tablet, Rfl: 3  Past Medical History:   Diagnosis Date    Abnormal stress test  1/25/2022    Asthma     Essential hypertension, malignant 1/8/2020    Lupus (systemic lupus erythematosus)     Membranous glomerulonephritis, 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    Scleroderma     Seizures 01/12/2017     Past Surgical History:   Procedure Laterality Date    COLONOSCOPY N/A 01/12/2023    Procedure: COLONOSCOPY;  Surgeon: Supriya Hughes MD;  Location: [facility] ENDO;  Service: Endoscopy;  Laterality: N/A;    ESOPHAGOGASTRODUODENOSCOPY N/A 01/12/2023    Procedure: EGD (ESOPHAGOGASTRODUODENOSCOPY);  Surgeon: Supriya Hughes MD;  Location: [facility] ENDO;  Service: Endoscopy;  Laterality: N/A;    LEFT HEART CATHETERIZATION Left 01/25/2022    Procedure: CATHETERIZATION, HEART, LEFT;  Surgeon: Rocío Davidson MD;  Location: [facility] CATH LAB;  Service: Cardiology;  Laterality: Left;    RENAL BIOPSY  11/16/2018     Social History     Socioeconomic History    Marital status:      Spouse name: Darrell    Number of children: 0   Tobacco Use    Smoking status: Never     Passive exposure: Never    Smokeless tobacco: Never   Substance and Sexual Activity    Alcohol use: Not Currently     Alcohol/week: 2.0 standard drinks of alcohol     Types: 2 Glasses of wine per week     Comment: OCC    Drug use: No    Sexual activity: Yes     Partners: Male     Social Determinants of Health     Financial Resource Strain: Patient Declined (1/19/2024)    Overall Financial Resource Strain (CARDIA)     Difficulty of Paying Living Expenses: Patient declined   Food Insecurity: Patient Declined (1/19/2024)    Hunger Vital Sign     Worried About Running Out of Food in the Last Year: Patient declined     Ran Out of Food in the Last Year: Patient declined   Transportation Needs: Patient Declined (1/19/2024)    PRAPARE - Transportation     Lack of Transportation (Medical): Patient declined     Lack of Transportation (Non-Medical): Patient declined   Physical Activity: Insufficiently Active (1/19/2024)    Exercise Vital Sign     Days of Exercise per Week: 2  days     Minutes of Exercise per Session: 20 min   Stress: No Stress Concern Present (1/19/2024)    [employer] - Occupational Stress Questionnaire     Feeling of Stress : Only a little   Social Connections: Unknown (1/19/2024)    Social Connection and Isolation Panel [NHANES]     Frequency of Communication with Friends and Family: More than three times a week     Frequency of Social Gatherings with Friends and Family: Twice a week     Active Member of Clubs or Organizations: No     Attends Club or Organization Meetings: Never     Marital Status:    Housing Stability: Unknown (1/19/2024)    Housing Stability Vital Sign     Unable to Pay for Housing in the Last Year: Patient declined     Number of Places Lived in the Last Year: 1     Unstable Housing in the Last Year: No             Past/Current Medical/Surgical History, Past/Current Social History, Past/Current Family History and Past/Current Medications were reviewed in detail.        Objective:           VITAL SIGNS WERE REVIEWED      GENERAL APPEARANCE:     The patient looks uncomfortable.    BMI 38.79    No signs of respiratory distress.    Normal breathing pattern.    No dysmorphic features    Normal eye contact.       GENERAL MEDICAL EXAM:    HEENT:  Head is atraumatic normocephalic.     FUNDOSCOPIC (OPHTHALMOSCOPIC) EXAMINATION showed no disc edema.      NECK: No JVD. No visible lesions or goiters.     CHEST-CARDIOPULMONARY: No cyanosis. No tachypnea. Normal respiratory effort.    ORMCRFZ-JFMBBVYOBCAODNEU-OCQGRBYHYY: No jaundice. No stomas or lesions. No visible hernias. No catheters.     SKIN, HAIR, NAILS: No pathognomonic skin rash.No neurofibromatosis. No visible lesions.No stigmata of autoimmune disease. No clubbing.    LIMBS: No varicose veins. No visible swelling.    MUSCULOSKELETAL: No visible deformities.No visible lesions.           Neurologic Exam     Mental Status   Oriented to person, place, and time.   Follows  3 step commands.   Attention: normal. Concentration: normal.   Speech: speech is normal   Level of consciousness: alert  Able to name object. Able to repeat. Normal comprehension.     Cranial Nerves   Cranial nerves II through XII intact.     CN II   Visual fields full to confrontation.   Visual acuity: normal  Right visual field deficit: none  Left visual field deficit: none     CN III, IV, VI   Pupils are equal, round, and reactive to light.  Extraocular motions are normal.   Right pupil: Size: 2 mm. Shape: regular. Reactivity: brisk. Consensual response: intact. Accommodation: intact.   Left pupil: Size: 2 mm. Shape: regular. Reactivity: brisk. Consensual response: intact. Accommodation: intact.   CN III: no CN III palsy  CN VI: no CN VI palsy  Nystagmus: none   Diplopia: none  Ophthalmoparesis: none  Upgaze: normal  Downgaze: normal  Conjugate gaze: present  Vestibulo-ocular reflex: present    CN V   Facial sensation intact.   Right facial sensation deficit: none  Left facial sensation deficit: none  Jaw jerk: normal    CN VII   Facial expression full, symmetric.   Right facial weakness: none  Left facial weakness: none    CN VIII   CN VIII normal.   Hearing: intact    CN IX, X   CN IX normal.   CN X normal.   Palate: symmetric    CN XI   CN XI normal.   Right sternocleidomastoid strength: normal  Left sternocleidomastoid strength: normal  Right trapezius strength: normal  Left trapezius strength: normal    CN XII   CN XII normal.   Tongue: not atrophic  Fasciculations: absent  Tongue deviation: none    Motor Exam   Muscle bulk: normal  Overall muscle tone: normal  Right arm tone: normal  Left arm tone: normal  Right arm pronator drift: absent  Left arm pronator drift: absent  Right leg tone: normal  Left leg tone: normal    Strength   Strength 5/5 throughout.   Right neck flexion: 5/5  Left neck flexion: 5/5  Right neck extension: 5/5  Left neck extension: 5/5  Right deltoid: 5/5  Left deltoid: 5/5  Right  biceps: 5/5  Left biceps: 5/5  Right triceps: 5/5  Left triceps: 5/5  Right wrist flexion: 5/5  Left wrist flexion: 5/5  Right wrist extension: 5/5  Left wrist extension: 5/5  Right interossei: 5/5  Left interossei: 5/5  Right iliopsoas: 5/5  Left iliopsoas: 5/5  Right quadriceps: 5/5  Left quadriceps: 5/5  Right hamstrin/5  Left hamstrin/5  Right glutei: 5/5  Left glutei: 5/5  Right anterior tibial: 5/5  Left anterior tibial: 5/5  Right posterior tibial: 5/5  Left posterior tibial: 5/5  Right peroneal: 5/5  Left peroneal: 5/5  Right gastroc: 5/5  Left gastroc: 55    Sensory Exam   Light touch normal.   Right arm light touch: normal  Left arm light touch: normal  Right leg light touch: normal  Left leg light touch: normal  Vibration normal.   Right arm vibration: normal  Left arm vibration: normal  Right leg vibration: normal  Left leg vibration: normal  Proprioception normal.   Right arm proprioception: normal  Left arm proprioception: normal  Right leg proprioception: normal  Left leg proprioception: normal  Pinprick normal.   Right arm pinprick: normal  Left arm pinprick: normal  Right leg pinprick: normal  Left leg pinprick: normal  Sensory deficit distribution on right: median  Sensory deficit distribution on left: median  Graphesthesia: normal  Stereognosis: normal    Gait, Coordination, and Reflexes     Gait  Gait: normal    Coordination   Romberg: negative  Finger to nose coordination: normal  Heel to shin coordination: normal  Tandem walking coordination: normal    Tremor   Resting tremor: absent  Intention tremor: absent  Action tremor: absent    Reflexes   Right brachioradialis: 2+  Left brachioradialis: 2+  Right biceps: 2+  Left biceps: 2+  Right triceps: 2+  Left triceps: 2+  Right patellar: 2+  Left patellar: 2+  Right achilles: 2+  Left achilles: 2+  Right plantar: normal  Left plantar: normal  Right Myrick: absent  Left Myrick: absent  Right ankle clonus: absent  Left ankle clonus:  absent  Right pendular knee jerk: absent  Left pendular knee jerk: absent              Lab Results   Component Value Date    WBC 5.28 02/19/2024    HGB 10.4 (L) 02/19/2024    HCT 33.2 (L) 02/19/2024    MCV 81 (L) 02/19/2024     02/19/2024     Sodium   Date Value Ref Range Status   01/19/2024 138 136 - 145 mmol/L Final     Potassium   Date Value Ref Range Status   01/19/2024 3.6 3.5 - 5.1 mmol/L Final     Chloride   Date Value Ref Range Status   01/19/2024 104 95 - 110 mmol/L Final     CO2   Date Value Ref Range Status   01/19/2024 26 23 - 29 mmol/L Final     Glucose   Date Value Ref Range Status   01/19/2024 84 70 - 110 mg/dL Final     BUN   Date Value Ref Range Status   01/19/2024 11 6 - 20 mg/dL Final     Creatinine   Date Value Ref Range Status   01/19/2024 0.9 0.5 - 1.4 mg/dL Final     Calcium   Date Value Ref Range Status   01/19/2024 9.4 8.7 - 10.5 mg/dL Final     Total Protein   Date Value Ref Range Status   01/19/2024 7.8 6.0 - 8.4 g/dL Final     Albumin   Date Value Ref Range Status   01/19/2024 3.9 3.5 - 5.2 g/dL Final     Total Bilirubin   Date Value Ref Range Status   01/19/2024 0.5 0.1 - 1.0 mg/dL Final     Comment:     For infants and newborns, interpretation of results should be based  on gestational age, weight and in agreement with clinical  observations.    Premature Infant recommended reference ranges:  Up to 24 hours.............<8.0 mg/dL  Up to 48 hours............<12.0 mg/dL  3-5 days..................<15.0 mg/dL  6-29 days.................<15.0 mg/dL       Alkaline Phosphatase   Date Value Ref Range Status   01/19/2024 49 (L) 55 - 135 U/L Final     AST   Date Value Ref Range Status   01/19/2024 13 10 - 40 U/L Final     ALT   Date Value Ref Range Status   01/19/2024 14 10 - 44 U/L Final     Anion Gap   Date Value Ref Range Status   01/19/2024 8 8 - 16 mmol/L Final     eGFR if    Date Value Ref Range Status   05/11/2022 >60 >60 mL/min/1.73 m^2 Final     eGFR if non     Date Value Ref Range Status   05/11/2022 >60 >60 mL/min/1.73 m^2 Final     Comment:     Calculation used to obtain the estimated glomerular filtration  rate (eGFR) is the CKD-EPI equation.        Lab Results   Component Value Date    [id number] 1476 (H) 08/23/2023     Lab Results   Component Value Date    TSH 1.194 01/19/2024    FREET4 1.02 02/01/2018       LABORATORY EVALUATION      [id number]     Lab [facility] showed NL HA1C, TFT, SPEP-IPEP , Low Vitamin D and Vitamin B12  and Positive AMAURY, SSA, SSB and RNP Antibodies.       RADIOLOGY EVALUATION     06-    MRI Brain NL       NEUROPHYSIOLOGY EVALUATION    06-    EEG NL     07- THROUGH 07-     AEEG  HOURS: SW, LT TL F7-T7, FOUR EVENTS (NON-EPILEPTIC-PSYCHOGENIC) .      AED MONITORING      AED: LEV  RANGE:  03-60 Dose    DATE LEVEL    05-   1000 mg QHS                                            Reviewed the neuroimaging independently       Assessment:       1. Generalized anxiety disorder    2. Nonepileptic episode    3. Psychosocial stressors    4. Epilepsy, grand mal    5. Paresthesias              EPILEPSY CLASSIFICATION    SEMIOLOGY: SENSORY AURA (LEFT FACE)--> GTC    EPILEPTOGENIC ZONE (S):  UNKNOWN     ETIOLOGY: UNKNOWN     PRIOR AEDS: GBP (PAIN)    CURRENT AEDS: LEV     LAST SEIZURE DATE:       COMPREHENSIVE LIST OF AEDs:     Acetazolamide (AZM-Diamox)   Benzos: clonazepam (CZP Klonopin), lorazepam (LZP-Ativan), diazepam (DZP-Valium), clorazepate (CLZ- Tranxene)  Brivaracetam (BRV-Briviact)  Cannabidiol (CBD- Epidiolex)  Carbamazepine (CBZ-Tegretol)  Cenobamate (CNB-Xcopri)  Clobazam (CLB-Onfi)  Eslicarbazepine (ESL-Aptiom)  Ethosuximide (ESX-Zarontin)  Felbamate (FBM-Felbatol)  Fenfluramine (FFA-Fintepla)  Gabapentin (GBP-Neurontin)  Lacosamide (LCM-Vimpat)  Lamotrigine (LTG-Lamitcal)  Levetiracetam (LEV- Keppra)  Oxcarbazepine (OXC-Trileptal)  Perampanel (PML-Fycompa)  Phenobarbital (PB)  Phenytoin  (PHT-Dilantin)  Pregabalin (PGB-Lyrica)  Primidone (PRM)   Retigabine (RTG- Potiga) Discontinued in 2017  Rufinamide (RFN-Benzil)  Stiripentol (STP-Diacomit)  Tiagabine (TGB-Gabitril)  Topiramate (TPM-Topamax)  Valproate (VPA-Depakote)  Vigabatrin (VGB-Sabril)  Zonisamide (ZNS-Zonegran)    Plan:         CARRIES A DIAGNOSIS OF GRAND MAL EPILEPSY/PSYCHOGENIC NON-EPILEPTIC SEIZURES        The patient was encouraged to maintain full traditional seizure precautions which include but not limited to avoidance of driving, biking, high altitudes (ladders, escalators, rock climbing, mountain climbing, skiing, connor diving, moderate to difficult hiking), proximity to fire or fire source, proximity to body of water or swimming alone, operating heavy and potentially risky machinery, using sharp objects, using exercise machines like treadmill and weight lifting. Walking while accompanied on soft surfaces like grass is preferable.The patient was encouraged to shower (without accumulation of water) instead of taking a bath if unsupervised. The patient was made aware that these precautions are especially important during concurrent illnesses, fever, infections, vomiting, changing medications and running out of anti-seizure medications. Instructed the patient to avoid night shifts, sleep deprivation and alcohol or recreational drug use as adequate sleep and avoidance of alcohol/drugs are very important measures to assure good seizure control. The patient was also advised not to care for young children without company. The patient is advised to pad the side rails with pillows and blankets if applicable.I strongly recommended lowering the bed to the floor level to decrease the risk of falls during nocturnal seizures that occur during sleep. I also instructed the patient to avoid safety sensitive duties. In general, any activity that requires full awareness and would result in serious injury to self and others if a seizure takes place  should be avoided.      Made the patient aware that the duration of restrictions/precautions varies and in [place] it is 6 months. The patient verbalized  full understanding.        Continue Levetiracetam/Keppra-LEV XR 2000 mg QHS. Check LEV today.       NON-EPILEPTIC SEIZURES    Discussed with patient regarding Psychogenic non-epileptic seizures are events resembling an epileptic seizure, but without the characteristic electrical discharges associated with epilepsy. They are of psychological origin, and are one type of non-epileptic seizure mimics. PNES are also known less specifically as non-epileptic attack disorder and functional neurological symptom disorder. I explained to patient that he will require different treatment to address the underlying cause for this phenomenon. A specific traumatic event, such as physical or sexual abuse, incest, divorce, death of a loved one, or other great loss or sudden change, can be identified in many people with PNES. Recommend CBT By definition, PNES are a physical manifestation of a psychological disturbance and are a type of Somatoform Disorder.     Follow up with Psychiatry     Continue AEDs INDEFINITELY, FOR GOOD AND NEVER SKIP A DOSE. The patient verbalized full understanding. Stressed the extreme medical, personal safety, public safety and legal importance of compliance and seizure control. Will give as many refills as possible with or without face-to-face evaluation to make sure the patient and any patient with epilepsy will never run out of medications. Running out of seizure medications should never happen under our care. I explained to the patient that he should not, under any circumstances, adjust or stop taking his seizure medication without discussing with me. Warned the patient about SUDEP. The patient verbalized full understanding.       AVOID any substance that could lower seizure threshold including but not limited to:        ALCOHOL AND WITHDRAWAL       TRAMADOL.     MEPERIDINE (DEMEROL)     ALL STIMULANTS-ALL ADHD MEDICATIONS.      CLOZAPINE.      BUPROPION (WELLBUTRIN)     CIPROFLOXACIN.    CYCLOSPORINE.     METOCLOPRAMIDE (REGLAN).     TETRAHYDROCANNABINOL (THC)    KRATOM            COMMON MIGRAINE WITHOUT AURA, EPISODIC, LOW FREQUENCY     HEADACHE DIARY     DISCUSSED THE THREE-FOLD MANAGEMENT OF MIGRAINE:      LIFESTYLE CHANGES:       Good sleep hygiene  Avoid general triggers like lack of sleep/too much sleep, prolonged sun exposure, excessive screen time and specific triggers based on you own diary   Minimize physical and emotional stress  Smoking avoidance and cessation  Limit caffeine drinks to 1-2 a day   Good hydration   Small frequent meals and avoid skipping meals   Moderate 30-minute-long aerobic exercises 3 times/week. Avoid strenuous exercise         ABORTIVE MEDICATIONS (ACUTE-RESCUE MEDICATIONS):     Should only be taken 2-3 times/week to avoid rebound and overuse headaches.      Triptans and DHE are C/I in [facility].    Failed Firoicet.       AVOID NARCOTICS (OPIATES)      1. No randomized controlled study shows pain-free results with opioids in the treatment of migraine.     2. The physiologic consequences of opioid use are adverse, occur quickly, and can be permanent. Decreased gray matter, release of calcitonin gene-related peptide, dynorphin, and pro-inflammatory peptides, and activation of excitatory glutamate receptors are all associated with opioid exposure.     3. Opioids are pro-nociceptive, prevent reversal of migraine central sensitization, and interfere with triptan effectiveness.     4.Opioids precipitate bad clinical outcomes, especially transformation to daily headache.     5. They cause disease progression, comorbidity, and excessive health care consumption.           NEXT OPTIONS:      Gepants: Nuretc (rimegepant)75 mg >Ubrelvy (ubrogepant) 100 mg          IMPENDING STATUS: Prednisone and Vistaril.    STATUS MIGRAINOSUS:  ED-Infusion for Status Protocol.        PREVENTATIVE (MORE ACCURATELY MIGRAINE REDUCTION) MEDICATIONS:           Since the patient's headache is very infrequent will hold off.     Tried CCB (Amlodipine),  TCA (Elavil), SSRIS (Zoloft) and AEDs (GBP).     BB are C/I in [facility].    HELPFUL SUPPLEMENTS:     Helpful supplements include Co-Q 10, B2, Mg, Feverfew (Dolovent combination) and butterbur (Petadolex)        NEUROPHARMACOLOGY     NEXT OPTIONS:       Topiramate/Topamax (TPM) slow titration to 50 mg BID which can cause mental slowing, transient tingling, kidney stones, weight loss, cleft lip and palate and rarely glaucoma and visual field defects . The patient was encouraged to drink a lot of fluids.     Zonisamide/Zonegran (ZNS) 100-400 mg QHS is a good alternative to TPM in case of SE/AE.     Lamotrigine/Lamictal  (LTG)slow titration to 100 mg BID which can cause serious skin rash and rare cardiac arrhythmias. LTG is superior to other therapies for specifically reducing migraine aura.     ANTI-CGRP AGENTS: Qulipta (alogepant) 60 mg QD, Erenumab (Aimovig) 140 mg SQ Pen monthly (Reported cases of Constipation and BP elevation) , Galcanezumab (Emgality) 120 mg SQ Pen monthly after a loading dose of 240 mg  and Fremnezumab (Ajovy) (Ligand Blocker): 225 mg SQ monthly or 675 mg every 3 months     Botox 200 units every 3 months .        LAST RESORT OPTIONS:      Namenda 10 mg BID     Valproic acid/ Depakote         NEUROMODULATION     Cefaly, Relivion, Nerivio and GammaCore (VNS)           PARESTHESIAS, HANDS AND FEET, B12 AND VITAMIN D DEFICIENCY, CONNECTIVE TISSUE DISEASE, IMMUNOSUPPRESSIVE THERAPY        Continue Vitamin D 5000 units QD.     Continue B12 SL 2500 mcg QD.                 MEDICAL/SURGICAL COMORBIDITIES     All relevant medical comorbidities noted and managed by primary care physician and medical care team.          HEALTHY LIFESTYLE AND PREVENTATIVE CARE    The patient to adhere to the age-appropriate  health maintenance guidelines including screening tests and vaccinations. The patient to adhere to  healthy lifestyle, optimal weight, exercise, healthy diet, good sleep hygiene and avoiding drugs including smoking, alcohol and recreational drugs.          RTC in 6 months       Bibi Larsen MSN NP      Collaborating Provider: Daren Lamb MD, FAAN Neurologist/Epileptologist    I spent a total of 30 minutes on the day of the visit.  This includes face to face time and non-face to face time preparing to see the patient (eg, review of tests), obtaining and/or reviewing separately obtained history, documenting clinical information in the electronic or other health record, independently interpreting results and communicating results to the patient/family/caregiver, or care coordinator.

## 2024-02-27 ENCOUNTER — PATIENT MESSAGE (OUTPATIENT)
Dept: NEUROLOGY | Facility: CLINIC | Age: 49
End: 2024-02-27
Payer: COMMERCIAL

## 2024-02-27 ENCOUNTER — OFFICE VISIT (OUTPATIENT)
Dept: RHEUMATOLOGY | Facility: CLINIC | Age: 49
End: 2024-02-27
Payer: COMMERCIAL

## 2024-02-27 ENCOUNTER — LAB VISIT (OUTPATIENT)
Dept: LAB | Facility: HOSPITAL | Age: 49
End: 2024-02-27
Attending: PHYSICIAN ASSISTANT
Payer: COMMERCIAL

## 2024-02-27 VITALS
BODY MASS INDEX: 40.15 KG/M2 | HEIGHT: 66 IN | DIASTOLIC BLOOD PRESSURE: 89 MMHG | WEIGHT: 249.81 LBS | HEART RATE: 66 BPM | SYSTOLIC BLOOD PRESSURE: 140 MMHG

## 2024-02-27 DIAGNOSIS — Z79.899 HIGH RISK MEDICATION USE: ICD-10-CM

## 2024-02-27 DIAGNOSIS — Z79.899 IMMUNOCOMPROMISED STATE DUE TO DRUG THERAPY: ICD-10-CM

## 2024-02-27 DIAGNOSIS — M35.01 SJOGREN'S SYNDROME WITH KERATOCONJUNCTIVITIS SICCA: ICD-10-CM

## 2024-02-27 DIAGNOSIS — M32.14 LUPUS NEPHRITIS, ISN/RPS CLASS V: ICD-10-CM

## 2024-02-27 DIAGNOSIS — M32.9 SLE (SYSTEMIC LUPUS ERYTHEMATOSUS RELATED SYNDROME): Primary | ICD-10-CM

## 2024-02-27 DIAGNOSIS — M34.9 SCLERODERMA: ICD-10-CM

## 2024-02-27 DIAGNOSIS — Z51.81 MEDICATION MONITORING ENCOUNTER: ICD-10-CM

## 2024-02-27 DIAGNOSIS — D84.821 IMMUNOCOMPROMISED STATE DUE TO DRUG THERAPY: ICD-10-CM

## 2024-02-27 DIAGNOSIS — I73.00 RAYNAUD'S DISEASE WITHOUT GANGRENE: ICD-10-CM

## 2024-02-27 LAB
ALBUMIN SERPL BCP-MCNC: 4 G/DL (ref 3.5–5.2)
ALP SERPL-CCNC: 49 U/L (ref 55–135)
ALT SERPL W/O P-5'-P-CCNC: 14 U/L (ref 10–44)
ANION GAP SERPL CALC-SCNC: 7 MMOL/L (ref 8–16)
AST SERPL-CCNC: 14 U/L (ref 10–40)
BACTERIA #/AREA URNS HPF: ABNORMAL /HPF
BASOPHILS # BLD AUTO: 0.03 K/UL (ref 0–0.2)
BASOPHILS NFR BLD: 0.6 % (ref 0–1.9)
BILIRUB SERPL-MCNC: 0.4 MG/DL (ref 0.1–1)
BUN SERPL-MCNC: 8 MG/DL (ref 6–20)
C3 SERPL-MCNC: 149 MG/DL (ref 50–180)
C4 SERPL-MCNC: 32 MG/DL (ref 11–44)
CALCIUM SERPL-MCNC: 9.4 MG/DL (ref 8.7–10.5)
CHLORIDE SERPL-SCNC: 105 MMOL/L (ref 95–110)
CO2 SERPL-SCNC: 26 MMOL/L (ref 23–29)
CREAT SERPL-MCNC: 0.9 MG/DL (ref 0.5–1.4)
CREAT UR-MCNC: 164 MG/DL (ref 15–325)
CRP SERPL-MCNC: 7.5 MG/L (ref 0–8.2)
DIFFERENTIAL METHOD BLD: ABNORMAL
EOSINOPHIL # BLD AUTO: 0.2 K/UL (ref 0–0.5)
EOSINOPHIL NFR BLD: 3.8 % (ref 0–8)
ERYTHROCYTE [DISTWIDTH] IN BLOOD BY AUTOMATED COUNT: 15.3 % (ref 11.5–14.5)
ERYTHROCYTE [SEDIMENTATION RATE] IN BLOOD BY PHOTOMETRIC METHOD: 20 MM/HR (ref 0–36)
EST. GFR  (NO RACE VARIABLE): >60 ML/MIN/1.73 M^2
GLUCOSE SERPL-MCNC: 91 MG/DL (ref 70–110)
HCT VFR BLD AUTO: 32.7 % (ref 37–48.5)
HGB BLD-MCNC: 10.3 G/DL (ref 12–16)
IMM GRANULOCYTES # BLD AUTO: 0.01 K/UL (ref 0–0.04)
IMM GRANULOCYTES NFR BLD AUTO: 0.2 % (ref 0–0.5)
LYMPHOCYTES # BLD AUTO: 1.5 K/UL (ref 1–4.8)
LYMPHOCYTES NFR BLD: 31 % (ref 18–48)
MCH RBC QN AUTO: 24.8 PG (ref 27–31)
MCHC RBC AUTO-ENTMCNC: 31.5 G/DL (ref 32–36)
MCV RBC AUTO: 79 FL (ref 82–98)
MICROSCOPIC COMMENT: ABNORMAL
MONOCYTES # BLD AUTO: 0.4 K/UL (ref 0.3–1)
MONOCYTES NFR BLD: 7.9 % (ref 4–15)
NEUTROPHILS # BLD AUTO: 2.7 K/UL (ref 1.8–7.7)
NEUTROPHILS NFR BLD: 56.5 % (ref 38–73)
NRBC BLD-RTO: 0 /100 WBC
OTHER ELEMENTS URNS MICRO: ABNORMAL
PLATELET # BLD AUTO: 312 K/UL (ref 150–450)
PMV BLD AUTO: 8.8 FL (ref 9.2–12.9)
POTASSIUM SERPL-SCNC: 3.7 MMOL/L (ref 3.5–5.1)
PROT SERPL-MCNC: 7.8 G/DL (ref 6–8.4)
PROT UR-MCNC: 9 MG/DL (ref 0–15)
PROT/CREAT UR: 0.05 MG/G{CREAT} (ref 0–0.2)
RBC # BLD AUTO: 4.16 M/UL (ref 4–5.4)
RBC #/AREA URNS HPF: 0 /HPF (ref 0–4)
SODIUM SERPL-SCNC: 138 MMOL/L (ref 136–145)
SQUAMOUS #/AREA URNS HPF: 2 /HPF
WBC # BLD AUTO: 4.8 K/UL (ref 3.9–12.7)
WBC #/AREA URNS HPF: 2 /HPF (ref 0–5)

## 2024-02-27 PROCEDURE — 3008F BODY MASS INDEX DOCD: CPT | Mod: CPTII,S$GLB,, | Performed by: PHYSICIAN ASSISTANT

## 2024-02-27 PROCEDURE — 81000 URINALYSIS NONAUTO W/SCOPE: CPT | Performed by: PHYSICIAN ASSISTANT

## 2024-02-27 PROCEDURE — 1159F MED LIST DOCD IN RCRD: CPT | Mod: CPTII,S$GLB,, | Performed by: PHYSICIAN ASSISTANT

## 2024-02-27 PROCEDURE — 3044F HG A1C LEVEL LT 7.0%: CPT | Mod: CPTII,S$GLB,, | Performed by: PHYSICIAN ASSISTANT

## 2024-02-27 PROCEDURE — 3077F SYST BP >= 140 MM HG: CPT | Mod: CPTII,S$GLB,, | Performed by: PHYSICIAN ASSISTANT

## 2024-02-27 PROCEDURE — 86140 C-REACTIVE PROTEIN: CPT | Performed by: PHYSICIAN ASSISTANT

## 2024-02-27 PROCEDURE — 96372 THER/PROPH/DIAG INJ SC/IM: CPT | Mod: S$GLB,,, | Performed by: PHYSICIAN ASSISTANT

## 2024-02-27 PROCEDURE — 85652 RBC SED RATE AUTOMATED: CPT | Performed by: PHYSICIAN ASSISTANT

## 2024-02-27 PROCEDURE — 84156 ASSAY OF PROTEIN URINE: CPT | Performed by: PHYSICIAN ASSISTANT

## 2024-02-27 PROCEDURE — 1160F RVW MEDS BY RX/DR IN RCRD: CPT | Mod: CPTII,S$GLB,, | Performed by: PHYSICIAN ASSISTANT

## 2024-02-27 PROCEDURE — 86334 IMMUNOFIX E-PHORESIS SERUM: CPT | Performed by: PHYSICIAN ASSISTANT

## 2024-02-27 PROCEDURE — 84165 PROTEIN E-PHORESIS SERUM: CPT | Performed by: PHYSICIAN ASSISTANT

## 2024-02-27 PROCEDURE — 86334 IMMUNOFIX E-PHORESIS SERUM: CPT | Mod: 26,,, | Performed by: PATHOLOGY

## 2024-02-27 PROCEDURE — 84165 PROTEIN E-PHORESIS SERUM: CPT | Mod: 26,,, | Performed by: PATHOLOGY

## 2024-02-27 PROCEDURE — 86160 COMPLEMENT ANTIGEN: CPT | Performed by: PHYSICIAN ASSISTANT

## 2024-02-27 PROCEDURE — 86160 COMPLEMENT ANTIGEN: CPT | Mod: 59 | Performed by: PHYSICIAN ASSISTANT

## 2024-02-27 PROCEDURE — 36415 COLL VENOUS BLD VENIPUNCTURE: CPT | Performed by: PHYSICIAN ASSISTANT

## 2024-02-27 PROCEDURE — 3079F DIAST BP 80-89 MM HG: CPT | Mod: CPTII,S$GLB,, | Performed by: PHYSICIAN ASSISTANT

## 2024-02-27 PROCEDURE — 99215 OFFICE O/P EST HI 40 MIN: CPT | Mod: 25,S$GLB,, | Performed by: PHYSICIAN ASSISTANT

## 2024-02-27 PROCEDURE — 85025 COMPLETE CBC W/AUTO DIFF WBC: CPT | Performed by: PHYSICIAN ASSISTANT

## 2024-02-27 PROCEDURE — 99999 PR PBB SHADOW E&M-EST. PATIENT-LVL IV: CPT | Mod: PBBFAC,,, | Performed by: PHYSICIAN ASSISTANT

## 2024-02-27 PROCEDURE — 80053 COMPREHEN METABOLIC PANEL: CPT | Performed by: PHYSICIAN ASSISTANT

## 2024-02-27 RX ORDER — BETAMETHASONE SODIUM PHOSPHATE AND BETAMETHASONE ACETATE 3; 3 MG/ML; MG/ML
6 INJECTION, SUSPENSION INTRA-ARTICULAR; INTRALESIONAL; INTRAMUSCULAR; SOFT TISSUE
Status: COMPLETED | OUTPATIENT
Start: 2024-02-27 | End: 2024-02-27

## 2024-02-27 RX ORDER — ONDANSETRON HYDROCHLORIDE 2 MG/ML
4 INJECTION, SOLUTION INTRAVENOUS
OUTPATIENT
Start: 2024-02-27

## 2024-02-27 RX ORDER — SODIUM CHLORIDE 0.9 % (FLUSH) 0.9 %
10 SYRINGE (ML) INJECTION
OUTPATIENT
Start: 2024-02-27

## 2024-02-27 RX ORDER — METHYLPREDNISOLONE SOD SUCC 125 MG
100 VIAL (EA) INJECTION
OUTPATIENT
Start: 2024-02-27

## 2024-02-27 RX ORDER — DIPHENHYDRAMINE HYDROCHLORIDE 50 MG/ML
25 INJECTION INTRAMUSCULAR; INTRAVENOUS
OUTPATIENT
Start: 2024-02-27

## 2024-02-27 RX ORDER — HEPARIN 100 UNIT/ML
500 SYRINGE INTRAVENOUS
OUTPATIENT
Start: 2024-02-27

## 2024-02-27 RX ORDER — KETOROLAC TROMETHAMINE 30 MG/ML
30 INJECTION, SOLUTION INTRAMUSCULAR; INTRAVENOUS
OUTPATIENT
Start: 2024-02-27

## 2024-02-27 RX ORDER — EPINEPHRINE 0.3 MG/.3ML
0.3 INJECTION SUBCUTANEOUS ONCE AS NEEDED
OUTPATIENT
Start: 2024-02-27

## 2024-02-27 RX ORDER — ACETAMINOPHEN 325 MG/1
650 TABLET ORAL
OUTPATIENT
Start: 2024-02-27

## 2024-02-27 RX ORDER — DIPHENHYDRAMINE HYDROCHLORIDE 50 MG/ML
50 INJECTION INTRAMUSCULAR; INTRAVENOUS ONCE AS NEEDED
OUTPATIENT
Start: 2024-02-27

## 2024-02-27 RX ADMIN — BETAMETHASONE SODIUM PHOSPHATE AND BETAMETHASONE ACETATE 6 MG: 3; 3 INJECTION, SUSPENSION INTRA-ARTICULAR; INTRALESIONAL; INTRAMUSCULAR; SOFT TISSUE at 11:02

## 2024-02-27 NOTE — PROGRESS NOTES
Subjective:      Patient ID: Zachary Lucia is a 48 y.o. female.    Chief Complaint: Disease Management and Lupus    HPI   Zachary Lucia  is a 48 y.o. female seen today for follow-up on Sjogren's syndrome, lupus, scleroderma overlap.  Reports pain 5/10.  Am stiffness now lasting about an hour or more.  Has been off benlysta over a month now b/c she ran out.  Got new insurance in Nov and hasn't been through the auth process per report.  Joint pain/swelling/stiffness markedly worse since stopping Benlysta.  Still taking Imuran 150 mg daily (divided), and HCQ as below.  Had Plaquenil monitoring exam done November 2022.  She is due for updated exam.  Missed last appt d/t new job and insurance change.    Sicca sxs still an issue.  Using gtts and staying hydrated    She is on chronic steroids 10 mg daily.  She takes 5 mg b.i.d..  Had a bone density scan showing no significant bone loss.  Denies h/o fragility fx    Hands also w periodic N/T.  Occasionally drops things.  EMG done 8/31/23 showed b/l CTS.  Following w ortho for knee OA    In the past she was seen by Nephrology for microscopic hematuria, She had a kidney biopsy done which was suggestive of membranous nephropathy/membranous lupus nephritis/class 5. Taken off lisinopril because of cough.     Patient denies fevers, chills, photosensitivity, eye pain, shortness of breath, chest pain, hematuria, blood in the stool, rash, sicca symptoms, raynauds, finger/oral ulcerations, LAD, bone pain, weakness hands.  Rheumatologic systems otherwise negative.    Serologies/Labs:  +AMAURY 1:640, +SSA/SSB, +sm/RNP  +Scl 70  Neg RF  Current Treatment:  Benlysta SQ once weekly   mg bid  Imuran 150 mg daily  PDN 10 mg daily  Previous Treatment:   Cellcept - se    PFT done 6/2023  CT chest ordered 8/10/23 not done  Last Chest CT done in 2018    Current Outpatient Medications:     azaTHIOprine (IMURAN) 100 mg tablet, Take 1 tablet (100 mg total)  by mouth once daily., Disp: 90 tablet, Rfl: 1    azaTHIOprine (IMURAN) 50 mg Tab, Take 1 tablet (50 mg total) by mouth once daily., Disp: 90 tablet, Rfl: 1    cholecalciferol, vitamin D3, 1,250 mcg (50,000 unit) capsule, Take 1 capsule (50,000 Units total) by mouth once a week., Disp: 15 capsule, Rfl: 1    cyclobenzaprine (FLEXERIL) 10 MG tablet, Take 1 tablet (10 mg total) by mouth 2 (two) times daily as needed for Muscle spasms., Disp: 60 tablet, Rfl: 0    furosemide (LASIX) 20 MG tablet, Take 1 tablet (20 mg total) by mouth once daily., Disp: 30 tablet, Rfl: 2    HYDROcodone-acetaminophen (NORCO) 7.5-325 mg per tablet, Take 1 tablet by mouth 4 (four) times daily as needed., Disp: , Rfl:     hydrOXYchloroQUINE (PLAQUENIL) 200 mg tablet, Take 1 tablet (200 mg total) by mouth 2 (two) times daily., Disp: 180 tablet, Rfl: 3     mg tablet, Take 800 mg by mouth every 8 (eight) hours as needed., Disp: , Rfl: 0    levetiracetam XR (KEPPRA XR) 500 mg Tb24 24 hr tablet, Take 4 tablets (2,000 mg total) by mouth once daily., Disp: 360 tablet, Rfl: 3    montelukast (SINGULAIR) 10 mg tablet, TAKE 1 TABLET BY MOUTH EVERY DAY IN THE EVENING, Disp: 90 tablet, Rfl: 0    mupirocin (BACTROBAN) 2 % ointment, Apply topically 3 (three) times daily., Disp: 1 g, Rfl: 0    predniSONE (DELTASONE) 10 MG tablet, TAKE 1 TABLET BY MOUTH EVERY DAY, Disp: 90 tablet, Rfl: 1    fluticasone-salmeterol diskus inhaler 100-50 mcg, Inhale 1 puff into the lungs 2 (two) times daily. Controller, Disp: 1 each, Rfl: 11  No current facility-administered medications for this visit.    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    Scleroderma     Seizures 01/12/2017     Family History   Problem Relation Age of Onset    Arthritis Mother     Glaucoma Mother     Hypertension  Mother     Diabetes Mother     Heart disease Mother     Stroke Mother     Diabetes Father     Cancer Father     Diabetes Maternal Grandmother     Hypertension Maternal Grandmother     Arthritis Maternal Grandmother     Cataracts Maternal Grandmother     Diabetes Maternal Grandfather     Heart disease Maternal Grandfather     Hypertension Maternal Grandfather     Diabetes Paternal Grandmother     Heart disease Paternal Grandmother     Hypertension Paternal Grandmother     Asthma Paternal Grandmother     Heart disease Paternal Grandfather     Cancer Maternal Uncle     Hypertension Maternal Uncle     Cancer Maternal Uncle     Cancer Paternal Aunt     Cancer Paternal Aunt     Cancer Paternal Aunt     Heart disease Maternal Uncle     Hypertension Maternal Uncle      Social History     Socioeconomic History    Marital status:      Spouse name: Darrell    Number of children: 0   Tobacco Use    Smoking status: Never     Passive exposure: Never    Smokeless tobacco: Never   Substance and Sexual Activity    Alcohol use: Not Currently     Alcohol/week: 2.0 standard drinks of alcohol     Types: 2 Glasses of wine per week     Comment: OCC    Drug use: No    Sexual activity: Yes     Partners: Male     Social Determinants of Health     Financial Resource Strain: Patient Declined (1/19/2024)    Overall Financial Resource Strain (CARDIA)     Difficulty of Paying Living Expenses: Patient declined   Food Insecurity: Patient Declined (1/19/2024)    Hunger Vital Sign     Worried About Running Out of Food in the Last Year: Patient declined     Ran Out of Food in the Last Year: Patient declined   Transportation Needs: Patient Declined (1/19/2024)    PRAPARE - Transportation     Lack of Transportation (Medical): Patient declined     Lack of Transportation (Non-Medical): Patient declined   Physical Activity: Insufficiently Active (1/19/2024)    Exercise Vital Sign     Days of Exercise per Week: 2 days     Minutes of Exercise per  "Session: 20 min   Stress: No Stress Concern Present (1/19/2024)    [employer] - Occupational Stress Questionnaire     Feeling of Stress : Only a little   Social Connections: Unknown (1/19/2024)    Social Connection and Isolation Panel [NHANES]     Frequency of Communication with Friends and Family: More than three times a week     Frequency of Social Gatherings with Friends and Family: Twice a week     Active Member of Clubs or Organizations: No     Attends Club or Organization Meetings: Never     Marital Status:    Housing Stability: Unknown (1/19/2024)    Housing Stability Vital Sign     Unable to Pay for Housing in the Last Year: Patient declined     Number of Places Lived in the Last Year: 1     Unstable Housing in the Last Year: No     Review of patient's allergies indicates:   Allergen Reactions    Lisinopril      cough    Sulfa (sulfonamide antibiotics) Swelling       Objective:   BP (!) 140/89   Pulse 66   Ht 5' 6" (1.676 m)   Wt 113.3 kg (249 lb 12.5 oz)   BMI 40.32 kg/m²   Immunization History   Administered Date(s) Administered    COVID-19, MRNA, LN-S, PF (Pfizer) (Purple Cap) 06/09/2021, 06/09/2021, 06/30/2021, 06/30/2021, 12/31/2021    Tdap 04/05/2012, 04/05/2012, 11/29/2022       Physical Exam   Constitutional: She is oriented to person, place, and time. No distress.   HENT:   Head: Normocephalic and atraumatic.   Pulmonary/Chest: Effort normal.   Abdominal: She exhibits no distension.   Musculoskeletal:         General: No swelling or tenderness. Normal range of motion.      Cervical back: Normal range of motion.   Lymphadenopathy:     She has no cervical adenopathy.   Neurological: She is alert and oriented to person, place, and time.   Skin: Skin is warm and dry. No rash noted.   Psychiatric: Mood normal.   Nursing note and vitals reviewed.    Synovitis multiple MCP (long/small fingers) and DIP(long and ring fingers) right hand  Synovitis Left hand index and " ring MCP joints  no dactylitis, no enthesitis  No effusions of large or small joints  100% fist formation but stiff  Well preserved ROM    TTP bilat shoulders as well.      Recent Results (from the past 672 hour(s))   Iron and TIBC    Collection Time: 02/19/24  7:13 AM   Result Value Ref Range    Iron 35 30 - 160 ug/dL    Transferrin 295 200 - 375 mg/dL    TIBC 437 250 - 450 ug/dL    Saturated Iron 8 (L) 20 - 50 %   Ferritin    Collection Time: 02/19/24  7:13 AM   Result Value Ref Range    Ferritin 10 (L) 20.0 - 300.0 ng/mL   CBC Auto Differential    Collection Time: 02/19/24  7:13 AM   Result Value Ref Range    WBC 5.28 3.90 - 12.70 K/uL    RBC 4.11 4.00 - 5.40 M/uL    Hemoglobin 10.4 (L) 12.0 - 16.0 g/dL    Hematocrit 33.2 (L) 37.0 - 48.5 %    MCV 81 (L) 82 - 98 fL    MCH 25.3 (L) 27.0 - 31.0 pg    MCHC 31.3 (L) 32.0 - 36.0 g/dL    RDW 15.2 (H) 11.5 - 14.5 %    Platelets 317 150 - 450 K/uL    MPV 9.2 9.2 - 12.9 fL    Immature Granulocytes 0.4 0.0 - 0.5 %    Gran # (ANC) 2.7 1.8 - 7.7 K/uL    Immature Grans (Abs) 0.02 0.00 - 0.04 K/uL    Lymph # 1.8 1.0 - 4.8 K/uL    Mono # 0.5 0.3 - 1.0 K/uL    Eos # 0.2 0.0 - 0.5 K/uL    Baso # 0.03 0.00 - 0.20 K/uL    nRBC 0 0 /100 WBC    Gran % 50.7 38.0 - 73.0 %    Lymph % 33.9 18.0 - 48.0 %    Mono % 10.0 4.0 - 15.0 %    Eosinophil % 4.4 0.0 - 8.0 %    Basophil % 0.6 0.0 - 1.9 %    Differential Method Automated    CBC Auto Differential    Collection Time: 02/27/24 11:21 AM   Result Value Ref Range    WBC 4.80 3.90 - 12.70 K/uL    RBC 4.16 4.00 - 5.40 M/uL    Hemoglobin 10.3 (L) 12.0 - 16.0 g/dL    Hematocrit 32.7 (L) 37.0 - 48.5 %    MCV 79 (L) 82 - 98 fL    MCH 24.8 (L) 27.0 - 31.0 pg    MCHC 31.5 (L) 32.0 - 36.0 g/dL    RDW 15.3 (H) 11.5 - 14.5 %    Platelets 312 150 - 450 K/uL    MPV 8.8 (L) 9.2 - 12.9 fL    Immature Granulocytes 0.2 0.0 - 0.5 %    Gran # (ANC) 2.7 1.8 - 7.7 K/uL    Immature Grans (Abs) 0.01 0.00 - 0.04 K/uL    Lymph # 1.5 1.0 - 4.8 K/uL    Mono # 0.4 0.3  - 1.0 K/uL    Eos # 0.2 0.0 - 0.5 K/uL    Baso # 0.03 0.00 - 0.20 K/uL    nRBC 0 0 /100 WBC    Gran % 56.5 38.0 - 73.0 %    Lymph % 31.0 18.0 - 48.0 %    Mono % 7.9 4.0 - 15.0 %    Eosinophil % 3.8 0.0 - 8.0 %    Basophil % 0.6 0.0 - 1.9 %    Differential Method Automated        Lab Results   Component Value Date    TBGOLDPLUS Negative 05/08/2023      Lab Results   Component Value Date    HEPAIGM Negative 02/17/2022    HEPBIGM Negative 02/17/2022    HEPBCAB Non-reactive 05/08/2023    HEPCAB Non-reactive 05/08/2023        Imaging  I have personally reviewed images and reports as below.  I agree with the interpretation.      Assessment:     1. SLE (systemic lupus erythematosus related syndrome)    2. Lupus nephritis, ISN/RPS class V    3. Sjogren's syndrome with keratoconjunctivitis sicca    4. Scleroderma    5. Raynaud's disease without gangrene    6. High risk medication use    7. Medication monitoring encounter    8. Immunocompromised state due to drug therapy          Plan:     Zachary was seen today for disease management and lupus.    Diagnoses and all orders for this visit:    SLE (systemic lupus erythematosus related syndrome)  -     betamethasone acetate-betamethasone sodium phosphate injection 6 mg    Lupus nephritis, ISN/RPS class V    Sjogren's syndrome with keratoconjunctivitis sicca    Scleroderma    Raynaud's disease without gangrene    High risk medication use    Medication monitoring encounter    Immunocompromised state due to drug therapy    Other orders  -     belimumab (BENLYSTA) 1,133 mg in sodium chloride 0.9% 250 mL IVPB  -     belimumab (BENLYSTA) 1,133 mg in sodium chloride 0.9% 250 mL IVPB  -     acetaminophen tablet 650 mg  -     diphenhydrAMINE injection 25 mg  -     methylPREDNISolone sodium succinate injection 100 mg  -     ondansetron injection 4 mg  -     ketorolac injection 30 mg  -     sodium chloride 0.9% flush 10 mL  -     sodium chloride 0.9% 100 mL flush bag  -     heparin,  porcine (PF) 100 unit/mL injection flush 500 Units  -     EPINEPHrine (EPIPEN) 0.3 mg/0.3 mL pen injection 0.3 mg  -     diphenhydrAMINE injection 50 mg  -     hydrocortisone sodium succinate injection 100 mg    SLE - severe worsening sxs off benlysta  SLE labs today w Hep/TB serologies  Sxs worsening since off Benlysta SC  Recommend switch to Benlysta infusions  Would recommend reloading  Off benlysta 1 mos per pt report  Discussed w our clinical pharmacist  Review of claims information suggests it has been more like 2-3 mos.  Based on pharmacokinetics - drug concentration would be negligible at this time even w last dose being in Jan  Discussed risks and benefits with the patient  She wishes to proceed  Therapy plan entered and consent signed  DC Benlysta SQ - has been off 1+ mos  Yearly eye exams for HCQ monitoring  Over due since 11/2023  Will cc ophtho to schedule  CMP/CBC reviewed from 1/2024 - stable to c/w imuran  IM celestone today - discussed risks/benefits, she wishes to proceed.  Sjogren's Syndrome   Discussed conservative treatment  Xerostomia  Biotene products and Xylimelt Lozenges  Avoid sleeping w fan on  Limit alcohol, coffee, nicotene and cola intake  Keratoconjunctivitis Sicca  OTC Refresh or Systane drops.    Limit alcohol/nicotene intake  Consider humidifier  Avoid using fans  Consider Salagen verses Evoxac if no improvement follow-up  Plaquenil 200 mg b.i.d. as above  Yearly labs today - spep, andrea, cryoglobulins, C3, C4, pc ratio in acceptable range 8/2023  Scleroderma overlap  CT chest ordered not done  Would recommend CT chest and Echo every 2 years.  No CP/SOB today  Raynauds stable  Hand pain  Xrays today  Likely related to active SLE   Also w known CTS - recommend f/u ortho  Chronic Steroid use  DEXA wnl 12/2022  Rec adequate calcium/vit d intake  Consider slow titration down on prednisone next visit if stable  Recheck DEXA 12/2024  Drug therapy requiring intensive monitoring for  toxicity  High Risk Medication Monitoring encounter  No current medication related issues, no evidence of toxicity  I ordered labs for toxicity monitoring, have personally reviewed the findings, and discussed them with the patient.  Pending labs will be sent via the portal  Compromised immune system secondary to autoimmune disease and/or use of immunosuppressive drugs.  Monitor carefully for infections.  Advised patient to get immediate medical care if any infection arises.  Also advised strict adherence age-appropriate vaccinations and cancer screenings with PCP.  Patient advised to hold DMARD and/or biologic therapy for signs of infection or for surgery. If you are unsure what to do please call our office for instruction.[facility] [phone]  Return to clinic: 4 mos w SLE labs prior    No follow-ups on file.    The patient understands, chooses and consents to this plan and accepts all the risks which include but are not limited to the risks mentioned above.     Disclaimer: This note was prepared using a voice recognition system and is likely to have sound alike errors within the text.

## 2024-02-27 NOTE — PROGRESS NOTES
Administered 1 cc Betamethasone 6mg/cc  to right ventrogluteal. Pt tolerated well. No acute reaction noted to site. Pt instructed on S/S to report. Advised patient to wait in lobby 15 minutes after receiving injection to monitor for any reactions. Pt verbalized understanding.     Lot: [id number]  Exp: 04/10/2025              Side Effects:  appetite changes, glucose intolerance, insomnia, diaphoresis (sweating), elevated blood pressure, ecchymosis (bruising), rash, headache, injection site reaction/pain, anaphylaxis.

## 2024-02-28 LAB
ALBUMIN SERPL ELPH-MCNC: 4.07 G/DL (ref 3.35–5.55)
ALPHA1 GLOB SERPL ELPH-MCNC: 0.28 G/DL (ref 0.17–0.41)
ALPHA2 GLOB SERPL ELPH-MCNC: 0.66 G/DL (ref 0.43–0.99)
B-GLOBULIN SERPL ELPH-MCNC: 0.81 G/DL (ref 0.5–1.1)
GAMMA GLOB SERPL ELPH-MCNC: 1.38 G/DL (ref 0.67–1.58)
INTERPRETATION SERPL IFE-IMP: NORMAL
PROT SERPL-MCNC: 7.2 G/DL (ref 6–8.4)

## 2024-02-29 LAB
LEVETIRACETAM SERPL-MCNC: <1 UG/ML (ref 3–60)
PATHOLOGIST INTERPRETATION IFE: NORMAL
PATHOLOGIST INTERPRETATION SPE: NORMAL

## 2024-02-29 NOTE — PROGRESS NOTES
LAB EVALUATION:    2-    LEV Level < 1.0     Level not detected.     Patient reported during visit she was taking treatment but lab reflect noncompliance

## 2024-03-07 ENCOUNTER — PATIENT MESSAGE (OUTPATIENT)
Dept: INFUSION THERAPY | Facility: HOSPITAL | Age: 49
End: 2024-03-07
Payer: COMMERCIAL

## 2024-03-13 ENCOUNTER — TELEPHONE (OUTPATIENT)
Dept: INFUSION THERAPY | Facility: HOSPITAL | Age: 49
End: 2024-03-13
Payer: COMMERCIAL

## 2024-04-16 ENCOUNTER — TELEPHONE (OUTPATIENT)
Dept: RHEUMATOLOGY | Facility: CLINIC | Age: 49
End: 2024-04-16
Payer: COMMERCIAL

## 2024-04-16 ENCOUNTER — OFFICE VISIT (OUTPATIENT)
Dept: HEMATOLOGY/ONCOLOGY | Facility: CLINIC | Age: 49
End: 2024-04-16
Payer: COMMERCIAL

## 2024-04-16 VITALS
HEART RATE: 73 BPM | RESPIRATION RATE: 18 BRPM | WEIGHT: 251.13 LBS | SYSTOLIC BLOOD PRESSURE: 130 MMHG | HEIGHT: 66 IN | TEMPERATURE: 98 F | DIASTOLIC BLOOD PRESSURE: 83 MMHG | OXYGEN SATURATION: 99 % | BODY MASS INDEX: 40.36 KG/M2

## 2024-04-16 DIAGNOSIS — M34.9 SCLERODERMA: ICD-10-CM

## 2024-04-16 DIAGNOSIS — M32.9 SLE (SYSTEMIC LUPUS ERYTHEMATOSUS RELATED SYNDROME): ICD-10-CM

## 2024-04-16 DIAGNOSIS — D50.9 IRON DEFICIENCY ANEMIA, UNSPECIFIED IRON DEFICIENCY ANEMIA TYPE: Primary | ICD-10-CM

## 2024-04-16 DIAGNOSIS — M35.09 SJOGREN'S SYNDROME WITH OTHER ORGAN INVOLVEMENT: ICD-10-CM

## 2024-04-16 DIAGNOSIS — D50.0 IRON DEFICIENCY ANEMIA DUE TO CHRONIC BLOOD LOSS: ICD-10-CM

## 2024-04-16 PROCEDURE — 3075F SYST BP GE 130 - 139MM HG: CPT | Mod: CPTII,S$GLB,, | Performed by: INTERNAL MEDICINE

## 2024-04-16 PROCEDURE — 99204 OFFICE O/P NEW MOD 45 MIN: CPT | Mod: S$GLB,,, | Performed by: INTERNAL MEDICINE

## 2024-04-16 PROCEDURE — 3008F BODY MASS INDEX DOCD: CPT | Mod: CPTII,S$GLB,, | Performed by: INTERNAL MEDICINE

## 2024-04-16 PROCEDURE — 99999 PR PBB SHADOW E&M-EST. PATIENT-LVL V: CPT | Mod: PBBFAC,,, | Performed by: INTERNAL MEDICINE

## 2024-04-16 PROCEDURE — 1160F RVW MEDS BY RX/DR IN RCRD: CPT | Mod: CPTII,S$GLB,, | Performed by: INTERNAL MEDICINE

## 2024-04-16 PROCEDURE — 1159F MED LIST DOCD IN RCRD: CPT | Mod: CPTII,S$GLB,, | Performed by: INTERNAL MEDICINE

## 2024-04-16 PROCEDURE — 3044F HG A1C LEVEL LT 7.0%: CPT | Mod: CPTII,S$GLB,, | Performed by: INTERNAL MEDICINE

## 2024-04-16 PROCEDURE — 3079F DIAST BP 80-89 MM HG: CPT | Mod: CPTII,S$GLB,, | Performed by: INTERNAL MEDICINE

## 2024-04-16 RX ORDER — EPINEPHRINE 0.3 MG/.3ML
0.3 INJECTION SUBCUTANEOUS ONCE AS NEEDED
Status: CANCELLED | OUTPATIENT
Start: 2024-04-23

## 2024-04-16 RX ORDER — EPINEPHRINE 0.3 MG/.3ML
0.3 INJECTION SUBCUTANEOUS ONCE AS NEEDED
Status: CANCELLED | OUTPATIENT
Start: 2024-04-16

## 2024-04-16 RX ORDER — SODIUM CHLORIDE 0.9 % (FLUSH) 0.9 %
10 SYRINGE (ML) INJECTION
Status: CANCELLED | OUTPATIENT
Start: 2024-04-23

## 2024-04-16 RX ORDER — DIPHENHYDRAMINE HYDROCHLORIDE 50 MG/ML
50 INJECTION INTRAMUSCULAR; INTRAVENOUS ONCE AS NEEDED
Status: CANCELLED | OUTPATIENT
Start: 2024-04-16

## 2024-04-16 RX ORDER — HEPARIN 100 UNIT/ML
500 SYRINGE INTRAVENOUS
Status: CANCELLED | OUTPATIENT
Start: 2024-04-23

## 2024-04-16 RX ORDER — DIPHENHYDRAMINE HYDROCHLORIDE 50 MG/ML
50 INJECTION INTRAMUSCULAR; INTRAVENOUS ONCE AS NEEDED
Status: CANCELLED | OUTPATIENT
Start: 2024-04-23

## 2024-04-16 RX ORDER — HEPARIN 100 UNIT/ML
500 SYRINGE INTRAVENOUS
Status: CANCELLED | OUTPATIENT
Start: 2024-04-16

## 2024-04-16 RX ORDER — SODIUM CHLORIDE 0.9 % (FLUSH) 0.9 %
10 SYRINGE (ML) INJECTION
Status: CANCELLED | OUTPATIENT
Start: 2024-04-16

## 2024-04-16 NOTE — PROGRESS NOTES
Subjective:       Patient ID: Zachary Lucia is a 48 y.o. female.    Chief Complaint: Results and Anemia    HPI:  48-year-old female with documented iron deficiency nonresponsive in intolerant to oral iron.  Patient history of scleroderma would recommend intravenous iron for evaluation EGD colonoscopies completed in January of 2023    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    Scleroderma     Seizures 01/12/2017     Family History   Problem Relation Name Age of Onset    Arthritis Mother Radha Escalona     Glaucoma Mother Radha Escalona     Hypertension Mother Radha Escalona     Diabetes Mother Radha Escalona     Heart disease Mother Radha Escalona     Stroke Mother Radha Escalona     Diabetes Father Ace Green     Cancer Father Ace Green     Diabetes Maternal Grandmother Rose Marx     Hypertension Maternal Grandmother Rose Marx     Arthritis Maternal Grandmother Rose Marx     Cataracts Maternal Grandmother Rose Marx     Diabetes Maternal Grandfather Jesus Marx, Jr     Heart disease Maternal Grandfather Jesus Marx, Jr     Hypertension Maternal Grandfather Jesus Marx, Jr     Diabetes Paternal Grandmother Bess Green     Heart disease Paternal Grandmother Bess Green     Hypertension Paternal Grandmother Bess Green     Asthma Paternal Grandmother Bess Green     Heart disease Paternal Grandfather Germán Green, Sr.     Cancer Maternal Uncle Aramisamina Maxr     Hypertension Maternal Uncle Aramisamina Marx     Cancer Maternal Uncle Alleghany Marx     Cancer Paternal Aunt Angie Peter Farah     Cancer Paternal Aunt Amy Peter Cedillo     Cancer Paternal Aunt Marrioyudy Bynum     Heart disease Maternal Uncle Ricardo Marx, Sr.     Hypertension Maternal Uncle Ricardo Marx, Sr.      Social History     Socioeconomic  History    Marital status:      Spouse name: Darrell    Number of children: 0   Tobacco Use    Smoking status: Never     Passive exposure: Never    Smokeless tobacco: Never   Substance and Sexual Activity    Alcohol use: Not Currently     Alcohol/week: 2.0 standard drinks of alcohol     Types: 2 Glasses of wine per week     Comment: OCC    Drug use: No    Sexual activity: Yes     Partners: Male     Social Determinants of Health     Financial Resource Strain: Low Risk  (2/7/2024)    Received from [facility]    Overall Financial Resource Strain (CARDIA)     Difficulty of Paying Living Expenses: Not hard at all   Food Insecurity: Patient Declined (1/19/2024)    Hunger Vital Sign     Worried About Running Out of Food in the Last Year: Patient declined     Ran Out of Food in the Last Year: Patient declined   Transportation Needs: Unknown (2/7/2024)    Received from [facility]    PRAPARE - Transportation     Lack of Transportation (Medical): No   Physical Activity: Inactive (2/7/2024)    Received from [facility]    Exercise Vital Sign     Days of Exercise per Week: 0 days     Minutes of Exercise per Session: 0 min   Stress: No Stress Concern Present (1/19/2024)    [employer] [place] [employer] - Occupational Stress Questionnaire     Feeling of Stress : Only a little   Social Connections: Unknown (1/19/2024)    Social Connection and Isolation Panel [NHANES]     Frequency of Communication with Friends and Family: More than three times a week     Frequency of Social Gatherings with Friends and Family: Twice a week     Active Member of Clubs or Organizations: No     Attends Club or Organization Meetings: Never     Marital Status:    Housing Stability: Unknown (1/19/2024)    Housing Stability Vital Sign     Unable to Pay for Housing in the Last Year: Patient declined     Number of Places Lived in the Last Year: 1     Unstable  Housing in the Last Year: No     Past Surgical History:   Procedure Laterality Date    COLONOSCOPY N/A 01/12/2023    Procedure: COLONOSCOPY;  Surgeon: Supriya Hughes MD;  Location: [facility] ENDO;  Service: Endoscopy;  Laterality: N/A;    ESOPHAGOGASTRODUODENOSCOPY N/A 01/12/2023    Procedure: EGD (ESOPHAGOGASTRODUODENOSCOPY);  Surgeon: Supriya Hughes MD;  Location: [facility] ENDO;  Service: Endoscopy;  Laterality: N/A;    LEFT HEART CATHETERIZATION Left 01/25/2022    Procedure: CATHETERIZATION, HEART, LEFT;  Surgeon: Rocío Davidson MD;  Location: [facility] CATH LAB;  Service: Cardiology;  Laterality: Left;    RENAL BIOPSY  11/16/2018       Labs:  Lab Results   Component Value Date    WBC 4.80 02/27/2024    HGB 10.3 (L) 02/27/2024    HCT 32.7 (L) 02/27/2024    MCV 79 (L) 02/27/2024     02/27/2024     BMP  Lab Results   Component Value Date     02/27/2024    K 3.7 02/27/2024     02/27/2024    CO2 26 02/27/2024    BUN 8 02/27/2024    CREATININE 0.9 02/27/2024    CALCIUM 9.4 02/27/2024    ANIONGAP 7 (L) 02/27/2024    ESTGFRAFRICA >60 05/11/2022    EGFRNONAA >60 05/11/2022     Lab Results   Component Value Date    ALT 14 02/27/2024    AST 14 02/27/2024    ALKPHOS 49 (L) 02/27/2024    BILITOT 0.4 02/27/2024       Lab Results   Component Value Date    IRON 35 02/19/2024    TIBC 437 02/19/2024    FERRITIN 10 (L) 02/19/2024     Lab Results   Component Value Date    NLSZSEBU83 1476 (H) 08/23/2023     Lab Results   Component Value Date    FOLATE 5.8 05/29/2019     Lab Results   Component Value Date    TSH 1.194 01/19/2024         Review of Systems   Constitutional:  Positive for activity change and appetite change. Negative for chills, diaphoresis, fatigue, fever and unexpected weight change.        Pica for ice   HENT:  Negative for congestion, dental problem, drooling, ear discharge, ear pain, facial swelling, hearing loss, mouth sores, nosebleeds, postnasal drip, rhinorrhea, sinus pressure, sneezing, sore throat,  tinnitus, trouble swallowing and voice change.    Eyes:  Negative for photophobia, pain, discharge, redness, itching and visual disturbance.   Respiratory:  Negative for cough, choking, chest tightness, shortness of breath, wheezing and stridor.    Cardiovascular:  Negative for chest pain, palpitations and leg swelling.   Gastrointestinal:  Negative for abdominal distention, abdominal pain, anal bleeding, blood in stool, constipation, diarrhea, nausea, rectal pain and vomiting.   Endocrine: Negative for cold intolerance, heat intolerance, polydipsia, polyphagia and polyuria.   Genitourinary:  Negative for decreased urine volume, difficulty urinating, dyspareunia, dysuria, enuresis, flank pain, frequency, genital sores, hematuria, menstrual problem, pelvic pain, urgency, vaginal bleeding, vaginal discharge and vaginal pain.   Musculoskeletal:  Negative for arthralgias, back pain, gait problem, joint swelling, myalgias, neck pain and neck stiffness.   Skin:  Negative for color change, pallor and rash.   Allergic/Immunologic: Negative for environmental allergies, food allergies and immunocompromised state.   Neurological:  Positive for weakness. Negative for dizziness, tremors, seizures, syncope, facial asymmetry, speech difficulty, light-headedness, numbness and headaches.   Hematological:  Negative for adenopathy. Does not bruise/bleed easily.   Psychiatric/Behavioral:  Negative for agitation, behavioral problems, confusion, decreased concentration, hallucinations, self-injury, sleep disturbance and suicidal ideas. The patient is not hyperactive.        Objective:      Physical Exam  Vitals reviewed.   Constitutional:       General: She is not in acute distress.     Appearance: Normal appearance. She is well-developed. She is obese. She is not diaphoretic.   HENT:      Head: Normocephalic and atraumatic.      Right Ear: External ear normal.      Left Ear: External ear normal.      Nose: Nose normal.      Right Sinus:  No maxillary sinus tenderness or frontal sinus tenderness.      Left Sinus: No maxillary sinus tenderness or frontal sinus tenderness.      Mouth/Throat:      Pharynx: No oropharyngeal exudate.   Eyes:      General: Lids are normal. No scleral icterus.        Right eye: No discharge.         Left eye: No discharge.      Conjunctiva/sclera: Conjunctivae normal.      Right eye: Right conjunctiva is not injected. No hemorrhage.     Left eye: Left conjunctiva is not injected. No hemorrhage.     Pupils: Pupils are equal, round, and reactive to light.   Neck:      Thyroid: No thyromegaly.      Vascular: No JVD.      Trachea: No tracheal deviation.   Cardiovascular:      Rate and Rhythm: Normal rate.   Pulmonary:      Effort: Pulmonary effort is normal. No respiratory distress.      Breath sounds: No stridor.   Chest:      Chest wall: No tenderness.   Abdominal:      General: Bowel sounds are normal. There is no distension.      Palpations: Abdomen is soft. There is no hepatomegaly, splenomegaly or mass.      Tenderness: There is no abdominal tenderness. There is no rebound.   Musculoskeletal:         General: No tenderness. Normal range of motion.      Cervical back: Normal range of motion and neck supple.   Lymphadenopathy:      Cervical: No cervical adenopathy.      Upper Body:      Right upper body: No supraclavicular adenopathy.      Left upper body: No supraclavicular adenopathy.   Skin:     General: Skin is dry.      Findings: No erythema or rash.   Neurological:      Mental Status: She is alert and oriented to person, place, and time.      Cranial Nerves: No cranial nerve deficit.      Coordination: Coordination normal.   Psychiatric:         Behavior: Behavior normal.         Thought Content: Thought content normal.         Judgment: Judgment normal.             Assessment:      1. Iron deficiency anemia, unspecified iron deficiency anemia type    2. Iron deficiency anemia due to chronic blood loss    3.  Scleroderma    4. Sjogren's syndrome with other organ involvement    5. SLE (systemic lupus erythematosus related syndrome)           Med Onc Chart Routing      Follow up with physician . Return to clinic to see either MD are APAP 2-3 months after day 8 of intravenous iron with CBC CMP serum iron TIBC ferritin prior   Follow up with JUAN CARLOS    Infusion scheduling note    Injection scheduling note Intravenous iron Feraheme orders written   Labs   Scheduling:  Preferred lab:  Lab interval:  CBC CMP serum iron TIBC and ferritin day of treatment day 1   Imaging    Pharmacy appointment    Other referrals         Refer referral made for video capsule              Plan:     History of documented iron deficiency.  Intolerant and nonresponsive to oral iron history of scleroderma.  EGD colonoscopies in January 2023 proceed with intravenous iron.  Follow-up in 2-3 months after day 8 of intravenous iron orders written reviewed article from up-to-date followed her for her review on iron deficiency        Florentino Duong Jr, MD FACP

## 2024-04-17 RX ORDER — EPINEPHRINE 0.3 MG/.3ML
0.3 INJECTION SUBCUTANEOUS ONCE AS NEEDED
Status: CANCELLED | OUTPATIENT
Start: 2024-04-17

## 2024-04-17 RX ORDER — HEPARIN 100 UNIT/ML
500 SYRINGE INTRAVENOUS
Status: CANCELLED | OUTPATIENT
Start: 2024-04-17

## 2024-04-17 RX ORDER — DIPHENHYDRAMINE HYDROCHLORIDE 50 MG/ML
50 INJECTION INTRAMUSCULAR; INTRAVENOUS ONCE AS NEEDED
Status: CANCELLED | OUTPATIENT
Start: 2024-04-17

## 2024-04-17 RX ORDER — SODIUM CHLORIDE 0.9 % (FLUSH) 0.9 %
10 SYRINGE (ML) INJECTION
Status: CANCELLED | OUTPATIENT
Start: 2024-04-17

## 2024-04-18 ENCOUNTER — TELEPHONE (OUTPATIENT)
Dept: INFUSION THERAPY | Facility: HOSPITAL | Age: 49
End: 2024-04-18
Payer: COMMERCIAL

## 2024-04-18 NOTE — TELEPHONE ENCOUNTER
Appeal approved. Benlysta including reinduction doses approval from 4/18/24-3/18/25. Routing team to inform.

## 2024-04-18 NOTE — TELEPHONE ENCOUNTER
Indication: Chest pain

 

Technique: One view of the chest

 

Comparison: 9/16/2019

 

Findings: Lungs and pleural spaces are clear. Heart size is normal.

 

Impression: No acute process Notified pt of approval. States that she has not heard from anyone at [facility]. Contacted Nurse Liaison, Rachel Dove with update. She will f/u with this case and reach out to the pt today and work on getting her scheduled.

## 2024-04-18 NOTE — TELEPHONE ENCOUNTER
Left message for Lidia at [facility] regarding recent Appeal / Approval for Benlysta IV. Following up with them to see when pt can be scheduled for start treatment.

## 2024-04-19 ENCOUNTER — TELEPHONE (OUTPATIENT)
Dept: INFUSION THERAPY | Facility: HOSPITAL | Age: 49
End: 2024-04-19
Payer: COMMERCIAL

## 2024-04-19 NOTE — TELEPHONE ENCOUNTER
[facility] to Lidia  with [facility] with fax confirmation. Fax# [phone]

## 2024-04-19 NOTE — TELEPHONE ENCOUNTER
"Spoke with Lidia LYON at [facility]. She received the Appeal approval letter that we faxed to her this morning. States she is not sure they can accept this letter as it does not mention [facility] as the infusion provider so they can "buy and bill". She will f/u with her billing dept and [employer] appeals dept. She will f/u with Ms. Lucia to update her.   "

## 2024-04-22 ENCOUNTER — PATIENT MESSAGE (OUTPATIENT)
Dept: ENDOSCOPY | Facility: HOSPITAL | Age: 49
End: 2024-04-22
Payer: COMMERCIAL

## 2024-04-25 ENCOUNTER — TELEPHONE (OUTPATIENT)
Dept: INFUSION THERAPY | Facility: HOSPITAL | Age: 49
End: 2024-04-25
Payer: COMMERCIAL

## 2024-04-25 NOTE — TELEPHONE ENCOUNTER
Follow up call to [facility]. States pt is approved to have her benlysta infusions at their facility. [facility] in [place] called the pt on 4/22 to set up first loading dose and pt stated that she will have to check with her employer before scheduling. They have not heard back from her. Attempted to contact pt. Message left for her to call Lynne in [facility]. At [phone]

## 2024-04-29 ENCOUNTER — INFUSION (OUTPATIENT)
Dept: INFUSION THERAPY | Facility: HOSPITAL | Age: 49
End: 2024-04-29
Attending: INTERNAL MEDICINE
Payer: COMMERCIAL

## 2024-04-29 VITALS
SYSTOLIC BLOOD PRESSURE: 133 MMHG | DIASTOLIC BLOOD PRESSURE: 85 MMHG | RESPIRATION RATE: 18 BRPM | TEMPERATURE: 98 F | OXYGEN SATURATION: 99 % | HEART RATE: 81 BPM

## 2024-04-29 DIAGNOSIS — D50.0 IRON DEFICIENCY ANEMIA DUE TO CHRONIC BLOOD LOSS: Primary | ICD-10-CM

## 2024-04-29 PROCEDURE — 96374 THER/PROPH/DIAG INJ IV PUSH: CPT

## 2024-04-29 PROCEDURE — 63600175 PHARM REV CODE 636 W HCPCS

## 2024-04-29 RX ORDER — HEPARIN 100 UNIT/ML
500 SYRINGE INTRAVENOUS
Status: CANCELLED | OUTPATIENT
Start: 2024-05-03

## 2024-04-29 RX ORDER — DIPHENHYDRAMINE HYDROCHLORIDE 50 MG/ML
50 INJECTION INTRAMUSCULAR; INTRAVENOUS ONCE AS NEEDED
Status: CANCELLED | OUTPATIENT
Start: 2024-05-03

## 2024-04-29 RX ORDER — EPINEPHRINE 0.3 MG/.3ML
0.3 INJECTION SUBCUTANEOUS ONCE AS NEEDED
Status: CANCELLED | OUTPATIENT
Start: 2024-05-03

## 2024-04-29 RX ORDER — SODIUM CHLORIDE 0.9 % (FLUSH) 0.9 %
10 SYRINGE (ML) INJECTION
Status: CANCELLED | OUTPATIENT
Start: 2024-05-03

## 2024-04-29 RX ADMIN — IRON SUCROSE 200 MG: 20 INJECTION, SOLUTION INTRAVENOUS at 07:04

## 2024-04-29 NOTE — PLAN OF CARE
Problem: Adult Inpatient Plan of Care  Goal: Plan of Care Review  Outcome: Progressing  Goal: Patient-Specific Goal (Individualized)  Outcome: Progressing  Goal: Optimal Comfort and Wellbeing  Outcome: Progressing     Problem: Anemia  Goal: Anemia Symptom Improvement  Outcome: Progressing

## 2024-04-29 NOTE — NURSING
Infusion # Venofer - 200 mg for 5 doses  First Dose      Recent labs? 2/27/24  Last Hem/Onc provider visit- Seen by Ad on 4/16/24     Premeds-N/A     Venofer 200 mg administered IV at a 5 minute rate per orders; see MAR and vitals for more details. Monitored for 30 post infusion for any s/sx of reaction. Tolerated well without adverse events, discharged and ambulatory out of clinic.

## 2024-05-06 ENCOUNTER — INFUSION (OUTPATIENT)
Dept: INFUSION THERAPY | Facility: HOSPITAL | Age: 49
End: 2024-05-06
Attending: INTERNAL MEDICINE
Payer: COMMERCIAL

## 2024-05-06 VITALS
SYSTOLIC BLOOD PRESSURE: 139 MMHG | OXYGEN SATURATION: 98 % | HEART RATE: 81 BPM | RESPIRATION RATE: 18 BRPM | DIASTOLIC BLOOD PRESSURE: 76 MMHG | TEMPERATURE: 98 F

## 2024-05-06 DIAGNOSIS — D50.0 IRON DEFICIENCY ANEMIA DUE TO CHRONIC BLOOD LOSS: Primary | ICD-10-CM

## 2024-05-06 PROCEDURE — 96374 THER/PROPH/DIAG INJ IV PUSH: CPT

## 2024-05-06 PROCEDURE — 63600175 PHARM REV CODE 636 W HCPCS

## 2024-05-06 RX ORDER — DIPHENHYDRAMINE HYDROCHLORIDE 50 MG/ML
50 INJECTION INTRAMUSCULAR; INTRAVENOUS ONCE AS NEEDED
Status: CANCELLED | OUTPATIENT
Start: 2024-05-13

## 2024-05-06 RX ORDER — EPINEPHRINE 0.3 MG/.3ML
0.3 INJECTION SUBCUTANEOUS ONCE AS NEEDED
Status: CANCELLED | OUTPATIENT
Start: 2024-05-13

## 2024-05-06 RX ORDER — HEPARIN 100 UNIT/ML
500 SYRINGE INTRAVENOUS
Status: CANCELLED | OUTPATIENT
Start: 2024-05-13

## 2024-05-06 RX ORDER — SODIUM CHLORIDE 0.9 % (FLUSH) 0.9 %
10 SYRINGE (ML) INJECTION
Status: CANCELLED | OUTPATIENT
Start: 2024-05-13

## 2024-05-06 RX ADMIN — IRON SUCROSE 200 MG: 20 INJECTION, SOLUTION INTRAVENOUS at 07:05

## 2024-05-06 NOTE — PLAN OF CARE
Problem: Adult Inpatient Plan of Care  Goal: Plan of Care Review  Outcome: Progressing  Flowsheets (Taken 5/6/2024 0700)  Plan of Care Reviewed With: patient  Goal: Optimal Comfort and Wellbeing  Outcome: Progressing  Intervention: Provide Person-Centered Care  Flowsheets (Taken 5/6/2024 0700)  Trust Relationship/Rapport:   care explained   choices provided   thoughts/feelings acknowledged   emotional support provided   empathic listening provided   questions encouraged   questions answered   reassurance provided

## 2024-05-08 ENCOUNTER — TELEPHONE (OUTPATIENT)
Dept: INFUSION THERAPY | Facility: HOSPITAL | Age: 49
End: 2024-05-08
Payer: COMMERCIAL

## 2024-05-08 NOTE — TELEPHONE ENCOUNTER
F/u call to see if pt scheduled her first Benlysta infusion with [facility]. States they called her again this morning but she has been unable to make an appt due to her work schedule. She will try to work something out and call [facility] back to schedule.

## 2024-05-09 ENCOUNTER — TELEPHONE (OUTPATIENT)
Dept: RHEUMATOLOGY | Facility: CLINIC | Age: 49
End: 2024-05-09
Payer: COMMERCIAL

## 2024-05-09 NOTE — TELEPHONE ENCOUNTER
----- Message from Elke Jimenez sent at 5/9/2024  7:35 AM CDT -----  Regarding: Provider Calling  Contact: Dashawn  Type:  Needs Medical Advice    Who Called: dashawn  Symptoms (please be specific):    How long has patient had these symptoms:    Pharmacy name and phone #:    Would the patient rather a call back or a response via [facility]? call  Best Call Back Number: [phone]  Additional Information:  Dashawn with [facility] is requesting a callback from the nurse today in regard to scheduling a patient that you referred to them.

## 2024-05-09 NOTE — PLAN OF CARE
POC reviewed with pt, pt verbalized understanding.   Pt ambulated to bathroom.   Will continue to monitor.   How Severe Is This Condition?: mild

## 2024-05-13 ENCOUNTER — INFUSION (OUTPATIENT)
Dept: INFUSION THERAPY | Facility: HOSPITAL | Age: 49
End: 2024-05-13
Attending: INTERNAL MEDICINE
Payer: COMMERCIAL

## 2024-05-13 VITALS
HEART RATE: 81 BPM | SYSTOLIC BLOOD PRESSURE: 131 MMHG | RESPIRATION RATE: 18 BRPM | TEMPERATURE: 98 F | DIASTOLIC BLOOD PRESSURE: 81 MMHG | OXYGEN SATURATION: 97 %

## 2024-05-13 DIAGNOSIS — D50.0 IRON DEFICIENCY ANEMIA DUE TO CHRONIC BLOOD LOSS: Primary | ICD-10-CM

## 2024-05-13 PROCEDURE — A4216 STERILE WATER/SALINE, 10 ML: HCPCS | Performed by: INTERNAL MEDICINE

## 2024-05-13 PROCEDURE — 63600175 PHARM REV CODE 636 W HCPCS

## 2024-05-13 PROCEDURE — 96374 THER/PROPH/DIAG INJ IV PUSH: CPT

## 2024-05-13 PROCEDURE — 25000003 PHARM REV CODE 250: Performed by: INTERNAL MEDICINE

## 2024-05-13 RX ORDER — SODIUM CHLORIDE 0.9 % (FLUSH) 0.9 %
10 SYRINGE (ML) INJECTION
Status: DISCONTINUED | OUTPATIENT
Start: 2024-05-13 | End: 2024-05-13 | Stop reason: HOSPADM

## 2024-05-13 RX ORDER — SODIUM CHLORIDE 0.9 % (FLUSH) 0.9 %
10 SYRINGE (ML) INJECTION
Status: CANCELLED | OUTPATIENT
Start: 2024-05-20

## 2024-05-13 RX ORDER — DIPHENHYDRAMINE HYDROCHLORIDE 50 MG/ML
50 INJECTION INTRAMUSCULAR; INTRAVENOUS ONCE AS NEEDED
Status: CANCELLED | OUTPATIENT
Start: 2024-05-20

## 2024-05-13 RX ORDER — HEPARIN 100 UNIT/ML
500 SYRINGE INTRAVENOUS
Status: CANCELLED | OUTPATIENT
Start: 2024-05-20

## 2024-05-13 RX ORDER — EPINEPHRINE 0.3 MG/.3ML
0.3 INJECTION SUBCUTANEOUS ONCE AS NEEDED
Status: CANCELLED | OUTPATIENT
Start: 2024-05-20

## 2024-05-13 RX ADMIN — IRON SUCROSE 200 MG: 20 INJECTION, SOLUTION INTRAVENOUS at 07:05

## 2024-05-13 RX ADMIN — Medication 10 ML: at 07:05

## 2024-05-13 NOTE — PLAN OF CARE
Problem: Adult Inpatient Plan of Care  Goal: Plan of Care Review  Outcome: Progressing  Flowsheets (Taken 5/13/2024 0729)  Plan of Care Reviewed With: patient  Goal: Optimal Comfort and Wellbeing  Outcome: Progressing  Intervention: Provide Person-Centered Care  Flowsheets (Taken 5/13/2024 0729)  Trust Relationship/Rapport:   care explained   questions encouraged   choices provided   reassurance provided   emotional support provided   thoughts/feelings acknowledged   empathic listening provided   questions answered

## 2024-05-20 ENCOUNTER — INFUSION (OUTPATIENT)
Dept: INFUSION THERAPY | Facility: HOSPITAL | Age: 49
End: 2024-05-20
Attending: INTERNAL MEDICINE
Payer: COMMERCIAL

## 2024-05-20 VITALS
HEART RATE: 77 BPM | TEMPERATURE: 98 F | SYSTOLIC BLOOD PRESSURE: 140 MMHG | OXYGEN SATURATION: 98 % | RESPIRATION RATE: 18 BRPM | DIASTOLIC BLOOD PRESSURE: 82 MMHG

## 2024-05-20 DIAGNOSIS — D50.0 IRON DEFICIENCY ANEMIA DUE TO CHRONIC BLOOD LOSS: Primary | ICD-10-CM

## 2024-05-20 PROCEDURE — 96374 THER/PROPH/DIAG INJ IV PUSH: CPT

## 2024-05-20 PROCEDURE — 63600175 PHARM REV CODE 636 W HCPCS

## 2024-05-20 RX ORDER — DIPHENHYDRAMINE HYDROCHLORIDE 50 MG/ML
50 INJECTION INTRAMUSCULAR; INTRAVENOUS ONCE AS NEEDED
OUTPATIENT
Start: 2024-05-27

## 2024-05-20 RX ORDER — EPINEPHRINE 0.3 MG/.3ML
0.3 INJECTION SUBCUTANEOUS ONCE AS NEEDED
OUTPATIENT
Start: 2024-05-27

## 2024-05-20 RX ORDER — SODIUM CHLORIDE 0.9 % (FLUSH) 0.9 %
10 SYRINGE (ML) INJECTION
OUTPATIENT
Start: 2024-05-27

## 2024-05-20 RX ORDER — HEPARIN 100 UNIT/ML
500 SYRINGE INTRAVENOUS
OUTPATIENT
Start: 2024-05-27

## 2024-05-20 RX ADMIN — IRON SUCROSE 200 MG: 20 INJECTION, SOLUTION INTRAVENOUS at 07:05

## 2024-05-20 NOTE — PLAN OF CARE
Discussed plan of care with pt. Addressed any and ongoing concerns. Pt denies    Problem: Adult Inpatient Plan of Care  Goal: Plan of Care Review  Outcome: Progressing  Flowsheets (Taken 5/20/2024 0722)  Plan of Care Reviewed With: patient  Goal: Absence of Hospital-Acquired Illness or Injury  Outcome: Progressing  Intervention: Identify and Manage Fall Risk  Flowsheets (Taken 5/20/2024 0722)  Safety Promotion/Fall Prevention:   room near unit station   in recliner, wheels locked   nonskid shoes/socks when out of bed  Intervention: Prevent Infection  Flowsheets (Taken 5/20/2024 0722)  Infection Prevention:   hand hygiene promoted   equipment surfaces disinfected  Goal: Optimal Comfort and Wellbeing  Outcome: Progressing  Intervention: Monitor Pain and Promote Comfort  Flowsheets (Taken 5/20/2024 0722)  Pain Management Interventions:   relaxation techniques promoted   quiet environment facilitated  Intervention: Provide Person-Centered Care  Flowsheets (Taken 5/20/2024 0722)  Trust Relationship/Rapport:   care explained   questions encouraged   reassurance provided   choices provided   emotional support provided   thoughts/feelings acknowledged   empathic listening provided   questions answered

## 2024-05-21 ENCOUNTER — TELEPHONE (OUTPATIENT)
Dept: INFUSION THERAPY | Facility: HOSPITAL | Age: 49
End: 2024-05-21
Payer: COMMERCIAL

## 2024-05-21 DIAGNOSIS — M32.9 SLE (SYSTEMIC LUPUS ERYTHEMATOSUS RELATED SYNDROME): Primary | ICD-10-CM

## 2024-05-21 RX ORDER — BELIMUMAB 200 MG/ML
200 SOLUTION SUBCUTANEOUS
Qty: 4 ML | Refills: 4 | Status: ACTIVE | OUTPATIENT
Start: 2024-05-21

## 2024-05-21 NOTE — PROGRESS NOTES
Pt decided against IV benlysta due to time constraints and interfering w work.  I've put in new orders for SQ dosing.

## 2024-05-22 ENCOUNTER — HOSPITAL ENCOUNTER (OUTPATIENT)
Facility: HOSPITAL | Age: 49
Discharge: HOME | End: 2024-05-22
Attending: INTERNAL MEDICINE | Admitting: INTERNAL MEDICINE
Payer: COMMERCIAL

## 2024-05-22 PROCEDURE — 91110 GI TRC IMG INTRAL ESOPH-ILE: CPT | Mod: TC | Performed by: INTERNAL MEDICINE

## 2024-05-22 PROCEDURE — 27201489 HC PILLCAM CAPSULE

## 2024-05-30 PROCEDURE — 91110 GI TRC IMG INTRAL ESOPH-ILE: CPT | Mod: 26,,, | Performed by: INTERNAL MEDICINE

## 2024-05-30 NOTE — [employer] PATIENT INSTRUCTIONS
Discharge Summary/Instructions for after Video Capsule Endoscopy  Patient Name: Zachary Lucia  Patient MRN: [id number]  Patient   YOB: 1975  Wednesday, May 22, 2024  Meggan Baker MD  1.  Do Not eat or drink anything for 1 hour.  Try sips of water first.  If   tolerated, resume your regular diet or one recommended by your physician.  2.  Do not drive, operate machinery, make critical decisions, or do   activities that require coordination or balance for 24 hours.  3.   You may experience a sore throat for 24 to 48 hours.  You may use   throat lozenges or gargle with warm salt water to relieve the discomfort.  4.  Because air was put into your stomach during the procedure, you may   experience some belching.  5.  Do not use any medication containing aspirin for 10 days.  6.  Go directly to the emergency room if you notice any of the following:   Chills and/or fever over 101 F   Persistent vomiting or vomiting with blood/nasal regurgitation   Severe abdominal pain, other than gas cramps   Severe chest pain   Black, tarry stools     Your doctor recommends these additional instructions:  - Discharge patient to home.   - Resume previous diet.   - Continue present medications.   - Return to referring physician.  For questions, problems or results please call your physician Meggan Baker MD at Work:  [phone]  If you have any questions about the above instructions, call the GI   department at [phone] or call the endoscopy unit at [phone]   from 7am until 3 pm.  [facility], EMERGENCY ROOM PHONE NUMBER:   [phone]  IF A COMPLICATION OR EMERGENCY SITUATION ARISES AND YOU ARE UNABLE TO REACH   YOUR PHYSICIAN - GO DIRECTLY TO THE EMERGENCY ROOM.  I have read or have had read to me these discharge instructions for my   procedure and have received a written copy.  I understand these   instructions and will follow-up with my physician if I have  any questions.     __________________________________       _____________________________________  Nurse Signature                                          Patient/Designated   Responsible Party Signature  Meggan Baker MD  5/30/2024 5:14:12 PM  This report has been verified and signed electronically.  Dear patient,  As a result of recent federal legislation (The Federal Cures Act), you may   receive lab or pathology results from your procedure in your [facility]   account before your physician is able to contact you. Your physician or   their representative will relay the results to you with their   recommendations at their soonest availability.  Thank you,  [employer]

## 2024-06-26 ENCOUNTER — LAB VISIT (OUTPATIENT)
Dept: LAB | Facility: HOSPITAL | Age: 49
End: 2024-06-26
Attending: INTERNAL MEDICINE
Payer: COMMERCIAL

## 2024-06-26 ENCOUNTER — OFFICE VISIT (OUTPATIENT)
Dept: OPHTHALMOLOGY | Facility: CLINIC | Age: 49
End: 2024-06-26
Payer: COMMERCIAL

## 2024-06-26 ENCOUNTER — PATIENT MESSAGE (OUTPATIENT)
Dept: OPHTHALMOLOGY | Facility: CLINIC | Age: 49
End: 2024-06-26

## 2024-06-26 DIAGNOSIS — D50.0 IRON DEFICIENCY ANEMIA DUE TO CHRONIC BLOOD LOSS: ICD-10-CM

## 2024-06-26 DIAGNOSIS — Z79.899 LONG-TERM USE OF PLAQUENIL: ICD-10-CM

## 2024-06-26 DIAGNOSIS — M35.00 SJOGREN'S SYNDROME, WITH UNSPECIFIED ORGAN INVOLVEMENT: Primary | ICD-10-CM

## 2024-06-26 LAB
ALBUMIN SERPL BCP-MCNC: 3.9 G/DL (ref 3.5–5.2)
ALP SERPL-CCNC: 52 U/L (ref 55–135)
ALT SERPL W/O P-5'-P-CCNC: 22 U/L (ref 10–44)
ANION GAP SERPL CALC-SCNC: 12 MMOL/L (ref 8–16)
AST SERPL-CCNC: 15 U/L (ref 10–40)
BASOPHILS # BLD AUTO: 0.03 K/UL (ref 0–0.2)
BASOPHILS NFR BLD: 0.5 % (ref 0–1.9)
BILIRUB SERPL-MCNC: 0.3 MG/DL (ref 0.1–1)
BUN SERPL-MCNC: 16 MG/DL (ref 6–20)
CALCIUM SERPL-MCNC: 9.5 MG/DL (ref 8.7–10.5)
CHLORIDE SERPL-SCNC: 104 MMOL/L (ref 95–110)
CO2 SERPL-SCNC: 25 MMOL/L (ref 23–29)
CREAT SERPL-MCNC: 1.1 MG/DL (ref 0.5–1.4)
DIFFERENTIAL METHOD BLD: ABNORMAL
EOSINOPHIL # BLD AUTO: 0.2 K/UL (ref 0–0.5)
EOSINOPHIL NFR BLD: 2.9 % (ref 0–8)
ERYTHROCYTE [DISTWIDTH] IN BLOOD BY AUTOMATED COUNT: 15.1 % (ref 11.5–14.5)
EST. GFR  (NO RACE VARIABLE): >60 ML/MIN/1.73 M^2
FERRITIN SERPL-MCNC: 57 NG/ML (ref 20–300)
GLUCOSE SERPL-MCNC: 79 MG/DL (ref 70–110)
HCT VFR BLD AUTO: 39.9 % (ref 37–48.5)
HGB BLD-MCNC: 12.8 G/DL (ref 12–16)
IMM GRANULOCYTES # BLD AUTO: 0.01 K/UL (ref 0–0.04)
IMM GRANULOCYTES NFR BLD AUTO: 0.2 % (ref 0–0.5)
IRON SERPL-MCNC: 61 UG/DL (ref 30–160)
LYMPHOCYTES # BLD AUTO: 2.1 K/UL (ref 1–4.8)
LYMPHOCYTES NFR BLD: 35.6 % (ref 18–48)
MCH RBC QN AUTO: 27.4 PG (ref 27–31)
MCHC RBC AUTO-ENTMCNC: 32.1 G/DL (ref 32–36)
MCV RBC AUTO: 85 FL (ref 82–98)
MONOCYTES # BLD AUTO: 0.6 K/UL (ref 0.3–1)
MONOCYTES NFR BLD: 9.6 % (ref 4–15)
NEUTROPHILS # BLD AUTO: 3 K/UL (ref 1.8–7.7)
NEUTROPHILS NFR BLD: 51.2 % (ref 38–73)
NRBC BLD-RTO: 0 /100 WBC
PLATELET # BLD AUTO: 322 K/UL (ref 150–450)
PMV BLD AUTO: 8.9 FL (ref 9.2–12.9)
POTASSIUM SERPL-SCNC: 4 MMOL/L (ref 3.5–5.1)
PROT SERPL-MCNC: 7.3 G/DL (ref 6–8.4)
RBC # BLD AUTO: 4.68 M/UL (ref 4–5.4)
SATURATED IRON: 16 % (ref 20–50)
SODIUM SERPL-SCNC: 141 MMOL/L (ref 136–145)
TOTAL IRON BINDING CAPACITY: 386 UG/DL (ref 250–450)
TRANSFERRIN SERPL-MCNC: 261 MG/DL (ref 200–375)
WBC # BLD AUTO: 5.93 K/UL (ref 3.9–12.7)

## 2024-06-26 PROCEDURE — 92083 EXTENDED VISUAL FIELD XM: CPT | Mod: S$GLB,,, | Performed by: OPTOMETRIST

## 2024-06-26 PROCEDURE — 1159F MED LIST DOCD IN RCRD: CPT | Mod: CPTII,S$GLB,, | Performed by: OPTOMETRIST

## 2024-06-26 PROCEDURE — 99999 PR PBB SHADOW E&M-EST. PATIENT-LVL III: CPT | Mod: PBBFAC,,, | Performed by: OPTOMETRIST

## 2024-06-26 PROCEDURE — 3044F HG A1C LEVEL LT 7.0%: CPT | Mod: CPTII,S$GLB,, | Performed by: OPTOMETRIST

## 2024-06-26 PROCEDURE — 92134 CPTRZ OPH DX IMG PST SGM RTA: CPT | Mod: S$GLB,,, | Performed by: OPTOMETRIST

## 2024-06-26 PROCEDURE — 92015 DETERMINE REFRACTIVE STATE: CPT | Mod: S$GLB,,, | Performed by: OPTOMETRIST

## 2024-06-26 PROCEDURE — 83540 ASSAY OF IRON: CPT | Performed by: INTERNAL MEDICINE

## 2024-06-26 PROCEDURE — 80053 COMPREHEN METABOLIC PANEL: CPT | Performed by: INTERNAL MEDICINE

## 2024-06-26 PROCEDURE — 36415 COLL VENOUS BLD VENIPUNCTURE: CPT | Performed by: INTERNAL MEDICINE

## 2024-06-26 PROCEDURE — 85025 COMPLETE CBC W/AUTO DIFF WBC: CPT | Performed by: INTERNAL MEDICINE

## 2024-06-26 PROCEDURE — 82728 ASSAY OF FERRITIN: CPT | Performed by: INTERNAL MEDICINE

## 2024-06-26 PROCEDURE — 92014 COMPRE OPH EXAM EST PT 1/>: CPT | Mod: S$GLB,,, | Performed by: OPTOMETRIST

## 2024-06-26 NOTE — PROGRESS NOTES
SUBJECTIVE  Zachary Lucia is 48 y.o. female  Uncorrected distance visual acuity was 20/20 in the right eye and 20/20 in the left eye. Uncorrected near visual acuity was J2 in the right eye and J2 in the left eye.   Chief Complaint   Patient presents with    Plaquenil Exam     Plaquenil x 2018           HPI     Plaquenil Exam     Additional comments: Plaquenil x 2018            Comments    Patient states decrease with overall vision and trouble with focusing.  Trouble with dry eyes and using otc drops prn.   Wear glasses but did not bring them today.             Last edited by Michelle Hodgson on 6/26/2024  4:08 PM.         Assessment /Plan :  1. Sjogren's syndrome, with unspecified organ involvement    2. Long-term use of Plaquenil    No active anterior or posterior uveitis OU.  No ocular changes from Plaquenil use  Normal VF and mOCT OU    Dispense Final Rx for glasses.  RTC 1 year  Discussed above and answered questions.

## 2024-07-01 ENCOUNTER — OFFICE VISIT (OUTPATIENT)
Dept: HEMATOLOGY/ONCOLOGY | Facility: CLINIC | Age: 49
End: 2024-07-01
Payer: COMMERCIAL

## 2024-07-01 VITALS
RESPIRATION RATE: 20 BRPM | DIASTOLIC BLOOD PRESSURE: 87 MMHG | OXYGEN SATURATION: 98 % | WEIGHT: 254.88 LBS | HEART RATE: 73 BPM | TEMPERATURE: 99 F | BODY MASS INDEX: 40.96 KG/M2 | SYSTOLIC BLOOD PRESSURE: 141 MMHG | HEIGHT: 66 IN

## 2024-07-01 DIAGNOSIS — D50.0 IRON DEFICIENCY ANEMIA DUE TO CHRONIC BLOOD LOSS: Primary | ICD-10-CM

## 2024-07-01 DIAGNOSIS — M35.09 SJOGREN'S SYNDROME WITH OTHER ORGAN INVOLVEMENT: ICD-10-CM

## 2024-07-01 DIAGNOSIS — M34.9 SCLERODERMA: ICD-10-CM

## 2024-07-01 PROCEDURE — 1159F MED LIST DOCD IN RCRD: CPT | Mod: CPTII,S$GLB,, | Performed by: INTERNAL MEDICINE

## 2024-07-01 PROCEDURE — 3044F HG A1C LEVEL LT 7.0%: CPT | Mod: CPTII,S$GLB,, | Performed by: INTERNAL MEDICINE

## 2024-07-01 PROCEDURE — 1160F RVW MEDS BY RX/DR IN RCRD: CPT | Mod: CPTII,S$GLB,, | Performed by: INTERNAL MEDICINE

## 2024-07-01 PROCEDURE — 99999 PR PBB SHADOW E&M-EST. PATIENT-LVL IV: CPT | Mod: PBBFAC,,, | Performed by: INTERNAL MEDICINE

## 2024-07-01 PROCEDURE — 3077F SYST BP >= 140 MM HG: CPT | Mod: CPTII,S$GLB,, | Performed by: INTERNAL MEDICINE

## 2024-07-01 PROCEDURE — 3079F DIAST BP 80-89 MM HG: CPT | Mod: CPTII,S$GLB,, | Performed by: INTERNAL MEDICINE

## 2024-07-01 PROCEDURE — 3008F BODY MASS INDEX DOCD: CPT | Mod: CPTII,S$GLB,, | Performed by: INTERNAL MEDICINE

## 2024-07-01 PROCEDURE — 99214 OFFICE O/P EST MOD 30 MIN: CPT | Mod: S$GLB,,, | Performed by: INTERNAL MEDICINE

## 2024-07-01 NOTE — PROGRESS NOTES
Subjective:       Patient ID: Zachary Lucia is a 48 y.o. female.    Chief Complaint: Results and Anemia    HPI:  48-year-old female history of iron deficiency.  Treated with 4 doses of IV Venofer.  Patient has had Pica prior none now.  Recent upper endoscopies no abnormalities noted video capsule suggestion of possible gastritis.    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    Scleroderma     Seizures 01/12/2017     Family History   Problem Relation Name Age of Onset    Arthritis Mother Radha Escalona     Glaucoma Mother Radha Escalona     Hypertension Mother Radha Escalona     Diabetes Mother Radha Escalona     Heart disease Mother Radha Escalona     Stroke Mother Radha Escalona     Diabetes Father Ace Green     Cancer Father Ace Green     Diabetes Maternal Grandmother Rose Marx     Hypertension Maternal Grandmother Rose Marx     Arthritis Maternal Grandmother Rose Marx     Cataracts Maternal Grandmother Rose Marx     Diabetes Maternal Grandfather Jesus Marx, Jr     Heart disease Maternal Grandfather Jesus Marx, Jr     Hypertension Maternal Grandfather Jesus Marx, Jr     Diabetes Paternal Grandmother Bess Green     Heart disease Paternal Grandmother Bess Green     Hypertension Paternal Grandmother Bess Green     Asthma Paternal Grandmother Bess Green     Heart disease Paternal Grandfather Germán Peter, Sr.     Cancer Maternal Uncle Aramisamina Marx     Hypertension Maternal Uncle Aramisamina Marx     Cancer Maternal Uncle Marco Aantonio Wellingtones     Cancer Paternal Aunt Angie Peter Farah     Cancer Paternal Aunt Amy Gastondaron Cedillo     Cancer Paternal Aunt Marsonia Bynum     Heart disease Maternal Uncle Ricardo Marx, Sr.     Hypertension Maternal Uncle Ricardo Marx, Sr.      Social History     Socioeconomic  History    Marital status:      Spouse name: Darrell    Number of children: 0   Tobacco Use    Smoking status: Never     Passive exposure: Never    Smokeless tobacco: Never   Substance and Sexual Activity    Alcohol use: Not Currently     Alcohol/week: 2.0 standard drinks of alcohol     Types: 2 Glasses of wine per week     Comment: OCC    Drug use: No    Sexual activity: Yes     Partners: Male     Social Determinants of Health     Financial Resource Strain: Low Risk  (2/7/2024)    Received from [facility]    Overall Financial Resource Strain (CARDIA)     Difficulty of Paying Living Expenses: Not hard at all   Food Insecurity: Patient Declined (1/19/2024)    Hunger Vital Sign     Worried About Running Out of Food in the Last Year: Patient declined     Ran Out of Food in the Last Year: Patient declined   Transportation Needs: Unknown (2/7/2024)    Received from [facility]    PRAPARE - Transportation     Lack of Transportation (Medical): No   Physical Activity: Inactive (2/7/2024)    Received from [facility]    Exercise Vital Sign     Days of Exercise per Week: 0 days     Minutes of Exercise per Session: 0 min   Stress: No Stress Concern Present (1/19/2024)    [employer] [place] [employer] - Occupational Stress Questionnaire     Feeling of Stress : Only a little   Housing Stability: Unknown (1/19/2024)    Housing Stability Vital Sign     Unable to Pay for Housing in the Last Year: Patient declined     Number of Places Lived in the Last Year: 1     Unstable Housing in the Last Year: No     Past Surgical History:   Procedure Laterality Date    COLONOSCOPY N/A 01/12/2023    Procedure: COLONOSCOPY;  Surgeon: Supriya Hughes MD;  Location: [facility];  Service: Endoscopy;  Laterality: N/A;    ESOPHAGOGASTRODUODENOSCOPY N/A 01/12/2023    Procedure: EGD (ESOPHAGOGASTRODUODENOSCOPY);  Surgeon: Supriya Hughes MD;  Location: [facility];   Service: Endoscopy;  Laterality: N/A;    INTRALUMINAL GASTROINTESTINAL TRACT IMAGING VIA CAPSULE N/A 5/22/2024    Procedure: IMAGING PROCEDURE, GI TRACT, INTRALUMINAL, VIA CAPSULE;  Surgeon: Sheila Maurice, First Available;  Location: [facility];  Service: Endoscopy;  Laterality: N/A;    LEFT HEART CATHETERIZATION Left 01/25/2022    Procedure: CATHETERIZATION, HEART, LEFT;  Surgeon: Rocío Davidson MD;  Location: [facility] CATH LAB;  Service: Cardiology;  Laterality: Left;    RENAL BIOPSY  11/16/2018       Labs:  Lab Results   Component Value Date    WBC 5.93 06/26/2024    HGB 12.8 06/26/2024    HCT 39.9 06/26/2024    MCV 85 06/26/2024     06/26/2024     BMP  Lab Results   Component Value Date     06/26/2024    K 4.0 06/26/2024     06/26/2024    CO2 25 06/26/2024    BUN 16 06/26/2024    CREATININE 1.1 06/26/2024    CALCIUM 9.5 06/26/2024    ANIONGAP 12 06/26/2024    ESTGFRAFRICA >60 05/11/2022    EGFRNONAA >60 05/11/2022     Lab Results   Component Value Date    ALT 22 06/26/2024    AST 15 06/26/2024    ALKPHOS 52 (L) 06/26/2024    BILITOT 0.3 06/26/2024       Lab Results   Component Value Date    IRON 61 06/26/2024    TIBC 386 06/26/2024    FERRITIN 57 06/26/2024     Lab Results   Component Value Date    CNOBRVSP78 1476 (H) 08/23/2023     Lab Results   Component Value Date    FOLATE 5.8 05/29/2019     Lab Results   Component Value Date    TSH 1.194 01/19/2024         Review of Systems   Constitutional:  Positive for appetite change. Negative for activity change, chills, diaphoresis, fatigue, fever and unexpected weight change.        No Pica for ice at present   HENT:  Negative for congestion, dental problem, drooling, ear discharge, ear pain, facial swelling, hearing loss, mouth sores, nosebleeds, postnasal drip, rhinorrhea, sinus pressure, sneezing, sore throat, tinnitus, trouble swallowing and voice change.    Eyes:  Negative for photophobia, pain, discharge, redness, itching and visual disturbance.    Respiratory:  Negative for cough, choking, chest tightness, shortness of breath, wheezing and stridor.    Cardiovascular:  Negative for chest pain, palpitations and leg swelling.   Gastrointestinal:  Negative for abdominal distention, abdominal pain, anal bleeding, blood in stool, constipation, diarrhea, nausea, rectal pain and vomiting.   Endocrine: Negative for cold intolerance, heat intolerance, polydipsia, polyphagia and polyuria.   Genitourinary:  Negative for decreased urine volume, difficulty urinating, dyspareunia, dysuria, enuresis, flank pain, frequency, genital sores, hematuria, menstrual problem, pelvic pain, urgency, vaginal bleeding, vaginal discharge and vaginal pain.   Musculoskeletal:  Negative for arthralgias, back pain, gait problem, joint swelling, myalgias, neck pain and neck stiffness.   Skin:  Negative for color change, pallor and rash.   Allergic/Immunologic: Negative for environmental allergies, food allergies and immunocompromised state.   Neurological:  Negative for dizziness, tremors, seizures, syncope, facial asymmetry, speech difficulty, weakness, light-headedness, numbness and headaches.   Hematological:  Negative for adenopathy. Does not bruise/bleed easily.   Psychiatric/Behavioral:  Negative for agitation, behavioral problems, confusion, decreased concentration, dysphoric mood, hallucinations, self-injury, sleep disturbance and suicidal ideas. The patient is not nervous/anxious and is not hyperactive.        Objective:      Physical Exam  Vitals reviewed.   Constitutional:       General: She is not in acute distress.     Appearance: She is well-developed. She is not diaphoretic.   HENT:      Head: Normocephalic and atraumatic.      Right Ear: External ear normal.      Left Ear: External ear normal.      Nose: Nose normal.      Right Sinus: No maxillary sinus tenderness or frontal sinus tenderness.      Left Sinus: No maxillary sinus tenderness or frontal sinus tenderness.       Mouth/Throat:      Pharynx: No oropharyngeal exudate.   Eyes:      General: Lids are normal. No scleral icterus.        Right eye: No discharge.         Left eye: No discharge.      Conjunctiva/sclera: Conjunctivae normal.      Right eye: Right conjunctiva is not injected. No hemorrhage.     Left eye: Left conjunctiva is not injected. No hemorrhage.     Pupils: Pupils are equal, round, and reactive to light.   Neck:      Thyroid: No thyromegaly.      Vascular: No JVD.      Trachea: No tracheal deviation.   Cardiovascular:      Rate and Rhythm: Normal rate.   Pulmonary:      Effort: Pulmonary effort is normal. No respiratory distress.      Breath sounds: No stridor.   Chest:      Chest wall: No tenderness.   Abdominal:      General: Bowel sounds are normal. There is no distension.      Palpations: Abdomen is soft. There is no hepatomegaly, splenomegaly or mass.      Tenderness: There is no abdominal tenderness. There is no rebound.   Musculoskeletal:         General: No tenderness. Normal range of motion.      Cervical back: Normal range of motion and neck supple.   Lymphadenopathy:      Cervical: No cervical adenopathy.      Upper Body:      Right upper body: No supraclavicular adenopathy.      Left upper body: No supraclavicular adenopathy.   Skin:     General: Skin is dry.      Findings: No erythema or rash.   Neurological:      Mental Status: She is alert and oriented to person, place, and time.      Cranial Nerves: No cranial nerve deficit.      Coordination: Coordination normal.   Psychiatric:         Behavior: Behavior normal.         Thought Content: Thought content normal.         Judgment: Judgment normal.             Assessment:      1. Iron deficiency anemia due to chronic blood loss    2. Scleroderma    3. Sjogren's syndrome with other organ involvement           Med Onc Chart Routing      Follow up with physician . Return in 6 months CBC serum iron TIBC ferritin prior   Follow up with JUAN CARLOS    Infusion  scheduling note    Injection scheduling note    Labs   Scheduling:  Preferred lab:  Lab interval:  Patient lives in [facility] may want to have labs done in [place] prior   Imaging    Pharmacy appointment    Other referrals                   Plan:     Reviewed information with her if Pica returns please contact sooner otherwise returns 6 months with CBC serum iron prior.  Discussed implications answered questions with her reviewed results of video capsule        Florentino Duong Jr, MD FACP

## 2024-07-25 ENCOUNTER — PATIENT MESSAGE (OUTPATIENT)
Dept: INTERNAL MEDICINE | Facility: CLINIC | Age: 49
End: 2024-07-25
Payer: COMMERCIAL

## 2024-07-25 DIAGNOSIS — E66.01 MORBID OBESITY WITH BMI OF 40.0-44.9, ADULT: Primary | ICD-10-CM

## 2024-07-31 ENCOUNTER — OFFICE VISIT (OUTPATIENT)
Dept: INTERNAL MEDICINE | Facility: CLINIC | Age: 49
End: 2024-07-31
Payer: COMMERCIAL

## 2024-07-31 VITALS
SYSTOLIC BLOOD PRESSURE: 130 MMHG | DIASTOLIC BLOOD PRESSURE: 86 MMHG | TEMPERATURE: 98 F | HEIGHT: 66 IN | BODY MASS INDEX: 40.5 KG/M2 | WEIGHT: 252 LBS | OXYGEN SATURATION: 97 % | HEART RATE: 76 BPM

## 2024-07-31 DIAGNOSIS — K21.9 GASTROESOPHAGEAL REFLUX DISEASE, UNSPECIFIED WHETHER ESOPHAGITIS PRESENT: ICD-10-CM

## 2024-07-31 DIAGNOSIS — E66.01 MORBID OBESITY WITH BMI OF 40.0-44.9, ADULT: ICD-10-CM

## 2024-07-31 DIAGNOSIS — M32.14 LUPUS NEPHRITIS, ISN/RPS CLASS V: ICD-10-CM

## 2024-07-31 DIAGNOSIS — M79.7 FIBROMYALGIA: Primary | ICD-10-CM

## 2024-07-31 DIAGNOSIS — M35.09 SJOGREN'S SYNDROME WITH OTHER ORGAN INVOLVEMENT: ICD-10-CM

## 2024-07-31 DIAGNOSIS — Z79.899 LONG-TERM USE OF PLAQUENIL: ICD-10-CM

## 2024-07-31 DIAGNOSIS — Z86.59 HISTORY OF RECENT STRESSFUL LIFE EVENT: ICD-10-CM

## 2024-07-31 PROBLEM — Z80.0 FAMILY HISTORY OF COLON CANCER: Status: ACTIVE | Noted: 2024-02-07

## 2024-07-31 PROBLEM — Z80.3 FAMILY HISTORY OF MALIGNANT NEOPLASM OF BREAST: Status: ACTIVE | Noted: 2024-02-07

## 2024-07-31 PROBLEM — Z80.42 FAMILY HISTORY OF MALIGNANT NEOPLASM OF PROSTATE: Status: ACTIVE | Noted: 2024-02-07

## 2024-07-31 PROBLEM — Z91.89 INCREASED RISK OF BREAST CANCER: Status: ACTIVE | Noted: 2024-02-07

## 2024-07-31 PROCEDURE — 99999 PR PBB SHADOW E&M-EST. PATIENT-LVL IV: CPT | Mod: PBBFAC,,,

## 2024-07-31 PROCEDURE — 3079F DIAST BP 80-89 MM HG: CPT | Mod: CPTII,S$GLB,,

## 2024-07-31 PROCEDURE — 99214 OFFICE O/P EST MOD 30 MIN: CPT | Mod: S$GLB,,,

## 2024-07-31 PROCEDURE — 3044F HG A1C LEVEL LT 7.0%: CPT | Mod: CPTII,S$GLB,,

## 2024-07-31 PROCEDURE — G2211 COMPLEX E/M VISIT ADD ON: HCPCS | Mod: S$GLB,,,

## 2024-07-31 PROCEDURE — 3008F BODY MASS INDEX DOCD: CPT | Mod: CPTII,S$GLB,,

## 2024-07-31 PROCEDURE — 1159F MED LIST DOCD IN RCRD: CPT | Mod: CPTII,S$GLB,,

## 2024-07-31 PROCEDURE — 3075F SYST BP GE 130 - 139MM HG: CPT | Mod: CPTII,S$GLB,,

## 2024-07-31 PROCEDURE — 1160F RVW MEDS BY RX/DR IN RCRD: CPT | Mod: CPTII,S$GLB,,

## 2024-07-31 RX ORDER — SEMAGLUTIDE 0.5 MG/.5ML
0.5 INJECTION, SOLUTION SUBCUTANEOUS
Qty: 2 ML | Refills: 1 | Status: SHIPPED | OUTPATIENT
Start: 2024-07-31

## 2024-07-31 RX ORDER — PANTOPRAZOLE SODIUM 40 MG/1
40 TABLET, DELAYED RELEASE ORAL DAILY
Qty: 30 TABLET | Refills: 3 | Status: SHIPPED | OUTPATIENT
Start: 2024-07-31 | End: 2025-07-31

## 2024-07-31 NOTE — PROGRESS NOTES
Zachary Lucia  07/31/2024  [id number]    Danna Camara DO  Patient Care Team:  Danna Camara DO as PCP - General (Internal Medicine)  Lakeshia Shelton MD as Obstetrician (Obstetrics)          Visit Type:an urgent visit for a new problem    Chief Complaint:  Chief Complaint   Patient presents with    Dizziness    Fatigue    Blurred Vision               History of Present Illness:    Feeling fatigued  History of LASHAY  Does iron infusions  Iron studies last month have improved     Having blurry vision  Started on Sunday night  She does need new prescription glasses    Monday felt like her balance was off    Denies fever, coughing, SOB, congestion  Did sneeze a few days yesterday     Denies constipation or pain with urination     Not sure if the heaviness is associated with weight gain    Last 2 days she did have heartburn     Having some life stressors  Recently   Trying to buy a house  Work has been stressful       History:  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    Scleroderma     Seizures 01/12/2017     Past Surgical History:   Procedure Laterality Date    COLONOSCOPY N/A 01/12/2023    Procedure: COLONOSCOPY;  Surgeon: Supriya Hughes MD;  Location: [facility];  Service: Endoscopy;  Laterality: N/A;    ESOPHAGOGASTRODUODENOSCOPY N/A 01/12/2023    Procedure: EGD (ESOPHAGOGASTRODUODENOSCOPY);  Surgeon: Supriya Hughes MD;  Location: [facility];  Service: Endoscopy;  Laterality: N/A;    INTRALUMINAL GASTROINTESTINAL TRACT IMAGING VIA CAPSULE N/A 5/22/2024    Procedure: IMAGING PROCEDURE, GI TRACT, INTRALUMINAL, VIA CAPSULE;  Surgeon: [place], First Available;  Location: [facility];  Service: Endoscopy;  Laterality: N/A;    LEFT HEART CATHETERIZATION Left 01/25/2022    Procedure: CATHETERIZATION, HEART, LEFT;   Surgeon: Rocío Davidson MD;  Location: [facility] CATH LAB;  Service: Cardiology;  Laterality: Left;    RENAL BIOPSY  11/16/2018     Family History   Problem Relation Name Age of Onset    Arthritis Mother Radha Escalona     Glaucoma Mother Radha Escalona     Hypertension Mother Radha Escalona     Diabetes Mother Radha Escalona     Heart disease Mother Radha Escalona     Stroke Mother Radha Escalona     Diabetes Father Ace Green     Cancer Father Ace Green     Diabetes Maternal Grandmother Rose Marx     Hypertension Maternal Grandmother Rose Marx     Arthritis Maternal Grandmother Rose Marx     Cataracts Maternal Grandmother Rose Marx     Diabetes Maternal Grandfather Jesus Marx, Jr     Heart disease Maternal Grandfather Jesus Marx, Jr     Hypertension Maternal Grandfather Jesus Marx, Jr     Diabetes Paternal Grandmother Bess Green     Heart disease Paternal Grandmother Bess Green     Hypertension Paternal Grandmother Bess Green     Asthma Paternal Grandmother Bess Gastonis     Heart disease Paternal Grandfather Germán Peter, Sr.     Cancer Maternal Uncle Aramisamina Marx     Hypertension Maternal Uncle Aramis Marx     Cancer Maternal Uncle Marco A Marx     Cancer Paternal Aunt Angie Farah     Cancer Paternal Aunt Amy Cedillo     Cancer Paternal Aunt Kapil Bynum     Heart disease Maternal Uncle Ricardo Marx, Sr.     Hypertension Maternal Uncle Ricardo Marx, Sr.      Social History     Socioeconomic History    Marital status:      Spouse name: Darrell    Number of children: 0   Tobacco Use    Smoking status: Never     Passive exposure: Never    Smokeless tobacco: Never   Substance and Sexual Activity    Alcohol use: Not Currently     Alcohol/week: 2.0 standard drinks of alcohol     Types: 2 Glasses of wine per week     Comment: OCC    Drug use: No    Sexual activity: Yes     Partners: Male     Social Determinants of Health     Financial Resource Strain: Low Risk  (2/7/2024)     Received from [facility]    Overall Financial Resource Strain (CARDIA)     Difficulty of Paying Living Expenses: Not hard at all   Food Insecurity: Patient Declined (1/19/2024)    Hunger Vital Sign     Worried About Running Out of Food in the Last Year: Patient declined     Ran Out of Food in the Last Year: Patient declined   Transportation Needs: Unknown (2/7/2024)    Received from [facility]    PRAPARE - Transportation     Lack of Transportation (Medical): No   Physical Activity: Inactive (2/7/2024)    Received from [facility]    Exercise Vital Sign     Days of Exercise per Week: 0 days     Minutes of Exercise per Session: 0 min   Stress: No Stress Concern Present (1/19/2024)    [employer] [place] [employer] - Occupational Stress Questionnaire     Feeling of Stress : Only a little   Housing Stability: Unknown (1/19/2024)    Housing Stability Vital Sign     Unable to Pay for Housing in the Last Year: Patient declined     Number of Places Lived in the Last Year: 1     Unstable Housing in the Last Year: No     Patient Active Problem List   Diagnosis    Epilepsy, grand mal    Sjogren's syndrome    Migraine without status migrainosus, not intractable    Generalized anxiety disorder    Scleroderma    Raynaud's disease without gangrene    Lupus nephritis, ISN/RPS class V    On antiepileptic therapy    Vitamin D deficiency disease    Other proteinuria    Right flank pain    Abnormal stress test    Gastroesophageal reflux disease    Hyperglycemia    Iron deficiency anemia    Paresthesias    SLE (systemic lupus erythematosus related syndrome)     Review of patient's allergies indicates:   Allergen Reactions    Lisinopril      cough    Sulfa (sulfonamide antibiotics) Swelling       The following were reviewed at this visit: active problem list, medication list, allergies, family history, social history, and health  maintenance.    Medications:  Current Outpatient Medications on File Prior to Visit   Medication Sig Dispense Refill    azaTHIOprine (IMURAN) 100 mg tablet Take 1 tablet (100 mg total) by mouth once daily. 90 tablet 1    azaTHIOprine (IMURAN) 50 mg Tab Take 1 tablet (50 mg total) by mouth once daily. 90 tablet 1    belimumab (BENLYSTA) 200 mg/mL Syrg Inject 1 mL (200 mg total) into the skin every 7 days. 4 mL 4    cholecalciferol, vitamin D3, 1,250 mcg (50,000 unit) capsule Take 1 capsule (50,000 Units total) by mouth once a week. 15 capsule 1    cyclobenzaprine (FLEXERIL) 10 MG tablet Take 1 tablet (10 mg total) by mouth 2 (two) times daily as needed for Muscle spasms. 60 tablet 0    fluticasone-salmeterol diskus inhaler 100-50 mcg Inhale 1 puff into the lungs 2 (two) times daily. Controller 1 each 11    furosemide (LASIX) 20 MG tablet Take 1 tablet (20 mg total) by mouth once daily. 30 tablet 2    HYDROcodone-acetaminophen (NORCO) 7.5-325 mg per tablet Take 1 tablet by mouth 4 (four) times daily as needed.      hydrOXYchloroQUINE (PLAQUENIL) 200 mg tablet Take 1 tablet (200 mg total) by mouth 2 (two) times daily. 180 tablet 3     mg tablet Take 800 mg by mouth every 8 (eight) hours as needed.  0    levetiracetam XR (KEPPRA XR) 500 mg Tb24 24 hr tablet Take 4 tablets (2,000 mg total) by mouth once daily. 360 tablet 3    montelukast (SINGULAIR) 10 mg tablet TAKE 1 TABLET BY MOUTH EVERY DAY IN THE EVENING 90 tablet 0    mupirocin (BACTROBAN) 2 % ointment Apply topically 3 (three) times daily. 1 g 0    predniSONE (DELTASONE) 10 MG tablet TAKE 1 TABLET BY MOUTH EVERY DAY 90 tablet 1    semaglutide, weight loss, (WEGOVY) 0.5 mg/0.5 mL PnIj Inject 0.5 mg into the skin every 7 days. 2 mL 1     No current facility-administered medications on file prior to visit.       Medications have been reviewed and reconciled with patient at this visit.  Barriers to medications reviewed with patient.    Adverse reactions to  current medications reviewed with patient..    Over the counter medications reviewed and reconciled with patient.    Exam:  Wt Readings from Last 3 Encounters:   07/01/24 115.6 kg (254 lb 13.6 oz)   04/16/24 113.9 kg (251 lb 1.7 oz)   02/27/24 113.3 kg (249 lb 12.5 oz)     Temp Readings from Last 3 Encounters:   07/01/24 98.5 °F (36.9 °C) (Temporal)   05/20/24 98.4 °F (36.9 °C)   05/13/24 97.6 °F (36.4 °C)     BP Readings from Last 3 Encounters:   07/01/24 (!) 141/87   05/20/24 (!) 140/82   05/13/24 131/81     Pulse Readings from Last 3 Encounters:   07/01/24 73   05/20/24 77   05/13/24 81     There is no height or weight on file to calculate BMI.      Review of Systems   Constitutional:  Positive for malaise/fatigue.   HENT:  Negative for congestion and sinus pain.    Eyes:  Positive for blurred vision.   Respiratory:  Negative for cough, shortness of breath and wheezing.    Cardiovascular:  Negative for chest pain.   Gastrointestinal:  Positive for heartburn. Negative for abdominal pain and vomiting.   Genitourinary:  Negative for dysuria, frequency and urgency.   Neurological:  Positive for dizziness.   Psychiatric/Behavioral:  The patient is nervous/anxious.      Physical Exam  Nursing note reviewed.   Constitutional:       Appearance: She is morbidly obese.   HENT:      Head: Normocephalic and atraumatic.   Cardiovascular:      Rate and Rhythm: Normal rate and regular rhythm.      Heart sounds: Normal heart sounds.   Pulmonary:      Effort: Pulmonary effort is normal. No respiratory distress.      Breath sounds: Normal breath sounds.   Skin:     General: Skin is dry.   Neurological:      Mental Status: She is alert and oriented to person, place, and time.   Psychiatric:         Mood and Affect: Mood normal.         Behavior: Behavior normal.         Thought Content: Thought content normal.         Judgment: Judgment normal.         Laboratory Reviewed ({Yes)  Lab Results   Component Value Date    WBC 5.93  06/26/2024    HGB 12.8 06/26/2024    HCT 39.9 06/26/2024     06/26/2024    CHOL 172 01/19/2024    TRIG 121 01/19/2024    HDL 51 01/19/2024    ALT 22 06/26/2024    AST 15 06/26/2024     06/26/2024    K 4.0 06/26/2024     06/26/2024    CREATININE 1.1 06/26/2024    BUN 16 06/26/2024    CO2 25 06/26/2024    TSH 1.194 01/19/2024    INR 1.0 01/12/2022    HGBA1C 5.5 01/19/2024    MICROALBUR 30 01/08/2020     Zachary was seen today for dizziness, fatigue and blurred vision.    Diagnoses and all orders for this visit:    Fibromyalgia    Long-term use of Plaquenil    Morbid obesity with BMI of 40.0-44.9, adult  Encouraged healthy diet and exercise as tolerated to help bring BMI into normal range.      Lupus nephritis, ISN/RPS class V    Gastroesophageal reflux disease, unspecified whether esophagitis present  -     pantoprazole (PROTONIX) 40 MG tablet; Take 1 tablet (40 mg total) by mouth once daily.    Sjogren's syndrome with other organ involvement    History of recent stressful life event    Explained that increased fatigue likely related to fibromyalgia flare up  She is under increased stress  Declines medication for stress/anxiety at this time    Resume Protonix for GERD symptoms    If any additional symptoms developed, patient will send a [employer] message     If blurry vision continues, recommend to follow up with optometry       Visit today included increased complexity associated with the care of the episodic problem fatigue/stress , which was addressed while instituting co-management of the longitudinal care of the patient due to the serious and/or complex managed problem(s) .    I have evaluated and discussed management associated with medical care services that serve as the continuing focal point for all needed health care services and/or with medical care services that are part of ongoing care related to my patient's single, serious condition or a complex condition(s).    I am providing ongoing  care and I am the primary care provider for this patient, and they are being managed, monitored, and/or observed for their chronic conditions over time.     I have addressed their ongoing health maintenance requirements and needs for all health care services and reviewed co-management plans provided by specialty providers when available.    Health Maintenance Due   Topic Date Due    Pneumococcal Vaccines (Age 0-64) (1 of 2 - PCV) Never done    Shingles Vaccine (1 of 2) Never done    COVID-19 Vaccine (6 - 2023-24 season) 09/01/2023    Mammogram  07/20/2024          Care Plan/Goals: Reviewed    Goals    None         Follow up: No follow-ups on file.    After visit summary was printed and given to patient upon discharge today.  Patient goals and care plan are included in After Visit Summary.

## 2024-08-07 DIAGNOSIS — R60.0 LOCALIZED EDEMA: ICD-10-CM

## 2024-08-07 DIAGNOSIS — R07.9 CHEST PAIN, UNSPECIFIED TYPE: Primary | ICD-10-CM

## 2024-09-30 ENCOUNTER — PATIENT MESSAGE (OUTPATIENT)
Dept: INTERNAL MEDICINE | Facility: CLINIC | Age: 49
End: 2024-09-30
Payer: COMMERCIAL

## 2024-09-30 DIAGNOSIS — E66.01 MORBID OBESITY WITH BMI OF 40.0-44.9, ADULT: ICD-10-CM

## 2024-09-30 NOTE — TELEPHONE ENCOUNTER
Care Due:                  Date            Visit Type   Department     Provider  --------------------------------------------------------------------------------                                EP -                              PRIMARY      [facility]  Last Visit: 07-      CARE ([facility])   MEDICINE       Danna Camara  Next Visit: None Scheduled  None         None Found                                                            Last  Test          Frequency    Reason                     Performed    Due Date  --------------------------------------------------------------------------------    Office Visit  15 months..  semaglutide,.............  07- 09-    HBA1C.......  6 months...  semaglutide,.............  01- 07-    [employer] Embedded Care Due Messages. Reference number: [id number].   9/30/2024 2:29:11 PM CDT

## 2024-10-01 RX ORDER — SEMAGLUTIDE 0.5 MG/.5ML
0.5 INJECTION, SOLUTION SUBCUTANEOUS
Qty: 2 ML | Refills: 1 | Status: SHIPPED | OUTPATIENT
Start: 2024-10-01

## 2024-10-02 ENCOUNTER — OFFICE VISIT (OUTPATIENT)
Dept: NEUROLOGY | Facility: CLINIC | Age: 49
End: 2024-10-02
Payer: COMMERCIAL

## 2024-10-02 VITALS
BODY MASS INDEX: 40.96 KG/M2 | SYSTOLIC BLOOD PRESSURE: 119 MMHG | WEIGHT: 254.88 LBS | HEIGHT: 66 IN | DIASTOLIC BLOOD PRESSURE: 79 MMHG | RESPIRATION RATE: 16 BRPM | HEART RATE: 103 BPM

## 2024-10-02 DIAGNOSIS — G43.909 MIGRAINE WITHOUT STATUS MIGRAINOSUS, NOT INTRACTABLE, UNSPECIFIED MIGRAINE TYPE: ICD-10-CM

## 2024-10-02 DIAGNOSIS — R20.2 PARESTHESIAS: ICD-10-CM

## 2024-10-02 DIAGNOSIS — I73.00 RAYNAUD'S DISEASE WITHOUT GANGRENE: ICD-10-CM

## 2024-10-02 DIAGNOSIS — Z65.8 PSYCHOSOCIAL STRESSORS: ICD-10-CM

## 2024-10-02 DIAGNOSIS — F41.1 GENERALIZED ANXIETY DISORDER: ICD-10-CM

## 2024-10-02 DIAGNOSIS — R56.9 NONEPILEPTIC EPISODE: ICD-10-CM

## 2024-10-02 DIAGNOSIS — M34.9 SCLERODERMA: ICD-10-CM

## 2024-10-02 DIAGNOSIS — M35.09 SJOGREN'S SYNDROME WITH OTHER ORGAN INVOLVEMENT: ICD-10-CM

## 2024-10-02 DIAGNOSIS — G40.409 EPILEPSY, GRAND MAL: Primary | ICD-10-CM

## 2024-10-02 PROCEDURE — 3008F BODY MASS INDEX DOCD: CPT | Mod: CPTII,S$GLB,, | Performed by: NURSE PRACTITIONER

## 2024-10-02 PROCEDURE — 99999 PR PBB SHADOW E&M-EST. PATIENT-LVL IV: CPT | Mod: PBBFAC,,, | Performed by: NURSE PRACTITIONER

## 2024-10-02 PROCEDURE — 1160F RVW MEDS BY RX/DR IN RCRD: CPT | Mod: CPTII,S$GLB,, | Performed by: NURSE PRACTITIONER

## 2024-10-02 PROCEDURE — 3078F DIAST BP <80 MM HG: CPT | Mod: CPTII,S$GLB,, | Performed by: NURSE PRACTITIONER

## 2024-10-02 PROCEDURE — 3044F HG A1C LEVEL LT 7.0%: CPT | Mod: CPTII,S$GLB,, | Performed by: NURSE PRACTITIONER

## 2024-10-02 PROCEDURE — 99214 OFFICE O/P EST MOD 30 MIN: CPT | Mod: S$GLB,,, | Performed by: NURSE PRACTITIONER

## 2024-10-02 PROCEDURE — 1159F MED LIST DOCD IN RCRD: CPT | Mod: CPTII,S$GLB,, | Performed by: NURSE PRACTITIONER

## 2024-10-02 PROCEDURE — 3074F SYST BP LT 130 MM HG: CPT | Mod: CPTII,S$GLB,, | Performed by: NURSE PRACTITIONER

## 2024-10-02 RX ORDER — VITAMIN B COMPLEX
1 TABLET ORAL DAILY
Qty: 30 EACH | Refills: 11 | Status: SHIPPED | OUTPATIENT
Start: 2024-10-02 | End: 2025-10-02

## 2024-10-02 NOTE — PROGRESS NOTES
Subjective:       Patient ID: Zachary Lucia is a 48 y.o. female.    Chief Complaint: Gerneralized anxiety disorder          HPI      The patient is here for seizure evaluation. The patient is unaccompanied.    The patient started having seizure in 2006. The patient describes aura of LT facial tingling. The patient is amnestic to the seizure after that. The seizure is described as grand mal seizure/GTC consisting of generalized tensing and muscle jerking with eyes deviated upwards with foamy secretions from the mouth, tongue biting and urine incontinence. The seizure lasts for 1-2 minutes followed by 30-60 minutes of confusion. No history of meningitis or encephalitis No toxic exposure or lead poisoning. No family history of epilepsy (paternal cousin had childhood epilepsy).  No history of strokes or aneurysmal bleeding. No history of TBI. She was started on  mg BID that was changed to LEV XR 1000 mg QHS. The patient continued to have seizures every 1-2 months. Of note, she was diagnosed with CTD in 2018 and has been on IST. She is concerned because she had 3 seizures over 4 days in .     The patient does complain of migraine without aura (throbbing, moderate-severe, light-noise sensitivity and nausea) that seems be sporadic and infrequent. Tried Fioricet, CCB (Amlodipine),  TCA (Elavil), SSRIS (Zoloft) and AEDs (GBP).     The patient also reports constant B/L hand numbness, pain and tingling and intermittent toes numbness and tingling since 2018. No history of DM or Thyroid disease. No excessive alcohol intake. Of note, she was diagnosed with CTD in 2018 and has been on IST. The patient was found to have low B12 and Vitamin D and stopped taking supplements. Lab [facility] [id number] showed NL HA1C, TFT, SPEP-IPEP , Low Vitamin D and Vitamin B12  and Positive AMAURY, SSA, SSB and RNP Antibodies.       Reports 1 seizure-like episodes on 08- that proceeded after an heated discussion with  spouse. The event described as non-epileptic event, no LOC, no tongue biting, no urinary incontinence. No new medication was added. Discussed AEEG results 07- THROUGH 07- AEEG  HOURS: SW, LT TL F7-T7, FOUR EVENTS (NON-EPILEPTIC-PSYCHOGENIC).  Patient reports work related stressors,  No tremors. No history of parkinsonism. No vomiting, diarrhea.           INTERVAL HISTORY 10-: The patient is present for follow up. Patient doing well. Patient had stopped Keppra XR 2000 mg po HS she reports NO GTC reported. Discussed the importance of compliance with patient.     Patient continues to have non- epileptic episodes. She is not currently in CBT. Patient is interested in starting treatment.     Headache complaints unchanged low frequency.     Patient continue Vitamin B 12 replacement, need refills.            Review of Systems   Constitutional:  Positive for fatigue. Negative for appetite change.   HENT:  Negative for hearing loss and tinnitus.    Eyes:  Negative for photophobia and visual disturbance.   Respiratory:  Negative for apnea and shortness of breath.    Cardiovascular:  Negative for chest pain and palpitations.   Gastrointestinal:  Negative for nausea and vomiting.   Endocrine: Negative for cold intolerance and heat intolerance.   Genitourinary:  Negative for difficulty urinating and urgency.   Musculoskeletal:  Positive for arthralgias. Negative for back pain, gait problem, joint swelling, myalgias, neck pain and neck stiffness.   Skin:  Positive for color change. Negative for rash.   Allergic/Immunologic: Negative for environmental allergies and immunocompromised state.   Neurological:  Positive for seizures, numbness and headaches. Negative for dizziness, tremors, syncope, facial asymmetry, speech difficulty, weakness and light-headedness.   Hematological:  Negative for adenopathy. Does not bruise/bleed easily.   Psychiatric/Behavioral:  Negative for agitation, behavioral problems,  confusion, decreased concentration, dysphoric mood, hallucinations, self-injury, sleep disturbance and suicidal ideas. The patient is not hyperactive.                  Current Outpatient Medications:     azaTHIOprine (IMURAN) 100 mg tablet, Take 1 tablet (100 mg total) by mouth once daily., Disp: 90 tablet, Rfl: 1    azaTHIOprine (IMURAN) 50 mg Tab, Take 1 tablet (50 mg total) by mouth once daily., Disp: 90 tablet, Rfl: 1    belimumab (BENLYSTA) 200 mg/mL Syrg, Inject 1 mL (200 mg total) into the skin every 7 days., Disp: 4 mL, Rfl: 4    cholecalciferol, vitamin D3, 1,250 mcg (50,000 unit) capsule, Take 1 capsule (50,000 Units total) by mouth once a week., Disp: 15 capsule, Rfl: 1    cyclobenzaprine (FLEXERIL) 10 MG tablet, Take 1 tablet (10 mg total) by mouth 2 (two) times daily as needed for Muscle spasms., Disp: 60 tablet, Rfl: 0    furosemide (LASIX) 20 MG tablet, Take 1 tablet (20 mg total) by mouth once daily., Disp: 30 tablet, Rfl: 2    HYDROcodone-acetaminophen (NORCO) 7.5-325 mg per tablet, Take 1 tablet by mouth 4 (four) times daily as needed., Disp: , Rfl:     hydrOXYchloroQUINE (PLAQUENIL) 200 mg tablet, Take 1 tablet (200 mg total) by mouth 2 (two) times daily., Disp: 180 tablet, Rfl: 3     mg tablet, Take 800 mg by mouth every 8 (eight) hours as needed., Disp: , Rfl: 0    levetiracetam XR (KEPPRA XR) 500 mg Tb24 24 hr tablet, Take 4 tablets (2,000 mg total) by mouth once daily., Disp: 360 tablet, Rfl: 3    montelukast (SINGULAIR) 10 mg tablet, TAKE 1 TABLET BY MOUTH EVERY DAY IN THE EVENING, Disp: 90 tablet, Rfl: 0    mupirocin (BACTROBAN) 2 % ointment, Apply topically 3 (three) times daily., Disp: 1 g, Rfl: 0    pantoprazole (PROTONIX) 40 MG tablet, Take 1 tablet (40 mg total) by mouth once daily., Disp: 30 tablet, Rfl: 3    predniSONE (DELTASONE) 10 MG tablet, TAKE 1 TABLET BY MOUTH EVERY DAY, Disp: 90 tablet, Rfl: 1    semaglutide, weight loss, (WEGOVY) 0.5 mg/0.5 mL PnIj, Inject 0.5 mg into  the skin every 7 days., Disp: 2 mL, Rfl: 1    cyanocobalamin, vitamin B-12, (VITAMIN B-12) 2,500 mcg Subl, Place 1 tablet under the tongue once daily., Disp: 30 each, Rfl: 11    fluticasone-salmeterol diskus inhaler 100-50 mcg, Inhale 1 puff into the lungs 2 (two) times daily. Controller, Disp: 1 each, Rfl: 11  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    Scleroderma     Seizures 01/12/2017     Past Surgical History:   Procedure Laterality Date    COLONOSCOPY N/A 01/12/2023    Procedure: COLONOSCOPY;  Surgeon: Supriya Hughes MD;  Location: [facility];  Service: Endoscopy;  Laterality: N/A;    ESOPHAGOGASTRODUODENOSCOPY N/A 01/12/2023    Procedure: EGD (ESOPHAGOGASTRODUODENOSCOPY);  Surgeon: Supriya Hughes MD;  Location: [facility];  Service: Endoscopy;  Laterality: N/A;    INTRALUMINAL GASTROINTESTINAL TRACT IMAGING VIA CAPSULE N/A 5/22/2024    Procedure: IMAGING PROCEDURE, GI TRACT, INTRALUMINAL, VIA CAPSULE;  Surgeon: [place], First Available;  Location: [facility];  Service: Endoscopy;  Laterality: N/A;    LEFT HEART CATHETERIZATION Left 01/25/2022    Procedure: CATHETERIZATION, HEART, LEFT;  Surgeon: Rocío Davidson MD;  Location: [facility];  Service: Cardiology;  Laterality: Left;    RENAL BIOPSY  11/16/2018     Social History     Socioeconomic History    Marital status:      Spouse name: Darrell    Number of children: 0   Tobacco Use    Smoking status: Never     Passive exposure: Never    Smokeless tobacco: Never   Substance and Sexual Activity    Alcohol use: Not Currently     Alcohol/week: 2.0 standard drinks of alcohol     Types: 2 Glasses of wine per week     Comment: OCC    Drug use: No    Sexual activity: Yes     Partners: Male     Social Drivers of Health     Financial Resource Strain: Low Risk  (8/14/2024)     Received from [facility]    Overall Financial Resource Strain (CARDIA)     Difficulty of Paying Living Expenses: Not hard at all   Food Insecurity: Patient Declined (8/14/2024)    Received from [facility]    Hunger Vital Sign     Worried About Running Out of Food in the Last Year: Patient declined     Ran Out of Food in the Last Year: Patient declined   Transportation Needs: No Transportation Needs (8/14/2024)    Received from [facility]    PRAPARE - Transportation     Lack of Transportation (Medical): No     Lack of Transportation (Non-Medical): No   Physical Activity: Inactive (8/14/2024)    Received from [facility]    Exercise Vital Sign     Days of Exercise per Week: 0 days     Minutes of Exercise per Session: 0 min   Stress: No Stress Concern Present (1/19/2024)    [employer] - Occupational Stress Questionnaire     Feeling of Stress : Only a little   Housing Stability: Unknown (8/14/2024)    Received from [facility]    Housing Stability Vital Sign     Unable to Pay for Housing in the Last Year: No     Homeless in the Last Year: No             Past/Current Medical/Surgical History, Past/Current Social History, Past/Current Family History and Past/Current Medications were reviewed in detail.        Objective:           VITAL SIGNS WERE REVIEWED      GENERAL APPEARANCE:     The patient looks uncomfortable.    BMI 38.79    No signs of respiratory distress.    Normal breathing pattern.    No dysmorphic features    Normal eye contact.       GENERAL MEDICAL EXAM:    HEENT:  Head is atraumatic normocephalic.     FUNDOSCOPIC (OPHTHALMOSCOPIC) EXAMINATION showed no disc edema.      NECK: No JVD. No visible lesions or goiters.     CHEST-CARDIOPULMONARY: No cyanosis. No tachypnea. Normal respiratory effort.    [id number]-DULUCOOUSW: No jaundice. No stomas or lesions. No visible hernias. No catheters.     SKIN, HAIR, NAILS: No pathognomonic skin  rash.No neurofibromatosis. No visible lesions.No stigmata of autoimmune disease. No clubbing.    LIMBS: No varicose veins. No visible swelling.    MUSCULOSKELETAL: No visible deformities.No visible lesions.           Neurologic Exam     Mental Status   Oriented to person, place, and time.   Follows 3 step commands.   Attention: normal. Concentration: normal.   Speech: speech is normal   Level of consciousness: alert  Able to name object. Able to repeat. Normal comprehension.     Cranial Nerves   Cranial nerves II through XII intact.     CN II   Visual fields full to confrontation.   Visual acuity: normal  Right visual field deficit: none  Left visual field deficit: none     CN III, IV, VI   Pupils are equal, round, and reactive to light.  Extraocular motions are normal.   Right pupil: Size: 2 mm. Shape: regular. Reactivity: brisk. Consensual response: intact. Accommodation: intact.   Left pupil: Size: 2 mm. Shape: regular. Reactivity: brisk. Consensual response: intact. Accommodation: intact.   CN III: no CN III palsy  CN VI: no CN VI palsy  Nystagmus: none   Diplopia: none  Ophthalmoparesis: none  Upgaze: normal  Downgaze: normal  Conjugate gaze: present  Vestibulo-ocular reflex: present    CN V   Facial sensation intact.   Right facial sensation deficit: none  Left facial sensation deficit: none  Jaw jerk: normal    CN VII   Facial expression full, symmetric.   Right facial weakness: none  Left facial weakness: none    CN VIII   CN VIII normal.   Hearing: intact    CN IX, X   CN IX normal.   CN X normal.   Palate: symmetric    CN XI   CN XI normal.   Right sternocleidomastoid strength: normal  Left sternocleidomastoid strength: normal  Right trapezius strength: normal  Left trapezius strength: normal    CN XII   CN XII normal.   Tongue: not atrophic  Fasciculations: absent  Tongue deviation: none    Motor Exam   Muscle bulk: normal  Overall muscle tone: normal  Right arm tone: normal  Left arm tone: normal  Right  arm pronator drift: absent  Left arm pronator drift: absent  Right leg tone: normal  Left leg tone: normal    Strength   Strength 5/5 throughout.   Right neck flexion: 5/5  Left neck flexion: 5/5  Right neck extension: 5/5  Left neck extension: 5/5  Right deltoid: 5/5  Left deltoid: 5/5  Right biceps: 5/5  Left biceps: 5/5  Right triceps: 5/5  Left triceps: 5/5  Right wrist flexion: 5/5  Left wrist flexion: 5/5  Right wrist extension: 5/5  Left wrist extension: 5/5  Right interossei: 5/5  Left interossei: 5/5  Right iliopsoas: 5/5  Left iliopsoas: 5/5  Right quadriceps: 5/5  Left quadriceps: 5/5  Right hamstrin/5  Left hamstrin/5  Right glutei: 5/5  Left glutei: 5/5  Right anterior tibial: 5/5  Left anterior tibial: 5/5  Right posterior tibial: 5/5  Left posterior tibial: 5/5  Right peroneal: 5/5  Left peroneal: 5/5  Right gastroc: 5/5  Left gastroc: 5/5    Sensory Exam   Light touch normal.   Right arm light touch: normal  Left arm light touch: normal  Right leg light touch: normal  Left leg light touch: normal  Vibration normal.   Right arm vibration: normal  Left arm vibration: normal  Right leg vibration: normal  Left leg vibration: normal  Proprioception normal.   Right arm proprioception: normal  Left arm proprioception: normal  Right leg proprioception: normal  Left leg proprioception: normal  Pinprick normal.   Right arm pinprick: normal  Left arm pinprick: normal  Right leg pinprick: normal  Left leg pinprick: normal  Sensory deficit distribution on right: median  Sensory deficit distribution on left: median  Graphesthesia: normal  Stereognosis: normal    Gait, Coordination, and Reflexes     Gait  Gait: normal    Coordination   Romberg: negative  Finger to nose coordination: normal  Heel to shin coordination: normal  Tandem walking coordination: normal    Tremor   Resting tremor: absent  Intention tremor: absent  Action tremor: absent    Reflexes   Right brachioradialis: 2+  Left brachioradialis:  2+  Right biceps: 2+  Left biceps: 2+  Right triceps: 2+  Left triceps: 2+  Right patellar: 2+  Left patellar: 2+  Right achilles: 2+  Left achilles: 2+  Right plantar: normal  Left plantar: normal  Right Myrick: absent  Left Myrick: absent  Right ankle clonus: absent  Left ankle clonus: absent  Right pendular knee jerk: absent  Left pendular knee jerk: absent              Lab Results   Component Value Date    WBC 5.93 06/26/2024    HGB 12.8 06/26/2024    HCT 39.9 06/26/2024    MCV 85 06/26/2024     06/26/2024     Sodium   Date Value Ref Range Status   06/26/2024 141 136 - 145 mmol/L Final     Potassium   Date Value Ref Range Status   06/26/2024 4.0 3.5 - 5.1 mmol/L Final     Chloride   Date Value Ref Range Status   06/26/2024 104 95 - 110 mmol/L Final     CO2   Date Value Ref Range Status   06/26/2024 25 23 - 29 mmol/L Final     Glucose   Date Value Ref Range Status   06/26/2024 79 70 - 110 mg/dL Final     BUN   Date Value Ref Range Status   06/26/2024 16 6 - 20 mg/dL Final     Creatinine   Date Value Ref Range Status   06/26/2024 1.1 0.5 - 1.4 mg/dL Final     Calcium   Date Value Ref Range Status   06/26/2024 9.5 8.7 - 10.5 mg/dL Final     Total Protein   Date Value Ref Range Status   06/26/2024 7.3 6.0 - 8.4 g/dL Final     Albumin   Date Value Ref Range Status   06/26/2024 3.9 3.5 - 5.2 g/dL Final     Total Bilirubin   Date Value Ref Range Status   06/26/2024 0.3 0.1 - 1.0 mg/dL Final     Comment:     For infants and newborns, interpretation of results should be based  on gestational age, weight and in agreement with clinical  observations.    Premature Infant recommended reference ranges:  Up to 24 hours.............<8.0 mg/dL  Up to 48 hours............<12.0 mg/dL  3-5 days..................<15.0 mg/dL  6-29 days.................<15.0 mg/dL       Alkaline Phosphatase   Date Value Ref Range Status   06/26/2024 52 (L) 55 - 135 U/L Final     AST   Date Value Ref Range Status   06/26/2024 15 10 - 40 U/L  Final     ALT   Date Value Ref Range Status   06/26/2024 22 10 - 44 U/L Final     Anion Gap   Date Value Ref Range Status   06/26/2024 12 8 - 16 mmol/L Final     eGFR if    Date Value Ref Range Status   05/11/2022 >60 >60 mL/min/1.73 m^2 Final     eGFR if non    Date Value Ref Range Status   05/11/2022 >60 >60 mL/min/1.73 m^2 Final     Comment:     Calculation used to obtain the estimated glomerular filtration  rate (eGFR) is the CKD-EPI equation.        Lab Results   Component Value Date    VOLHGWKM04 1476 (H) 08/23/2023     Lab Results   Component Value Date    TSH 1.194 01/19/2024    FREET4 1.02 02/01/2018       LABORATORY EVALUATION      [id number]     Lab [facility] showed NL HA1C, TFT, SPEP-IPEP , Low Vitamin D and Vitamin B12  and Positive AMAURY, SSA, SSB and RNP Antibodies.       RADIOLOGY EVALUATION     06-    MRI Brain NL       NEUROPHYSIOLOGY EVALUATION    06-    EEG NL     07- THROUGH 07-     AEEG  HOURS: SW, LT TL F7-T7, FOUR EVENTS (NON-EPILEPTIC-PSYCHOGENIC) .      AED MONITORING      AED: LEV  RANGE:  03-60 Dose    DATE LEVEL    05-   1000 mg QHS                                            Reviewed the neuroimaging independently       Assessment:       1. Epilepsy, grand mal    2. Nonepileptic episode    3. Generalized anxiety disorder    4. Psychosocial stressors    5. Paresthesias    6. Sjogren's syndrome with other organ involvement    7. Migraine without status migrainosus, not intractable, unspecified migraine type    8. Scleroderma    9. Raynaud's disease without gangrene                EPILEPSY CLASSIFICATION    SEMIOLOGY: SENSORY AURA (LEFT FACE)--> GTC    EPILEPTOGENIC ZONE (S):  UNKNOWN     ETIOLOGY: UNKNOWN     PRIOR AEDS: GBP (PAIN)    CURRENT AEDS: LEV     LAST SEIZURE DATE:       COMPREHENSIVE LIST OF AEDs:     Acetazolamide (AZM-Diamox)   Benzos: clonazepam (CZP Klonopin), lorazepam (LZP-Ativan), diazepam  (DZP-Valium), clorazepate (CLZ- Tranxene)  Brivaracetam (BRV-Briviact)  Cannabidiol (CBD- Epidiolex)  Carbamazepine (CBZ-Tegretol)  Cenobamate (CNB-Xcopri)  Clobazam (CLB-Onfi)  Eslicarbazepine (ESL-Aptiom)  Ethosuximide (ESX-Zarontin)  Felbamate (FBM-Felbatol)  Fenfluramine (FFA-Fintepla)  Gabapentin (GBP-Neurontin)  Lacosamide (LCM-Vimpat)  Lamotrigine (LTG-Lamitcal)  Levetiracetam (LEV- Keppra)  Oxcarbazepine (OXC-Trileptal)  Perampanel (PML-Fycompa)  Phenobarbital (PB)  Phenytoin (PHT-Dilantin)  Pregabalin (PGB-Lyrica)  Primidone (PRM)   Retigabine (RTG- Potiga) Discontinued in 2017  Rufinamide (RFN-Benzil)  Stiripentol (STP-Diacomit)  Tiagabine (TGB-Gabitril)  Topiramate (TPM-Topamax)  Valproate (VPA-Depakote)  Vigabatrin (VGB-Sabril)  Zonisamide (ZNS-Zonegran)    Plan:         CARRIES A DIAGNOSIS OF GRAND MAL EPILEPSY/PSYCHOGENIC NON-EPILEPTIC SEIZURES        The patient was encouraged to maintain full traditional seizure precautions which include but not limited to avoidance of driving, biking, high altitudes (ladders, escalators, rock climbing, mountain climbing, skiing, connor diving, moderate to difficult hiking), proximity to fire or fire source, proximity to body of water or swimming alone, operating heavy and potentially risky machinery, using sharp objects, using exercise machines like treadmill and weight lifting. Walking while accompanied on soft surfaces like grass is preferable.The patient was encouraged to shower (without accumulation of water) instead of taking a bath if unsupervised. The patient was made aware that these precautions are especially important during concurrent illnesses, fever, infections, vomiting, changing medications and running out of anti-seizure medications. Instructed the patient to avoid night shifts, sleep deprivation and alcohol or recreational drug use as adequate sleep and avoidance of alcohol/drugs are very important measures to assure good seizure control. The patient  was also advised not to care for young children without company. The patient is advised to pad the side rails with pillows and blankets if applicable.I strongly recommended lowering the bed to the floor level to decrease the risk of falls during nocturnal seizures that occur during sleep. I also instructed the patient to avoid safety sensitive duties. In general, any activity that requires full awareness and would result in serious injury to self and others if a seizure takes place should be avoided.      Made the patient aware that the duration of restrictions/precautions varies and in [place] it is 6 months. The patient verbalized  full understanding.        Restart Levetiracetam/Keppra-LEV XR 2000 mg QHS. Check LEV in 8 weeks.       NON-EPILEPTIC SEIZURES    Discussed with patient regarding Psychogenic non-epileptic seizures are events resembling an epileptic seizure, but without the characteristic electrical discharges associated with epilepsy. They are of psychological origin, and are one type of non-epileptic seizure mimics. PNES are also known less specifically as non-epileptic attack disorder and functional neurological symptom disorder. I explained to patient that he will require different treatment to address the underlying cause for this phenomenon. A specific traumatic event, such as physical or sexual abuse, incest, divorce, death of a loved one, or other great loss or sudden change, can be identified in many people with PNES. Recommend CBT By definition, PNES are a physical manifestation of a psychological disturbance and are a type of Somatoform Disorder.     Follow up with Psychiatry     Continue AEDs INDEFINITELY, FOR GOOD AND NEVER SKIP A DOSE. The patient verbalized full understanding. Stressed the extreme medical, personal safety, public safety and legal importance of compliance and seizure control. Will give as many refills as possible with or without face-to-face evaluation to make sure the patient  and any patient with epilepsy will never run out of medications. Running out of seizure medications should never happen under our care. I explained to the patient that he should not, under any circumstances, adjust or stop taking his seizure medication without discussing with me. Warned the patient about SUDEP. The patient verbalized full understanding.       AVOID any substance that could lower seizure threshold including but not limited to:        ALCOHOL AND WITHDRAWAL      TRAMADOL.     MEPERIDINE (DEMEROL)     ALL STIMULANTS-ALL ADHD MEDICATIONS.      CLOZAPINE.      BUPROPION (WELLBUTRIN)     CIPROFLOXACIN.    CYCLOSPORINE.     METOCLOPRAMIDE (REGLAN).     TETRAHYDROCANNABINOL (THC)    KRATOM            COMMON MIGRAINE WITHOUT AURA, EPISODIC, LOW FREQUENCY     HEADACHE DIARY     DISCUSSED THE THREE-FOLD MANAGEMENT OF MIGRAINE:      LIFESTYLE CHANGES:       Good sleep hygiene  Avoid general triggers like lack of sleep/too much sleep, prolonged sun exposure, excessive screen time and specific triggers based on you own diary   Minimize physical and emotional stress  Smoking avoidance and cessation  Limit caffeine drinks to 1-2 a day   Good hydration   Small frequent meals and avoid skipping meals   Moderate 30-minute-long aerobic exercises 3 times/week. Avoid strenuous exercise         ABORTIVE MEDICATIONS (ACUTE-RESCUE MEDICATIONS):     Should only be taken 2-3 times/week to avoid rebound and overuse headaches.      Triptans and DHE are C/I in Raynaud's.    Failed Firoicet.       AVOID NARCOTICS (OPIATES)      1. No randomized controlled study shows pain-free results with opioids in the treatment of migraine.     2. The physiologic consequences of opioid use are adverse, occur quickly, and can be permanent. Decreased gray matter, release of calcitonin gene-related peptide, dynorphin, and pro-inflammatory peptides, and activation of excitatory glutamate receptors are all associated with opioid exposure.     3.  Opioids are pro-nociceptive, prevent reversal of migraine central sensitization, and interfere with triptan effectiveness.     4.Opioids precipitate bad clinical outcomes, especially transformation to daily headache.     5. They cause disease progression, comorbidity, and excessive health care consumption.           NEXT OPTIONS:      Gepants: Nuretc (rimegepant)75 mg >Ubrelvy (ubrogepant) 100 mg          IMPENDING STATUS: Prednisone and Vistaril.    STATUS MIGRAINOSUS: ED-Infusion for Status Protocol.        PREVENTATIVE (MORE ACCURATELY MIGRAINE REDUCTION) MEDICATIONS:           Since the patient's headache is very infrequent will hold off.     Tried CCB (Amlodipine),  TCA (Elavil), SSRIS (Zoloft) and AEDs (GBP).     BB are C/I in Raynaud's.    HELPFUL SUPPLEMENTS:     Helpful supplements include Co-Q 10, B2, Mg, Feverfew (Dolovent combination) and butterbur (Petadolex)        NEUROPHARMACOLOGY     NEXT OPTIONS:       Topiramate/Topamax (TPM) slow titration to 50 mg BID which can cause mental slowing, transient tingling, kidney stones, weight loss, cleft lip and palate and rarely glaucoma and visual field defects . The patient was encouraged to drink a lot of fluids.     Zonisamide/Zonegran (ZNS) 100-400 mg QHS is a good alternative to TPM in case of SE/AE.     Lamotrigine/Lamictal  (LTG)slow titration to 100 mg BID which can cause serious skin rash and rare cardiac arrhythmias. LTG is superior to other therapies for specifically reducing migraine aura.     ANTI-CGRP AGENTS: Qulipta (alogepant) 60 mg QD, Erenumab (Aimovig) 140 mg SQ Pen monthly (Reported cases of Constipation and BP elevation) , Galcanezumab (Emgality) 120 mg SQ Pen monthly after a loading dose of 240 mg  and Fremnezumab (Ajovy) (Ligand Blocker): 225 mg SQ monthly or 675 mg every 3 months     Botox 200 units every 3 months .        LAST RESORT OPTIONS:      Namenda 10 mg BID     Valproic acid/ Depakote         NEUROMODULATION     Cefaly, Relivion,  Nerivio and GammaCore (VNS)           PARESTHESIAS, HANDS AND FEET, B12 AND VITAMIN D DEFICIENCY, CONNECTIVE TISSUE DISEASE, IMMUNOSUPPRESSIVE THERAPY        Continue Vitamin D 5000 units QD.     Continue B12 SL 2500 mcg QD.                 MEDICAL/SURGICAL COMORBIDITIES     All relevant medical comorbidities noted and managed by primary care physician and medical care team.          HEALTHY LIFESTYLE AND PREVENTATIVE CARE    The patient to adhere to the age-appropriate health maintenance guidelines including screening tests and vaccinations. The patient to adhere to  healthy lifestyle, optimal weight, exercise, healthy diet, good sleep hygiene and avoiding drugs including smoking, alcohol and recreational drugs.          RTC in 6 months       Bibi Larsen MSN NP      Collaborating Provider: Daren Lamb MD, FAAN Neurologist/Epileptologist    I spent a total of 30 minutes on the day of the visit.  This includes face to face time and non-face to face time preparing to see the patient (eg, review of tests), obtaining and/or reviewing separately obtained history, documenting clinical information in the electronic or other health record, independently interpreting results and communicating results to the patient/family/caregiver, or care coordinator.

## 2024-10-02 NOTE — PATIENT INSTRUCTIONS
A cold or warm feeling on a specific part of your body can have a holistic effect.     By stimulating the temperature-sensitive nerves on your inner wrist, wearing Embr Wave can change how hot or cold you feel.

## 2024-10-07 ENCOUNTER — TELEPHONE (OUTPATIENT)
Dept: RHEUMATOLOGY | Facility: CLINIC | Age: 49
End: 2024-10-07
Payer: COMMERCIAL

## 2024-10-07 NOTE — TELEPHONE ENCOUNTER
Message left on patients voice mail letting her know her appointment with Dr. Kinsey on 10-16 has been cancelled because he's no longer here. I let her know our doctors are booked until after the first of the year. I told her [facility] have Rheumatologist. Check with her insurance.

## 2024-10-09 ENCOUNTER — LAB VISIT (OUTPATIENT)
Dept: LAB | Facility: HOSPITAL | Age: 49
End: 2024-10-09
Attending: STUDENT IN AN ORGANIZED HEALTH CARE EDUCATION/TRAINING PROGRAM
Payer: COMMERCIAL

## 2024-10-09 DIAGNOSIS — M32.14 LUPUS NEPHRITIS, ISN/RPS CLASS V: ICD-10-CM

## 2024-10-09 DIAGNOSIS — M35.01 SJOGREN'S SYNDROME WITH KERATOCONJUNCTIVITIS SICCA: ICD-10-CM

## 2024-10-09 LAB
ALBUMIN SERPL BCP-MCNC: 4.2 G/DL (ref 3.5–5.2)
ALP SERPL-CCNC: 52 U/L (ref 55–135)
ALT SERPL W/O P-5'-P-CCNC: 23 U/L (ref 10–44)
ANION GAP SERPL CALC-SCNC: 14 MMOL/L (ref 8–16)
AST SERPL-CCNC: 18 U/L (ref 10–40)
BACTERIA #/AREA URNS HPF: NORMAL /HPF
BASOPHILS # BLD AUTO: 0.03 K/UL (ref 0–0.2)
BASOPHILS NFR BLD: 0.4 % (ref 0–1.9)
BILIRUB SERPL-MCNC: 0.3 MG/DL (ref 0.1–1)
BUN SERPL-MCNC: 13 MG/DL (ref 6–20)
C3 SERPL-MCNC: 172 MG/DL (ref 50–180)
C4 SERPL-MCNC: 39 MG/DL (ref 11–44)
CALCIUM SERPL-MCNC: 10.2 MG/DL (ref 8.7–10.5)
CHLORIDE SERPL-SCNC: 103 MMOL/L (ref 95–110)
CO2 SERPL-SCNC: 24 MMOL/L (ref 23–29)
CREAT SERPL-MCNC: 1.1 MG/DL (ref 0.5–1.4)
CREAT UR-MCNC: 160 MG/DL (ref 15–325)
CRP SERPL-MCNC: 12 MG/L (ref 0–8.2)
DIFFERENTIAL METHOD BLD: ABNORMAL
EOSINOPHIL # BLD AUTO: 0.1 K/UL (ref 0–0.5)
EOSINOPHIL NFR BLD: 1 % (ref 0–8)
ERYTHROCYTE [DISTWIDTH] IN BLOOD BY AUTOMATED COUNT: 13.4 % (ref 11.5–14.5)
ERYTHROCYTE [SEDIMENTATION RATE] IN BLOOD BY WESTERGREN METHOD: 16 MM/HR (ref 0–20)
EST. GFR  (NO RACE VARIABLE): >60 ML/MIN/1.73 M^2
GLUCOSE SERPL-MCNC: 88 MG/DL (ref 70–110)
HCT VFR BLD AUTO: 39.6 % (ref 37–48.5)
HGB BLD-MCNC: 12.8 G/DL (ref 12–16)
IMM GRANULOCYTES # BLD AUTO: 0.03 K/UL (ref 0–0.04)
IMM GRANULOCYTES NFR BLD AUTO: 0.4 % (ref 0–0.5)
LYMPHOCYTES # BLD AUTO: 1.8 K/UL (ref 1–4.8)
LYMPHOCYTES NFR BLD: 26 % (ref 18–48)
MCH RBC QN AUTO: 28.7 PG (ref 27–31)
MCHC RBC AUTO-ENTMCNC: 32.3 G/DL (ref 32–36)
MCV RBC AUTO: 89 FL (ref 82–98)
MICROSCOPIC COMMENT: NORMAL
MONOCYTES # BLD AUTO: 0.4 K/UL (ref 0.3–1)
MONOCYTES NFR BLD: 6 % (ref 4–15)
NEUTROPHILS # BLD AUTO: 4.6 K/UL (ref 1.8–7.7)
NEUTROPHILS NFR BLD: 66.2 % (ref 38–73)
NRBC BLD-RTO: 0 /100 WBC
PLATELET # BLD AUTO: 335 K/UL (ref 150–450)
PMV BLD AUTO: 8.8 FL (ref 9.2–12.9)
POTASSIUM SERPL-SCNC: 4.4 MMOL/L (ref 3.5–5.1)
PROT SERPL-MCNC: 8.2 G/DL (ref 6–8.4)
PROT UR-MCNC: <7 MG/DL (ref 0–15)
PROT/CREAT UR: NORMAL MG/G{CREAT} (ref 0–0.2)
RBC # BLD AUTO: 4.46 M/UL (ref 4–5.4)
RBC #/AREA URNS HPF: 1 /HPF (ref 0–4)
SODIUM SERPL-SCNC: 141 MMOL/L (ref 136–145)
WBC # BLD AUTO: 6.95 K/UL (ref 3.9–12.7)
WBC #/AREA URNS HPF: 2 /HPF (ref 0–5)

## 2024-10-09 PROCEDURE — 36415 COLL VENOUS BLD VENIPUNCTURE: CPT | Performed by: PHYSICIAN ASSISTANT

## 2024-10-09 PROCEDURE — 86160 COMPLEMENT ANTIGEN: CPT | Performed by: PHYSICIAN ASSISTANT

## 2024-10-09 PROCEDURE — 80053 COMPREHEN METABOLIC PANEL: CPT | Performed by: PHYSICIAN ASSISTANT

## 2024-10-09 PROCEDURE — 81000 URINALYSIS NONAUTO W/SCOPE: CPT | Performed by: PHYSICIAN ASSISTANT

## 2024-10-09 PROCEDURE — 85651 RBC SED RATE NONAUTOMATED: CPT | Performed by: PHYSICIAN ASSISTANT

## 2024-10-09 PROCEDURE — 86140 C-REACTIVE PROTEIN: CPT | Performed by: PHYSICIAN ASSISTANT

## 2024-10-09 PROCEDURE — 85025 COMPLETE CBC W/AUTO DIFF WBC: CPT | Performed by: PHYSICIAN ASSISTANT

## 2024-10-09 PROCEDURE — 82570 ASSAY OF URINE CREATININE: CPT | Performed by: PHYSICIAN ASSISTANT

## 2024-10-09 PROCEDURE — 86160 COMPLEMENT ANTIGEN: CPT | Mod: 59 | Performed by: PHYSICIAN ASSISTANT

## 2024-10-25 ENCOUNTER — OFFICE VISIT (OUTPATIENT)
Dept: RHEUMATOLOGY | Facility: CLINIC | Age: 49
End: 2024-10-25
Payer: COMMERCIAL

## 2024-10-25 VITALS
HEIGHT: 66 IN | BODY MASS INDEX: 40.78 KG/M2 | HEART RATE: 75 BPM | SYSTOLIC BLOOD PRESSURE: 130 MMHG | WEIGHT: 253.75 LBS | DIASTOLIC BLOOD PRESSURE: 81 MMHG

## 2024-10-25 DIAGNOSIS — Z79.899 HIGH RISK MEDICATION USE: ICD-10-CM

## 2024-10-25 DIAGNOSIS — R06.02 SHORTNESS OF BREATH: ICD-10-CM

## 2024-10-25 DIAGNOSIS — M34.9 SCLERODERMA: ICD-10-CM

## 2024-10-25 DIAGNOSIS — M35.01 SJOGREN'S SYNDROME WITH KERATOCONJUNCTIVITIS SICCA: ICD-10-CM

## 2024-10-25 DIAGNOSIS — M32.14 LUPUS NEPHRITIS, ISN/RPS CLASS V: ICD-10-CM

## 2024-10-25 DIAGNOSIS — Z79.60 ENCOUNTER FOR MONITORING IMMUNOSUPPRESSIVE MEDICATION THERAPY CAUSING IMMUNODEFICIENCY: ICD-10-CM

## 2024-10-25 DIAGNOSIS — M32.14 OTHER SYSTEMIC LUPUS ERYTHEMATOSUS WITH GLOMERULAR DISEASE: Primary | ICD-10-CM

## 2024-10-25 DIAGNOSIS — D84.821 IMMUNODEFICIENCY DUE TO DRUG THERAPY: ICD-10-CM

## 2024-10-25 DIAGNOSIS — Z79.899 IMMUNODEFICIENCY DUE TO DRUG THERAPY: ICD-10-CM

## 2024-10-25 DIAGNOSIS — Z51.81 ENCOUNTER FOR MONITORING IMMUNOSUPPRESSIVE MEDICATION THERAPY CAUSING IMMUNODEFICIENCY: ICD-10-CM

## 2024-10-25 DIAGNOSIS — D84.821 ENCOUNTER FOR MONITORING IMMUNOSUPPRESSIVE MEDICATION THERAPY CAUSING IMMUNODEFICIENCY: ICD-10-CM

## 2024-10-25 PROCEDURE — 99999 PR PBB SHADOW E&M-EST. PATIENT-LVL IV: CPT | Mod: PBBFAC,,, | Performed by: STUDENT IN AN ORGANIZED HEALTH CARE EDUCATION/TRAINING PROGRAM

## 2024-10-25 RX ORDER — AZATHIOPRINE 100 MG/1
100 TABLET ORAL DAILY
Qty: 90 TABLET | Refills: 1 | Status: SHIPPED | OUTPATIENT
Start: 2024-10-25

## 2024-10-25 RX ORDER — PREDNISONE 5 MG/1
TABLET ORAL
Qty: 90 TABLET | Refills: 0 | Status: SHIPPED | OUTPATIENT
Start: 2024-10-25

## 2024-10-25 RX ORDER — PREDNISONE 2.5 MG/1
2.5 TABLET ORAL DAILY
Qty: 90 TABLET | Refills: 0 | Status: SHIPPED | OUTPATIENT
Start: 2024-10-25

## 2024-10-25 RX ORDER — HYDROXYCHLOROQUINE SULFATE 200 MG/1
200 TABLET, FILM COATED ORAL 2 TIMES DAILY
Qty: 180 TABLET | Refills: 1 | Status: SHIPPED | OUTPATIENT
Start: 2024-10-25

## 2024-10-25 NOTE — PATIENT INSTRUCTIONS
Echocardiogram.  Reduce prednisone to 7.5 mg daily (5 mg + 2.5 mg).  Take Imuran 100 mg daily.  Take Plaquenil 200 mg twice a day.  Labs and follow up in 3 months.

## 2024-10-25 NOTE — PROGRESS NOTES
Subjective:      Patient ID: Zachary Lucia is a 49 y.o. female.    Chief Complaint: SLE with lupus nephritis class V    HPI:   Patient presents for Rheumatology follow up for SLE with class V lupus nephritis, Sjogren's Disease, Systemic Sclerosis sine scleroderma. Diagnosed with Sjogren's Disease in 2010 by Dr. Dixon. Then with Sjogren's/SS since scleroderma overlap in 2018 by Dr. Dinh. At that time, she had AMAURY+ 1:1280 speckled, SSA+, SSB+, RNP+, Scl70+ and manifestations of arthralgias, sicca symptoms, Raynaud's, dysphagia. In 2018 she had kidney biopsy showing class V lupus nephritis. She was intolerant of Cellcept, so started Imuran. Kidneys appear to be in remission at this point. She takes Plaquenil 200 mg BID. She is supposed to be on Imuran 150 mg daily but ran out of the 100 mg tablets so has been on 50 mg daily for about a month. She was also on Benlysta but has been out of it for a year, she estimates. (Work life is too busy for infusions and the [place] prescription never got processed). She self discontinued her lisinopril a while ago due to cough. She's been taking prednisone 10 mg daily for years. Previous attempts at reducing prednisone to 5 mg daily resulted in worse joint and muscle pain. She reports no worsening symptoms since reducing her Imuran or being off Benlysta. Has gained weight in the past year since being off Wegovy due to insurance coverage. Endorses edema in the hands and lower extremities by the end of day. 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 Doesn't exercise. Tries to cook, eat healthy, avoid salt. Endorses chronic dyspnea. Denies joint swelling, significant stiffness, skin rashes, oral ulcers, patchy alopecia, sicca symptoms, chest pain, cough, eye inflammation, IBD, fevers, weight loss, Raynaud's. Rheumatology review of systems is otherwise negative.    Social Hx: Non smoker.    Objective:   /81 (BP Location:  "Left arm, Patient Position: Sitting)   Pulse 75   Ht 5' 6" (1.676 m)   Wt 115.1 kg (253 lb 12 oz)   BMI 40.96 kg/m²   Physical Exam  Constitutional:       General: She is not in acute distress.     Appearance: Normal appearance.   HENT:      Head: Normocephalic and atraumatic.      Mouth/Throat:      Mouth: Mucous membranes are moist.      Pharynx: Oropharynx is clear.   Cardiovascular:      Rate and Rhythm: Normal rate and regular rhythm.   Pulmonary:      Effort: Pulmonary effort is normal.      Breath sounds: Normal breath sounds.   Abdominal:      Palpations: Abdomen is soft.      Tenderness: There is no abdominal tenderness.   Musculoskeletal:         General: No swelling.      Cervical back: Normal range of motion. No tenderness.      Comments: No synovitis, dactylitis, enthesitis. +tenderness shoulders, knees. Trace BLE edema.   Skin:     General: Skin is warm and dry.      Comments: No rashes. No telangiectasias. No skin thickening, finger edema, or sclerodactyly.   Neurological:      Mental Status: She is alert and oriented to person, place, and time. Mental status is at baseline.             Assessment and Plan:     Problem List Items Addressed This Visit          Unprioritized    Sjogren's syndrome    Relevant Medications    hydroxychloroquine (PLAQUENIL) 200 mg tablet    Scleroderma    Relevant Medications    azaTHIOprine (IMURAN) 100 mg tablet    Lupus nephritis, ISN/RPS class V    Relevant Orders    Anti-DNA Ab, Double-Stranded    C3 Complement    C4 Complement    CBC Auto Differential    Comprehensive Metabolic Panel    C-Reactive Protein    Protein/Creatinine Ratio, Urine    Urinalysis    Sedimentation rate     Other Visit Diagnoses       Other systemic lupus erythematosus with glomerular disease    -  Primary    Relevant Orders    Echo    Anti-DNA Ab, Double-Stranded    C3 Complement    C4 Complement    CBC Auto Differential    Comprehensive Metabolic Panel    C-Reactive Protein    " Protein/Creatinine Ratio, Urine    Urinalysis    Sedimentation rate    Shortness of breath        Relevant Orders    Echo    Immunodeficiency due to drug therapy        High risk medication use        Encounter for monitoring immunosuppressive medication therapy causing immunodeficiency                Patient presents for Rheumatology follow up for SLE with class V lupus nephritis, Sjogren's Disease, systemic sclerosis sine scleroderma.  AMAURY+ 1:1280 speckled, SSA+, SSB+, RNP+, Scl70+  Manifestations of arthralgias, sicca symptoms, Raynaud's, dysphagia, class V lupus nephritis.    The diagnosis of systemic sclerosis came first in 2018 before her kidney biopsy revealed lupus. CT chest normal. Echo with mildly elevated PA pressure of 32 mmHg. PFTs were inconclusive (hx asthma). Dysphagia resolved. LHC showed normal coronary arteries. Therefore her chronic dyspnea is likely from asthma and deconditioning. Will repeat echo to make sure she's not developing pulmonary HTN. Other than Scl70+ and Raynaud's, I don't find features of systemic sclerosis today.    Her autoimmune disease is stable and lupus nephritis seems to be in remission. Raynaud's is stable. She has self discontinued or run out of most of her medications, and labs and symptoms continue to be stable. Will taper the prednisone most importantly. If we are unable to do that due to symptom flare or abnormal labs, will have to increase Imuran and/or add Benlysta. I'm worried about decreasing prednisone in the setting of her dropping Imuran from 150 mg to 50 mg recently.    Plan:  Echo  Reduce prednisone to 7.5 mg daily.  Increase Imuran to 100 mg daily.  Continue Plaquenil 200 mg BID  Repeat labs and follow up in 3 months and decide if we need to increase Imuran back to 150 and/or add Benlysta.  Counseled extensively on exercise, low impact muscle strengthening, conditioning, Mediterranean diet, avoiding sedentary lifestyle.        Follow up in about 3 months  (around 1/25/2025).       I spent a total of 60 minutes on the day of the visit.  This includes face to face time and non-face to face time preparing to see the patient (eg, review of tests), obtaining and/or reviewing separately obtained history, documenting clinical information in the electronic or other health record, independently interpreting results and communicating results to the patient/family/caregiver, or care coordinator.

## 2024-11-02 ENCOUNTER — TELEPHONE (OUTPATIENT)
Facility: CLINIC | Age: 49
End: 2024-11-02
Payer: COMMERCIAL

## 2024-11-08 ENCOUNTER — HOSPITAL ENCOUNTER (OUTPATIENT)
Dept: CARDIOLOGY | Facility: HOSPITAL | Age: 49
Discharge: HOME | End: 2024-11-08
Attending: STUDENT IN AN ORGANIZED HEALTH CARE EDUCATION/TRAINING PROGRAM
Payer: COMMERCIAL

## 2024-11-08 VITALS
WEIGHT: 253 LBS | SYSTOLIC BLOOD PRESSURE: 141 MMHG | BODY MASS INDEX: 40.66 KG/M2 | HEIGHT: 66 IN | DIASTOLIC BLOOD PRESSURE: 87 MMHG

## 2024-11-08 DIAGNOSIS — M32.14 OTHER SYSTEMIC LUPUS ERYTHEMATOSUS WITH GLOMERULAR DISEASE: ICD-10-CM

## 2024-11-08 DIAGNOSIS — R06.02 SHORTNESS OF BREATH: ICD-10-CM

## 2024-11-08 LAB
AORTIC ROOT ANNULUS: 3.06 CM
AV INDEX (PROSTH): 0.66
AV MEAN GRADIENT: 4.7 MMHG
AV PEAK GRADIENT: 9 MMHG
AV VALVE AREA BY VELOCITY RATIO: 2.1 CM²
AV VALVE AREA: 2.1 CM²
AV VELOCITY RATIO: 0.67
BSA FOR ECHO PROCEDURE: 2.31 M2
CV ECHO LV RWT: 0.4 CM
DOP CALC AO PEAK VEL: 1.5 M/S
DOP CALC AO VTI: 35.2 CM
DOP CALC LVOT AREA: 3.1 CM2
DOP CALC LVOT DIAMETER: 2 CM
DOP CALC LVOT PEAK VEL: 1 M/S
DOP CALC LVOT STROKE VOLUME: 73.5 CM3
DOP CALC RVOT PEAK VEL: 0.75 M/S
DOP CALC RVOT VTI: 18.3 CM
DOP CALCLVOT PEAK VEL VTI: 23.4 CM
E WAVE DECELERATION TIME: 183.13 MSEC
E/A RATIO: 1.14
E/E' RATIO: 9.04 M/S
ECHO LV POSTERIOR WALL: 0.9 CM (ref 0.6–1.1)
FRACTIONAL SHORTENING: 26.7 % (ref 28–44)
INTERVENTRICULAR SEPTUM: 0.9 CM (ref 0.6–1.1)
IVRT: 110.37 MSEC
LA MAJOR: 5.6 CM
LA MINOR: 5.04 CM
LA WIDTH: 3 CM
LEFT ATRIUM AREA SYSTOLIC (APICAL 2 CHAMBER): 19.96 CM2
LEFT ATRIUM AREA SYSTOLIC (APICAL 4 CHAMBER): 20.15 CM2
LEFT ATRIUM SIZE: 3.9 CM
LEFT ATRIUM VOLUME INDEX MOD: 26.6 ML/M2
LEFT ATRIUM VOLUME INDEX: 23.9 ML/M2
LEFT ATRIUM VOLUME MOD: 58.76 ML
LEFT ATRIUM VOLUME: 52.76 CM3
LEFT INTERNAL DIMENSION IN SYSTOLE: 3.3 CM (ref 2.1–4)
LEFT VENTRICLE DIASTOLIC VOLUME INDEX: 42.53 ML/M2
LEFT VENTRICLE DIASTOLIC VOLUME: 93.99 ML
LEFT VENTRICLE END SYSTOLIC VOLUME APICAL 2 CHAMBER: 59.31 ML
LEFT VENTRICLE END SYSTOLIC VOLUME APICAL 4 CHAMBER: 55.66 ML
LEFT VENTRICLE MASS INDEX: 60.1 G/M2
LEFT VENTRICLE SYSTOLIC VOLUME INDEX: 19.6 ML/M2
LEFT VENTRICLE SYSTOLIC VOLUME: 43.39 ML
LEFT VENTRICULAR INTERNAL DIMENSION IN DIASTOLE: 4.5 CM (ref 3.5–6)
LEFT VENTRICULAR MASS: 132.8 G
LV LATERAL E/E' RATIO: 8.67 M/S
LV SEPTAL E/E' RATIO: 9.45 M/S
LVED V (TEICH): 93.99 ML
LVES V (TEICH): 43.39 ML
LVOT MG: 2.23 MMHG
LVOT MV: 0.69 CM/S
MV PEAK A VEL: 0.91 M/S
MV PEAK E VEL: 1.04 M/S
PISA TR MAX VEL: 2.47 M/S
PULM VEIN S/D RATIO: 1.23
PV MEAN GRADIENT: 1 MMHG
PV MV: 0.92 M/S
PV PEAK D VEL: 0.57 M/S
PV PEAK GRADIENT: 2 MMHG
PV PEAK S VEL: 0.7 M/S
PV PEAK VELOCITY: 1.26 M/S
RA MAJOR: 5.05 CM
RA WIDTH: 3.09 CM
RIGHT VENTRICULAR END-DIASTOLIC DIMENSION: 2.37 CM
SINUS: 2.61 CM
STJ: 2.63 CM
TDI LATERAL: 0.12 M/S
TDI SEPTAL: 0.11 M/S
TDI: 0.12 M/S
TR MAX PG: 24 MMHG
TRICUSPID ANNULAR PLANE SYSTOLIC EXCURSION: 1.84 CM
Z-SCORE OF LEFT VENTRICULAR DIMENSION IN END DIASTOLE: -5.28
Z-SCORE OF LEFT VENTRICULAR DIMENSION IN END SYSTOLE: -2.68

## 2024-11-08 PROCEDURE — 93306 TTE W/DOPPLER COMPLETE: CPT | Mod: 26,,, | Performed by: INTERNAL MEDICINE

## 2024-11-08 PROCEDURE — 93306 TTE W/DOPPLER COMPLETE: CPT

## 2024-11-10 LAB
AORTIC ROOT ANNULUS: 3.06 CM
AV INDEX (PROSTH): 0.66
AV MEAN GRADIENT: 4.7 MMHG
AV PEAK GRADIENT: 9 MMHG
AV VALVE AREA BY VELOCITY RATIO: 2.1 CM²
AV VALVE AREA: 2.1 CM²
AV VELOCITY RATIO: 0.67
BSA FOR ECHO PROCEDURE: 2.31 M2
CV ECHO LV RWT: 0.4 CM
DOP CALC AO PEAK VEL: 1.5 M/S
DOP CALC AO VTI: 35.2 CM
DOP CALC LVOT AREA: 3.1 CM2
DOP CALC LVOT DIAMETER: 2 CM
DOP CALC LVOT PEAK VEL: 1 M/S
DOP CALC LVOT STROKE VOLUME: 73.5 CM3
DOP CALC RVOT PEAK VEL: 0.75 M/S
DOP CALC RVOT VTI: 18.3 CM
DOP CALCLVOT PEAK VEL VTI: 23.4 CM
E WAVE DECELERATION TIME: 183.13 MSEC
E/A RATIO: 1.14
E/E' RATIO: 9.04 M/S
ECHO LV POSTERIOR WALL: 0.9 CM (ref 0.6–1.1)
FRACTIONAL SHORTENING: 26.7 % (ref 28–44)
INTERVENTRICULAR SEPTUM: 0.9 CM (ref 0.6–1.1)
IVRT: 110.37 MSEC
LA MAJOR: 5.6 CM
LA MINOR: 5.04 CM
LA WIDTH: 3 CM
LEFT ATRIUM AREA SYSTOLIC (APICAL 2 CHAMBER): 19.96 CM2
LEFT ATRIUM AREA SYSTOLIC (APICAL 4 CHAMBER): 20.15 CM2
LEFT ATRIUM SIZE: 3.9 CM
LEFT ATRIUM VOLUME INDEX MOD: 26.6 ML/M2
LEFT ATRIUM VOLUME INDEX: 23.9 ML/M2
LEFT ATRIUM VOLUME MOD: 58.76 ML
LEFT ATRIUM VOLUME: 52.76 CM3
LEFT INTERNAL DIMENSION IN SYSTOLE: 3.3 CM (ref 2.1–4)
LEFT VENTRICLE DIASTOLIC VOLUME INDEX: 42.53 ML/M2
LEFT VENTRICLE DIASTOLIC VOLUME: 93.99 ML
LEFT VENTRICLE END SYSTOLIC VOLUME APICAL 2 CHAMBER: 59.31 ML
LEFT VENTRICLE END SYSTOLIC VOLUME APICAL 4 CHAMBER: 55.66 ML
LEFT VENTRICLE MASS INDEX: 60.1 G/M2
LEFT VENTRICLE SYSTOLIC VOLUME INDEX: 19.6 ML/M2
LEFT VENTRICLE SYSTOLIC VOLUME: 43.39 ML
LEFT VENTRICULAR INTERNAL DIMENSION IN DIASTOLE: 4.5 CM (ref 3.5–6)
LEFT VENTRICULAR MASS: 132.8 G
LV LATERAL E/E' RATIO: 8.67 M/S
LV SEPTAL E/E' RATIO: 9.45 M/S
LVED V (TEICH): 93.99 ML
LVES V (TEICH): 43.39 ML
LVOT MG: 2.23 MMHG
LVOT MV: 0.69 CM/S
MV PEAK A VEL: 0.91 M/S
MV PEAK E VEL: 1.04 M/S
PISA TR MAX VEL: 2.47 M/S
PULM VEIN S/D RATIO: 1.23
PV MEAN GRADIENT: 1 MMHG
PV MV: 0.92 M/S
PV PEAK D VEL: 0.57 M/S
PV PEAK GRADIENT: 6 MMHG
PV PEAK S VEL: 0.7 M/S
PV PEAK VELOCITY: 1.26 M/S
RA MAJOR: 5.05 CM
RA PRESSURE ESTIMATED: 3 MMHG
RA WIDTH: 3.09 CM
RIGHT VENTRICULAR END-DIASTOLIC DIMENSION: 2.37 CM
RV TB RVSP: 5 MMHG
SINUS: 2.61 CM
STJ: 2.63 CM
TDI LATERAL: 0.12 M/S
TDI SEPTAL: 0.11 M/S
TDI: 0.12 M/S
TR MAX PG: 24 MMHG
TRICUSPID ANNULAR PLANE SYSTOLIC EXCURSION: 1.84 CM
TV REST PULMONARY ARTERY PRESSURE: 27 MMHG
Z-SCORE OF LEFT VENTRICULAR DIMENSION IN END DIASTOLE: -5.28
Z-SCORE OF LEFT VENTRICULAR DIMENSION IN END SYSTOLE: -2.68

## 2024-11-29 DIAGNOSIS — R07.9 CHEST PAIN, UNSPECIFIED TYPE: Primary | ICD-10-CM

## 2024-11-29 DIAGNOSIS — R94.39 ABNORMAL STRESS TEST: ICD-10-CM

## 2024-12-02 ENCOUNTER — OFFICE VISIT (OUTPATIENT)
Dept: CARDIOLOGY | Facility: CLINIC | Age: 49
End: 2024-12-02
Attending: STUDENT IN AN ORGANIZED HEALTH CARE EDUCATION/TRAINING PROGRAM
Payer: COMMERCIAL

## 2024-12-02 ENCOUNTER — HOSPITAL ENCOUNTER (OUTPATIENT)
Dept: CARDIOLOGY | Facility: HOSPITAL | Age: 49
Discharge: HOME | End: 2024-12-02
Attending: STUDENT IN AN ORGANIZED HEALTH CARE EDUCATION/TRAINING PROGRAM
Payer: COMMERCIAL

## 2024-12-02 VITALS
SYSTOLIC BLOOD PRESSURE: 130 MMHG | HEART RATE: 68 BPM | HEIGHT: 66 IN | DIASTOLIC BLOOD PRESSURE: 80 MMHG | BODY MASS INDEX: 40.96 KG/M2 | WEIGHT: 254.88 LBS

## 2024-12-02 DIAGNOSIS — M79.609 PAIN IN EXTREMITY, UNSPECIFIED EXTREMITY: ICD-10-CM

## 2024-12-02 DIAGNOSIS — M34.9 SCLERODERMA: ICD-10-CM

## 2024-12-02 DIAGNOSIS — M32.14 LUPUS NEPHRITIS, ISN/RPS CLASS V: ICD-10-CM

## 2024-12-02 DIAGNOSIS — R06.03 RESPIRATORY DISTRESS: ICD-10-CM

## 2024-12-02 DIAGNOSIS — R07.9 CHEST PAIN, UNSPECIFIED TYPE: ICD-10-CM

## 2024-12-02 DIAGNOSIS — R60.0 LOCALIZED EDEMA: ICD-10-CM

## 2024-12-02 DIAGNOSIS — R06.02 SOB (SHORTNESS OF BREATH): ICD-10-CM

## 2024-12-02 DIAGNOSIS — J12.82 PNEUMONIA DUE TO COVID-19 VIRUS: ICD-10-CM

## 2024-12-02 DIAGNOSIS — M35.01 SJOGREN'S SYNDROME WITH KERATOCONJUNCTIVITIS SICCA: ICD-10-CM

## 2024-12-02 DIAGNOSIS — R07.9 CHEST PAIN, UNSPECIFIED TYPE: Primary | ICD-10-CM

## 2024-12-02 DIAGNOSIS — I10 ESSENTIAL HYPERTENSION: ICD-10-CM

## 2024-12-02 DIAGNOSIS — R06.02 SHORTNESS OF BREATH: ICD-10-CM

## 2024-12-02 DIAGNOSIS — E66.01 MORBID OBESITY WITH BMI OF 40.0-44.9, ADULT: ICD-10-CM

## 2024-12-02 DIAGNOSIS — U07.1 PNEUMONIA DUE TO COVID-19 VIRUS: ICD-10-CM

## 2024-12-02 DIAGNOSIS — R94.39 ABNORMAL STRESS TEST: ICD-10-CM

## 2024-12-02 DIAGNOSIS — M32.14 OTHER SYSTEMIC LUPUS ERYTHEMATOSUS WITH GLOMERULAR DISEASE: ICD-10-CM

## 2024-12-02 PROCEDURE — 3008F BODY MASS INDEX DOCD: CPT | Mod: CPTII,S$GLB,, | Performed by: STUDENT IN AN ORGANIZED HEALTH CARE EDUCATION/TRAINING PROGRAM

## 2024-12-02 PROCEDURE — 3075F SYST BP GE 130 - 139MM HG: CPT | Mod: CPTII,S$GLB,, | Performed by: STUDENT IN AN ORGANIZED HEALTH CARE EDUCATION/TRAINING PROGRAM

## 2024-12-02 PROCEDURE — 1159F MED LIST DOCD IN RCRD: CPT | Mod: CPTII,S$GLB,, | Performed by: STUDENT IN AN ORGANIZED HEALTH CARE EDUCATION/TRAINING PROGRAM

## 2024-12-02 PROCEDURE — 93010 ELECTROCARDIOGRAM REPORT: CPT | Mod: ,,, | Performed by: INTERNAL MEDICINE

## 2024-12-02 PROCEDURE — 3079F DIAST BP 80-89 MM HG: CPT | Mod: CPTII,S$GLB,, | Performed by: STUDENT IN AN ORGANIZED HEALTH CARE EDUCATION/TRAINING PROGRAM

## 2024-12-02 PROCEDURE — 99214 OFFICE O/P EST MOD 30 MIN: CPT | Mod: S$GLB,,, | Performed by: STUDENT IN AN ORGANIZED HEALTH CARE EDUCATION/TRAINING PROGRAM

## 2024-12-02 PROCEDURE — 3044F HG A1C LEVEL LT 7.0%: CPT | Mod: CPTII,S$GLB,, | Performed by: STUDENT IN AN ORGANIZED HEALTH CARE EDUCATION/TRAINING PROGRAM

## 2024-12-02 PROCEDURE — 93005 ELECTROCARDIOGRAM TRACING: CPT

## 2024-12-02 PROCEDURE — 99999 PR PBB SHADOW E&M-EST. PATIENT-LVL III: CPT | Mod: PBBFAC,,, | Performed by: STUDENT IN AN ORGANIZED HEALTH CARE EDUCATION/TRAINING PROGRAM

## 2024-12-02 NOTE — PROGRESS NOTES
Section of Cardiology                  Cardiac Clinic Note    HPI:   Zachary Lucia is a 49 y.o. female    12/2/24  She is an    Still CORDOBA, but stable  Does not exercise   BP stable today  Echo 11/24 EF 60-65%, mild MR/TR  Weight stable- lower in the 240s last year    Has low back pain and knee pain- no longer getting injections  Cooking more now        Denies chest pain, SOB, LE swelling      EKG 12/2/24 NSR   EKG 11/8/24 NSR, no acute ST - T wave changes    ECHO  Results for orders placed during the hospital encounter of 11/08/24    Echo    Interpretation Summary    Left Ventricle: The left ventricle is normal in size. Normal wall thickness. There is normal systolic function with a visually estimated ejection fraction of 60 - 65%. There is normal diastolic function.    Right Ventricle: Normal right ventricular cavity size. Wall thickness is normal. Systolic function is normal.    Mitral Valve: There is mild regurgitation.    Tricuspid Valve: There is mild regurgitation.    Pulmonary Artery: The estimated pulmonary artery systolic pressure is 27 mmHg.    IVC/SVC: Normal venous pressure at 3 mmHg.       STRESS TEST Results for orders placed during the hospital encounter of 01/06/22    Nuclear Stress - Cardiology Interpreted    Interpretation Summary    Abnormal myocardial perfusion scan.    There is a mild to moderate intensity, reversible defect that is consistent with ischemia in the basal to mid anterior wall(s).    The gated perfusion images showed an ejection fraction of 76% at rest. The gated perfusion images showed an ejection fraction of 73% post stress.    The EKG portion of this study is negative for ischemia.    The patient reported no chest pain during the stress test.       [facility] Results for orders placed during the hospital encounter of 01/25/22    Cardiac catheterization    Conclusion  · The pre-procedure left ventricular end diastolic pressure was  20.  · The post-procedure left ventricular end diastolic pressure was 20.  · The ejection fraction was calculated to be 60%.  · There was no aortic valve stenosis.  · There was no mitral valve stenosis.  · There was no mitral valve prolapse evident. The annulus was normal. There was normal mitral valve motion.  · The estimated blood loss was none.  · The coronary arteries were normal..  · Tortuous coronary arteries.    The procedure log was documented by Documenter: Meli Tucker RN and verified by Shreya Davidson MD.    Date: 1/25/2022  Time: 8:35 AM            ROS: All 10 systems reviewed. Please refer to the HPI for pertinent positives. All other systems negative.     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    Scleroderma     Seizures 01/12/2017       Surgical History  Past Surgical History:   Procedure Laterality Date    COLONOSCOPY N/A 01/12/2023    Procedure: COLONOSCOPY;  Surgeon: Supriya Hughes MD;  Location: [facility];  Service: Endoscopy;  Laterality: N/A;    ESOPHAGOGASTRODUODENOSCOPY N/A 01/12/2023    Procedure: EGD (ESOPHAGOGASTRODUODENOSCOPY);  Surgeon: Supriya Hughes MD;  Location: [facility];  Service: Endoscopy;  Laterality: N/A;    INTRALUMINAL GASTROINTESTINAL TRACT IMAGING VIA CAPSULE N/A 5/22/2024    Procedure: IMAGING PROCEDURE, GI TRACT, INTRALUMINAL, VIA CAPSULE;  Surgeon: Troy, First Available;  Location: [facility] ENDO;  Service: Endoscopy;  Laterality: N/A;    LEFT HEART CATHETERIZATION Left 01/25/2022    Procedure: CATHETERIZATION, HEART, LEFT;  Surgeon: Rocío Davidson MD;  Location: [facility] CATH LAB;  Service: Cardiology;  Laterality: Left;    RENAL BIOPSY  11/16/2018          Allergies:   Review of patient's allergies indicates:   Allergen  Reactions    Lisinopril      cough    Sulfa (sulfonamide antibiotics) Swelling       Social History:  Social History     Socioeconomic History    Marital status:      Spouse name: Darrell    Number of children: 0   Tobacco Use    Smoking status: Never     Passive exposure: Never    Smokeless tobacco: Never   Substance and Sexual Activity    Alcohol use: Not Currently     Alcohol/week: 2.0 standard drinks of alcohol     Types: 2 Glasses of wine per week     Comment: OCC    Drug use: No    Sexual activity: Yes     Partners: Male     Social Drivers of Health     Financial Resource Strain: Low Risk  (8/14/2024)    Received from [facility]    Overall Financial Resource Strain (CARDIA)     Difficulty of Paying Living Expenses: Not hard at all   Food Insecurity: Patient Declined (8/14/2024)    Received from [facility]    Hunger Vital Sign     Worried About Running Out of Food in the Last Year: Patient declined     Ran Out of Food in the Last Year: Patient declined   Transportation Needs: No Transportation Needs (8/14/2024)    Received from [facility]    PRAPARE - Transportation     Lack of Transportation (Medical): No     Lack of Transportation (Non-Medical): No   Physical Activity: Inactive (8/14/2024)    Received from [facility]    Exercise Vital Sign     Days of Exercise per Week: 0 days     Minutes of Exercise per Session: 0 min   Stress: No Stress Concern Present (1/19/2024)    [employer] [place] [employer] - Occupational Stress Questionnaire     Feeling of Stress : Only a little   Housing Stability: Unknown (8/14/2024)    Received from [facility]    Housing Stability Vital Sign     Unable to Pay for Housing in the Last Year: No     Homeless in the Last Year: No       Family History:  family history includes Arthritis in her maternal grandmother and mother; Asthma in her paternal grandmother; Cancer in her father, maternal uncle, maternal uncle, paternal aunt, paternal  aunt, and paternal aunt; Cataracts in her maternal grandmother; Diabetes in her father, maternal grandfather, maternal grandmother, mother, and paternal grandmother; Glaucoma in her mother; Heart disease in her maternal grandfather, maternal uncle, mother, paternal grandfather, and paternal grandmother; Hypertension in her maternal grandfather, maternal grandmother, maternal uncle, maternal uncle, mother, and paternal grandmother; Stroke in her mother.    Home Medications:  Current Outpatient Medications on File Prior to Visit   Medication Sig Dispense Refill    azaTHIOprine (IMURAN) 100 mg tablet Take 1 tablet (100 mg total) by mouth once daily. 90 tablet 1    cholecalciferol, vitamin D3, 1,250 mcg (50,000 unit) capsule Take 1 capsule (50,000 Units total) by mouth once a week. 15 capsule 1    cyanocobalamin, vitamin B-12, (VITAMIN B-12) 2,500 mcg Subl Place 1 tablet under the tongue once daily. 30 each 11    cyclobenzaprine (FLEXERIL) 10 MG tablet Take 1 tablet (10 mg total) by mouth 2 (two) times daily as needed for Muscle spasms. 60 tablet 0    furosemide (LASIX) 20 MG tablet Take 1 tablet (20 mg total) by mouth once daily. 30 tablet 2    hydroxychloroquine (PLAQUENIL) 200 mg tablet Take 1 tablet (200 mg total) by mouth 2 (two) times daily. 180 tablet 1     mg tablet Take 800 mg by mouth every 8 (eight) hours as needed.  0    levetiracetam XR (KEPPRA XR) 500 mg Tb24 24 hr tablet Take 4 tablets (2,000 mg total) by mouth once daily. 360 tablet 3    montelukast (SINGULAIR) 10 mg tablet TAKE 1 TABLET BY MOUTH EVERY DAY IN THE EVENING 90 tablet 0    mupirocin (BACTROBAN) 2 % ointment Apply topically 3 (three) times daily. 1 g 0    pantoprazole (PROTONIX) 40 MG tablet Take 1 tablet (40 mg total) by mouth once daily. 30 tablet 3    predniSONE (DELTASONE) 2.5 MG tablet Take 1 tablet (2.5 mg total) by mouth once daily. 90 tablet 0    predniSONE (DELTASONE) 5 MG tablet Along with the 2.5 mg tablets, take total of  "7.5 mg daily. 90 tablet 0    semaglutide, weight loss, (WEGOVY) 0.5 mg/0.5 mL PnIj Inject 0.5 mg into the skin every 7 days. 2 mL 1    fluticasone-salmeterol diskus inhaler 100-50 mcg Inhale 1 puff into the lungs 2 (two) times daily. Controller 1 each 11     No current facility-administered medications on file prior to visit.       Physical exam:  /80 (BP Location: Left arm, Patient Position: Sitting)   Pulse 68   Ht 5' 6" (1.676 m)   Wt 115.6 kg (254 lb 13.6 oz)   BMI 41.13 kg/m²         General: Pt is a 49 y.o. year old female who is AAOx3, in NAD, is pleasant, well nourished, looks stated age  HEENT: PERRL, EOMI, Oral mucosa pink & moist  CVS  No abnormal cardiac pulsations noted on inspection. JVP not raised. The apical impulse is normal on palpation, and is located in the left 5th intercostal space in the mid - clavicular line. No palpable thrills or abnormal pulsations noted. RR, S1 - S2 heard, no murmurs, rubs or gallops appreciated.   PUL : CTA B/L. No wheezes/crackles heard   ABD : BS +, soft. No tenderness elicited   LE : No C/C/E. Distal Pulses palpable B/L         LABS:    Chemistry:   Lab Results   Component Value Date     10/09/2024    K 4.4 10/09/2024     10/09/2024    CO2 24 10/09/2024    BUN 13 10/09/2024    CREATININE 1.1 10/09/2024    CALCIUM 10.2 10/09/2024     Cardiac Markers:   Lab Results   Component Value Date    TROPONINI <0.006 11/18/2021     Cardiac Markers (Last 3):   Lab Results   Component Value Date    TROPONINI <0.006 11/18/2021    TROPONINI <0.006 07/27/2020     CBC:   Lab Results   Component Value Date    WBC 6.95 10/09/2024    HGB 12.8 10/09/2024    HCT 39.6 10/09/2024    MCV 89 10/09/2024     10/09/2024     Lipids:   Lab Results   Component Value Date    CHOL 172 01/19/2024    TRIG 121 01/19/2024    HDL 51 01/19/2024     Coagulation:   Lab Results   Component Value Date    INR 1.0 01/12/2022    APTT 29.6 01/12/2022           Assessment        1. Chest " pain, unspecified type    2. Abnormal stress test    3. Other systemic lupus erythematosus with glomerular disease    4. Shortness of breath    5. Localized edema    6. Sjogren's syndrome with keratoconjunctivitis sicca    7. Pain in extremity, unspecified extremity    8. Essential hypertension    9. Scleroderma    10. Pneumonia due to COVID-19 virus    11. Lupus nephritis, ISN/RPS class V    12. Respiratory distress    13. SOB (shortness of breath)    14. Morbid obesity with BMI of 40.0-44.9, adult         Plan:      EKG stable  Appetite is good  Watching diet  No longer on Wegovy- insurance no longer covering  Renal function stable   Takes lasix daily  Not seeing nephrology  F/u rheumatology - on steroids for lupus  BP stable- not on medication  Low salt, low fat diet  Exercise as tolerated, at least 30 min daily     [facility] 2022 normal coronaries   Echo 11/24 EF 60-65%, mild MR/TR        This note was prepared using voice recognition system and is likely to have sound alike errors that may have been overlooked even after proofreading.     I have reviewed all pertinent chart information.  Plans and recommendations have been formulated under my direct supervision. All questions answered and patient voiced understanding.   If symptoms persist go to the ED.    RTC in 1 year         Lupillo Easton MD  Cardiology

## 2024-12-03 LAB
OHS QRS DURATION: 76 MS
OHS QTC CALCULATION: 448 MS

## 2025-01-02 ENCOUNTER — LAB VISIT (OUTPATIENT)
Dept: LAB | Facility: HOSPITAL | Age: 50
End: 2025-01-02
Attending: NURSE PRACTITIONER
Payer: COMMERCIAL

## 2025-01-02 DIAGNOSIS — G40.409 EPILEPSY, GRAND MAL: ICD-10-CM

## 2025-01-02 DIAGNOSIS — D50.0 IRON DEFICIENCY ANEMIA DUE TO CHRONIC BLOOD LOSS: ICD-10-CM

## 2025-01-02 LAB
ALBUMIN SERPL BCP-MCNC: 4.1 G/DL (ref 3.5–5.2)
ALP SERPL-CCNC: 53 U/L (ref 40–150)
ALT SERPL W/O P-5'-P-CCNC: 22 U/L (ref 10–44)
ANION GAP SERPL CALC-SCNC: 11 MMOL/L (ref 8–16)
AST SERPL-CCNC: 14 U/L (ref 10–40)
BASOPHILS # BLD AUTO: 0.02 K/UL (ref 0–0.2)
BASOPHILS NFR BLD: 0.4 % (ref 0–1.9)
BILIRUB SERPL-MCNC: 0.5 MG/DL (ref 0.1–1)
BUN SERPL-MCNC: 12 MG/DL (ref 6–20)
CALCIUM SERPL-MCNC: 9.7 MG/DL (ref 8.7–10.5)
CHLORIDE SERPL-SCNC: 107 MMOL/L (ref 95–110)
CO2 SERPL-SCNC: 23 MMOL/L (ref 23–29)
CREAT SERPL-MCNC: 1 MG/DL (ref 0.5–1.4)
DIFFERENTIAL METHOD BLD: ABNORMAL
EOSINOPHIL # BLD AUTO: 0.1 K/UL (ref 0–0.5)
EOSINOPHIL NFR BLD: 2.7 % (ref 0–8)
ERYTHROCYTE [DISTWIDTH] IN BLOOD BY AUTOMATED COUNT: 12.9 % (ref 11.5–14.5)
EST. GFR  (NO RACE VARIABLE): >60 ML/MIN/1.73 M^2
FERRITIN SERPL-MCNC: 58 NG/ML (ref 20–300)
GLUCOSE SERPL-MCNC: 96 MG/DL (ref 70–110)
HCT VFR BLD AUTO: 39.3 % (ref 37–48.5)
HGB BLD-MCNC: 13.1 G/DL (ref 12–16)
IMM GRANULOCYTES # BLD AUTO: 0.02 K/UL (ref 0–0.04)
IMM GRANULOCYTES NFR BLD AUTO: 0.4 % (ref 0–0.5)
IRON SERPL-MCNC: 69 UG/DL (ref 30–160)
LYMPHOCYTES # BLD AUTO: 1.5 K/UL (ref 1–4.8)
LYMPHOCYTES NFR BLD: 29.2 % (ref 18–48)
MCH RBC QN AUTO: 28.7 PG (ref 27–31)
MCHC RBC AUTO-ENTMCNC: 33.3 G/DL (ref 32–36)
MCV RBC AUTO: 86 FL (ref 82–98)
MONOCYTES # BLD AUTO: 0.4 K/UL (ref 0.3–1)
MONOCYTES NFR BLD: 7.8 % (ref 4–15)
NEUTROPHILS # BLD AUTO: 3 K/UL (ref 1.8–7.7)
NEUTROPHILS NFR BLD: 59.5 % (ref 38–73)
NRBC BLD-RTO: 0 /100 WBC
PLATELET # BLD AUTO: 290 K/UL (ref 150–450)
PMV BLD AUTO: 9 FL (ref 9.2–12.9)
POTASSIUM SERPL-SCNC: 4.3 MMOL/L (ref 3.5–5.1)
PROT SERPL-MCNC: 7.8 G/DL (ref 6–8.4)
RBC # BLD AUTO: 4.57 M/UL (ref 4–5.4)
SATURATED IRON: 18 % (ref 20–50)
SODIUM SERPL-SCNC: 141 MMOL/L (ref 136–145)
TOTAL IRON BINDING CAPACITY: 383 UG/DL (ref 250–450)
TRANSFERRIN SERPL-MCNC: 259 MG/DL (ref 200–375)
WBC # BLD AUTO: 5.1 K/UL (ref 3.9–12.7)

## 2025-01-02 PROCEDURE — 83540 ASSAY OF IRON: CPT | Performed by: INTERNAL MEDICINE

## 2025-01-02 PROCEDURE — 36415 COLL VENOUS BLD VENIPUNCTURE: CPT | Performed by: NURSE PRACTITIONER

## 2025-01-02 PROCEDURE — 80053 COMPREHEN METABOLIC PANEL: CPT | Performed by: INTERNAL MEDICINE

## 2025-01-02 PROCEDURE — 82728 ASSAY OF FERRITIN: CPT | Performed by: INTERNAL MEDICINE

## 2025-01-02 PROCEDURE — 80177 DRUG SCRN QUAN LEVETIRACETAM: CPT | Performed by: NURSE PRACTITIONER

## 2025-01-02 PROCEDURE — 85025 COMPLETE CBC W/AUTO DIFF WBC: CPT | Performed by: INTERNAL MEDICINE

## 2025-01-06 LAB — LEVETIRACETAM SERPL-MCNC: <1 UG/ML (ref 3–60)

## 2025-01-06 NOTE — PROGRESS NOTES
Patient continues to be non-compliant with seizure  treatment plan, she was instructed to take LEV as directed in 10/2024 during visit, the level was a rechecked 8 weeks later and it is  indicating continual non compliance. If the patient choose to not comply with treatment she will be released from care.

## 2025-01-16 ENCOUNTER — PATIENT MESSAGE (OUTPATIENT)
Dept: RHEUMATOLOGY | Facility: CLINIC | Age: 50
End: 2025-01-16
Payer: COMMERCIAL

## 2025-01-16 DIAGNOSIS — A41.9 SEPSIS, DUE TO UNSPECIFIED ORGANISM, UNSPECIFIED WHETHER ACUTE ORGAN DYSFUNCTION PRESENT: ICD-10-CM

## 2025-01-16 DIAGNOSIS — M35.01 SJOGREN'S SYNDROME WITH KERATOCONJUNCTIVITIS SICCA: ICD-10-CM

## 2025-01-16 DIAGNOSIS — R06.02 SOB (SHORTNESS OF BREATH): ICD-10-CM

## 2025-01-16 DIAGNOSIS — I10 ESSENTIAL HYPERTENSION: ICD-10-CM

## 2025-01-16 DIAGNOSIS — U07.1 PNEUMONIA DUE TO COVID-19 VIRUS: ICD-10-CM

## 2025-01-16 DIAGNOSIS — J12.82 PNEUMONIA DUE TO COVID-19 VIRUS: ICD-10-CM

## 2025-01-16 DIAGNOSIS — R94.39 ABNORMAL STRESS TEST: ICD-10-CM

## 2025-01-16 DIAGNOSIS — R07.9 CHEST PAIN, UNSPECIFIED TYPE: ICD-10-CM

## 2025-01-16 DIAGNOSIS — M34.9 SCLERODERMA: ICD-10-CM

## 2025-01-16 DIAGNOSIS — M32.14 LUPUS NEPHRITIS, ISN/RPS CLASS V: ICD-10-CM

## 2025-01-16 DIAGNOSIS — U07.1 COVID-19: ICD-10-CM

## 2025-01-16 DIAGNOSIS — M79.609 PAIN IN EXTREMITY, UNSPECIFIED EXTREMITY: ICD-10-CM

## 2025-01-16 DIAGNOSIS — R06.03 RESPIRATORY DISTRESS: ICD-10-CM

## 2025-01-16 DIAGNOSIS — R60.0 LOCALIZED EDEMA: ICD-10-CM

## 2025-01-17 RX ORDER — HYDROXYCHLOROQUINE SULFATE 200 MG/1
200 TABLET, FILM COATED ORAL 2 TIMES DAILY
Qty: 180 TABLET | Refills: 1 | Status: SHIPPED | OUTPATIENT
Start: 2025-01-17

## 2025-01-17 RX ORDER — PREDNISONE 2.5 MG/1
2.5 TABLET ORAL DAILY
Qty: 90 TABLET | Refills: 0 | Status: SHIPPED | OUTPATIENT
Start: 2025-01-17

## 2025-01-17 RX ORDER — FUROSEMIDE 20 MG/1
20 TABLET ORAL DAILY
Qty: 30 TABLET | Refills: 2 | OUTPATIENT
Start: 2025-01-17 | End: 2026-01-17

## 2025-01-17 RX ORDER — PREDNISONE 5 MG/1
TABLET ORAL
Qty: 90 TABLET | Refills: 0 | Status: SHIPPED | OUTPATIENT
Start: 2025-01-17

## 2025-01-17 RX ORDER — AZATHIOPRINE 100 MG/1
100 TABLET ORAL DAILY
Qty: 90 TABLET | Refills: 1 | Status: SHIPPED | OUTPATIENT
Start: 2025-01-17

## 2025-01-17 NOTE — TELEPHONE ENCOUNTER
Patient had a change in pharmacy benefit. Which is why all meds need to be sent to new pharmacy attached below.

## 2025-02-21 ENCOUNTER — TELEPHONE (OUTPATIENT)
Dept: SPORTS MEDICINE | Facility: CLINIC | Age: 50
End: 2025-02-21
Payer: COMMERCIAL

## 2025-02-21 ENCOUNTER — PATIENT MESSAGE (OUTPATIENT)
Dept: RHEUMATOLOGY | Facility: CLINIC | Age: 50
End: 2025-02-21
Payer: COMMERCIAL

## 2025-02-21 DIAGNOSIS — M17.0 BILATERAL PRIMARY OSTEOARTHRITIS OF KNEE: Primary | ICD-10-CM

## 2025-02-24 DIAGNOSIS — M34.9 SCLERODERMA: ICD-10-CM

## 2025-02-24 RX ORDER — AZATHIOPRINE 100 MG/1
100 TABLET ORAL DAILY
Qty: 90 TABLET | Refills: 1 | Status: SHIPPED | OUTPATIENT
Start: 2025-02-24

## 2025-02-28 ENCOUNTER — HOSPITAL ENCOUNTER (OUTPATIENT)
Dept: RADIOLOGY | Facility: HOSPITAL | Age: 50
Discharge: HOME | End: 2025-02-28
Attending: PHYSICIAN ASSISTANT
Payer: COMMERCIAL

## 2025-02-28 ENCOUNTER — OFFICE VISIT (OUTPATIENT)
Dept: SPORTS MEDICINE | Facility: CLINIC | Age: 50
End: 2025-02-28
Payer: COMMERCIAL

## 2025-02-28 DIAGNOSIS — G89.29 CHRONIC PAIN OF BOTH KNEES: ICD-10-CM

## 2025-02-28 DIAGNOSIS — K21.9 GASTROESOPHAGEAL REFLUX DISEASE, UNSPECIFIED WHETHER ESOPHAGITIS PRESENT: ICD-10-CM

## 2025-02-28 DIAGNOSIS — M25.561 CHRONIC PAIN OF BOTH KNEES: ICD-10-CM

## 2025-02-28 DIAGNOSIS — M17.0 BILATERAL PRIMARY OSTEOARTHRITIS OF KNEE: ICD-10-CM

## 2025-02-28 DIAGNOSIS — M25.562 CHRONIC PAIN OF BOTH KNEES: ICD-10-CM

## 2025-02-28 DIAGNOSIS — M17.0 BILATERAL PRIMARY OSTEOARTHRITIS OF KNEE: Primary | ICD-10-CM

## 2025-02-28 PROCEDURE — 99999 PR PBB SHADOW E&M-EST. PATIENT-LVL III: CPT | Mod: PBBFAC,,, | Performed by: PHYSICIAN ASSISTANT

## 2025-02-28 PROCEDURE — 73564 X-RAY EXAM KNEE 4 OR MORE: CPT | Mod: 26,50,, | Performed by: RADIOLOGY

## 2025-02-28 PROCEDURE — 73564 X-RAY EXAM KNEE 4 OR MORE: CPT | Mod: TC,50

## 2025-02-28 RX ORDER — DICLOFENAC SODIUM 10 MG/G
2 GEL TOPICAL 3 TIMES DAILY
Qty: 1 EACH | Refills: 1 | Status: SHIPPED | OUTPATIENT
Start: 2025-02-28

## 2025-02-28 NOTE — PATIENT INSTRUCTIONS
Assessment:  Zachary Lucia is a 49 y.o. 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  Bilateral knee     Encounter Diagnoses   Name Primary?    Bilateral primary osteoarthritis of knee Yes    Gastroesophageal reflux disease, unspecified whether esophagitis present     Chronic pain of both knees       Plan:  PT/OT:   Home exercise program  Medications:    Oral anti-inflammatories or tylenol OA  Volatren gel   DME and weight bearing status:    Compressive knee brace  Injections:   Order bilateral visco to start in 4 weeks- [facility]   Education:    I had a long discussion with the patient about the causes, treatments, and prognosis for knee arthritis. We discussed that although there is not a cure for arthritis, there are effective ways to improve symptoms. I recommended low impact activities such as elliptical and bicycle. I talked about the fact that aquatic and pool therapy is often helpful. I advised the patient to consider good quality stability and cushioned shoes. We talked about the importance of knee motion in the health of the knee. The patient can also use a compression knee sleeve to help limit swelling and provide proprioceptive feedback. We recommend formal physical therapy or at minimum a home exercise program, which we discussed with the patient. I advised that over the counter solutions such as glucosamine and chondroitin may help with symptoms.   Return to clinic:    4 week for bilateral knee visco         STRETCHING EXERCISES            STRENGTHENING EXERCISES                If you have any difficulties reading this information, you may visit the online version using the following link: Knee Conditioning Program ([email or web address])    Follow-up: 4 weeks visco.  Please reach out to us sooner if there are any problems between now and then.    About Dr. Reyes Chambers's Research & Publications    Give us Feedback:   Google: Leave Google Review  Healthgrades: Leave Healthgrades Review    After Hours Number: [phone]

## 2025-02-28 NOTE — PROGRESS NOTES
Patient ID: Zachary Lucia  YOB: 1975  MRN: [id number]    Chief Complaint: Pain and Swelling of the Right Knee and Pain and Swelling of the Left Knee    History of Present Illness: Zachary uLcia is a  49 y.o. 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    HPI:  Zachary presents with bilateral knee pain, particularly in the right knee. She reports significant pain and swelling in both knees last week, severely limiting her ability to walk. The right knee was more problematic, with pain radiating from the knee down to the shin. She describes the sensation as extremely tight and notes visible fluid accumulation when pressed. Pain feels like her knee is . Her last visit was in May 2023, during which she received Synvisc injections in both knees. She mentions starting a new job in October 2023, which may have disrupted her regular treatment schedule. Zachary is currently taking prednisone for fibromyalgia and scleroderma, with a recent reduction from 10mg to 7.5mg due to long-term use. She confirms her ability to take oral anti-inflammatory medications.    She denies using any knee braces or supports and denies any history of recent trauma or injury to the knees.    PREVIOUS TREATMENTS:  - Synvisc (Hylan G-F 20) injections: Every six months in both knees, last injection given in early to mid-2023, provided benefit    IMAGING:  - X-rays of both knees: 2025, show joint space narrowing on the inside of both knees, consistent with arthritis. Compared to X-rays from March 2023, there is no significant worsening of the condition. The right knee shows more narrowing on the inside compared to the left knee. The left knee shows a slightly larger osteophyte and a bit  more arthritis underneath the kneecap compared to the previous X-rays.    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  - Tries to get up throughout the day      ROS:  Musculoskeletal: +joint pain, +joint swelling         Past Medical History:   Past Medical History:   Diagnosis Date    Abnormal stress test 01/25/2022    Asthma     Essential hypertension, malignant 01/08/2020    History of colonic polyps 2023    Lupus (systemic lupus erythematosus)     Membranous glomerulonephritis, stage 4 05/30/2019    Scleroderma     Seizures 01/12/2017     Past Surgical History:   Procedure Laterality Date    COLONOSCOPY N/A 01/12/2023    Procedure: COLONOSCOPY;  Surgeon: Supriya Hughes MD;  Location: [facility];  Service: Endoscopy;  Laterality: N/A;    ESOPHAGOGASTRODUODENOSCOPY N/A 01/12/2023    Procedure: EGD (ESOPHAGOGASTRODUODENOSCOPY);  Surgeon: Supriya Hughes MD;  Location: [facility];  Service: Endoscopy;  Laterality: N/A;    INTRALUMINAL GASTROINTESTINAL TRACT IMAGING VIA CAPSULE N/A 5/22/2024    Procedure: IMAGING PROCEDURE, GI TRACT, INTRALUMINAL, VIA CAPSULE;  Surgeon: [place], First Available;  Location: [facility];  Service: Endoscopy;  Laterality: N/A;    LEFT HEART CATHETERIZATION Left 01/25/2022    Procedure: CATHETERIZATION, HEART, LEFT;  Surgeon: Rocío Davidson MD;  Location: [facility] CATH LAB;  Service: Cardiology;  Laterality: Left;    RENAL BIOPSY  11/16/2018     Family History   Problem Relation Name Age of Onset    Arthritis Mother Radha Escalona     Glaucoma Mother Radha Escalona     Hypertension Mother Radha Escalona     Diabetes Mother Radha Escalona     Heart disease Mother Radha Escalona     Stroke Mother Radha Escalona     Diabetes Father Ace Peter     Cancer Father Ace Peter     Diabetes  Maternal Grandmother Rose Marx     Hypertension Maternal Grandmother Rose Marx     Arthritis Maternal Grandmother Rose Marx     Cataracts Maternal Grandmother Rose Marx     Diabetes Maternal Grandfather Jesus Marx, Jr     Heart disease Maternal Grandfather Jesus Marx, Jr     Hypertension Maternal Grandfather Jesus Marx, Jr     Diabetes Paternal Grandmother Bess Green     Heart disease Paternal Grandmother Bess Green     Hypertension Paternal Grandmother Bess Green     Asthma Paternal Grandmother Bess Green     Heart disease Paternal Grandfather Germán Peter, Sr.     Cancer Maternal Uncle Aramis Marx     Hypertension Maternal Uncle Aramis Marx     Cancer Maternal Uncle Marco A Marx     Cancer Paternal Aunt Angie Farah     Cancer Paternal Aunt Amy Cedillo     Cancer Paternal Aunt Kapil Bynum     Heart disease Maternal Uncle Ricardo Marx, Sr.     Hypertension Maternal Uncle Ricardo Marx, Sr.      Social History[1]  Medication List with Changes/Refills   New Medications    DICLOFENAC SODIUM (VOLTAREN) 1 % GEL    Apply 2 g topically 3 (three) times daily.   Current Medications    AZATHIOPRINE (IMURAN) 100 MG TABLET    Take 1 tablet (100 mg total) by mouth once daily.    CHOLECALCIFEROL, VITAMIN D3, 1,250 MCG (50,000 UNIT) CAPSULE    Take 1 capsule (50,000 Units total) by mouth once a week.    CYANOCOBALAMIN, VITAMIN B-12, (VITAMIN B-12) 2,500 MCG SUBL    Place 1 tablet under the tongue once daily.    CYCLOBENZAPRINE (FLEXERIL) 10 MG TABLET    Take 1 tablet (10 mg total) by mouth 2 (two) times daily as needed for Muscle spasms.    FLUTICASONE-SALMETEROL DISKUS INHALER 100-50 MCG    Inhale 1 puff into the lungs 2 (two) times daily. Controller    FUROSEMIDE (LASIX) 20 MG TABLET    Take 1 tablet (20 mg total) by mouth once daily.    HYDROXYCHLOROQUINE (PLAQUENIL) 200 MG TABLET    Take 1 tablet (200 mg total) by mouth 2 (two) times daily.     MG TABLET    Take 800 mg by  mouth every 8 (eight) hours as needed.    LEVETIRACETAM XR (KEPPRA XR) 500 MG TB24 24 HR TABLET    Take 4 tablets (2,000 mg total) by mouth once daily.    MONTELUKAST (SINGULAIR) 10 MG TABLET    TAKE 1 TABLET BY MOUTH EVERY DAY IN THE EVENING    MUPIROCIN (BACTROBAN) 2 % OINTMENT    Apply topically 3 (three) times daily.    PANTOPRAZOLE (PROTONIX) 40 MG TABLET    Take 1 tablet (40 mg total) by mouth once daily.    PREDNISONE (DELTASONE) 2.5 MG TABLET    Take 1 tablet (2.5 mg total) by mouth once daily.    PREDNISONE (DELTASONE) 5 MG TABLET    Along with the 2.5 mg tablets, take total of 7.5 mg daily.    SEMAGLUTIDE, WEIGHT LOSS, (WEGOVY) 0.5 MG/0.5 ML PNIJ    Inject 0.5 mg into the skin every 7 days.     Review of patient's allergies indicates:   Allergen Reactions    Lisinopril      cough    Sulfa (sulfonamide antibiotics) Swelling     Review of Systems   Constitutional: Negative for chills and fever.   HENT:  Negative for sore throat.    Eyes:  Negative for pain.   Cardiovascular:  Negative for chest pain and leg swelling.   Respiratory:  Negative for cough and shortness of breath.    Skin:  Negative for itching and rash.   Musculoskeletal:  Positive for joint pain and joint swelling.   Gastrointestinal:  Negative for abdominal pain, nausea and vomiting.   Genitourinary:  Negative for dysuria.   Neurological:  Negative for dizziness, numbness and paresthesias.       Physical Exam:   There is no height or weight on file to calculate BMI.  There were no vitals filed for this visit.   GENERAL: Well appearing, appropriate for stated age, no acute distress.  CARDIOVASCULAR: Pulses regular by peripheral palpation.  PULMONARY: Respirations are even and non-labored.  NEURO: Awake, alert, and oriented x 3.  PSYCH: Mood & affect are appropriate.  HEENT: Head is normocephalic and atraumatic.  General    Nursing note and vitals reviewed.          Right Knee Exam   Right knee exam is normal.    Inspection   Effusion:  present    Tenderness   The patient is tender to palpation of the medial joint line.    Crepitus   The patient has crepitus of the patella.    Range of Motion   Extension:  0   Flexion:  120     Tests   Meniscus   Andrzej:  Medial - negative Lateral - negative  Ligament Examination   Lachman: normal (-1 to 2mm)   PCL-Posterior Drawer: normal (0 to 2mm)     MCL - Valgus: normal (0 to 2mm)  LCL - Varus: normal    Other   Sensation: normal    Left Knee Exam   Left knee exam is normal.    Inspection   Effusion: absent    Tenderness   The patient tender to palpation of the medial joint line.    Crepitus   The patient has crepitus of the patella.    Range of Motion   Extension:  0   Flexion:  120     Tests   Meniscus   Andrzej:  Medial - negative Lateral - negative  Stability   Lachman: normal (-1 to 2mm)   PCL-Posterior Drawer: normal (0 to 2mm)  MCL - Valgus: normal (0 to 2mm)  LCL - Varus: normal (0 to 2mm)    Other   Sensation: normal    Muscle Strength   Right Lower Extremity   Hip Abduction: 5/5   Quadriceps:  4/5   Hamstrin/5   Left Lower Extremity   Hip Abduction: 5/5   Quadriceps:  4/5   Hamstrin/5     Vascular Exam     Right Pulses  Dorsalis Pedis:      2+  Posterior Tibial:      2+        Left Pulses  Dorsalis Pedis:      2+  Posterior Tibial:      2+          Physical Exam    Musculoskeletal: Right knee swelling.         All compartments are soft and compressible. Calf soft non-tender. Intact EHL, FHL, gastroc soleus, and tibialis anterior. Sensation intact to light touch in superficial peroneal, deep peroneal, tibial, sural, and saphenous nerve distributions. Foot warm and well perfused with capillary refill of less than 2 seconds and palpable pedal pulses.       Imaging:    X-ray Knee Ortho Bilateral with Flexion  Order: [id number]   Status: Final result       Next appt: 2025 at 07:00 AM in Hematology and Oncology (Florentino Duong MD)       Dx: Bilateral primary osteoarthritis of knee     Test Result Released: Yes (not seen)    0 Result Notes  Details    Reading Physician Reading Date Result Priority   Zane Fernandez,   [phone]  2/28/2025 Routine     Narrative & Impression  EXAMINATION:  XR KNEE ORTHO BILAT WITH FLEXION     CLINICAL HISTORY:  Bilateral primary osteoarthritis of knee     TECHNIQUE:  AP standing of both knees, PA flexion standing views of both knees, and Merchant views of both knees were performed.  Lateral views of both knees were also performed.     COMPARISON  03/24/2023     FINDINGS:  There is minimal joint space narrowing seen involving the medial compartment of the right knee.  Remaining joint spaces bilaterally are well maintained with  some minimal degenerative change seen at the left patellofemoral compartment.  No joint effusions are suggested.  No acute fracture or dislocation.     Impression:     1.  As above        Electronically signed by:Zane Fernandez DO  Date:                                            02/28/2025  Time:                                           10:13       Relevant imaging results reviewed and interpreted by me, discussed with the patient and / or family today.     Other Tests:     Patient Instructions   Assessment:  Zachary Linyaa Lucia is a 49 y.o. 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  Bilateral knee     Encounter Diagnoses   Name Primary?    Bilateral primary osteoarthritis of knee Yes    Gastroesophageal reflux disease, unspecified whether esophagitis present     Chronic pain of both knees       Plan:  PT/OT:   Home exercise program  Medications:    Oral anti-inflammatories or tylenol OA  Volatren gel   DME and weight bearing status:    Compressive knee brace  Injections:   Order bilateral visco to start in 4 weeks- [facility]   Education:    I had  a long discussion with the patient about the causes, treatments, and prognosis for knee arthritis. We discussed that although there is not a cure for arthritis, there are effective ways to improve symptoms. I recommended low impact activities such as elliptical and bicycle. I talked about the fact that aquatic and pool therapy is often helpful. I advised the patient to consider good quality stability and cushioned shoes. We talked about the importance of knee motion in the health of the knee. The patient can also use a compression knee sleeve to help limit swelling and provide proprioceptive feedback. We recommend formal physical therapy or at minimum a home exercise program, which we discussed with the patient. I advised that over the counter solutions such as glucosamine and chondroitin may help with symptoms.   Return to clinic:    4 week for bilateral knee visco         STRETCHING EXERCISES            STRENGTHENING EXERCISES                If you have any difficulties reading this information, you may visit the online version using the following link: Knee Conditioning Program ([email or web address])    Follow-up: 4 weeks visco. Please reach out to us sooner if there are any problems between now and then.    About Dr. Reyes Chambers's Research & Publications    Give us Feedback:   Google: Leave Google Review  Healthgrades: Leave Healthgrades Review    After Hours Number: [phone]        Provider Note/Medical Decision Making:   Under my direction and supervision, 10 minutes were spent sizing, fitting, and educating regarding durable medical equipment by an assistant today.  CPT 20624.    At least 15 minutes were spent developing, teaching, and performing a home exercise program.  A written summary was provided and all questions were answered. CPT 84694-IU    I discussed worrisome and red flag signs and symptoms with the patient. The patient expressed  understanding and agreed to alert me immediately or to go to the emergency room if they experience any of these.   Treatment plan was developed with input from the patient/family, and they expressed understanding and agreement with the plan. All questions were answered today.      Kaya Morales PA-C  Sports Medicine Physician Assistant     Disclaimer: This note was prepared using a voice recognition system and is likely to have sound alike errors within the text.     This note was generated with the assistance of ambient listening technology. Verbal consent was obtained by the patient and accompanying visitor(s) for the recording of patient appointment to facilitate this note. I attest to having reviewed and edited the generated note for accuracy, though some syntax or spelling errors may persist. Please contact the author of this note for any clarification.           [1]   Social History  Socioeconomic History    Marital status:      Spouse name: Darrell    Number of children: 0   Tobacco Use    Smoking status: Never     Passive exposure: Never    Smokeless tobacco: Never   Substance and Sexual Activity    Alcohol use: Not Currently     Alcohol/week: 2.0 standard drinks of alcohol     Types: 2 Glasses of wine per week     Comment: OCC    Drug use: No    Sexual activity: Yes     Partners: Male     Social Drivers of Health     Financial Resource Strain: Low Risk  (8/14/2024)    Received from [facility]    Overall Financial Resource Strain (CARDIA)     Difficulty of Paying Living Expenses: Not hard at all   Food Insecurity: Patient Declined (8/14/2024)    Received from [facility]    Hunger Vital Sign     Worried About Running Out of Food in the Last Year: Patient declined     Ran Out of Food in the Last Year: Patient declined   Transportation Needs: No Transportation Needs (8/14/2024)    Received from [facility]    PRAPARE - Transportation     Lack of Transportation (Medical): No     Lack  of Transportation (Non-Medical): No   Physical Activity: Inactive (8/14/2024)    Received from [facility]    Exercise Vital Sign     Days of Exercise per Week: 0 days     Minutes of Exercise per Session: 0 min   Stress: No Stress Concern Present (1/19/2024)    [employer] - Occupational Stress Questionnaire     Feeling of Stress : Only a little   Housing Stability: Unknown (8/14/2024)    Received from [facility]    Housing Stability Vital Sign     Unable to Pay for Housing in the Last Year: No     Homeless in the Last Year: No

## 2025-03-12 ENCOUNTER — TELEPHONE (OUTPATIENT)
Dept: HEMATOLOGY/ONCOLOGY | Facility: CLINIC | Age: 50
End: 2025-03-12

## 2025-03-12 ENCOUNTER — RESULTS FOLLOW-UP (OUTPATIENT)
Dept: HEMATOLOGY/ONCOLOGY | Facility: CLINIC | Age: 50
End: 2025-03-12

## 2025-03-12 ENCOUNTER — LAB VISIT (OUTPATIENT)
Dept: LAB | Facility: HOSPITAL | Age: 50
End: 2025-03-12
Attending: INTERNAL MEDICINE
Payer: COMMERCIAL

## 2025-03-12 ENCOUNTER — OFFICE VISIT (OUTPATIENT)
Dept: HEMATOLOGY/ONCOLOGY | Facility: CLINIC | Age: 50
End: 2025-03-12
Payer: COMMERCIAL

## 2025-03-12 VITALS
TEMPERATURE: 98 F | HEIGHT: 66 IN | SYSTOLIC BLOOD PRESSURE: 133 MMHG | WEIGHT: 260.81 LBS | DIASTOLIC BLOOD PRESSURE: 94 MMHG | BODY MASS INDEX: 41.91 KG/M2 | OXYGEN SATURATION: 97 % | HEART RATE: 67 BPM

## 2025-03-12 DIAGNOSIS — M34.9 SCLERODERMA: ICD-10-CM

## 2025-03-12 DIAGNOSIS — D50.9 IRON DEFICIENCY ANEMIA, UNSPECIFIED IRON DEFICIENCY ANEMIA TYPE: ICD-10-CM

## 2025-03-12 DIAGNOSIS — D50.0 IRON DEFICIENCY ANEMIA DUE TO CHRONIC BLOOD LOSS: ICD-10-CM

## 2025-03-12 DIAGNOSIS — M35.09 SJOGREN'S SYNDROME WITH OTHER ORGAN INVOLVEMENT: ICD-10-CM

## 2025-03-12 DIAGNOSIS — D50.0 IRON DEFICIENCY ANEMIA DUE TO CHRONIC BLOOD LOSS: Primary | ICD-10-CM

## 2025-03-12 LAB
ALBUMIN SERPL BCP-MCNC: 4.3 G/DL (ref 3.5–5.2)
ALP SERPL-CCNC: 54 U/L (ref 40–150)
ALT SERPL W/O P-5'-P-CCNC: 27 U/L (ref 10–44)
ANION GAP SERPL CALC-SCNC: 11 MMOL/L (ref 8–16)
AST SERPL-CCNC: 19 U/L (ref 10–40)
BASOPHILS # BLD AUTO: 0.02 K/UL (ref 0–0.2)
BASOPHILS NFR BLD: 0.2 % (ref 0–1.9)
BILIRUB SERPL-MCNC: 0.4 MG/DL (ref 0.1–1)
BUN SERPL-MCNC: 12 MG/DL (ref 6–20)
CALCIUM SERPL-MCNC: 10 MG/DL (ref 8.7–10.5)
CHLORIDE SERPL-SCNC: 104 MMOL/L (ref 95–110)
CO2 SERPL-SCNC: 25 MMOL/L (ref 23–29)
CREAT SERPL-MCNC: 1.1 MG/DL (ref 0.5–1.4)
DIFFERENTIAL METHOD BLD: ABNORMAL
EOSINOPHIL # BLD AUTO: 0.1 K/UL (ref 0–0.5)
EOSINOPHIL NFR BLD: 0.9 % (ref 0–8)
ERYTHROCYTE [DISTWIDTH] IN BLOOD BY AUTOMATED COUNT: 13.3 % (ref 11.5–14.5)
EST. GFR  (NO RACE VARIABLE): >60 ML/MIN/1.73 M^2
FERRITIN SERPL-MCNC: 76 NG/ML (ref 20–300)
GLUCOSE SERPL-MCNC: 94 MG/DL (ref 70–110)
HCT VFR BLD AUTO: 42 % (ref 37–48.5)
HGB BLD-MCNC: 13.8 G/DL (ref 12–16)
IMM GRANULOCYTES # BLD AUTO: 0.04 K/UL (ref 0–0.04)
IMM GRANULOCYTES NFR BLD AUTO: 0.5 % (ref 0–0.5)
IRON SERPL-MCNC: 55 UG/DL (ref 30–160)
LYMPHOCYTES # BLD AUTO: 2 K/UL (ref 1–4.8)
LYMPHOCYTES NFR BLD: 25 % (ref 18–48)
MCH RBC QN AUTO: 28.8 PG (ref 27–31)
MCHC RBC AUTO-ENTMCNC: 32.9 G/DL (ref 32–36)
MCV RBC AUTO: 88 FL (ref 82–98)
MONOCYTES # BLD AUTO: 0.5 K/UL (ref 0.3–1)
MONOCYTES NFR BLD: 6.3 % (ref 4–15)
NEUTROPHILS # BLD AUTO: 5.4 K/UL (ref 1.8–7.7)
NEUTROPHILS NFR BLD: 67.1 % (ref 38–73)
NRBC BLD-RTO: 0 /100 WBC
PLATELET # BLD AUTO: 295 K/UL (ref 150–450)
PMV BLD AUTO: 8.8 FL (ref 9.2–12.9)
POTASSIUM SERPL-SCNC: 4.3 MMOL/L (ref 3.5–5.1)
PROT SERPL-MCNC: 8.6 G/DL (ref 6–8.4)
RBC # BLD AUTO: 4.8 M/UL (ref 4–5.4)
SATURATED IRON: 13 % (ref 20–50)
SODIUM SERPL-SCNC: 140 MMOL/L (ref 136–145)
TOTAL IRON BINDING CAPACITY: 411 UG/DL (ref 250–450)
TRANSFERRIN SERPL-MCNC: 278 MG/DL (ref 200–375)
WBC # BLD AUTO: 8.08 K/UL (ref 3.9–12.7)

## 2025-03-12 PROCEDURE — 85025 COMPLETE CBC W/AUTO DIFF WBC: CPT | Performed by: INTERNAL MEDICINE

## 2025-03-12 PROCEDURE — 3075F SYST BP GE 130 - 139MM HG: CPT | Mod: CPTII,S$GLB,, | Performed by: INTERNAL MEDICINE

## 2025-03-12 PROCEDURE — 3008F BODY MASS INDEX DOCD: CPT | Mod: CPTII,S$GLB,, | Performed by: INTERNAL MEDICINE

## 2025-03-12 PROCEDURE — 83540 ASSAY OF IRON: CPT | Performed by: INTERNAL MEDICINE

## 2025-03-12 PROCEDURE — 99999 PR PBB SHADOW E&M-EST. PATIENT-LVL V: CPT | Mod: PBBFAC,,, | Performed by: INTERNAL MEDICINE

## 2025-03-12 PROCEDURE — 1159F MED LIST DOCD IN RCRD: CPT | Mod: CPTII,S$GLB,, | Performed by: INTERNAL MEDICINE

## 2025-03-12 PROCEDURE — 3080F DIAST BP >= 90 MM HG: CPT | Mod: CPTII,S$GLB,, | Performed by: INTERNAL MEDICINE

## 2025-03-12 PROCEDURE — 99214 OFFICE O/P EST MOD 30 MIN: CPT | Mod: S$GLB,,, | Performed by: INTERNAL MEDICINE

## 2025-03-12 PROCEDURE — 82728 ASSAY OF FERRITIN: CPT | Performed by: INTERNAL MEDICINE

## 2025-03-12 PROCEDURE — 36415 COLL VENOUS BLD VENIPUNCTURE: CPT | Performed by: INTERNAL MEDICINE

## 2025-03-12 PROCEDURE — 80053 COMPREHEN METABOLIC PANEL: CPT | Performed by: INTERNAL MEDICINE

## 2025-03-12 NOTE — TELEPHONE ENCOUNTER
3/12/2025     7:08 AM 7/1/2024    10:47 AM 5/20/2024     7:12 AM 4/16/2024     2:33 PM   DISTRESS SCREENING   Distress Score 8 1 0 - No Distress 0 - No Distress   Practical Concerns None of these   None of these    Social Concerns None of these   None of these   Emotional Concerns Worry or anxiety   None of these   Spiritual or Church Concerns None of these   None of these   Physical Concerns None of these Fatigue  None of these       Data saved with a previous flowsheet row definition     SS was consulted by MD to reach out to pt to discuss distress score. SW spoke with pt who reported her anxiety was due to buying a new home. Pt denied any other needs at this time. ANDREZ encouraged pt to contact SS as needed. SW will remain available.

## 2025-03-12 NOTE — PROGRESS NOTES
Subjective:       Patient ID: Zachary Lucia is a 49 y.o. female.    Chief Complaint: Results and Anemia    HPI:  49-year-old female history of scleroderma and previous iron deficiency patient's laboratory studies in January of 2025 demonstrating improvement.  Faizan Gaytan patient is here for follow-up    Past Medical History:   Diagnosis Date    Abnormal stress test 01/25/2022    Asthma     Essential hypertension, malignant 01/08/2020    History of colonic polyps 2023    Lupus (systemic lupus erythematosus)     Membranous glomerulonephritis, stage 4 05/30/2019    Scleroderma     Seizures 01/12/2017     Family History   Problem Relation Name Age of Onset    Arthritis Mother Radha Escalona     Glaucoma Mother Radha Escalona     Hypertension Mother Radha Escalona     Diabetes Mother Radha Escalona     Heart disease Mother Radha Escalona     Stroke Mother Radha Escalona     Diabetes Father Ace Green     Cancer Father Ace Green     Diabetes Maternal Grandmother Rose Marx     Hypertension Maternal Grandmother Rose Marx     Arthritis Maternal Grandmother Rose Marx     Cataracts Maternal Grandmother Rose Marx     Diabetes Maternal Grandfather Jesus Marx, Jr     Heart disease Maternal Grandfather Jesus Marx, Jr     Hypertension Maternal Grandfather Jesus Marx, Jr     Diabetes Paternal Grandmother Bess Green     Heart disease Paternal Grandmother Bess Green     Hypertension Paternal Grandmother Bess Green     Asthma Paternal Grandmother Bess Green     Heart disease Paternal Grandfather Germán Green, Sr.     Cancer Maternal Uncle Aramis Marx     Hypertension Maternal Uncle Aramis Marx     Cancer Maternal Uncle Marco A Marx     Cancer Paternal Aunt Angie Peter Farah     Cancer Paternal Aunt Amy Peter Cedillo     Cancer Paternal Aunt Marrioyudy Bynum     Heart disease Maternal Uncle Ricardo Marx, Sr.     Hypertension Maternal Uncle Ricardo Marx, Sr.      Social History[1]  Past  Surgical History:   Procedure Laterality Date    COLONOSCOPY N/A 01/12/2023    Procedure: COLONOSCOPY;  Surgeon: Supriya Hughes MD;  Location: [facility] ENDO;  Service: Endoscopy;  Laterality: N/A;    ESOPHAGOGASTRODUODENOSCOPY N/A 01/12/2023    Procedure: EGD (ESOPHAGOGASTRODUODENOSCOPY);  Surgeon: Supriya Hughes MD;  Location: [facility] ENDO;  Service: Endoscopy;  Laterality: N/A;    INTRALUMINAL GASTROINTESTINAL TRACT IMAGING VIA CAPSULE N/A 5/22/2024    Procedure: IMAGING PROCEDURE, GI TRACT, INTRALUMINAL, VIA CAPSULE;  Surgeon: [place], First Available;  Location: [facility] ENDO;  Service: Endoscopy;  Laterality: N/A;    LEFT HEART CATHETERIZATION Left 01/25/2022    Procedure: CATHETERIZATION, HEART, LEFT;  Surgeon: Rocío Davidson MD;  Location: [facility] CATH LAB;  Service: Cardiology;  Laterality: Left;    RENAL BIOPSY  11/16/2018       Labs:  Lab Results   Component Value Date    WBC 5.10 01/02/2025    HGB 13.1 01/02/2025    HCT 39.3 01/02/2025    MCV 86 01/02/2025     01/02/2025     BMP  Lab Results   Component Value Date     01/02/2025    K 4.3 01/02/2025     01/02/2025    CO2 23 01/02/2025    BUN 12 01/02/2025    CREATININE 1.0 01/02/2025    CALCIUM 9.7 01/02/2025    ANIONGAP 11 01/02/2025    ESTGFRAFRICA >60 05/11/2022    EGFRNONAA >60 05/11/2022     Lab Results   Component Value Date    ALT 22 01/02/2025    AST 14 01/02/2025    ALKPHOS 53 01/02/2025    BILITOT 0.5 01/02/2025       Lab Results   Component Value Date    IRON 69 01/02/2025    TIBC 383 01/02/2025    FERRITIN 58 01/02/2025     Lab Results   Component Value Date    [id number] 1476 (H) 08/23/2023     Lab Results   Component Value Date    FOLATE 5.8 05/29/2019     Lab Results   Component Value Date    TSH 1.194 01/19/2024         Review of Systems   Constitutional:  Negative for activity change, appetite change, chills, diaphoresis, fatigue, fever and unexpected weight change.   HENT:  Negative for congestion, dental problem, drooling,  ear discharge, ear pain, facial swelling, hearing loss, mouth sores, nosebleeds, postnasal drip, rhinorrhea, sinus pressure, sneezing, sore throat, tinnitus, trouble swallowing and voice change.    Eyes:  Negative for photophobia, pain, discharge, redness, itching and visual disturbance.   Respiratory:  Negative for cough, choking, chest tightness, shortness of breath, wheezing and stridor.    Cardiovascular:  Negative for chest pain, palpitations and leg swelling.   Gastrointestinal:  Negative for abdominal distention, abdominal pain, anal bleeding, blood in stool, constipation, diarrhea, nausea, rectal pain and vomiting.   Endocrine: Negative for cold intolerance, heat intolerance, polydipsia, polyphagia and polyuria.   Genitourinary:  Negative for decreased urine volume, difficulty urinating, dyspareunia, dysuria, enuresis, flank pain, frequency, genital sores, hematuria, menstrual problem, pelvic pain, urgency, vaginal bleeding, vaginal discharge and vaginal pain.   Musculoskeletal:  Negative for arthralgias, back pain, gait problem, joint swelling, myalgias, neck pain and neck stiffness.   Skin:  Negative for color change, pallor and rash.   Allergic/Immunologic: Negative for environmental allergies, food allergies and immunocompromised state.   Neurological:  Negative for dizziness, tremors, seizures, syncope, facial asymmetry, speech difficulty, weakness, light-headedness, numbness and headaches.   Hematological:  Negative for adenopathy. Does not bruise/bleed easily.   Psychiatric/Behavioral:  Negative for agitation, behavioral problems, confusion, decreased concentration, dysphoric mood, hallucinations, self-injury, sleep disturbance and suicidal ideas. The patient is not nervous/anxious and is not hyperactive.        Objective:      Physical Exam  Vitals reviewed.   Constitutional:       General: She is not in acute distress.     Appearance: She is well-developed. She is not diaphoretic.   HENT:      Head:  Normocephalic and atraumatic.      Right Ear: External ear normal.      Left Ear: External ear normal.      Nose: Nose normal.      Right Sinus: No maxillary sinus tenderness or frontal sinus tenderness.      Left Sinus: No maxillary sinus tenderness or frontal sinus tenderness.      Mouth/Throat:      Pharynx: No oropharyngeal exudate.   Eyes:      General: Lids are normal. No scleral icterus.        Right eye: No discharge.         Left eye: No discharge.      Conjunctiva/sclera: Conjunctivae normal.      Right eye: Right conjunctiva is not injected. No hemorrhage.     Left eye: Left conjunctiva is not injected. No hemorrhage.     Pupils: Pupils are equal, round, and reactive to light.   Neck:      Thyroid: No thyromegaly.      Vascular: No JVD.      Trachea: No tracheal deviation.   Cardiovascular:      Rate and Rhythm: Normal rate.   Pulmonary:      Effort: Pulmonary effort is normal. No respiratory distress.      Breath sounds: No stridor.   Chest:      Chest wall: No tenderness.   Abdominal:      General: Bowel sounds are normal. There is no distension.      Palpations: Abdomen is soft. There is no hepatomegaly, splenomegaly or mass.      Tenderness: There is no abdominal tenderness. There is no rebound.   Musculoskeletal:         General: No tenderness. Normal range of motion.      Cervical back: Normal range of motion and neck supple.   Lymphadenopathy:      Cervical: No cervical adenopathy.      Upper Body:      Right upper body: No supraclavicular adenopathy.      Left upper body: No supraclavicular adenopathy.   Skin:     General: Skin is dry.      Findings: No erythema or rash.   Neurological:      Mental Status: She is alert and oriented to person, place, and time.      Cranial Nerves: No cranial nerve deficit.      Coordination: Coordination normal.   Psychiatric:         Behavior: Behavior normal.         Thought Content: Thought content normal.         Judgment: Judgment normal.              Assessment:      1. Iron deficiency anemia due to chronic blood loss    2. Scleroderma    3. Sjogren's syndrome with other organ involvement    4. Iron deficiency anemia, unspecified iron deficiency anemia type           Med Onc Chart Routing      Follow up with physician No follow up needed.   Follow up with JUAN CARLOS    Infusion scheduling note    Injection scheduling note    Labs    Imaging    Pharmacy appointment    Other referrals                   Plan:      proceed with CBC CMP iron status today.  Communicate through portal patient if patient is iron deficient will need additional iron if not will proceed with follow-up through Rheumatology and primary physician patient is aware when she felt anemic she has possibly high-risk for recurrence with scleroderma variant discussed implications of answered questions with her        Florentino Duong Jr, MD FACP         [1]   Social History  Socioeconomic History    Marital status:      Spouse name: Darrell    Number of children: 0   Tobacco Use    Smoking status: Never     Passive exposure: Never    Smokeless tobacco: Never   Substance and Sexual Activity    Alcohol use: Not Currently     Alcohol/week: 2.0 standard drinks of alcohol     Types: 2 Glasses of wine per week     Comment: OCC    Drug use: No    Sexual activity: Yes     Partners: Male     Social Drivers of Health     Financial Resource Strain: Low Risk  (8/14/2024)    Received from [facility]    Overall Financial Resource Strain (CARDIA)     Difficulty of Paying Living Expenses: Not hard at all   Food Insecurity: Patient Declined (8/14/2024)    Received from [facility]    Hunger Vital Sign     Worried About Running Out of Food in the Last Year: Patient declined     Ran Out of Food in the Last Year: Patient declined   Transportation Needs: No Transportation Needs (8/14/2024)    Received from [facility]    PRAPARE - Transportation     Lack of Transportation (Medical): No     Lack of  Transportation (Non-Medical): No   Physical Activity: Inactive (8/14/2024)    Received from [facility]    Exercise Vital Sign     Days of Exercise per Week: 0 days     Minutes of Exercise per Session: 0 min   Stress: No Stress Concern Present (1/19/2024)    [employer] [place] [employer] - Occupational Stress Questionnaire     Feeling of Stress : Only a little   Housing Stability: Unknown (8/14/2024)    Received from [facility]    Housing Stability Vital Sign     Unable to Pay for Housing in the Last Year: No     Homeless in the Last Year: No

## 2025-03-13 ENCOUNTER — PATIENT MESSAGE (OUTPATIENT)
Dept: RHEUMATOLOGY | Facility: CLINIC | Age: 50
End: 2025-03-13
Payer: COMMERCIAL

## 2025-04-01 ENCOUNTER — TELEPHONE (OUTPATIENT)
Dept: ORTHOPEDICS | Facility: CLINIC | Age: 50
End: 2025-04-01
Payer: COMMERCIAL

## 2025-04-01 ENCOUNTER — TELEPHONE (OUTPATIENT)
Dept: SPORTS MEDICINE | Facility: CLINIC | Age: 50
End: 2025-04-01
Payer: COMMERCIAL

## 2025-04-01 NOTE — TELEPHONE ENCOUNTER
Patient's authorization for bilateral gel injections (Synvisc) is still pending with her insurance. Patient is aware that it can take 2-3 weeks for authorization. Appointment made and attached to referral for authorization. Patient aware appointment can be rescheduled if still pending authorization.

## 2025-04-17 ENCOUNTER — PATIENT MESSAGE (OUTPATIENT)
Dept: BARIATRICS | Facility: CLINIC | Age: 50
End: 2025-04-17
Payer: COMMERCIAL

## 2025-04-28 ENCOUNTER — LAB VISIT (OUTPATIENT)
Dept: LAB | Facility: HOSPITAL | Age: 50
End: 2025-04-28
Attending: NURSE PRACTITIONER
Payer: COMMERCIAL

## 2025-04-28 ENCOUNTER — OFFICE VISIT (OUTPATIENT)
Dept: NEUROLOGY | Facility: CLINIC | Age: 50
End: 2025-04-28
Payer: COMMERCIAL

## 2025-04-28 VITALS
DIASTOLIC BLOOD PRESSURE: 85 MMHG | RESPIRATION RATE: 16 BRPM | SYSTOLIC BLOOD PRESSURE: 125 MMHG | HEART RATE: 65 BPM | HEIGHT: 66 IN | BODY MASS INDEX: 41.8 KG/M2 | WEIGHT: 260.13 LBS

## 2025-04-28 DIAGNOSIS — G40.409 EPILEPSY, GRAND MAL: ICD-10-CM

## 2025-04-28 DIAGNOSIS — M34.9 SCLERODERMA: ICD-10-CM

## 2025-04-28 DIAGNOSIS — R56.9 NONEPILEPTIC EPISODE: ICD-10-CM

## 2025-04-28 DIAGNOSIS — I73.00 RAYNAUD'S DISEASE WITHOUT GANGRENE: ICD-10-CM

## 2025-04-28 DIAGNOSIS — G40.919 INTRACTABLE EPILEPSY WITHOUT STATUS EPILEPTICUS, UNSPECIFIED EPILEPSY TYPE: ICD-10-CM

## 2025-04-28 DIAGNOSIS — G43.909 MIGRAINE WITHOUT STATUS MIGRAINOSUS, NOT INTRACTABLE, UNSPECIFIED MIGRAINE TYPE: ICD-10-CM

## 2025-04-28 DIAGNOSIS — E55.9 VITAMIN D DEFICIENCY DISEASE: ICD-10-CM

## 2025-04-28 DIAGNOSIS — M35.09 SJOGREN'S SYNDROME WITH OTHER ORGAN INVOLVEMENT: ICD-10-CM

## 2025-04-28 DIAGNOSIS — Z65.8 PSYCHOSOCIAL STRESSORS: ICD-10-CM

## 2025-04-28 DIAGNOSIS — R20.2 PARESTHESIAS: ICD-10-CM

## 2025-04-28 DIAGNOSIS — Z79.899 ON ANTIEPILEPTIC THERAPY: ICD-10-CM

## 2025-04-28 DIAGNOSIS — M32.9 SLE (SYSTEMIC LUPUS ERYTHEMATOSUS RELATED SYNDROME): ICD-10-CM

## 2025-04-28 DIAGNOSIS — F41.1 GENERALIZED ANXIETY DISORDER: ICD-10-CM

## 2025-04-28 DIAGNOSIS — G40.919 INTRACTABLE EPILEPSY WITHOUT STATUS EPILEPTICUS, UNSPECIFIED EPILEPSY TYPE: Primary | ICD-10-CM

## 2025-04-28 DIAGNOSIS — M32.14 LUPUS NEPHRITIS, ISN/RPS CLASS V: ICD-10-CM

## 2025-04-28 LAB — VIT B12 SERPL-MCNC: 473 PG/ML (ref 210–950)

## 2025-04-28 PROCEDURE — 99999 PR PBB SHADOW E&M-EST. PATIENT-LVL IV: CPT | Mod: PBBFAC,,, | Performed by: NURSE PRACTITIONER

## 2025-04-28 PROCEDURE — 80177 DRUG SCRN QUAN LEVETIRACETAM: CPT

## 2025-04-28 PROCEDURE — 82607 VITAMIN B-12: CPT

## 2025-04-28 PROCEDURE — 36415 COLL VENOUS BLD VENIPUNCTURE: CPT

## 2025-04-28 RX ORDER — CYANOCOBALAMIN 1000 UG/ML
1000 INJECTION, SOLUTION INTRAMUSCULAR; SUBCUTANEOUS
Qty: 3 ML | Refills: 3 | Status: SHIPPED | OUTPATIENT
Start: 2025-04-28 | End: 2026-04-28

## 2025-04-28 RX ORDER — LEVETIRACETAM 500 MG/1
2000 TABLET, EXTENDED RELEASE ORAL DAILY
Qty: 360 TABLET | Refills: 3 | Status: SHIPPED | OUTPATIENT
Start: 2025-04-28

## 2025-04-28 NOTE — PROGRESS NOTES
Subjective:       Patient ID: Zachary Lucia is a 49 y.o. female.    Chief Complaint: Epilepsy, grand mal           HPI      The patient is here for seizure evaluation. The patient is unaccompanied.    The patient started having seizure in 2006. The patient describes aura of LT facial tingling. The patient is amnestic to the seizure after that. The seizure is described as grand mal seizure/GTC consisting of generalized tensing and muscle jerking with eyes deviated upwards with foamy secretions from the mouth, tongue biting and urine incontinence. The seizure lasts for 1-2 minutes followed by 30-60 minutes of confusion. No history of meningitis or encephalitis No toxic exposure or lead poisoning. No family history of epilepsy (paternal cousin had childhood epilepsy).  No history of strokes or aneurysmal bleeding. No history of TBI. She was started on  mg BID that was changed to LEV XR 1000 mg QHS. The patient continued to have seizures every 1-2 months. Of note, she was diagnosed with CTD in 2018 and has been on IST. She is concerned because she had 3 seizures over 4 days in .     The patient does complain of migraine without aura (throbbing, moderate-severe, light-noise sensitivity and nausea) that seems be sporadic and infrequent. Tried Fioricet, CCB (Amlodipine),  TCA (Elavil), SSRIS (Zoloft) and AEDs (GBP).     The patient also reports constant B/L hand numbness, pain and tingling and intermittent toes numbness and tingling since 2018. No history of DM or Thyroid disease. No excessive alcohol intake. Of note, she was diagnosed with CTD in 2018 and has been on IST. The patient was found to have low B12 and Vitamin D and stopped taking supplements. Lab [facility] [id number] showed NL HA1C, TFT, SPEP-IPEP , Low Vitamin D and Vitamin B12  and Positive AMAURY, SSA, SSB and RNP Antibodies.       Reports 1 seizure-like episodes on 08- that proceeded after an heated discussion with spouse. The  event described as non-epileptic event, no LOC, no tongue biting, no urinary incontinence. No new medication was added. Discussed AEEG results 07- THROUGH 07- AEEG  HOURS: SW, LT TL F7-T7, FOUR EVENTS (NON-EPILEPTIC-PSYCHOGENIC).  Patient reports work related stressors,  No tremors. No history of parkinsonism. No vomiting, diarrhea.           INTERVAL HISTORY 04-28- 2025 : The patient is present for follow up. Patient doing well. Patient had stopped Keppra XR 2000 mg po HS she reports. NO GTC reported.     Patient continues to have non- epileptic episodes. She is not currently in CBT. Patient is interested in starting treatment.     Headache complaints unchanged low frequency.     Patient has been out of Vitamin B 12 replacement, patient states she rather Injections rather than daily sublingual dose, Discussed plan of care.         Review of Systems   Constitutional:  Positive for fatigue. Negative for appetite change.   HENT:  Negative for hearing loss and tinnitus.    Eyes:  Negative for photophobia and visual disturbance.   Respiratory:  Negative for apnea and shortness of breath.    Cardiovascular:  Negative for chest pain and palpitations.   Gastrointestinal:  Negative for nausea and vomiting.   Endocrine: Negative for cold intolerance and heat intolerance.   Genitourinary:  Negative for difficulty urinating and urgency.   Musculoskeletal:  Positive for arthralgias. Negative for back pain, gait problem, joint swelling, myalgias, neck pain and neck stiffness.   Skin:  Positive for color change. Negative for rash.   Allergic/Immunologic: Negative for environmental allergies and immunocompromised state.   Neurological:  Positive for seizures, numbness and headaches. Negative for dizziness, tremors, syncope, facial asymmetry, speech difficulty, weakness and light-headedness.   Hematological:  Negative for adenopathy. Does not bruise/bleed easily.   Psychiatric/Behavioral:  Negative for  agitation, behavioral problems, confusion, decreased concentration, dysphoric mood, hallucinations, self-injury, sleep disturbance and suicidal ideas. The patient is not hyperactive.                  Current Outpatient Medications:     azaTHIOprine (IMURAN) 100 mg tablet, Take 1 tablet (100 mg total) by mouth once daily., Disp: 90 tablet, Rfl: 1    cholecalciferol, vitamin D3, 1,250 mcg (50,000 unit) capsule, Take 1 capsule (50,000 Units total) by mouth once a week., Disp: 15 capsule, Rfl: 1    cyclobenzaprine (FLEXERIL) 10 MG tablet, Take 1 tablet (10 mg total) by mouth 2 (two) times daily as needed for Muscle spasms., Disp: 60 tablet, Rfl: 0    diclofenac sodium (VOLTAREN) 1 % Gel, Apply 2 g topically 3 (three) times daily., Disp: 1 each, Rfl: 1    hydroxychloroquine (PLAQUENIL) 200 mg tablet, Take 1 tablet (200 mg total) by mouth 2 (two) times daily., Disp: 180 tablet, Rfl: 1     mg tablet, Take 800 mg by mouth every 8 (eight) hours as needed., Disp: , Rfl: 0    montelukast (SINGULAIR) 10 mg tablet, TAKE 1 TABLET BY MOUTH EVERY DAY IN THE EVENING, Disp: 90 tablet, Rfl: 0    mupirocin (BACTROBAN) 2 % ointment, Apply topically 3 (three) times daily., Disp: 1 g, Rfl: 0    pantoprazole (PROTONIX) 40 MG tablet, Take 1 tablet (40 mg total) by mouth once daily., Disp: 30 tablet, Rfl: 3    predniSONE (DELTASONE) 2.5 MG tablet, Take 1 tablet (2.5 mg total) by mouth once daily., Disp: 90 tablet, Rfl: 0    predniSONE (DELTASONE) 5 MG tablet, Along with the 2.5 mg tablets, take total of 7.5 mg daily., Disp: 90 tablet, Rfl: 0    semaglutide, weight loss, (WEGOVY) 0.5 mg/0.5 mL PnIj, Inject 0.5 mg into the skin every 7 days., Disp: 2 mL, Rfl: 1    cyanocobalamin 1,000 mcg/mL injection, Inject 1 mL (1,000 mcg total) into the skin every 30 days., Disp: 3 mL, Rfl: 3    fluticasone-salmeterol diskus inhaler 100-50 mcg, Inhale 1 puff into the lungs 2 (two) times daily. 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    Scleroderma     Seizures 01/12/2017     Past Surgical History:   Procedure Laterality Date    COLONOSCOPY N/A 01/12/2023    Procedure: COLONOSCOPY;  Surgeon: Supriya Hughes MD;  Location: [facility];  Service: Endoscopy;  Laterality: N/A;    ESOPHAGOGASTRODUODENOSCOPY N/A 01/12/2023    Procedure: EGD (ESOPHAGOGASTRODUODENOSCOPY);  Surgeon: Supriya Hughes MD;  Location: [facility];  Service: Endoscopy;  Laterality: N/A;    INTRALUMINAL GASTROINTESTINAL TRACT IMAGING VIA CAPSULE N/A 5/22/2024    Procedure: IMAGING PROCEDURE, GI TRACT, INTRALUMINAL, VIA CAPSULE;  Surgeon: [place], First Available;  Location: [facility] ENDO;  Service: Endoscopy;  Laterality: N/A;    LEFT HEART CATHETERIZATION Left 01/25/2022    Procedure: CATHETERIZATION, HEART, LEFT;  Surgeon: Rocío Davidson MD;  Location: [facility] CATH LAB;  Service: Cardiology;  Laterality: Left;    RENAL BIOPSY  11/16/2018     Social History     Socioeconomic History    Marital status:      Spouse name: Darrell    Number of children: 0   Tobacco Use    Smoking status: Never     Passive exposure: Never    Smokeless tobacco: Never   Substance and Sexual Activity    Alcohol use: Not Currently     Alcohol/week: 2.0 standard drinks of alcohol     Types: 2 Glasses of wine per week     Comment: OCC    Drug use: No    Sexual activity: Yes     Partners: Male     Social Drivers of Health     Financial Resource Strain: Patient Unable To Answer (3/26/2025)    Received from [facility]     Overall Financial Resource Strain (CARDIA)     Difficulty of Paying Living Expenses: Patient unable to answer   Food Insecurity: Patient Declined (3/26/2025)    Received from [facility]    Hunger Vital Sign     Worried About Running Out of Food in the Last Year: Patient declined     Ran Out of Food in the Last Year: Patient declined   Transportation Needs: No Transportation Needs (3/26/2025)    Received from [facility]    PRAPARE - Transportation     Lack of Transportation (Medical): No     Lack of Transportation (Non-Medical): No   Physical Activity: Inactive (3/26/2025)    Received from [facility]    Exercise Vital Sign     Days of Exercise per Week: 0 days     Minutes of Exercise per Session: 0 min   Stress: Patient Declined (3/26/2025)    Received from [facility]    [employer] [place] [employer] - Occupational Stress Questionnaire     Feeling of Stress : Patient declined   Housing Stability: Unknown (3/26/2025)    Received from [facility]    Housing Stability Vital Sign     Unable to Pay for Housing in the Last Year: No     Homeless in the Last Year: No             Past/Current Medical/Surgical History, Past/Current Social History, Past/Current Family History and Past/Current Medications were reviewed in detail.        Objective:           VITAL SIGNS WERE REVIEWED      GENERAL APPEARANCE:     The patient looks uncomfortable.    BMI 41.99 KG     No signs of respiratory distress.    Normal breathing pattern.    No dysmorphic features    Normal eye contact.       GENERAL MEDICAL EXAM:    HEENT:  Head is atraumatic normocephalic.     FUNDOSCOPIC (OPHTHALMOSCOPIC) EXAMINATION showed no disc edema.      NECK: No JVD. No visible lesions or goiters.     CHEST-CARDIOPULMONARY: No cyanosis. No tachypnea. Normal respiratory effort.    AJTCAIX-[id number]: No jaundice. No stomas or lesions. No visible hernias. No catheters.     SKIN, HAIR, NAILS: No pathognomonic  skin rash.No neurofibromatosis. No visible lesions.No stigmata of autoimmune disease. No clubbing.    LIMBS: No varicose veins. No visible swelling.    MUSCULOSKELETAL: No visible deformities.No visible lesions.           Neurologic Exam     Mental Status   Oriented to person, place, and time.   Follows 3 step commands.   Attention: normal. Concentration: normal.   Speech: speech is normal   Level of consciousness: alert  Able to name object. Able to repeat. Normal comprehension.     Cranial Nerves   Cranial nerves II through XII intact.     CN II   Visual fields full to confrontation.   Visual acuity: normal  Right visual field deficit: none  Left visual field deficit: none     CN III, IV, VI   Pupils are equal, round, and reactive to light.  Extraocular motions are normal.   Right pupil: Size: 2 mm. Shape: regular. Reactivity: brisk. Consensual response: intact. Accommodation: intact.   Left pupil: Size: 2 mm. Shape: regular. Reactivity: brisk. Consensual response: intact. Accommodation: intact.   CN III: no CN III palsy  CN VI: no CN VI palsy  Nystagmus: none   Diplopia: none  Ophthalmoparesis: none  Upgaze: normal  Downgaze: normal  Conjugate gaze: present  Vestibulo-ocular reflex: present    CN V   Facial sensation intact.   Right facial sensation deficit: none  Left facial sensation deficit: none  Jaw jerk: normal    CN VII   Facial expression full, symmetric.   Right facial weakness: none  Left facial weakness: none    CN VIII   CN VIII normal.   Hearing: intact    CN IX, X   CN IX normal.   CN X normal.   Palate: symmetric    CN XI   CN XI normal.   Right sternocleidomastoid strength: normal  Left sternocleidomastoid strength: normal  Right trapezius strength: normal  Left trapezius strength: normal    CN XII   CN XII normal.   Tongue: not atrophic  Fasciculations: absent  Tongue deviation: none    Motor Exam   Muscle bulk: normal  Overall muscle tone: normal  Right arm tone: normal  Left arm tone:  normal  Right arm pronator drift: absent  Left arm pronator drift: absent  Right leg tone: normal  Left leg tone: normal    Strength   Strength 5/5 throughout.   Right neck flexion: 5/5  Left neck flexion: 5/5  Right neck extension: 5/5  Left neck extension: 5/5  Right deltoid: 5/5  Left deltoid: 5/5  Right biceps: 5/5  Left biceps: 5/5  Right triceps: 5/5  Left triceps: 5/5  Right wrist flexion: 5/5  Left wrist flexion: 5/5  Right wrist extension: 5/5  Left wrist extension: 5/5  Right interossei: 5/5  Left interossei: 5/5  Right iliopsoas: 5/5  Left iliopsoas: 5/5  Right quadriceps: 5/5  Left quadriceps: 5/5  Right hamstrin/5  Left hamstrin/5  Right glutei: 5/5  Left glutei: 5/5  Right anterior tibial: 5/5  Left anterior tibial: 5/5  Right posterior tibial: 5/5  Left posterior tibial: 5/5  Right peroneal: 5/5  Left peroneal: 5/5  Right gastroc: 5/5  Left gastroc: 5/5    Sensory Exam   Light touch normal.   Right arm light touch: normal  Left arm light touch: normal  Right leg light touch: normal  Left leg light touch: normal  Vibration normal.   Right arm vibration: normal  Left arm vibration: normal  Right leg vibration: normal  Left leg vibration: normal  Proprioception normal.   Right arm proprioception: normal  Left arm proprioception: normal  Right leg proprioception: normal  Left leg proprioception: normal  Pinprick normal.   Right arm pinprick: normal  Left arm pinprick: normal  Right leg pinprick: normal  Left leg pinprick: normal  Sensory deficit distribution on right: median  Sensory deficit distribution on left: median  Graphesthesia: normal  Stereognosis: normal    Gait, Coordination, and Reflexes     Gait  Gait: normal    Coordination   Romberg: negative  Finger to nose coordination: normal  Heel to shin coordination: normal  Tandem walking coordination: normal    Tremor   Resting tremor: absent  Intention tremor: absent  Action tremor: absent    Reflexes   Right brachioradialis: 2+  Left  brachioradialis: 2+  Right biceps: 2+  Left biceps: 2+  Right triceps: 2+  Left triceps: 2+  Right patellar: 2+  Left patellar: 2+  Right achilles: 2+  Left achilles: 2+  Right plantar: normal  Left plantar: normal  Right Myrick: absent  Left Myrick: absent  Right ankle clonus: absent  Left ankle clonus: absent  Right pendular knee jerk: absent  Left pendular knee jerk: absent              Lab Results   Component Value Date    WBC 8.08 03/12/2025    HGB 13.8 03/12/2025    HCT 42.0 03/12/2025    MCV 88 03/12/2025     03/12/2025     Sodium   Date Value Ref Range Status   03/12/2025 140 136 - 145 mmol/L Final     Potassium   Date Value Ref Range Status   03/12/2025 4.3 3.5 - 5.1 mmol/L Final     Chloride   Date Value Ref Range Status   03/12/2025 104 95 - 110 mmol/L Final     CO2   Date Value Ref Range Status   03/12/2025 25 23 - 29 mmol/L Final     Glucose   Date Value Ref Range Status   03/12/2025 94 70 - 110 mg/dL Final     BUN   Date Value Ref Range Status   03/12/2025 12 6 - 20 mg/dL Final     Creatinine   Date Value Ref Range Status   03/12/2025 1.1 0.5 - 1.4 mg/dL Final     Calcium   Date Value Ref Range Status   03/12/2025 10.0 8.7 - 10.5 mg/dL Final     Total Protein   Date Value Ref Range Status   03/12/2025 8.6 (H) 6.0 - 8.4 g/dL Final     Albumin   Date Value Ref Range Status   03/12/2025 4.3 3.5 - 5.2 g/dL Final     Total Bilirubin   Date Value Ref Range Status   03/12/2025 0.4 0.1 - 1.0 mg/dL Final     Comment:     For infants and newborns, interpretation of results should be based  on gestational age, weight and in agreement with clinical  observations.    Premature Infant recommended reference ranges:  Up to 24 hours.............<8.0 mg/dL  Up to 48 hours............<12.0 mg/dL  3-5 days..................<15.0 mg/dL  6-29 days.................<15.0 mg/dL       Alkaline Phosphatase   Date Value Ref Range Status   03/12/2025 54 40 - 150 U/L Final     AST   Date Value Ref Range Status   03/12/2025  19 10 - 40 U/L Final     ALT   Date Value Ref Range Status   03/12/2025 27 10 - 44 U/L Final     Anion Gap   Date Value Ref Range Status   03/12/2025 11 8 - 16 mmol/L Final     eGFR if    Date Value Ref Range Status   05/11/2022 >60 >60 mL/min/1.73 m^2 Final     eGFR if non    Date Value Ref Range Status   05/11/2022 >60 >60 mL/min/1.73 m^2 Final     Comment:     Calculation used to obtain the estimated glomerular filtration  rate (eGFR) is the CKD-EPI equation.        Lab Results   Component Value Date    [id number] 1476 (H) 08/23/2023     Lab Results   Component Value Date    TSH 1.194 01/19/2024    FREET4 1.02 02/01/2018       LABORATORY EVALUATION  05-    LEV LEVEL 9.6 NL, VITAMIN B 12 473 NL,     [id number]     Lab [facility] showed NL HA1C, TFT, SPEP-IPEP , Low Vitamin D and Vitamin B12  and Positive AMAURY, SSA, SSB and RNP Antibodies.       RADIOLOGY EVALUATION     06-    MRI Brain NL       NEUROPHYSIOLOGY EVALUATION    06-    EEG NL     07- THROUGH 07-     AEEG  HOURS: SW, LT TL F7-T7, FOUR EVENTS (NON-EPILEPTIC-PSYCHOGENIC) .      AED MONITORING      AED: LEV  RANGE:  03-60 Dose    DATE LEVEL    05-   1000 mg QHS    01-     >1.0    05- 9.6 NL                             01-    LEV LEVEL <1.0     Level indicates non compliance       Reviewed the neuroimaging independently       Assessment:       1. Intractable epilepsy without status epilepticus, unspecified epilepsy type    2. Epilepsy, grand mal    3. Nonepileptic episode    4. Generalized anxiety disorder    5. Psychosocial stressors    6. Paresthesias    7. Sjogren's syndrome with other organ involvement    8. Migraine without status migrainosus, not intractable, unspecified migraine type    9. Scleroderma    10. Raynaud's disease without gangrene    11. Lupus nephritis, ISN/RPS class V    12. On antiepileptic therapy    13. Vitamin D deficiency disease    14. SLE  (systemic lupus erythematosus related syndrome)                EPILEPSY CLASSIFICATION    SEMIOLOGY: SENSORY AURA (LEFT FACE)--> GTC    EPILEPTOGENIC ZONE (S):  UNKNOWN     ETIOLOGY: UNKNOWN     PRIOR AEDS: GBP (PAIN)    CURRENT AEDS: LEV     LAST SEIZURE DATE:       COMPREHENSIVE LIST OF AEDs:     Acetazolamide (AZM-Diamox)   Benzos: clonazepam (CZP Klonopin), lorazepam (LZP-Ativan), diazepam (DZP-Valium), clorazepate (CLZ- Tranxene)  Brivaracetam (BRV-Briviact)  Cannabidiol (CBD- Epidiolex)  Carbamazepine (CBZ-Tegretol)  Cenobamate (CNB-Xcopri)  Clobazam (CLB-Onfi)  Eslicarbazepine (ESL-Aptiom)  Ethosuximide (ESX-Zarontin)  Felbamate (FBM-Felbatol)  Fenfluramine (FFA-Fintepla)  Gabapentin (GBP-Neurontin)  Lacosamide (LCM-Vimpat)  Lamotrigine (LTG-Lamitcal)  Levetiracetam (LEV- Keppra)  Oxcarbazepine (OXC-Trileptal)  Perampanel (PML-Fycompa)  Phenobarbital (PB)  Phenytoin (PHT-Dilantin)  Pregabalin (PGB-Lyrica)  Primidone (PRM)   Retigabine (RTG- Potiga) Discontinued in 2017  Rufinamide (RFN-Benzil)  Stiripentol (STP-Diacomit)  Tiagabine (TGB-Gabitril)  Topiramate (TPM-Topamax)  Valproate (VPA-Depakote)  Vigabatrin (VGB-Sabril)  Zonisamide (ZNS-Zonegran)    Plan:         CARRIES A DIAGNOSIS OF GRAND MAL EPILEPSY/PSYCHOGENIC NON-EPILEPTIC SEIZURES        The patient was encouraged to maintain full traditional seizure precautions which include but not limited to avoidance of driving, biking, high altitudes (ladders, escalators, rock climbing, mountain climbing, skiing, connor diving, moderate to difficult hiking), proximity to fire or fire source, proximity to body of water or swimming alone, operating heavy and potentially risky machinery, using sharp objects, using exercise machines like treadmill and weight lifting. Walking while accompanied on soft surfaces like grass is preferable.The patient was encouraged to shower (without accumulation of water) instead of taking a bath if unsupervised. The patient was  made aware that these precautions are especially important during concurrent illnesses, fever, infections, vomiting, changing medications and running out of anti-seizure medications. Instructed the patient to avoid night shifts, sleep deprivation and alcohol or recreational drug use as adequate sleep and avoidance of alcohol/drugs are very important measures to assure good seizure control. The patient was also advised not to care for young children without company. The patient is advised to pad the side rails with pillows and blankets if applicable.I strongly recommended lowering the bed to the floor level to decrease the risk of falls during nocturnal seizures that occur during sleep. I also instructed the patient to avoid safety sensitive duties. In general, any activity that requires full awareness and would result in serious injury to self and others if a seizure takes place should be avoided.      Made the patient aware that the duration of restrictions/precautions varies and in [place] it is 6 months. The patient verbalized  full understanding.        Continue Levetiracetam/Keppra-LEV XR 2000 mg QHS. Check LEV today       NON-EPILEPTIC SEIZURES    Discussed with patient regarding Psychogenic non-epileptic seizures are events resembling an epileptic seizure, but without the characteristic electrical discharges associated with epilepsy. They are of psychological origin, and are one type of non-epileptic seizure mimics. PNES are also known less specifically as non-epileptic attack disorder and functional neurological symptom disorder. I explained to patient that he will require different treatment to address the underlying cause for this phenomenon. A specific traumatic event, such as physical or sexual abuse, incest, divorce, death of a loved one, or other great loss or sudden change, can be identified in many people with PNES. Recommend CBT By definition, PNES are a physical manifestation of a psychological  disturbance and are a type of Somatoform Disorder.     Follow up with Psychiatry     Continue AEDs INDEFINITELY, FOR GOOD AND NEVER SKIP A DOSE. The patient verbalized full understanding. Stressed the extreme medical, personal safety, public safety and legal importance of compliance and seizure control. Will give as many refills as possible with or without face-to-face evaluation to make sure the patient and any patient with epilepsy will never run out of medications. Running out of seizure medications should never happen under our care. I explained to the patient that he should not, under any circumstances, adjust or stop taking his seizure medication without discussing with me. Warned the patient about SUDEP. The patient verbalized full understanding.       AVOID any substance that could lower seizure threshold including but not limited to:        ALCOHOL AND WITHDRAWAL      TRAMADOL.     MEPERIDINE (DEMEROL)     ALL STIMULANTS-ALL ADHD MEDICATIONS.      CLOZAPINE.      BUPROPION (WELLBUTRIN)     CIPROFLOXACIN.    CYCLOSPORINE.     METOCLOPRAMIDE (REGLAN).     TETRAHYDROCANNABINOL (THC)    KRATOM            COMMON MIGRAINE WITHOUT AURA, EPISODIC, LOW FREQUENCY     HEADACHE DIARY     DISCUSSED THE THREE-FOLD MANAGEMENT OF MIGRAINE:      LIFESTYLE CHANGES:       Good sleep hygiene  Avoid general triggers like lack of sleep/too much sleep, prolonged sun exposure, excessive screen time and specific triggers based on you own diary   Minimize physical and emotional stress  Smoking avoidance and cessation  Limit caffeine drinks to 1-2 a day   Good hydration   Small frequent meals and avoid skipping meals   Moderate 30-minute-long aerobic exercises 3 times/week. Avoid strenuous exercise         ABORTIVE MEDICATIONS (ACUTE-RESCUE MEDICATIONS):     Should only be taken 2-3 times/week to avoid rebound and overuse headaches.      Triptans and DHE are C/I in Raynaud's.    Failed Firoicet.       AVOID NARCOTICS  (OPIATES)      1. No randomized controlled study shows pain-free results with opioids in the treatment of migraine.     2. The physiologic consequences of opioid use are adverse, occur quickly, and can be permanent. Decreased gray matter, release of calcitonin gene-related peptide, dynorphin, and pro-inflammatory peptides, and activation of excitatory glutamate receptors are all associated with opioid exposure.     3. Opioids are pro-nociceptive, prevent reversal of migraine central sensitization, and interfere with triptan effectiveness.     4.Opioids precipitate bad clinical outcomes, especially transformation to daily headache.     5. They cause disease progression, comorbidity, and excessive health care consumption.           NEXT OPTIONS:      Gepants: Nuretc (rimegepant)75 mg >Ubrelvy (ubrogepant) 100 mg          IMPENDING STATUS: Prednisone and Vistaril.    STATUS MIGRAINOSUS: ED-Infusion for Status Protocol.        PREVENTATIVE (MORE ACCURATELY MIGRAINE REDUCTION) MEDICATIONS:           Since the patient's headache is very infrequent will hold off.     Tried CCB (Amlodipine),  TCA (Elavil), SSRIS (Zoloft) and AEDs (GBP).     BB are C/I in [facility].    HELPFUL SUPPLEMENTS:     Helpful supplements include Co-Q 10, B2, Mg, Feverfew (Dolovent combination) and butterbur (Petadolex)        NEUROPHARMACOLOGY     NEXT OPTIONS:       Topiramate/Topamax (TPM) slow titration to 50 mg BID which can cause mental slowing, transient tingling, kidney stones, weight loss, cleft lip and palate and rarely glaucoma and visual field defects . The patient was encouraged to drink a lot of fluids.     Zonisamide/Zonegran (ZNS) 100-400 mg QHS is a good alternative to TPM in case of SE/AE.     Lamotrigine/Lamictal  (LTG)slow titration to 100 mg BID which can cause serious skin rash and rare cardiac arrhythmias. LTG is superior to other therapies for specifically reducing migraine aura.     ANTI-CGRP AGENTS: Qulipta (alogepant) 60 mg  QD, Erenumab (Aimovig) 140 mg SQ Pen monthly (Reported cases of Constipation and BP elevation) , Galcanezumab (Emgality) 120 mg SQ Pen monthly after a loading dose of 240 mg  and Fremnezumab (Ajovy) (Ligand Blocker): 225 mg SQ monthly or 675 mg every 3 months     Botox 200 units every 3 months .        LAST RESORT OPTIONS:      Namenda 10 mg BID     Valproic acid/ Depakote         NEUROMODULATION     Cefaly, Relivion, Nerivio and GammaCore (VNS)           PARESTHESIAS, HANDS AND FEET, B12 AND VITAMIN D DEFICIENCY, CONNECTIVE TISSUE DISEASE, IMMUNOSUPPRESSIVE THERAPY        Change B12 SL 2500 mcg QD to B 12 injections monthly     Continue Vitamin D 5000 units QD.              MEDICAL/SURGICAL COMORBIDITIES     All relevant medical comorbidities noted and managed by primary care physician and medical care team.          HEALTHY LIFESTYLE AND PREVENTATIVE CARE    The patient to adhere to the age-appropriate health maintenance guidelines including screening tests and vaccinations. The patient to adhere to  healthy lifestyle, optimal weight, exercise, healthy diet, good sleep hygiene and avoiding drugs including smoking, alcohol and recreational drugs.          RTC in 11 months       MARCO Najera NP      Collaborating Provider: Daren Lamb MD, FAAN Neurologist/Epileptologist    I spent a total of 30 minutes on the day of the visit.  This includes face to face time and non-face to face time preparing to see the patient (eg, review of tests), obtaining and/or reviewing separately obtained history, documenting clinical information in the electronic or other health record, independently interpreting results and communicating results to the patient/family/caregiver, or care coordinator.

## 2025-05-01 ENCOUNTER — RESULTS FOLLOW-UP (OUTPATIENT)
Dept: NEUROLOGY | Facility: CLINIC | Age: 50
End: 2025-05-01
Payer: COMMERCIAL

## 2025-05-01 LAB — W LEVETIRACETAM: 9.6 UG/ML

## 2025-05-02 ENCOUNTER — PROCEDURE VISIT (OUTPATIENT)
Dept: SPORTS MEDICINE | Facility: CLINIC | Age: 50
End: 2025-05-02
Payer: COMMERCIAL

## 2025-05-02 ENCOUNTER — PATIENT MESSAGE (OUTPATIENT)
Dept: SPORTS MEDICINE | Facility: CLINIC | Age: 50
End: 2025-05-02
Payer: COMMERCIAL

## 2025-05-02 DIAGNOSIS — M17.0 BILATERAL PRIMARY OSTEOARTHRITIS OF KNEE: Primary | ICD-10-CM

## 2025-05-02 PROCEDURE — 20610 DRAIN/INJ JOINT/BURSA W/O US: CPT | Mod: 50,S$GLB,, | Performed by: PHYSICIAN ASSISTANT

## 2025-05-02 PROCEDURE — 99499 UNLISTED E&M SERVICE: CPT | Mod: S$GLB,,, | Performed by: PHYSICIAN ASSISTANT

## 2025-05-02 NOTE — PROCEDURES
Large Joint Aspiration/Injection: bilateral knee    Date/Time: 5/2/2025 1:30 PM    Performed by: Kaya Ordoñez PA-C  Authorized by: Kaya Ordoñez PA-C    Consent Done?:  Yes (Verbal)  Indications:  Pain  Site marked: the procedure site was marked    Timeout: prior to procedure the correct patient, procedure, and site was verified    Prep: patient was prepped and draped in usual sterile fashion      Local anesthesia used?: Yes    Local anesthetic:  Topical anesthetic    Details:  Needle Size:  22 G  Ultrasonic Guidance for needle placement?: No    Approach:  Superior  Location:  Knee  Laterality:  Bilateral  Site:  Bilateral knee  Medications (Right):  20 mg sodium hyaluronate (EUFLEXXA) 10 mg/mL(mw 2.4 -3.6 million)  Medications (Left):  20 mg sodium hyaluronate (EUFLEXXA) 10 mg/mL(mw 2.4 -3.6 million)  Patient tolerance:  Patient tolerated the procedure well with no immediate complications     Bilateral Euflexxa injection  1/3

## 2025-05-08 ENCOUNTER — TELEPHONE (OUTPATIENT)
Dept: SPORTS MEDICINE | Facility: CLINIC | Age: 50
End: 2025-05-08
Payer: COMMERCIAL

## 2025-05-08 NOTE — TELEPHONE ENCOUNTER
I successfully got the patient rescheduled for her injection and she confirmed a new date and time that worked well with her.     ----- Message from Jah sent at 5/8/2025  9:54 AM CDT -----  Contact: [phone]  Patient is scheduled for ortho procedure on 5/9 and requesting to reschedule. Pt is going out of town and would like to reschedule the procedure for Monday. Please call patient at [phone]. Thanks KB

## 2025-05-16 ENCOUNTER — PROCEDURE VISIT (OUTPATIENT)
Dept: SPORTS MEDICINE | Facility: CLINIC | Age: 50
End: 2025-05-16
Payer: COMMERCIAL

## 2025-05-16 DIAGNOSIS — M17.0 BILATERAL PRIMARY OSTEOARTHRITIS OF KNEE: Primary | ICD-10-CM

## 2025-05-16 NOTE — PROCEDURES
Large Joint Aspiration/Injection: bilateral knee    Date/Time: 5/16/2025 1:00 PM    Performed by: Kaya Ordoñez PA-C  Authorized by: Kaya Ordoñez PA-C    Consent Done?:  Yes (Verbal)  Indications:  Pain  Site marked: the procedure site was marked    Timeout: prior to procedure the correct patient, procedure, and site was verified    Prep: patient was prepped and draped in usual sterile fashion      Local anesthesia used?: Yes    Local anesthetic:  Topical anesthetic    Details:  Needle Size:  22 G  Ultrasonic Guidance for needle placement?: No    Approach:  Superior  Location:  Knee  Laterality:  Bilateral  Site:  Bilateral knee  Medications (Right):  20 mg sodium hyaluronate (EUFLEXXA) 10 mg/mL(mw 2.4 -3.6 million)  Medications (Left):  20 mg sodium hyaluronate (EUFLEXXA) 10 mg/mL(mw 2.4 -3.6 million)  Patient tolerance:  Patient tolerated the procedure well with no immediate complications     Bilateral Euflexxa injection  2/3

## 2025-05-20 RX ORDER — PREDNISONE 2.5 MG/1
2.5 TABLET ORAL
Qty: 30 TABLET | Refills: 0 | Status: SHIPPED | OUTPATIENT
Start: 2025-05-20

## 2025-05-23 ENCOUNTER — PROCEDURE VISIT (OUTPATIENT)
Dept: ORTHOPEDICS | Facility: CLINIC | Age: 50
End: 2025-05-23
Payer: COMMERCIAL

## 2025-05-23 DIAGNOSIS — M17.0 OSTEOARTHRITIS OF BOTH KNEES, UNSPECIFIED OSTEOARTHRITIS TYPE: Primary | ICD-10-CM

## 2025-05-23 NOTE — PROCEDURES
Large Joint Aspiration/Injection: bilateral knee    Date/Time: 5/23/2025 8:00 AM    Performed by: Liana Harmon PA-C  Authorized by: Liana Harmon PA-C    Consent Done?:  Yes (Verbal)  Indications:  Arthritis  Site marked: the procedure site was marked    Timeout: prior to procedure the correct patient, procedure, and site was verified    Local anesthesia used?: No      Details:  Needle Size:  22 G  Ultrasonic Guidance for needle placement?: No    Approach:  Anterolateral  Location:  Knee  Laterality:  Bilateral  Site:  Bilateral knee  Medications (Right):  20 mg sodium hyaluronate (EUFLEXXA) 10 mg/mL(mw 2.4 -3.6 million)  Medications (Left):  20 mg sodium hyaluronate (EUFLEXXA) 10 mg/mL(mw 2.4 -3.6 million)  Patient tolerance:  Patient tolerated the procedure well with no immediate complications     Euflexxa 3/3

## 2025-06-06 ENCOUNTER — OFFICE VISIT (OUTPATIENT)
Dept: BARIATRICS | Facility: CLINIC | Age: 50
End: 2025-06-06
Payer: COMMERCIAL

## 2025-06-06 VITALS
RESPIRATION RATE: 16 BRPM | TEMPERATURE: 97 F | SYSTOLIC BLOOD PRESSURE: 140 MMHG | WEIGHT: 260.38 LBS | DIASTOLIC BLOOD PRESSURE: 73 MMHG | HEIGHT: 66 IN | HEART RATE: 74 BPM | BODY MASS INDEX: 41.85 KG/M2

## 2025-06-06 DIAGNOSIS — E66.01 MORBID OBESITY: Primary | ICD-10-CM

## 2025-06-06 PROCEDURE — 3077F SYST BP >= 140 MM HG: CPT | Mod: CPTII,S$GLB,, | Performed by: NURSE PRACTITIONER

## 2025-06-06 PROCEDURE — 3008F BODY MASS INDEX DOCD: CPT | Mod: CPTII,S$GLB,, | Performed by: NURSE PRACTITIONER

## 2025-06-06 PROCEDURE — 99204 OFFICE O/P NEW MOD 45 MIN: CPT | Mod: S$GLB,,, | Performed by: NURSE PRACTITIONER

## 2025-06-06 PROCEDURE — 3078F DIAST BP <80 MM HG: CPT | Mod: CPTII,S$GLB,, | Performed by: NURSE PRACTITIONER

## 2025-06-06 PROCEDURE — 1159F MED LIST DOCD IN RCRD: CPT | Mod: CPTII,S$GLB,, | Performed by: NURSE PRACTITIONER

## 2025-06-06 PROCEDURE — 1160F RVW MEDS BY RX/DR IN RCRD: CPT | Mod: CPTII,S$GLB,, | Performed by: NURSE PRACTITIONER

## 2025-06-06 PROCEDURE — 99999 PR PBB SHADOW E&M-EST. PATIENT-LVL IV: CPT | Mod: PBBFAC,,, | Performed by: NURSE PRACTITIONER

## 2025-06-06 NOTE — PROGRESS NOTES
Initial Consult    Chief Complaint   Patient presents with    Consult    Obesity    Nutrition Counseling       History of Present Illness:  Patient is a 49 y.o. female who is referred for evaluation of surgical or medical treatment of morbid obesity. Her Body mass index is 42.02 kg/m². She has known comorbidities of hypertension and Lupus. She has attended the bariatric seminar and is most interested in Medical Weight Loss.      Past attempts at weight loss include:   Unsupervised: low carb,    Supervised:  meds from MD  Diet pills: wegovy only taking 1 month dose with minimal loss, phentermine around 2004 with 50 lbs weight loss  Exercise attempts: treadmill, gym membership    Weight history:   At current weight:  6 months  Obese for 10 years.  More than 35 pounds overweight for 10 years.  More than 100 pounds overweight for 10 years.  Started dieting at 28s years old.  Maximum weight reached: 262 lbs  Most weight lost was 50 lbs through meal replacements, shakes, stress, gym and phentermine for 3 years.  She describes Her eating habits as volume, snacking.    ALESSANDRA screening: n/a    Reflux screening: n/a      Past Medical History:   Diagnosis Date    Abnormal stress test 01/25/2022    Asthma     Essential hypertension, malignant 01/08/2020    History of colonic polyps 2023    Lupus (systemic lupus erythematosus)     Membranous glomerulonephritis, stage 4 05/30/2019    Scleroderma     Seizures 01/12/2017     Past Surgical History:   Procedure Laterality Date    COLONOSCOPY N/A 01/12/2023    Procedure: COLONOSCOPY;  Surgeon: Supriya Hughes MD;  Location: [facility];  Service: Endoscopy;  Laterality: N/A;    ESOPHAGOGASTRODUODENOSCOPY N/A 01/12/2023    Procedure: EGD (ESOPHAGOGASTRODUODENOSCOPY);  Surgeon: Supriya Hughes MD;  Location: [facility];  Service: Endoscopy;  Laterality: N/A;    INTRALUMINAL GASTROINTESTINAL TRACT IMAGING VIA CAPSULE N/A 5/22/2024    Procedure: IMAGING PROCEDURE, GI TRACT,  INTRALUMINAL, VIA CAPSULE;  Surgeon: Sheila Maurice, First Available;  Location: [facility] ENDO;  Service: Endoscopy;  Laterality: N/A;    LEFT HEART CATHETERIZATION Left 01/25/2022    Procedure: CATHETERIZATION, HEART, LEFT;  Surgeon: Rocío Davidson MD;  Location: [facility] CATH LAB;  Service: Cardiology;  Laterality: Left;    RENAL BIOPSY  11/16/2018     Family History   Problem Relation Name Age of Onset    Arthritis Mother Radha Escalona     Glaucoma Mother Radha Escalona     Hypertension Mother Radha Escalona     Diabetes Mother Radha Escalona     Heart disease Mother Radha Escalona     Stroke Mother Radha Escalona     Diabetes Father Ace Green     Cancer Father Ace Green     Diabetes Maternal Grandmother Rose Marx     Hypertension Maternal Grandmother Rose Marx     Arthritis Maternal Grandmother Rose Marx     Cataracts Maternal Grandmother Rose Marx     Diabetes Maternal Grandfather Jesus Marx, Jr     Heart disease Maternal Grandfather Jesus Marx, Jr     Hypertension Maternal Grandfather Jesus Marx, Jr     Diabetes Paternal Grandmother Bess Green     Heart disease Paternal Grandmother Bess Green     Hypertension Paternal Grandmother Bess Green     Asthma Paternal Grandmother Bess Green     Heart disease Paternal Grandfather Germán Green, Sr.     Cancer Maternal Uncle Aramisamina Marx     Hypertension Maternal Uncle Aramisamina Marx     Cancer Maternal Uncle Marco A Marx     Cancer Paternal Aunt Angie Farah     Cancer Paternal Aunt Amy Cedillo     Cancer Paternal Aunt Kapil Bynum     Heart disease Maternal Uncle Ricardo Marx, Sr.     Hypertension Maternal Uncle Ricardo Marx, Sr.      Social History[1]     Review of patient's allergies indicates:   Allergen Reactions    Lisinopril      cough    Sulfa (sulfonamide antibiotics) Swelling       Current Medications[2]      Chart review:  Primary Care Physician: Jax      Lab review:  Most Recent Data:  CBC:    Lab Results   Component Value Date    WBC 8.08 03/12/2025    HGB 13.8 03/12/2025    HCT 42.0 03/12/2025     03/12/2025    MCV 88 03/12/2025    RDW 13.3 03/12/2025     BMP:   Lab Results   Component Value Date     03/12/2025    K 4.3 03/12/2025     03/12/2025    CO2 25 03/12/2025    BUN 12 03/12/2025    CREATININE 1.1 03/12/2025    GLU 94 03/12/2025    CALCIUM 10.0 03/12/2025    MG 2.0 07/27/2020    PHOS 3.2 06/30/2020     LFTs:   Lab Results   Component Value Date    PROT 8.6 (H) 03/12/2025    ALBUMIN 4.3 03/12/2025    BILITOT 0.4 03/12/2025    AST 19 03/12/2025    ALKPHOS 54 03/12/2025    ALT 27 03/12/2025     Coags:   Lab Results   Component Value Date    INR 1.0 01/12/2022     FLP:   Lab Results   Component Value Date    CHOL 172 01/19/2024    HDL 51 01/19/2024    LDLCALC 96.8 01/19/2024    TRIG 121 01/19/2024    CHOLHDL 29.7 01/19/2024     DM:   Lab Results   Component Value Date    HGBA1C 5.5 01/19/2024    HGBA1C 5.2 03/17/2021    MICROALBUR 30 01/08/2020    LDLCALC 96.8 01/19/2024    CREATININE 1.1 03/12/2025     Thyroid:   Lab Results   Component Value Date    TSH 1.194 01/19/2024    FREET4 1.02 02/01/2018     Anemia:   Lab Results   Component Value Date    IRON 55 03/12/2025    TIBC 411 03/12/2025    FERRITIN 76 03/12/2025    YZPJHGBO77 473 04/28/2025    FOLATE 5.8 05/29/2019     Cardiac:   Lab Results   Component Value Date    TROPONINI <0.006 11/18/2021    BNP 15 09/11/2023     Urinalysis:   Lab Results   Component Value Date    LABURIN Final report 08/10/2020    COLORU Yellow 11/09/2022    SPECGRAV 1.015 11/09/2022    NITRITE Negative 11/09/2022    KETONESU Negative 11/09/2022    UROBILINOGEN 0.2 08/07/2020    WBCUA 2 10/09/2024         Radiology review:    No valid procedures specified.    Other Results:  EKG (my interpretation): normal EKG, normal sinus rhythm, unchanged from previous tracings.    Most Recent EKG Results  Results for orders placed or performed during the hospital  encounter of 12/02/24   EKG 12-lead    Collection Time: 12/02/24  2:45 PM   Result Value Ref Range    QRS Duration 76 ms    [facility] QTC Calculation 448 ms    Narrative    Test Reason : R07.9,R94.39,    Vent. Rate :  68 BPM     Atrial Rate :  68 BPM     P-R Int : 138 ms          QRS Dur :  76 ms      QT Int : 422 ms       P-R-T Axes :  52  30  31 degrees    QTcB Int : 448 ms    Normal sinus rhythm  Normal ECG  When compared with ECG of 11-Sep-2023 08:39,  No significant change was found  Confirmed by Tho Pena ([id number]) on 12/3/2024 10:19:29 AM    Referred By: NORBERTO MERCER           Confirmed By: Tho Pena       Most Recent Echocardiogram Results  Results for orders placed during the hospital encounter of 11/08/24    Echo    Interpretation Summary    Left Ventricle: The left ventricle is normal in size. Normal wall thickness. There is normal systolic function with a visually estimated ejection fraction of 60 - 65%. There is normal diastolic function.    Right Ventricle: Normal right ventricular cavity size. Wall thickness is normal. Systolic function is normal.    Mitral Valve: There is mild regurgitation.    Tricuspid Valve: There is mild regurgitation.    Pulmonary Artery: The estimated pulmonary artery systolic pressure is 27 mmHg.    IVC/SVC: Normal venous pressure at 3 mmHg.      Most Recent Nuclear Stress Test Results  Results for orders placed during the hospital encounter of 01/06/22    Nuclear Stress - Cardiology Interpreted    Interpretation Summary    Abnormal myocardial perfusion scan.    There is a mild to moderate intensity, reversible defect that is consistent with ischemia in the basal to mid anterior wall(s).    The gated perfusion images showed an ejection fraction of 76% at rest. The gated perfusion images showed an ejection fraction of 73% post stress.    The EKG portion of this study is negative for ischemia.    The patient reported no chest pain during the stress  "test.      Review of Systems:  Review of Systems   Constitutional:  Positive for appetite change, diaphoresis, fatigue and unexpected weight change.   Respiratory:  Positive for shortness of breath.    Endocrine: Positive for polydipsia.   Musculoskeletal:  Positive for back pain and joint swelling.   Allergic/Immunologic: Positive for immunocompromised state.   Neurological:  Positive for seizures.   All other systems reviewed and are negative.      Diet Education Discussed: Recommend high protein, low carb meals- mainly meats and vegetables.      Breakfast: skipping 3-4/wk, coffee with SF syrups and tbsp sharma sugar- scrambled eggs x2 with bell peppers/onion + sky  Snack: quick, rice cakes  Lunch: skip 5-6/wk   Dinner:  start meal prep this week- white beans, chicken, (no breads)- tortillas  Limited rice, potatoes  Water: 40 oz  Sweet tea  Juice- snapple apple juice    MVI:   none    Exercise: Recommend cardiovascular exercise, get HR over 100 for 20 minutes 3 times per week  none      Physical:     Vital Signs (Most Recent)  Temp: 97.3 °F (36.3 °C) (06/06/25 1217)  Pulse: 74 (06/06/25 1217)  Resp: 16 (06/06/25 1217)  BP: (!) 140/73 (06/06/25 1217)  5' 6" (1.676 m)  118.1 kg (260 lb 5.8 oz)     Body comp:  Fat Percent:  47.8 %  Fat Mass:  122.2 lb  FFM:  133.6 lb  TBW: 97.8 lb  TBW %:  38.2 %  BMR: 1894 kcal    Wt Readings from Last 5 Encounters:   06/06/25 118.1 kg (260 lb 5.8 oz)   04/28/25 118 kg (260 lb 2.3 oz)   03/28/25 118.3 kg (260 lb 12.9 oz)   03/12/25 118.3 kg (260 lb 12.9 oz)   12/02/24 115.6 kg (254 lb 13.6 oz)         Physical Exam:  Physical Exam  Vitals and nursing note reviewed.   Constitutional:       General: She is not in acute distress.     Appearance: Normal appearance. She is obese. She is not ill-appearing or toxic-appearing.   HENT:      Head: Normocephalic and atraumatic.      Right Ear: External ear normal.      Left Ear: External ear normal.      Nose: Nose normal. No congestion or " rhinorrhea.      Mouth/Throat:      Mouth: Mucous membranes are moist.      Pharynx: Oropharynx is clear.   Eyes:      General:         Right eye: No discharge.         Left eye: No discharge.      Conjunctiva/sclera: Conjunctivae normal.   Cardiovascular:      Rate and Rhythm: Normal rate and regular rhythm.      Pulses: Normal pulses.   Pulmonary:      Effort: Pulmonary effort is normal. No respiratory distress.   Musculoskeletal:         General: No swelling, tenderness, deformity or signs of injury. Normal range of motion.      Right lower leg: No edema.      Left lower leg: No edema.   Skin:     General: Skin is warm and dry.      Capillary Refill: Capillary refill takes less than 2 seconds.      Coloration: Skin is not jaundiced or pale.      Findings: No bruising, erythema or rash.   Neurological:      General: No focal deficit present.      Mental Status: She is alert and oriented to person, place, and time.      Motor: No weakness.      Gait: Gait normal.   Psychiatric:         Mood and Affect: Mood normal.         Behavior: Behavior normal.         Thought Content: Thought content normal.         Judgment: Judgment normal.     ASSESSMENT/PLAN:        1. Morbid obesity            Plan:  Zachary Lucia has morbid obesity as their Body mass index is 42.02 kg/m². She would benefit from weight loss and has chosen Medical Weight Loss as the preferred method. She understands that this is a tool and lifestyle change will be necessary to keep weight off.     Behavorial/Counseling Plan for patient:  Falls reviewed with patient  Continue prescribed home medications  Tanita Body Composition Scale results reviewed with patient  Discussed muscle vs fat loss  Do not exceed 1600 calories/day  I recommend the following therapeutic lifestyle changes:  Diet:   Transition to a low salt, primarily plant-based diet which emphasizes:  Increase fiber with vegetables, whole grains, legumes   Increase lean protein  by eating beans, legumes, fish, poultry, eggs, bison  Good dairy choices for lean protein include greek yogurt (low sugar options) and cottage cheese  Replacing butter with healthy fats, such as olive oil and nuts  Using herbs and spices instead of salt to flavor foods   Limiting red meat to no more than a few times a month   Limit added sugars (sodas, fruit juices, fancy coffee drinks)  Limit processed foods   Do not skip meals  Ensure 60 gm/protein per day at minimum  Focus on small, frequesnt meals with eating high protein, low carb  Clear, protein powder added to water, typically 20 gm protein- brands like Isopure Fusion, Oath, Seeq  Unflavored protein, Isopure, can add 25g to foods   2. Exercise:   Initiation of a graded aerobic exercise plan (goal 30-45 minutes per day 4-5 times per week)  Patient encouraged to participate in low intensity strength exercising and if unfamiliar with strength and conditioning training, I recommend joining a gym with access to a  to further instruct the patient. Increase activity  Remain consistent with exercise, including cardio and weights  3. Vitamins:   Start MVI      Medical Weight Loss  PO vs Injectables  PO  Topamax (Topiramate)-   IR vs ER Amphetamines- Phentermine, Phendimetrazine, Diethylpropion   Contrave (Bupropion/Naltrexone)   Metformin (Glucophage)  Injectables  Discussed Wegovy vs Zepbound  Discussed muscle vs fat loss on GLP1/GIP  Nutritional Packet and education provided      Option chosen today, 6/6/2025-   Topamax 25 mg BID  Phenterine 1/2 tablet (18.75 mg) daily for 30 days, will discuss increasing to BID           [1]  Social History  Tobacco Use    Smoking status: Never     Passive exposure: Never    Smokeless tobacco: Never   Substance Use Topics    Alcohol use: Not Currently     Alcohol/week: 2.0 standard drinks of alcohol     Types: 2 Glasses of wine per week     Comment: OCC    Drug use: No   [2]  Current Outpatient Medications    Medication Sig Dispense Refill    azaTHIOprine (IMURAN) 100 mg tablet Take 1 tablet (100 mg total) by mouth once daily. 90 tablet 1    cholecalciferol, vitamin D3, 1,250 mcg (50,000 unit) capsule Take 1 capsule (50,000 Units total) by mouth once a week. 15 capsule 1    cyanocobalamin 1,000 mcg/mL injection Inject 1 mL (1,000 mcg total) into the skin every 30 days. 3 mL 3    cyclobenzaprine (FLEXERIL) 10 MG tablet Take 1 tablet (10 mg total) by mouth 2 (two) times daily as needed for Muscle spasms. 60 tablet 0    diclofenac sodium (VOLTAREN) 1 % Gel Apply 2 g topically 3 (three) times daily. 1 each 1    fluticasone-salmeterol diskus inhaler 100-50 mcg Inhale 1 puff into the lungs 2 (two) times daily. Controller 1 each 11    furosemide (LASIX) 20 MG tablet Take 1 tablet (20 mg total) by mouth once daily. 30 tablet 2    hydroxychloroquine (PLAQUENIL) 200 mg tablet Take 1 tablet (200 mg total) by mouth 2 (two) times daily. 180 tablet 1     mg tablet Take 800 mg by mouth every 8 (eight) hours as needed.  0    levetiracetam XR (KEPPRA XR) 500 mg Tb24 24 hr tablet Take 4 tablets (2,000 mg total) by mouth once daily. 360 tablet 3    montelukast (SINGULAIR) 10 mg tablet TAKE 1 TABLET BY MOUTH EVERY DAY IN THE EVENING 90 tablet 0    mupirocin (BACTROBAN) 2 % ointment Apply topically 3 (three) times daily. 1 g 0    pantoprazole (PROTONIX) 40 MG tablet Take 1 tablet (40 mg total) by mouth once daily. 30 tablet 3    predniSONE (DELTASONE) 2.5 MG tablet TAKE 1 TABLET BY MOUTH EVERY DAY 30 tablet 0    predniSONE (DELTASONE) 5 MG tablet Along with the 2.5 mg tablets, take total of 7.5 mg daily. 90 tablet 0     No current facility-administered medications for this visit.

## 2025-06-07 RX ORDER — PHENTERMINE HYDROCHLORIDE 37.5 MG/1
18.75 TABLET ORAL
Qty: 15 TABLET | Refills: 0 | Status: SHIPPED | OUTPATIENT
Start: 2025-06-07 | End: 2025-07-07

## 2025-06-07 RX ORDER — TOPIRAMATE 25 MG/1
25 TABLET, FILM COATED ORAL 2 TIMES DAILY
Qty: 60 TABLET | Refills: 11 | Status: SHIPPED | OUTPATIENT
Start: 2025-06-07 | End: 2026-06-07

## 2025-06-13 ENCOUNTER — TELEPHONE (OUTPATIENT)
Dept: ORTHOPEDICS | Facility: CLINIC | Age: 50
End: 2025-06-13
Payer: COMMERCIAL

## 2025-07-12 DIAGNOSIS — E66.01 MORBID OBESITY: ICD-10-CM

## 2025-07-14 RX ORDER — PHENTERMINE HYDROCHLORIDE 37.5 MG/1
18.75 TABLET ORAL
Qty: 30 TABLET | Refills: 0 | Status: SHIPPED | OUTPATIENT
Start: 2025-07-14 | End: 2025-08-13

## 2025-07-28 ENCOUNTER — TELEPHONE (OUTPATIENT)
Dept: BARIATRICS | Facility: CLINIC | Age: 50
End: 2025-07-28
Payer: COMMERCIAL

## 2025-07-29 ENCOUNTER — PATIENT MESSAGE (OUTPATIENT)
Dept: BARIATRICS | Facility: CLINIC | Age: 50
End: 2025-07-29
Payer: COMMERCIAL

## 2025-08-10 DIAGNOSIS — M35.01 SJOGREN'S SYNDROME WITH KERATOCONJUNCTIVITIS SICCA: ICD-10-CM

## 2025-08-11 RX ORDER — HYDROXYCHLOROQUINE SULFATE 200 MG/1
200 TABLET, FILM COATED ORAL 2 TIMES DAILY
Qty: 180 TABLET | Refills: 1 | OUTPATIENT
Start: 2025-08-11

## 2025-08-30 DIAGNOSIS — E66.01 MORBID OBESITY: ICD-10-CM

## 2025-09-03 RX ORDER — PHENTERMINE HYDROCHLORIDE 37.5 MG/1
18.75 TABLET ORAL
Qty: 30 TABLET | Refills: 0 | Status: SHIPPED | OUTPATIENT
Start: 2025-09-03 | End: 2025-10-03

## 2025-09-04 ENCOUNTER — OFFICE VISIT (OUTPATIENT)
Dept: URGENT CARE | Facility: CLINIC | Age: 50
End: 2025-09-04
Payer: COMMERCIAL

## 2025-09-04 VITALS
TEMPERATURE: 98 F | HEART RATE: 72 BPM | BODY MASS INDEX: 38.51 KG/M2 | DIASTOLIC BLOOD PRESSURE: 86 MMHG | RESPIRATION RATE: 16 BRPM | SYSTOLIC BLOOD PRESSURE: 139 MMHG | HEIGHT: 66 IN | OXYGEN SATURATION: 97 % | WEIGHT: 239.63 LBS

## 2025-09-04 DIAGNOSIS — R10.13 POSTPRANDIAL EPIGASTRIC PAIN: ICD-10-CM

## 2025-09-04 DIAGNOSIS — R10.10 UPPER ABDOMINAL PAIN: ICD-10-CM

## 2025-09-04 DIAGNOSIS — R10.13 EPIGASTRIC PAIN: Primary | ICD-10-CM

## 2025-09-04 DIAGNOSIS — R11.10 POSTPRANDIAL VOMITING: ICD-10-CM

## 2025-09-04 DIAGNOSIS — K21.9 GASTROESOPHAGEAL REFLUX DISEASE, UNSPECIFIED WHETHER ESOPHAGITIS PRESENT: ICD-10-CM

## 2025-09-04 PROCEDURE — 99214 OFFICE O/P EST MOD 30 MIN: CPT | Mod: S$GLB,,, | Performed by: PHYSICIAN ASSISTANT

## 2025-09-04 RX ORDER — ONDANSETRON 4 MG/1
4 TABLET, ORALLY DISINTEGRATING ORAL EVERY 6 HOURS PRN
Qty: 12 TABLET | Refills: 0 | Status: SHIPPED | OUTPATIENT
Start: 2025-09-04

## 2025-09-04 RX ORDER — SUCRALFATE 1 G/1
1 TABLET ORAL 4 TIMES DAILY
Qty: 28 TABLET | Refills: 0 | Status: SHIPPED | OUTPATIENT
Start: 2025-09-04 | End: 2025-09-11

## 2025-09-04 RX ORDER — LIDOCAINE HYDROCHLORIDE 20 MG/ML
10 SOLUTION OROPHARYNGEAL
Status: COMPLETED | OUTPATIENT
Start: 2025-09-04 | End: 2025-09-04

## 2025-09-04 RX ORDER — ALUMINUM HYDROXIDE, MAGNESIUM HYDROXIDE, AND SIMETHICONE 1200; 120; 1200 MG/30ML; MG/30ML; MG/30ML
30 SUSPENSION ORAL
Status: COMPLETED | OUTPATIENT
Start: 2025-09-04 | End: 2025-09-04

## 2025-09-04 RX ADMIN — LIDOCAINE HYDROCHLORIDE 10 ML: 20 SOLUTION OROPHARYNGEAL at 06:09

## 2025-09-04 RX ADMIN — ALUMINUM HYDROXIDE, MAGNESIUM HYDROXIDE, AND SIMETHICONE 30 ML: 1200; 120; 1200 SUSPENSION ORAL at 06:09

## 2025-09-05 ENCOUNTER — OFFICE VISIT (OUTPATIENT)
Dept: OPHTHALMOLOGY | Facility: CLINIC | Age: 50
End: 2025-09-05
Payer: COMMERCIAL

## 2025-09-05 DIAGNOSIS — M35.00 SJOGREN'S SYNDROME, WITH UNSPECIFIED ORGAN INVOLVEMENT: Primary | ICD-10-CM

## 2025-09-05 DIAGNOSIS — H52.7 REFRACTIVE ERRORS: ICD-10-CM

## 2025-09-05 DIAGNOSIS — Z79.899 LONG-TERM USE OF PLAQUENIL: ICD-10-CM

## 2025-09-05 PROCEDURE — 99999 PR PBB SHADOW E&M-EST. PATIENT-LVL III: CPT | Mod: PBBFAC,,, | Performed by: OPTOMETRIST

## 2025-09-06 ENCOUNTER — TELEPHONE (OUTPATIENT)
Dept: URGENT CARE | Facility: CLINIC | Age: 50
End: 2025-09-06
Payer: COMMERCIAL

## (undated) DEVICE — CATH INFINITI MULTIPAK JR4 5FR

## (undated) DEVICE — WIRE GUIDE TEFLON 3CM .035 145

## (undated) DEVICE — ANGIOTOUCH KIT

## (undated) DEVICE — KIT MANIFOLD LOW PRESS TUBING

## (undated) DEVICE — CATH PIG145 INFINITI 5X110CM

## (undated) DEVICE — BAND TR WITH INFLATOR

## (undated) DEVICE — KIT SYR REUSABLE

## (undated) DEVICE — GUIDEWIRE WHOLEY HI TORQ 175CM

## (undated) DEVICE — GLIDESHEATH SLENDER SS 5FR10CM

## (undated) DEVICE — OMNIPAQUE 300MG 150ML VIAL

## (undated) DEVICE — PACK CATH LAB CUSTOM BR

## (undated) DEVICE — CATH JL4 5FR

## (undated) DEVICE — CATH JR4 5FR